# Patient Record
Sex: MALE | Race: WHITE | NOT HISPANIC OR LATINO | Employment: OTHER | URBAN - METROPOLITAN AREA
[De-identification: names, ages, dates, MRNs, and addresses within clinical notes are randomized per-mention and may not be internally consistent; named-entity substitution may affect disease eponyms.]

---

## 2023-06-05 ENCOUNTER — APPOINTMENT (OUTPATIENT)
Dept: CT IMAGING | Facility: HOSPITAL | Age: 88
End: 2023-06-05
Payer: MEDICARE

## 2023-06-05 ENCOUNTER — APPOINTMENT (OUTPATIENT)
Dept: GENERAL RADIOLOGY | Facility: HOSPITAL | Age: 88
End: 2023-06-05
Payer: MEDICARE

## 2023-06-05 ENCOUNTER — HOSPITAL ENCOUNTER (OUTPATIENT)
Facility: HOSPITAL | Age: 88
Setting detail: OBSERVATION
LOS: 1 days | Discharge: NURSING, REHAB OR LONG-TERM CARE | End: 2023-06-14
Attending: EMERGENCY MEDICINE | Admitting: INTERNAL MEDICINE
Payer: MEDICARE

## 2023-06-05 ENCOUNTER — APPOINTMENT (OUTPATIENT)
Dept: NEPHROLOGY | Facility: HOSPITAL | Age: 88
End: 2023-06-05
Payer: MEDICARE

## 2023-06-05 DIAGNOSIS — R13.12 OROPHARYNGEAL DYSPHAGIA: ICD-10-CM

## 2023-06-05 DIAGNOSIS — S09.90XA CLOSED HEAD INJURY, INITIAL ENCOUNTER: ICD-10-CM

## 2023-06-05 DIAGNOSIS — S12.690A COMPRESSION FRACTURE OF C7 VERTEBRA, INITIAL ENCOUNTER: Primary | ICD-10-CM

## 2023-06-05 DIAGNOSIS — N18.6 ESRD ON HEMODIALYSIS: ICD-10-CM

## 2023-06-05 DIAGNOSIS — Z99.2 ESRD ON HEMODIALYSIS: ICD-10-CM

## 2023-06-05 PROBLEM — I50.30 (HFPEF) HEART FAILURE WITH PRESERVED EJECTION FRACTION: Status: ACTIVE | Noted: 2022-09-24
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
## 2023-06-05 PROBLEM — S22.000A COMPRESSION FRACTURE OF THORACIC VERTEBRA: Status: ACTIVE | Noted: 2023-05-09

## 2023-06-05 PROBLEM — Z91.81 AT HIGH RISK FOR FALLS: Status: ACTIVE | Noted: 2023-04-06

## 2023-06-05 PROBLEM — S32.402A CLOSED FRACTURE OF LEFT ACETABULUM: Status: ACTIVE | Noted: 2023-03-21

## 2023-06-05 PROBLEM — F32.1 MAJOR DEPRESSIVE DISORDER, SINGLE EPISODE, MODERATE: Status: ACTIVE | Noted: 2023-04-26

## 2023-06-05 PROBLEM — N18.9 ANEMIA OF RENAL DISEASE: Status: ACTIVE | Noted: 2017-04-12

## 2023-06-05 PROBLEM — N40.0 BENIGN PROSTATIC HYPERPLASIA: Status: ACTIVE | Noted: 2017-01-11

## 2023-06-05 PROBLEM — E53.8 DEFICIENCY OF OTHER SPECIFIED B GROUP VITAMINS: Status: ACTIVE | Noted: 2023-04-26

## 2023-06-05 PROBLEM — G47.33 OSA (OBSTRUCTIVE SLEEP APNEA): Status: ACTIVE | Noted: 2022-11-23

## 2023-06-05 PROBLEM — F32.A DEPRESSION: Status: ACTIVE | Noted: 2022-11-28

## 2023-06-05 PROBLEM — K76.89 HEPATIC CYST: Status: ACTIVE | Noted: 2023-03-22

## 2023-06-05 PROBLEM — Q79.0 MORGAGNI HERNIA: Status: ACTIVE | Noted: 2023-03-21

## 2023-06-05 PROBLEM — Z79.01 LONG TERM (CURRENT) USE OF ANTICOAGULANTS: Status: ACTIVE | Noted: 2023-03-21

## 2023-06-05 PROBLEM — I10 ESSENTIAL HYPERTENSION: Status: ACTIVE | Noted: 2021-06-09

## 2023-06-05 PROBLEM — I65.01 OCCLUSION OF RIGHT VERTEBRAL ARTERY: Status: ACTIVE | Noted: 2023-03-21

## 2023-06-05 PROBLEM — E11.9 TYPE 2 DIABETES MELLITUS: Status: ACTIVE | Noted: 2023-03-21

## 2023-06-05 PROBLEM — G93.40 ENCEPHALOPATHY: Status: ACTIVE | Noted: 2023-05-09

## 2023-06-05 PROBLEM — D63.1 ANEMIA OF RENAL DISEASE: Status: ACTIVE | Noted: 2017-04-12

## 2023-06-05 LAB
ALBUMIN SERPL-MCNC: 3.1 G/DL (ref 3.5–5.2)
ALBUMIN/GLOB SERPL: 0.9 G/DL
ALP SERPL-CCNC: 200 U/L (ref 39–117)
ALT SERPL W P-5'-P-CCNC: 19 U/L (ref 1–41)
ANION GAP SERPL CALCULATED.3IONS-SCNC: 13 MMOL/L (ref 5–15)
ANION GAP SERPL CALCULATED.3IONS-SCNC: 13 MMOL/L (ref 5–15)
AST SERPL-CCNC: 25 U/L (ref 1–40)
BACTERIA UR QL AUTO: NORMAL /HPF
BASOPHILS # BLD AUTO: 0.04 10*3/MM3 (ref 0–0.2)
BASOPHILS NFR BLD AUTO: 0.5 % (ref 0–1.5)
BILIRUB SERPL-MCNC: 0.3 MG/DL (ref 0–1.2)
BILIRUB UR QL STRIP: NEGATIVE
BUN SERPL-MCNC: 37 MG/DL (ref 8–23)
BUN SERPL-MCNC: 38 MG/DL (ref 8–23)
BUN/CREAT SERPL: 7.8 (ref 7–25)
BUN/CREAT SERPL: 8.4 (ref 7–25)
CALCIUM SPEC-SCNC: 8.8 MG/DL (ref 8.6–10.5)
CALCIUM SPEC-SCNC: 9.1 MG/DL (ref 8.6–10.5)
CHLORIDE SERPL-SCNC: 93 MMOL/L (ref 98–107)
CHLORIDE SERPL-SCNC: 94 MMOL/L (ref 98–107)
CLARITY UR: CLEAR
CO2 SERPL-SCNC: 28 MMOL/L (ref 22–29)
CO2 SERPL-SCNC: 29 MMOL/L (ref 22–29)
COLOR UR: YELLOW
CREAT SERPL-MCNC: 4.5 MG/DL (ref 0.76–1.27)
CREAT SERPL-MCNC: 4.74 MG/DL (ref 0.76–1.27)
DEPRECATED RDW RBC AUTO: 54.7 FL (ref 37–54)
DEPRECATED RDW RBC AUTO: 55.1 FL (ref 37–54)
EGFRCR SERPLBLD CKD-EPI 2021: 11.2 ML/MIN/1.73
EGFRCR SERPLBLD CKD-EPI 2021: 11.9 ML/MIN/1.73
EOSINOPHIL # BLD AUTO: 0.16 10*3/MM3 (ref 0–0.4)
EOSINOPHIL NFR BLD AUTO: 1.8 % (ref 0.3–6.2)
ERYTHROCYTE [DISTWIDTH] IN BLOOD BY AUTOMATED COUNT: 16 % (ref 12.3–15.4)
ERYTHROCYTE [DISTWIDTH] IN BLOOD BY AUTOMATED COUNT: 16 % (ref 12.3–15.4)
FLUAV RNA RESP QL NAA+PROBE: NOT DETECTED
FLUBV RNA RESP QL NAA+PROBE: NOT DETECTED
GEN 5 2HR TROPONIN T REFLEX: 182 NG/L
GLOBULIN UR ELPH-MCNC: 3.3 GM/DL
GLUCOSE BLDC GLUCOMTR-MCNC: 115 MG/DL (ref 70–130)
GLUCOSE BLDC GLUCOMTR-MCNC: 71 MG/DL (ref 70–130)
GLUCOSE SERPL-MCNC: 107 MG/DL (ref 65–99)
GLUCOSE SERPL-MCNC: 115 MG/DL (ref 65–99)
GLUCOSE UR STRIP-MCNC: NEGATIVE MG/DL
HCT VFR BLD AUTO: 24.7 % (ref 37.5–51)
HCT VFR BLD AUTO: 25.7 % (ref 37.5–51)
HGB BLD-MCNC: 7.7 G/DL (ref 13–17.7)
HGB BLD-MCNC: 8.1 G/DL (ref 13–17.7)
HGB UR QL STRIP.AUTO: NEGATIVE
HYALINE CASTS UR QL AUTO: NORMAL /LPF
IMM GRANULOCYTES # BLD AUTO: 0.11 10*3/MM3 (ref 0–0.05)
IMM GRANULOCYTES NFR BLD AUTO: 1.3 % (ref 0–0.5)
IRON 24H UR-MRATE: 54 MCG/DL (ref 59–158)
IRON SATN MFR SERPL: 25 % (ref 20–50)
KETONES UR QL STRIP: NEGATIVE
LEUKOCYTE ESTERASE UR QL STRIP.AUTO: NEGATIVE
LYMPHOCYTES # BLD AUTO: 1.06 10*3/MM3 (ref 0.7–3.1)
LYMPHOCYTES NFR BLD AUTO: 12.1 % (ref 19.6–45.3)
MAGNESIUM SERPL-MCNC: 1.5 MG/DL (ref 1.6–2.4)
MCH RBC QN AUTO: 30 PG (ref 26.6–33)
MCH RBC QN AUTO: 30.6 PG (ref 26.6–33)
MCHC RBC AUTO-ENTMCNC: 31.2 G/DL (ref 31.5–35.7)
MCHC RBC AUTO-ENTMCNC: 31.5 G/DL (ref 31.5–35.7)
MCV RBC AUTO: 96.1 FL (ref 79–97)
MCV RBC AUTO: 97 FL (ref 79–97)
MONOCYTES # BLD AUTO: 0.72 10*3/MM3 (ref 0.1–0.9)
MONOCYTES NFR BLD AUTO: 8.2 % (ref 5–12)
NEUTROPHILS NFR BLD AUTO: 6.7 10*3/MM3 (ref 1.7–7)
NEUTROPHILS NFR BLD AUTO: 76.1 % (ref 42.7–76)
NITRITE UR QL STRIP: NEGATIVE
NRBC BLD AUTO-RTO: 0.3 /100 WBC (ref 0–0.2)
PH UR STRIP.AUTO: >=9 [PH] (ref 5–8)
PLATELET # BLD AUTO: 300 10*3/MM3 (ref 140–450)
PLATELET # BLD AUTO: 329 10*3/MM3 (ref 140–450)
PMV BLD AUTO: 8.3 FL (ref 6–12)
PMV BLD AUTO: 8.5 FL (ref 6–12)
POTASSIUM SERPL-SCNC: 4.5 MMOL/L (ref 3.5–5.2)
POTASSIUM SERPL-SCNC: 4.6 MMOL/L (ref 3.5–5.2)
PROT SERPL-MCNC: 6.4 G/DL (ref 6–8.5)
PROT UR QL STRIP: ABNORMAL
RBC # BLD AUTO: 2.57 10*6/MM3 (ref 4.14–5.8)
RBC # BLD AUTO: 2.65 10*6/MM3 (ref 4.14–5.8)
RBC # UR STRIP: NORMAL /HPF
REF LAB TEST METHOD: NORMAL
SARS-COV-2 RNA RESP QL NAA+PROBE: NOT DETECTED
SODIUM SERPL-SCNC: 135 MMOL/L (ref 136–145)
SODIUM SERPL-SCNC: 135 MMOL/L (ref 136–145)
SP GR UR STRIP: 1.01 (ref 1–1.03)
SQUAMOUS #/AREA URNS HPF: NORMAL /HPF
TIBC SERPL-MCNC: 216 MCG/DL (ref 298–536)
TRANSFERRIN SERPL-MCNC: 145 MG/DL (ref 200–360)
TROPONIN T DELTA: -3 NG/L
TROPONIN T SERPL HS-MCNC: 185 NG/L
UROBILINOGEN UR QL STRIP: ABNORMAL
WBC # UR STRIP: NORMAL /HPF
WBC NRBC COR # BLD: 7.22 10*3/MM3 (ref 3.4–10.8)
WBC NRBC COR # BLD: 8.79 10*3/MM3 (ref 3.4–10.8)

## 2023-06-05 PROCEDURE — G0378 HOSPITAL OBSERVATION PER HR: HCPCS

## 2023-06-05 PROCEDURE — 74230 X-RAY XM SWLNG FUNCJ C+: CPT

## 2023-06-05 PROCEDURE — 71045 X-RAY EXAM CHEST 1 VIEW: CPT

## 2023-06-05 PROCEDURE — 83735 ASSAY OF MAGNESIUM: CPT | Performed by: INTERNAL MEDICINE

## 2023-06-05 PROCEDURE — 97116 GAIT TRAINING THERAPY: CPT

## 2023-06-05 PROCEDURE — 93005 ELECTROCARDIOGRAM TRACING: CPT | Performed by: EMERGENCY MEDICINE

## 2023-06-05 PROCEDURE — 83540 ASSAY OF IRON: CPT | Performed by: INTERNAL MEDICINE

## 2023-06-05 PROCEDURE — 97166 OT EVAL MOD COMPLEX 45 MIN: CPT

## 2023-06-05 PROCEDURE — 84466 ASSAY OF TRANSFERRIN: CPT | Performed by: INTERNAL MEDICINE

## 2023-06-05 PROCEDURE — 99285 EMERGENCY DEPT VISIT HI MDM: CPT

## 2023-06-05 PROCEDURE — 96366 THER/PROPH/DIAG IV INF ADDON: CPT

## 2023-06-05 PROCEDURE — 85027 COMPLETE CBC AUTOMATED: CPT | Performed by: INTERNAL MEDICINE

## 2023-06-05 PROCEDURE — 82948 REAGENT STRIP/BLOOD GLUCOSE: CPT

## 2023-06-05 PROCEDURE — 87636 SARSCOV2 & INF A&B AMP PRB: CPT | Performed by: EMERGENCY MEDICINE

## 2023-06-05 PROCEDURE — 81001 URINALYSIS AUTO W/SCOPE: CPT | Performed by: EMERGENCY MEDICINE

## 2023-06-05 PROCEDURE — 70450 CT HEAD/BRAIN W/O DYE: CPT

## 2023-06-05 PROCEDURE — 97162 PT EVAL MOD COMPLEX 30 MIN: CPT

## 2023-06-05 PROCEDURE — 36415 COLL VENOUS BLD VENIPUNCTURE: CPT

## 2023-06-05 PROCEDURE — 96365 THER/PROPH/DIAG IV INF INIT: CPT

## 2023-06-05 PROCEDURE — 92610 EVALUATE SWALLOWING FUNCTION: CPT

## 2023-06-05 PROCEDURE — 99222 1ST HOSP IP/OBS MODERATE 55: CPT | Performed by: NURSE PRACTITIONER

## 2023-06-05 PROCEDURE — 25010000002 MAGNESIUM SULFATE IN D5W 1G/100ML (PREMIX) 1-5 GM/100ML-% SOLUTION: Performed by: INTERNAL MEDICINE

## 2023-06-05 PROCEDURE — 92611 MOTION FLUOROSCOPY/SWALLOW: CPT

## 2023-06-05 PROCEDURE — 99203 OFFICE O/P NEW LOW 30 MIN: CPT

## 2023-06-05 PROCEDURE — 82746 ASSAY OF FOLIC ACID SERUM: CPT | Performed by: INTERNAL MEDICINE

## 2023-06-05 PROCEDURE — 72125 CT NECK SPINE W/O DYE: CPT

## 2023-06-05 PROCEDURE — 82607 VITAMIN B-12: CPT | Performed by: INTERNAL MEDICINE

## 2023-06-05 PROCEDURE — G0257 UNSCHED DIALYSIS ESRD PT HOS: HCPCS

## 2023-06-05 PROCEDURE — 85025 COMPLETE CBC W/AUTO DIFF WBC: CPT | Performed by: EMERGENCY MEDICINE

## 2023-06-05 PROCEDURE — 80053 COMPREHEN METABOLIC PANEL: CPT | Performed by: EMERGENCY MEDICINE

## 2023-06-05 PROCEDURE — 84484 ASSAY OF TROPONIN QUANT: CPT | Performed by: EMERGENCY MEDICINE

## 2023-06-05 PROCEDURE — 63710000001 ONDANSETRON ODT 4 MG TABLET DISPERSIBLE: Performed by: EMERGENCY MEDICINE

## 2023-06-05 RX ORDER — NICOTINE POLACRILEX 4 MG
15 LOZENGE BUCCAL
Status: DISCONTINUED | OUTPATIENT
Start: 2023-06-05 | End: 2023-06-10

## 2023-06-05 RX ORDER — PEN NEEDLE, DIABETIC 32GX 5/32"
NEEDLE, DISPOSABLE MISCELLANEOUS
Status: ON HOLD | COMMUNITY
Start: 2023-02-23 | End: 2023-06-16

## 2023-06-05 RX ORDER — HYDROCODONE BITARTRATE AND ACETAMINOPHEN 5; 325 MG/1; MG/1
1 TABLET ORAL EVERY 4 HOURS PRN
Status: DISPENSED | OUTPATIENT
Start: 2023-06-05 | End: 2023-06-12

## 2023-06-05 RX ORDER — CHOLECALCIFEROL (VITAMIN D3) 125 MCG
5 CAPSULE ORAL NIGHTLY
COMMUNITY

## 2023-06-05 RX ORDER — HYDROCODONE BITARTRATE AND ACETAMINOPHEN 5; 325 MG/1; MG/1
TABLET ORAL EVERY 6 HOURS PRN
Status: ON HOLD | COMMUNITY
Start: 2023-04-26 | End: 2023-06-14 | Stop reason: SDUPTHER

## 2023-06-05 RX ORDER — OXYCODONE HYDROCHLORIDE AND ACETAMINOPHEN 5; 325 MG/1; MG/1
1 TABLET ORAL ONCE
Status: COMPLETED | OUTPATIENT
Start: 2023-06-05 | End: 2023-06-05

## 2023-06-05 RX ORDER — OXYCODONE HYDROCHLORIDE 5 MG/1
TABLET ORAL
COMMUNITY
Start: 2023-05-31 | End: 2023-06-14 | Stop reason: HOSPADM

## 2023-06-05 RX ORDER — ACETAMINOPHEN 325 MG/1
650 TABLET ORAL EVERY 4 HOURS PRN
Status: DISCONTINUED | OUTPATIENT
Start: 2023-06-05 | End: 2023-06-14 | Stop reason: HOSPADM

## 2023-06-05 RX ORDER — ACETAMINOPHEN 500 MG
500 TABLET ORAL EVERY 6 HOURS PRN
COMMUNITY

## 2023-06-05 RX ORDER — ACETAMINOPHEN 160 MG/5ML
650 SOLUTION ORAL EVERY 4 HOURS PRN
Status: DISCONTINUED | OUTPATIENT
Start: 2023-06-05 | End: 2023-06-14 | Stop reason: HOSPADM

## 2023-06-05 RX ORDER — METHYLPREDNISOLONE 4 MG/1
TABLET ORAL
COMMUNITY
Start: 2023-04-20 | End: 2023-06-14 | Stop reason: HOSPADM

## 2023-06-05 RX ORDER — CHOLECALCIFEROL (VITAMIN D3) 125 MCG
5 CAPSULE ORAL NIGHTLY
Status: DISCONTINUED | OUTPATIENT
Start: 2023-06-05 | End: 2023-06-14 | Stop reason: HOSPADM

## 2023-06-05 RX ORDER — LIDOCAINE 40 MG/G
1 CREAM TOPICAL EVERY 12 HOURS
COMMUNITY
Start: 2023-05-31 | End: 2023-06-14 | Stop reason: HOSPADM

## 2023-06-05 RX ORDER — SODIUM CHLORIDE 9 MG/ML
40 INJECTION, SOLUTION INTRAVENOUS AS NEEDED
Status: DISCONTINUED | OUTPATIENT
Start: 2023-06-05 | End: 2023-06-14 | Stop reason: HOSPADM

## 2023-06-05 RX ORDER — NALOXONE HYDROCHLORIDE 4 MG/.1ML
4 SPRAY NASAL
COMMUNITY
Start: 2023-04-25 | End: 2024-04-24

## 2023-06-05 RX ORDER — CARVEDILOL 3.12 MG/1
TABLET ORAL
COMMUNITY
Start: 2023-04-25 | End: 2023-06-14 | Stop reason: HOSPADM

## 2023-06-05 RX ORDER — MAGNESIUM SULFATE 1 G/100ML
1 INJECTION INTRAVENOUS
Status: DISPENSED | OUTPATIENT
Start: 2023-06-05 | End: 2023-06-05

## 2023-06-05 RX ORDER — BISACODYL 10 MG
10 SUPPOSITORY, RECTAL RECTAL DAILY PRN
Status: DISCONTINUED | OUTPATIENT
Start: 2023-06-05 | End: 2023-06-14 | Stop reason: HOSPADM

## 2023-06-05 RX ORDER — PANTOPRAZOLE SODIUM 40 MG/1
40 TABLET, DELAYED RELEASE ORAL
Status: DISCONTINUED | OUTPATIENT
Start: 2023-06-05 | End: 2023-06-14 | Stop reason: HOSPADM

## 2023-06-05 RX ORDER — INSULIN LISPRO 100 [IU]/ML
2-7 INJECTION, SOLUTION INTRAVENOUS; SUBCUTANEOUS
Status: DISCONTINUED | OUTPATIENT
Start: 2023-06-05 | End: 2023-06-10

## 2023-06-05 RX ORDER — FINASTERIDE 5 MG/1
5 TABLET, FILM COATED ORAL DAILY
COMMUNITY
Start: 2023-06-02

## 2023-06-05 RX ORDER — FLUTICASONE PROPIONATE 50 MCG
2 SPRAY, SUSPENSION (ML) NASAL DAILY
COMMUNITY

## 2023-06-05 RX ORDER — ATORVASTATIN CALCIUM 20 MG/1
20 TABLET, FILM COATED ORAL NIGHTLY
COMMUNITY

## 2023-06-05 RX ORDER — AMOXICILLIN 250 MG
2 CAPSULE ORAL 2 TIMES DAILY
Status: DISCONTINUED | OUTPATIENT
Start: 2023-06-05 | End: 2023-06-14 | Stop reason: HOSPADM

## 2023-06-05 RX ORDER — FINASTERIDE 5 MG/1
5 TABLET, FILM COATED ORAL DAILY
Status: DISCONTINUED | OUTPATIENT
Start: 2023-06-05 | End: 2023-06-14 | Stop reason: HOSPADM

## 2023-06-05 RX ORDER — POLYETHYLENE GLYCOL 3350 17 G/17G
17 POWDER, FOR SOLUTION ORAL DAILY PRN
Status: DISCONTINUED | OUTPATIENT
Start: 2023-06-05 | End: 2023-06-14 | Stop reason: HOSPADM

## 2023-06-05 RX ORDER — SODIUM CHLORIDE 0.9 % (FLUSH) 0.9 %
10 SYRINGE (ML) INJECTION AS NEEDED
Status: DISCONTINUED | OUTPATIENT
Start: 2023-06-05 | End: 2023-06-14 | Stop reason: HOSPADM

## 2023-06-05 RX ORDER — ACETAMINOPHEN 650 MG/1
650 SUPPOSITORY RECTAL EVERY 4 HOURS PRN
Status: DISCONTINUED | OUTPATIENT
Start: 2023-06-05 | End: 2023-06-14 | Stop reason: HOSPADM

## 2023-06-05 RX ORDER — SODIUM CHLORIDE 0.9 % (FLUSH) 0.9 %
10 SYRINGE (ML) INJECTION EVERY 12 HOURS SCHEDULED
Status: DISCONTINUED | OUTPATIENT
Start: 2023-06-05 | End: 2023-06-14 | Stop reason: HOSPADM

## 2023-06-05 RX ORDER — BISACODYL 5 MG/1
5 TABLET, DELAYED RELEASE ORAL DAILY PRN
Status: DISCONTINUED | OUTPATIENT
Start: 2023-06-05 | End: 2023-06-14 | Stop reason: HOSPADM

## 2023-06-05 RX ORDER — PANTOPRAZOLE SODIUM 40 MG/1
40 TABLET, DELAYED RELEASE ORAL DAILY
COMMUNITY
Start: 2023-04-04

## 2023-06-05 RX ORDER — LOSARTAN POTASSIUM 100 MG/1
TABLET ORAL
COMMUNITY
Start: 2023-02-21 | End: 2023-06-14 | Stop reason: HOSPADM

## 2023-06-05 RX ORDER — TERIPARATIDE 250 UG/ML
INJECTION, SOLUTION SUBCUTANEOUS
COMMUNITY
Start: 2023-04-11

## 2023-06-05 RX ORDER — ATORVASTATIN CALCIUM 20 MG/1
20 TABLET, FILM COATED ORAL DAILY
Status: DISCONTINUED | OUTPATIENT
Start: 2023-06-05 | End: 2023-06-14 | Stop reason: HOSPADM

## 2023-06-05 RX ORDER — TRAZODONE HYDROCHLORIDE 50 MG/1
25-50 TABLET ORAL NIGHTLY PRN
COMMUNITY
Start: 2023-03-16

## 2023-06-05 RX ORDER — IBUPROFEN 600 MG/1
1 TABLET ORAL
Status: DISCONTINUED | OUTPATIENT
Start: 2023-06-05 | End: 2023-06-10

## 2023-06-05 RX ORDER — DEXTROSE MONOHYDRATE 25 G/50ML
25 INJECTION, SOLUTION INTRAVENOUS
Status: DISCONTINUED | OUTPATIENT
Start: 2023-06-05 | End: 2023-06-10

## 2023-06-05 RX ORDER — ONDANSETRON 4 MG/1
4 TABLET, ORALLY DISINTEGRATING ORAL ONCE
Status: COMPLETED | OUTPATIENT
Start: 2023-06-05 | End: 2023-06-05

## 2023-06-05 RX ORDER — FLUTICASONE PROPIONATE 50 MCG
1 SPRAY, SUSPENSION (ML) NASAL DAILY
Status: DISCONTINUED | OUTPATIENT
Start: 2023-06-05 | End: 2023-06-14 | Stop reason: HOSPADM

## 2023-06-05 RX ADMIN — METOPROLOL TARTRATE 6.25 MG: 25 TABLET, FILM COATED ORAL at 20:30

## 2023-06-05 RX ADMIN — MAGNESIUM SULFATE HEPTAHYDRATE 1 G: 1 INJECTION, SOLUTION INTRAVENOUS at 10:49

## 2023-06-05 RX ADMIN — FLUTICASONE PROPIONATE 1 SPRAY: 50 SPRAY, METERED NASAL at 09:56

## 2023-06-05 RX ADMIN — Medication 5 MG: at 20:33

## 2023-06-05 RX ADMIN — MAGNESIUM SULFATE HEPTAHYDRATE 1 G: 1 INJECTION, SOLUTION INTRAVENOUS at 10:30

## 2023-06-05 RX ADMIN — BARIUM SULFATE 50 ML: 400 SUSPENSION ORAL at 14:25

## 2023-06-05 RX ADMIN — ONDANSETRON 4 MG: 4 TABLET, ORALLY DISINTEGRATING ORAL at 02:58

## 2023-06-05 RX ADMIN — Medication 10 ML: at 09:56

## 2023-06-05 RX ADMIN — HYDROCODONE BITARTRATE AND ACETAMINOPHEN 1 TABLET: 5; 325 TABLET ORAL at 20:33

## 2023-06-05 RX ADMIN — MAGNESIUM SULFATE HEPTAHYDRATE 1 G: 1 INJECTION, SOLUTION INTRAVENOUS at 09:56

## 2023-06-05 RX ADMIN — Medication 10 ML: at 20:35

## 2023-06-05 RX ADMIN — BARIUM SULFATE 20 ML: 400 PASTE ORAL at 14:26

## 2023-06-05 RX ADMIN — OXYCODONE HYDROCHLORIDE AND ACETAMINOPHEN 1 TABLET: 5; 325 TABLET ORAL at 02:58

## 2023-06-05 RX ADMIN — BARIUM SULFATE 100 ML: 0.81 POWDER, FOR SUSPENSION ORAL at 14:26

## 2023-06-05 RX ADMIN — BARIUM SULFATE 10 ML: 400 SUSPENSION ORAL at 14:25

## 2023-06-05 NOTE — PROGRESS NOTES
Malnutrition Severity Assessment    Patient Name:  Enrike Tomas  YOB: 1935  MRN: [id number]  Admit Date:  6/5/2023    Patient meets criteria for : Severe Malnutrition    Comments:  Based on patient recent wt loss and poor PO intake, patient meets criteria for severe malnutrition r/t acute illness/injury.  Nutrition Focused Physical Exam completed to determine fat and muscle wasting.  MD please attest and include in pt diagnosis as you deem appropriate.      Malnutrition Severity Assessment  Malnutrition Type: Acute Disease or Injury - Related Malnutrition  Malnutrition Type (last 8 hours)       Malnutrition Severity Assessment       Row Name 06/05/23 1036       Malnutrition Severity Assessment    Malnutrition Type Acute Disease or Injury - Related Malnutrition      Row Name 06/05/23 1036       Insufficient Energy Intake     Insufficient Energy Intake Findings Severe    Insufficient Energy Intake  < or equal to 50% of est. energy requirement for > or equal to 5d)      Row Name 06/05/23 1036       Unintentional Weight Loss     Unintentional Weight Loss Findings Severe    Unintentional Weight Loss  Weight loss greater than 5% in one month      Row Name 06/05/23 1036       Muscle Loss    Loss of Muscle Mass Findings Severe    [place] Severe - deep hollowing/scooping, lack of muscle to touch, facial bones well defined    Clavicle Bone Region Severe - protruding prominent bone    Acromion Bone Region Severe - squared shoulders, bones, and acromion process protrusion prominent    Scapular Bone Region Severe - prominent bones, depressions easily visible between ribs, scapula, spine, shoulders    Dorsal Hand Region Severe - prominent depression    Patellar Region Severe - prominent bone, square looking, very little muscle definition    Anterior Thigh Region Severe - line/depression along thigh, obviously thin    Posterior Calf Region Severe - thin with very little definition/firmness      Row Name  06/05/23 1036       Fat Loss    Subcutaneous Fat Loss Findings Severe    Orbital Region  Severe - pronounced hollowness/depression, dark circles, loose saggy skin    Upper Arm Region Severe - mostly skin, very little space between folds, fingers touch    Thoracic & Lumbar Region Severe - ribs visible with prominent depressions, iliac crest very prominent      Row Name 06/05/23 1036       Criteria Met (Must meet criteria for severity in at least 2 of these categories: M Wasting, Fat Loss, Fluid, Secondary Signs, Wt. Status, Intake)    Patient meets criteria for  Severe Malnutrition                    Electronically signed by:  Shagufta Dorman RD  06/05/23 10:37 EDT

## 2023-06-05 NOTE — PLAN OF CARE
Goal Outcome Evaluation:  Plan of Care Reviewed With: patient, spouse, daughter        Progress: no change  Outcome Evaluation: PT initial eval completed. Pt presents with generalized weakness, impaired balance, and limited ROM causing functional mobility below baseline. Spinal precautions reviewed, cervical collar in place. Pt ambulated 80' with min Ax2 and FWW. Pt reports multiple falls over last 6 months and will benefit from further IPPT for addressing deficits. PT rec d/c to IPR for best functional outcomes.

## 2023-06-05 NOTE — PLAN OF CARE
Goal Outcome Evaluation:  Plan of Care Reviewed With: patient, spouse, daughter        Progress: no change (Initial Eval)  Outcome Evaluation: Pt presenting w/ impaired balance, mobility and transfers limiting independence w/ ADL based tasks. Pt educated on spinal precautions and log rolling for ingressing/egressing bed. pt requiring significant assist w/ UB and LB dressing. Will issue AE and initiate training next session. IP OT warranted to address deficits. Recommend inpatient rehab at d/c for best functional outcome.

## 2023-06-05 NOTE — CONSULTS
Patient Care Team:  Blair Dhaliwal MD as PCP - General (Internal Medicine)    Chief complaint: Recurrent fall   Thoracic and cervical fracture     History of Present Illness:  Mr. Tomas is an 88-year-old male with a past medical history significant for ESRD on hemodialysis every Monday, Wednesday and Friday, A-fib which she is on Eliquis therapy for, GERD, BPH, hypertension, hyperlipidemia, orthostatic hypotension, T2DM, depression.  Patient initially presented to [facility] emergency department on 6/05/2023 with complaint of fall and neck pain. Found to have C7 fracture. In neck collar at present. Nephrology has been consulted for dialysis needs. Patient has been on HD for 5 yrs. He normally gets HD at [facility] nephrology. He gets HD on [facility] [place].     Review of Systems   Constitutional: Negative.    HENT: Negative.     Respiratory: Negative.     Cardiovascular: Negative.    Gastrointestinal: Negative.    Genitourinary: Negative.    Musculoskeletal: Negative.    Neurological: Negative.    Hematological: Negative.    Psychiatric/Behavioral: Negative.        Past Medical History:   Diagnosis Date    Atrial fibrillation     BPH (benign prostatic hyperplasia)     Diabetes mellitus     Diverticulosis     ESRD on hemodialysis     GERD (gastroesophageal reflux disease)     Heart failure     Hyperlipidemia     Hypertension     NERY (obstructive sleep apnea)     Recurrent falls    ,   Past Surgical History:   Procedure Laterality Date    CATARACT EXTRACTION      DIALYSIS FISTULA CREATION      INGUINAL HERNIA REPAIR      PROSTATE BIOPSY      TONSILLECTOMY AND ADENOIDECTOMY     ,   Family History   Problem Relation Age of Onset    Stomach cancer Mother     Heart disease Mother     Arthritis Mother     Heart disease Father     Stroke Father     Kidney failure Father     Cancer Father    ,   Social History     Socioeconomic History    Marital status:    Tobacco Use    Smoking status: Never     Smokeless tobacco: Never   Vaping Use    Vaping Use: Never used   Substance and Sexual Activity    Alcohol use: Not Currently    Drug use: Never     E-cigarette/Vaping    E-cigarette/Vaping Use Never User      E-cigarette/Vaping Substances     E-cigarette/Vaping Devices         ,   Medications Prior to Admission   Medication Sig Dispense Refill Last Dose    acetaminophen (TYLENOL) 500 MG tablet Take 1 tablet by mouth Every 6 (Six) Hours As Needed for Mild Pain.   Past Month    apixaban (ELIQUIS) 2.5 MG tablet tablet Take 1 tablet by mouth 2 (Two) Times a Day.   6/4/2023    atorvastatin (LIPITOR) 20 MG tablet Take 1 tablet by mouth Daily.   6/4/2023    BD Pen Needle Makenna U/F 32G X 4 MM misc        carvedilol (COREG) 3.125 MG tablet        finasteride (PROSCAR) 5 MG tablet    6/4/2023    fluticasone (FLONASE) 50 MCG/ACT nasal spray 1 spray into the nostril(s) as directed by provider.   Past Week    HYDROcodone-acetaminophen (NORCO) 5-325 MG per tablet Take  by mouth Every 6 (Six) Hours As Needed.   Past Month    lidocaine (LMX) 4 % cream Apply 1 application topically to the appropriate area as directed Every 12 (Twelve) Hours.   6/4/2023    losartan (COZAAR) 100 MG tablet        melatonin 5 MG tablet tablet Take 1 tablet by mouth.   Past Week    methylPREDNISolone (MEDROL) 4 MG dose pack follow package directions       metoprolol tartrate (LOPRESSOR) 25 MG tablet Take 6.25 mg by mouth.   6/4/2023    oxyCODONE (ROXICODONE) 5 MG immediate release tablet    Past Month    pantoprazole (PROTONIX) 40 MG EC tablet Take 1 tablet by mouth.   6/4/2023    sertraline (ZOLOFT) 50 MG tablet Take 1 tablet by mouth Daily.   6/4/2023    Teriparatide, Recombinant, (FORTEO) 620 MCG/2.48ML injection INJECT 20MCG UNDER THE SKIN ONCE DAILY AS DIRECTED. DISCARD 28 DAYS AFTER FIRST INJECTION.   6/4/2023    traZODone (DESYREL) 50 MG tablet Take 25-50 mg by mouth At Night As Needed.   Past Month    CYANOCOBALAMIN IJ 1 vial.   More than a month     naloxone (NARCAN) 4 MG/0.1ML nasal spray 1 spray into the nostril(s) as directed by provider.      , and Scheduled Meds:  atorvastatin, 20 mg, Oral, Daily  finasteride, 5 mg, Oral, Daily  fluticasone, 1 spray, Nasal, Daily  insulin lispro, 2-7 Units, Subcutaneous, 4x Daily AC & at Bedtime  melatonin, 5 mg, Oral, Nightly  metoprolol tartrate, 6.25 mg, Oral, Q12H  pantoprazole, 40 mg, Oral, Q AM  senna-docusate sodium, 2 tablet, Oral, BID  sertraline, 50 mg, Oral, Daily  sodium chloride, 10 mL, Intravenous, Q12H       Objective     Vital Signs  Temp:  [97.8 °F (36.6 °C)-98.3 °F (36.8 °C)] 97.8 °F (36.6 °C)  Heart Rate:  [57-81] 57  Resp:  [18-20] 18  BP: (119-141)/(62-88) 141/74    No intake/output data recorded.  No intake/output data recorded.    Physical Exam  Constitutional:       General: He is not in acute distress.     Appearance: Normal appearance. He is not ill-appearing.   HENT:      Head: Normocephalic and atraumatic.      Right Ear: Tympanic membrane normal.      Left Ear: Tympanic membrane normal.      Nose: Nose normal.      Mouth/Throat:      Mouth: Mucous membranes are moist.   Eyes:      Extraocular Movements: Extraocular movements intact.      Pupils: Pupils are equal, round, and reactive to light.   Cardiovascular:      Rate and Rhythm: Normal rate and regular rhythm.      Pulses: Normal pulses.      Heart sounds: Normal heart sounds.   Pulmonary:      Effort: Pulmonary effort is normal.      Breath sounds: Normal breath sounds.   Abdominal:      General: Abdomen is flat.   Musculoskeletal:         General: Normal range of motion.      Right lower leg: No edema.      Left lower leg: No edema.   Skin:     General: Skin is warm.   Neurological:      General: No focal deficit present.      Mental Status: He is alert and oriented to person, place, and time. Mental status is at baseline.   Psychiatric:         Mood and Affect: Mood normal.       Results Review:    I reviewed the patient's new clinical  results.    WBC WBC   Date Value Ref Range Status   06/05/2023 7.22 3.40 - 10.80 10*3/mm3 Final   06/05/2023 8.79 3.40 - 10.80 10*3/mm3 Final      HGB Hemoglobin   Date Value Ref Range Status   06/05/2023 8.1 (L) 13.0 - 17.7 g/dL Final   06/05/2023 7.7 (L) 13.0 - 17.7 g/dL Final      HCT Hematocrit   Date Value Ref Range Status   06/05/2023 25.7 (L) 37.5 - 51.0 % Final   06/05/2023 24.7 (L) 37.5 - 51.0 % Final      Platlets No results found for: LABPLAT   MCV MCV   Date Value Ref Range Status   06/05/2023 97.0 79.0 - 97.0 fL Final   06/05/2023 96.1 79.0 - 97.0 fL Final          Sodium Sodium   Date Value Ref Range Status   06/05/2023 135 (L) 136 - 145 mmol/L Final   06/05/2023 135 (L) 136 - 145 mmol/L Final      Potassium Potassium   Date Value Ref Range Status   06/05/2023 4.5 3.5 - 5.2 mmol/L Final   06/05/2023 4.6 3.5 - 5.2 mmol/L Final      Chloride Chloride   Date Value Ref Range Status   06/05/2023 94 (L) 98 - 107 mmol/L Final   06/05/2023 93 (L) 98 - 107 mmol/L Final      CO2 CO2   Date Value Ref Range Status   06/05/2023 28.0 22.0 - 29.0 mmol/L Final   06/05/2023 29.0 22.0 - 29.0 mmol/L Final      BUN BUN   Date Value Ref Range Status   06/05/2023 37 (H) 8 - 23 mg/dL Final   06/05/2023 38 (H) 8 - 23 mg/dL Final      Creatinine Creatinine   Date Value Ref Range Status   06/05/2023 4.74 (H) 0.76 - 1.27 mg/dL Final   06/05/2023 4.50 (H) 0.76 - 1.27 mg/dL Final      Calcium Calcium   Date Value Ref Range Status   06/05/2023 8.8 8.6 - 10.5 mg/dL Final   06/05/2023 9.1 8.6 - 10.5 mg/dL Final      PO4 No results found for: CAPO4   Albumin Albumin   Date Value Ref Range Status   06/05/2023 3.1 (L) 3.5 - 5.2 g/dL Final      Magnesium Magnesium   Date Value Ref Range Status   06/05/2023 1.5 (L) 1.6 - 2.4 mg/dL Final      Uric Acid No results found for: URICACID         Assessment & Plan       Compression fracture of C7 vertebra, initial encounter    (HFpEF) heart failure with preserved ejection fraction    Benign  prostatic hyperplasia    Closed fracture of left acetabulum    Encephalopathy    ESRD (end stage renal disease)    Essential hypertension    GERD (gastroesophageal reflux disease)    Mixed hyperlipidemia    NERY (obstructive sleep apnea)    Type 2 diabetes mellitus    Fall      Assessment & Plan    ESRD: MWF [facility].  nephrology. [address]     Anemia: JENNYFER on HD days    Recurrent fall: hx of thoracic vertebrae fracture now with C7 fracture.     BMD: Check phos/PTH ( Obtain records from HD unit)    Recs  Seen on HD tolerating well. No active complaints. UF 2.5 liter as tolerated. Good access pressure        I discussed the patients findings and my recommendations with patient    Pancho Carpenter MD  06/05/23  17:15 EDT

## 2023-06-05 NOTE — H&P
[facility] Medicine Services  HISTORY AND PHYSICAL    Patient Name: Enrike Tomas  : 1935  MRN: [id number]  Primary Care Physician: Blair Dhaliwal MD  Date of admission: 2023    Subjective   Subjective     Chief Complaint:  Fall, neck pain    HPI:  Enrike Tomas is a 88 y.o. male with PMH significant for ESRD on HD M, W, F, A-fib on Eliquis, GERD, BPH, HTN, HLD, orthostatic hypotension, DM2, depression, who presents to the ED with complaint of fall and neck pain.  He recently had admission to  2023 - 2023 secondary to fall and T7 fracture.  He ultimately was discharged home instead of acute rehab.  His wife who is at bedside helps provide HPI.  She notes that since arriving home he has had increased confusion.  He has been compliant with dialysis and medications.  She also notes some increased edema.  Tonight, she states that he got up and was confused about what time it was.  She states that he made it out to the garage and down the steps where he then apparently lost his balance and fell backwards hitting his head on the fender of the car.  He normally uses a walker when walking at home.  She states that given his intermittent confusion and ongoing weakness and poor balance with recurrent falls, she is unable to provide good care for him at home.  Upon arrival to the ED, labs note elevated troponin as well as elevated creatinine.  He is also anemic.  CXR is negative for acute findings.  CT cervical spine notes acute superior endplate compression fracture of C7 with 30% height loss.  CT head notes right parietal scalp soft tissue swelling without acute intracranial abnormality.  He will be admitted to hospital medicine for further evaluation.      Review of Systems   Constitutional:  Positive for activity change. Negative for appetite change, chills, diaphoresis, fatigue, fever and unexpected weight change.   HENT: Negative.  Negative for congestion and trouble  swallowing.    Eyes: Negative.  Negative for visual disturbance.   Respiratory: Negative.  Negative for cough, choking, chest tightness, shortness of breath and wheezing.    Cardiovascular:  Positive for leg swelling. Negative for chest pain and palpitations.   Gastrointestinal: Negative.  Negative for abdominal distention, abdominal pain, constipation, diarrhea, nausea and vomiting.   Endocrine: Negative.  Negative for polydipsia and polyphagia.   Genitourinary:  Positive for decreased urine volume. Negative for difficulty urinating, dysuria and hematuria.        On hemodialysis   Musculoskeletal:  Positive for arthralgias, gait problem and neck pain. Negative for back pain and joint swelling.   Skin:  Negative for color change, pallor and wound.   Allergic/Immunologic: Negative.  Negative for immunocompromised state.   Neurological:  Positive for dizziness, speech difficulty, weakness and light-headedness. Negative for tremors, facial asymmetry and headaches.   Hematological: Negative.  Does not bruise/bleed easily.   Psychiatric/Behavioral:  Positive for confusion. The patient is not nervous/anxious.           Personal History     Past Medical History:   Diagnosis Date    Atrial fibrillation     BPH (benign prostatic hyperplasia)     Diabetes mellitus     Diverticulosis     ESRD on hemodialysis     GERD (gastroesophageal reflux disease)     Heart failure     Hyperlipidemia     Hypertension     NERY (obstructive sleep apnea)     Recurrent falls          Past Surgical History:   Procedure Laterality Date    CATARACT EXTRACTION      DIALYSIS FISTULA CREATION      INGUINAL HERNIA REPAIR      PROSTATE BIOPSY      TONSILLECTOMY AND ADENOIDECTOMY         Family History:  family history includes Arthritis in his mother; Cancer in his father; Heart disease in his father and mother; Kidney failure in his father; Stomach cancer in his mother; Stroke in his father.     Social History:  reports that he has never smoked. He has  never used smokeless tobacco. He reports that he does not currently use alcohol. He reports that he does not use drugs.  Social History     Social History Narrative    Not on file       Medications:  Cyanocobalamin, HYDROcodone-acetaminophen, Insulin Pen Needle, Teriparatide (Recombinant), acetaminophen, apixaban, atorvastatin, carvedilol, finasteride, fluticasone, lidocaine, losartan, melatonin, methylPREDNISolone, metoprolol tartrate, naloxone, oxyCODONE, pantoprazole, sertraline, and traZODone    No Known Allergies    Objective   Objective     Vital Signs:   Temp:  [98 °F (36.7 °C)-98.1 °F (36.7 °C)] 98.1 °F (36.7 °C)  Heart Rate:  [64-81] 65  Resp:  [18-20] 18  BP: (119-139)/(62-88) 125/66  Flow (L/min):  [2] 2    Physical Exam   Constitutional: Sleeping, easily awakens  Eyes: PERRLA, sclerae anicteric, no conjunctival injection  HENT: NCAT, mucous membranes moist  Neck: Supple, no thyromegaly, no lymphadenopathy, trachea midline, hard collar in place  Respiratory: Clear to auscultation bilaterally, nonlabored respirations   Cardiovascular: RRR, + murmur, no rubs, or gallops, palpable pedal pulses bilaterally  Gastrointestinal: Positive bowel sounds, soft, nontender, nondistended  Musculoskeletal: Trace bilateral ankle edema, no clubbing or cyanosis to extremities  Psychiatric: Appropriate affect, cooperative  Neurologic: Oriented to self, situation disoriented to time, place, strength symmetric in all extremities, Cranial Nerves grossly intact to confrontation, speech clear  Skin: No rashes      Result Review:  I have personally reviewed the results from the time of this admission to 6/5/2023 06:13 EDT and agree with these findings:  [x]  Laboratory list / accordion  []  Microbiology  [x]  Radiology  []  EKG/Telemetry   []  Cardiology/Vascular   []  Pathology  [x]  Old records  []  Other:  Most notable findings include:     LAB RESULTS:      Lab 06/05/23  0324 05/30/23  0533 05/29/23  0713   WBC 8.79 6.82 6.35    HEMOGLOBIN 7.7* 8.2* 7.8*   HEMATOCRIT 24.7* 25.1* 23.4*   PLATELETS 300 342 360   NEUTROS ABS 6.70 4.46 3.91   IMMATURE GRANS (ABS) 0.11* 0.11* 0.11*   LYMPHS ABS 1.06 1.22 1.26   MONOS ABS 0.72 0.66 0.64   EOS ABS 0.16 0.29 0.36   MCV 96.1 94 93         Lab 06/05/23  0514 06/05/23  0324   SODIUM 135* 135*   POTASSIUM 4.5 4.6   CHLORIDE 94* 93*   CO2 28.0 29.0   ANION GAP 13.0 13.0   BUN 37* 38*   CREATININE 4.74* 4.50*   EGFR 11.2* 11.9*   GLUCOSE 107* 115*   CALCIUM 8.8 9.1   MAGNESIUM  --  1.5*         Lab 06/05/23  0324   TOTAL PROTEIN 6.4   ALBUMIN 3.1*   GLOBULIN 3.3   ALT (SGPT) 19   AST (SGOT) 25   BILIRUBIN 0.3   ALK PHOS 200*         Lab 06/05/23  0514 06/05/23  0324   HSTROP T 182* 185*             Lab 06/05/23  0324   IRON 54*   IRON SATURATION 25   TIBC 216*   TRANSFERRIN 145*         Brief Urine Lab Results  (Last result in the past 365 days)        Color   Clarity   Blood   Leuk Est   Nitrite   Protein   CREAT   Urine HCG        05/09/23 1317 Yellow   Clear     Negative                     Microbiology Results (last 10 days)       Procedure Component Value - Date/Time    COVID PRE-OP / PRE-PROCEDURE SCREENING ORDER (NO ISOLATION) - Swab, Nasopharynx [[id number]]  (Normal) Collected: 06/05/23 0324    Lab Status: Final result Specimen: Swab from Nasopharynx Updated: 06/05/23 0402    Narrative:      The following orders were created for panel order COVID PRE-OP / PRE-PROCEDURE SCREENING ORDER (NO ISOLATION) - Swab, Nasopharynx.  Procedure                               Abnormality         Status                     ---------                               -----------         ------                     COVID-19 and FLU A/B PCR...[[id number]]  Normal              Final result                 Please view results for these tests on the individual orders.    COVID-19 and FLU A/B PCR - Swab, Nasopharynx [[id number]]  (Normal) Collected: 06/05/23 0324    Lab Status: Final result Specimen: Swab from Nasopharynx  Updated: 06/05/23 0402     COVID19 Not Detected     Influenza A PCR Not Detected     Influenza B PCR Not Detected    Narrative:      Fact sheet for providers: https://www.fda.gov/media/553329/download    Fact sheet for patients: https://www.fda.gov/media/571282/download    Test performed by PCR.            CT Head Without Contrast    Result Date: 6/5/2023  EXAMINATION: CT HEAD WITHOUT CONTRAST DATE: 6/5/2023 1:41 AM INDICATION: Trauma, Pain COMPARISON: None available at the time of this dictation. TECHNIQUE: Noncontrast imaging obtained from the vertex to the skull base.  CT dose lowering techniques were used, to include: automated exposure control, adjustment for patient size, and or use of iterative reconstruction.? FINDINGS: Soft Tissues: Mild right parietal scalp soft tissue swelling. Skull: No underlying skull fracture or radiopaque foreign body. Sinuses: Paranasal sinuses are clear. Mastoids: Mastoid air cells are clear. Globes and Orbits: Globes and orbits are intact. Brain: No acute hemorrhage.  No midline shift, masses, or mass effect.  No evidence of acute infarct by noncontrast CT. Ventricles and Cisterns: Ventricular size and configuration is within normal limits. Basal cisterns are patent. No abnormal extra-axial fluid collection. Senescent Changes: Mild to moderate volume loss and mild chronic microvascular ischemic changes. Arteriosclerosis.     Impression: 1. Mild right parietal scalp soft tissue swelling. 2. No acute intracranial abnormality. 3. Mild to moderate volume loss and mild chronic microvascular ischemic changes. Arteriosclerosis.  Electronically signed by:  Richard Malin M.D.  6/5/2023 12:01 AM Mountain Time    CT Cervical Spine Without Contrast    Result Date: 6/5/2023  EXAMINATION: CT CERVICAL SPINE WO CONTRAST DATE: 6/5/2023 1:41 AM  INDICATION: Trauma, fall  COMPARISON: None available. TECHNIQUE: Thin section axial noncontrast images were obtained through the cervical spine.   Sagittal and coronal reformatted images were created.  Images were reviewed in bone and soft tissue windows. CT dose lowering techniques were used, to include: automated exposure control, adjustment for patient size, and or use of iterative reconstruction. FINDINGS: Vertebral column: Straightening of the normal cervical lordosis. There is an acute superior endplate compression deformity of C7 with approximately 30% anterior height loss. No bony retropulsion. Remaining vertebral body heights are maintained. No additional acute fractures in the cervical spine. Multilevel intervertebral disc space narrowing with facet and uncovertebral joint degenerative changes. Resulting mild spinal canal narrowing at C3-C4 and C4-C5. Multilevel neuroforaminal stenosis, greatest on the right at C3-4 and C4-5. Soft tissues: Lung apices are clear. Carotid atherosclerosis. Multiple punctate calcifications noted in the left parotid gland, nonspecific.     Impression: 1.  Acute superior endplate compression fracture at C7 with approximately 30% height loss. No bony retropulsion. 2.  No other acute fractures. Normal facet joint alignment. 3.  Multilevel cervical spondylosis greatest at C3-4 to C5-6. I communicated these results by phone to  Dr. Flora Cantu at 12:18 AM Mountain time on 6/5/2023. The results were communicated back and were said to be understood. Electronically signed by:  Abdon Steele M.D.  6/5/2023 12:18 AM Mountain Time    XR Chest 1 View    Result Date: 6/5/2023  Examination: XR CHEST 1 VW Date and Time: 6/5/2023 4:01 AM Clinical Information: 88 years, Male, . Mental status change. Comparison: None FINDINGS: Heart size/mediastinum/mira: The heart size is normal. There is moderate calcific plaque at the aortic arch. Lung fields: The lungs are hypoventilated. Lung bases/Retrocardiac/Pleura:  No effusion, retrocardiac density, pneumothorax or pleural parenchymal abnormality is demonstrated. Trachea/Paravertebral soft  tissues: The trachea and paravertebral soft tissues appear normal. Upper abdomen: No free air is demonstrated. Osseous structures: The bones are normally mineralized.     Impression: No consolidation, atelectasis or effusion is demonstrated. Thank you for the referral of this patient. This exam was interpreted by an American Board of Radiology certified radiologist with subspecialty fellowship training. If there are any questions regarding this exam please feel free to contact a radiologist directly at [phone]. Slot 70 Electronically signed by:  Geovanny Musa M.D.  6/5/2023 2:54 AM Mountain Time         Assessment & Plan   Assessment & Plan       Compression fracture of C7 vertebra, initial encounter    (HFpEF) heart failure with preserved ejection fraction    Benign prostatic hyperplasia    Closed fracture of left acetabulum    Encephalopathy    ESRD (end stage renal disease)    Essential hypertension    GERD (gastroesophageal reflux disease)    Mixed hyperlipidemia    NERY (obstructive sleep apnea)    Type 2 diabetes mellitus    Fall    88 y.o. male with PMH significant for ESRD on HD M, W, F, A-fib on Eliquis, GERD, BPH, HTN, HLD, orthostatic hypotension, DM2, depression, who presents to the ED with complaint of fall and neck pain who was found to have concern for acute compression fracture of C7.    Compression fracture of C7  Recurrent falls  - Neurosurgery consult in the a.m. (contacted by ED)  - Hard collar in place  - P.o. as needed pain control  - Recent admission to  x3 weeks secondary to T7 fracture  - PT/OT consult in the a.m.  - Case management consult in the a.m.  - Fall precautions    ESRD  - On HD M, W, F  - Nephrology consult    Afib  -holding eliquis due to falls, anemia    Anemia  - Iron studies  - Stool occult  - Type and screen  - H&H previously 11.7/33.8 on 3/29/2023  - Hold Eliquis for now    Diabetes mellitus 2  - FSBG ACHS  - SS insulin  - Hemoglobin A1c in the  a.m.    Hypertension  Hyperlipidemia  History of orthostatic hypotension  - Continue metoprolol with hold parameters  - Atorvastatin  - Orthostatic hypotension likely playing a role in frequent falls    BPH  - Continue Proscar    GERD  - Continue PPI    Depression  - Continue Zoloft      DVT prophylaxis:  on Eliquis, holding for now secondary to head injury and frequent falls    CODE STATUS:  discussed with wife at bedside   Medical Intervention Limits: NO intubation (DNI)  Code Status (Patient has no pulse and is not breathing): No CPR (Do Not Attempt to Resuscitate)  Medical Interventions (Patient has pulse or is breathing): Limited Support      Expected Discharge  Expected Discharge Date: 6/7/2023; Expected Discharge Time:       Signature: Electronically signed by MERCEDEZ Cui, 06/05/23, 6:13 AM EDT.  Total time spent: 63 minutes   Time spent includes time reviewing chart, face-to-face time, counseling patient/family/caregiver, ordering medications/tests/procedures, communicating with other health care professionals, documenting clinical information in the electronic health record, and coordination of care.        Attending   Admission Attestation       I have performed an independent face-to-face diagnostic evaluation including performing an independent physical examination as documented here.  The documented plan of care above was reviewed and developed with the advanced practice clinician (APC).      Brief Summary Statement:   Enrike Tomas is a 88 y.o. male w/ afib, esrd (m/w/f HD), dm, frequent falls who presented after a fall this evening. Got disoriented, walked without his walker and fall causing neck pain. In ED ct imaging show c7 wedge compression fracture. ED spoke w/ neurosugery who recommended hard collar and admission w/ neurosurgery consultation.    Remainder of detailed HPI is as noted by APC and has been reviewed and/or edited by me for completeness.    Attending Physical Exam:  Temp:   [98 °F (36.7 °C)-98.1 °F (36.7 °C)] 98.1 °F (36.7 °C)  Heart Rate:  [64-81] 65  Resp:  [18-20] 18  BP: (119-139)/(62-88) 125/66  Flow (L/min):  [2] 2  Constitutional:Alert, oriented x 3, nontoxic appearing, elderly, hard neck collar in place  Psych:Normal/appropriate affect  HEENT:NCAT, oropharynx clear  Neck: neck supple, full range of motion  Neuro: Face symmetric, speech clear, equal , moves all extremities  Cardiac: RRR; No pretibial pitting edema  Resp: CTAB, normal effort  GI: abd soft, nontender  Skin: No extremity rash  Musculoskeletal/extremities: no cyanosis of extremities; no significant ankle edema          Brief Assessment/Plan :  See detailed assessment and plan developed with APC which I have reviewed and/or edited for completeness.            Yanick Reed MD  06/05/23

## 2023-06-05 NOTE — ED PROVIDER NOTES
Subjective   History of Present Illness  88-year-old male brought by EMS for evaluation of head injury after fall.  Patient was recently discharged from the hospital at .  He was admitted there for 3 weeks.  He has been home for about 4 days.  He woke up this morning around midnight and was confused and thought it was morning.  He made some coffee and walked out into the garage and fell.  He hit the back of his head.  He takes Eliquis for atrial fibrillation chronically.  He complains of right-sided neck pain anteriorly.  He denies any other injuries.  His wife does not feel that she can take care of him at home.  She has tried to get him into a rehab facility, but has been unsuccessful.  The patient has difficulty with left-sided shoulder mobility.  He does have a walker at home, but has difficulty using it due to his left shoulder problems.  This is his third fall in the recent past.        No past medical history on file.  Atrial fibrillation, end-stage renal disease on hemodialysis Monday Wednesday Friday at [facility]    No Known Allergies    No past surgical history on file.  Right upper extremity dialysis access    No family history on file.    Social History     Socioeconomic History    Marital status:            Objective   Physical Exam  Vitals and nursing note reviewed.   Constitutional:       General: He is not in acute distress.     Appearance: He is not diaphoretic.   HENT:      Head: Normocephalic.      Comments: Occipital cephalohematoma with mild tenderness to palpation, no palpable skull fracture.     Mouth/Throat:      Comments: Moist mucosa without pallor  Eyes:      General: No scleral icterus.     Extraocular Movements: Extraocular movements intact.      Comments: No photophobia   Neck:      Comments: No midline cervical spine tenderness or step-off.  Cardiovascular:      Rate and Rhythm: Normal rate and regular rhythm.      Heart sounds: No murmur heard.    No friction rub. No gallop.       Comments: S1, S2.  Pulmonary:      Effort: Pulmonary effort is normal. No respiratory distress.      Breath sounds: No stridor. No wheezing, rhonchi or rales.      Comments: No respiratory distress, good air entry.  Abdominal:      Palpations: Abdomen is soft.      Tenderness: There is no abdominal tenderness. There is no guarding or rebound.   Musculoskeletal:         General: No deformity.      Comments: 1+ peripheral edema, bilateral, symmetric, without signs of cellulitis.  Palpable thrill right medial arm   Skin:     General: Skin is warm and dry.   Neurological:      Mental Status: He is alert.      Comments: Normal speech, no dysarthria.  No facial droop.  Moves all extremities.  Sensation grossly intact to light touch.       Procedures - I applied the [place] collar/reapplied it as the RN had applied it and it settled and was too loose.           ED Course                                   TRISTEN reviewed by Flora Cantu MD       Medical Decision Making  Differential diagnosis includes subdural hematoma, deconditioning, cervical spine fracture.    Problems Addressed:  Closed head injury, initial encounter: complicated acute illness or injury  Compression fracture of C7 vertebra, initial encounter: complicated acute illness or injury  ESRD on hemodialysis: complicated acute illness or injury    Amount and/or Complexity of Data Reviewed  Labs: ordered. Decision-making details documented in ED Course.  Radiology: ordered. Decision-making details documented in ED Course.  ECG/medicine tests: ordered and independent interpretation performed. Decision-making details documented in ED Course.     Details: EKG at 0315 shows atrial fibrillation at a rate of 66 bpm, nonspecific ST-T wave changes, no prior EKG available for comparison.  Discussion of management or test interpretation with external provider(s): Case discussed with SHEILA Jolley, taking call for neurosurgery Dr. Caballero this evening, at 0230,  recommends hard [place] collar and follow-up.  Patient's wife does not feel comfortable taking the patient home, as he is already following, and now has a hard collar on and cannot look down to see where he is walking.  Hospitalist Dr. Reed contacted regarding admission at approximately 0500 via epic chat and accepts the patient.    Risk  Prescription drug management.  Decision regarding hospitalization.      Recent Results (from the past 24 hour(s))   ECG 12 Lead Altered Mental Status    Collection Time: 06/05/23  3:15 AM   Result Value Ref Range    QT Interval 438 ms    QTC Interval 459 ms   Comprehensive Metabolic Panel    Collection Time: 06/05/23  3:24 AM    Specimen: Blood   Result Value Ref Range    Glucose 115 (H) 65 - 99 mg/dL    BUN 38 (H) 8 - 23 mg/dL    Creatinine 4.50 (H) 0.76 - 1.27 mg/dL    Sodium 135 (L) 136 - 145 mmol/L    Potassium 4.6 3.5 - 5.2 mmol/L    Chloride 93 (L) 98 - 107 mmol/L    CO2 29.0 22.0 - 29.0 mmol/L    Calcium 9.1 8.6 - 10.5 mg/dL    Total Protein 6.4 6.0 - 8.5 g/dL    Albumin 3.1 (L) 3.5 - 5.2 g/dL    ALT (SGPT) 19 1 - 41 U/L    AST (SGOT) 25 1 - 40 U/L    Alkaline Phosphatase 200 (H) 39 - 117 U/L    Total Bilirubin 0.3 0.0 - 1.2 mg/dL    Globulin 3.3 gm/dL    A/G Ratio 0.9 g/dL    BUN/Creatinine Ratio 8.4 7.0 - 25.0    Anion Gap 13.0 5.0 - 15.0 mmol/L    eGFR 11.9 (L) >60.0 mL/min/1.73   CBC Auto Differential    Collection Time: 06/05/23  3:24 AM    Specimen: Blood   Result Value Ref Range    WBC 8.79 3.40 - 10.80 10*3/mm3    RBC 2.57 (L) 4.14 - 5.80 10*6/mm3    Hemoglobin 7.7 (L) 13.0 - 17.7 g/dL    Hematocrit 24.7 (L) 37.5 - 51.0 %    MCV 96.1 79.0 - 97.0 fL    MCH 30.0 26.6 - 33.0 pg    MCHC 31.2 (L) 31.5 - 35.7 g/dL    RDW 16.0 (H) 12.3 - 15.4 %    RDW-SD 54.7 (H) 37.0 - 54.0 fl    MPV 8.3 6.0 - 12.0 fL    Platelets 300 140 - 450 10*3/mm3    Neutrophil % 76.1 (H) 42.7 - 76.0 %    Lymphocyte % 12.1 (L) 19.6 - 45.3 %    Monocyte % 8.2 5.0 - 12.0 %    Eosinophil % 1.8 0.3 - 6.2 %     Basophil % 0.5 0.0 - 1.5 %    Immature Grans % 1.3 (H) 0.0 - 0.5 %    Neutrophils, Absolute 6.70 1.70 - 7.00 10*3/mm3    Lymphocytes, Absolute 1.06 0.70 - 3.10 10*3/mm3    Monocytes, Absolute 0.72 0.10 - 0.90 10*3/mm3    Eosinophils, Absolute 0.16 0.00 - 0.40 10*3/mm3    Basophils, Absolute 0.04 0.00 - 0.20 10*3/mm3    Immature Grans, Absolute 0.11 (H) 0.00 - 0.05 10*3/mm3    nRBC 0.3 (H) 0.0 - 0.2 /100 WBC   COVID-19 and FLU A/B PCR - Swab, Nasopharynx    Collection Time: 06/05/23  3:24 AM    Specimen: Nasopharynx; Swab   Result Value Ref Range    COVID19 Not Detected Not Detected - Ref. Range    Influenza A PCR Not Detected Not Detected    Influenza B PCR Not Detected Not Detected   High Sensitivity Troponin T    Collection Time: 06/05/23  3:24 AM    Specimen: Blood   Result Value Ref Range    HS Troponin T 185 (C) <15 ng/L     Note: In addition to lab results from this visit, the labs listed above may include labs taken at another facility or during a different encounter within the last 24 hours. Please correlate lab times with ED admission and discharge times for further clarification of the services performed during this visit.    XR Chest 1 View   Final Result   No consolidation, atelectasis or effusion is demonstrated.      Thank you for the referral of this patient. This exam was interpreted by an American Board of Radiology certified radiologist with subspecialty fellowship training. If there are any questions regarding this exam please feel free to contact a radiologist    directly at [phone].      Slot 70      Electronically signed by:  Geovanny Musa M.D.     6/5/2023 2:54 AM Mountain Time      CT Head Without Contrast   Final Result         1. Mild right parietal scalp soft tissue swelling.      2. No acute intracranial abnormality.      3. Mild to moderate volume loss and mild chronic microvascular ischemic changes. Arteriosclerosis.                Electronically signed by:  Richard Malin  "M.D.     6/5/2023 12:01 AM Mountain Time      CT Cervical Spine Without Contrast   Final Result      1.  Acute superior endplate compression fracture at C7 with approximately 30% height loss. No bony retropulsion.    2.  No other acute fractures. Normal facet joint alignment.   3.  Multilevel cervical spondylosis greatest at C3-4 to C5-6.         I communicated these results by phone to  Dr. Flora Cantu at 12:18 AM Mountain time on 6/5/2023. The results were communicated back and were said to be understood.      Electronically signed by:  Abdon Steele M.D.     6/5/2023 12:18 AM Mountain Time        Vitals:    06/05/23 0119 06/05/23 0130 06/05/23 0215 06/05/23 0230   BP: 122/88 139/79  128/65   BP Location: Right arm      Patient Position: Lying      Pulse: 81 67 69 66   Resp: 20      Temp: 98 °F (36.7 °C)      TempSrc: Oral      SpO2: 92% 93% 96% 92%   Weight: 58 kg (127 lb 13.9 oz)      Height: 167.6 cm (66\")        Medications   sodium chloride 0.9 % flush 10 mL (has no administration in time range)   ondansetron ODT (ZOFRAN-ODT) disintegrating tablet 4 mg (4 mg Oral Given 6/5/23 0258)   oxyCODONE-acetaminophen (PERCOCET) 5-325 MG per tablet 1 tablet (1 tablet Oral Given 6/5/23 0258)     ECG/EMG Results (last 24 hours)       Procedure Component Value Units Date/Time    ECG 12 Lead Altered Mental Status [[id number]] Collected: 06/05/23 0315     Updated: 06/05/23 0315     QT Interval 438 ms      QTC Interval 459 ms     Narrative:      Test Reason : Altered Mental Status  Blood Pressure :   */*   mmHG  Vent. Rate :  66 BPM     Atrial Rate :  56 BPM     P-R Int :   * ms          QRS Dur : 120 ms      QT Int : 438 ms       P-R-T Axes :   * -21 171 degrees     QTc Int : 459 ms    Wide QRS rhythm  Nonspecific intraventricular conduction delay  Nonspecific ST and T wave abnormality  Abnormal ECG  No previous ECGs available    Referred By: EDMD           Confirmed By:           ECG 12 Lead Altered Mental Status "   Preliminary Result   Test Reason : Altered Mental Status   Blood Pressure :   */*   mmHG   Vent. Rate :  66 BPM     Atrial Rate :  56 BPM      P-R Int :   * ms          QRS Dur : 120 ms       QT Int : 438 ms       P-R-T Axes :   * -21 171 degrees      QTc Int : 459 ms      Wide QRS rhythm   Nonspecific intraventricular conduction delay   Nonspecific ST and T wave abnormality   Abnormal ECG   No previous ECGs available      Referred By: EDMD           Confirmed By:               Final diagnoses:   Compression fracture of C7 vertebra, initial encounter   Closed head injury, initial encounter   ESRD on hemodialysis       ED Disposition  ED Disposition       ED Disposition   Decision to Admit    Condition   --    Comment   --               Blair Dhaliwal MD  [address] [id number] [phone]    Schedule an appointment as soon as possible for a visit in 1 week      Supa Caballero MD  [address] [id number] [phone]    Schedule an appointment as soon as possible for a visit in 1 week  for C7 cervical spine anterior wedge compression fracture         Medication List      No changes were made to your prescriptions during this visit.            Flora Cantu MD  06/05/23 3192

## 2023-06-05 NOTE — PROGRESS NOTES
Clinical Nutrition     Nutrition Support Assessment  Reason for Visit: Identified at risk by screening criteria, MST score 2+, Difficulty chewing/swallowing      Patient Name: Enrike Tomas  YOB: 1935  MRN: [id number]  Date of Encounter: 06/05/23 10:21 EDT  Admission date: 6/5/2023    Comments:    - Patient meets criteria for severe malnutrition due to acute illness.  Recent hospital admission with poor intake and weight loss.  Noted with significant muscle and fast wasting on exam   -Monitor SLP eval results.    - Nepro supplement BID once diet started (vanilla)  - Monitor intake during admission.     Nutrition Assessment   Admission Diagnosis:  Compression fracture of C7 vertebra, initial encounter [S12.690A]    Problem List:    Compression fracture of C7 vertebra, initial encounter    (HFpEF) heart failure with preserved ejection fraction    Benign prostatic hyperplasia    Closed fracture of left acetabulum    Encephalopathy    ESRD (end stage renal disease)    Essential hypertension    GERD (gastroesophageal reflux disease)    Mixed hyperlipidemia    NERY (obstructive sleep apnea)    Type 2 diabetes mellitus    Fall    PMH:   He  has a past medical history of Atrial fibrillation, BPH (benign prostatic hyperplasia), Diabetes mellitus, Diverticulosis, ESRD on hemodialysis, GERD (gastroesophageal reflux disease), Heart failure, Hyperlipidemia, Hypertension, NERY (obstructive sleep apnea), and Recurrent falls.    PSH:  He  has a past surgical history that includes Tonsillectomy and adenoidectomy; Cataract extraction; Dialysis fistula creation; Inguinal hernia repair; and Prostate biopsy.    Applicable Nutrition Concerns:   Skin:  Oral:  NPO awaiting SLP eval   GI:    Applicable Interval History:   (6/5) SLP eval - bed side eval, NPO, needs further testing.     Reported/Observed/Food/Nutrition Related History:   6/5  Alert and oriented. Very pleasant to provide information regarding  "weight status, intake and recent hospital admission (outside hospital). Wife at bedside and is able to provide some information as well.  Reported issues with chewing and swallowing (at outside hospital). SLP completed bedside eval with recommendation for NPO status and further testing.     Patient reports appetite started to diminish about a couple of months ago.  Became worse during previous admission as he did not like most of the food provided.  He feels his intake declined due to poor appetite as well as not liking the food provided.  He believes he lost about 15lb in the past month.  A bed weight obtained during visit = 115lb.      Drank Nepro supplement before and liked it.  Willing to drink once diet started.     History of ESRD, HD x 5 years per patient. Weight stable during that time.     Labs    Labs Reviewed: Yes     Results from last 7 days   Lab Units 06/05/23 0514 06/05/23  0324   GLUCOSE mg/dL 107* 115*   BUN mg/dL 37* 38*   CREATININE mg/dL 4.74* 4.50*   SODIUM mmol/L 135* 135*   CHLORIDE mmol/L 94* 93*   POTASSIUM mmol/L 4.5 4.6   MAGNESIUM mg/dL  --  1.5*   ALT (SGPT) U/L  --  19       Results from last 7 days   Lab Units 06/05/23  0324   ALBUMIN g/dL 3.1*           Lab Results   Lab Value Date/Time    HGBA1C 4.8 03/01/2023 1707    HGBA1C 5.2 09/25/2022 0347             Medications    Medications Reviewed: Yes  Pertinent: melatonin, protonix, zoloft   Infusion: none   PRN: Miralax, Dulcolax       Intake/Ouptut 24 hrs (0701 - 0700)   I&O's Reviewed: Yes       Anthropometrics     Flowsheet Rows      Flowsheet Row First Filed Value   Admission Height 167.6 cm (66\") Documented at 06/05/2023 0119   Admission Weight 58 kg (127 lb 13.9 oz) Documented at 06/05/2023 0119          Height: Height: 167.6 cm (66\")  Last Filed Weight: Weight: 51.6 kg (113 lb 11.2 oz) (06/05/23 0546)  Method: Weight Method: Bed scale  BMI: BMI (Calculated): 18.4  BMI classification: Underweight:<18.5kg/m2  IBW:  148lb    UBW: " 65kg (143lb)   Weight change: 15lb x 1 month per patient, based on bed weight obtained patient with 28lb weight loss. (19.6%)     Nutrition Focused Physical Exam     Date: 6/5    Patient meets criteria for malnutrition diagnosis, see MSA note.    Current Nutrition Prescription     PO: NPO Diet NPO Type: Strict NPO  Oral Nutrition Supplement:   Intake: Insufficient data  NPO awaiting neuro eval as well as SLP further testing.       Nutrition Diagnosis   Date: 6/5 Updated:   Problem Malnutrition acuate/severe    Etiology Recent hospital admission (disliked hospital food); decreased appetite    Signs/Symptoms Weight loss; muscle and fat wasting on NPFE   Status:     Date:  6/5 Updated:  Problem Predicted suboptimal energy intake   Etiology NPO awaiting SLP Eval; history of poor intake    Signs/Symptoms    Status:     Goal:   General: Nutrition support treatment  PO: Establish PO  EN/PN: N/A    Nutrition Intervention      Follow treatment progress, Care plan reviewed, Await begin PO    - Patient meets criteria for severe malnutrition due to acute illness.  Recent hospital admission with poor intake and weight loss.  Noted with significant muscle and fast wasting on exam   -Monitor SLP eval results.    - Nepro supplement BID once diet started (vanilla)  - Monitor intake during admission.     Monitoring/Evaluation:   Per protocol, Pertinent labs, Symptoms, Swallow function      Shagufta Dorman RD  Time Spent: 45min

## 2023-06-05 NOTE — PROGRESS NOTES
[facility]  PROGRESS NOTE    Patient Name: Enrike Tomas  : 1935  MRN: [id number]    Date of Admission: 2023  Primary Care Physician: Blair Dhaliwal MD    Subjective   Subjective     CC:  See H&P    Patient seen and examined. Wife present     HPI:  See H&P    ROS:  See H&P    Objective   Objective     Vital Signs:   Temp:  [98 °F (36.7 °C)-98.1 °F (36.7 °C)] 98.1 °F (36.7 °C)  Heart Rate:  [64-81] 65  Resp:  [18-20] 18  BP: (119-139)/(62-88) 125/66  Flow (L/min):  [2] 2     Physical Exam:  See H&P    Results Reviewed:  LAB RESULTS:      Lab 23  0324 23  0533   WBC 8.79 6.82   HEMOGLOBIN 7.7* 8.2*   HEMATOCRIT 24.7* 25.1*   PLATELETS 300 342   NEUTROS ABS 6.70 4.46   IMMATURE GRANS (ABS) 0.11* 0.11*   LYMPHS ABS 1.06 1.22   MONOS ABS 0.72 0.66   EOS ABS 0.16 0.29   MCV 96.1 94         Lab 23  0514 23  0324   SODIUM 135* 135*   POTASSIUM 4.5 4.6   CHLORIDE 94* 93*   CO2 28.0 29.0   ANION GAP 13.0 13.0   BUN 37* 38*   CREATININE 4.74* 4.50*   EGFR 11.2* 11.9*   GLUCOSE 107* 115*   CALCIUM 8.8 9.1   MAGNESIUM  --  1.5*         Lab 23  0324   TOTAL PROTEIN 6.4   ALBUMIN 3.1*   GLOBULIN 3.3   ALT (SGPT) 19   AST (SGOT) 25   BILIRUBIN 0.3   ALK PHOS 200*         Lab 23  0514 23  032   HSTROP T 182* 185*             Lab 23  032   IRON 54*   IRON SATURATION 25   TIBC 216*   TRANSFERRIN 145*         Brief Urine Lab Results  (Last result in the past 365 days)        Color   Clarity   Blood   Leuk Est   Nitrite   Protein   CREAT   Urine HCG        23 1317 Yellow   Clear     Negative                       Microbiology Results Abnormal       Procedure Component Value - Date/Time    COVID PRE-OP / PRE-PROCEDURE SCREENING ORDER (NO ISOLATION) - Swab, Nasopharynx [[id number]]  (Normal) Collected: 23 0324    Lab Status: Final result Specimen: Swab from Nasopharynx Updated: 23    Narrative:      The  following orders were created for panel order COVID PRE-OP / PRE-PROCEDURE SCREENING ORDER (NO ISOLATION) - Swab, Nasopharynx.  Procedure                               Abnormality         Status                     ---------                               -----------         ------                     COVID-19 and FLU A/B PCR...[[id number]]  Normal              Final result                 Please view results for these tests on the individual orders.    COVID-19 and FLU A/B PCR - Swab, Nasopharynx [[id number]]  (Normal) Collected: 06/05/23 0324    Lab Status: Final result Specimen: Swab from Nasopharynx Updated: 06/05/23 0402     COVID19 Not Detected     Influenza A PCR Not Detected     Influenza B PCR Not Detected    Narrative:      Fact sheet for providers: https://www.fda.gov/media/574398/download    Fact sheet for patients: https://www.fda.gov/media/524635/download    Test performed by PCR.            CT Head Without Contrast    Result Date: 6/5/2023  EXAMINATION: CT HEAD WITHOUT CONTRAST DATE: 6/5/2023 1:41 AM INDICATION: Trauma, Pain COMPARISON: None available at the time of this dictation. TECHNIQUE: Noncontrast imaging obtained from the vertex to the skull base.  CT dose lowering techniques were used, to include: automated exposure control, adjustment for patient size, and or use of iterative reconstruction.? FINDINGS: Soft Tissues: Mild right parietal scalp soft tissue swelling. Skull: No underlying skull fracture or radiopaque foreign body. Sinuses: Paranasal sinuses are clear. Mastoids: Mastoid air cells are clear. Globes and Orbits: Globes and orbits are intact. Brain: No acute hemorrhage.  No midline shift, masses, or mass effect.  No evidence of acute infarct by noncontrast CT. Ventricles and Cisterns: Ventricular size and configuration is within normal limits. Basal cisterns are patent. No abnormal extra-axial fluid collection. Senescent Changes: Mild to moderate volume loss and mild chronic  microvascular ischemic changes. Arteriosclerosis.     Impression: 1. Mild right parietal scalp soft tissue swelling. 2. No acute intracranial abnormality. 3. Mild to moderate volume loss and mild chronic microvascular ischemic changes. Arteriosclerosis.  Electronically signed by:  Richard Malin M.D.  6/5/2023 12:01 AM Mountain Time    CT Cervical Spine Without Contrast    Result Date: 6/5/2023  EXAMINATION: CT CERVICAL SPINE WO CONTRAST DATE: 6/5/2023 1:41 AM  INDICATION: Trauma, fall  COMPARISON: None available. TECHNIQUE: Thin section axial noncontrast images were obtained through the cervical spine.  Sagittal and coronal reformatted images were created.  Images were reviewed in bone and soft tissue windows. CT dose lowering techniques were used, to include: automated exposure control, adjustment for patient size, and or use of iterative reconstruction. FINDINGS: Vertebral column: Straightening of the normal cervical lordosis. There is an acute superior endplate compression deformity of C7 with approximately 30% anterior height loss. No bony retropulsion. Remaining vertebral body heights are maintained. No additional acute fractures in the cervical spine. Multilevel intervertebral disc space narrowing with facet and uncovertebral joint degenerative changes. Resulting mild spinal canal narrowing at C3-C4 and C4-C5. Multilevel neuroforaminal stenosis, greatest on the right at C3-4 and C4-5. Soft tissues: Lung apices are clear. Carotid atherosclerosis. Multiple punctate calcifications noted in the left parotid gland, nonspecific.     Impression: 1.  Acute superior endplate compression fracture at C7 with approximately 30% height loss. No bony retropulsion. 2.  No other acute fractures. Normal facet joint alignment. 3.  Multilevel cervical spondylosis greatest at C3-4 to C5-6. I communicated these results by phone to  Dr. Flora Cantu at 12:18 AM Mountain time on 6/5/2023. The results were communicated back and were  said to be understood. Electronically signed by:  Abdon Steele M.D.  6/5/2023 12:18 AM Mountain Time    XR Chest 1 View    Result Date: 6/5/2023  Examination: XR CHEST 1 VW Date and Time: 6/5/2023 4:01 AM Clinical Information: 88 years, Male, . Mental status change. Comparison: None FINDINGS: Heart size/mediastinum/mira: The heart size is normal. There is moderate calcific plaque at the aortic arch. Lung fields: The lungs are hypoventilated. Lung bases/Retrocardiac/Pleura:  No effusion, retrocardiac density, pneumothorax or pleural parenchymal abnormality is demonstrated. Trachea/Paravertebral soft tissues: The trachea and paravertebral soft tissues appear normal. Upper abdomen: No free air is demonstrated. Osseous structures: The bones are normally mineralized.     Impression: No consolidation, atelectasis or effusion is demonstrated. Thank you for the referral of this patient. This exam was interpreted by an American Board of Radiology certified radiologist with subspecialty fellowship training. If there are any questions regarding this exam please feel free to contact a radiologist directly at [phone]. Slot 70 Electronically signed by:  Geovanny Musa M.D.  6/5/2023 2:54 AM Mountain Time         Current medications:  Scheduled Meds:atorvastatin, 20 mg, Oral, Daily  finasteride, 5 mg, Oral, Daily  fluticasone, 1 spray, Nasal, Daily  insulin lispro, 2-7 Units, Subcutaneous, 4x Daily AC & at Bedtime  magnesium sulfate, 1 g, Intravenous, Q1H  melatonin, 5 mg, Oral, Nightly  metoprolol tartrate, 6.25 mg, Oral, Q12H  pantoprazole, 40 mg, Oral, Q AM  senna-docusate sodium, 2 tablet, Oral, BID  sertraline, 50 mg, Oral, Daily  sodium chloride, 10 mL, Intravenous, Q12H      Continuous Infusions:   PRN Meds:.  acetaminophen **OR** acetaminophen **OR** acetaminophen    senna-docusate sodium **AND** polyethylene glycol **AND** bisacodyl **AND** bisacodyl    dextrose    dextrose    glucagon (human recombinant)     HYDROcodone-acetaminophen    Magnesium Low Dose Replacement - Follow Nurse / BPA Driven Protocol    [COMPLETED] Insert Peripheral IV **AND** sodium chloride    sodium chloride    sodium chloride    Assessment & Plan   Assessment & Plan     Active Hospital Problems    Diagnosis  POA    **Compression fracture of C7 vertebra, initial encounter [S12.690A]  Yes    Fall [W19.XXXA]  Yes    Encephalopathy [G93.40]  Yes    Type 2 diabetes mellitus [E11.9]  Yes    Closed fracture of left acetabulum [S32.402A]  Yes    NERY (obstructive sleep apnea) [G47.33]  Yes    (HFpEF) heart failure with preserved ejection fraction [I50.30]  Yes    Essential hypertension [I10]  Yes    ESRD (end stage renal disease) [N18.6]  Yes    Benign prostatic hyperplasia [N40.0]  Yes    GERD (gastroesophageal reflux disease) [K21.9]  Yes    Mixed hyperlipidemia [E78.2]  Yes      Resolved Hospital Problems   No resolved problems to display.        Brief Hospital Course to date:  Enrike Tomas is 88 y.o. male with PMH significant for ESRD on HD M, W, F, A-fib on Eliquis, GERD, BPH, HTN, HLD, orthostatic hypotension, DM2, depression, who presents to the ED with complaint of fall and neck pain who was found to have concern for acute compression fracture of C7.     Compression fracture of C7  Recurrent falls  - Neurosurgery consulted, appreciate assistance   - Hard collar in place  - P.o. as needed pain control  - Recent admission to  x3 weeks secondary to T7 fracture  - PT/OT consult in the a.m.  - Case management consult in the a.m.  - Fall precautions  CT head notes right parietal scalp soft tissue swelling without acute intracranial abnormality     ESRD  - On HD M, W, F  - Nephrology consult     Afib  -holding eliquis due to falls, anemia     Anemia  - Iron studies  - Stool occult  - Type and screen  - H&H previously 11.7/33.8 on 3/29/2023  - Hold Eliquis for now     Diabetes mellitus 2  - FSBG ACHS  -  insulin  - Hemoglobin A1c in the a.m.      Hypertension  Hyperlipidemia  History of orthostatic hypotension  - Continue metoprolol with hold parameters  - Atorvastatin  - Orthostatic hypotension likely playing a role in frequent falls     BPH  - Continue Proscar     GERD  - Continue PPI     Depression  - Continue Zoloft        DVT prophylaxis:  on Eliquis, holding for now secondary to head injury and frequent falls    Expected Discharge Location and Transportation:patient would benefit greatly from rehab and wife reports cannot take him home  Expected Discharge   Expected Discharge Date: 6/7/2023; Expected Discharge Time:      DVT prophylaxis:  Medical and mechanical DVT prophylaxis orders are present.     AM-PAC 6 Clicks Score (PT): 13 (06/05/23 0635)    CODE STATUS:   Code Status and Medical Interventions:   Ordered at: 06/05/23 0611     Medical Intervention Limits:    NO intubation (DNI)     Code Status (Patient has no pulse and is not breathing):    No CPR (Do Not Attempt to Resuscitate)     Medical Interventions (Patient has pulse or is breathing):    Limited Support       Bing Llanes MD  06/05/23

## 2023-06-05 NOTE — PLAN OF CARE
Goal Outcome Evaluation:  Plan of Care Reviewed With: patient, spouse        Progress: no change          SLP evaluation completed. Will address swallowing concerns w/ MBS when appropriate. Please see note for further details and recommendations.

## 2023-06-05 NOTE — PLAN OF CARE
Goal Outcome Evaluation:Pt tolerated tx well. Goal reached. UF:2500mls removed. Called report to AGUSTÍN Dumont  Problem: Device-Related Complication Risk (Hemodialysis)  Goal: Safe, Effective Therapy Delivery  Outcome: Ongoing, Progressing     Problem: Hemodynamic Instability (Hemodialysis)  Goal: Effective Tissue Perfusion  Outcome: Ongoing, Progressing     Problem: Infection (Hemodialysis)  Goal: Absence of Infection Signs and Symptoms  Outcome: Ongoing, Progressing

## 2023-06-05 NOTE — CONSULTS
NEUROSURGERY CONSULT NOTE    Chief Complaint: Fall/C7 compression fracture    Subjective: Mr. Tomas is an 88-year-old male with a past medical history significant for ESRD on hemodialysis every Monday, Wednesday and Friday, A-fib which she is on Eliquis therapy for, GERD, BPH, hypertension, hyperlipidemia, orthostatic hypotension, T2DM, depression.  Patient initially presented to [facility] emergency department on 6/05/2023 with complaint of fall and neck pain.  Of note the patient was recently admitted admitted at [facility] from 5/9/2023 to 5/31/2023, secondary to a fall and a T7 fracture.  He was ultimately discharged home with a set of acute rehab.  He is accompanied by his wife who helps provide some of the history.  Per the patient and wife they state that the patient had got up late last night and was confused about what time it was, patient normally ambulates with a walker at baseline.  When he awoke, he according to the wife apparently made his way out to the garage where he lost his balance and fell backwards on steps hitting his head on the side of his car.  Per the wife, the patient has some intermittent confusion and ongoing weakness with poor balance and recurrent falls, this is his fourth fall in around 2 months, she is unable provide good care for him at home.  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  Denies any TIA or strokelike symptoms, denies any numbness/tingling, denies any right upper extremity weakness, denies any acute lower extremity weakness other than his baseline.  Patient is resting comfortably in bed with a c-collar do not, IV fluids and IV magnesium running at the bedside, patient is in no acute distress, and currently complains of no pain whatsoever.  Patient did complain of  "some pain with movement in his neck when he was \"getting up on the gurney.\"    Objective    Vital Signs: Blood pressure 141/74, pulse 65, temperature 98.3 °F (36.8 °C), temperature source Oral, resp. rate 18, height 167.6 cm (66\"), weight 51.6 kg (113 lb 11.2 oz), SpO2 91 %.    Physical Exam  Awake, alert and oriented x 3  Opens eyes spont  Pupils 3 mm rx bilat  Extraocular muscles intact bilaterally  Face symmetric bilaterally  Tongue midline  4/5 in all 4 ext, he has good  strength, able to lift both legs to command, with mild resistance, good dorsiflexion good plantarflexion strength  Of note, the patient has to have assistance from his right arm in order to lift his left arm due to pain and limited range of motion in the shoulder joint, patient does have good  strength on the left.  Minimal tenderness to palpation noted on the C-spine, slight limited range of motion, however no pain is elicited on movement of the patient's neck  States he ambulates with a walker at baseline    Intake/Output: No intake or output data in the 24 hours ending 06/05/23 1222    Current Medications:   Current Facility-Administered Medications:     acetaminophen (TYLENOL) tablet 650 mg, 650 mg, Oral, Q4H PRN **OR** acetaminophen (TYLENOL) 160 MG/5ML solution 650 mg, 650 mg, Oral, Q4H PRN **OR** acetaminophen (TYLENOL) suppository 650 mg, 650 mg, Rectal, Q4H PRN, Mónica Perry APRN    atorvastatin (LIPITOR) tablet 20 mg, 20 mg, Oral, Daily, Mónica Perry APRN    sennosides-docusate (PERICOLACE) 8.6-50 MG per tablet 2 tablet, 2 tablet, Oral, BID **AND** polyethylene glycol (MIRALAX) packet 17 g, 17 g, Oral, Daily PRN **AND** bisacodyl (DULCOLAX) EC tablet 5 mg, 5 mg, Oral, Daily PRN **AND** bisacodyl (DULCOLAX) suppository 10 mg, 10 mg, Rectal, Daily PRN, Mónica Perry APRN    dextrose (D50W) (25 g/50 mL) IV injection 25 g, 25 g, Intravenous, Q15 Min PRN, Mónica Perry APRN    dextrose (GLUTOSE) oral gel 15 g, 15 " g, Oral, Q15 Min PRN, Mónica Perry APRN    finasteride (PROSCAR) tablet 5 mg, 5 mg, Oral, Daily, Mónica Perry APRN    fluticasone (FLONASE) 50 MCG/ACT nasal spray 1 spray, 1 spray, Nasal, Daily, Mónica Perry APRN, 1 spray at 06/05/23 0956    glucagon (GLUCAGEN) injection 1 mg, 1 mg, Intramuscular, Q15 Min PRN, Móncia Perry APRN    HYDROcodone-acetaminophen (NORCO) 5-325 MG per tablet 1 tablet, 1 tablet, Oral, Q4H PRN, Mónica Perry APRN    Insulin Lispro (humaLOG) injection 2-7 Units, 2-7 Units, Subcutaneous, 4x Daily AC & at Bedtime, Mónica Perry APRN    Magnesium Low Dose Replacement - Follow Nurse / BPA Driven Protocol, , Does not apply, PRN, Yanick Reed MD    melatonin tablet 5 mg, 5 mg, Oral, Nightly, Mónica Perry APRN    metoprolol tartrate (LOPRESSOR) tablet 6.25 mg, 6.25 mg, Oral, Q12H, Mónica Perry APRN    pantoprazole (PROTONIX) EC tablet 40 mg, 40 mg, Oral, Q AM, Mónica Perry APRN    sertraline (ZOLOFT) tablet 50 mg, 50 mg, Oral, Daily, Mónica Perry APRN    [COMPLETED] Insert Peripheral IV, , , Once **AND** sodium chloride 0.9 % flush 10 mL, 10 mL, Intravenous, PRN, Flora Cantu MD    sodium chloride 0.9 % flush 10 mL, 10 mL, Intravenous, Q12H, Mónica Perry APRN, 10 mL at 06/05/23 0956    sodium chloride 0.9 % flush 10 mL, 10 mL, Intravenous, PRN, Mónica Perry APRN    sodium chloride 0.9 % infusion 40 mL, 40 mL, Intravenous, PRN, Mónica Perry APRN     Laboratory Results:       Lab 06/05/23  0324 05/30/23  0533   WBC 8.79 6.82   HEMOGLOBIN 7.7* 8.2*   HEMATOCRIT 24.7* 25.1*   PLATELETS 300 342   NEUTROS ABS 6.70 4.46   IMMATURE GRANS (ABS) 0.11* 0.11*   LYMPHS ABS 1.06 1.22   MONOS ABS 0.72 0.66   EOS ABS 0.16 0.29   MCV 96.1 94         Lab 06/05/23  0514 06/05/23  0324   SODIUM 135* 135*   POTASSIUM 4.5 4.6   CHLORIDE 94* 93*   CO2 28.0 29.0   ANION GAP 13.0 13.0   BUN 37* 38*   CREATININE 4.74* 4.50*   EGFR 11.2* 11.9*    GLUCOSE 107* 115*   CALCIUM 8.8 9.1   MAGNESIUM  --  1.5*         Lab 06/05/23 0324   TOTAL PROTEIN 6.4   ALBUMIN 3.1*   GLOBULIN 3.3   ALT (SGPT) 19   AST (SGOT) 25   BILIRUBIN 0.3   ALK PHOS 200*         Lab 06/05/23  0514 06/05/23 0324   HSTROP T 182* 185*             Lab 06/05/23 0324   IRON 54*   IRON SATURATION 25   TIBC 216*   TRANSFERRIN 145*         Brief Urine Lab Results  (Last result in the past 365 days)        Color   Clarity   Blood   Leuk Est   Nitrite   Protein   CREAT   Urine HCG        05/09/23 1317 Yellow   Clear     Negative                     Microbiology Results (last 10 days)       Procedure Component Value - Date/Time    COVID PRE-OP / PRE-PROCEDURE SCREENING ORDER (NO ISOLATION) - Swab, Nasopharynx [[id number]]  (Normal) Collected: 06/05/23 0324    Lab Status: Final result Specimen: Swab from Nasopharynx Updated: 06/05/23 0402    Narrative:      The following orders were created for panel order COVID PRE-OP / PRE-PROCEDURE SCREENING ORDER (NO ISOLATION) - Swab, Nasopharynx.  Procedure                               Abnormality         Status                     ---------                               -----------         ------                     COVID-19 and FLU A/B PCR...[[id number]]  Normal              Final result                 Please view results for these tests on the individual orders.    COVID-19 and FLU A/B PCR - Swab, Nasopharynx [[id number]]  (Normal) Collected: 06/05/23 0324    Lab Status: Final result Specimen: Swab from Nasopharynx Updated: 06/05/23 0402     COVID19 Not Detected     Influenza A PCR Not Detected     Influenza B PCR Not Detected    Narrative:      Fact sheet for providers: https://www.fda.gov/media/918030/download    Fact sheet for patients: https://www.fda.gov/media/408217/download    Test performed by PCR.             Diagnostic Imaging: I reviewed and independently interpreted the new imaging.  I reviewed the patient's CT cervical spine without contrast  was performed on 6/5/2023 along with his corresponding radiological report, there is acute.  Endplate compression fracture of C7 with approximately 30% height loss, with no bony retropulsion, normal facet joint alignment, multilevel cervical spondylosis throughout the cervical spine most notable at C3-C4 to C5-C6.  I reviewed the patient's CT of the head without contrast along with his corresponding radiological report, there is a mild right parietal scalp soft tissue swelling, that coincides well with his history of fall, no acute intracranial abnormality, mild to moderate volume loss and mild chronic microvascular hemic changes.    Assessment/Plan:  Compression fracture of C7 vertebra/recurrent falls  - Stable acute traumatic compression fracture at C7 with no bony retropulsion, patient is neurologically intact, no pain with range of motion, minimal pain to palpation  - Continue good p.o. pain control regimen  - Recent admission to  x3 weeks secondary to fall and acute T7 traumatic compression fracture, would recommend follow-up with  for this  - Would recommend semirigid cervical collar for 8 to 10 weeks when up and ambulating  - PT OT evaluation as tolerated  - Fall precautions  - Would consider possible inpatient rehab facility upon discharge  - Can follow-up as an outpatient in the neurosurgical practice upon discharge  - Patient can have a diet  -CT head showed mild right parietal scalp soft tissue swelling, no other acute intracranial abnormality    2.  ESRD  - On hemodialysis Monday, Wednesday, Friday  - Nephrology consult pending    3.  A-fib  -On current anticoagulation therapy with Eliquis    Continue hospitalist medical management.      Any copied data from previous notes included in the (1) History of Present Illness, (2) Physical Examination and (3) Medical Decision Making and/or Assessment and Plan has been reviewed and is accurate as of 06/05/23      Murali Solis PA-C  06/05/23  12:22  EDT

## 2023-06-05 NOTE — THERAPY EVALUATION
Patient Name: Enrike Tomas  : 1935    MRN: [id number]                              Today's Date: 2023       Admit Date: 2023    Visit Dx:     ICD-10-CM ICD-9-CM   1. Compression fracture of C7 vertebra, initial encounter  S12.690A 805.07   2. Closed head injury, initial encounter  S09.90XA 959.01   3. ESRD on hemodialysis  N18.6 585.6    Z99.2 V45.11   4. Dysphagia, unspecified type  R13.10 787.20     Patient Active Problem List   Diagnosis    Compression fracture of C7 vertebra, initial encounter    (HFpEF) heart failure with preserved ejection fraction    Anemia of renal disease    At high risk for falls    Benign prostatic hyperplasia    Closed fracture of left acetabulum    Compression fracture of thoracic vertebra    Deficiency of other specified B group vitamins    Depression    Encephalopathy    ESRD (end stage renal disease)    Essential hypertension    GERD (gastroesophageal reflux disease)    Hepatic cyst    Long term (current) use of anticoagulants    Major depressive disorder, single episode, moderate    Mixed hyperlipidemia    Morgagni hernia    Occlusion of right vertebral artery    NERY (obstructive sleep apnea)    Type 2 diabetes mellitus    Fall     Past Medical History:   Diagnosis Date    Atrial fibrillation     BPH (benign prostatic hyperplasia)     Diabetes mellitus     Diverticulosis     ESRD on hemodialysis     GERD (gastroesophageal reflux disease)     Heart failure     Hyperlipidemia     Hypertension     NERY (obstructive sleep apnea)     Recurrent falls      Past Surgical History:   Procedure Laterality Date    CATARACT EXTRACTION      DIALYSIS FISTULA CREATION      INGUINAL HERNIA REPAIR      PROSTATE BIOPSY      TONSILLECTOMY AND ADENOIDECTOMY        General Information       Row Name 23 1358          Physical Therapy Time and Intention    Document Type evaluation  -AB     Mode of Treatment physical therapy  -AB       Row Name 23 1350          General Information     Patient Profile Reviewed yes  -AB     Prior Level of Function min assist:;mod assist:;all household mobility;gait;transfer;ADL's;bed mobility;using stairs  Pt reports decline in functional mobility since hospital stay on 5/9 post fall and T7 fx. Spouse and daughter assisting with all ADL's and mobility. Climbing steps to 2nd level bathroom with assist. Pt using RW. Spouse reports 5 falls in last 6 months.  -AB     Existing Precautions/Restrictions fall;cervical collar;spinal;brace worn when out of bed   Hard cervical collar in place for OOB activity. T7 and C7 fx.  -AB     Barriers to Rehab medically complex;previous functional deficit  -AB       Row Name 06/05/23 1356          Living Environment    People in Home spouse  -AB       Row Name 06/05/23 1358          Home Main Entrance    Number of Stairs, Main Entrance other (see comments)  Ramp to enter.  -AB       Row Name 06/05/23 1358          Stairs Within Home, Primary    Stairs, Within Home, Primary three level home with basement. Half bath on first floor with shower on second floor.  -AB     Number of Stairs, Within Home, Primary other (see comments)  15  -AB     Stair Railings, Within Home, Primary railing on right side (ascending)  -AB       Row Name 06/05/23 135          Cognition    Orientation Status (Cognition) oriented x 3;verbal cues/prompts needed for orientation;other (see comments)  Multiple attempts for year.  -AB       Row Name 06/05/23 1354          Safety Issues, Functional Mobility    Safety Issues Affecting Function (Mobility) awareness of need for assistance;insight into deficits/self-awareness;judgment;safety precaution awareness;safety precautions follow-through/compliance;sequencing abilities  -AB     Impairments Affecting Function (Mobility) balance;endurance/activity tolerance;pain;range of motion (ROM);strength  -AB     Comment, Safety Issues/Impairments (Mobility) Pt alert and following all commands.  -AB               User Key  (r)  = Recorded By, (t) = Taken By, (c) = Cosigned By      Initials Name Provider Type    AB Caro Youngblood, PT Physical Therapist                   Mobility       Row Name 06/05/23 1407          Bed Mobility    Comment, (Bed Mobility) Pt received sitting UIC from OT.  -AB       Row Name 06/05/23 1407          Transfers    Comment, (Transfers) Cues for hand placement, sequencing, and walker management. Cervical collar in place. Spinal precautions reviewed.  -AB       Row Name 06/05/23 1407          Bed-Chair Transfer    Bed-Chair [place] (Transfers) unable to assess  -AB       Row Name 06/05/23 1407          Sit-Stand Transfer    Sit-Stand [place] (Transfers) contact guard;1 person to manage equipment;verbal cues  -AB     Assistive Device (Sit-Stand Transfers) walker, front-wheeled  -AB     Comment, (Sit-Stand Transfer) Cues for reaching back prior to sitting.  -AB       Row Name 06/05/23 1407          Gait/Stairs (Locomotion)    [place] Level (Gait) minimum assist (75% patient effort);verbal cues;2 person assist  -AB     Assistive Device (Gait) walker, front-wheeled  -AB     Distance in Feet (Gait) 80'  -AB     Deviations/Abnormal Patterns (Gait) bilateral deviations;base of support, narrow;gait speed decreased;stride length decreased;amberly decreased  -AB     Bilateral Gait Deviations heel strike decreased  -AB     [place] Level (Stairs) unable to assess  -AB     Comment, (Gait/Stairs) Pt demo's step through gait pattern with narrow [place] and decreased stride length. Intermittent episodes of min A required for improved stability. Cues for maintaining spinal precautions during turns. No overt LOB. Gait mechanics improved with cues. LLE with noted ER during gait. Further ambulation limited by fatigue.  -AB               User Key  (r) = Recorded By, (t) = Taken By, (c) = Cosigned By      Initials Name Provider Type    AB Caro Youngblood, PT Physical Therapist                   Obj/Interventions        Row Name 06/05/23 1410          Range of Motion Comprehensive    General Range of Motion bilateral lower extremity ROM WFL  -AB       Row Name 06/05/23 1410          Strength Comprehensive (MMT)    General Manual Muscle Testing (MMT) Assessment lower extremity strength deficits identified  -AB     Comment, General Manual Muscle Testing (MMT) Assessment BLE strength grossly 4-/5  -AB       Row Name 06/05/23 1410          Balance    Balance Assessment sitting static balance;sitting dynamic balance;standing static balance;standing dynamic balance  -AB     Static Sitting Balance contact guard  -AB     Dynamic Sitting Balance contact guard  -AB     Position, Sitting Balance unsupported;sitting in chair  -AB     Static Standing Balance contact guard;1-person assist;verbal cues  -AB     Dynamic Standing Balance minimal assist;2-person assist;verbal cues  -AB     Position/Device Used, Standing Balance supported;walker, front-wheeled  -AB     Balance Interventions sitting;standing;sit to stand;supported;static;dynamic  -AB     Comment, Balance No overt LOB. General unsteadiness.  -AB       Row Name 06/05/23 1410          Sensory Assessment (Somatosensory)    Sensory Assessment (Somatosensory) LE sensation intact  -AB               User Key  (r) = Recorded By, (t) = Taken By, (c) = Cosigned By      Initials Name Provider Type    AB Caro Youngblood, PT Physical Therapist                   Goals/Plan       Row Name 06/05/23 1418          Bed Mobility Goal 1 (PT)    Activity/Assistive Device (Bed Mobility Goal 1, PT) supine to sit;sit to supine  -AB     [place] Level/Cues Needed (Bed Mobility Goal 1, PT) independent  -AB     Time Frame (Bed Mobility Goal 1, PT) long term goal (LTG);10 days  -AB       Row Name 06/05/23 1418          Transfer Goal 1 (PT)    Activity/Assistive Device (Transfer Goal 1, PT) sit-to-stand/stand-to-sit;bed-to-chair/chair-to-bed;walker, rolling  -AB     [place] Level/Cues Needed (Transfer Goal  1, PT) modified independence  -AB     Time Frame (Transfer Goal 1, PT) long term goal (LTG);10 days  -AB       Row Name 06/05/23 1418          Gait Training Goal 1 (PT)    Activity/Assistive Device (Gait Training Goal 1, PT) gait (walking locomotion);assistive device use;walker, rolling  -AB     [place] Level (Gait Training Goal 1, PT) standby assist  -AB     Distance (Gait Training Goal 1, PT) 150  -AB     Time Frame (Gait Training Goal 1, PT) long term goal (LTG);10 days  -AB       Row Name 06/05/23 1418          Patient Education Goal (PT)    Activity (Patient Education Goal, PT) Spinal precautions.  -AB     [place]/Cues/Accuracy (Memory Goal 2, PT) verbalizes understanding;independent  -AB     Time Frame (Patient Education Goal, PT) long term goal (LTG);10 days  -AB       Row Name 06/05/23 1418          Therapy Assessment/Plan (PT)    Planned Therapy Interventions (PT) balance training;bed mobility training;gait training;home exercise program;postural re-education;patient/family education;strengthening;ROM (range of motion);stretching;transfer training  -AB               User Key  (r) = Recorded By, (t) = Taken By, (c) = Cosigned By      Initials Name Provider Type    AB Caro Youngblood, PT Physical Therapist                   Clinical Impression       Row Name 06/05/23 1411          Pain    Pain Location - Side/Orientation Left  -AB     Pain Location - shoulder  -AB     Pre/Posttreatment Pain Comment Pt reports this is baseline.  -AB     Pain Intervention(s) Repositioned;Rest  -AB       Row Name 06/05/23 1411          Pain Scale: FACES Pre/Post-Treatment    Pain: FACES Scale, Pretreatment 2-->hurts little bit  -AB     Posttreatment Pain Rating 2-->hurts little bit  -AB       Row Name 06/05/23 1411          Plan of Care Review    Plan of Care Reviewed With patient;spouse;daughter  -AB     Progress no change  -AB     Outcome Evaluation PT initial eval completed. Pt presents with generalized weakness,  impaired balance, and limited ROM causing functional mobility below baseline. Spinal precautions reviewed, cervical collar in place. Pt ambulated 80' with min Ax2 and FWW. Pt reports multiple falls over last 6 months and will benefit from further IPPT for addressing deficits. PT rec d/c to IPR for best functional outcomes.  -AB       Row Name 06/05/23 1411          Therapy Assessment/Plan (PT)    Rehab Potential (PT) good, to achieve stated therapy goals  -AB     Criteria for Skilled Interventions Met (PT) yes;meets criteria;skilled treatment is necessary  -AB     Therapy Frequency (PT) daily  -AB       Row Name 06/05/23 1411          Vital Signs    Pre Systolic BP Rehab 119  -AB     Pre Treatment Diastolic   -AB     Post Systolic BP Rehab 124  -AB     Post Treatment Diastolic BP 75  -AB     O2 Delivery Pre Treatment room air  -AB     O2 Delivery Intra Treatment room air  -AB     O2 Delivery Post Treatment room air  -AB     Pre Patient Position Sitting  -AB     Intra Patient Position Standing  -AB     Post Patient Position Sitting  -AB       Row Name 06/05/23 1411          Positioning and Restraints    Pre-Treatment Position sitting in chair/recliner  -AB     Post Treatment Position chair  -AB     In Chair notified nsg;reclined;sitting;call light within reach;encouraged to call for assist;exit alarm on;with family/caregiver;legs elevated;waffle cushion;with brace  Cervical collar.  -AB               User Key  (r) = Recorded By, (t) = Taken By, (c) = Cosigned By      Initials Name Provider Type    AB Caro Youngblood, PT Physical Therapist                   Outcome Measures       Row Name 06/05/23 1419 06/05/23 1000       How much help from another person do you currently need...    Turning from your back to your side while in flat bed without using bedrails? 3  -AB 3  -TG    Moving from lying on back to sitting on the side of a flat bed without bedrails? 2  -AB 3  -TG    Moving to and from a bed to a chair  (including a wheelchair)? 3  -AB 3  -TG    Standing up from a chair using your arms (e.g., wheelchair, bedside chair)? 3  -AB 2  -TG    Climbing 3-5 steps with a railing? 2  -AB 2  -TG    To walk in hospital room? 3  -AB 3  -TG    AM-PAC 6 Clicks Score (PT) 16  -AB 16  -TG    Highest level of mobility 5 --> Static standing  -AB 5 --> Static standing  -TG      Row Name 06/05/23 0800 06/05/23 0635       How much help from another person do you currently need...    Turning from your back to your side while in flat bed without using bedrails? 2  -TG 2  -AD    Moving from lying on back to sitting on the side of a flat bed without bedrails? -- 2  -AD    Moving to and from a bed to a chair (including a wheelchair)? -- 2  -AD    Standing up from a chair using your arms (e.g., wheelchair, bedside chair)? -- 2  -AD    Climbing 3-5 steps with a railing? -- 2  -AD    To walk in hospital room? -- 3  -AD    AM-PAC 6 Clicks Score (PT) -- 13  -AD    Highest level of mobility -- 4 --> Transferred to chair/commode  -AD      Row Name 06/05/23 1419          Functional Assessment    Outcome Measure Options AM-PAC 6 Clicks Basic Mobility (PT)  -AB               User Key  (r) = Recorded By, (t) = Taken By, (c) = Cosigned By      Initials Name Provider Type    TG Alan Basilio, RN Registered Nurse    Cece Phillips, RN Registered Nurse    Caro Draper, PT Physical Therapist                                 Physical Therapy Education       Title: PT OT SLP Therapies (In Progress)       Topic: Physical Therapy (In Progress)       Point: Mobility training (In Progress)       Learning Progress Summary             Patient Acceptance, E,D, NR by AB at 6/5/2023 1419                         Point: Home exercise program (Not Started)       Learner Progress:  Not documented in this visit.              Point: Body mechanics (In Progress)       Learning Progress Summary             Patient Acceptance, E,D, NR by AB at 6/5/2023 7976                          Point: Precautions (In Progress)       Learning Progress Summary             Patient Acceptance, E,D, NR by AB at 6/5/2023 1419                                         User Key       Initials Effective Dates Name Provider Type Discipline    AB 09/22/22 -  Caro Youngblood, PT Physical Therapist PT                  PT Recommendation and Plan  Planned Therapy Interventions (PT): balance training, bed mobility training, gait training, home exercise program, postural re-education, patient/family education, strengthening, ROM (range of motion), stretching, transfer training  Plan of Care Reviewed With: patient, spouse, daughter  Progress: no change  Outcome Evaluation: PT initial eval completed. Pt presents with generalized weakness, impaired balance, and limited ROM causing functional mobility below baseline. Spinal precautions reviewed, cervical collar in place. Pt ambulated 80' with min Ax2 and FWW. Pt reports multiple falls over last 6 months and will benefit from further IPPT for addressing deficits. PT rec d/c to IPR for best functional outcomes.     Time Calculation:    PT Charges       Row Name 06/05/23 1420             Time Calculation    Start Time 1310  -AB      PT Received On 06/05/23  -AB      PT Goal Re-Cert Due Date 06/15/23  -AB         Timed Charges    60244 - Gait Training Minutes  10  -AB         Untimed Charges    PT Eval/Re-eval Minutes 46  -AB         Total Minutes    Timed Charges Total Minutes 10  -AB      Untimed Charges Total Minutes 46  -AB       Total Minutes 56  -AB                User Key  (r) = Recorded By, (t) = Taken By, (c) = Cosigned By      Initials Name Provider Type    AB Caro Youngblood, PT Physical Therapist                  Therapy Charges for Today       Code Description Service Date Service Provider Modifiers Qty    65990179330 HC GAIT TRAINING EA 15 MIN 6/5/2023 Caro Youngblood, PT GP 1    66105420886 HC PT EVAL MOD COMPLEXITY 4 6/5/2023 Caro Youngblood, PT GP 1             PT G-Codes  Outcome Measure Options: AM-PAC 6 Clicks Basic Mobility (PT)  AM-PAC 6 Clicks Score (PT): 16  PT Discharge Summary  Anticipated Discharge Disposition (PT): inpatient rehabilitation facility    Caro Youngblood PT  6/5/2023

## 2023-06-05 NOTE — THERAPY EVALUATION
Acute Care - Speech Language Pathology   Swallow Initial Evaluation [facility]  Clinical Swallow Evaluation     Patient Name: Enrike Tomas  : 1935  MRN: [id number]  Today's Date: 2023               Admit Date: 2023    Visit Dx:     ICD-10-CM ICD-9-CM   1. Compression fracture of C7 vertebra, initial encounter  S12.690A 805.07   2. Closed head injury, initial encounter  S09.90XA 959.01   3. ESRD on hemodialysis  N18.6 585.6    Z99.2 V45.11   4. Dysphagia, unspecified type  R13.10 787.20     Patient Active Problem List   Diagnosis    Compression fracture of C7 vertebra, initial encounter    (HFpEF) heart failure with preserved ejection fraction    Anemia of renal disease    At high risk for falls    Benign prostatic hyperplasia    Closed fracture of left acetabulum    Compression fracture of thoracic vertebra    Deficiency of other specified B group vitamins    Depression    Encephalopathy    ESRD (end stage renal disease)    Essential hypertension    GERD (gastroesophageal reflux disease)    Hepatic cyst    Long term (current) use of anticoagulants    Major depressive disorder, single episode, moderate    Mixed hyperlipidemia    Morgagni hernia    Occlusion of right vertebral artery    NERY (obstructive sleep apnea)    Type 2 diabetes mellitus    Fall     Past Medical History:   Diagnosis Date    Atrial fibrillation     BPH (benign prostatic hyperplasia)     Diabetes mellitus     Diverticulosis     ESRD on hemodialysis     GERD (gastroesophageal reflux disease)     Heart failure     Hyperlipidemia     Hypertension     NERY (obstructive sleep apnea)     Recurrent falls      Past Surgical History:   Procedure Laterality Date    CATARACT EXTRACTION      DIALYSIS FISTULA CREATION      INGUINAL HERNIA REPAIR      PROSTATE BIOPSY      TONSILLECTOMY AND ADENOIDECTOMY         SLP Recommendation and Plan  SLP Swallowing Diagnosis: suspected pharyngeal dysphagia (23 0915)  SLP Diet Recommendation: NPO,  other (see comments) (until MBS) (06/05/23 0915)     SLP Rec. for Method of Medication Administration: meds via alternate route (06/05/23 0915)        Recommended Diagnostics: VFSS (MBS), other (see comments) (pending neurosurgery & nephrology consults/plan) (06/05/23 0915)  Swallow Criteria for Skilled Therapeutic Interventions Met: demonstrates skilled criteria (06/05/23 0915)  Anticipated Discharge Disposition (SLP): anticipate therapy at next level of care, skilled nursing facility (06/05/23 0915)  Rehab Potential/Prognosis, Swallowing: good, to achieve stated therapy goals (06/05/23 0915)                                              Plan of Care Reviewed With: patient, spouse  Progress: no change      SWALLOW EVALUATION (last 72 hours)       SLP Adult Swallow Evaluation       Row Name 06/05/23 0915                   Rehab Evaluation    Document Type evaluation  -AC        Subjective Information no complaints  -AC        Patient Observations cooperative  Drowsy--alert to stimuli  -AC        Patient/Family/Caregiver Comments/Observations Spouse present.  -AC        Patient Effort good  -AC           General Information    Patient Profile Reviewed yes  -AC        Pertinent History Of Current Problem Fall, compression fx of C7. Encephalopathic. Recent adm  5/9-5/31 after fall & T7 fx. Pt was assessed by SLP team @  multiple times and diagnosed w/ pharyngeal dysphagia/aspiration w/ unknown etiology. Initially, honey-thick liquids were recommended. Per repeat MBS on 5/23, pt aspirated thin liquids (ineffective cough response) and SLP recommended regular diet, upgrade to nectar-thick liquids. Pt d/c'd home after the hospital stay, reported he returned to thin liquids, & home health SLP services were not arranged. 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  -AC         Current Method of Nutrition NPO  -AC        Precautions/Limitations, Vision WFL with corrective lenses;for purposes of eval  -AC        Precautions/Limitations, Hearing WFL;for purposes of eval  -AC        Prior Level of Function-Swallowing --  recent hx pharyngeal dysphagia/aspiration  -AC        Plans/Goals Discussed with patient;spouse/S.O.;agreed upon  -AC        Barriers to Rehab medically complex;previous functional deficit;cognitive status  -AC        Patient's Goals for Discharge return to PO diet  -AC        Family Goals for Discharge patient able to return to PO diet  -AC           Pain    Additional Documentation Pain Scale: FACES Pre/Post-Treatment (Group)  -AC           Pain Scale: FACES Pre/Post-Treatment    Pain: FACES Scale, Pretreatment 0-->no hurt  -AC        Posttreatment Pain Rating 0-->no hurt  -AC           Oral Motor Structure and Function    Dentition Assessment natural, present and adequate  -AC        Secretion Management WNL/WFL  -AC        Mucosal Quality dry  -AC        Volitional Cough weak;reduced respiratory support  -AC           Oral Musculature and Cranial Nerve Assessment    Oral Motor General Assessment generalized oral motor weakness  -AC           General Eating/Swallowing Observations    Respiratory Support Currently in Use nasal cannula  -AC        O2 Liters 2L  -AC        Eating/Swallowing Skills fed by SLP  -AC        Positioning During Eating upright 90 degree;upright in bed  -AC        Utensils Used spoon;straw  -AC        Consistencies Trialed nectar/syrup-thick liquids;pureed  ~10cc nectar H2O & 5cc puree  -AC           Clinical Swallow Eval    Oral Prep Phase WFL  -AC        Oral Transit WFL  -AC        Oral Residue WFL  -AC        Pharyngeal Phase suspected pharyngeal impairment  -AC           Pharyngeal Phase Concerns    Pharyngeal Phase Concerns cough;throat clear  -AC        Cough all consistencies  -AC        Throat Clear all consistencies  -AC        Pharyngeal  Phase Concerns, Comment In c-collar. Baseline throat clear/cough. Increased w/ introduction of PO trials. High risk for aspiration.  -           SLP Evaluation Clinical Impression    SLP Swallowing Diagnosis suspected pharyngeal dysphagia  -        Functional Impact risk of aspiration/pneumonia;risk of malnutrition;risk of dehydration  -        Rehab Potential/Prognosis, Swallowing good, to achieve stated therapy goals  -        Swallow Criteria for Skilled Therapeutic Interventions Met demonstrates skilled criteria  -AC           Recommendations    SLP Diet Recommendation NPO;other (see comments)  until MBS  -        Recommended Diagnostics VFSS ([facility] [place]);other (see comments)  pending neurosurgery & nephrology consults/plan  -        Oral Care Recommendations Oral Care BID/PRN  -        SLP Rec. for Method of Medication Administration meds via alternate route  -        Anticipated Discharge Disposition (SLP) anticipate therapy at next level of care;skilled nursing facility  -                  User Key  (r) = Recorded By, (t) = Taken By, (c) = Cosigned By      Initials Name Effective Dates    Mireya Youssef MS CCC-SLP 02/03/23 -                     EDUCATION  The patient has been educated in the following areas:   Dysphagia (Swallowing Impairment) Oral Care/Hydration NPO rationale.              Time Calculation:    Time Calculation- SLP       Row Name 06/05/23 1056             Time Calculation- SLP    SLP Start Time 0915  -      SLP Received On 06/05/23  -         Untimed Charges    21327-VO Eval Oral Pharyng Swallow Minutes 50  -AC         Total Minutes    Untimed Charges Total Minutes 50  -AC       Total Minutes 50  -AC                User Key  (r) = Recorded By, (t) = Taken By, (c) = Cosigned By      Initials Name Provider Type    Mireya Youssef MS CCC-SLP Speech and Language Pathologist                    Therapy Charges for Today       Code Description Service Date Service Provider  Modifiers Qty    [id number]  ST EVAL ORAL PHARYNG SWALLOW 3 6/5/2023 Mireya Santos, MS CCC-SLP GN 1                 Mireya Santos, MS DUMONT-SLP  6/5/2023

## 2023-06-05 NOTE — THERAPY EVALUATION
Patient Name: Enrike Tomas  : 1935    MRN: [id number]                              Today's Date: 2023       Admit Date: 2023    Visit Dx:     ICD-10-CM ICD-9-CM   1. Compression fracture of C7 vertebra, initial encounter  S12.690A 805.07   2. Closed head injury, initial encounter  S09.90XA 959.01   3. ESRD on hemodialysis  N18.6 585.6    Z99.2 V45.11   4. Dysphagia, unspecified type  R13.10 787.20     Patient Active Problem List   Diagnosis    Compression fracture of C7 vertebra, initial encounter    (HFpEF) heart failure with preserved ejection fraction    Anemia of renal disease    At high risk for falls    Benign prostatic hyperplasia    Closed fracture of left acetabulum    Compression fracture of thoracic vertebra    Deficiency of other specified B group vitamins    Depression    Encephalopathy    ESRD (end stage renal disease)    Essential hypertension    GERD (gastroesophageal reflux disease)    Hepatic cyst    Long term (current) use of anticoagulants    Major depressive disorder, single episode, moderate    Mixed hyperlipidemia    Morgagni hernia    Occlusion of right vertebral artery    NERY (obstructive sleep apnea)    Type 2 diabetes mellitus    Fall     Past Medical History:   Diagnosis Date    Atrial fibrillation     BPH (benign prostatic hyperplasia)     Diabetes mellitus     Diverticulosis     ESRD on hemodialysis     GERD (gastroesophageal reflux disease)     Heart failure     Hyperlipidemia     Hypertension     NERY (obstructive sleep apnea)     Recurrent falls      Past Surgical History:   Procedure Laterality Date    CATARACT EXTRACTION      DIALYSIS FISTULA CREATION      INGUINAL HERNIA REPAIR      PROSTATE BIOPSY      TONSILLECTOMY AND ADENOIDECTOMY        General Information       Row Name 23 1425          OT Time and Intention    Document Type evaluation  -MR     Mode of Treatment occupational therapy  -MR       Row Name 23 1425          General Information    Patient  Profile Reviewed yes  -MR     Prior Level of Function min assist:;all household mobility;gait;transfer;bed mobility;ADL's  -MR     Existing Precautions/Restrictions fall;cervical collar;spinal;brace worn when out of bed  Hard cervical collar in place for OOB activity. T7 and C7 fx.  -MR     Barriers to Rehab medically complex;previous functional deficit  -MR       Row Name 06/05/23 1425          Living Environment    People in Home spouse  -MR       Row Name 06/05/23 1425          Home Main Entrance    Number of Stairs, Main Entrance other (see comments)  Ramp to enter.  -MR       Row Name 06/05/23 1425          Stairs Within Home, Primary    Stairs, Within Home, Primary Three level home w/ basement. Half bath on first floor w/ shower on second floor.  -MR     Number of Stairs, Within Home, Primary other (see comments)  15  -MR     Stair Railings, Within Home, Primary railing on right side (ascending)  -MR       Row Name 06/05/23 1425          Cognition    Orientation Status (Cognition) oriented x 3;verbal cues/prompts needed for orientation;other (see comments)  Multiple attempts for year.  -MR       Row Name 06/05/23 1425          Safety Issues, Functional Mobility    Safety Issues Affecting Function (Mobility) awareness of need for assistance;insight into deficits/self-awareness;judgment;safety precaution awareness;safety precautions follow-through/compliance;sequencing abilities  -MR     Impairments Affecting Function (Mobility) balance;endurance/activity tolerance;pain;range of motion (ROM);strength  -MR               User Key  (r) = Recorded By, (t) = Taken By, (c) = Cosigned By      Initials Name Provider Type    MR Dhara Ford OT Occupational Therapist                     Mobility/ADL's       Row Name 06/05/23 1426          Bed Mobility    Bed Mobility rolling left;sidelying-sit  -MR     Rolling Left [place] (Bed Mobility) minimum assist (75% patient effort);verbal cues;nonverbal cues (demo/gesture)   -MR     Sidelying-Sit [place] (Bed Mobility) minimum assist (75% patient effort);verbal cues;nonverbal cues (demo/gesture);2 person assist  -MR     Bed Mobility, Safety Issues decreased use of arms for pushing/pulling;decreased use of legs for bridging/pushing  -MR     Assistive Device (Bed Mobility) bed rails;head of bed elevated  -MR     Comment, (Bed Mobility) Pt educated on log rolling technique for bed mobility and spinal precautions.  -MR       Row Name 06/05/23 1426          Transfers    Transfers bed-chair transfer  -MR       Row Name 06/05/23 1426          Bed-Chair Transfer    Bed-Chair [place] (Transfers) contact guard;verbal cues;nonverbal cues (demo/gesture)  -MR     Assistive Device (Bed-Chair Transfers) walker, front-wheeled  -MR       Row Name 06/05/23 1426          Functional Mobility    Functional Mobility- Ind. Level contact guard assist;verbal cues required;nonverbal cues required (demo/gesture)  -MR     Functional Mobility- Device walker, front-wheeled  -MR     Functional Mobility-Distance (Feet) --  HH distances w/in pt's room  -MR       Row Name 06/05/23 1426          Activities of Daily Living    BADL Assessment/Intervention upper body dressing;lower body dressing  -MR       Row Name 06/05/23 1426          Upper Body Dressing Assessment/Training    [place] Level (Upper Body Dressing) don;other (see comments);moderate assist (50% patient effort)  hospital gown  -MR     Position (Upper Body Dressing) edge of bed sitting  -MR       Row Name 06/05/23 1426          Lower Body Dressing Assessment/Training    [place] Level (Lower Body Dressing) don;socks;dependent (less than 25% patient effort)  -MR     Position (Lower Body Dressing) supine  -MR               User Key  (r) = Recorded By, (t) = Taken By, (c) = Cosigned By      Initials Name Provider Type    MR Dhara Ford OT Occupational Therapist                   Obj/Interventions       Row Name 06/05/23 7305           Sensory Assessment (Somatosensory)    Sensory Assessment (Somatosensory) UE sensation intact  -MR       Row Name 06/05/23 1431          Vision Assessment/Intervention    Visual Impairment/Limitations WFL;corrective lenses full-time  -MR       Row Name 06/05/23 1431          Range of Motion Comprehensive    Comment, General Range of Motion LUE ROM limited d/t significant arthritis. Pt reporting he is supposed to be getting a shoulder replacement but it has since been postponed by multiple falls. RUE WFL  -MR       Row Name 06/05/23 1431          Strength Comprehensive (MMT)    Comment, General Manual Muscle Testing (MMT) Assessment Unable to formally assess LUE strength d/t pain. RUE grossly 3+/5  -MR       Row Name 06/05/23 1431          Balance    Balance Assessment sitting static balance;sitting dynamic balance;standing static balance;standing dynamic balance  -MR     Static Sitting Balance contact guard  -MR     Dynamic Sitting Balance contact guard  -MR     Position, Sitting Balance unsupported;sitting in chair;sitting edge of bed  -MR     Static Standing Balance contact guard;verbal cues;non-verbal cues (demo/gesture)  -MR     Dynamic Standing Balance contact guard;verbal cues;non-verbal cues (demo/gesture)  -MR     Position/Device Used, Standing Balance supported;walker, front-wheeled  -MR     Balance Interventions sitting;standing;sit to stand;supported;static;dynamic;occupation based/functional task  -MR     Comment, Balance Pt demonstrating slight unsteadiness w/ transfer and in standing. Pt denied dizziness and BP stable throughout session.  -MR               User Key  (r) = Recorded By, (t) = Taken By, (c) = Cosigned By      Initials Name Provider Type     Dhara Ford, BETSY Occupational Therapist                   Goals/Plan       Row Name 06/05/23 1439          Bed Mobility Goal 1 (OT)    Activity/Assistive Device (Bed Mobility Goal 1, OT) rolling to left;rolling to right;sidelying to sit/sit to  sidelying  -MR     [place] Level/Cues Needed (Bed Mobility Goal 1, OT) contact guard required;tactile cues required;verbal cues required  -MR     Time Frame (Bed Mobility Goal 1, OT) long term goal (LTG);by discharge  -MR     Progress/Outcomes (Bed Mobility Goal 1, OT) new goal  -MR       Row Name 06/05/23 1439          Transfer Goal 1 (OT)    Activity/Assistive Device (Transfer Goal 1, OT) sit-to-stand/stand-to-sit;toilet  -MR     [place] Level/Cues Needed (Transfer Goal 1, OT) contact guard required;tactile cues required;verbal cues required  -MR     Time Frame (Transfer Goal 1, OT) long term goal (LTG);by discharge  -MR     Progress/Outcome (Transfer Goal 1, OT) new goal  -MR       Row Name 06/05/23 1439          Dressing Goal 1 (OT)    Activity/Device (Dressing Goal 1, OT) lower body dressing;elastic laces;long-handled shoe horn;reacher;other (see comments)  will initiate training next session.  -MR     [place]/Cues Needed (Dressing Goal 1, OT) tactile cues required;verbal cues required;moderate assist (50-74% patient effort)  -MR     Time Frame (Dressing Goal 1, OT) long term goal (LTG);by discharge  -MR     Progress/Outcome (Dressing Goal 1, OT) new goal  -MR       Row Name 06/05/23 1439          Grooming Goal 1 (OT)    Activity/Device (Grooming Goal 1, OT) grooming skills, all;other (see comments)  standing at sink side for increased functional challenge  -MR     [place] (Grooming Goal 1, OT) contact guard required;tactile cues required;verbal cues required  -MR     Time Frame (Grooming Goal 1, OT) long term goal (LTG);by discharge  -MR     Progress/Outcome (Grooming Goal 1, OT) new goal  -MR       Row Name 06/05/23 1439          Therapy Assessment/Plan (OT)    Planned Therapy Interventions (OT) activity tolerance training;adaptive equipment training;BADL retraining;functional balance retraining;IADL retraining;occupation/activity based interventions;patient/caregiver  education/training;transfer/mobility retraining;strengthening exercise;ROM/therapeutic exercise  -MR               User Key  (r) = Recorded By, (t) = Taken By, (c) = Cosigned By      Initials Name Provider Type    MR Ford Dhara, OT Occupational Therapist                   Clinical Impression       Row Name 06/05/23 1434          Pain Assessment    Pretreatment Pain Rating 0/10 - no pain  -MR     Posttreatment Pain Rating 0/10 - no pain  -MR     Pain Intervention(s) Ambulation/increased activity;Repositioned  -MR       Row Name 06/05/23 1434          Plan of Care Review    Plan of Care Reviewed With patient;spouse;daughter  -MR     Progress no change  Initial Eval  -MR     Outcome Evaluation Pt presenting w/ impaired balance, mobility and transfers limiting independence w/ ADL based tasks. Pt educated on spinal precautions and log rolling for ingressing/egressing bed. pt requiring significant assist w/ UB and LB dressing. Will issue AE and initiate training next session. IP OT warranted to address deficits. Recommend inpatient rehab at d/c for best functional outcome.  -MR       Row Name 06/05/23 1434          Therapy Assessment/Plan (OT)    Patient/Family Therapy Goal Statement (OT) Return to PLOF  -MR     Rehab Potential (OT) good, to achieve stated therapy goals  -MR     Criteria for Skilled Therapeutic Interventions Met (OT) yes;meets criteria;skilled treatment is necessary  -MR     Therapy Frequency (OT) daily  -MR       Row Name 06/05/23 1434          Therapy Plan Review/Discharge Plan (OT)    Anticipated Discharge Disposition (OT) inpatient rehabilitation facility  -MR       Row Name 06/05/23 1434          Vital Signs    Pre Systolic BP Rehab 120  -MR     Pre Treatment Diastolic BP 64  -MR     Post Systolic BP Rehab 124  -MR     Post Treatment Diastolic BP 75  -MR     Pretreatment Heart Rate (beats/min) 60  -MR     Posttreatment Heart Rate (beats/min) 70  -MR     O2 Delivery Pre Treatment room air  -MR      O2 Delivery Intra Treatment room air  -MR     O2 Delivery Post Treatment room air  -MR     Pre Patient Position Supine  -MR     Intra Patient Position Standing  -MR     Post Patient Position Sitting  -MR       Row Name 06/05/23 1434          Positioning and Restraints    Pre-Treatment Position in bed  -MR     Post Treatment Position chair  -MR     In Chair exit alarm on;with PT  cervical collar donned  -MR               User Key  (r) = Recorded By, (t) = Taken By, (c) = Cosigned By      Initials Name Provider Type     LoganDhara, OT Occupational Therapist                   Outcome Measures       Row Name 06/05/23 1442          How much help from another is currently needed...    Putting on and taking off regular lower body clothing? 1  -MR     Bathing (including washing, rinsing, and drying) 2  -MR     Toileting (which includes using toilet bed pan or urinal) 2  -MR     Putting on and taking off regular upper body clothing 2  -MR     Taking care of personal grooming (such as brushing teeth) 2  -MR     Eating meals 3  -MR     AM-PAC 6 Clicks Score (OT) 12  -MR       Row Name 06/05/23 1419 06/05/23 1000       How much help from another person do you currently need...    Turning from your back to your side while in flat bed without using bedrails? 3  -AB 3  -TG    Moving from lying on back to sitting on the side of a flat bed without bedrails? 2  -AB 3  -TG    Moving to and from a bed to a chair (including a wheelchair)? 3  -AB 3  -TG    Standing up from a chair using your arms (e.g., wheelchair, bedside chair)? 3  -AB 2  -TG    Climbing 3-5 steps with a railing? 2  -AB 2  -TG    To walk in hospital room? 3  -AB 3  -TG    AM-PAC 6 Clicks Score (PT) 16  -AB 16  -TG    Highest level of mobility 5 --> Static standing  -AB 5 --> Static standing  -TG      Row Name 06/05/23 0800 06/05/23 0635       How much help from another person do you currently need...    Turning from your back to your side while in flat bed  without using bedrails? 2  -TG 2  -AD    Moving from lying on back to sitting on the side of a flat bed without bedrails? -- 2  -AD    Moving to and from a bed to a chair (including a wheelchair)? -- 2  -AD    Standing up from a chair using your arms (e.g., wheelchair, bedside chair)? -- 2  -AD    Climbing 3-5 steps with a railing? -- 2  -AD    To walk in hospital room? -- 3  -AD    AM-PAC 6 Clicks Score (PT) -- 13  -AD    Highest level of mobility -- 4 --> Transferred to chair/commode  -AD      Row Name 06/05/23 1442 06/05/23 1419       Functional Assessment    Outcome Measure Options AM-PAC 6 Clicks Daily Activity (OT)  -MR AM-PAC 6 Clicks Basic Mobility (PT)  -AB              User Key  (r) = Recorded By, (t) = Taken By, (c) = Cosigned By      Initials Name Provider Type    TG Alan Basilio, RN Registered Nurse    eCce Phillips, RN Registered Nurse    Dhara Lopez, OT Occupational Therapist    Caro Draper, PT Physical Therapist                    Occupational Therapy Education       Title: PT OT SLP Therapies (In Progress)       Topic: Occupational Therapy (In Progress)       Point: ADL training (Done)       Description:   Instruct learner(s) on proper safety adaptation and remediation techniques during self care or transfers.   Instruct in proper use of assistive devices.                  Learning Progress Summary             Patient Acceptance, E, VU by MR at 6/5/2023 6178                         Point: Home exercise program (Not Started)       Description:   Instruct learner(s) on appropriate technique for monitoring, assisting and/or progressing therapeutic exercises/activities.                  Learner Progress:  Not documented in this visit.              Point: Precautions (Done)       Description:   Instruct learner(s) on prescribed precautions during self-care and functional transfers.                  Learning Progress Summary             Patient Acceptance, E, VU by MR at 6/5/2023 0444                          Point: Body mechanics (Done)       Description:   Instruct learner(s) on proper positioning and spine alignment during self-care, functional mobility activities and/or exercises.                  Learning Progress Summary             Patient Acceptance, E, VU by MR at 6/5/2023 1443                                         User Key       Initials Effective Dates Name Provider Type Discipline    MR 09/22/22 -  Dhara Ford, OT Occupational Therapist OT                  OT Recommendation and Plan  Planned Therapy Interventions (OT): activity tolerance training, adaptive equipment training, BADL retraining, functional balance retraining, IADL retraining, occupation/activity based interventions, patient/caregiver education/training, transfer/mobility retraining, strengthening exercise, ROM/therapeutic exercise  Therapy Frequency (OT): daily  Plan of Care Review  Plan of Care Reviewed With: patient, spouse, daughter  Progress: no change (Initial Eval)  Outcome Evaluation: Pt presenting w/ impaired balance, mobility and transfers limiting independence w/ ADL based tasks. Pt educated on spinal precautions and log rolling for ingressing/egressing bed. pt requiring significant assist w/ UB and LB dressing. Will issue AE and initiate training next session. IP OT warranted to address deficits. Recommend inpatient rehab at d/c for best functional outcome.     Time Calculation:    Time Calculation- OT       Row Name 06/05/23 1444 06/05/23 1420          Time Calculation- OT    OT Start Time 1308  -MR --     OT Received On 06/05/23  -MR --     OT Goal Re-Cert Due Date 06/15/23  -MR --        Timed Charges    28645 - Gait Training Minutes  -- 10  -AB        Untimed Charges    OT Eval/Re-eval Minutes 46  -MR --        Total Minutes    Timed Charges Total Minutes -- 10  -AB     Untimed Charges Total Minutes 46  -MR --      Total Minutes 46  -MR 10  -AB               User Key  (r) = Recorded By, (t) = Taken By,  (c) = Cosigned By      Initials Name Provider Type    MR Dhara Ford, OT Occupational Therapist    AB Caro Youngblood, PT Physical Therapist                  Therapy Charges for Today       Code Description Service Date Service Provider Modifiers Qty    [id number] HC OT EVAL MOD COMPLEXITY 4 6/5/2023 Dhara Ford OT GO 1                 Dhara Ford OT  6/5/2023

## 2023-06-05 NOTE — MBS/VFSS/FEES
Acute Care - Speech Language Pathology   Swallow Initial Evaluation  [facility]  Modified Barium Swallow Study (MBS)     Patient Name: Enrike Tomas  : 1935  MRN: [id number]  Today's Date: 2023               Admit Date: 2023    Visit Dx:     ICD-10-CM ICD-9-CM   1. Compression fracture of C7 vertebra, initial encounter  S12.690A 805.07   2. Closed head injury, initial encounter  S09.90XA 959.01   3. ESRD on hemodialysis  N18.6 585.6    Z99.2 V45.11   4. Oropharyngeal dysphagia  R13.12 787.22     Patient Active Problem List   Diagnosis    Compression fracture of C7 vertebra, initial encounter    (HFpEF) heart failure with preserved ejection fraction    Anemia of renal disease    At high risk for falls    Benign prostatic hyperplasia    Closed fracture of left acetabulum    Compression fracture of thoracic vertebra    Deficiency of other specified B group vitamins    Depression    Encephalopathy    ESRD (end stage renal disease)    Essential hypertension    GERD (gastroesophageal reflux disease)    Hepatic cyst    Long term (current) use of anticoagulants    Major depressive disorder, single episode, moderate    Mixed hyperlipidemia    Morgagni hernia    Occlusion of right vertebral artery    NERY (obstructive sleep apnea)    Type 2 diabetes mellitus    Fall     Past Medical History:   Diagnosis Date    Atrial fibrillation     BPH (benign prostatic hyperplasia)     Diabetes mellitus     Diverticulosis     ESRD on hemodialysis     GERD (gastroesophageal reflux disease)     Heart failure     Hyperlipidemia     Hypertension     NERY (obstructive sleep apnea)     Recurrent falls      Past Surgical History:   Procedure Laterality Date    CATARACT EXTRACTION      DIALYSIS FISTULA CREATION      INGUINAL HERNIA REPAIR      PROSTATE BIOPSY      TONSILLECTOMY AND ADENOIDECTOMY         SLP Recommendation and Plan  SLP Swallowing Diagnosis: suspect acute-on-chronic, moderate, pharyngeal dysphagia, mild-moderate, oral  dysphagia (06/05/23 1410)  SLP Diet Recommendation: mechanical ground textures, no mixed consistencies, honey thick liquids (06/05/23 1410)  Recommended Precautions and Strategies: upright posture during/after eating, general aspiration precautions (encourage dry/2nd swallows intermittently) (06/05/23 1410)  SLP Rec. for Method of Medication Administration: meds whole, meds crushed, with puree, as tolerated (06/05/23 1410)     Monitor for Signs of Aspiration: yes, notify SLP if any concerns, cough, throat clearing (06/05/23 1410)    Swallow Criteria for Skilled Therapeutic Interventions Met: demonstrates skilled criteria (06/05/23 1410)  Anticipated Discharge Disposition (SLP): anticipate therapy at next level of care, skilled nursing facility (06/05/23 1410)  Rehab Potential/Prognosis, Swallowing: good, to achieve stated therapy goals (06/05/23 1410)  Therapy Frequency (Swallow): 5 days per week (06/05/23 1410)  Predicted Duration Therapy Intervention (Days): until discharge (06/05/23 1410)                                        Plan of Care Reviewed With: patient, spouse  Progress: no change      SWALLOW EVALUATION (last 72 hours)       SLP Adult Swallow Evaluation       Row Name 06/05/23 1410 06/05/23 0915                Rehab Evaluation    Document Type evaluation  -AC evaluation  -AC       Subjective Information no complaints  -AC no complaints  -AC       Patient Observations cooperative  drowsy--alert to stimuli  -AC cooperative  Drowsy--alert to stimuli  -AC       Patient/Family/Caregiver Comments/Observations No family present.  -AC Spouse present.  -AC       Patient Effort good  -AC good  -AC          General Information    Patient Profile Reviewed yes  -AC yes  -AC       Pertinent History Of Current Problem See previous eval. Neurosurgery cleared pt for PO diet.  -AC Fall, compression fx of C7. Encephalopathic. Recent adm  5/9-5/31 after fall & T7 fx. Pt was assessed by SLP team @  multiple times and  diagnosed w/ pharyngeal dysphagia/aspiration w/ unknown etiology. Initially, honey-thick liquids were recommended. Per repeat MBS on 5/23, pt aspirated thin liquids (ineffective cough response) and SLP recommended regular diet, upgrade to nectar-thick liquids. Pt d/c'd home after the hospital stay, reported he returned to thin liquids, & home health SLP services were not arranged. 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  -AC       Current Method of Nutrition NPO  -AC NPO  -AC       Precautions/Limitations, Vision -- WFL with corrective lenses;for purposes of eval  -AC       Precautions/Limitations, Hearing -- WFL;for purposes of eval  -AC       Prior Level of Function-Swallowing -- --  recent hx pharyngeal dysphagia/aspiration  -AC       Plans/Goals Discussed with patient;agreed upon  -AC patient;spouse/S.O.;agreed upon  -AC       Barriers to Rehab medically complex;previous functional deficit;cognitive status  -AC medically complex;previous functional deficit;cognitive status  -AC       Patient's Goals for Discharge return to PO diet  -AC return to PO diet  -AC       Family Goals for Discharge -- patient able to return to PO diet  -AC          Pain    Additional Documentation -- Pain Scale: FACES Pre/Post-Treatment (Group)  -AC          Pain Scale: FACES Pre/Post-Treatment    Pain: FACES Scale, Pretreatment 0-->no hurt  -AC 0-->no hurt  -AC       Posttreatment Pain Rating 0-->no hurt  -AC 0-->no hurt  -AC          Oral Motor Structure and Function    Dentition Assessment -- natural, present and adequate  -AC       Secretion Management -- WNL/WFL  -AC       Mucosal Quality -- dry  -AC       Volitional Cough -- weak;reduced respiratory support  -AC          Oral Musculature and Cranial Nerve Assessment    Oral Motor General Assessment -- generalized oral motor  weakness  -          General Eating/Swallowing Observations    Respiratory Support Currently in Use room air  -AC nasal cannula  -AC       O2 Liters -- 2L  -AC       Eating/Swallowing Skills fed by SLP  -AC fed by SLP  -AC       Positioning During Eating upright 90 degree;upright in chair;other (see comments)  wearing c-collar  - upright 90 degree;upright in bed  -       Utensils Used -- spoon;straw  -AC       Consistencies Trialed -- nectar/syrup-thick liquids;pureed  ~10cc nectar H2O & 5cc puree  -          Clinical Swallow Eval    Oral Prep Phase -- WFL  -AC       Oral Transit -- WFL  -       Oral Residue -- L  -       Pharyngeal Phase -- suspected pharyngeal impairment  -          Pharyngeal Phase Concerns    Pharyngeal Phase Concerns -- cough;throat clear  -       Cough -- all consistencies  -       Throat Clear -- all consistencies  -AC       Pharyngeal Phase Concerns, Comment -- In c-collar. Baseline throat clear/cough. Increased w/ introduction of PO trials. High risk for aspiration.  -          MBS/VFSS    Utensils Used spoon;cup;straw  -AC --       Consistencies Trialed thin liquids;nectar/syrup-thick liquids;honey-thick liquids;pudding thick;regular textures  - --          MBS/VFSS Interpretation    Oral Prep Phase [facility]  - --       Oral Transit Phase impaired  - --       Oral Residue L  - --          Oral Transit Phase    Impaired Oral Transit Phase increased A-P transit time;premature spillage of liquids into pharynx;delayed initiation of bolus transit  -AC --       Delayed Initiation of bolus transit all consistencies tested;secondary to impaired cognitive status  -AC --       Increased A-P Transit Time all consistencies tested;secondary to impaired cognitive status  -AC --       Premature Spillage of Liquids into Pharynx thin liquids;nectar-thick liquids;secondary to reduced lingual control;secondary to impaired cognitive status  -AC --          Initiation of Pharyngeal  Swallow    Initiation of Pharyngeal Swallow bolus in pyriform sinuses  -AC --       Pharyngeal Phase impaired pharyngeal phase of swallowing  -AC --       Anatomical abnormalities noted anterior cervical c-spine prominence;other (see comments)  lordotic curvature appeared prominent anteriorly--may have been affected by positioning in c-collar  -AC --       Anatomical abnormalities functional impact functional impact on swallowing;other (see comments)  reduced pharyngeal clearance/stripping  -AC --       Penetration Before the Swallow thin liquids;secondary to reduced back of tongue control;secondary to delayed swallow initiation or mistiming  -AC --       Aspiration During the Swallow thin liquids;nectar-thick liquids;secondary to delayed swallow initiation or mistiming;secondary to reduced laryngeal elevation;secondary to reduced vestibular closure;secondary to reduced laryngeal (VF) closure;other (see comments)  luz  -AC --       Response to Aspiration Yes  -AC --       Responded to aspiration with non-effective;cough;throat clear;other (see comments)  very weak cough/throat clear  -AC --       Rosenbek's Scale thin:;nectar:;7--->level 7  -AC --       Pharyngeal Residue all consistencies tested;diffuse within pharynx;secondary to reduced posterior pharyngeal wall stripping;secondary to reduced hyolaryngeal excursion;secondary to reduced laryngeal elevation;other (see comments)  mild-moderate amount w/ all, except solid, which was moderate-severe  -AC --       Response to Residue other (see comments)  partial clearance w/ cued 2nd swallow & liquid wash  -AC --       Attempted Compensatory Maneuvers effortful (hard swallow);other (see comments)  Did not attmempt more complex compensations 2' cognitive impairments  -AC --       Response to Attempted Compensatory Maneuvers did not reduce residue  -AC --       Pharyngeal Phase, Comment Luz aspiration of thin and nectar-thick liquids. No penetration/aspiration  appreciated w/ honey-thick liquid, pudding, or solid. At end of study, pt exhibited excessive coughing--though no new penetration/aspiration appreciated. ? Delayed response to aspiration of thin/nectar-thick liquids @ beginning of study as material deepened in trachea.  -AC --          SLP Evaluation Clinical Impression    SLP Swallowing Diagnosis suspect acute-on-chronic;moderate;pharyngeal dysphagia;mild-moderate;oral dysphagia  -AC suspected pharyngeal dysphagia  -AC       Functional Impact risk of aspiration/pneumonia  -AC risk of aspiration/pneumonia;risk of malnutrition;risk of dehydration  -AC       Rehab Potential/Prognosis, Swallowing good, to achieve stated therapy goals  -AC good, to achieve stated therapy goals  -AC       Swallow Criteria for Skilled Therapeutic Interventions Met demonstrates skilled criteria  -AC demonstrates skilled criteria  -AC          Recommendations    Therapy Frequency (Swallow) 5 days per week  -AC --       Predicted Duration Therapy Intervention (Days) until discharge  -AC --       SLP Diet Recommendation mechanical ground textures;no mixed consistencies;honey thick liquids  -AC NPO;other (see comments)  until MBS  -       Recommended Diagnostics -- VFSS ([facility] [place]);other (see comments)  pending neurosurgery & nephrology consults/plan  -       Recommended Precautions and Strategies upright posture during/after eating;general aspiration precautions  encourage dry/2nd swallows intermittently  -AC --       Oral Care Recommendations Oral Care BID/PRN  -AC Oral Care BID/PRN  -AC       SLP Rec. for Method of Medication Administration meds whole;meds crushed;with puree;as tolerated  -AC meds via alternate route  -       Monitor for Signs of Aspiration yes;notify SLP if any concerns;cough;throat clearing  -AC --       Anticipated Discharge Disposition (SLP) anticipate therapy at next level of care;skilled nursing facility  - anticipate therapy at next level of care;skilled nursing  facility  -                 User Key  (r) = Recorded By, (t) = Taken By, (c) = Cosigned By      Initials Name Effective Dates    AC Mireya Santos, MS [facility]-SLP 02/03/23 -                     EDUCATION  The patient has been educated in the following areas:   Dysphagia (Swallowing Impairment) Modified Diet Instruction.        SLP GOALS       Row Name 06/05/23 1410             (LTG) Patient will demonstrate functional swallow for    Diet Texture (Demonstrate functional swallow) regular textures  -AC      Liquid viscosity (Demonstrate functional swallow) nectar/ mildly thick liquids  -AC      Fitzpatrick (Demonstrate functional swallow) with minimal cues (75-90% accuracy)  -AC      Time Frame (Demonstrate functional swallow) by discharge  -AC         (STG) Patient will tolerate trials of    Consistencies Trialed (Tolerate trials) mechanical ground textures;honey/ moderately thick liquids  -AC      Desired Outcome (Tolerate trials) without signs/symptoms of aspiration;with adequate oral prep/transit/clearance;with use of compensatory strategies (see comments)  2nd swallow, general precautions  -AC      Fitzpatrick (Tolerate trials) with minimal cues (75-90% accuracy)  -AC      Time Frame (Tolerate trials) by discharge  -AC         (STG) Patient will tolerate therapeutic trials of    Consistencies Trialed (Tolerate therapeutic trials) nectar/ mildly thick liquids  -AC      Desired Outcome (Tolerate therapeutic trials) without signs/symptoms of aspiration  check for cough/throat clear--may very weak  -AC      Fitzpatrick (Tolerate therapeutic trials) with 1:1 assist/ supervision  -AC      Time Frame (Tolerate therapeutic trials) by discharge  -AC         (STG) Lingual Strengthening Goal 1 (SLP)    Activity (Lingual Strengthening Goal 1, SLP) increase tongue back strength  -AC      Increase Tongue Back Strength lingual resistance exercises  -AC      Fitzpatrick/Accuracy (Lingual Strengthening Goal 1, SLP) with moderate  cues (50-74% accuracy)  -AC      Time Frame (Lingual Strengthening Goal 1, SLP) short term goal (STG)  -AC         (STG) Pharyngeal Strengthening Exercise Goal 1 (SLP)    Activity (Pharyngeal Strengthening Goal 1, SLP) increase timing;increase superior movement of the hyolaryngeal complex;increase squeeze/positive pressure generation;increase closure at entrance to airway/closure of airway at glottis  -AC      Increase Timing prepping - 3 second prep or suck swallow or 3-step swallow  -AC      Increase Superior Movement of the Hyolaryngeal Complex effortful pitch glide (falsetto + pharyngeal squeeze)  -AC      Increase Closure at Entrance to Airway/Closure of Airway at Glottis supraglottic swallow  -AC      Increase Squeeze/Positive Pressure Generation hard effortful swallow  -AC      Clifton/Accuracy (Pharyngeal Strengthening Goal 1, SLP) with moderate cues (50-74% accuracy)  -AC      Time Frame (Pharyngeal Strengthening Goal 1, SLP) short term goal (STG)  -AC                User Key  (r) = Recorded By, (t) = Taken By, (c) = Cosigned By      Initials Name Provider Type    Mireya Youssef MS CCC-SLP Speech and Language Pathologist                       Time Calculation:    Time Calculation- SLP       Row Name 06/05/23 1556 06/05/23 1555 06/05/23 1056       Time Calculation- SLP    SLP Start Time -- 1410  -AC 0915  -AC    SLP Received On -- 06/05/23  -AC 06/05/23  -AC       Untimed Charges    83106-QZ Eval Oral Pharyng Swallow Minutes -- -- 50  -AC    84592-AO Motion Fluoro Eval Swallow Minutes 70  -AC -- --       Total Minutes    Untimed Charges Total Minutes 70  -AC -- 50  -AC     Total Minutes 70  -AC -- 50  -AC              User Key  (r) = Recorded By, (t) = Taken By, (c) = Cosigned By      Initials Name Provider Type    Mireya Youssef MS CCC-SLP Speech and Language Pathologist                    Therapy Charges for Today       Code Description Service Date Service Provider Modifiers Qty     90458348034 HC ST EVAL ORAL PHARYNG SWALLOW 3 6/5/2023 Mireya Santos, MS CCC-SLP GN 1    [id number] HC ST MOTION FLUORO EVAL SWALLOW 5 6/5/2023 Mireya Santos, MS CCC-SLP GN 1                 Mireya Santos MS CCC-SLP  6/5/2023

## 2023-06-05 NOTE — Clinical Note
Level of Care: Telemetry [5]   Diagnosis: Compression fracture of C7 vertebra, initial encounter [[id number]]

## 2023-06-06 PROBLEM — E43 SEVERE MALNUTRITION: Status: ACTIVE | Noted: 2023-06-06

## 2023-06-06 LAB
FOLATE SERPL-MCNC: 7.8 NG/ML (ref 4.78–24.2)
GLUCOSE BLDC GLUCOMTR-MCNC: 126 MG/DL (ref 70–130)
GLUCOSE BLDC GLUCOMTR-MCNC: 132 MG/DL (ref 70–130)
MAGNESIUM SERPL-MCNC: 2.3 MG/DL (ref 1.6–2.4)
QT INTERVAL: 438 MS
QTC INTERVAL: 459 MS
VIT B12 BLD-MCNC: 1484 PG/ML (ref 211–946)

## 2023-06-06 PROCEDURE — 82948 REAGENT STRIP/BLOOD GLUCOSE: CPT

## 2023-06-06 PROCEDURE — 97530 THERAPEUTIC ACTIVITIES: CPT

## 2023-06-06 PROCEDURE — 83735 ASSAY OF MAGNESIUM: CPT | Performed by: INTERNAL MEDICINE

## 2023-06-06 PROCEDURE — G0378 HOSPITAL OBSERVATION PER HR: HCPCS

## 2023-06-06 PROCEDURE — 83036 HEMOGLOBIN GLYCOSYLATED A1C: CPT | Performed by: FAMILY MEDICINE

## 2023-06-06 PROCEDURE — 97535 SELF CARE MNGMENT TRAINING: CPT

## 2023-06-06 PROCEDURE — 97116 GAIT TRAINING THERAPY: CPT

## 2023-06-06 PROCEDURE — 99233 SBSQ HOSP IP/OBS HIGH 50: CPT | Performed by: FAMILY MEDICINE

## 2023-06-06 RX ORDER — FOLIC ACID 1 MG/1
1 TABLET ORAL DAILY
Status: DISCONTINUED | OUTPATIENT
Start: 2023-06-06 | End: 2023-06-14 | Stop reason: HOSPADM

## 2023-06-06 RX ADMIN — PANTOPRAZOLE SODIUM 40 MG: 40 TABLET, DELAYED RELEASE ORAL at 05:56

## 2023-06-06 RX ADMIN — Medication 10 ML: at 08:48

## 2023-06-06 RX ADMIN — FINASTERIDE 5 MG: 5 TABLET, FILM COATED ORAL at 08:52

## 2023-06-06 RX ADMIN — METOPROLOL TARTRATE 6.25 MG: 25 TABLET, FILM COATED ORAL at 08:49

## 2023-06-06 RX ADMIN — SENNOSIDES AND DOCUSATE SODIUM 2 TABLET: 50; 8.6 TABLET ORAL at 08:49

## 2023-06-06 RX ADMIN — ATORVASTATIN CALCIUM 20 MG: 20 TABLET, FILM COATED ORAL at 08:49

## 2023-06-06 RX ADMIN — Medication 10 ML: at 21:18

## 2023-06-06 RX ADMIN — Medication 5 MG: at 21:18

## 2023-06-06 RX ADMIN — SENNOSIDES AND DOCUSATE SODIUM 2 TABLET: 50; 8.6 TABLET ORAL at 21:18

## 2023-06-06 RX ADMIN — SERTRALINE 50 MG: 50 TABLET, FILM COATED ORAL at 08:49

## 2023-06-06 NOTE — PLAN OF CARE
Goal Outcome Evaluation:  Plan of Care Reviewed With: patient, spouse, daughter        Progress: improving  Outcome Evaluation: Pt with good effort and ambulated 80' with min Ax1 and FWW. Cues for maintaining spinal precautions, cervical collar in place. Pt continues to be limited by decreased strength, impaired safety awareness, and balance deficits causingf functional mobility below baseline. Pt will benefit from further IPPT for addressing deficits. PT rec d/c to IPR.

## 2023-06-06 NOTE — PLAN OF CARE
Goal Outcome Evaluation:  Plan of Care Reviewed With: patient        Progress: improving  Outcome Evaluation: Pt continues to present below baseline with decreased balance, strength, coordination, and overall independence with ADLS. Pt completes transfers with CGA and ambulates with CGA. Nasima for all dynamic standing takes due to decreased balance. Nasima for all grooming. Recommend d/c to IPR when medically appropriate.

## 2023-06-06 NOTE — THERAPY TREATMENT NOTE
Patient Name: Enrike Tomas  : 1935    MRN: [id number]                              Today's Date: 2023       Admit Date: 2023    Visit Dx:     ICD-10-CM ICD-9-CM   1. Compression fracture of C7 vertebra, initial encounter  S12.690A 805.07   2. Closed head injury, initial encounter  S09.90XA 959.01   3. ESRD on hemodialysis  N18.6 585.6    Z99.2 V45.11   4. Oropharyngeal dysphagia  R13.12 787.22     Patient Active Problem List   Diagnosis    Compression fracture of C7 vertebra, initial encounter    (HFpEF) heart failure with preserved ejection fraction    Anemia of renal disease    At high risk for falls    Benign prostatic hyperplasia    Closed fracture of left acetabulum    Compression fracture of thoracic vertebra    Deficiency of other specified B group vitamins    Depression    Encephalopathy    ESRD (end stage renal disease)    Essential hypertension    GERD (gastroesophageal reflux disease)    Hepatic cyst    Long term (current) use of anticoagulants    Major depressive disorder, single episode, moderate    Mixed hyperlipidemia    Morgagni hernia    Occlusion of right vertebral artery    NERY (obstructive sleep apnea)    Type 2 diabetes mellitus    Fall     Past Medical History:   Diagnosis Date    Atrial fibrillation     BPH (benign prostatic hyperplasia)     Diabetes mellitus     Diverticulosis     ESRD on hemodialysis     GERD (gastroesophageal reflux disease)     Heart failure     Hyperlipidemia     Hypertension     NERY (obstructive sleep apnea)     Recurrent falls      Past Surgical History:   Procedure Laterality Date    CATARACT EXTRACTION      DIALYSIS FISTULA CREATION      INGUINAL HERNIA REPAIR      PROSTATE BIOPSY      TONSILLECTOMY AND ADENOIDECTOMY        General Information       Row Name 23 1146          Physical Therapy Time and Intention    Document Type therapy note (daily note)  -AB     Mode of Treatment physical therapy  -AB       Row Name 23 1146          General  Information    Patient Profile Reviewed yes  -AB     Existing Precautions/Restrictions fall;cervical collar;spinal;brace worn when out of bed  Hard cervical collar in place for OOB activity. T7 and C7 fx.  -AB     Barriers to Rehab medically complex;previous functional deficit  -AB       Row Name 06/06/23 1146          Cognition    Orientation Status (Cognition) oriented x 3  -AB       Row Name 06/06/23 1146          Safety Issues, Functional Mobility    Safety Issues Affecting Function (Mobility) awareness of need for assistance;insight into deficits/self-awareness;positioning of assistive device;safety precaution awareness;safety precautions follow-through/compliance;sequencing abilities;judgment  -AB     Impairments Affecting Function (Mobility) balance;endurance/activity tolerance;pain;range of motion (ROM);strength  -AB               User Key  (r) = Recorded By, (t) = Taken By, (c) = Cosigned By      Initials Name Provider Type    AB Caro Youngblood, PT Physical Therapist                   Mobility       Row Name 06/06/23 1146          Bed Mobility    Bed Mobility rolling left;rolling right;sidelying-sit;sit-sidelying  -AB     Rolling Left Drew (Bed Mobility) minimum assist (75% patient effort);verbal cues  -AB     Rolling Right Drew (Bed Mobility) minimum assist (75% patient effort);verbal cues  -AB     Sidelying-Sit Drew (Bed Mobility) minimum assist (75% patient effort);verbal cues;nonverbal cues (demo/gesture);1 person assist  -AB     Sit-Sidelying Drew (Bed Mobility) minimum assist (75% patient effort);1 person assist;verbal cues;nonverbal cues (demo/gesture)  -AB     Assistive Device (Bed Mobility) bed rails;head of bed elevated  -AB     Comment, (Bed Mobility) Pt practiced multiple reps of sup<>sit using log rolling technique. Maintained precautions with minimal cues.  -AB       Row Name 06/06/23 1146          Transfers    Comment, (Transfers) Cues for hand placement and  sequencing. Cervical collar in place.  -AB       Row Name 06/06/23 1146          Bed-Chair Transfer    Bed-Chair Amite (Transfers) contact guard;verbal cues;1 person assist  -AB     Assistive Device (Bed-Chair Transfers) walker, front-wheeled  -AB       Row Name 06/06/23 1146          Sit-Stand Transfer    Sit-Stand Amite (Transfers) contact guard;verbal cues;1 person assist  -AB     Assistive Device (Sit-Stand Transfers) walker, front-wheeled  -AB     Comment, (Sit-Stand Transfer) Cues for eccentric control when sitting.  -AB       Row Name 06/06/23 1146          Gait/Stairs (Locomotion)    Amite Level (Gait) minimum assist (75% patient effort);verbal cues;1 person assist  -AB     Assistive Device (Gait) walker, front-wheeled  -AB     Distance in Feet (Gait) 80'  -AB     Deviations/Abnormal Patterns (Gait) bilateral deviations;base of support, narrow;gait speed decreased;stride length decreased;amberly decreased  -AB     Bilateral Gait Deviations heel strike decreased  -AB     Comment, (Gait/Stairs) Pt demo's step through gait pattern with decreased stride length and forward flexed posture. Cues for forward gaze and increased stride length. Min assist intermittently for walker management. Cues for maintaining spinal precautions during turns. Gait mechanics improved with cues. No overt LOB. Further gait limited by fatigue.  -AB               User Key  (r) = Recorded By, (t) = Taken By, (c) = Cosigned By      Initials Name Provider Type    AB Caro Youngblood, PT Physical Therapist                   Obj/Interventions       Row Name 06/06/23 1150          Motor Skills    Therapeutic Exercise knee;ankle  -AB       Row Name 06/06/23 1150          Knee (Therapeutic Exercise)    Knee (Therapeutic Exercise) strengthening exercise  -AB     Knee Strengthening (Therapeutic Exercise) bilateral;LAQ (long arc quad);SLR (straight leg raise);10 repetitions  -AB       Row Name 06/06/23 1150          Ankle  (Therapeutic Exercise)    Ankle (Therapeutic Exercise) AROM (active range of motion)  -AB     Ankle AROM (Therapeutic Exercise) bilateral;dorsiflexion;plantarflexion;20 repititions  -AB       Row Name 06/06/23 1150          Balance    Balance Assessment sitting static balance;sitting dynamic balance;standing static balance;standing dynamic balance  -AB     Static Sitting Balance supervision  -AB     Dynamic Sitting Balance contact guard  -AB     Position, Sitting Balance unsupported;sitting edge of bed  -AB     Static Standing Balance contact guard;1-person assist  -AB     Dynamic Standing Balance minimal assist;1-person assist;verbal cues  -AB     Position/Device Used, Standing Balance supported;walker, front-wheeled  -AB     Balance Interventions sitting;standing;sit to stand;supported;static;dynamic  -AB               User Key  (r) = Recorded By, (t) = Taken By, (c) = Cosigned By      Initials Name Provider Type    AB Caro Youngblood, PT Physical Therapist                   Goals/Plan    No documentation.                  Clinical Impression       Row Name 06/06/23 1150          Pain    Pretreatment Pain Rating 2/10  -AB     Posttreatment Pain Rating 2/10  -AB     Pain Location - Side/Orientation Left  -AB     Pain Location - shoulder  -AB     Pre/Posttreatment Pain Comment Pain with movement.  -AB     Pain Intervention(s) Ambulation/increased activity;Repositioned  -AB     Additional Documentation Pain Scale: Numbers Pre/Post-Treatment (Group)  -AB       Row Name 06/06/23 1150          Plan of Care Review    Progress improving  -AB     Outcome Evaluation Pt with good effort and ambulated 80' with min Ax1 and FWW. Cues for maintaining spinal precautions, cervical collar in place. Pt continues to be limited by decreased strength, impaired safety awareness, and balance deficits causingf functional mobility below baseline. Pt will benefit from further IPPT for addressing deficits. PT rec d/c to IPR.  -AB       Row  Name 06/06/23 1150          Vital Signs    Pre Systolic BP Rehab 117  -AB     Pre Treatment Diastolic BP 72  -AB     Post Systolic BP Rehab 100  -AB     Post Treatment Diastolic BP 71  -AB     Pretreatment Heart Rate (beats/min) 71  -AB     Posttreatment Heart Rate (beats/min) 70  -AB     Pre SpO2 (%) 93  -AB     O2 Delivery Pre Treatment room air  -AB     O2 Delivery Intra Treatment room air  -AB     Post SpO2 (%) 91  -AB     O2 Delivery Post Treatment room air  -AB     Pre Patient Position Sitting  -AB     Intra Patient Position Standing  -AB     Post Patient Position Sitting  -AB       Row Name 06/06/23 1150          Positioning and Restraints    Pre-Treatment Position sitting in chair/recliner  -AB     Post Treatment Position chair  -AB     In Chair notified nsg;reclined;sitting;call light within reach;encouraged to call for assist;exit alarm on;legs elevated;waffle cushion;with brace  -AB               User Key  (r) = Recorded By, (t) = Taken By, (c) = Cosigned By      Initials Name Provider Type    AB Caro Youngblood, PT Physical Therapist                   Outcome Measures       Row Name 06/06/23 1154 06/06/23 0800       How much help from another person do you currently need...    Turning from your back to your side while in flat bed without using bedrails? 3  -AB 3  -SN    Moving from lying on back to sitting on the side of a flat bed without bedrails? 3  -AB 2  -SN    Moving to and from a bed to a chair (including a wheelchair)? 3  -AB 3  -SN    Standing up from a chair using your arms (e.g., wheelchair, bedside chair)? 3  -AB 3  -SN    Climbing 3-5 steps with a railing? 2  -AB 2  -SN    To walk in hospital room? 3  -AB 3  -SN    AM-PAC 6 Clicks Score (PT) 17  -AB 16  -SN    Highest level of mobility 5 --> Static standing  -AB 5 --> Static standing  -SN      Row Name 06/06/23 1154          Functional Assessment    Outcome Measure Options AM-PAC 6 Clicks Basic Mobility (PT)  -AB               User Key  (r)  = Recorded By, (t) = Taken By, (c) = Cosigned By      Initials Name Provider Type    AB Caro Youngblood, PT Physical Therapist    Rosemarie Rock RN Registered Nurse                                 Physical Therapy Education       Title: PT OT SLP Therapies (In Progress)       Topic: Physical Therapy (Done)       Point: Mobility training (Done)       Learning Progress Summary             Patient Acceptance, E,D, NR,VU by AB at 6/6/2023 1155    Acceptance, E,D, NR by AB at 6/5/2023 1419                         Point: Home exercise program (Done)       Learning Progress Summary             Patient Acceptance, E,D, NR,VU by AB at 6/6/2023 1155                         Point: Body mechanics (Done)       Learning Progress Summary             Patient Acceptance, E,D, NR,VU by AB at 6/6/2023 1155    Acceptance, E,D, NR by AB at 6/5/2023 1419                         Point: Precautions (Done)       Learning Progress Summary             Patient Acceptance, E,D, NR,VU by AB at 6/6/2023 1155    Acceptance, E,D, NR by AB at 6/5/2023 1419                                         User Key       Initials Effective Dates Name Provider Type Discipline    AB 09/22/22 -  Caro Youngblood, PT Physical Therapist PT                  PT Recommendation and Plan  Planned Therapy Interventions (PT): balance training, bed mobility training, gait training, home exercise program, postural re-education, patient/family education, strengthening, ROM (range of motion), stretching, transfer training  Plan of Care Reviewed With: patient, spouse, daughter  Progress: improving  Outcome Evaluation: Pt with good effort and ambulated 80' with min Ax1 and FWW. Cues for maintaining spinal precautions, cervical collar in place. Pt continues to be limited by decreased strength, impaired safety awareness, and balance deficits causingf functional mobility below baseline. Pt will benefit from further IPPT for addressing deficits. PT rec d/c to IPR.     Time  Calculation:    PT Charges       Row Name 06/06/23 1155             Time Calculation    Start Time 1117  -AB      PT Received On 06/06/23  -AB      PT Goal Re-Cert Due Date 06/15/23  -AB         Timed Charges    18934 - Gait Training Minutes  15  -AB      11481 - PT Therapeutic Activity Minutes 12  -AB         Total Minutes    Timed Charges Total Minutes 27  -AB       Total Minutes 27  -AB                User Key  (r) = Recorded By, (t) = Taken By, (c) = Cosigned By      Initials Name Provider Type    AB Caro Youngblood, PT Physical Therapist                  Therapy Charges for Today       Code Description Service Date Service Provider Modifiers Qty    26272922430 HC GAIT TRAINING EA 15 MIN 6/5/2023 Caro Youngblood, PT GP 1    56358348560 HC PT EVAL MOD COMPLEXITY 4 6/5/2023 Caro Youngblood, PT GP 1    63531453363 HC GAIT TRAINING EA 15 MIN 6/6/2023 Caro Youngblood, PT GP 1    75990090504 HC PT THERAPEUTIC ACT EA 15 MIN 6/6/2023 Caro Youngblood, PT GP 1            PT G-Codes  Outcome Measure Options: AM-PAC 6 Clicks Basic Mobility (PT)  AM-PAC 6 Clicks Score (PT): 17  AM-PAC 6 Clicks Score (OT): 12  PT Discharge Summary  Anticipated Discharge Disposition (PT): inpatient rehabilitation facility    Caro Youngblood PT  6/6/2023

## 2023-06-06 NOTE — THERAPY TREATMENT NOTE
Patient Name: Enrike Tomas  : 1935    MRN: [id number]                              Today's Date: 2023       Admit Date: 2023    Visit Dx:     ICD-10-CM ICD-9-CM   1. Compression fracture of C7 vertebra, initial encounter  S12.690A 805.07   2. Closed head injury, initial encounter  S09.90XA 959.01   3. ESRD on hemodialysis  N18.6 585.6    Z99.2 V45.11   4. Oropharyngeal dysphagia  R13.12 787.22     Patient Active Problem List   Diagnosis    Compression fracture of C7 vertebra, initial encounter    (HFpEF) heart failure with preserved ejection fraction    Anemia of renal disease    At high risk for falls    Benign prostatic hyperplasia    Closed fracture of left acetabulum    Compression fracture of thoracic vertebra    Deficiency of other specified B group vitamins    Depression    Encephalopathy    ESRD (end stage renal disease)    Essential hypertension    GERD (gastroesophageal reflux disease)    Hepatic cyst    Long term (current) use of anticoagulants    Major depressive disorder, single episode, moderate    Mixed hyperlipidemia    Morgagni hernia    Occlusion of right vertebral artery    NERY (obstructive sleep apnea)    Type 2 diabetes mellitus    Fall     Past Medical History:   Diagnosis Date    Atrial fibrillation     BPH (benign prostatic hyperplasia)     Diabetes mellitus     Diverticulosis     ESRD on hemodialysis     GERD (gastroesophageal reflux disease)     Heart failure     Hyperlipidemia     Hypertension     NERY (obstructive sleep apnea)     Recurrent falls      Past Surgical History:   Procedure Laterality Date    CATARACT EXTRACTION      DIALYSIS FISTULA CREATION      INGUINAL HERNIA REPAIR      PROSTATE BIOPSY      TONSILLECTOMY AND ADENOIDECTOMY        General Information       Row Name 23 1147          OT Time and Intention    Document Type therapy note (daily note)  -     Mode of Treatment occupational therapy  -       Row Name 23 1147          General Information     Patient Profile Reviewed yes  -     Existing Precautions/Restrictions fall;cervical collar;spinal;brace worn when out of bed   C collar on when out of bed. T7 and C7 fractures  -     Barriers to Rehab medically complex;previous functional deficit;cognitive status  -       Row Name 06/06/23 1147          Cognition    Orientation Status (Cognition) oriented x 4  -       Row Name 06/06/23 1147          Safety Issues, Functional Mobility    Safety Issues Affecting Function (Mobility) safety precautions follow-through/compliance;safety precaution awareness;insight into deficits/self-awareness;awareness of need for assistance;judgment;problem-solving  -     Impairments Affecting Function (Mobility) balance;endurance/activity tolerance;pain;range of motion (ROM);strength  -               User Key  (r) = Recorded By, (t) = Taken By, (c) = Cosigned By      Initials Name Provider Type     Fouzia Soriano OT Occupational Therapist                     Mobility/ADL's       Row Name 06/06/23 1148          Bed Mobility    Comment, (Bed Mobility) Pt educated on log roll technique as he reports increased difficulty and pain with all bed mobility.  -       Row Name 06/06/23 1148          Transfers    Transfers sit-stand transfer  -     Comment, (Transfers) OT reviewed all spinal precautions. Pt with 1/3 recall.  -       Row Name 06/06/23 1148          Sit-Stand Transfer    Sit-Stand [place] (Transfers) contact guard;verbal cues  -     Assistive Device (Sit-Stand Transfers) walker, front-wheeled  -       Row Name 06/06/23 1148          Functional Mobility    Functional Mobility- Ind. Level contact guard assist;verbal cues required  -     Functional Mobility- Device walker, front-wheeled  -     Functional Mobility-Distance (Feet) 30  -     Functional Mobility- Safety Issues sequencing ability decreased;step length decreased;weight-shifting ability decreased;balance decreased during turns;loses  balance backward  -       Row Name 06/06/23 1148          Activities of Daily Living    BADL Assessment/Intervention grooming  -       Row Name 06/06/23 1148          Grooming Assessment/Training    [place] Level (Grooming) hair care, combing/brushing;oral care regimen;minimum assist (75% patient effort);wash face, hands;set up  -     Position (Grooming) supported standing  -     Comment, (Grooming) Charu to open tooth paste and tooth brush.  -               User Key  (r) = Recorded By, (t) = Taken By, (c) = Cosigned By      Initials Name Provider Type     Fouzia Soriano OT Occupational Therapist                   Obj/Interventions       Row Name 06/06/23 1157          Sensory Assessment (Somatosensory)    Sensory Assessment (Somatosensory) sensation intact  -       Row Name 06/06/23 1157          Balance    Balance Assessment sitting static balance;sitting dynamic balance;standing static balance;standing dynamic balance  -     Static Sitting Balance supervision  -     Dynamic Sitting Balance contact guard  -     Position, Sitting Balance unsupported;sitting in chair  -     Static Standing Balance contact guard  -     Dynamic Standing Balance minimal assist  -     Position/Device Used, Standing Balance supported;walker, rolling  -     Balance Interventions sitting;standing;occupation based/functional task  -               User Key  (r) = Recorded By, (t) = Taken By, (c) = Cosigned By      Initials Name Provider Type     Fouzia Soriano OT Occupational Therapist                   Goals/Plan    No documentation.                  Clinical Impression       [facility] Name 06/06/23 1158          Pain Assessment    Additional Documentation Pain Scale: FACES Pre/Post-Treatment (Group)  -       Row Name 06/06/23 1158          Pain Scale: FACES Pre/Post-Treatment    Pain: FACES Scale, Pretreatment 0-->no hurt  -     Posttreatment Pain Rating 4-->hurts little more  -     Pain  Location - Side/Orientation Bilateral  -     Pain Location lower  -     Pain Location - back  -       Row Name 06/06/23 1158          Plan of Care Review    Plan of Care Reviewed With patient  -     Progress improving  -     Outcome Evaluation Pt continues to present below baseline with decreased balance, strength, coordination, and overall independence with ADLS. Pt completes transfers with CGA and ambulates with CGA. Nasima for all dynamic standing takes due to decreased balance. Nasima for all grooming. Recommend d/c to IPR when medically appropriate.  -       Row Name 06/06/23 1158          Therapy Assessment/Plan (OT)    Patient/Family Therapy Goal Statement (OT) Pt would like to improve and return home.  -     Rehab Potential (OT) good, to achieve stated therapy goals  -     Criteria for Skilled Therapeutic Interventions Met (OT) yes;meets criteria;skilled treatment is necessary  -     Therapy Frequency (OT) daily  -       Row Name 06/06/23 1158          Therapy Plan Review/Discharge Plan (OT)    Anticipated Discharge Disposition (OT) inpatient rehabilitation facility  -       Row Name 06/06/23 1158          Vital Signs    Pre Systolic BP Rehab 107  RN cleared for tx; VSS  -HM     Pre Treatment Diastolic BP 62  -HM     Post Systolic BP Rehab 117  -HM     Post Treatment Diastolic BP 72  -HM     O2 Delivery Pre Treatment room air  -HM     O2 Delivery Intra Treatment room air  -HM     O2 Delivery Post Treatment room air  -HM     Pre Patient Position Sitting  -     Intra Patient Position Standing  -     Post Patient Position Sitting  -       Row Name 06/06/23 1158          Positioning and Restraints    Pre-Treatment Position sitting in chair/recliner  -HM     Post Treatment Position chair  -HM     In Chair notified nsg;reclined;encouraged to call for assist;call light within reach;exit alarm on;waffle cushion  -               User Key  (r) = Recorded By, (t) = Taken By, (c) = Cosigned By       Initials Name Provider Type     Fouzia Soriano OT Occupational Therapist                   Outcome Measures       Row Name 06/06/23 1201          How much help from another is currently needed...    Putting on and taking off regular lower body clothing? 1  -HM     Bathing (including washing, rinsing, and drying) 2  -HM     Toileting (which includes using toilet bed pan or urinal) 2  -HM     Putting on and taking off regular upper body clothing 2  -HM     Taking care of personal grooming (such as brushing teeth) 3  -HM     Eating meals 3  -HM     AM-PAC 6 Clicks Score (OT) 13  -HM       Row Name 06/06/23 1154 06/06/23 0800       How much help from another person do you currently need...    Turning from your back to your side while in flat bed without using bedrails? 3  -AB 3  -SN    Moving from lying on back to sitting on the side of a flat bed without bedrails? 3  -AB 2  -SN    Moving to and from a bed to a chair (including a wheelchair)? 3  -AB 3  -SN    Standing up from a chair using your arms (e.g., wheelchair, bedside chair)? 3  -AB 3  -SN    Climbing 3-5 steps with a railing? 2  -AB 2  -SN    To walk in hospital room? 3  -AB 3  -SN    AM-PAC 6 Clicks Score (PT) 17  -AB 16  -SN    Highest level of mobility 5 --> Static standing  -AB 5 --> Static standing  -SN      Row Name 06/06/23 1201 06/06/23 1154       Functional Assessment    Outcome Measure Options AM-PAC 6 Clicks Daily Activity (OT)  - AM-PAC 6 Clicks Basic Mobility (PT)  -AB              User Key  (r) = Recorded By, (t) = Taken By, (c) = Cosigned By      Initials Name Provider Type     Fouzia Soriano, OT Occupational Therapist    AB Caro Youngblood, PT Physical Therapist    Rosemarie Rock, RN Registered Nurse                    Occupational Therapy Education       Title: PT OT SLP Therapies (In Progress)       Topic: Occupational Therapy (In Progress)       Point: ADL training (Done)       Description:   Instruct learner(s) on  proper safety adaptation and remediation techniques during self care or transfers.   Instruct in proper use of assistive devices.                  Learning Progress Summary             Patient Acceptance, E,TB,D, VU,NR by  at 6/6/2023 1202    Acceptance, E, VU by MR at 6/5/2023 1443                         Point: Home exercise program (Not Started)       Description:   Instruct learner(s) on appropriate technique for monitoring, assisting and/or progressing therapeutic exercises/activities.                  Learner Progress:  Not documented in this visit.              Point: Precautions (Done)       Description:   Instruct learner(s) on prescribed precautions during self-care and functional transfers.                  Learning Progress Summary             Patient Acceptance, E,TB,D, VU,NR by  at 6/6/2023 1202    Acceptance, E, VU by MR at 6/5/2023 1443                         Point: Body mechanics (Done)       Description:   Instruct learner(s) on proper positioning and spine alignment during self-care, functional mobility activities and/or exercises.                  Learning Progress Summary             Patient Acceptance, E,TB,D, VU,NR by  at 6/6/2023 1202    Acceptance, E, VU by MR at 6/5/2023 1443                                         User Key       Initials Effective Dates Name Provider Type Discipline     10/25/22 -  Fouzia Soriano, OT Occupational Therapist OT    MR 09/22/22 -  Dhara Ford OT Occupational Therapist OT                  OT Recommendation and Plan  Therapy Frequency (OT): daily  Plan of Care Review  Plan of Care Reviewed With: patient  Progress: improving  Outcome Evaluation: Pt continues to present below baseline with decreased balance, strength, coordination, and overall independence with ADLS. Pt completes transfers with CGA and ambulates with CGA. Nasima for all dynamic standing takes due to decreased balance. Nasima for all grooming. Recommend d/c to IPR when medically  appropriate.     Time Calculation:    Time Calculation- OT       Row Name 06/06/23 1155 06/06/23 1056          Time Calculation- OT    OT Start Time -- 1056  -HM     OT Received On -- 06/06/23  -        Timed Charges    71852 - Gait Training Minutes  15  -AB --     52827 - OT Self Care/Mgmt Minutes -- 24  -HM        Total Minutes    Timed Charges Total Minutes 15  -AB 24  -HM      Total Minutes 15  -AB 24  -HM               User Key  (r) = Recorded By, (t) = Taken By, (c) = Cosigned By      Initials Name Provider Type     Fouzia Soriano, OT Occupational Therapist    Caro Draper, PT Physical Therapist                           Fouzia Soriano OT  6/6/2023

## 2023-06-06 NOTE — CASE MANAGEMENT/SOCIAL WORK
Discharge Planning Assessment  [facility]     Patient Name: Enrike Tomas  MRN: [id number]  Today's Date: 6/6/2023    Admit Date: 6/5/2023    Plan: TBD   Discharge Needs Assessment       Row Name 06/06/23 1619       Living Environment    People in Home spouse    Current Living Arrangements home    Potentially Unsafe Housing Conditions none    Primary Care Provided by self;spouse/significant other    Provides Primary Care For no one    Family Caregiver if Needed spouse    Quality of Family Relationships supportive    Able to Return to Prior Arrangements yes       Resource/Environmental Concerns    Transportation Concerns none       Transition Planning    Patient/Family Anticipates Transition to home with family    Transportation Anticipated family or friend will provide       Discharge Needs Assessment    Readmission Within the Last 30 Days no previous admission in last 30 days    Equipment Currently Used at Home walker, rolling;wheelchair;shower chair    Anticipated Changes Related to Illness none                   Discharge Plan       Row Name 06/06/23 7283       Plan    Plan Comments Spoke with Mr. Tomas and spouse at the bedside. Mr. Tomas lives with his Spouse in [facility]. He was independent with ADL's until a few months ago, but is needing more assistance. He has a hospital bed, walker, transport chair, and shower chair. He does not use oxygen at home, but is current with [facility]. His Spouse Cleo is his POA. He has dialysis every monday, wednesday and friday at 10:15 through [facility] on [address] [address] [facility]. Spouse can transport to all appointments. PCP is Blair Dhaliwal and he has [employer] Medicare. CM will continue to follow for any discharge needs.    Final Discharge Disposition Code 30 - still a patient      Row Name 06/06/23 9209       Plan    Plan TBD    Patient/Family in Agreement with Plan yes    Provided Post Acute Provider List? Yes    Post Acute Provider List Inpatient  Rehab    Plan Comments Spoke with Mr. Tomas and spouse at the bedside. Mr. Tomas lives with his Spouse in [facility]. He was Independent with ADL's until a few months ago, but is needing more assistance. He has a hospital bed, walker, transport chair and shower chair.    Final Discharge Disposition Code 30 - still a patient      Row Name 06/06/23 1628       Plan    Plan TBD    Patient/Family in Agreement with Plan yes                  Continued Care and Services - Admitted Since 6/5/2023       Home Medical Care       Service Provider Request Status Selected Services Address Phone Fax Patient Preferred    RONA-ERVIN SKELTON,[facility] Accepted N/A [address], [address] [id number] [phone] --                  Expected Discharge Date and Time       Expected Discharge Date Expected Discharge Time    Jun 9, 2023            Demographic Summary       Row Name 06/06/23 1618       General Information    Admission Type observation    Arrived From home    Referral Source admission list    Reason for Consult discharge planning    Preferred Language English       Contact Information    Permission Granted to Share Info With                    Functional Status       Row Name 06/06/23 1618       Functional Status, IADL    Medications assistive equipment and person    Meal Preparation assistive equipment and person    Housekeeping assistive equipment and person    Laundry assistive equipment and person    Shopping assistive equipment and person       Mental Status    General Appearance WDL WDL       Mental Status Summary    Recent Changes in Mental Status/Cognitive Functioning no changes                   Psychosocial    No documentation.                  Abuse/Neglect    No documentation.                  Legal    No documentation.                  Substance Abuse    No documentation.                  Patient Forms    No documentation.                     Rekha Navarrete,  RN     Current Weight: 51.6 kg (113 lb 11.2 oz)   Please refer to physical therapy notes for mobility status. If patient is a bed patient please refer to above bed assessment.

## 2023-06-06 NOTE — PROGRESS NOTES
[facility] Medicine Services  PROGRESS NOTE    Patient Name: Enrike Tomas  : 1935  MRN: [id number]    Date of Admission: 2023  Primary Care Physician: Blair Dhaliwal MD    Subjective   Subjective     CC:  F/U C spine Fx     HPI:  Patient seen and examined. Up in chair, he doesn't have pain if he doesn't move. Discussed plan for rehab.    ROS:  Gen- No fevers, chills  CV- No chest pain, palpitations  Resp- No cough, dyspnea  GI- No N/V/D, abd pain    Objective   Objective     Vital Signs:   Temp:  [97.5 °F (36.4 °C)-98.2 °F (36.8 °C)] 97.5 °F (36.4 °C)  Heart Rate:  [56-71] 71  Resp:  [18] 18  BP: (114-180)/() 114/72  Flow (L/min):  [2] 2     Physical Exam:  Constitutional: No acute distress, awake, alert  HENT: NCAT, mucous membranes moist, in C collar   Respiratory: Clear to auscultation bilaterally, respiratory effort normal   Cardiovascular: RRR, no murmurs, rubs, or gallops  Gastrointestinal: Positive bowel sounds, soft, nontender, nondistended  Musculoskeletal: No bilateral ankle edema  Psychiatric: Appropriate affect, cooperative  Neurologic: Oriented x 3, speech clear  Skin: No rashes     Results Reviewed:  LAB RESULTS:      Lab 23  1541 23  0324   WBC 7.22 8.79   HEMOGLOBIN 8.1* 7.7*   HEMATOCRIT 25.7* 24.7*   PLATELETS 329 300   NEUTROS ABS  --  6.70   IMMATURE GRANS (ABS)  --  0.11*   LYMPHS ABS  --  1.06   MONOS ABS  --  0.72   EOS ABS  --  0.16   MCV 97.0 96.1         Lab 23  0011 23  0514 23  0324   SODIUM  --  135* 135*   POTASSIUM  --  4.5 4.6   CHLORIDE  --  94* 93*   CO2  --  28.0 29.0   ANION GAP  --  13.0 13.0   BUN  --  37* 38*   CREATININE  --  4.74* 4.50*   EGFR  --  11.2* 11.9*   GLUCOSE  --  107* 115*   CALCIUM  --  8.8 9.1   MAGNESIUM 2.3  --  1.5*         Lab 23  0324   TOTAL PROTEIN 6.4   ALBUMIN 3.1*   GLOBULIN 3.3   ALT (SGPT) 19   AST (SGOT) 25   BILIRUBIN 0.3   ALK PHOS 200*         Lab 23  0594  06/05/23 0324   HSTROP T 182* 185*             Lab 06/05/23  1541 06/05/23 0324   IRON  --  54*   IRON SATURATION (TSAT)  --  25   TIBC  --  216*   TRANSFERRIN  --  145*   FOLATE 7.80  --    VITAMIN B 12 1,484*  --          Brief Urine Lab Results  (Last result in the past 365 days)        Color   Clarity   Blood   Leuk Est   Nitrite   Protein   CREAT   Urine HCG        06/05/23 2018 Yellow   Clear   Negative   Negative   Negative   >=300 mg/dL (3+)                   Microbiology Results Abnormal       Procedure Component Value - Date/Time    COVID PRE-OP / PRE-PROCEDURE SCREENING ORDER (NO ISOLATION) - Swab, Nasopharynx [[id number]]  (Normal) Collected: 06/05/23 0324    Lab Status: Final result Specimen: Swab from Nasopharynx Updated: 06/05/23 0402    Narrative:      The following orders were created for panel order COVID PRE-OP / PRE-PROCEDURE SCREENING ORDER (NO ISOLATION) - Swab, Nasopharynx.  Procedure                               Abnormality         Status                     ---------                               -----------         ------                     COVID-19 and FLU A/B PCR...[[id number]]  Normal              Final result                 Please view results for these tests on the individual orders.    COVID-19 and FLU A/B PCR - Swab, Nasopharynx [[id number]]  (Normal) Collected: 06/05/23 0324    Lab Status: Final result Specimen: Swab from Nasopharynx Updated: 06/05/23 0402     COVID19 Not Detected     Influenza A PCR Not Detected     Influenza B PCR Not Detected    Narrative:      Fact sheet for providers: https://www.fda.gov/media/207447/download    Fact sheet for patients: https://www.fda.gov/media/073179/download    Test performed by PCR.            CT Head Without Contrast    Result Date: 6/5/2023  EXAMINATION: CT HEAD WITHOUT CONTRAST DATE: 6/5/2023 1:41 AM INDICATION: Trauma, Pain COMPARISON: None available at the time of this dictation. TECHNIQUE: Noncontrast imaging obtained from the  vertex to the skull base.  CT dose lowering techniques were used, to include: automated exposure control, adjustment for patient size, and or use of iterative reconstruction.? FINDINGS: Soft Tissues: Mild right parietal scalp soft tissue swelling. Skull: No underlying skull fracture or radiopaque foreign body. Sinuses: Paranasal sinuses are clear. Mastoids: Mastoid air cells are clear. Globes and Orbits: Globes and orbits are intact. Brain: No acute hemorrhage.  No midline shift, masses, or mass effect.  No evidence of acute infarct by noncontrast CT. Ventricles and Cisterns: Ventricular size and configuration is within normal limits. Basal cisterns are patent. No abnormal extra-axial fluid collection. Senescent Changes: Mild to moderate volume loss and mild chronic microvascular ischemic changes. Arteriosclerosis.     Impression: 1. Mild right parietal scalp soft tissue swelling. 2. No acute intracranial abnormality. 3. Mild to moderate volume loss and mild chronic microvascular ischemic changes. Arteriosclerosis.  Electronically signed by:  Richard Malin M.D.  6/5/2023 12:01 AM Mountain Time    CT Cervical Spine Without Contrast    Result Date: 6/5/2023  EXAMINATION: CT CERVICAL SPINE WO CONTRAST DATE: 6/5/2023 1:41 AM  INDICATION: Trauma, fall  COMPARISON: None available. TECHNIQUE: Thin section axial noncontrast images were obtained through the cervical spine.  Sagittal and coronal reformatted images were created.  Images were reviewed in bone and soft tissue windows. CT dose lowering techniques were used, to include: automated exposure control, adjustment for patient size, and or use of iterative reconstruction. FINDINGS: Vertebral column: Straightening of the normal cervical lordosis. There is an acute superior endplate compression deformity of C7 with approximately 30% anterior height loss. No bony retropulsion. Remaining vertebral body heights are maintained. No additional acute fractures in the cervical  spine. Multilevel intervertebral disc space narrowing with facet and uncovertebral joint degenerative changes. Resulting mild spinal canal narrowing at C3-C4 and C4-C5. Multilevel neuroforaminal stenosis, greatest on the right at C3-4 and C4-5. Soft tissues: Lung apices are clear. Carotid atherosclerosis. Multiple punctate calcifications noted in the left parotid gland, nonspecific.     Impression: 1.  Acute superior endplate compression fracture at C7 with approximately 30% height loss. No bony retropulsion. 2.  No other acute fractures. Normal facet joint alignment. 3.  Multilevel cervical spondylosis greatest at C3-4 to C5-6. I communicated these results by phone to  Dr. Flora Cantu at 12:18 AM Mountain time on 6/5/2023. The results were communicated back and were said to be understood. Electronically signed by:  Abdon Steele M.D.  6/5/2023 12:18 AM Mountain Time    FL Video Swallow With Speech Single Contrast    Result Date: 6/5/2023  FL VIDEO SWALLOW W SPEECH SINGLE-CONTRAST Date of Exam: 6/5/2023 1:59 PM EDT Indication: dysphagia. Comparison: None available. Technique:   The speech pathologist administered food and/or liquid mixed with barium to the patient with cine/video imaging.  Imaging assistance was provided to the speech pathologist and an image was saved. Fluoroscopic Time: 1 minute 42 seconds Number of Images: 11 fluoroscopic series Findings: Fluoroscopy provided for modified barium swallow for speech therapy evaluation.     Impression: Impression: Fluoroscopy provided for modified barium swallow for speech therapy evaluation. Please see speech therapy report for full findings and recommendations. Electronically Signed: Minh Galdamez  6/5/2023 3:19 PM EDT  Workstation ID: [id number]    XR Chest 1 View    Result Date: 6/5/2023  Examination: XR CHEST 1 VW Date and Time: 6/5/2023 4:01 AM Clinical Information: 88 years, Male, . Mental status change. Comparison: None FINDINGS: Heart  size/mediastinum/mira: The heart size is normal. There is moderate calcific plaque at the aortic arch. Lung fields: The lungs are hypoventilated. Lung bases/Retrocardiac/Pleura:  No effusion, retrocardiac density, pneumothorax or pleural parenchymal abnormality is demonstrated. Trachea/Paravertebral soft tissues: The trachea and paravertebral soft tissues appear normal. Upper abdomen: No free air is demonstrated. Osseous structures: The bones are normally mineralized.     Impression: No consolidation, atelectasis or effusion is demonstrated. Thank you for the referral of this patient. This exam was interpreted by an American Board of Radiology certified radiologist with subspecialty fellowship training. If there are any questions regarding this exam please feel free to contact a radiologist directly at [phone]. Slot 70 Electronically signed by:  Geovanny Musa M.D.  6/5/2023 2:54 AM Mountain Time         Current medications:  Scheduled Meds:atorvastatin, 20 mg, Oral, Daily  finasteride, 5 mg, Oral, Daily  fluticasone, 1 spray, Nasal, Daily  insulin lispro, 2-7 Units, Subcutaneous, 4x Daily AC & at Bedtime  melatonin, 5 mg, Oral, Nightly  metoprolol tartrate, 6.25 mg, Oral, Q12H  pantoprazole, 40 mg, Oral, Q AM  senna-docusate sodium, 2 tablet, Oral, BID  sertraline, 50 mg, Oral, Daily  sodium chloride, 10 mL, Intravenous, Q12H      Continuous Infusions:   PRN Meds:.  acetaminophen **OR** acetaminophen **OR** acetaminophen    senna-docusate sodium **AND** polyethylene glycol **AND** bisacodyl **AND** bisacodyl    dextrose    dextrose    glucagon (human recombinant)    HYDROcodone-acetaminophen    Magnesium Low Dose Replacement - Follow Nurse / BPA Driven Protocol    [COMPLETED] Insert Peripheral IV **AND** sodium chloride    sodium chloride    sodium chloride    Assessment & Plan   Assessment & Plan     Active Hospital Problems    Diagnosis  POA    **Compression fracture of C7 vertebra, initial encounter  [S12.690A]  Yes    Fall [W19.XXXA]  Yes    Encephalopathy [G93.40]  Yes    Type 2 diabetes mellitus [E11.9]  Yes    Closed fracture of left acetabulum [S32.402A]  Yes    NERY (obstructive sleep apnea) [G47.33]  Yes    (HFpEF) heart failure with preserved ejection fraction [I50.30]  Yes    Essential hypertension [I10]  Yes    ESRD (end stage renal disease) [N18.6]  Yes    Benign prostatic hyperplasia [N40.0]  Yes    GERD (gastroesophageal reflux disease) [K21.9]  Yes    Mixed hyperlipidemia [E78.2]  Yes      Resolved Hospital Problems   No resolved problems to display.        Brief Hospital Course to date:  Enrike Tomas is 88 y.o. male with PMH significant for ESRD on HD M, W, F, A-fib on Eliquis, GERD, BPH, HTN, HLD, orthostatic hypotension, DM2, depression, who presents to the ED with complaint of fall and neck pain who was found to have concern for acute compression fracture of C7.    This patient's problems and plans were partially entered by my partner and updated as appropriate by me 06/06/23.   All problems are new to me today     Compression fracture of C7  Recurrent falls  Recent T7 Fx  -Neurosurgery has evaluated. Plan for semirigid cervical collar for 8 to 10 weeks when up and ambulating   -PT/OT recommend rehab, CM following, plan is [facility]  -Pain control PRN  -Follow-up with NSGY outpatient      ESRD  - On HD M, W, F  - Nephrology following      Afib  -holding eliquis due to falls, anemia     Anemia  -Iron studies consistent with AoCD  -Folate low normal, will supplement   -Hemoccult pending  -Hold Eliquis for now     Diabetes mellitus 2  - FSBG ACHS  - SS insulin  - Hemoglobin A1c in the a.m.     Hypertension  Hyperlipidemia  History of orthostatic hypotension  - Continue metoprolol with hold parameters  - Atorvastatin  - Orthostatic hypotension likely playing a role in frequent falls     BPH  - Continue Proscar     GERD  - Continue PPI     Depression  - Continue Zoloft    Moderate Pharyngeal Dysphagia,  Mild-Mod oral dysphagia   -SLP has evaluated with MBS, see diet recs      DVT prophylaxis:  on Eliquis, holding for now secondary to head injury and frequent falls    Expected Discharge Location and Transportation:[facility]  Expected Discharge   Expected Discharge Date: 6/9/2023; Expected Discharge Time:      DVT prophylaxis:  Medical and mechanical DVT prophylaxis orders are present.     AM-PAC 6 Clicks Score (PT): 17 (06/06/23 1154)    CODE STATUS:   Code Status and Medical Interventions:   Ordered at: 06/05/23 0611     Medical Intervention Limits:    NO intubation (DNI)     Code Status (Patient has no pulse and is not breathing):    No CPR (Do Not Attempt to Resuscitate)     Medical Interventions (Patient has pulse or is breathing):    Limited Support       Caitlin Resendez, DO  06/06/23

## 2023-06-06 NOTE — PROGRESS NOTES
"   LOS: 1 day    Patient Care Team:  Blair Dhaliwal MD as PCP - General (Internal Medicine)    Chief Complaint:  Recurrent fall     Subjective     Interval History:     No acute events overnight. No new complaints       Review of Systems:   No CP or SOA , fever, chills, rigors, rash, N/V, Constipation.       Objective     Vital Sign Min/Max for last 24 hours  Temp  Min: 97.8 °F (36.6 °C)  Max: 98.2 °F (36.8 °C)   BP  Min: 114/72  Max: 180/76   Pulse  Min: 56  Max: 68   Resp  Min: 18  Max: 18   SpO2  Min: 88 %  Max: 100 %   Flow (L/min)  Min: 2  Max: 2   Weight  Min: 51.6 kg (113 lb 11.2 oz)  Max: 51.6 kg (113 lb 11.2 oz)     Flowsheet Rows      Flowsheet Row First Filed Value   Admission Height 167.6 cm (66\") Documented at 06/05/2023 0119   Admission Weight 58 kg (127 lb 13.9 oz) Documented at 06/05/2023 0119            I/O this shift:  In: 120 [P.O.:120]  Out: -   I/O last 3 completed shifts:  In: -   Out: 2500 [Other:2500]    Physical Exam:    Gen: Alert, NAD   HENT: NC, AT, EOMI   NECK: Supple, no JVD, Trachea midline   LUNGS: CTA bilaterally, non labored respirtation   CVS: S1/S2 audible, RRR, no murmur   Abd: Soft, NT, ND, BS+   Ext: No pedal edema, no cyanosis   CNS: Alert, No focal deficit noted grossly  Psy: Cooperative  Skin: Warm, dry and intact      WBC WBC   Date Value Ref Range Status   06/05/2023 7.22 3.40 - 10.80 10*3/mm3 Final   06/05/2023 8.79 3.40 - 10.80 10*3/mm3 Final      HGB Hemoglobin   Date Value Ref Range Status   06/05/2023 8.1 (L) 13.0 - 17.7 g/dL Final   06/05/2023 7.7 (L) 13.0 - 17.7 g/dL Final      HCT Hematocrit   Date Value Ref Range Status   06/05/2023 25.7 (L) 37.5 - 51.0 % Final   06/05/2023 24.7 (L) 37.5 - 51.0 % Final      Platlets No results found for: LABPLAT   MCV MCV   Date Value Ref Range Status   06/05/2023 97.0 79.0 - 97.0 fL Final   06/05/2023 96.1 79.0 - 97.0 fL Final          Sodium Sodium   Date Value Ref Range Status   06/05/2023 135 (L) 136 - 145 mmol/L Final "   06/05/2023 135 (L) 136 - 145 mmol/L Final      Potassium Potassium   Date Value Ref Range Status   06/05/2023 4.5 3.5 - 5.2 mmol/L Final   06/05/2023 4.6 3.5 - 5.2 mmol/L Final      Chloride Chloride   Date Value Ref Range Status   06/05/2023 94 (L) 98 - 107 mmol/L Final   06/05/2023 93 (L) 98 - 107 mmol/L Final      CO2 CO2   Date Value Ref Range Status   06/05/2023 28.0 22.0 - 29.0 mmol/L Final   06/05/2023 29.0 22.0 - 29.0 mmol/L Final      BUN BUN   Date Value Ref Range Status   06/05/2023 37 (H) 8 - 23 mg/dL Final   06/05/2023 38 (H) 8 - 23 mg/dL Final      Creatinine Creatinine   Date Value Ref Range Status   06/05/2023 4.74 (H) 0.76 - 1.27 mg/dL Final   06/05/2023 4.50 (H) 0.76 - 1.27 mg/dL Final      Calcium Calcium   Date Value Ref Range Status   06/05/2023 8.8 8.6 - 10.5 mg/dL Final   06/05/2023 9.1 8.6 - 10.5 mg/dL Final      PO4 No results found for: CAPO4   Albumin Albumin   Date Value Ref Range Status   06/05/2023 3.1 (L) 3.5 - 5.2 g/dL Final      Magnesium Magnesium   Date Value Ref Range Status   06/06/2023 2.3 1.6 - 2.4 mg/dL Final     Comment:     Result checked   06/05/2023 1.5 (L) 1.6 - 2.4 mg/dL Final      Uric Acid No results found for: URICACID        Results Review:     I reviewed the patient's new clinical results.    atorvastatin, 20 mg, Oral, Daily  finasteride, 5 mg, Oral, Daily  fluticasone, 1 spray, Nasal, Daily  insulin lispro, 2-7 Units, Subcutaneous, 4x Daily AC & at Bedtime  melatonin, 5 mg, Oral, Nightly  metoprolol tartrate, 6.25 mg, Oral, Q12H  pantoprazole, 40 mg, Oral, Q AM  senna-docusate sodium, 2 tablet, Oral, BID  sertraline, 50 mg, Oral, Daily  sodium chloride, 10 mL, Intravenous, Q12H           Medication Review: yes    Assessment & Plan       Compression fracture of C7 vertebra, initial encounter    (HFpEF) heart failure with preserved ejection fraction    Benign prostatic hyperplasia    Closed fracture of left acetabulum    Encephalopathy    ESRD (end stage renal  disease)    Essential hypertension    GERD (gastroesophageal reflux disease)    Mixed hyperlipidemia    NERY (obstructive sleep apnea)    Type 2 diabetes mellitus    Fall      1- ESRD -MWF   2-HTN   3- Anemia of chronic disease   4- Hyponatremia     Plan:  - HD tomorrow, UF as tolerated.   - Renal diet   - JENNYFER with HD   - Electrolytes will be corrected with HD         Cristy Coyne MD  06/06/23  10:18 EDT

## 2023-06-07 ENCOUNTER — APPOINTMENT (OUTPATIENT)
Dept: NEPHROLOGY | Facility: HOSPITAL | Age: 88
End: 2023-06-07
Payer: MEDICARE

## 2023-06-07 LAB
ANION GAP SERPL CALCULATED.3IONS-SCNC: 12 MMOL/L (ref 5–15)
BUN SERPL-MCNC: 30 MG/DL (ref 8–23)
BUN/CREAT SERPL: 7.9 (ref 7–25)
CALCIUM SPEC-SCNC: 9.9 MG/DL (ref 8.6–10.5)
CHLORIDE SERPL-SCNC: 94 MMOL/L (ref 98–107)
CO2 SERPL-SCNC: 26 MMOL/L (ref 22–29)
CREAT SERPL-MCNC: 3.79 MG/DL (ref 0.76–1.27)
DEPRECATED RDW RBC AUTO: 56.7 FL (ref 37–54)
EGFRCR SERPLBLD CKD-EPI 2021: 14.6 ML/MIN/1.73
ERYTHROCYTE [DISTWIDTH] IN BLOOD BY AUTOMATED COUNT: 17 % (ref 12.3–15.4)
GLUCOSE BLDC GLUCOMTR-MCNC: 113 MG/DL (ref 70–130)
GLUCOSE BLDC GLUCOMTR-MCNC: 121 MG/DL (ref 70–130)
GLUCOSE BLDC GLUCOMTR-MCNC: 99 MG/DL (ref 70–130)
GLUCOSE SERPL-MCNC: 99 MG/DL (ref 65–99)
HBA1C MFR BLD: 5.2 % (ref 4.8–5.6)
HCT VFR BLD AUTO: 29.2 % (ref 37.5–51)
HEMOCCULT STL QL: NEGATIVE
HGB BLD-MCNC: 9 G/DL (ref 13–17.7)
MCH RBC QN AUTO: 30.4 PG (ref 26.6–33)
MCHC RBC AUTO-ENTMCNC: 30.8 G/DL (ref 31.5–35.7)
MCV RBC AUTO: 98.6 FL (ref 79–97)
PLATELET # BLD AUTO: 377 10*3/MM3 (ref 140–450)
PMV BLD AUTO: 8.3 FL (ref 6–12)
POTASSIUM SERPL-SCNC: 4.9 MMOL/L (ref 3.5–5.2)
RBC # BLD AUTO: 2.96 10*6/MM3 (ref 4.14–5.8)
SODIUM SERPL-SCNC: 132 MMOL/L (ref 136–145)
WBC NRBC COR # BLD: 8.6 10*3/MM3 (ref 3.4–10.8)

## 2023-06-07 PROCEDURE — G0378 HOSPITAL OBSERVATION PER HR: HCPCS

## 2023-06-07 PROCEDURE — G0257 UNSCHED DIALYSIS ESRD PT HOS: HCPCS

## 2023-06-07 PROCEDURE — 80048 BASIC METABOLIC PNL TOTAL CA: CPT | Performed by: FAMILY MEDICINE

## 2023-06-07 PROCEDURE — 99232 SBSQ HOSP IP/OBS MODERATE 35: CPT | Performed by: NURSE PRACTITIONER

## 2023-06-07 PROCEDURE — 82948 REAGENT STRIP/BLOOD GLUCOSE: CPT

## 2023-06-07 PROCEDURE — 97116 GAIT TRAINING THERAPY: CPT

## 2023-06-07 PROCEDURE — 97110 THERAPEUTIC EXERCISES: CPT

## 2023-06-07 PROCEDURE — 92526 ORAL FUNCTION THERAPY: CPT

## 2023-06-07 PROCEDURE — 82272 OCCULT BLD FECES 1-3 TESTS: CPT | Performed by: NURSE PRACTITIONER

## 2023-06-07 PROCEDURE — 85027 COMPLETE CBC AUTOMATED: CPT | Performed by: FAMILY MEDICINE

## 2023-06-07 RX ADMIN — FLUTICASONE PROPIONATE 1 SPRAY: 50 SPRAY, METERED NASAL at 13:07

## 2023-06-07 RX ADMIN — ATORVASTATIN CALCIUM 20 MG: 20 TABLET, FILM COATED ORAL at 13:07

## 2023-06-07 RX ADMIN — PANTOPRAZOLE SODIUM 40 MG: 40 TABLET, DELAYED RELEASE ORAL at 04:49

## 2023-06-07 RX ADMIN — SENNOSIDES AND DOCUSATE SODIUM 2 TABLET: 50; 8.6 TABLET ORAL at 20:44

## 2023-06-07 RX ADMIN — FINASTERIDE 5 MG: 5 TABLET, FILM COATED ORAL at 13:07

## 2023-06-07 RX ADMIN — FOLIC ACID 1 MG: 1 TABLET ORAL at 13:07

## 2023-06-07 RX ADMIN — SERTRALINE 50 MG: 50 TABLET, FILM COATED ORAL at 13:07

## 2023-06-07 RX ADMIN — Medication 10 ML: at 13:07

## 2023-06-07 RX ADMIN — METOPROLOL TARTRATE 6.25 MG: 25 TABLET, FILM COATED ORAL at 13:07

## 2023-06-07 RX ADMIN — Medication 5 MG: at 20:44

## 2023-06-07 NOTE — THERAPY TREATMENT NOTE
Patient Name: Enrike Tomas  : 1935    MRN: [id number]                              Today's Date: 2023       Admit Date: 2023    Visit Dx:     ICD-10-CM ICD-9-CM   1. Compression fracture of C7 vertebra, initial encounter  S12.690A 805.07   2. Closed head injury, initial encounter  S09.90XA 959.01   3. ESRD on hemodialysis  N18.6 585.6    Z99.2 V45.11   4. Oropharyngeal dysphagia  R13.12 787.22     Patient Active Problem List   Diagnosis    Compression fracture of C7 vertebra, initial encounter    (HFpEF) heart failure with preserved ejection fraction    Anemia of renal disease    At high risk for falls    Benign prostatic hyperplasia    Closed fracture of left acetabulum    Compression fracture of thoracic vertebra    Deficiency of other specified B group vitamins    Depression    Encephalopathy    ESRD (end stage renal disease)    Essential hypertension    GERD (gastroesophageal reflux disease)    Hepatic cyst    Long term (current) use of anticoagulants    Major depressive disorder, single episode, moderate    Mixed hyperlipidemia    Morgagni hernia    Occlusion of right vertebral artery    NERY (obstructive sleep apnea)    Type 2 diabetes mellitus    Fall    Severe Malnutrition (HCC)     Past Medical History:   Diagnosis Date    Atrial fibrillation     BPH (benign prostatic hyperplasia)     Diabetes mellitus     Diverticulosis     ESRD on hemodialysis     GERD (gastroesophageal reflux disease)     Heart failure     Hyperlipidemia     Hypertension     NERY (obstructive sleep apnea)     Recurrent falls      Past Surgical History:   Procedure Laterality Date    CATARACT EXTRACTION      DIALYSIS FISTULA CREATION      INGUINAL HERNIA REPAIR      PROSTATE BIOPSY      TONSILLECTOMY AND ADENOIDECTOMY        General Information       Row Name 23 1541          Physical Therapy Time and Intention    Document Type therapy note (daily note)  -SS     Mode of Treatment physical therapy  -SS       Row Name  06/07/23 1541          General Information    Patient Profile Reviewed yes  -     Existing Precautions/Restrictions fall;cervical collar;spinal;brace worn when out of bed;other (see comments)  C7/T7 comp fx  -     Barriers to Rehab medically complex;previous functional deficit;cognitive status  -       Row Name 06/07/23 1541          Cognition    Orientation Status (Cognition) oriented x 4  -       Row Name 06/07/23 1541          Safety Issues, Functional Mobility    Safety Issues Affecting Function (Mobility) awareness of need for assistance;insight into deficits/self-awareness;judgment;positioning of assistive device;problem-solving;safety precaution awareness;safety precautions follow-through/compliance;sequencing abilities  -     Impairments Affecting Function (Mobility) balance;endurance/activity tolerance;pain;range of motion (ROM);strength;postural/trunk control  -               User Key  (r) = Recorded By, (t) = Taken By, (c) = Cosigned By      Initials Name Provider Type     Bing Castellanos PT Physical Therapist                   Mobility       Row Name 06/07/23 1543          Bed Mobility    Comment, (Bed Mobility) up in chair  -       Row Name 06/07/23 1543          Sit-Stand Transfer    Sit-Stand [place] (Transfers) contact guard;verbal cues  -     Assistive Device (Sit-Stand Transfers) walker, front-wheeled  -     Comment, (Sit-Stand Transfer) VC for hand placement, appropriate alignment, lowering with eccentric control  -       Row Name 06/07/23 1543          Gait/Stairs (Locomotion)    [place] Level (Gait) minimum assist (75% patient effort);verbal cues  -     Assistive Device (Gait) walker, front-wheeled  -     Distance in Feet (Gait) 100  -     Deviations/Abnormal Patterns (Gait) bilateral deviations;base of support, narrow;gait speed decreased;stride length decreased;amberly decreased  -     Bilateral Gait Deviations heel strike decreased  -     Comment,  (Gait/Stairs) Pt. ambulated with a step through gait pattern at a decreased speed. VC for upright posture, decreased shoulder elevation, heel toe gait pattern. Activity limited by fatigue.  -               User Key  (r) = Recorded By, (t) = Taken By, (c) = Cosigned By      Initials Name Provider Type     Bing Castellanos PT Physical Therapist                   Obj/Interventions       Row Name 06/07/23 1545          Motor Skills    Motor Skills posture  -     Therapeutic Exercise shoulder;hip;knee;ankle  -[facility] Name 06/07/23 1545          Shoulder (Therapeutic Exercise)    Shoulder (Therapeutic Exercise) AROM (active range of motion)  -     Shoulder AROM (Therapeutic Exercise) bilateral;aBduction;scapular elevation;scapular retraction;10 repetitions  shoulder rolls  -[facility] Name 06/07/23 1545          Hip (Therapeutic Exercise)    Hip (Therapeutic Exercise) strengthening exercise;isometric exercises  -     Hip Isometrics (Therapeutic Exercise) bilateral;gluteal sets;10 repetitions  -     Hip Strengthening (Therapeutic Exercise) bilateral;aBduction;aDduction;heel slides;marching while seated;10 repetitions  -[facility] Name 06/07/23 1545          Knee (Therapeutic Exercise)    Knee (Therapeutic Exercise) isometric exercises;strengthening exercise  -     Knee Isometrics (Therapeutic Exercise) bilateral;quad sets;10 repetitions  -     Knee Strengthening (Therapeutic Exercise) bilateral;SLR (straight leg raise);LAQ (long arc quad);10 repetitions  -[facility] Name 06/07/23 1545          Ankle (Therapeutic Exercise)    Ankle (Therapeutic Exercise) AROM (active range of motion)  -     Ankle AROM (Therapeutic Exercise) bilateral;dorsiflexion;plantarflexion;10 repetitions  -[facility] Name 06/07/23 1545          Balance    Balance Assessment sitting static balance;sitting dynamic balance;sit to stand dynamic balance;standing dynamic balance;standing static balance  -     Static Sitting  Balance supervision  -     Dynamic Sitting Balance contact guard  -     Position, Sitting Balance unsupported;sitting in chair  -     Sit to Stand Dynamic Balance contact guard  -SS     Static Standing Balance contact guard  -SS     Dynamic Standing Balance minimal assist  -     Position/Device Used, Standing Balance supported;walker, rolling  -     Balance Interventions sitting;standing;sit to stand;supported;static;dynamic  -       Row Name 06/07/23 1545          Posture    Posture postural deviations  -       Row Name 06/07/23 1545          Postural Deviations    Comment, Postural Deviations scolosis in lumbar region w/R hip elevation noted during ambulation  -               User Key  (r) = Recorded By, (t) = Taken By, (c) = Cosigned By      Initials Name Provider Type     Bing Castellanos, PT Physical Therapist                   Goals/Plan    No documentation.                  Clinical Impression       Row Name 06/07/23 2220          Pain    Pretreatment Pain Rating 0/10 - no pain  -     Posttreatment Pain Rating 0/10 - no pain  -     Pre/Posttreatment Pain Comment no pain at rest but LUE painful w/mobility  -     Pain Intervention(s) Repositioned;Ambulation/increased activity;Elevated  -     Additional Documentation Pain Scale: Numbers Pre/Post-Treatment (Group)  -       Row Name 06/07/23 3298          Plan of Care Review    Plan of Care Reviewed With patient  -     Progress improving  -     Outcome Evaluation Pt. presents below baseline function w/generalized weakness, balance deficits, L shoulder pain and decreased functional endurance affecting his ability to safely participate in functional mobility. He performed transfers and ambulated 100' w/front wheeled walker, min assist. Activity limited by fatigue, dyspnea on exertion. He tolerated ther-ex well. Will continue IPPT to progress as tolerated. Recommend IPR upon discharge due to acute history of falls as well as previously  stated deficits.  -       Row Name 06/07/23 1550          Therapy Assessment/Plan (PT)    Rehab Potential (PT) good, to achieve stated therapy goals  -     Criteria for Skilled Interventions Met (PT) yes;meets criteria;skilled treatment is necessary  -     Therapy Frequency (PT) daily  -       Row Name 06/07/23 1550          Vital Signs    Pre Systolic BP Rehab 99  asymptomatic  -SS     Pre Treatment Diastolic BP 66  -SS     Intra Systolic BP Rehab 96  asymptomatic  -SS     Intra Treatment Diastolic BP 63  -SS     Post Systolic BP Rehab 107  -SS     Post Treatment Diastolic BP 74  -SS     Pretreatment Heart Rate (beats/min) 81  -SS     Posttreatment Heart Rate (beats/min) 81  -SS     Pre SpO2 (%) 98  -SS     O2 Delivery Pre Treatment room air  -SS     Post SpO2 (%) 97  -SS     O2 Delivery Post Treatment room air  -SS     Pre Patient Position Sitting  -SS     Intra Patient Position Standing  -SS     Post Patient Position Sitting  -SS       Row Name 06/07/23 1550          Positioning and Restraints    Pre-Treatment Position sitting in chair/recliner  -SS     Post Treatment Position chair  -SS     In Chair notified nsg;reclined;call light within reach;encouraged to call for assist;exit alarm on;LUE elevated;legs elevated;waffle cushion;with brace  -               User Key  (r) = Recorded By, (t) = Taken By, (c) = Cosigned By      Initials Name Provider Type     Bing Castellanos, PT Physical Therapist                   Outcome Measures       Row Name 06/07/23 1554 06/07/23 1200       How much help from another person do you currently need...    Turning from your back to your side while in flat bed without using bedrails? 3  -SS 2  -SN    Moving from lying on back to sitting on the side of a flat bed without bedrails? 3  -SS 2  -SN    Moving to and from a bed to a chair (including a wheelchair)? 3  -SS 2  -SN    Standing up from a chair using your arms (e.g., wheelchair, bedside chair)? 3  -SS 2  -SN     Climbing 3-5 steps with a railing? 2  -SS 2  -SN    To walk in hospital room? 3  -SS 3  -SN    AM-PAC 6 Clicks Score (PT) 17  -SS 13  -SN    Highest level of mobility 5 --> Static standing  -SS 4 --> Transferred to chair/commode  -SN      Row Name 06/07/23 1554          Functional Assessment    Outcome Measure Options AM-PAC 6 Clicks Basic Mobility (PT)  -SS               User Key  (r) = Recorded By, (t) = Taken By, (c) = Cosigned By      Initials Name Provider Type    Bing Chan, PT Physical Therapist    Rosemarie Rock, RN Registered Nurse                                 Physical Therapy Education       Title: PT OT SLP Therapies (In Progress)       Topic: Physical Therapy (Done)       Point: Mobility training (Done)       Learning Progress Summary             Patient Eager, E, VU,DU,NR by  at 6/7/2023 1554    Comment: Reviewed safety/technique w/transfers, ambulation, HEP, c spine precautions, PT POC    Acceptance, E,D, NR,VU by AB at 6/6/2023 1155    Acceptance, E,D, NR by AB at 6/5/2023 1419                         Point: Home exercise program (Done)       Learning Progress Summary             Patient Eager, E, VU,DU,NR by SS at 6/7/2023 1554    Comment: Reviewed safety/technique w/transfers, ambulation, HEP, c spine precautions, PT POC    Acceptance, E,D, NR,VU by AB at 6/6/2023 1155                         Point: Body mechanics (Done)       Learning Progress Summary             Patient Eager, E, VU,DU,NR by SS at 6/7/2023 1554    Comment: Reviewed safety/technique w/transfers, ambulation, HEP, c spine precautions, PT POC    Acceptance, E,D, NR,VU by AB at 6/6/2023 1155    Acceptance, E,D, NR by AB at 6/5/2023 1419                         Point: Precautions (Done)       Learning Progress Summary             Patient Eager, E, VU,DU,NR by  at 6/7/2023 1554    Comment: Reviewed safety/technique w/transfers, ambulation, HEP, c spine precautions, PT POC    Acceptance, E,D, NR,VU by AB at 6/6/2023 1155     Acceptance, E,D, NR by AB at 6/5/2023 1419                                         User Key       Initials Effective Dates Name Provider Type Discipline     06/01/21 -  Bing Castellanos, PT Physical Therapist PT    AB 09/22/22 -  Caro Youngblood, PT Physical Therapist PT                  PT Recommendation and Plan     Plan of Care Reviewed With: patient  Progress: improving  Outcome Evaluation: Pt. presents below baseline function w/generalized weakness, balance deficits, L shoulder pain and decreased functional endurance affecting his ability to safely participate in functional mobility. He performed transfers and ambulated 100' w/front wheeled walker, min assist. Activity limited by fatigue, dyspnea on exertion. He tolerated ther-ex well. Will continue IPPT to progress as tolerated. Recommend IPR upon discharge due to acute history of falls as well as previously stated deficits.     Time Calculation:    PT Charges       Row Name 06/07/23 1555             Time Calculation    Start Time 1515  -SS      Stop Time 1538  -SS      Time Calculation (min) 23 min  -SS      PT Received On 06/07/23  -SS         Time Calculation- PT    Total Timed Code Minutes- PT 23 minute(s)  -SS         Timed Charges    89187 - PT Therapeutic Exercise Minutes 13  -SS      03024 - Gait Training Minutes  10  -SS         Total Minutes    Timed Charges Total Minutes 23  -SS       Total Minutes 23  -SS                User Key  (r) = Recorded By, (t) = Taken By, (c) = Cosigned By      Initials Name Provider Type     Bing Castellanos, PT Physical Therapist                  Therapy Charges for Today       Code Description Service Date Service Provider Modifiers Qty    94483500956 HC PT THER PROC EA 15 MIN 6/7/2023 Bing Castellanos, PT GP 1    10000327108 HC GAIT TRAINING EA 15 MIN 6/7/2023 Bing Castellanos, PT GP 1            PT G-Codes  Outcome Measure Options: AM-PAC 6 Clicks Basic Mobility (PT)  AM-PAC 6 Clicks Score (PT): 17  AM-PAC 6 Clicks  Score (OT): 13  PT Discharge Summary  Anticipated Discharge Disposition (PT): inpatient rehabilitation facility    Bing Castellanos, PT  6/7/2023

## 2023-06-07 NOTE — CASE MANAGEMENT/SOCIAL WORK
Continued Stay Note  [facility]     Patient Name: Enrike Tomas  MRN: [id number]  Today's Date: 6/7/2023    Admit Date: 6/5/2023    Plan: IPR   Discharge Plan       Row Name 06/07/23 1628       Plan    Plan IPR    Patient/Family in Agreement with Plan yes    Post Acute Provider List Inpatient Rehab    Plan Comments Spoke with Ms. Tomas on the telephone. PT is recommending IPR. Asked her which facilities she was interested in and the [facility] was first, but they do not have any beds. Her second choice is [facility]. Called and gave referral to Ruchi on 6/7. [facility] third choice is [facility]. Referral given to Pauly on 6/7. Prior auth will be needed for the [employer]. CM will continue to follow up.                   Discharge Codes    No documentation.                 Expected Discharge Date and Time       Expected Discharge Date Expected Discharge Time    Jun 9, 2023               Rekha Navarrete RN

## 2023-06-07 NOTE — PLAN OF CARE
Goal Outcome Evaluation: Scheduled HD completed. Pt tolerated well. Goal reached . UF:2500mls removed. Called report to AGUSTÍN [place].         Problem: Device-Related Complication Risk (Hemodialysis)  Goal: Safe, Effective Therapy Delivery  Outcome: Ongoing, Progressing  Intervention: Optimize Device Care and Function  Recent Flowsheet Documentation  Taken 6/7/2023 0806 by Thais Holley, RN  Medication Review/Management: medications reviewed     Problem: Hemodynamic Instability (Hemodialysis)  Goal: Effective Tissue Perfusion  Outcome: Ongoing, Progressing     Problem: Infection (Hemodialysis)  Goal: Absence of Infection Signs and Symptoms  Outcome: Ongoing, Progressing

## 2023-06-07 NOTE — PLAN OF CARE
Goal Outcome Evaluation:  Plan of Care Reviewed With: patient        Progress: improving  Outcome Evaluation: Pt. presents below baseline function w/generalized weakness, balance deficits, L shoulder pain and decreased functional endurance affecting his ability to safely participate in functional mobility. He performed transfers and ambulated 100' w/front wheeled walker, min assist. Activity limited by fatigue, dyspnea on exertion. He tolerated ther-ex well. Will continue IPPT to progress as tolerated. Recommend IPR upon discharge due to acute history of falls as well as previously stated deficits.

## 2023-06-07 NOTE — PLAN OF CARE
Goal Outcome Evaluation:  Plan of Care Reviewed With: patient            SLP treatment completed. Will continue to address dysphagia. Please see note for further details and recommendations.

## 2023-06-07 NOTE — PROGRESS NOTES
[facility] Medicine Services  PROGRESS NOTE    Patient Name: Enrike Tomas  : 1935  MRN: [id number]    Date of Admission: 2023  Primary Care Physician: Blair Dhaliwal MD    Subjective   Subjective     CC:  F/U C spine Fx     HPI:  Patient seen resting up in bed awake and alert.  Seen on hemodialysis.  No acute distress.  Wearing a hard c-collar.  Denies neck pain at rest.  States he has moderate neck and right shoulder pain with movement rated 7-9/10 scale.  Still feels weak.  Debilitated.  Eager to get to rehab soon.  Awaiting placement.      ROS:  Gen- No fevers, chills  CV- No chest pain, palpitations  Resp- No cough, dyspnea  GI- No N/V/D, abd pain    Objective   Objective     Vital Signs:   Temp:  [96 °F (35.6 °C)-98.6 °F (37 °C)] 96 °F (35.6 °C)  Heart Rate:  [57-82] 82  Resp:  [16-18] 16  BP: ()/(54-69) 120/65  Flow (L/min):  [1-2] 1     Physical Exam:  Constitutional: No acute distress, seen on hemodialysis.  Awake and alert.  Chronically ill-appearing.  HENT: NCAT, mucous membranes moist, in C collar   Respiratory: Clear to auscultation bilaterally, respiratory effort normal on room air with sats 96%.  Cardiovascular: RRR, no murmurs, rubs, or gallops  Gastrointestinal: Positive bowel sounds, soft, nontender, nondistended  Musculoskeletal: No bilateral ankle edema.  VARELA spontaneously.  Psychiatric: Appropriate affect, cooperative  Neurologic: Oriented x 3, speech clear and appropriate.  Nonfocal.  Follows commands.  Skin: No rashes.  Right upper extremity AV fistula with hemodialysis underway.    Results Reviewed:  LAB RESULTS:      Lab 23  0732 23  1541 23  0324   WBC 8.60 7.22 8.79   HEMOGLOBIN 9.0* 8.1* 7.7*   HEMATOCRIT 29.2* 25.7* 24.7*   PLATELETS 377 329 300   NEUTROS ABS  --   --  6.70   IMMATURE GRANS (ABS)  --   --  0.11*   LYMPHS ABS  --   --  1.06   MONOS ABS  --   --  0.72   EOS ABS  --   --  0.16   MCV 98.6* 97.0 96.1          Lab 06/07/23  0732 06/06/23  1322 06/06/23  0011 06/05/23  0514 06/05/23  0324   SODIUM 132*  --   --  135* 135*   POTASSIUM 4.9  --   --  4.5 4.6   CHLORIDE 94*  --   --  94* 93*   CO2 26.0  --   --  28.0 29.0   ANION GAP 12.0  --   --  13.0 13.0   BUN 30*  --   --  37* 38*   CREATININE 3.79*  --   --  4.74* 4.50*   EGFR 14.6*  --   --  11.2* 11.9*   GLUCOSE 99  --   --  107* 115*   CALCIUM 9.9  --   --  8.8 9.1   MAGNESIUM  --   --  2.3  --  1.5*   HEMOGLOBIN A1C  --  5.20  --   --   --          Lab 06/05/23  0324   TOTAL PROTEIN 6.4   ALBUMIN 3.1*   GLOBULIN 3.3   ALT (SGPT) 19   AST (SGOT) 25   BILIRUBIN 0.3   ALK PHOS 200*         Lab 06/05/23  0514 06/05/23  0324   HSTROP T 182* 185*             Lab 06/05/23  1541 06/05/23  0324   IRON  --  54*   IRON SATURATION (TSAT)  --  25   TIBC  --  216*   TRANSFERRIN  --  145*   FOLATE 7.80  --    VITAMIN B 12 1,484*  --          Brief Urine Lab Results  (Last result in the past 365 days)        Color   Clarity   Blood   Leuk Est   Nitrite   Protein   CREAT   Urine HCG        06/05/23 2018 Yellow   Clear   Negative   Negative   Negative   >=300 mg/dL (3+)                   Microbiology Results Abnormal       Procedure Component Value - Date/Time    COVID PRE-OP / PRE-PROCEDURE SCREENING ORDER (NO ISOLATION) - Swab, Nasopharynx [[id number]]  (Normal) Collected: 06/05/23 0324    Lab Status: Final result Specimen: Swab from Nasopharynx Updated: 06/05/23 0402    Narrative:      The following orders were created for panel order COVID PRE-OP / PRE-PROCEDURE SCREENING ORDER (NO ISOLATION) - Swab, Nasopharynx.  Procedure                               Abnormality         Status                     ---------                               -----------         ------                     COVID-19 and FLU A/B PCR...[[id number]]  Normal              Final result                 Please view results for these tests on the individual orders.    COVID-19 and FLU A/B PCR - Swab, Nasopharynx  [[id number]]  (Normal) Collected: 06/05/23 0324    Lab Status: Final result Specimen: Swab from Nasopharynx Updated: 06/05/23 0402     COVID19 Not Detected     Influenza A PCR Not Detected     Influenza B PCR Not Detected    Narrative:      Fact sheet for providers: https://www.fda.gov/media/478211/download    Fact sheet for patients: https://www.fda.gov/media/315391/download    Test performed by PCR.            FL Video Swallow With Speech Single Contrast    Result Date: 6/5/2023  FL VIDEO SWALLOW W SPEECH SINGLE-CONTRAST Date of Exam: 6/5/2023 1:59 PM EDT Indication: dysphagia. Comparison: None available. Technique:   The speech pathologist administered food and/or liquid mixed with barium to the patient with cine/video imaging.  Imaging assistance was provided to the speech pathologist and an image was saved. Fluoroscopic Time: 1 minute 42 seconds Number of Images: 11 fluoroscopic series Findings: Fluoroscopy provided for modified barium swallow for speech therapy evaluation.     Impression: Impression: Fluoroscopy provided for modified barium swallow for speech therapy evaluation. Please see speech therapy report for full findings and recommendations. Electronically Signed: Minh Galdamez  6/5/2023 3:19 PM EDT  Workstation ID: [id number]         Current medications:  Scheduled Meds:atorvastatin, 20 mg, Oral, Daily  finasteride, 5 mg, Oral, Daily  fluticasone, 1 spray, Nasal, Daily  folic acid, 1 mg, Oral, Daily  insulin lispro, 2-7 Units, Subcutaneous, 4x Daily AC & at Bedtime  melatonin, 5 mg, Oral, Nightly  metoprolol tartrate, 6.25 mg, Oral, Q12H  pantoprazole, 40 mg, Oral, Q AM  senna-docusate sodium, 2 tablet, Oral, BID  sertraline, 50 mg, Oral, Daily  sodium chloride, 10 mL, Intravenous, Q12H      Continuous Infusions:   PRN Meds:.  acetaminophen **OR** acetaminophen **OR** acetaminophen    senna-docusate sodium **AND** polyethylene glycol **AND** bisacodyl **AND** bisacodyl    dextrose    dextrose     glucagon (human recombinant)    HYDROcodone-acetaminophen    Magnesium Low Dose Replacement - Follow Nurse / BPA Driven Protocol    [COMPLETED] Insert Peripheral IV **AND** sodium chloride    sodium chloride    sodium chloride    Assessment & Plan   Assessment & Plan     Active Hospital Problems    Diagnosis  POA    **Compression fracture of C7 vertebra, initial encounter [S12.690A]  Yes    Severe Malnutrition (HCC) [E43]  Yes    Fall [W19.XXXA]  Yes    Encephalopathy [G93.40]  Yes    Type 2 diabetes mellitus [E11.9]  Yes    Closed fracture of left acetabulum [S32.402A]  Yes    NERY (obstructive sleep apnea) [G47.33]  Yes    (HFpEF) heart failure with preserved ejection fraction [I50.30]  Yes    Essential hypertension [I10]  Yes    ESRD (end stage renal disease) [N18.6]  Yes    Benign prostatic hyperplasia [N40.0]  Yes    GERD (gastroesophageal reflux disease) [K21.9]  Yes    Mixed hyperlipidemia [E78.2]  Yes      Resolved Hospital Problems   No resolved problems to display.        Brief Hospital Course to date:  Enrike Tomas is 88 y.o. male with PMH significant for ESRD on HD M, W, F, A-fib on Eliquis, GERD, BPH, HTN, HLD, orthostatic hypotension, DM2, depression, who presents to the ED with complaint of fall and neck pain who was found to have concern for acute compression fracture of C7.    This patient's problems and plans were partially entered by my partner and updated as appropriate by me 06/07/23.     Assessment/plan:  Patient is new to me today    Compression fracture of C7  Recurrent falls  Recent T7 Fx  -Neurosurgery has evaluated. Plan for semirigid cervical collar for 8 to 10 weeks when up and ambulating   -PT/OT recommend rehab, CM following, plan is [facility].  CM following for disposition.  -Pain control PRN  -Follow-up with NSGY outpatient      ESRD  - On HD M, W, F  - Nephrology following   --Patient currently has dialysis at [facility] on Monday/Wednesday/Fridays at 10:15 AM.     Afib  -holding eliquis due  to falls, anemia     Anemia  -Iron studies consistent with AoCD  -Folate low normal, will supplement   -Hemoccult negative  -Hold Eliquis for now as above.  --Monitor labs.  H/H today stable at 9.0/29.2.     Diabetes mellitus 2 (A1c 5.2)  - FSBG ACHS  - SS insulin  --Well-controlled.  No change.     Hypertension  Hyperlipidemia  History of orthostatic hypotension  - Continue metoprolol with hold parameters  - Atorvastatin  - Orthostatic hypotension likely playing a role in frequent falls     BPH  - Continue Proscar     GERD  - Continue PPI     Depression  - Continue Zoloft    Moderate Pharyngeal Dysphagia, Mild-Mod oral dysphagia   -SLP has evaluated with MBS, see diet recs      DVT prophylaxis:  on Eliquis, holding for now secondary to head injury and frequent falls    [facility]  CM following.    Expected Discharge   Expected Discharge Date: 6/9/2023; Expected Discharge Time:      DVT prophylaxis:  Medical and mechanical DVT prophylaxis orders are present.     AM-PAC 6 Clicks Score (PT): 17 (06/06/23 1154)    CODE STATUS:   Code Status and Medical Interventions:   Ordered at: 06/05/23 0611     Medical Intervention Limits:    NO intubation (DNI)     Code Status (Patient has no pulse and is not breathing):    No CPR (Do Not Attempt to Resuscitate)     Medical Interventions (Patient has pulse or is breathing):    Limited Support       Kaycee Byrne, APRN  06/07/23

## 2023-06-07 NOTE — PROGRESS NOTES
"   LOS: 1 day    Patient Care Team:  Blair Dhaliwal MD as PCP - General (Internal Medicine)    Chief Complaint:  Recurrent fall     Subjective     Interval History:     No acute events overnight. No new complaints       Review of Systems:   No CP or SOA , fever, chills, rigors, rash, N/V, Constipation.       Objective     Vital Sign Min/Max for last 24 hours  Temp  Min: 97.5 °F (36.4 °C)  Max: 98.6 °F (37 °C)   BP  Min: 91/54  Max: 129/64   Pulse  Min: 57  Max: 80   Resp  Min: 16  Max: 18   SpO2  Min: 96 %  Max: 99 %   Flow (L/min)  Min: 1  Max: 2   Weight  Min: 59.4 kg (131 lb)  Max: 59.4 kg (131 lb)     Flowsheet Rows      Flowsheet Row First Filed Value   Admission Height 167.6 cm (66\") Documented at 06/05/2023 0119   Admission Weight 58 kg (127 lb 13.9 oz) Documented at 06/05/2023 0119            No intake/output data recorded.  I/O last 3 completed shifts:  In: 490 [P.O.:490]  Out: 2500 [Other:2500]    Physical Exam:    Gen: Alert, NAD   HENT: NC, AT, EOMI   NECK: Supple, no JVD, Trachea midline   LUNGS: CTA bilaterally, non labored respirtation   CVS: S1/S2 audible, RRR, no murmur   Abd: Soft, NT, ND, BS+   Ext: No pedal edema, no cyanosis   CNS: Alert, No focal deficit noted grossly  Psy: Cooperative  Skin: Warm, dry and intact      WBC WBC   Date Value Ref Range Status   06/05/2023 7.22 3.40 - 10.80 10*3/mm3 Final   06/05/2023 8.79 3.40 - 10.80 10*3/mm3 Final      HGB Hemoglobin   Date Value Ref Range Status   06/05/2023 8.1 (L) 13.0 - 17.7 g/dL Final   06/05/2023 7.7 (L) 13.0 - 17.7 g/dL Final      HCT Hematocrit   Date Value Ref Range Status   06/05/2023 25.7 (L) 37.5 - 51.0 % Final   06/05/2023 24.7 (L) 37.5 - 51.0 % Final      Platlets No results found for: LABPLAT   MCV MCV   Date Value Ref Range Status   06/05/2023 97.0 79.0 - 97.0 fL Final   06/05/2023 96.1 79.0 - 97.0 fL Final          Sodium Sodium   Date Value Ref Range Status   06/05/2023 135 (L) 136 - 145 mmol/L Final   06/05/2023 135 (L) " 136 - 145 mmol/L Final      Potassium Potassium   Date Value Ref Range Status   06/05/2023 4.5 3.5 - 5.2 mmol/L Final   06/05/2023 4.6 3.5 - 5.2 mmol/L Final      Chloride Chloride   Date Value Ref Range Status   06/05/2023 94 (L) 98 - 107 mmol/L Final   06/05/2023 93 (L) 98 - 107 mmol/L Final      CO2 CO2   Date Value Ref Range Status   06/05/2023 28.0 22.0 - 29.0 mmol/L Final   06/05/2023 29.0 22.0 - 29.0 mmol/L Final      BUN BUN   Date Value Ref Range Status   06/05/2023 37 (H) 8 - 23 mg/dL Final   06/05/2023 38 (H) 8 - 23 mg/dL Final      Creatinine Creatinine   Date Value Ref Range Status   06/05/2023 4.74 (H) 0.76 - 1.27 mg/dL Final   06/05/2023 4.50 (H) 0.76 - 1.27 mg/dL Final      Calcium Calcium   Date Value Ref Range Status   06/05/2023 8.8 8.6 - 10.5 mg/dL Final   06/05/2023 9.1 8.6 - 10.5 mg/dL Final      PO4 No results found for: CAPO4   Albumin Albumin   Date Value Ref Range Status   06/05/2023 3.1 (L) 3.5 - 5.2 g/dL Final      Magnesium Magnesium   Date Value Ref Range Status   06/06/2023 2.3 1.6 - 2.4 mg/dL Final     Comment:     Result checked   06/05/2023 1.5 (L) 1.6 - 2.4 mg/dL Final      Uric Acid No results found for: URICACID        Results Review:     I reviewed the patient's new clinical results.    atorvastatin, 20 mg, Oral, Daily  finasteride, 5 mg, Oral, Daily  fluticasone, 1 spray, Nasal, Daily  folic acid, 1 mg, Oral, Daily  insulin lispro, 2-7 Units, Subcutaneous, 4x Daily AC & at Bedtime  melatonin, 5 mg, Oral, Nightly  metoprolol tartrate, 6.25 mg, Oral, Q12H  pantoprazole, 40 mg, Oral, Q AM  senna-docusate sodium, 2 tablet, Oral, BID  sertraline, 50 mg, Oral, Daily  sodium chloride, 10 mL, Intravenous, Q12H           Medication Review: yes    Assessment & Plan       Compression fracture of C7 vertebra, initial encounter    (HFpEF) heart failure with preserved ejection fraction    Benign prostatic hyperplasia    Closed fracture of left acetabulum    Encephalopathy    ESRD (end stage  renal disease)    Essential hypertension    GERD (gastroesophageal reflux disease)    Mixed hyperlipidemia    NERY (obstructive sleep apnea)    Type 2 diabetes mellitus    Fall    Severe Malnutrition (HCC)      1- ESRD -MWF   2-HTN   3- Anemia of chronic disease   4- Hyponatremia     Plan:  - Seen on dialysis, UF as tolerated.   - Renal diet   - JENNYFER with HD   - Electrolytes will be corrected with HD         Cristy Coyne MD  06/07/23  07:48 EDT

## 2023-06-07 NOTE — THERAPY TREATMENT NOTE
Acute Care - Speech Language Pathology   Swallow Treatment Note  [place]     Patient Name: Enrike Tomas  : 1935  MRN: [id number]  Today's Date: 2023               Admit Date: 2023    Visit Dx:     ICD-10-CM ICD-9-CM   1. Compression fracture of C7 vertebra, initial encounter  S12.690A 805.07   2. Closed head injury, initial encounter  S09.90XA 959.01   3. ESRD on hemodialysis  N18.6 585.6    Z99.2 V45.11   4. Oropharyngeal dysphagia  R13.12 787.22     Patient Active Problem List   Diagnosis    Compression fracture of C7 vertebra, initial encounter    (HFpEF) heart failure with preserved ejection fraction    Anemia of renal disease    At high risk for falls    Benign prostatic hyperplasia    Closed fracture of left acetabulum    Compression fracture of thoracic vertebra    Deficiency of other specified B group vitamins    Depression    Encephalopathy    ESRD (end stage renal disease)    Essential hypertension    GERD (gastroesophageal reflux disease)    Hepatic cyst    Long term (current) use of anticoagulants    Major depressive disorder, single episode, moderate    Mixed hyperlipidemia    Morgagni hernia    Occlusion of right vertebral artery    NERY (obstructive sleep apnea)    Type 2 diabetes mellitus    Fall    Severe Malnutrition (HCC)     Past Medical History:   Diagnosis Date    Atrial fibrillation     BPH (benign prostatic hyperplasia)     Diabetes mellitus     Diverticulosis     ESRD on hemodialysis     GERD (gastroesophageal reflux disease)     Heart failure     Hyperlipidemia     Hypertension     NERY (obstructive sleep apnea)     Recurrent falls      Past Surgical History:   Procedure Laterality Date    CATARACT EXTRACTION      DIALYSIS FISTULA CREATION      INGUINAL HERNIA REPAIR      PROSTATE BIOPSY      TONSILLECTOMY AND ADENOIDECTOMY         SLP Recommendation and Plan     SLP Diet Recommendation: mechanical ground textures, no mixed consistencies, honey thick liquids (23  1455)  Recommended Precautions and Strategies: upright posture during/after eating, general aspiration precautions (encourage dry/2nd swallows intermittently) (06/07/23 1455)  SLP Rec. for Method of Medication Administration: meds whole, meds crushed, with puree, as tolerated (06/07/23 1455)     Monitor for Signs of Aspiration: yes, notify SLP if any concerns, cough, throat clearing (06/07/23 1455)        Anticipated Discharge Disposition (SLP): anticipate therapy at next level of care, skilled nursing facility (06/07/23 1455)     Therapy Frequency (Swallow): 5 days per week (06/07/23 1455)  Predicted Duration Therapy Intervention (Days): until discharge (06/07/23 1455)        Daily Summary of Progress (SLP): progress toward functional goals as expected (06/07/23 1455)               Treatment Assessment (SLP): continued, suspected, pharyngeal dysphagia (06/07/23 1455)  Treatment Assessment Comments (SLP): Reviewed & completed dysphagia exercises w/ pt. Left handout & encouraged independent completion. Would benefit from cont'd SLP services @ next level of care. (06/07/23 1455)  Plan for Continued Treatment (SLP): continue treatment per plan of care (06/07/23 1455)         Plan of Care Reviewed With: patient      SWALLOW EVALUATION (last 72 hours)       SLP Adult Swallow Evaluation       Row Name 06/07/23 1455 06/05/23 1410 06/05/23 0915             Rehab Evaluation    Document Type therapy note (daily note)  -MP evaluation  -AC evaluation  -AC      Subjective Information no complaints  -MP no complaints  -AC no complaints  -AC      Patient Observations alert;cooperative  -MP cooperative  drowsy--alert to stimuli  -AC cooperative  Drowsy--alert to stimuli  -AC      Patient/Family/Caregiver Comments/Observations No family present  -MP No family present.  -AC Spouse present.  -AC      Patient Effort good  -MP good  -AC good  -AC         General Information    Patient Profile Reviewed -- yes  -AC yes  -AC      Pertinent  History Of Current Problem -- See previous eval. Neurosurgery cleared pt for PO diet.  -AC Fall, compression fx of C7. Encephalopathic. Recent adm  5/9-5/31 after fall & T7 fx. Pt was assessed by SLP team @  multiple times and diagnosed w/ pharyngeal dysphagia/aspiration w/ unknown etiology. Initially, honey-thick liquids were recommended. Per repeat MBS on 5/23, pt aspirated thin liquids (ineffective cough response) and SLP recommended regular diet, upgrade to nectar-thick liquids. Pt d/c'd home after the hospital stay, reported he returned to thin liquids, & home health SLP services were not arranged. 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  -AC      Current Method of Nutrition -- NPO  -AC NPO  -AC      Precautions/Limitations, Vision -- -- WFL with corrective lenses;for purposes of eval  -AC      Precautions/Limitations, Hearing -- -- WFL;for purposes of eval  -AC      Prior Level of Function-Swallowing -- -- --  recent hx pharyngeal dysphagia/aspiration  -AC      Plans/Goals Discussed with -- patient;agreed upon  -AC patient;spouse/S.O.;agreed upon  -AC      Barriers to Rehab -- medically complex;previous functional deficit;cognitive status  -AC medically complex;previous functional deficit;cognitive status  -AC      Patient's Goals for Discharge -- return to PO diet  -AC return to PO diet  -AC      Family Goals for Discharge -- -- patient able to return to PO diet  -AC         Pain    Additional Documentation Pain Scale: FACES Pre/Post-Treatment (Group)  -MP -- Pain Scale: FACES Pre/Post-Treatment (Group)  -AC         Pain Scale: FACES Pre/Post-Treatment    Pain: FACES Scale, Pretreatment 0-->no hurt  -MP 0-->no hurt  -AC 0-->no hurt  -AC      Posttreatment Pain Rating 0-->no hurt  -MP 0-->no hurt  -AC 0-->no hurt  -AC         Oral Motor Structure and  Function    Dentition Assessment -- -- natural, present and adequate  -      Secretion Management -- -- WNL/WFL  -AC      Mucosal Quality -- -- dry  -AC      Volitional Cough -- -- weak;reduced respiratory support  -         Oral Musculature and Cranial Nerve Assessment    Oral Motor General Assessment -- -- generalized oral motor weakness  -         General Eating/Swallowing Observations    Respiratory Support Currently in Use -- room air  - nasal cannula  -      O2 Liters -- -- 2L  -AC      Eating/Swallowing Skills -- fed by SLP  -AC fed by SLP  -      Positioning During Eating -- upright 90 degree;upright in chair;other (see comments)  wearing c-collar  - upright 90 degree;upright in bed  -      Utensils Used -- -- spoon;straw  -      Consistencies Trialed -- -- nectar/syrup-thick liquids;pureed  ~10cc nectar H2O & 5cc puree  -         Clinical Swallow Eval    Oral Prep Phase -- -- WFL  -      Oral Transit -- -- WFL  -      Oral Residue -- -- [facility]  -      Pharyngeal Phase -- -- suspected pharyngeal impairment  -         Pharyngeal Phase Concerns    Pharyngeal Phase Concerns -- -- cough;throat clear  -      Cough -- -- all consistencies  -      Throat Clear -- -- all consistencies  -      Pharyngeal Phase Concerns, Comment -- -- In c-collar. Baseline throat clear/cough. Increased w/ introduction of PO trials. High risk for aspiration.  -         MBS/VFSS    Utensils Used -- spoon;cup;straw  -AC --      Consistencies Trialed -- thin liquids;nectar/syrup-thick liquids;honey-thick liquids;pudding thick;regular textures  - --         MBS/VFSS Interpretation    Oral Prep Phase -- WFL  -AC --      Oral Transit Phase -- impaired  - --      Oral Residue -- WFL  -AC --         Oral Transit Phase    Impaired Oral Transit Phase -- increased A-P transit time;premature spillage of liquids into pharynx;delayed initiation of bolus transit  - --      Delayed Initiation of bolus transit --  all consistencies tested;secondary to impaired cognitive status  -AC --      Increased A-P Transit Time -- all consistencies tested;secondary to impaired cognitive status  -AC --      Premature Spillage of Liquids into Pharynx -- thin liquids;nectar-thick liquids;secondary to reduced lingual control;secondary to impaired cognitive status  -AC --         Initiation of Pharyngeal Swallow    Initiation of Pharyngeal Swallow -- bolus in pyriform sinuses  -AC --      Pharyngeal Phase -- impaired pharyngeal phase of swallowing  -AC --      Anatomical abnormalities noted -- anterior cervical c-spine prominence;other (see comments)  lordotic curvature appeared prominent anteriorly--may have been affected by positioning in c-collar  -AC --      Anatomical abnormalities functional impact -- functional impact on swallowing;other (see comments)  reduced pharyngeal clearance/stripping  -AC --      Penetration Before the Swallow -- thin liquids;secondary to reduced back of tongue control;secondary to delayed swallow initiation or mistiming  -AC --      Aspiration During the Swallow -- thin liquids;nectar-thick liquids;secondary to delayed swallow initiation or mistiming;secondary to reduced laryngeal elevation;secondary to reduced vestibular closure;secondary to reduced laryngeal (VF) closure;other (see comments)  luz  -AC --      Response to Aspiration -- Yes  -AC --      Responded to aspiration with -- non-effective;cough;throat clear;other (see comments)  very weak cough/throat clear  -AC --      Rosenbek's Scale -- thin:;nectar:;7--->level 7  -AC --      Pharyngeal Residue -- all consistencies tested;diffuse within pharynx;secondary to reduced posterior pharyngeal wall stripping;secondary to reduced hyolaryngeal excursion;secondary to reduced laryngeal elevation;other (see comments)  mild-moderate amount w/ all, except solid, which was moderate-severe  -AC --      Response to Residue -- other (see comments)  partial  clearance w/ cued 2nd swallow & liquid wash  -AC --      Attempted Compensatory Maneuvers -- effortful (hard swallow);other (see comments)  Did not attmempt more complex compensations 2' cognitive impairments  -AC --      Response to Attempted Compensatory Maneuvers -- did not reduce residue  -AC --      Pharyngeal Phase, Comment -- Luis Enrique aspiration of thin and nectar-thick liquids. No penetration/aspiration appreciated w/ honey-thick liquid, pudding, or solid. At end of study, pt exhibited excessive coughing--though no new penetration/aspiration appreciated. ? Delayed response to aspiration of thin/nectar-thick liquids @ beginning of study as material deepened in trachea.  -AC --         SLP Evaluation Clinical Impression    SLP Swallowing Diagnosis -- suspect acute-on-chronic;moderate;pharyngeal dysphagia;mild-moderate;oral dysphagia  -AC suspected pharyngeal dysphagia  -AC      Functional Impact -- risk of aspiration/pneumonia  -AC risk of aspiration/pneumonia;risk of malnutrition;risk of dehydration  -AC      Rehab Potential/Prognosis, Swallowing -- good, to achieve stated therapy goals  -AC good, to achieve stated therapy goals  -AC      Swallow Criteria for Skilled Therapeutic Interventions Met -- demonstrates skilled criteria  -AC demonstrates skilled criteria  -AC         SLP Treatment Clinical Impressions    Treatment Assessment (SLP) continued;suspected;pharyngeal dysphagia  -MP -- --      Treatment Assessment Comments (SLP) Reviewed & completed dysphagia exercises w/ pt. Left handout & encouraged independent completion. Would benefit from cont'd SLP services @ next level of care.  -MP -- --      Daily Summary of Progress (SLP) progress toward functional goals as expected  -MP -- --      Plan for Continued Treatment (SLP) continue treatment per plan of care  -MP -- --      Care Plan Review care plan/treatment goals reviewed;patient/other agree to care plan  -MP -- --         Recommendations    Therapy  Frequency (Swallow) 5 days per week  -MP 5 days per week  -AC --      Predicted Duration Therapy Intervention (Days) until discharge  -MP until discharge  -AC --      SLP Diet Recommendation mechanical ground textures;no mixed consistencies;honey thick liquids  -MP mechanical ground textures;no mixed consistencies;honey thick liquids  -AC NPO;other (see comments)  until MBS  -AC      Recommended Diagnostics -- -- [facility] ([facility]other (see comments)  pending neurosurgery & nephrology consults/plan  -      Recommended Precautions and Strategies upright posture during/after eating;general aspiration precautions  encourage dry/2nd swallows intermittently  -MP upright posture during/after eating;general aspiration precautions  encourage dry/2nd swallows intermittently  -AC --      Oral Care Recommendations Oral Care BID/PRN  -MP Oral Care BID/PRN  -AC Oral Care BID/PRN  -AC      SLP Rec. for Method of Medication Administration meds whole;meds crushed;with puree;as tolerated  -MP meds whole;meds crushed;with puree;as tolerated  -AC meds via alternate route  -AC      Monitor for Signs of Aspiration yes;notify SLP if any concerns;cough;throat clearing  -MP yes;notify SLP if any concerns;cough;throat clearing  -AC --      Anticipated Discharge Disposition (SLP) anticipate therapy at next level of care;skilled nursing facility  -MP anticipate therapy at next level of care;skilled nursing facility  -AC anticipate therapy at next level of care;skilled nursing facility  -AC                User Key  (r) = Recorded By, (t) = Taken By, (c) = Cosigned By      Initials Name Effective Dates    AC Mireya Santos MS CCC-SLP 02/03/23 -     Jayme Escalante MS CCC-SLP 12/28/21 -                     EDUCATION  The patient has been educated in the following areas:   Dysphagia (Swallowing Impairment) Modified Diet Instruction.        SLP GOALS       Row Name 06/07/23 1455 06/05/23 4762          (LTG) Patient will demonstrate  functional swallow for    Diet Texture (Demonstrate functional swallow) regular textures  -MP regular textures  -AC     Liquid viscosity (Demonstrate functional swallow) nectar/ mildly thick liquids  -MP nectar/ mildly thick liquids  -AC     [place] (Demonstrate functional swallow) with minimal cues (75-90% accuracy)  -MP with minimal cues (75-90% accuracy)  -AC     Time Frame (Demonstrate functional swallow) by discharge  -MP by discharge  -AC     Progress/Outcomes (Demonstrate functional swallow) continuing progress toward goal  -MP --        (STG) Patient will tolerate trials of    Consistencies Trialed (Tolerate trials) mechanical ground textures;honey/ moderately thick liquids  -MP mechanical ground textures;honey/ moderately thick liquids  -AC     Desired Outcome (Tolerate trials) without signs/symptoms of aspiration;with adequate oral prep/transit/clearance;with use of compensatory strategies (see comments)  2nd swallow, general precautions  -MP without signs/symptoms of aspiration;with adequate oral prep/transit/clearance;with use of compensatory strategies (see comments)  2nd swallow, general precautions  -AC     [place] (Tolerate trials) with minimal cues (75-90% accuracy)  -MP with minimal cues (75-90% accuracy)  -AC     Time Frame (Tolerate trials) by discharge  -MP by discharge  -AC     Progress/Outcomes (Tolerate trials) continuing progress toward goal  -MP --        (STG) Patient will tolerate therapeutic trials of    Consistencies Trialed (Tolerate therapeutic trials) nectar/ mildly thick liquids  -MP nectar/ mildly thick liquids  -AC     Desired Outcome (Tolerate therapeutic trials) without signs/symptoms of aspiration  check for cough/throat clear--may very weak  -MP without signs/symptoms of aspiration  check for cough/throat clear--may very weak  -AC     [place] (Tolerate therapeutic trials) with 1:1 assist/ supervision  -MP with 1:1 assist/ supervision  -AC     Time Frame  (Tolerate therapeutic trials) by discharge  -MP by discharge  -AC     Progress/Outcomes (Tolerate therapeutic trials) continuing progress toward goal  -MP --        (STG) Lingual Strengthening Goal 1 (SLP)    Activity (Lingual Strengthening Goal 1, SLP) -- increase tongue back strength  -AC     Increase Tongue Back Strength lingual resistance exercises  -MP lingual resistance exercises  -AC     [place]/Accuracy (Lingual Strengthening Goal 1, SLP) with moderate cues (50-74% accuracy)  -MP with moderate cues (50-74% accuracy)  -AC     Time Frame (Lingual Strengthening Goal 1, SLP) short term goal (STG)  -MP short term goal (STG)  -AC     Progress/Outcomes (Lingual Strengthening Goal 1, SLP) continuing progress toward goal  -MP --        (STG) Pharyngeal Strengthening Exercise Goal 1 (SLP)    Activity (Pharyngeal Strengthening Goal 1, SLP) -- increase timing;increase superior movement of the hyolaryngeal complex;increase squeeze/positive pressure generation;increase closure at entrance to airway/closure of airway at glottis  -AC     Increase Timing prepping - 3 second prep or suck swallow or 3-step swallow  -MP prepping - 3 second prep or suck swallow or 3-step swallow  -AC     Increase Superior Movement of the Hyolaryngeal Complex effortful pitch glide (falsetto + pharyngeal squeeze)  -MP effortful pitch glide (falsetto + pharyngeal squeeze)  -AC     Increase Closure at Entrance to Airway/Closure of Airway at Glottis supraglottic swallow  -MP supraglottic swallow  -AC     Increase Squeeze/Positive Pressure Generation hard effortful swallow  -MP hard effortful swallow  -AC     [place]/Accuracy (Pharyngeal Strengthening Goal 1, SLP) with moderate cues (50-74% accuracy)  -MP with moderate cues (50-74% accuracy)  -AC     Time Frame (Pharyngeal Strengthening Goal 1, SLP) short term goal (STG)  -MP short term goal (STG)  -AC     Progress/Outcomes (Pharyngeal Strengthening Goal 1, SLP) continuing progress toward  goal  -MP --     Comment (Pharyngeal Strengthening Goal 1, SLP) Reviewed & completed w/ pt. Some difficulty initiating dry swallows for exercises  -MP --               User Key  (r) = Recorded By, (t) = Taken By, (c) = Cosigned By      Initials Name Provider Type    Mireya Youssef MS CCC-SLP Speech and Language Pathologist    Jayme Escalante MS CCC-SLP Speech and Language Pathologist                       Time Calculation:    Time Calculation- SLP       Row Name 06/07/23 1538             Time Calculation- SLP    SLP Start Time 1455  -MP      SLP Received On 06/07/23  -MP         Untimed Charges    41595-QI Treatment Swallow Minutes 40  -MP         Total Minutes    Untimed Charges Total Minutes 40  -MP       Total Minutes 40  -MP                User Key  (r) = Recorded By, (t) = Taken By, (c) = Cosigned By      Initials Name Provider Type    Jayme Escalante MS CCC-SLP Speech and Language Pathologist                    Therapy Charges for Today       Code Description Service Date Service Provider Modifiers Qty    [id number]  ST TREATMENT SWALLOW 3 6/7/2023 Jayme Guerin MS CCC-SLP GN 1                 Jayme Jimenez MS CCC-MINH  6/7/2023

## 2023-06-08 LAB
ANION GAP SERPL CALCULATED.3IONS-SCNC: 15 MMOL/L (ref 5–15)
BUN SERPL-MCNC: 20 MG/DL (ref 8–23)
BUN/CREAT SERPL: 6.6 (ref 7–25)
CALCIUM SPEC-SCNC: 9.5 MG/DL (ref 8.6–10.5)
CHLORIDE SERPL-SCNC: 98 MMOL/L (ref 98–107)
CO2 SERPL-SCNC: 20 MMOL/L (ref 22–29)
CREAT SERPL-MCNC: 3.04 MG/DL (ref 0.76–1.27)
DEPRECATED RDW RBC AUTO: 60.1 FL (ref 37–54)
EGFRCR SERPLBLD CKD-EPI 2021: 19.1 ML/MIN/1.73
ERYTHROCYTE [DISTWIDTH] IN BLOOD BY AUTOMATED COUNT: 17.5 % (ref 12.3–15.4)
GLUCOSE BLDC GLUCOMTR-MCNC: 107 MG/DL (ref 70–130)
GLUCOSE BLDC GLUCOMTR-MCNC: 126 MG/DL (ref 70–130)
GLUCOSE BLDC GLUCOMTR-MCNC: 128 MG/DL (ref 70–130)
GLUCOSE BLDC GLUCOMTR-MCNC: 97 MG/DL (ref 70–130)
GLUCOSE SERPL-MCNC: 122 MG/DL (ref 65–99)
HCT VFR BLD AUTO: 31.5 % (ref 37.5–51)
HGB BLD-MCNC: 9.6 G/DL (ref 13–17.7)
MCH RBC QN AUTO: 30.9 PG (ref 26.6–33)
MCHC RBC AUTO-ENTMCNC: 30.5 G/DL (ref 31.5–35.7)
MCV RBC AUTO: 101.3 FL (ref 79–97)
PLATELET # BLD AUTO: 330 10*3/MM3 (ref 140–450)
PMV BLD AUTO: 8.2 FL (ref 6–12)
POTASSIUM SERPL-SCNC: 4.8 MMOL/L (ref 3.5–5.2)
RBC # BLD AUTO: 3.11 10*6/MM3 (ref 4.14–5.8)
SODIUM SERPL-SCNC: 133 MMOL/L (ref 136–145)
WBC NRBC COR # BLD: 7.88 10*3/MM3 (ref 3.4–10.8)

## 2023-06-08 PROCEDURE — 97110 THERAPEUTIC EXERCISES: CPT

## 2023-06-08 PROCEDURE — 97116 GAIT TRAINING THERAPY: CPT

## 2023-06-08 PROCEDURE — 85027 COMPLETE CBC AUTOMATED: CPT | Performed by: NURSE PRACTITIONER

## 2023-06-08 PROCEDURE — G0378 HOSPITAL OBSERVATION PER HR: HCPCS

## 2023-06-08 PROCEDURE — 82948 REAGENT STRIP/BLOOD GLUCOSE: CPT

## 2023-06-08 PROCEDURE — 92526 ORAL FUNCTION THERAPY: CPT

## 2023-06-08 PROCEDURE — 80048 BASIC METABOLIC PNL TOTAL CA: CPT | Performed by: NURSE PRACTITIONER

## 2023-06-08 PROCEDURE — 97535 SELF CARE MNGMENT TRAINING: CPT

## 2023-06-08 PROCEDURE — 99232 SBSQ HOSP IP/OBS MODERATE 35: CPT | Performed by: NURSE PRACTITIONER

## 2023-06-08 RX ADMIN — METOPROLOL TARTRATE 6.25 MG: 25 TABLET, FILM COATED ORAL at 08:01

## 2023-06-08 RX ADMIN — PANTOPRAZOLE SODIUM 40 MG: 40 TABLET, DELAYED RELEASE ORAL at 05:13

## 2023-06-08 RX ADMIN — METOPROLOL TARTRATE 6.25 MG: 25 TABLET, FILM COATED ORAL at 20:34

## 2023-06-08 RX ADMIN — HYDROCODONE BITARTRATE AND ACETAMINOPHEN 1 TABLET: 5; 325 TABLET ORAL at 06:09

## 2023-06-08 RX ADMIN — FOLIC ACID 1 MG: 1 TABLET ORAL at 08:01

## 2023-06-08 RX ADMIN — Medication 10 ML: at 08:01

## 2023-06-08 RX ADMIN — Medication 5 MG: at 20:35

## 2023-06-08 RX ADMIN — FLUTICASONE PROPIONATE 1 SPRAY: 50 SPRAY, METERED NASAL at 08:01

## 2023-06-08 RX ADMIN — ATORVASTATIN CALCIUM 20 MG: 20 TABLET, FILM COATED ORAL at 08:01

## 2023-06-08 RX ADMIN — SERTRALINE 50 MG: 50 TABLET, FILM COATED ORAL at 08:01

## 2023-06-08 RX ADMIN — FINASTERIDE 5 MG: 5 TABLET, FILM COATED ORAL at 08:03

## 2023-06-08 RX ADMIN — Medication 10 ML: at 20:37

## 2023-06-08 RX ADMIN — SENNOSIDES AND DOCUSATE SODIUM 2 TABLET: 50; 8.6 TABLET ORAL at 20:37

## 2023-06-08 RX ADMIN — SENNOSIDES AND DOCUSATE SODIUM 2 TABLET: 50; 8.6 TABLET ORAL at 08:01

## 2023-06-08 NOTE — PROGRESS NOTES
"   LOS: 1 day    Patient Care Team:  Blair Dhaliwal MD as PCP - General (Internal Medicine)    Chief Complaint:  Recurrent fall     Subjective     Interval History:     No acute events overnight. No new complaints       Review of Systems:   No CP or SOA , fever, chills, rigors, rash, N/V, Constipation.       Objective     Vital Sign Min/Max for last 24 hours  Temp  Min: 96 °F (35.6 °C)  Max: 98.2 °F (36.8 °C)   BP  Min: 99/66  Max: 131/74   Pulse  Min: 71  Max: 83   Resp  Min: 16  Max: 17   SpO2  Min: 93 %  Max: 98 %   No data recorded   Weight  Min: 58.9 kg (129 lb 13.6 oz)  Max: 58.9 kg (129 lb 13.6 oz)     Flowsheet Rows      Flowsheet Row First Filed Value   Admission Height 167.6 cm (66\") Documented at 06/05/2023 0119   Admission Weight 58 kg (127 lb 13.9 oz) Documented at 06/05/2023 0119            No intake/output data recorded.  I/O last 3 completed shifts:  In: 670 [P.O.:670]  Out: 2500 [Other:2500]    Physical Exam:    Gen: Alert, NAD   HENT: NC, AT, EOMI   NECK: Supple, no JVD, Trachea midline   LUNGS: CTA bilaterally, non labored respirtation   CVS: S1/S2 audible, RRR, no murmur   Abd: Soft, NT, ND, BS+   Ext: No pedal edema, no cyanosis   CNS: Alert, No focal deficit noted grossly  Psy: Cooperative  Skin: Warm, dry and intact      WBC WBC   Date Value Ref Range Status   06/07/2023 8.60 3.40 - 10.80 10*3/mm3 Final   06/05/2023 7.22 3.40 - 10.80 10*3/mm3 Final      HGB Hemoglobin   Date Value Ref Range Status   06/07/2023 9.0 (L) 13.0 - 17.7 g/dL Final   06/05/2023 8.1 (L) 13.0 - 17.7 g/dL Final      HCT Hematocrit   Date Value Ref Range Status   06/07/2023 29.2 (L) 37.5 - 51.0 % Final   06/05/2023 25.7 (L) 37.5 - 51.0 % Final      Platlets No results found for: LABPLAT   MCV MCV   Date Value Ref Range Status   06/07/2023 98.6 (H) 79.0 - 97.0 fL Final   06/05/2023 97.0 79.0 - 97.0 fL Final          Sodium Sodium   Date Value Ref Range Status   06/07/2023 132 (L) 136 - 145 mmol/L Final      Potassium " Potassium   Date Value Ref Range Status   06/07/2023 4.9 3.5 - 5.2 mmol/L Final      Chloride Chloride   Date Value Ref Range Status   06/07/2023 94 (L) 98 - 107 mmol/L Final      CO2 CO2   Date Value Ref Range Status   06/07/2023 26.0 22.0 - 29.0 mmol/L Final      BUN BUN   Date Value Ref Range Status   06/07/2023 30 (H) 8 - 23 mg/dL Final      Creatinine Creatinine   Date Value Ref Range Status   06/07/2023 3.79 (H) 0.76 - 1.27 mg/dL Final      Calcium Calcium   Date Value Ref Range Status   06/07/2023 9.9 8.6 - 10.5 mg/dL Final      PO4 No results found for: CAPO4   Albumin No results found for: ALBUMIN     Magnesium Magnesium   Date Value Ref Range Status   06/06/2023 2.3 1.6 - 2.4 mg/dL Final     Comment:     Result checked      Uric Acid No results found for: URICACID        Results Review:     I reviewed the patient's new clinical results.    atorvastatin, 20 mg, Oral, Daily  finasteride, 5 mg, Oral, Daily  fluticasone, 1 spray, Nasal, Daily  folic acid, 1 mg, Oral, Daily  insulin lispro, 2-7 Units, Subcutaneous, 4x Daily AC & at Bedtime  melatonin, 5 mg, Oral, Nightly  metoprolol tartrate, 6.25 mg, Oral, Q12H  pantoprazole, 40 mg, Oral, Q AM  senna-docusate sodium, 2 tablet, Oral, BID  sertraline, 50 mg, Oral, Daily  sodium chloride, 10 mL, Intravenous, Q12H           Medication Review: yes    Assessment & Plan       Compression fracture of C7 vertebra, initial encounter    (HFpEF) heart failure with preserved ejection fraction    Benign prostatic hyperplasia    Closed fracture of left acetabulum    Encephalopathy    ESRD (end stage renal disease)    Essential hypertension    GERD (gastroesophageal reflux disease)    Mixed hyperlipidemia    NERY (obstructive sleep apnea)    Type 2 diabetes mellitus    Fall    Severe Malnutrition (HCC)      1- ESRD -MWF   2-HTN   3- Anemia of chronic disease   4- Hyponatremia     Plan:  - HD per schedule, UF as tolerated.   - Renal diet   - JENNYFER with HD   - Electrolytes will be  corrected with HD         Cristy Coyne MD  06/08/23  08:32 EDT

## 2023-06-08 NOTE — PLAN OF CARE
Goal Outcome Evaluation:  Plan of Care Reviewed With: patient        Progress: improving  Outcome Evaluation: Pt continues to improve with all OT goals, however continues to be limited by impaired balance, weakness, and activity tolerance that is below his baseline performance. Pt able to perform LB dressing without AE and able to maintain spinal precautions throughout. Cont POC.

## 2023-06-08 NOTE — THERAPY TREATMENT NOTE
Patient Name: Enrike Tomas  : 1935    MRN: [id number]                              Today's Date: 2023       Admit Date: 2023    Visit Dx:     ICD-10-CM ICD-9-CM   1. Compression fracture of C7 vertebra, initial encounter  S12.690A 805.07   2. Closed head injury, initial encounter  S09.90XA 959.01   3. ESRD on hemodialysis  N18.6 585.6    Z99.2 V45.11   4. Oropharyngeal dysphagia  R13.12 787.22     Patient Active Problem List   Diagnosis    Compression fracture of C7 vertebra, initial encounter    (HFpEF) heart failure with preserved ejection fraction    Anemia of renal disease    At high risk for falls    Benign prostatic hyperplasia    Closed fracture of left acetabulum    Compression fracture of thoracic vertebra    Deficiency of other specified B group vitamins    Depression    Encephalopathy    ESRD (end stage renal disease)    Essential hypertension    GERD (gastroesophageal reflux disease)    Hepatic cyst    Long term (current) use of anticoagulants    Major depressive disorder, single episode, moderate    Mixed hyperlipidemia    Morgagni hernia    Occlusion of right vertebral artery    NERY (obstructive sleep apnea)    Type 2 diabetes mellitus    Fall    Severe Malnutrition (HCC)     Past Medical History:   Diagnosis Date    Atrial fibrillation     BPH (benign prostatic hyperplasia)     Diabetes mellitus     Diverticulosis     ESRD on hemodialysis     GERD (gastroesophageal reflux disease)     Heart failure     Hyperlipidemia     Hypertension     NERY (obstructive sleep apnea)     Recurrent falls      Past Surgical History:   Procedure Laterality Date    CATARACT EXTRACTION      DIALYSIS FISTULA CREATION      INGUINAL HERNIA REPAIR      PROSTATE BIOPSY      TONSILLECTOMY AND ADENOIDECTOMY        General Information       Row Name 23 1524          OT Time and Intention    Document Type therapy note (daily note)  -AN     Mode of Treatment occupational therapy  -AN       Row Name 23 1524           General Information    Patient Profile Reviewed yes  -AN     Existing Precautions/Restrictions fall;cervical collar;spinal;brace worn when out of bed;other (see comments)  C7/T7 comp fx  -AN     Barriers to Rehab medically complex;previous functional deficit  -AN       Row Name 06/08/23 1524          Cognition    Orientation Status (Cognition) oriented x 4  -AN       Row Name 06/08/23 1524          Safety Issues, Functional Mobility    Safety Issues Affecting Function (Mobility) safety precaution awareness;safety precautions follow-through/compliance;awareness of need for assistance  -AN     Impairments Affecting Function (Mobility) balance;endurance/activity tolerance;pain;range of motion (ROM);strength;postural/trunk control  -AN               User Key  (r) = Recorded By, (t) = Taken By, (c) = Cosigned By      Initials Name Provider Type    Juliana Villaseñor OT Occupational Therapist                     Mobility/ADL's       Row Name 06/08/23 1528          Bed Mobility    Bed Mobility supine-sit  -AN     Supine-Sit [place] (Bed Mobility) supervision  -AN     Bed Mobility, Safety Issues decreased use of arms for pushing/pulling;decreased use of legs for bridging/pushing  -AN     Comment, (Bed Mobility) pt able to demo appropriate log rolling technique with sup<>sit  -AN       Row Name 06/08/23 1528          Transfers    Transfers sit-stand transfer;stand-sit transfer;toilet transfer  -AN     Comment, (Transfers) pt demo'd safe transfer technique using RW throughout with all transfers  -AN       Row Name 06/08/23 1528          Sit-Stand Transfer    Sit-Stand [place] (Transfers) contact guard;verbal cues  -AN     Assistive Device (Sit-Stand Transfers) walker, front-wheeled  -AN       Row Name 06/08/23 1528          Stand-Sit Transfer    Stand-Sit [place] (Transfers) verbal cues;contact guard  -AN     Assistive Device (Stand-Sit Transfers) walker, front-wheeled  -AN       Row Name 06/08/23  1528          Toilet Transfer    Type (Toilet Transfer) sit-stand;stand-sit  -AN     [place] Level (Toilet Transfer) contact guard;verbal cues  -AN     Assistive Device (Toilet Transfer) walker, front-wheeled  -AN       Row Name 06/08/23 1528          Functional Mobility    Functional Mobility- Ind. Level contact guard assist;verbal cues required  -AN     Functional Mobility- Device walker, front-wheeled  -AN     Functional Mobility-Distance (Feet) --  household distance  -AN     Functional Mobility- Safety Issues sequencing ability decreased;step length decreased  -AN     Functional Mobility- Comment pt ambulated >household distance using the RW with CGA for balance and safety, no LOB throughout  -AN       Row Name 06/08/23 1528          Activities of Daily Living    BADL Assessment/Intervention grooming;lower body dressing  -AN       Row Name 06/08/23 1528          Lower Body Dressing Assessment/Training    [place] Level (Lower Body Dressing) don;socks;supervision;verbal cues  -AN     Position (Lower Body Dressing) edge of bed sitting  -AN     Comment, (Lower Body Dressing) pt able to complete figure four compensatory technique in order to maintain precautions  -AN       Row Name 06/08/23 1528          Grooming Assessment/Training    [place] Level (Grooming) oral care regimen;wash face, hands;standby assist  -AN     Position (Grooming) sink side  -AN               User Key  (r) = Recorded By, (t) = Taken By, (c) = Cosigned By      Initials Name Provider Type    Juliana Villaseñor OT Occupational Therapist                   Obj/Interventions       Row Name 06/08/23 1538          Balance    Balance Assessment sitting static balance;sitting dynamic balance;sit to stand dynamic balance;standing static balance;standing dynamic balance  -AN     Static Sitting Balance supervision  -AN     Dynamic Sitting Balance supervision  -AN     Position, Sitting Balance sitting edge of bed  -AN     Sit to Stand  Dynamic Balance contact guard  -AN     Static Standing Balance contact guard  -AN     Dynamic Standing Balance contact guard  -AN     Position/Device Used, Standing Balance walker, rolling  -AN     Balance Interventions standing;sit to stand;supported;dynamic;minimal challenge;occupation based/functional task  -AN               User Key  (r) = Recorded By, (t) = Taken By, (c) = Cosigned By      Initials Name Provider Type    Juliana Villaseñor, OT Occupational Therapist                   Goals/Plan    No documentation.                  Clinical Impression       Row Name 06/08/23 1540          Pain Assessment    Pretreatment Pain Rating 0/10 - no pain  -AN     Posttreatment Pain Rating 0/10 - no pain  -AN     Pre/Posttreatment Pain Comment asymptomatic  -AN     Pain Intervention(s) Ambulation/increased activity;Repositioned  -AN       Row Name 06/08/23 5670          Plan of Care Review    Plan of Care Reviewed With patient  -AN     Progress improving  -AN     Outcome Evaluation Pt continues to improve with all OT goals, however continues to be limited by impaired balance, weakness, and activity tolerance that is below his baseline performance. Pt able to perform LB dressing without AE and able to maintain spinal precautions throughout. Cont POC.  -AN       Row Name 06/08/23 8890          Therapy Plan Review/Discharge Plan (OT)    Anticipated Discharge Disposition (OT) inpatient rehabilitation facility  -AN       Row Name 06/08/23 5717          Vital Signs    Pre Systolic BP Rehab --  VSS  -AN     O2 Delivery Pre Treatment room air  -AN     O2 Delivery Intra Treatment room air  -AN     O2 Delivery Post Treatment room air  -AN     Pre Patient Position Supine  -AN     Intra Patient Position Standing  -AN     Post Patient Position Supine  -AN       Row Name 06/08/23 6349          Positioning and Restraints    Pre-Treatment Position in bed  -AN     Post Treatment Position bed  -AN     In Bed notified nsg;supine;exit  alarm on;encouraged to call for assist;call light within reach;side rails up x2;patient within staff view  -AN               User Key  (r) = Recorded By, (t) = Taken By, (c) = Cosigned By      Initials Name Provider Type    Juliana Villaseñor OT Occupational Therapist                   Outcome Measures       Row Name 06/08/23 1543          How much help from another is currently needed...    Putting on and taking off regular lower body clothing? 3  -AN     Bathing (including washing, rinsing, and drying) 3  -AN     Toileting (which includes using toilet bed pan or urinal) 2  -AN     Putting on and taking off regular upper body clothing 3  -AN     Taking care of personal grooming (such as brushing teeth) 3  -AN     Eating meals 3  -AN     AM-PAC 6 Clicks Score (OT) 17  -AN       Row Name 06/08/23 1355 06/08/23 0800       How much help from another person do you currently need...    Turning from your back to your side while in flat bed without using bedrails? 3  -MB 4  -EO    Moving from lying on back to sitting on the side of a flat bed without bedrails? 3  -MB 3  -EO    Moving to and from a bed to a chair (including a wheelchair)? 3  -MB 3  -EO    Standing up from a chair using your arms (e.g., wheelchair, bedside chair)? 3  -MB 3  -EO    Climbing 3-5 steps with a railing? 3  -MB 2  -EO    To walk in hospital room? 3  -MB 3  -EO    AM-PAC 6 Clicks Score (PT) 18  -MB 18  -EO    Highest level of mobility 6 --> Walked 10 steps or more  -MB 6 --> Walked 10 steps or more  -EO      Row Name 06/08/23 1543          Functional Assessment    Outcome Measure Options AM-PAC 6 Clicks Daily Activity (OT)  -AN               User Key  (r) = Recorded By, (t) = Taken By, (c) = Cosigned By      Initials Name Provider Type    Tamiko Hernandez, PT Physical Therapist    Maria Alejandra Oseguera, RN Registered Nurse    Juliana Villaseñor OT Occupational Therapist                    Occupational Therapy Education       Title: PT OT  SLP Therapies (In Progress)       Topic: Occupational Therapy (In Progress)       Point: ADL training (Done)       Description:   Instruct learner(s) on proper safety adaptation and remediation techniques during self care or transfers.   Instruct in proper use of assistive devices.                  Learning Progress Summary             Patient Acceptance, E, VU by AN at 6/8/2023 1543    Acceptance, E,TB,D, VU,NR by  at 6/6/2023 1202    Acceptance, E, VU by MR at 6/5/2023 1443                         Point: Home exercise program (Not Started)       Description:   Instruct learner(s) on appropriate technique for monitoring, assisting and/or progressing therapeutic exercises/activities.                  Learner Progress:  Not documented in this visit.              Point: Precautions (Done)       Description:   Instruct learner(s) on prescribed precautions during self-care and functional transfers.                  Learning Progress Summary             Patient Acceptance, E, VU by AN at 6/8/2023 1543    Acceptance, E,TB,D, VU,NR by  at 6/6/2023 1202    Acceptance, E, VU by MR at 6/5/2023 1443                         Point: Body mechanics (Done)       Description:   Instruct learner(s) on proper positioning and spine alignment during self-care, functional mobility activities and/or exercises.                  Learning Progress Summary             Patient Acceptance, E, VU by AN at 6/8/2023 1543    Acceptance, E,TB,D, VU,NR by  at 6/6/2023 1202    Acceptance, E, VU by MR at 6/5/2023 1443                                         User Key       Initials Effective Dates Name Provider Type Discipline     10/25/22 -  Fouzia Soriano OT Occupational Therapist OT    AN 09/21/21 -  Juliana Mckinney OT Occupational Therapist OT    MR 09/22/22 -  Dhara Ford OT Occupational Therapist OT                  OT Recommendation and Plan     Plan of Care Review  Plan of Care Reviewed With: patient  Progress:  improving  Outcome Evaluation: Pt continues to improve with all OT goals, however continues to be limited by impaired balance, weakness, and activity tolerance that is below his baseline performance. Pt able to perform LB dressing without AE and able to maintain spinal precautions throughout. Cont POC.     Time Calculation:    Time Calculation- OT       Row Name 06/08/23 1544 06/08/23 1356          Time Calculation- OT    OT Start Time 1130  -AN --     OT Received On 06/08/23  -AN --        Timed Charges    25283 - Gait Training Minutes  -- 16  -MB     75596 - OT Self Care/Mgmt Minutes 16  -AN --        Total Minutes    Timed Charges Total Minutes 16  -AN 16  -MB      Total Minutes 16  -AN 16  -MB               User Key  (r) = Recorded By, (t) = Taken By, (c) = Cosigned By      Initials Name Provider Type    Tamiko Hernandez, PT Physical Therapist    Juliana Villaseñor OT Occupational Therapist                  Therapy Charges for Today       Code Description Service Date Service Provider Modifiers Qty    [id number] HC OT SELF CARE/MGMT/TRAIN EA 15 MIN 6/8/2023 Juliana Mckinney OT GO 1                 Juliana Mckinney OT  6/8/2023

## 2023-06-08 NOTE — THERAPY TREATMENT NOTE
Acute Care - Speech Language Pathology   Swallow Treatment Note  [place]     Patient Name: Enrike Tomas  : 1935  MRN: [id number]  Today's Date: 2023               Admit Date: 2023    Visit Dx:     ICD-10-CM ICD-9-CM   1. Compression fracture of C7 vertebra, initial encounter  S12.690A 805.07   2. Closed head injury, initial encounter  S09.90XA 959.01   3. ESRD on hemodialysis  N18.6 585.6    Z99.2 V45.11   4. Oropharyngeal dysphagia  R13.12 787.22     Patient Active Problem List   Diagnosis    Compression fracture of C7 vertebra, initial encounter    (HFpEF) heart failure with preserved ejection fraction    Anemia of renal disease    At high risk for falls    Benign prostatic hyperplasia    Closed fracture of left acetabulum    Compression fracture of thoracic vertebra    Deficiency of other specified B group vitamins    Depression    Encephalopathy    ESRD (end stage renal disease)    Essential hypertension    GERD (gastroesophageal reflux disease)    Hepatic cyst    Long term (current) use of anticoagulants    Major depressive disorder, single episode, moderate    Mixed hyperlipidemia    Morgagni hernia    Occlusion of right vertebral artery    NERY (obstructive sleep apnea)    Type 2 diabetes mellitus    Fall    Severe Malnutrition (HCC)     Past Medical History:   Diagnosis Date    Atrial fibrillation     BPH (benign prostatic hyperplasia)     Diabetes mellitus     Diverticulosis     ESRD on hemodialysis     GERD (gastroesophageal reflux disease)     Heart failure     Hyperlipidemia     Hypertension     NERY (obstructive sleep apnea)     Recurrent falls      Past Surgical History:   Procedure Laterality Date    CATARACT EXTRACTION      DIALYSIS FISTULA CREATION      INGUINAL HERNIA REPAIR      PROSTATE BIOPSY      TONSILLECTOMY AND ADENOIDECTOMY         SLP Recommendation and Plan                                            Daily Summary of Progress (SLP): progress toward functional goals as  expected (06/08/23 1345)               Treatment Assessment (SLP): continued, suspected, pharyngeal dysphagia (06/08/23 1345)  Treatment Assessment Comments (SLP): [place] state limited success. (06/08/23 1345)  Plan for Continued Treatment (SLP): continue treatment per plan of care (06/08/23 1345)         Plan of Care Reviewed With: patient      SWALLOW EVALUATION (last 72 hours)       SLP Adult Swallow Evaluation       Row Name 06/08/23 1345 06/07/23 8315                Rehab Evaluation    Document Type therapy note (daily note)  -SM therapy note (daily note)  -MP       Subjective Information no complaints  -SM no complaints  -MP       Patient Observations lethargic;cooperative  -SM alert;cooperative  -MP       Patient/Family/Caregiver Comments/Observations -- No family present  -MP       Patient Effort good  -SM good  -MP       Comment Pt sleepy, stated did not sleep well last night.  -SM --          Pain    Additional Documentation -- Pain Scale: FACES Pre/Post-Treatment (Group)  -MP          Pain Scale: Numbers Pre/Post-Treatment    Pretreatment Pain Rating 0/10 - no pain  -SM --       Posttreatment Pain Rating 0/10 - no pain  -SM --          Pain Scale: FACES Pre/Post-Treatment    Pain: FACES Scale, Pretreatment -- 0-->no hurt  -MP       Posttreatment Pain Rating -- 0-->no hurt  -MP          SLP Treatment Clinical Impressions    Treatment Assessment (SLP) continued;suspected;pharyngeal dysphagia  -SM continued;suspected;pharyngeal dysphagia  -MP       Treatment Assessment Comments (SLP) [place]y state limited success.  -SM Reviewed & completed dysphagia exercises w/ pt. Left handout & encouraged independent completion. Would benefit from cont'd SLP services @ next level of care.  -MP       Daily Summary of Progress (SLP) progress toward functional goals as expected  -SM progress toward functional goals as expected  -MP       Plan for Continued Treatment (SLP) continue treatment per plan of care  -SM continue  treatment per plan of care  -MP       Care Plan Review care plan/treatment goals reviewed;patient/other agree to care plan  -SM care plan/treatment goals reviewed;patient/other agree to care plan  -MP          Recommendations    Therapy Frequency (Swallow) -- 5 days per week  -MP       Predicted Duration Therapy Intervention (Days) -- until discharge  -MP       SLP Diet Recommendation -- mechanical ground textures;no mixed consistencies;honey thick liquids  -MP       Recommended Precautions and Strategies -- upright posture during/after eating;general aspiration precautions  encourage dry/2nd swallows intermittently  -MP       Oral Care Recommendations -- Oral Care BID/PRN  -MP       SLP Rec. for Method of Medication Administration -- meds whole;meds crushed;with puree;as tolerated  -MP       Monitor for Signs of Aspiration -- yes;notify SLP if any concerns;cough;throat clearing  -MP       Anticipated Discharge Disposition (SLP) -- anticipate therapy at next level of care;skilled nursing facility  -MP                 User Key  (r) = Recorded By, (t) = Taken By, (c) = Cosigned By      Initials Name Effective Dates    Sigrid Quesada MS CCC-SLP 02/03/23 -     Jayme Escalante MS CCC-SLP 12/28/21 -                     EDUCATION  The patient has been educated in the following areas:   Dysphagia (Swallowing Impairment).        SLP GOALS       Row Name 06/08/23 1345 06/07/23 1455          (LTG) Patient will demonstrate functional swallow for    Diet Texture (Demonstrate functional swallow) regular textures  -SM regular textures  -MP     Liquid viscosity (Demonstrate functional swallow) nectar/ mildly thick liquids  -SM nectar/ mildly thick liquids  -MP     [place] (Demonstrate functional swallow) with minimal cues (75-90% accuracy)  -SM with minimal cues (75-90% accuracy)  -MP     Time Frame (Demonstrate functional swallow) by discharge  -SM by discharge  -MP     Progress/Outcomes (Demonstrate  functional swallow) continuing progress toward goal  -SM continuing progress toward goal  -MP        (STG) Patient will tolerate trials of    Consistencies Trialed (Tolerate trials) mechanical ground textures;honey/ moderately thick liquids  -SM mechanical ground textures;honey/ moderately thick liquids  -MP     Desired Outcome (Tolerate trials) without signs/symptoms of aspiration;with adequate oral prep/transit/clearance;with use of compensatory strategies (see comments)  2nd swallow, general precautions  -SM without signs/symptoms of aspiration;with adequate oral prep/transit/clearance;with use of compensatory strategies (see comments)  2nd swallow, general precautions  -MP     [place] (Tolerate trials) with minimal cues (75-90% accuracy)  -SM with minimal cues (75-90% accuracy)  -MP     Time Frame (Tolerate trials) by discharge  -SM by discharge  -MP     Progress/Outcomes (Tolerate trials) good progress toward goal  -SM continuing progress toward goal  -MP        (STG) Patient will tolerate therapeutic trials of    Consistencies Trialed (Tolerate therapeutic trials) nectar/ mildly thick liquids  -SM nectar/ mildly thick liquids  -MP     Desired Outcome (Tolerate therapeutic trials) without signs/symptoms of aspiration  check for cough/throat clear--may very weak  -SM without signs/symptoms of aspiration  check for cough/throat clear--may very weak  -MP     [place] (Tolerate therapeutic trials) with 1:1 assist/ supervision  -SM with 1:1 assist/ supervision  -MP     Time Frame (Tolerate therapeutic trials) by discharge  -SM by discharge  -MP     Progress/Outcomes (Tolerate therapeutic trials) good progress toward goal  -SM continuing progress toward goal  -MP     Comment (Tolerate therapeutic trials) No s/s aspiration with ice chip or tsp NT x1 or straw NT x1. Pt quite sleepy today. Not ready to use results as means for readiness repeat swallow study. Will f/u for continued carryover.  -SM --         (STG) Lingual Strengthening Goal 1 (SLP)    Increase Tongue Back Strength lingual resistance exercises  -SM lingual resistance exercises  -MP     [place]/Accuracy (Lingual Strengthening Goal 1, SLP) with moderate cues (50-74% accuracy)  -SM with moderate cues (50-74% accuracy)  -MP     Time Frame (Lingual Strengthening Goal 1, SLP) short term goal (STG)  -SM short term goal (STG)  -MP     Progress/Outcomes (Lingual Strengthening Goal 1, SLP) goal ongoing  -SM continuing progress toward goal  -MP        (STG) Pharyngeal Strengthening Exercise Goal 1 (SLP)    Increase Timing prepping - 3 second prep or suck swallow or 3-step swallow  -SM prepping - 3 second prep or suck swallow or 3-step swallow  -MP     Increase Superior Movement of the Hyolaryngeal Complex effortful pitch glide (falsetto + pharyngeal squeeze)  -SM effortful pitch glide (falsetto + pharyngeal squeeze)  -MP     Increase Closure at Entrance to Airway/Closure of Airway at Glottis supraglottic swallow  -SM supraglottic swallow  -MP     Increase Squeeze/Positive Pressure Generation hard effortful swallow  -SM hard effortful swallow  -MP     [place]/Accuracy (Pharyngeal Strengthening Goal 1, SLP) with moderate cues (50-74% accuracy)  -SM with moderate cues (50-74% accuracy)  -MP     Time Frame (Pharyngeal Strengthening Goal 1, SLP) short term goal (STG)  -SM short term goal (STG)  -MP     Progress/Outcomes (Pharyngeal Strengthening Goal 1, SLP) goal ongoing  -SM continuing progress toward goal  -MP     Comment (Pharyngeal Strengthening Goal 1, SLP) Copy of exercises remain on bedside table. Too sleepy to attempt this session.  -SM Reviewed & completed w/ pt. Some difficulty initiating dry swallows for exercises  -MP               User Key  (r) = Recorded By, (t) = Taken By, (c) = Cosigned By      Initials Name Provider Type    Sigrid Quesada, MS CCC-SLP Speech and Language Pathologist    Jayme Escalante, MS CCC-SLP Speech and  Language Pathologist                       Time Calculation:    Time Calculation- SLP       Row Name 06/08/23 1440             Time Calculation- SLP    SLP Start Time 1330  -SM      SLP Received On 06/08/23  -SM         Untimed Charges    49706-XV Treatment Swallow Minutes 24  -SM         Total Minutes    Untimed Charges Total Minutes 24  -SM       Total Minutes 24  -SM                User Key  (r) = Recorded By, (t) = Taken By, (c) = Cosigned By      Initials Name Provider Type    Sigrid Quesada MS CCC-SLP Speech and Language Pathologist                    Therapy Charges for Today       Code Description Service Date Service Provider Modifiers Qty    [id number]  ST TREATMENT SWALLOW 2 6/8/2023 Sigrid Higgins MS CCC-SLP GN 1                 Sigrid Higgins MS CCC-SLP  6/8/2023

## 2023-06-08 NOTE — CASE MANAGEMENT/SOCIAL WORK
Continued Stay Note  [facility]     Patient Name: Enrike Tomas  MRN: [id number]  Today's Date: 6/8/2023    Admit Date: 6/5/2023    Plan: [facility]   Discharge Plan       Row Name 06/08/23 1326       Plan    Plan [facility]    Patient/Family in Agreement with Plan yes    Plan Comments Patient's plan is a skilled bed at [facility] PENDING insurance approval.  Ruchi will initiate precert with patient's insurance today, 6/8.  Updated patient's wife by phone.  CM will continue to follow.                   Discharge Codes    No documentation.                 Expected Discharge Date and Time       Expected Discharge Date Expected Discharge Time    Jun 9, 2023               Lois Naidu RN

## 2023-06-08 NOTE — PLAN OF CARE
Goal Outcome Evaluation:  Plan of Care Reviewed With: patient        Progress: improving  Outcome Evaluation: Patient demonstrates improving independence w/ mobility, but continues to be limited by generalized weakness, decreased ROM, impaired balance, unsteady gait, and remains below his baseline. He progressed forward ambulation to 115ft w/ RW, min A, and 1 overt LOB requiring assist to recover. PT continues to recommend IP rehab at D/C for best outcome.

## 2023-06-08 NOTE — THERAPY TREATMENT NOTE
Patient Name: Enrike Tomas  : 1935    MRN: [id number]                              Today's Date: 2023       Admit Date: 2023    Visit Dx:     ICD-10-CM ICD-9-CM   1. Compression fracture of C7 vertebra, initial encounter  S12.690A 805.07   2. Closed head injury, initial encounter  S09.90XA 959.01   3. ESRD on hemodialysis  N18.6 585.6    Z99.2 V45.11   4. Oropharyngeal dysphagia  R13.12 787.22     Patient Active Problem List   Diagnosis    Compression fracture of C7 vertebra, initial encounter    (HFpEF) heart failure with preserved ejection fraction    Anemia of renal disease    At high risk for falls    Benign prostatic hyperplasia    Closed fracture of left acetabulum    Compression fracture of thoracic vertebra    Deficiency of other specified B group vitamins    Depression    Encephalopathy    ESRD (end stage renal disease)    Essential hypertension    GERD (gastroesophageal reflux disease)    Hepatic cyst    Long term (current) use of anticoagulants    Major depressive disorder, single episode, moderate    Mixed hyperlipidemia    Morgagni hernia    Occlusion of right vertebral artery    NERY (obstructive sleep apnea)    Type 2 diabetes mellitus    Fall    Severe Malnutrition (HCC)     Past Medical History:   Diagnosis Date    Atrial fibrillation     BPH (benign prostatic hyperplasia)     Diabetes mellitus     Diverticulosis     ESRD on hemodialysis     GERD (gastroesophageal reflux disease)     Heart failure     Hyperlipidemia     Hypertension     NERY (obstructive sleep apnea)     Recurrent falls      Past Surgical History:   Procedure Laterality Date    CATARACT EXTRACTION      DIALYSIS FISTULA CREATION      INGUINAL HERNIA REPAIR      PROSTATE BIOPSY      TONSILLECTOMY AND ADENOIDECTOMY        General Information       Row Name 23 0838          Physical Therapy Time and Intention    Document Type therapy note (daily note)  -MB     Mode of Treatment physical therapy  -MB       Row Name  06/08/23 0838          General Information    Patient Profile Reviewed yes  -MB     Existing Precautions/Restrictions fall;cervical collar;spinal;brace worn when out of bed;other (see comments)  C7/T7 comp fx  -MB     Barriers to Rehab medically complex;previous functional deficit  -MB       Row Name 06/08/23 0838          Cognition    Orientation Status (Cognition) oriented x 4  -MB       Row Name 06/08/23 0838          Safety Issues, Functional Mobility    Safety Issues Affecting Function (Mobility) insight into deficits/self-awareness;judgment;positioning of assistive device;problem-solving;safety precaution awareness;safety precautions follow-through/compliance;sequencing abilities  -MB     Impairments Affecting Function (Mobility) balance;endurance/activity tolerance;pain;range of motion (ROM);strength;postural/trunk control  -MB               User Key  (r) = Recorded By, (t) = Taken By, (c) = Cosigned By      Initials Name Provider Type    MB Tamiko Aceves, PT Physical Therapist                   Mobility       Row Name 06/08/23 1333          Bed Mobility    Rolling Right Harrison (Bed Mobility) minimum assist (75% patient effort);verbal cues  -MB     Sidelying-Sit Harrison (Bed Mobility) minimum assist (75% patient effort);verbal cues  -MB     Sit-Sidelying Harrison (Bed Mobility) not tested  -MB     Assistive Device (Bed Mobility) bed rails;head of bed elevated  -MB     Comment, (Bed Mobility) Pt. verbalized understanding of spinal precautions; required assist to roll and transfer sidelying to sit d/t limited use LUE.  -MB       Row Name 06/08/23 1333          Transfers    Comment, (Transfers) VCs for safe hand placement and avoiding twisting in standing.  -MB       Row Name 06/08/23 1333          Sit-Stand Transfer    Sit-Stand Harrison (Transfers) contact guard;verbal cues  -MB     Assistive Device (Sit-Stand Transfers) walker, front-wheeled  -MB       Row Name 06/08/23 1333           Gait/Stairs (Locomotion)    Prentiss Level (Gait) minimum assist (75% patient effort);verbal cues  -MB     Assistive Device (Gait) walker, front-wheeled  -MB     Distance in Feet (Gait) 115  -MB     Deviations/Abnormal Patterns (Gait) bilateral deviations;base of support, narrow;gait speed decreased;stride length decreased;amberly decreased  -MB     Bilateral Gait Deviations forward flexed posture;heel strike decreased;weight shift ability decreased  -MB     Comment, (Gait/Stairs) Patient ambulated w/ step through gait pattern w/ slow pace. He required VCs for upright posture, increased B heelstrike, and safe use of RW to keep closer to HARRISON. 1 LOB requiring assist to recover.  -MB               User Key  (r) = Recorded By, (t) = Taken By, (c) = Cosigned By      Initials Name Provider Type    Tamiko Hernandez, PT Physical Therapist                   Obj/Interventions       Row Name 06/08/23 1345          Shoulder (Therapeutic Exercise)    Shoulder (Therapeutic Exercise) AROM (active range of motion)  -MB     Shoulder AROM (Therapeutic Exercise) right;flexion;scapular retraction;10 repetitions  -MB     Shoulder AAROM (Therapeutic Exercise) left;flexion;scapular retraction;10 repetitions  -MB       Row Name 06/08/23 1345          Hip (Therapeutic Exercise)    Hip Isometrics (Therapeutic Exercise) bilateral;gluteal sets;10 repetitions  -MB     Hip Strengthening (Therapeutic Exercise) bilateral;marching while seated;aBduction;heel slides;10 repetitions  -MB       Row Name 06/08/23 1345          Knee (Therapeutic Exercise)    Knee Isometrics (Therapeutic Exercise) bilateral;quad sets;10 repetitions  -MB     Knee Strengthening (Therapeutic Exercise) bilateral;LAQ (long arc quad);10 repetitions  -MB       Row Name 06/08/23 1345          Ankle (Therapeutic Exercise)    Ankle AROM (Therapeutic Exercise) bilateral;dorsiflexion;plantarflexion;10 repetitions  -MB       Row Name 06/08/23 1345          Balance    Static  Standing Balance contact guard  -MB     Dynamic Standing Balance minimal assist  -MB     Position/Device Used, Standing Balance supported;walker, rolling  -MB     Balance Interventions standing;sit to stand;weight shifting activity;dynamic reaching  -MB               User Key  (r) = Recorded By, (t) = Taken By, (c) = Cosigned By      Initials Name Provider Type    MB Tamiko Aceves, PT Physical Therapist                   Goals/Plan    No documentation.                  Clinical Impression       Row Name 06/08/23 1348          Pain    Pretreatment Pain Rating 0/10 - no pain  -MB     Posttreatment Pain Rating 0/10 - no pain  -MB       Row Name 06/08/23 1348          Plan of Care Review    Plan of Care Reviewed With patient  -MB     Progress improving  -MB     Outcome Evaluation Patient demonstrates improving independence w/ mobility, but continues to be limited by generalized weakness, decreased ROM, impaired balance, unsteady gait, and remains below his baseline. He progressed forward ambulation to 115ft w/ RW, min A, and 1 overt LOB requiring assist to recover. PT continues to recommend IP rehab at D/C for best outcome.  -MB       Row Name 06/08/23 1348          Vital Signs    Pre Systolic BP Rehab 118  -MB     Pre Treatment Diastolic BP 64  -MB     Pre SpO2 (%) 97  -MB     O2 Delivery Pre Treatment room air  -MB     O2 Delivery Intra Treatment room air  -MB     Post SpO2 (%) 98  -MB     O2 Delivery Post Treatment room air  -MB     Pre Patient Position Supine  -MB     Intra Patient Position Standing  -MB     Post Patient Position Sitting  -MB       Row Name 06/08/23 1345          Positioning and Restraints    Pre-Treatment Position in bed  -MB     Post Treatment Position chair  -MB     In Chair notified nsg;reclined;call light within reach;encouraged to call for assist;exit alarm on;waffle cushion;legs elevated;heels elevated;with brace  -MB               User Key  (r) = Recorded By, (t) = Taken By, (c) =  Cosigned By      Initials Name Provider Type    Tamiko Hernandez, PT Physical Therapist                   Outcome Measures       Row Name 06/08/23 1355 06/08/23 0800       How much help from another person do you currently need...    Turning from your back to your side while in flat bed without using bedrails? 3  -MB 4  -EO    Moving from lying on back to sitting on the side of a flat bed without bedrails? 3  -MB 3  -EO    Moving to and from a bed to a chair (including a wheelchair)? 3  -MB 3  -EO    Standing up from a chair using your arms (e.g., wheelchair, bedside chair)? 3  -MB 3  -EO    Climbing 3-5 steps with a railing? 3  -MB 2  -EO    To walk in hospital room? 3  -MB 3  -EO    AM-PAC 6 Clicks Score (PT) 18  -MB 18  -EO    Highest level of mobility 6 --> Walked 10 steps or more  -MB 6 --> Walked 10 steps or more  -EO              User Key  (r) = Recorded By, (t) = Taken By, (c) = Cosigned By      Initials Name Provider Type    Tamiko Hernandez, PT Physical Therapist    Maria Alejandra Oseguera RN Registered Nurse                                 Physical Therapy Education       Title: PT OT SLP Therapies (In Progress)       Topic: Physical Therapy (Done)       Point: Mobility training (Done)       Learning Progress Summary             Patient Eager, E, VU,DU,NR by  at 6/7/2023 1554    Comment: Reviewed safety/technique w/transfers, ambulation, HEP, c spine precautions, PT POC    Acceptance, E,D, NR,VU by AB at 6/6/2023 1155    Acceptance, E,D, NR by AB at 6/5/2023 1419                         Point: Home exercise program (Done)       Learning Progress Summary             Patient Eager, E, VU,DU,NR by  at 6/7/2023 1554    Comment: Reviewed safety/technique w/transfers, ambulation, HEP, c spine precautions, PT POC    Acceptance, E,D, NR,VU by AB at 6/6/2023 1155                         Point: Body mechanics (Done)       Learning Progress Summary             Patient Eager, E, VU,DU,NR by SS at 6/7/2023  1554    Comment: Reviewed safety/technique w/transfers, ambulation, HEP, c spine precautions, PT POC    Acceptance, E,D, NR,VU by AB at 6/6/2023 1155    Acceptance, E,D, NR by AB at 6/5/2023 1419                         Point: Precautions (Done)       Learning Progress Summary             Patient Eager, E, VU,DU,NR by  at 6/7/2023 1554    Comment: Reviewed safety/technique w/transfers, ambulation, HEP, c spine precautions, PT POC    Acceptance, E,D, NR,VU by AB at 6/6/2023 1155    Acceptance, E,D, NR by AB at 6/5/2023 1419                                         User Key       Initials Effective Dates Name Provider Type Discipline     06/01/21 -  Bing Castellanos, PT Physical Therapist PT    AB 09/22/22 -  Caro Youngblood, PT Physical Therapist PT                  PT Recommendation and Plan     Plan of Care Reviewed With: patient  Progress: improving  Outcome Evaluation: Patient demonstrates improving independence w/ mobility, but continues to be limited by generalized weakness, decreased ROM, impaired balance, unsteady gait, and remains below his baseline. He progressed forward ambulation to 115ft w/ RW, min A, and 1 overt LOB requiring assist to recover. PT continues to recommend IP rehab at D/C for best outcome.     Time Calculation:    PT Charges       Row Name 06/08/23 1356             Time Calculation    Start Time 0838  -MB      PT Received On 06/08/23  -MB      PT Goal Re-Cert Due Date 06/15/23  -MB         Time Calculation- PT    Total Timed Code Minutes- PT 46 minute(s)  -MB         Timed Charges    35056 - PT Therapeutic Exercise Minutes 30  -MB      69748 - Gait Training Minutes  16  -MB         Total Minutes    Timed Charges Total Minutes 46  -MB       Total Minutes 46  -MB                User Key  (r) = Recorded By, (t) = Taken By, (c) = Cosigned By      Initials Name Provider Type    Tamiko Hernandez, PT Physical Therapist                  Therapy Charges for Today       Code Description  Service Date Service Provider Modifiers Qty    10408457678 HC PT THER PROC EA 15 MIN 6/8/2023 Tamiko Aceves, PT GP 2    28271924682 HC GAIT TRAINING EA 15 MIN 6/8/2023 Tamiko Aceves, PT GP 1            PT G-Codes  Outcome Measure Options: AM-PAC 6 Clicks Basic Mobility (PT)  AM-PAC 6 Clicks Score (PT): 18  AM-PAC 6 Clicks Score (OT): 13  PT Discharge Summary  Anticipated Discharge Disposition (PT): inpatient rehabilitation facility    Tamiko Aceves, PT  6/8/2023

## 2023-06-08 NOTE — PROGRESS NOTES
[facility] Medicine Services  PROGRESS NOTE    Patient Name: Enrike Tomas  : 1935  MRN: [id number]    Date of Admission: 2023  Primary Care Physician: Blair Dhaliwal MD    Subjective   Subjective     CC:  F/U C spine Fx     HPI:  Seen resting up in chair awake and alert.  No acute distress.  Wearing his hard c-collar.  Tolerating his dysphagia diet.  Denies nausea or vomiting.  Rates right shoulder and neck pain 0 at rest and up to 8/10 scale with activity.  Otherwise no new issues.  Awaiting rehab.    ROS:  Gen- No fevers, chills  CV- No chest pain, palpitations  Resp- No cough, dyspnea  GI- No N/V/D, abd pain    Objective   Objective     Vital Signs:   Temp:  [97.7 °F (36.5 °C)-98.2 °F (36.8 °C)] 97.7 °F (36.5 °C)  Heart Rate:  [71-83] 71  Resp:  [16-18] 18  BP: ()/(55-74) 104/63     Physical Exam:  Constitutional: No acute distress, seen resting up in the chair.  Awake and alert.  Chronically ill-appearing.  HENT: NCAT, mucous membranes moist, in C collar   Respiratory: Clear to auscultation bilaterally, respiratory effort normal on room air with sats 97%.  Cardiovascular: RRR, no murmurs, rubs, or gallops  Gastrointestinal: Positive bowel sounds, soft, nontender, nondistended  Musculoskeletal: No bilateral ankle edema.  VARELA spontaneously.  Psychiatric: Appropriate affect, cooperative  Neurologic: Oriented x 3, speech clear and appropriate.  Nonfocal.  Follows commands.  Skin: No rashes. AV fistula with positive thrill and bruit.    Results Reviewed:  LAB RESULTS:      Lab 23  1018 23  0732 23  1541 23  0324   WBC 7.88 8.60 7.22 8.79   HEMOGLOBIN 9.6* 9.0* 8.1* 7.7*   HEMATOCRIT 31.5* 29.2* 25.7* 24.7*   PLATELETS 330 377 329 300   NEUTROS ABS  --   --   --  6.70   IMMATURE GRANS (ABS)  --   --   --  0.11*   LYMPHS ABS  --   --   --  1.06   MONOS ABS  --   --   --  0.72   EOS ABS  --   --   --  0.16   .3* 98.6* 97.0 96.1         Lab  06/08/23  1018 06/07/23  0732 06/06/23  1322 06/06/23  0011 06/05/23  0514 06/05/23 0324   SODIUM 133* 132*  --   --  135* 135*   POTASSIUM 4.8 4.9  --   --  4.5 4.6   CHLORIDE 98 94*  --   --  94* 93*   CO2 20.0* 26.0  --   --  28.0 29.0   ANION GAP 15.0 12.0  --   --  13.0 13.0   BUN 20 30*  --   --  37* 38*   CREATININE 3.04* 3.79*  --   --  4.74* 4.50*   EGFR 19.1* 14.6*  --   --  11.2* 11.9*   GLUCOSE 122* 99  --   --  107* 115*   CALCIUM 9.5 9.9  --   --  8.8 9.1   MAGNESIUM  --   --   --  2.3  --  1.5*   HEMOGLOBIN A1C  --   --  5.20  --   --   --          Lab 06/05/23  0324   TOTAL PROTEIN 6.4   ALBUMIN 3.1*   GLOBULIN 3.3   ALT (SGPT) 19   AST (SGOT) 25   BILIRUBIN 0.3   ALK PHOS 200*         Lab 06/05/23  0514 06/05/23  0324   HSTROP T 182* 185*             Lab 06/05/23  1541 06/05/23 0324   IRON  --  54*   IRON SATURATION (TSAT)  --  25   TIBC  --  216*   TRANSFERRIN  --  145*   FOLATE 7.80  --    VITAMIN B 12 1,484*  --          Brief Urine Lab Results  (Last result in the past 365 days)        Color   Clarity   Blood   Leuk Est   Nitrite   Protein   CREAT   Urine HCG        06/05/23 2018 Yellow   Clear   Negative   Negative   Negative   >=300 mg/dL (3+)                   Microbiology Results Abnormal       Procedure Component Value - Date/Time    COVID PRE-OP / PRE-PROCEDURE SCREENING ORDER (NO ISOLATION) - Swab, Nasopharynx [[id number]]  (Normal) Collected: 06/05/23 0324    Lab Status: Final result Specimen: Swab from Nasopharynx Updated: 06/05/23 0402    Narrative:      The following orders were created for panel order COVID PRE-OP / PRE-PROCEDURE SCREENING ORDER (NO ISOLATION) - Swab, Nasopharynx.  Procedure                               Abnormality         Status                     ---------                               -----------         ------                     COVID-19 and FLU A/B PCR...[[id number]]  Normal              Final result                 Please view results for these tests on the  individual orders.    COVID-19 and FLU A/B PCR - Swab, Nasopharynx [[id number]]  (Normal) Collected: 06/05/23 0324    Lab Status: Final result Specimen: Swab from Nasopharynx Updated: 06/05/23 0402     COVID19 Not Detected     Influenza A PCR Not Detected     Influenza B PCR Not Detected    Narrative:      Fact sheet for providers: https://www.fda.gov/media/916190/download    Fact sheet for patients: https://www.fda.gov/media/258477/download    Test performed by PCR.            No radiology results from the last 24 hrs        Current medications:  Scheduled Meds:atorvastatin, 20 mg, Oral, Daily  finasteride, 5 mg, Oral, Daily  fluticasone, 1 spray, Nasal, Daily  folic acid, 1 mg, Oral, Daily  insulin lispro, 2-7 Units, Subcutaneous, 4x Daily AC & at Bedtime  melatonin, 5 mg, Oral, Nightly  metoprolol tartrate, 6.25 mg, Oral, Q12H  pantoprazole, 40 mg, Oral, Q AM  senna-docusate sodium, 2 tablet, Oral, BID  sertraline, 50 mg, Oral, Daily  sodium chloride, 10 mL, Intravenous, Q12H      Continuous Infusions:   PRN Meds:.  acetaminophen **OR** acetaminophen **OR** acetaminophen    senna-docusate sodium **AND** polyethylene glycol **AND** bisacodyl **AND** bisacodyl    dextrose    dextrose    glucagon (human recombinant)    HYDROcodone-acetaminophen    Magnesium Low Dose Replacement - Follow Nurse / BPA Driven Protocol    [COMPLETED] Insert Peripheral IV **AND** sodium chloride    sodium chloride    sodium chloride    Assessment & Plan   Assessment & Plan     Active Hospital Problems    Diagnosis  POA    **Compression fracture of C7 vertebra, initial encounter [S12.690A]  Yes    Severe Malnutrition (HCC) [E43]  Yes    Fall [W19.XXXA]  Yes    Encephalopathy [G93.40]  Yes    Type 2 diabetes mellitus [E11.9]  Yes    Closed fracture of left acetabulum [S32.402A]  Yes    NERY (obstructive sleep apnea) [G47.33]  Yes    (HFpEF) heart failure with preserved ejection fraction [I50.30]  Yes    Essential hypertension [I10]  Yes     ESRD (end stage renal disease) [N18.6]  Yes    Benign prostatic hyperplasia [N40.0]  Yes    GERD (gastroesophageal reflux disease) [K21.9]  Yes    Mixed hyperlipidemia [E78.2]  Yes      Resolved Hospital Problems   No resolved problems to display.        Brief Hospital Course to date:  Enrike Tomas is 88 y.o. male with PMH significant for ESRD on HD M, W, F, A-fib on Eliquis, GERD, BPH, HTN, HLD, orthostatic hypotension, DM2, depression, who presents to the ED with complaint of fall and neck pain who was found to have concern for acute compression fracture of C7.    This patient's problems and plans were partially entered by my partner and updated as appropriate by me 06/08/23.     Assessment/plan:    Compression fracture of C7  Recurrent falls  Recent T7 Fx  -Neurosurgery has evaluated. Plan for semirigid cervical collar for 8 to 10 weeks when up and ambulating   -PT/OT recommend rehab, CM following, plan is [facility].  CM following for disposition.  -Pain control PRN  -Follow-up with NSGY outpatient   --Pain fairly well controlled.     ESRD  - On HD M, W, F  - Nephrology following   --Patient currently has dialysis at [facility] on Monday/Wednesday/Fridays at 10:15 AM.  Awaiting disposition.  CM following.     Afib  -holding eliquis due to falls, anemia     Anemia  -Iron studies consistent with AoCD  -Folate low normal, will supplement   -Hemoccult negative  -Hold Eliquis for now as above.  --Monitor labs.  H/H today stable at 9.6/31.5     Diabetes mellitus 2 (A1c 5.2)  - FSBG ACHS  - SS insulin  --Well-controlled.  No change.     Hypertension  Hyperlipidemia  History of orthostatic hypotension  - Continue metoprolol with hold parameters  - Atorvastatin  - Orthostatic hypotension likely playing a role in frequent falls  --BP low end normal but stable and asymptomatic today.  Continue to monitor.     BPH  - Continue Proscar     GERD  - Continue PPI     Depression  - Continue Zoloft    Moderate Pharyngeal Dysphagia, Mild-Mod  oral dysphagia   -SLP has evaluated with MBS, see diet recs   --Tolerating diet     DVT prophylaxis:  on Eliquis, holding for now secondary to head injury and frequent falls    Expected Discharge Location and Transportation: [facility]/rehab pending placement.  CM following.    Expected Discharge   Expected Discharge Date: 6/9/2023; Expected Discharge Time:      DVT prophylaxis:  Medical and mechanical DVT prophylaxis orders are present.     AM-PAC 6 Clicks Score (PT): 18 (06/08/23 0800)    CODE STATUS:   Code Status and Medical Interventions:   Ordered at: 06/05/23 0611     Medical Intervention Limits:    NO intubation (DNI)     Code Status (Patient has no pulse and is not breathing):    No CPR (Do Not Attempt to Resuscitate)     Medical Interventions (Patient has pulse or is breathing):    Limited Support       Kaycee Byrne, APRN  06/08/23

## 2023-06-09 ENCOUNTER — APPOINTMENT (OUTPATIENT)
Dept: NEPHROLOGY | Facility: HOSPITAL | Age: 88
End: 2023-06-09
Payer: MEDICARE

## 2023-06-09 LAB
GLUCOSE BLDC GLUCOMTR-MCNC: 102 MG/DL (ref 70–130)
GLUCOSE BLDC GLUCOMTR-MCNC: 110 MG/DL (ref 70–130)
GLUCOSE BLDC GLUCOMTR-MCNC: 134 MG/DL (ref 70–130)

## 2023-06-09 PROCEDURE — G0257 UNSCHED DIALYSIS ESRD PT HOS: HCPCS

## 2023-06-09 PROCEDURE — G0378 HOSPITAL OBSERVATION PER HR: HCPCS

## 2023-06-09 PROCEDURE — 82948 REAGENT STRIP/BLOOD GLUCOSE: CPT

## 2023-06-09 PROCEDURE — 92526 ORAL FUNCTION THERAPY: CPT

## 2023-06-09 PROCEDURE — 99232 SBSQ HOSP IP/OBS MODERATE 35: CPT | Performed by: INTERNAL MEDICINE

## 2023-06-09 RX ADMIN — METOPROLOL TARTRATE 6.25 MG: 25 TABLET, FILM COATED ORAL at 13:28

## 2023-06-09 RX ADMIN — PANTOPRAZOLE SODIUM 40 MG: 40 TABLET, DELAYED RELEASE ORAL at 05:13

## 2023-06-09 RX ADMIN — Medication 5 MG: at 21:45

## 2023-06-09 RX ADMIN — SERTRALINE 50 MG: 50 TABLET, FILM COATED ORAL at 13:29

## 2023-06-09 RX ADMIN — HYDROCODONE BITARTRATE AND ACETAMINOPHEN 1 TABLET: 5; 325 TABLET ORAL at 14:02

## 2023-06-09 RX ADMIN — FOLIC ACID 1 MG: 1 TABLET ORAL at 13:29

## 2023-06-09 RX ADMIN — Medication 10 ML: at 13:35

## 2023-06-09 RX ADMIN — ATORVASTATIN CALCIUM 20 MG: 20 TABLET, FILM COATED ORAL at 13:29

## 2023-06-09 RX ADMIN — FINASTERIDE 5 MG: 5 TABLET, FILM COATED ORAL at 13:35

## 2023-06-09 RX ADMIN — SENNOSIDES AND DOCUSATE SODIUM 2 TABLET: 50; 8.6 TABLET ORAL at 21:48

## 2023-06-09 RX ADMIN — METOPROLOL TARTRATE 6.25 MG: 25 TABLET, FILM COATED ORAL at 21:45

## 2023-06-09 RX ADMIN — SENNOSIDES AND DOCUSATE SODIUM 2 TABLET: 50; 8.6 TABLET ORAL at 13:30

## 2023-06-09 RX ADMIN — FLUTICASONE PROPIONATE 1 SPRAY: 50 SPRAY, METERED NASAL at 13:30

## 2023-06-09 RX ADMIN — Medication 10 ML: at 21:46

## 2023-06-09 NOTE — PROGRESS NOTES
"   LOS: 1 day    Patient Care Team:  Blair Dhaliwal MD as PCP - General (Internal Medicine)    Chief Complaint:  Recurrent fall     Subjective     Interval History:     No acute events overnight. No new complaints       Review of Systems:   No CP or SOA , fever, chills, rigors, rash, N/V, Constipation.       Objective     Vital Sign Min/Max for last 24 hours  Temp  Min: 97.7 °F (36.5 °C)  Max: 98.1 °F (36.7 °C)   BP  Min: 104/63  Max: 123/71   Pulse  Min: 70  Max: 80   Resp  Min: 16  Max: 19   SpO2  Min: 92 %  Max: 99 %   No data recorded   No data recorded     Flowsheet Rows      Flowsheet Row First Filed Value   Admission Height 167.6 cm (66\") Documented at 06/05/2023 0119   Admission Weight 58 kg (127 lb 13.9 oz) Documented at 06/05/2023 0119            No intake/output data recorded.  I/O last 3 completed shifts:  In: 450 [P.O.:450]  Out: -     Physical Exam:    Gen: Alert, NAD   HENT: NC, AT, EOMI   NECK: Supple, no JVD, Trachea midline   LUNGS: CTA bilaterally, non labored respirtation   CVS: S1/S2 audible, RRR, no murmur   Abd: Soft, NT, ND, BS+   Ext: No pedal edema, no cyanosis   CNS: Alert, No focal deficit noted grossly  Psy: Cooperative  Skin: Warm, dry and intact      WBC WBC   Date Value Ref Range Status   06/08/2023 7.88 3.40 - 10.80 10*3/mm3 Final   06/07/2023 8.60 3.40 - 10.80 10*3/mm3 Final      HGB Hemoglobin   Date Value Ref Range Status   06/08/2023 9.6 (L) 13.0 - 17.7 g/dL Final   06/07/2023 9.0 (L) 13.0 - 17.7 g/dL Final      HCT Hematocrit   Date Value Ref Range Status   06/08/2023 31.5 (L) 37.5 - 51.0 % Final   06/07/2023 29.2 (L) 37.5 - 51.0 % Final      Platlets No results found for: LABPLAT   MCV MCV   Date Value Ref Range Status   06/08/2023 101.3 (H) 79.0 - 97.0 fL Final   06/07/2023 98.6 (H) 79.0 - 97.0 fL Final          Sodium Sodium   Date Value Ref Range Status   06/08/2023 133 (L) 136 - 145 mmol/L Final   06/07/2023 132 (L) 136 - 145 mmol/L Final      Potassium Potassium "   Date Value Ref Range Status   06/08/2023 4.8 3.5 - 5.2 mmol/L Final     Comment:     Slight hemolysis detected by analyzer. Results may be affected.   06/07/2023 4.9 3.5 - 5.2 mmol/L Final      Chloride Chloride   Date Value Ref Range Status   06/08/2023 98 98 - 107 mmol/L Final   06/07/2023 94 (L) 98 - 107 mmol/L Final      CO2 CO2   Date Value Ref Range Status   06/08/2023 20.0 (L) 22.0 - 29.0 mmol/L Final   06/07/2023 26.0 22.0 - 29.0 mmol/L Final      BUN BUN   Date Value Ref Range Status   06/08/2023 20 8 - 23 mg/dL Final   06/07/2023 30 (H) 8 - 23 mg/dL Final      Creatinine Creatinine   Date Value Ref Range Status   06/08/2023 3.04 (H) 0.76 - 1.27 mg/dL Final   06/07/2023 3.79 (H) 0.76 - 1.27 mg/dL Final      Calcium Calcium   Date Value Ref Range Status   06/08/2023 9.5 8.6 - 10.5 mg/dL Final   06/07/2023 9.9 8.6 - 10.5 mg/dL Final      PO4 No results found for: CAPO4   Albumin No results found for: ALBUMIN     Magnesium No results found for: MG     Uric Acid No results found for: URICACID        Results Review:     I reviewed the patient's new clinical results.    atorvastatin, 20 mg, Oral, Daily  finasteride, 5 mg, Oral, Daily  fluticasone, 1 spray, Nasal, Daily  folic acid, 1 mg, Oral, Daily  insulin lispro, 2-7 Units, Subcutaneous, 4x Daily AC & at Bedtime  melatonin, 5 mg, Oral, Nightly  metoprolol tartrate, 6.25 mg, Oral, Q12H  pantoprazole, 40 mg, Oral, Q AM  senna-docusate sodium, 2 tablet, Oral, BID  sertraline, 50 mg, Oral, Daily  sodium chloride, 10 mL, Intravenous, Q12H           Medication Review: yes    Assessment & Plan       Compression fracture of C7 vertebra, initial encounter    (HFpEF) heart failure with preserved ejection fraction    Benign prostatic hyperplasia    Closed fracture of left acetabulum    Encephalopathy    ESRD (end stage renal disease)    Essential hypertension    GERD (gastroesophageal reflux disease)    Mixed hyperlipidemia    NERY (obstructive sleep apnea)    Type 2  diabetes mellitus    Fall    Severe Malnutrition (HCC)      1- ESRD -MWF   2-HTN   3- Anemia of chronic disease   4- Hyponatremia     Plan:  - Seen on dialysis, UF as tolerated.   - Renal diet   - JENNYFER with HD   - Electrolytes will be corrected with HD         Cristy Coyne MD  06/09/23  08:17 EDT

## 2023-06-09 NOTE — PLAN OF CARE
Problem: Device-Related Complication Risk (Hemodialysis)  Goal: Safe, Effective Therapy Delivery  Outcome: Ongoing, Progressing     Problem: Hemodynamic Instability (Hemodialysis)  Goal: Effective Tissue Perfusion  Outcome: Ongoing, Progressing     Problem: Infection (Hemodialysis)  Goal: Absence of Infection Signs and Symptoms  Outcome: Ongoing, Progressing   Goal Outcome Evaluation:     HD today, goal UF 2.5 liters

## 2023-06-09 NOTE — THERAPY TREATMENT NOTE
Acute Care - Speech Language Pathology   Swallow Treatment Note  [facility]     Patient Name: Enrike Tomas  : 1935  MRN: [id number]  Today's Date: 2023               Admit Date: 2023    Visit Dx:     ICD-10-CM ICD-9-CM   1. Compression fracture of C7 vertebra, initial encounter  S12.690A 805.07   2. Closed head injury, initial encounter  S09.90XA 959.01   3. ESRD on hemodialysis  N18.6 585.6    Z99.2 V45.11   4. Oropharyngeal dysphagia  R13.12 787.22     Patient Active Problem List   Diagnosis   • Compression fracture of C7 vertebra, initial encounter   • (HFpEF) heart failure with preserved ejection fraction   • Anemia of renal disease   • At high risk for falls   • Benign prostatic hyperplasia   • Closed fracture of left acetabulum   • Compression fracture of thoracic vertebra   • Deficiency of other specified B group vitamins   • Depression   • Encephalopathy   • ESRD (end stage renal disease)   • Essential hypertension   • GERD (gastroesophageal reflux disease)   • Hepatic cyst   • Long term (current) use of anticoagulants   • Major depressive disorder, single episode, moderate   • Mixed hyperlipidemia   • Morgagni hernia   • Occlusion of right vertebral artery   • NERY (obstructive sleep apnea)   • Type 2 diabetes mellitus   • Fall   • Severe Malnutrition (HCC)     Past Medical History:   Diagnosis Date   • Atrial fibrillation    • BPH (benign prostatic hyperplasia)    • Diabetes mellitus    • Diverticulosis    • ESRD on hemodialysis    • GERD (gastroesophageal reflux disease)    • Heart failure    • Hyperlipidemia    • Hypertension    • NERY (obstructive sleep apnea)    • Recurrent falls      Past Surgical History:   Procedure Laterality Date   • CATARACT EXTRACTION     • DIALYSIS FISTULA CREATION     • INGUINAL HERNIA REPAIR     • PROSTATE BIOPSY     • TONSILLECTOMY AND ADENOIDECTOMY         SLP Recommendation and Plan                          Anticipated Discharge Disposition (SLP): anticipate  therapy at next level of care, [facility] nursing facility (06/09/23 1545)                 Daily Summary of Progress (SLP): progress toward functional goals as expected (06/09/23 1545)               Treatment Assessment (SLP): continued, clinical signs of, aspiration, suspected, oral dysphagia, pharyngeal dysphagia (06/09/23 1545)     Plan for Continued Treatment (SLP): continue treatment per plan of care (06/09/23 1545)         Plan of Care Reviewed With: patient, spouse  Progress: no change      SWALLOW EVALUATION (last 72 hours)     SLP Adult Swallow Evaluation     Row Name 06/09/23 1545 06/08/23 1345 06/07/23 1455             Rehab Evaluation    Document Type therapy note (daily note)  -AC therapy note (daily note)  -SM therapy note (daily note)  -MP      Subjective Information no complaints  -AC no complaints  -SM no complaints  -MP      Patient Observations alert;cooperative  -AC lethargic;cooperative  -SM alert;cooperative  -MP      Patient/Family/Caregiver Comments/Observations Spouse present.  -AC -- No family present  -MP      Patient Effort good  -AC good  -SM good  -MP      Comment -- Pt sleepy, stated did not sleep well last night.  -SM --         Pain    Additional Documentation -- -- Pain Scale: FACES Pre/Post-Treatment (Group)  -MP         Pain Scale: Numbers Pre/Post-Treatment    Pretreatment Pain Rating -- 0/10 - no pain  -SM --      Posttreatment Pain Rating -- 0/10 - no pain  -SM --         Pain Scale: FACES Pre/Post-Treatment    Pain: FACES Scale, Pretreatment 0-->no hurt  -AC -- 0-->no hurt  -MP      Posttreatment Pain Rating 0-->no hurt  -AC -- 0-->no hurt  -MP         Swallowing Quality of Life Assessment    Education and counseling provided Signs of aspiration;Silent aspiration;Risks of aspiration;Safest diet options  -AC -- --         SLP Treatment Clinical Impressions    Treatment Assessment (SLP) continued;clinical signs of;aspiration;suspected;oral dysphagia;pharyngeal dysphagia  -AC  continued;suspected;pharyngeal dysphagia  -SM continued;suspected;pharyngeal dysphagia  -      Treatment Assessment Comments (SLP) -- [place] state limited success.  -SM Reviewed & completed dysphagia exercises w/ pt. Left handout & encouraged independent completion. Would benefit from cont'd SLP services @ next level of care.  -      Daily Summary of Progress (SLP) progress toward functional goals as expected  -AC progress toward functional goals as expected  -SM progress toward functional goals as expected  -MP      Barriers to Overall Progress (SLP) Cognitive status  - -- --      Plan for Continued Treatment (SLP) continue treatment per plan of care  - continue treatment per plan of care  - continue treatment per plan of care  -      Care Plan Review evaluation/treatment results reviewed;care plan/treatment goals reviewed;risks/benefits reviewed;current/potential barriers reviewed;patient/other agree to care plan  - care plan/treatment goals reviewed;patient/other agree to care plan  - care plan/treatment goals reviewed;patient/other agree to care plan  -      Care Plan Review, Other Participant(s) spouse  - -- --         Recommendations    Therapy Frequency (Swallow) -- -- 5 days per week  -      Predicted Duration Therapy Intervention (Days) -- -- until discharge  -      SLP Diet Recommendation -- -- mechanical ground textures;no mixed consistencies;honey thick liquids  -      Recommended Precautions and Strategies -- -- upright posture during/after eating;general aspiration precautions  encourage dry/2nd swallows intermittently  -      Oral Care Recommendations -- -- Oral Care BID/PRN  -      SLP Rec. for Method of Medication Administration -- -- meds whole;meds crushed;with puree;as tolerated  -      Monitor for Signs of Aspiration -- -- yes;notify SLP if any concerns;cough;throat clearing  -      Anticipated Discharge Disposition (SLP) anticipate therapy at next level of  care;skilled nursing facility  -AC -- anticipate therapy at next level of care;skilled nursing facility  -MP            User Key  (r) = Recorded By, (t) = Taken By, (c) = Cosigned By    Initials Name Effective Dates    Sigrid Quesada, MS CCC-SLP 02/03/23 -     AC Mireya Santos, MS CCC-SLP 02/03/23 -     MP Jayme Guerin, MS CCC-SLP 12/28/21 -                 EDUCATION  The patient has been educated in the following areas:   Dysphagia (Swallowing Impairment) Modified Diet Instruction.        SLP GOALS     Row Name 06/09/23 1545 06/08/23 1345 06/07/23 1455       (LTG) Patient will demonstrate functional swallow for    Diet Texture (Demonstrate functional swallow) regular textures  -AC regular textures  -SM regular textures  -MP    Liquid viscosity (Demonstrate functional swallow) nectar/ mildly thick liquids  -AC nectar/ mildly thick liquids  -SM nectar/ mildly thick liquids  -MP    [place] (Demonstrate functional swallow) with minimal cues (75-90% accuracy)  -AC with minimal cues (75-90% accuracy)  -SM with minimal cues (75-90% accuracy)  -MP    Time Frame (Demonstrate functional swallow) by discharge  -AC by discharge  -SM by discharge  -MP    Progress/Outcomes (Demonstrate functional swallow) continuing progress toward goal  -AC continuing progress toward goal  -SM continuing progress toward goal  -MP       (STG) Patient will tolerate trials of    Consistencies Trialed (Tolerate trials) mechanical ground textures;honey/ moderately thick liquids  -AC mechanical ground textures;honey/ moderately thick liquids  -SM mechanical ground textures;honey/ moderately thick liquids  -MP    Desired Outcome (Tolerate trials) without signs/symptoms of aspiration;with adequate oral prep/transit/clearance;with use of compensatory strategies (see comments)  2nd swallow, general precautions  -AC without signs/symptoms of aspiration;with adequate oral prep/transit/clearance;with use of compensatory strategies  (see comments)  2nd swallow, general precautions  -SM without signs/symptoms of aspiration;with adequate oral prep/transit/clearance;with use of compensatory strategies (see comments)  2nd swallow, general precautions  -MP    [place] (Tolerate trials) with minimal cues (75-90% accuracy)  -AC with minimal cues (75-90% accuracy)  -SM with minimal cues (75-90% accuracy)  -MP    Time Frame (Tolerate trials) by discharge  -AC by discharge  -SM by discharge  -MP    Progress/Outcomes (Tolerate trials) goal ongoing  -AC good progress toward goal  -SM continuing progress toward goal  -MP       (STG) Patient will tolerate therapeutic trials of    Consistencies Trialed (Tolerate therapeutic trials) thin liquids;nectar/ mildly thick liquids  -AC nectar/ mildly thick liquids  -SM nectar/ mildly thick liquids  -MP    Desired Outcome (Tolerate therapeutic trials) without signs/symptoms of aspiration  check for cough/throat clear--may be very weak  -AC without signs/symptoms of aspiration  check for cough/throat clear--may very weak  -SM without signs/symptoms of aspiration  check for cough/throat clear--may very weak  -MP    [place] (Tolerate therapeutic trials) with 1:1 assist/ supervision  -AC with 1:1 assist/ supervision  -SM with 1:1 assist/ supervision  -MP    Time Frame (Tolerate therapeutic trials) by discharge  -AC by discharge  -SM by discharge  -MP    Progress/Outcomes (Tolerate therapeutic trials) continuing progress toward goal  -AC good progress toward goal  -SM continuing progress toward goal  -MP    Comment (Tolerate therapeutic trials) Excessive & prolonged cough/throat clear w/ thin via tsp.  -AC No s/s aspiration with ice chip or tsp NT x1 or straw NT x1. Pt quite sleepy today. Not ready to use results as means for readiness repeat swallow study. Will f/u for continued carryover.  -SM --       (STG) Lingual Strengthening Goal 1 (SLP)    Increase Tongue Back Strength lingual resistance exercises  -AC  lingual resistance exercises  -SM lingual resistance exercises  -MP    [place]/Accuracy (Lingual Strengthening Goal 1, SLP) with moderate cues (50-74% accuracy)  -AC with moderate cues (50-74% accuracy)  -SM with moderate cues (50-74% accuracy)  -MP    Time Frame (Lingual Strengthening Goal 1, SLP) short term goal (STG)  -AC short term goal (STG)  -SM short term goal (STG)  -MP    Progress/Outcomes (Lingual Strengthening Goal 1, SLP) goal ongoing  -AC goal ongoing  -SM continuing progress toward goal  -MP    Comment (Lingual Strengthening Goal 1, SLP) Provided handout/explanation/rationale for independent practice.  -AC -- --       (STG) Pharyngeal Strengthening Exercise Goal 1 (SLP)    Increase Timing prepping - 3 second prep or suck swallow or 3-step swallow  -AC prepping - 3 second prep or suck swallow or 3-step swallow  -SM prepping - 3 second prep or suck swallow or 3-step swallow  -MP    Increase Superior Movement of the Hyolaryngeal Complex effortful pitch glide (falsetto + pharyngeal squeeze)  -AC effortful pitch glide (falsetto + pharyngeal squeeze)  -SM effortful pitch glide (falsetto + pharyngeal squeeze)  -MP    Increase Closure at Entrance to Airway/Closure of Airway at Glottis supraglottic swallow  -AC supraglottic swallow  -SM supraglottic swallow  -MP    Increase Squeeze/Positive Pressure Generation hard effortful swallow  -AC hard effortful swallow  -SM hard effortful swallow  -MP    [place]/Accuracy (Pharyngeal Strengthening Goal 1, SLP) with moderate cues (50-74% accuracy)  -AC with moderate cues (50-74% accuracy)  -SM with moderate cues (50-74% accuracy)  -MP    Time Frame (Pharyngeal Strengthening Goal 1, SLP) short term goal (STG)  -AC short term goal (STG)  -SM short term goal (STG)  -MP    Progress/Outcomes (Pharyngeal Strengthening Goal 1, SLP) continuing progress toward goal  -AC goal ongoing  -SM continuing progress toward goal  -MP    Comment (Pharyngeal Strengthening Goal 1,  SLP) Pt able to demonstrate effortful pitch glide x3 w/ min cues + SGS x2 w/ max cues. Provided handout & encouraged independent practice.  -AC Copy of exercises remain on bedside table. Too sleepy to attempt this session.  -SM Reviewed & completed w/ pt. Some difficulty initiating dry swallows for exercises  -MP          User Key  (r) = Recorded By, (t) = Taken By, (c) = Cosigned By    Initials Name Provider Type    Sigrid Quesada, MS CCC-SLP Speech and Language Pathologist    Mireya Youssef MS CCC-SLP Speech and Language Pathologist    Jayme Escalante MS CCC-SLP Speech and Language Pathologist                   Time Calculation:    Time Calculation- SLP     Row Name 06/09/23 1650             Time Calculation- SLP    SLP Start Time 1545  -AC      SLP Received On 06/09/23  -         Untimed Charges    [id number] Treatment Swallow Minutes 32  -AC         Total Minutes    Untimed Charges Total Minutes 32  -AC       Total Minutes 32  -AC            User Key  (r) = Recorded By, (t) = Taken By, (c) = Cosigned By    Initials Name Provider Type     Mireya Santos MS CCC-SLP Speech and Language Pathologist                Therapy Charges for Today     Code Description Service Date Service Provider Modifiers Qty    [id number] HC ST TREATMENT SWALLOW 2 6/9/2023 Mireya Santos MS CCC-SLP GN 1               Mireya Santos MS CCC-SLP  6/9/2023

## 2023-06-09 NOTE — PROGRESS NOTES
[facility] Medicine Services  PROGRESS NOTE    Patient Name: Enrike Tomas  : 1935  MRN: [id number]    Date of Admission: 2023  Primary Care Physician: Blair Dhaliwal MD    Subjective   Subjective     CC:  F/U C spine Fx     HPI:  Doing okay in HD today.     ROS:  Gen- No fevers, chills  CV- No chest pain, palpitations  Resp- No cough, dyspnea  GI- No N/V/D, abd pain    Objective   Objective     Vital Signs:   Temp:  [97.7 °F (36.5 °C)-98.1 °F (36.7 °C)] 98.1 °F (36.7 °C)  Heart Rate:  [70-80] 70  Resp:  [16-19] 16  BP: (104-123)/(63-76) 121/64     Physical Exam:  Constitutional: No acute distress, seen resting up in the chair.  Awake and alert.  Chronically ill-appearing.  HENT: NCAT, mucous membranes moist, in C collar   Respiratory: Clear to auscultation bilaterally, respiratory effort normal on room air with sats 97%.  Cardiovascular: RRR, no murmurs, rubs, or gallops  Gastrointestinal: Positive bowel sounds, soft, nontender, nondistended  Musculoskeletal: No bilateral ankle edema.  VARELA spontaneously.  Psychiatric: Appropriate affect, cooperative  Neurologic: Oriented x 3, speech clear and appropriate.  Nonfocal.  Follows commands.  Skin: No rashes. AV fistula with positive thrill and bruit.    Results Reviewed:  LAB RESULTS:      Lab 23  1018 23  0732 23  1541 23  0324   WBC 7.88 8.60 7.22 8.79   HEMOGLOBIN 9.6* 9.0* 8.1* 7.7*   HEMATOCRIT 31.5* 29.2* 25.7* 24.7*   PLATELETS 330 377 329 300   NEUTROS ABS  --   --   --  6.70   IMMATURE GRANS (ABS)  --   --   --  0.11*   LYMPHS ABS  --   --   --  1.06   MONOS ABS  --   --   --  0.72   EOS ABS  --   --   --  0.16   .3* 98.6* 97.0 96.1         Lab 23  1018 23  0732 23  1322 23  0011 23  0514 23  0324   SODIUM 133* 132*  --   --  135* 135*   POTASSIUM 4.8 4.9  --   --  4.5 4.6   CHLORIDE 98 94*  --   --  94* 93*   CO2 20.0* 26.0  --   --  28.0 29.0   ANION  GAP 15.0 12.0  --   --  13.0 13.0   BUN 20 30*  --   --  37* 38*   CREATININE 3.04* 3.79*  --   --  4.74* 4.50*   EGFR 19.1* 14.6*  --   --  11.2* 11.9*   GLUCOSE 122* 99  --   --  107* 115*   CALCIUM 9.5 9.9  --   --  8.8 9.1   MAGNESIUM  --   --   --  2.3  --  1.5*   HEMOGLOBIN A1C  --   --  5.20  --   --   --          Lab 06/05/23  0324   TOTAL PROTEIN 6.4   ALBUMIN 3.1*   GLOBULIN 3.3   ALT (SGPT) 19   AST (SGOT) 25   BILIRUBIN 0.3   ALK PHOS 200*         Lab 06/05/23  0514 06/05/23 0324   HSTROP T 182* 185*             Lab 06/05/23  1541 06/05/23 0324   IRON  --  54*   IRON SATURATION (TSAT)  --  25   TIBC  --  216*   TRANSFERRIN  --  145*   FOLATE 7.80  --    VITAMIN B 12 1,484*  --          Brief Urine Lab Results  (Last result in the past 365 days)        Color   Clarity   Blood   Leuk Est   Nitrite   Protein   CREAT   Urine HCG        06/05/23 2018 Yellow   Clear   Negative   Negative   Negative   >=300 mg/dL (3+)                   Microbiology Results Abnormal       Procedure Component Value - Date/Time    COVID PRE-OP / PRE-PROCEDURE SCREENING ORDER (NO ISOLATION) - Swab, Nasopharynx [[id number]]  (Normal) Collected: 06/05/23 0324    Lab Status: Final result Specimen: Swab from Nasopharynx Updated: 06/05/23 0402    Narrative:      The following orders were created for panel order COVID PRE-OP / PRE-PROCEDURE SCREENING ORDER (NO ISOLATION) - Swab, Nasopharynx.  Procedure                               Abnormality         Status                     ---------                               -----------         ------                     COVID-19 and FLU A/B PCR...[[id number]]  Normal              Final result                 Please view results for these tests on the individual orders.    COVID-19 and FLU A/B PCR - Swab, Nasopharynx [[id number]]  (Normal) Collected: 06/05/23 0324    Lab Status: Final result Specimen: Swab from Nasopharynx Updated: 06/05/23 0402     COVID19 Not Detected     Influenza A PCR Not  Detected     Influenza B PCR Not Detected    Narrative:      Fact sheet for providers: https://www.fda.gov/media/099457/download    Fact sheet for patients: https://www.fda.gov/media/280476/download    Test performed by PCR.            No radiology results from the last 24 hrs        Current medications:  Scheduled Meds:atorvastatin, 20 mg, Oral, Daily  finasteride, 5 mg, Oral, Daily  fluticasone, 1 spray, Nasal, Daily  folic acid, 1 mg, Oral, Daily  insulin lispro, 2-7 Units, Subcutaneous, 4x Daily AC & at Bedtime  melatonin, 5 mg, Oral, Nightly  metoprolol tartrate, 6.25 mg, Oral, Q12H  pantoprazole, 40 mg, Oral, Q AM  senna-docusate sodium, 2 tablet, Oral, BID  sertraline, 50 mg, Oral, Daily  sodium chloride, 10 mL, Intravenous, Q12H      Continuous Infusions:   PRN Meds:.  acetaminophen **OR** acetaminophen **OR** acetaminophen    senna-docusate sodium **AND** polyethylene glycol **AND** bisacodyl **AND** bisacodyl    dextrose    dextrose    glucagon (human recombinant)    HYDROcodone-acetaminophen    Magnesium Low Dose Replacement - Follow Nurse / BPA Driven Protocol    [COMPLETED] Insert Peripheral IV **AND** sodium chloride    sodium chloride    sodium chloride    Assessment & Plan   Assessment & Plan     Active Hospital Problems    Diagnosis  POA    **Compression fracture of C7 vertebra, initial encounter [S12.690A]  Yes    Severe Malnutrition (HCC) [E43]  Yes    Fall [W19.XXXA]  Yes    Encephalopathy [G93.40]  Yes    Type 2 diabetes mellitus [E11.9]  Yes    Closed fracture of left acetabulum [S32.402A]  Yes    NERY (obstructive sleep apnea) [G47.33]  Yes    (HFpEF) heart failure with preserved ejection fraction [I50.30]  Yes    Essential hypertension [I10]  Yes    ESRD (end stage renal disease) [N18.6]  Yes    Benign prostatic hyperplasia [N40.0]  Yes    GERD (gastroesophageal reflux disease) [K21.9]  Yes    Mixed hyperlipidemia [E78.2]  Yes      Resolved Hospital Problems   No resolved problems to display.         Brief Hospital Course to date:  Enrike Tomas is 88 y.o. male with PMH significant for ESRD on HD M, W, F, A-fib on Eliquis, GERD, BPH, HTN, HLD, orthostatic hypotension, DM2, depression, who presents to the ED with complaint of fall and neck pain who was found to have concern for acute compression fracture of C7.    This patient's problems and plans were partially entered by my partner and updated as appropriate by me 06/09/23.     Assessment/plan:    Compression fracture of C7  Recurrent falls  Recent T7 Fx  -Neurosurgery has evaluated. Plan for semirigid cervical collar for 8 to 10 weeks when up and ambulating   -PT/OT recommend rehab, CM following, plan is LCP.  CM following for disposition.  -Pain control PRN  -Follow-up with NSGY outpatient   --Pain fairly well controlled.     ESRD  - On HD M, W, F  - Nephrology following   --Patient currently has dialysis at [facility] on Monday/Wednesday/Fridays at 10:15 AM.  Awaiting disposition.  CM following.     Afib  -holding eliquis due to falls, anemia     Anemia  -Iron studies consistent with AoCD  -Folate low normal, will supplement   -Hemoccult negative  -Hold Eliquis for now as above.  --Monitor labs.  H/H stable around 9.6/31.5     Diabetes mellitus 2 (A1c 5.2)  - FSBG ACHS  - SS insulin  --Well-controlled.  No change.     Hypertension  Hyperlipidemia  History of orthostatic hypotension  - Continue metoprolol with hold parameters  - Atorvastatin  - Orthostatic hypotension likely playing a role in frequent falls  --BP low end normal but stable and asymptomatic today.  Continue to monitor.     BPH  - Continue Proscar     GERD  - Continue PPI     Depression  - Continue Zoloft    Moderate Pharyngeal Dysphagia, Mild-Mod oral dysphagia   -SLP has evaluated with MBS, see diet recs   --Tolerating diet     DVT prophylaxis:  on Eliquis, holding for now secondary to head injury and frequent falls    Expected Discharge Location and Transportation: [facility]  Place/rehab pending insurance precert    Expected Discharge   Expected Discharge Date: 6/10/2023; Expected Discharge Time:      DVT prophylaxis:  Medical and mechanical DVT prophylaxis orders are present.     AM-PAC 6 Clicks Score (PT): 18 (06/08/23 4055)    CODE STATUS:   Code Status and Medical Interventions:   Ordered at: 06/05/23 0611     Medical Intervention Limits:    NO intubation (DNI)     Code Status (Patient has no pulse and is not breathing):    No CPR (Do Not Attempt to Resuscitate)     Medical Interventions (Patient has pulse or is breathing):    Limited Support       Nancy Cormier MD  06/09/23

## 2023-06-09 NOTE — PLAN OF CARE
Goal Outcome Evaluation:  Plan of Care Reviewed With: patient, spouse        Progress: no change     SLP treatment completed. Will continue to address dysphagia in tx. Please see note for further details and recommendations.

## 2023-06-09 NOTE — CASE MANAGEMENT/SOCIAL WORK
Continued Stay Note  [facility]     Patient Name: Enrike Tomas  MRN: [id number]  Today's Date: 6/9/2023    Admit Date: 6/5/2023    Plan: [facility]   Discharge Plan       Row Name 06/09/23 1450       Plan    Plan [facility]    Patient/Family in Agreement with Plan yes    Plan Comments Updated  about discharge plan on the telephone. Awaiting insurance approval for [facility]. Spoke to Acosta Hopper at facility, and she is anticipating authorization by Monday. CM will follow up.    Final Discharge Disposition Code 03 - skilled nursing facility (SNF)                   Discharge Codes    No documentation.                 Expected Discharge Date and Time       Expected Discharge Date Expected Discharge Time    Jun 11, 2023               Rekha Navarrete RN

## 2023-06-10 LAB
ANION GAP SERPL CALCULATED.3IONS-SCNC: 11 MMOL/L (ref 5–15)
BASOPHILS # BLD AUTO: 0.05 10*3/MM3 (ref 0–0.2)
BASOPHILS NFR BLD AUTO: 0.7 % (ref 0–1.5)
BUN SERPL-MCNC: 17 MG/DL (ref 8–23)
BUN/CREAT SERPL: 6.6 (ref 7–25)
CALCIUM SPEC-SCNC: 9.1 MG/DL (ref 8.6–10.5)
CHLORIDE SERPL-SCNC: 99 MMOL/L (ref 98–107)
CO2 SERPL-SCNC: 23 MMOL/L (ref 22–29)
CREAT SERPL-MCNC: 2.56 MG/DL (ref 0.76–1.27)
DEPRECATED RDW RBC AUTO: 61.7 FL (ref 37–54)
EGFRCR SERPLBLD CKD-EPI 2021: 23.4 ML/MIN/1.73
EOSINOPHIL # BLD AUTO: 0.31 10*3/MM3 (ref 0–0.4)
EOSINOPHIL NFR BLD AUTO: 4.2 % (ref 0.3–6.2)
ERYTHROCYTE [DISTWIDTH] IN BLOOD BY AUTOMATED COUNT: 18.2 % (ref 12.3–15.4)
GLUCOSE BLDC GLUCOMTR-MCNC: 109 MG/DL (ref 70–130)
GLUCOSE BLDC GLUCOMTR-MCNC: 88 MG/DL (ref 70–130)
GLUCOSE SERPL-MCNC: 89 MG/DL (ref 65–99)
HCT VFR BLD AUTO: 29.7 % (ref 37.5–51)
HGB BLD-MCNC: 9.3 G/DL (ref 13–17.7)
IMM GRANULOCYTES # BLD AUTO: 0.05 10*3/MM3 (ref 0–0.05)
IMM GRANULOCYTES NFR BLD AUTO: 0.7 % (ref 0–0.5)
LYMPHOCYTES # BLD AUTO: 1.06 10*3/MM3 (ref 0.7–3.1)
LYMPHOCYTES NFR BLD AUTO: 14.5 % (ref 19.6–45.3)
MCH RBC QN AUTO: 31.2 PG (ref 26.6–33)
MCHC RBC AUTO-ENTMCNC: 31.3 G/DL (ref 31.5–35.7)
MCV RBC AUTO: 99.7 FL (ref 79–97)
MONOCYTES # BLD AUTO: 0.75 10*3/MM3 (ref 0.1–0.9)
MONOCYTES NFR BLD AUTO: 10.3 % (ref 5–12)
NEUTROPHILS NFR BLD AUTO: 5.08 10*3/MM3 (ref 1.7–7)
NEUTROPHILS NFR BLD AUTO: 69.6 % (ref 42.7–76)
NRBC BLD AUTO-RTO: 0 /100 WBC (ref 0–0.2)
PLATELET # BLD AUTO: 293 10*3/MM3 (ref 140–450)
PMV BLD AUTO: 8.1 FL (ref 6–12)
POTASSIUM SERPL-SCNC: 4.6 MMOL/L (ref 3.5–5.2)
RBC # BLD AUTO: 2.98 10*6/MM3 (ref 4.14–5.8)
SODIUM SERPL-SCNC: 133 MMOL/L (ref 136–145)
WBC NRBC COR # BLD: 7.3 10*3/MM3 (ref 3.4–10.8)

## 2023-06-10 PROCEDURE — 97530 THERAPEUTIC ACTIVITIES: CPT

## 2023-06-10 PROCEDURE — 82948 REAGENT STRIP/BLOOD GLUCOSE: CPT

## 2023-06-10 PROCEDURE — 99232 SBSQ HOSP IP/OBS MODERATE 35: CPT | Performed by: FAMILY MEDICINE

## 2023-06-10 PROCEDURE — 80048 BASIC METABOLIC PNL TOTAL CA: CPT | Performed by: INTERNAL MEDICINE

## 2023-06-10 PROCEDURE — 97110 THERAPEUTIC EXERCISES: CPT

## 2023-06-10 PROCEDURE — G0378 HOSPITAL OBSERVATION PER HR: HCPCS

## 2023-06-10 PROCEDURE — 85025 COMPLETE CBC W/AUTO DIFF WBC: CPT | Performed by: INTERNAL MEDICINE

## 2023-06-10 RX ORDER — ZOLPIDEM TARTRATE 5 MG/1
5 TABLET ORAL NIGHTLY PRN
Status: DISCONTINUED | OUTPATIENT
Start: 2023-06-10 | End: 2023-06-14 | Stop reason: HOSPADM

## 2023-06-10 RX ADMIN — FINASTERIDE 5 MG: 5 TABLET, FILM COATED ORAL at 10:34

## 2023-06-10 RX ADMIN — FOLIC ACID 1 MG: 1 TABLET ORAL at 10:35

## 2023-06-10 RX ADMIN — METOPROLOL TARTRATE 6.25 MG: 25 TABLET, FILM COATED ORAL at 20:55

## 2023-06-10 RX ADMIN — FLUTICASONE PROPIONATE 1 SPRAY: 50 SPRAY, METERED NASAL at 10:35

## 2023-06-10 RX ADMIN — METOPROLOL TARTRATE 6.25 MG: 25 TABLET, FILM COATED ORAL at 10:35

## 2023-06-10 RX ADMIN — ATORVASTATIN CALCIUM 20 MG: 20 TABLET, FILM COATED ORAL at 10:35

## 2023-06-10 RX ADMIN — Medication 10 ML: at 10:35

## 2023-06-10 RX ADMIN — HYDROCODONE BITARTRATE AND ACETAMINOPHEN 1 TABLET: 5; 325 TABLET ORAL at 15:56

## 2023-06-10 RX ADMIN — Medication 5 MG: at 20:55

## 2023-06-10 RX ADMIN — ZOLPIDEM TARTRATE 5 MG: 5 TABLET ORAL at 20:55

## 2023-06-10 RX ADMIN — Medication 10 ML: at 20:58

## 2023-06-10 RX ADMIN — SERTRALINE 50 MG: 50 TABLET, FILM COATED ORAL at 10:34

## 2023-06-10 RX ADMIN — PANTOPRAZOLE SODIUM 40 MG: 40 TABLET, DELAYED RELEASE ORAL at 05:10

## 2023-06-10 NOTE — PROGRESS NOTES
"   LOS: 1 day    Patient Care Team:  Blair Dhaliwal MD as PCP - General (Internal Medicine)    Chief Complaint:  Recurrent fall     Subjective     Interval History:     No acute events overnight. No new complaints       Review of Systems:   No CP or SOA , fever, chills, rigors, rash, N/V, Constipation.       Objective     Vital Sign Min/Max for last 24 hours  Temp  Min: 97.5 °F (36.4 °C)  Max: 98.3 °F (36.8 °C)   BP  Min: 97/61  Max: 123/64   Pulse  Min: 66  Max: 81   Resp  Min: 16  Max: 18   SpO2  Min: 84 %  Max: 99 %   Flow (L/min)  Min: 2  Max: 2   No data recorded     Flowsheet Rows      Flowsheet Row First Filed Value   Admission Height 167.6 cm (66\") Documented at 06/05/2023 0119   Admission Weight 58 kg (127 lb 13.9 oz) Documented at 06/05/2023 0119            No intake/output data recorded.  I/O last 3 completed shifts:  In: 450 [P.O.:450]  Out: 2610 [Other:2610]    Physical Exam:    Gen: Alert, NAD   HENT: NC, AT, EOMI   NECK: Supple, no JVD, Trachea midline   LUNGS: CTA bilaterally, non labored respirtation   CVS: S1/S2 audible, RRR, no murmur   Abd: Soft, NT, ND, BS+   Ext: No pedal edema, no cyanosis   CNS: Alert, No focal deficit noted grossly  Psy: Cooperative  Skin: Warm, dry and intact      WBC WBC   Date Value Ref Range Status   06/10/2023 7.30 3.40 - 10.80 10*3/mm3 Final   06/08/2023 7.88 3.40 - 10.80 10*3/mm3 Final      HGB Hemoglobin   Date Value Ref Range Status   06/10/2023 9.3 (L) 13.0 - 17.7 g/dL Final   06/08/2023 9.6 (L) 13.0 - 17.7 g/dL Final      HCT Hematocrit   Date Value Ref Range Status   06/10/2023 29.7 (L) 37.5 - 51.0 % Final   06/08/2023 31.5 (L) 37.5 - 51.0 % Final      Platlets No results found for: LABPLAT   MCV MCV   Date Value Ref Range Status   06/10/2023 99.7 (H) 79.0 - 97.0 fL Final   06/08/2023 101.3 (H) 79.0 - 97.0 fL Final          Sodium Sodium   Date Value Ref Range Status   06/10/2023 133 (L) 136 - 145 mmol/L Final   06/08/2023 133 (L) 136 - 145 mmol/L Final    "   Potassium Potassium   Date Value Ref Range Status   06/10/2023 4.6 3.5 - 5.2 mmol/L Final   06/08/2023 4.8 3.5 - 5.2 mmol/L Final     Comment:     Slight hemolysis detected by analyzer. Results may be affected.      Chloride Chloride   Date Value Ref Range Status   06/10/2023 99 98 - 107 mmol/L Final   06/08/2023 98 98 - 107 mmol/L Final      CO2 CO2   Date Value Ref Range Status   06/10/2023 23.0 22.0 - 29.0 mmol/L Final   06/08/2023 20.0 (L) 22.0 - 29.0 mmol/L Final      BUN BUN   Date Value Ref Range Status   06/10/2023 17 8 - 23 mg/dL Final   06/08/2023 20 8 - 23 mg/dL Final      Creatinine Creatinine   Date Value Ref Range Status   06/10/2023 2.56 (H) 0.76 - 1.27 mg/dL Final   06/08/2023 3.04 (H) 0.76 - 1.27 mg/dL Final      Calcium Calcium   Date Value Ref Range Status   06/10/2023 9.1 8.6 - 10.5 mg/dL Final   06/08/2023 9.5 8.6 - 10.5 mg/dL Final      PO4 No results found for: CAPO4   Albumin No results found for: ALBUMIN     Magnesium No results found for: MG     Uric Acid No results found for: URICACID        Results Review:     I reviewed the patient's new clinical results.    atorvastatin, 20 mg, Oral, Daily  finasteride, 5 mg, Oral, Daily  fluticasone, 1 spray, Nasal, Daily  folic acid, 1 mg, Oral, Daily  insulin lispro, 2-7 Units, Subcutaneous, 4x Daily AC & at Bedtime  melatonin, 5 mg, Oral, Nightly  metoprolol tartrate, 6.25 mg, Oral, Q12H  pantoprazole, 40 mg, Oral, Q AM  senna-docusate sodium, 2 tablet, Oral, BID  sertraline, 50 mg, Oral, Daily  sodium chloride, 10 mL, Intravenous, Q12H           Medication Review: yes    Assessment & Plan       Compression fracture of C7 vertebra, initial encounter    (HFpEF) heart failure with preserved ejection fraction    Benign prostatic hyperplasia    Closed fracture of left acetabulum    Encephalopathy    ESRD (end stage renal disease)    Essential hypertension    GERD (gastroesophageal reflux disease)    Mixed hyperlipidemia    NERY (obstructive sleep  apnea)    Type 2 diabetes mellitus    Fall    Severe Malnutrition (HCC)      1- ESRD -MWF   2-HTN   3- Anemia of chronic disease   4- Hyponatremia     Plan:  - Next HD on Monday , UF as tolerated.   - Renal diet   - JENNYFER with HD   - Electrolytes will be corrected with HD         Cristy Coyne MD  06/10/23  08:59 EDT

## 2023-06-10 NOTE — PLAN OF CARE
Goal Outcome Evaluation:  Plan of Care Reviewed With: patient        Progress: improving  Outcome Evaluation: Pt required less assist for ambulation this date and demonstrated good safety awareness with walker management. Pt req'd education re: spinal precautions and importance of cervical collar. Pt continues to demonstrate weakness and balance below baseline and will contnue to benefit from PT to address these ongoing deficits and return to PLOF.

## 2023-06-10 NOTE — PROGRESS NOTES
[facility] Medicine Services  PROGRESS NOTE    Patient Name: Enrike Tomas  : 1935  MRN: [id number]    Date of Admission: 2023  Primary Care Physician: Blair Dhaliwal MD    Subjective   Subjective     CC:  F/U C spine Fx     HPI:  No new concerns today.  Wearing c-collar.  Tolerating p.o.  Awaiting rehab, anticipate insurance/bed for Monday.  Patient tearful and states he has been dealing with depression.    ROS:  Gen- No fevers, chills  CV- No chest pain, palpitations  Resp- No cough, dyspnea  GI- No N/V/D, abd pain    Objective   Objective     Vital Signs:   Temp:  [97.5 °F (36.4 °C)-98.3 °F (36.8 °C)] 98.3 °F (36.8 °C)  Heart Rate:  [66-81] 67  Resp:  [16-18] 16  BP: ()/(55-76) 97/61  Flow (L/min):  [2] 2     Physical Exam:  Constitutional: No acute distress, awake, alert  HENT: NCAT, mucous membranes moist; wearing c-collar  Respiratory: Clear to auscultation bilaterally, respiratory effort normal   Cardiovascular: RRR  Gastrointestinal: Positive bowel sounds, soft, nontender, nondistended  Musculoskeletal: No bilateral ankle edema; wearing c-collar  Psychiatric: Appropriate affect, cooperative  Neurologic: Oriented x 3, strength symmetric in all extremities, Cranial Nerves grossly intact to confrontation, speech clear  Skin: No rashes      Results Reviewed:  LAB RESULTS:      Lab 06/10/23  0437 23  1018 23  0732 23  1541 23  0324   WBC 7.30 7.88 8.60 7.22 8.79   HEMOGLOBIN 9.3* 9.6* 9.0* 8.1* 7.7*   HEMATOCRIT 29.7* 31.5* 29.2* 25.7* 24.7*   PLATELETS 293 330 377 329 300   NEUTROS ABS 5.08  --   --   --  6.70   IMMATURE GRANS (ABS) 0.05  --   --   --  0.11*   LYMPHS ABS 1.06  --   --   --  1.06   MONOS ABS 0.75  --   --   --  0.72   EOS ABS 0.31  --   --   --  0.16   MCV 99.7* 101.3* 98.6* 97.0 96.1         Lab 06/10/23  0437 23  1018 23  0732 23  1322 23  0011 23  0514 23  0324   SODIUM 133* 133* 132*   --   --  135* 135*   POTASSIUM 4.6 4.8 4.9  --   --  4.5 4.6   CHLORIDE 99 98 94*  --   --  94* 93*   CO2 23.0 20.0* 26.0  --   --  28.0 29.0   ANION GAP 11.0 15.0 12.0  --   --  13.0 13.0   BUN 17 20 30*  --   --  37* 38*   CREATININE 2.56* 3.04* 3.79*  --   --  4.74* 4.50*   EGFR 23.4* 19.1* 14.6*  --   --  11.2* 11.9*   GLUCOSE 89 122* 99  --   --  107* 115*   CALCIUM 9.1 9.5 9.9  --   --  8.8 9.1   MAGNESIUM  --   --   --   --  2.3  --  1.5*   HEMOGLOBIN A1C  --   --   --  5.20  --   --   --          Lab 06/05/23  0324   TOTAL PROTEIN 6.4   ALBUMIN 3.1*   GLOBULIN 3.3   ALT (SGPT) 19   AST (SGOT) 25   BILIRUBIN 0.3   ALK PHOS 200*         Lab 06/05/23  0514 06/05/23  0324   HSTROP T 182* 185*             Lab 06/05/23  1541 06/05/23  0324   IRON  --  54*   IRON SATURATION (TSAT)  --  25   TIBC  --  216*   TRANSFERRIN  --  145*   FOLATE 7.80  --    VITAMIN B 12 1,484*  --          Brief Urine Lab Results  (Last result in the past 365 days)        Color   Clarity   Blood   Leuk Est   Nitrite   Protein   CREAT   Urine HCG        06/05/23 2018 Yellow   Clear   Negative   Negative   Negative   >=300 mg/dL (3+)                   Microbiology Results Abnormal       Procedure Component Value - Date/Time    COVID PRE-OP / PRE-PROCEDURE SCREENING ORDER (NO ISOLATION) - Swab, Nasopharynx [[id number]]  (Normal) Collected: 06/05/23 0324    Lab Status: Final result Specimen: Swab from Nasopharynx Updated: 06/05/23 0402    Narrative:      The following orders were created for panel order COVID PRE-OP / PRE-PROCEDURE SCREENING ORDER (NO ISOLATION) - Swab, Nasopharynx.  Procedure                               Abnormality         Status                     ---------                               -----------         ------                     COVID-19 and FLU A/B PCR...[[id number]]  Normal              Final result                 Please view results for these tests on the individual orders.    COVID-19 and FLU A/B PCR - Swab,  Nasopharynx [[id number]]  (Normal) Collected: 06/05/23 0324    Lab Status: Final result Specimen: Swab from Nasopharynx Updated: 06/05/23 0402     COVID19 Not Detected     Influenza A PCR Not Detected     Influenza B PCR Not Detected    Narrative:      Fact sheet for providers: https://www.fda.gov/media/135589/download    Fact sheet for patients: https://www.fda.gov/media/694696/download    Test performed by PCR.            No radiology results from the last 24 hrs        Current medications:  Scheduled Meds:atorvastatin, 20 mg, Oral, Daily  finasteride, 5 mg, Oral, Daily  fluticasone, 1 spray, Nasal, Daily  folic acid, 1 mg, Oral, Daily  insulin lispro, 2-7 Units, Subcutaneous, 4x Daily AC & at Bedtime  melatonin, 5 mg, Oral, Nightly  metoprolol tartrate, 6.25 mg, Oral, Q12H  pantoprazole, 40 mg, Oral, Q AM  senna-docusate sodium, 2 tablet, Oral, BID  sertraline, 50 mg, Oral, Daily  sodium chloride, 10 mL, Intravenous, Q12H      Continuous Infusions:   PRN Meds:.  acetaminophen **OR** acetaminophen **OR** acetaminophen    senna-docusate sodium **AND** polyethylene glycol **AND** bisacodyl **AND** bisacodyl    dextrose    dextrose    glucagon (human recombinant)    HYDROcodone-acetaminophen    Magnesium Low Dose Replacement - Follow Nurse / BPA Driven Protocol    [COMPLETED] Insert Peripheral IV **AND** sodium chloride    sodium chloride    sodium chloride    Assessment & Plan   Assessment & Plan     Active Hospital Problems    Diagnosis  POA    **Compression fracture of C7 vertebra, initial encounter [S12.690A]  Yes    Severe Malnutrition (HCC) [E43]  Yes    Fall [W19.XXXA]  Yes    Encephalopathy [G93.40]  Yes    Type 2 diabetes mellitus [E11.9]  Yes    Closed fracture of left acetabulum [S32.402A]  Yes    NERY (obstructive sleep apnea) [G47.33]  Yes    (HFpEF) heart failure with preserved ejection fraction [I50.30]  Yes    Essential hypertension [I10]  Yes    ESRD (end stage renal disease) [N18.6]  Yes    Benign  prostatic hyperplasia [N40.0]  Yes    GERD (gastroesophageal reflux disease) [K21.9]  Yes    Mixed hyperlipidemia [E78.2]  Yes      Resolved Hospital Problems   No resolved problems to display.        Brief Hospital Course to date:  Enrike Tomas is 88 y.o. male with PMH significant for ESRD on HD M, W, F, A-fib on Eliquis, GERD, BPH, HTN, HLD, orthostatic hypotension, DM2, depression, who presents to the ED with complaint of fall and neck pain who was found to have concern for acute compression fracture of C7.    This patient's problems and plans were partially entered by my partner and updated as appropriate by me 06/10/23.     Assessment/plan:    Compression fracture of C7  Recurrent falls  Recent T7 Fx  -Neurosurgery has evaluated. Plan for semirigid cervical collar for 8 to 10 weeks when up and ambulating   -PT/OT recommend rehab, CM following, plan is LCP.  CM following for disposition.  -Pain control PRN  -Follow-up with NSGY outpatient   --Pain fairly well controlled.     ESRD  - On HD M, W, F  - Nephrology following   --Patient currently has dialysis at [facility] on Monday/Wednesday/Fridays at 10:15 AM.  Awaiting disposition.  CM following.     Afib  -holding eliquis due to falls, anemia     Anemia  -Iron studies consistent with AoCD  -Folate low normal, will supplement   -Hemoccult negative  -Hold Eliquis for now as above.  --Monitor labs.  H/H stable around 9.6/31.5     Diabetes mellitus 2 (A1c 5.2)  - FSBG ACHS  - SS insulin  --Well-controlled.  No change.     Hypertension  Hyperlipidemia  History of orthostatic hypotension  - Continue metoprolol with hold parameters  - Atorvastatin  - Orthostatic hypotension likely playing a role in frequent falls  --BP low end normal but stable and asymptomatic today.  Continue to monitor.     BPH  - Continue Proscar     GERD  - Continue PPI     Depression  - Continue Zoloft    Moderate Pharyngeal Dysphagia, Mild-Mod oral dysphagia   -SLP has evaluated with MBS, see diet  recs   --Tolerating diet     DVT prophylaxis:  on Eliquis, holding for now secondary to head injury and frequent falls    Expected Discharge Location and Transportation: [facility]/rehab pending insurance precert    Expected Discharge   Expected Discharge Date: 6/12/2023; Expected Discharge Time:      DVT prophylaxis:  Medical and mechanical DVT prophylaxis orders are present.     AM-PAC 6 Clicks Score (PT): 18 (06/10/23 0800)    CODE STATUS:   Code Status and Medical Interventions:   Ordered at: 06/05/23 0611     Medical Intervention Limits:    NO intubation (DNI)     Code Status (Patient has no pulse and is not breathing):    No CPR (Do Not Attempt to Resuscitate)     Medical Interventions (Patient has pulse or is breathing):    Limited Support       Zoe Hurtado MD  06/10/23

## 2023-06-10 NOTE — THERAPY TREATMENT NOTE
Patient Name: Enrike Tomas  : 1935    MRN: [id number]                              Today's Date: 6/10/2023       Admit Date: 2023    Visit Dx:     ICD-10-CM ICD-9-CM   1. Compression fracture of C7 vertebra, initial encounter  S12.690A 805.07   2. Closed head injury, initial encounter  S09.90XA 959.01   3. ESRD on hemodialysis  N18.6 585.6    Z99.2 V45.11   4. Oropharyngeal dysphagia  R13.12 787.22     Patient Active Problem List   Diagnosis    Compression fracture of C7 vertebra, initial encounter    (HFpEF) heart failure with preserved ejection fraction    Anemia of renal disease    At high risk for falls    Benign prostatic hyperplasia    Closed fracture of left acetabulum    Compression fracture of thoracic vertebra    Deficiency of other specified B group vitamins    Depression    Encephalopathy    ESRD (end stage renal disease)    Essential hypertension    GERD (gastroesophageal reflux disease)    Hepatic cyst    Long term (current) use of anticoagulants    Major depressive disorder, single episode, moderate    Mixed hyperlipidemia    Morgagni hernia    Occlusion of right vertebral artery    NERY (obstructive sleep apnea)    Type 2 diabetes mellitus    Fall    Severe Malnutrition (HCC)     Past Medical History:   Diagnosis Date    Atrial fibrillation     BPH (benign prostatic hyperplasia)     Diabetes mellitus     Diverticulosis     ESRD on hemodialysis     GERD (gastroesophageal reflux disease)     Heart failure     Hyperlipidemia     Hypertension     NERY (obstructive sleep apnea)     Recurrent falls      Past Surgical History:   Procedure Laterality Date    CATARACT EXTRACTION      DIALYSIS FISTULA CREATION      INGUINAL HERNIA REPAIR      PROSTATE BIOPSY      TONSILLECTOMY AND ADENOIDECTOMY        General Information       Row Name 06/10/23 1022          Physical Therapy Time and Intention    Document Type therapy note (daily note)  -KR     Mode of Treatment individual therapy;physical therapy   -KR       Row Name 06/10/23 1022          General Information    Patient Profile Reviewed yes  -KR     Existing Precautions/Restrictions fall;cervical collar;spinal;brace worn when out of bed;other (see comments)  C7/T7 compression fx  -KR     Barriers to Rehab medically complex;previous functional deficit  -KR       Row Name 06/10/23 1022          Cognition    Orientation Status (Cognition) oriented x 4  -KR       Row Name 06/10/23 1022          Safety Issues, Functional Mobility    Safety Issues Affecting Function (Mobility) safety precaution awareness;safety precautions follow-through/compliance  -KR     Impairments Affecting Function (Mobility) balance;endurance/activity tolerance;pain;range of motion (ROM);strength;postural/trunk control  -KR               User Key  (r) = Recorded By, (t) = Taken By, (c) = Cosigned By      Initials Name Provider Type    KR Nazia Murray, PT Physical Therapist                   Mobility       Row Name 06/10/23 1320          Bed Mobility    Bed Mobility rolling left;sidelying-sit  -KR     Rolling Left [place] (Bed Mobility) supervision;verbal cues;nonverbal cues (demo/gesture)  -KR     Sidelying-Sit [place] (Bed Mobility) minimum assist (75% patient effort);verbal cues  -KR     Assistive Device (Bed Mobility) bed rails;head of bed elevated  -KR     Comment, (Bed Mobility) education re: log rolling technique for spinal precautions. BP in supine 101/61. BP upon sitting /67.  -KR       Row Name 06/10/23 1320          Sit-Stand Transfer    Sit-Stand [place] (Transfers) supervision  -KR     Assistive Device (Sit-Stand Transfers) walker, front-wheeled  -KR     Comment, (Sit-Stand Transfer) 1x from bed, 1x from toilet, 2x from chair. Pt demo'd good safety awareness with all STS transfers.  -KR       Row Name 06/10/23 1320          Gait/Stairs (Locomotion)    [place] Level (Gait) contact guard  -KR     Assistive Device (Gait) walker, front-wheeled  -KR      Distance in Feet (Gait) 20 + 20 + 140  -KR     Deviations/Abnormal Patterns (Gait) bilateral deviations;base of support, narrow;gait speed decreased;stride length decreased;amberly decreased  -KR     Bilateral Gait Deviations forward flexed posture;heel strike decreased;weight shift ability decreased  -KR     Comment, (Gait/Stairs) Pt demonstrated good safety awareness and positioning of walker this date. Pt continues to ambulate at slow, steady pace, no LOB.  -KR               User Key  (r) = Recorded By, (t) = Taken By, (c) = Cosigned By      Initials Name Provider Type    KR Nazia Murray PT Physical Therapist                   Obj/Interventions       Row Name 06/10/23 1322          Motor Skills    Therapeutic Exercise hip;knee  -KR       Row Name 06/10/23 1322          Hip (Therapeutic Exercise)    Hip (Therapeutic Exercise) strengthening exercise  -KR     Hip Strengthening (Therapeutic Exercise) 10 repetitions;bilateral;aDduction;aBduction;marching while standing  -KR       Row Name 06/10/23 1322          Knee (Therapeutic Exercise)    Knee (Therapeutic Exercise) strengthening exercise;isometric exercises  -KR     Knee Isometrics (Therapeutic Exercise) 10 repetitions;bilateral;quad sets  -KR     Knee Strengthening (Therapeutic Exercise) 10 repetitions;bilateral;SLR (straight leg raise)  -KR       Row Name 06/10/23 1322          Balance    Balance Assessment sitting static balance;sitting dynamic balance;standing static balance;standing dynamic balance  -KR     Static Sitting Balance independent  -KR     Dynamic Sitting Balance supervision  -KR     Position, Sitting Balance unsupported;sitting edge of bed;sitting in chair;other (see comments)  toilet  -KR     Static Standing Balance supervision  -KR     Dynamic Standing Balance contact guard  -KR     Position/Device Used, Standing Balance supported;walker, rolling  -KR     Comment, Balance pt performed pericare on toilet with spv, washed hands at sink in  unsupported standing with spv, no LOB.  -KR               User Key  (r) = Recorded By, (t) = Taken By, (c) = Cosigned By      Initials Name Provider Type    Nazia Jones PT Physical Therapist                   Goals/Plan    No documentation.                  Clinical Impression       Row Name 06/10/23 1324          Pain    Pretreatment Pain Rating 0/10 - no pain  -KR     Posttreatment Pain Rating 0/10 - no pain  -KR     Pre/Posttreatment Pain Comment Pt reports no pain at rest, however pain in L shoulder with all movement.  -KR     Pain Intervention(s) Repositioned  -KR       Row Name 06/10/23 1324          Plan of Care Review    Plan of Care Reviewed With patient  -KR     Progress improving  -KR     Outcome Evaluation Pt required less assist for ambulation this date and demonstrated good safety awareness with walker management. Pt req'd education re: spinal precautions and importance of cervical collar. Pt continues to demonstrate weakness and balance below baseline and will contnue to benefit from PT to address these ongoing deficits and return to PLOF.  -KR       Row Name 06/10/23 1324          Vital Signs    Pre Systolic BP Rehab 101  -KR     Pre Treatment Diastolic BP 61  -KR     Intra Systolic BP Rehab 111  -KR     Intra Treatment Diastolic BP 67  -KR     Post Systolic BP Rehab 108  -KR     Post Treatment Diastolic BP 65  -KR     O2 Delivery Pre Treatment room air  -KR     O2 Delivery Intra Treatment room air  -KR     Post SpO2 (%) 98  -KR     O2 Delivery Post Treatment room air  -KR     Pre Patient Position Supine  -KR     Intra Patient Position Standing  -KR     Post Patient Position Sitting  -KR       Row Name 06/10/23 1324          Positioning and Restraints    Pre-Treatment Position in bed  -KR     Post Treatment Position chair  -KR     In Chair notified nsg;reclined;call light within reach;encouraged to call for assist;exit alarm on;waffle cushion;legs elevated  -KR               User Key  (r) =  Recorded By, (t) = Taken By, (c) = Cosigned By      Initials Name Provider Type    Nazia Jones, KIM Physical Therapist                   Outcome Measures       Row Name 06/10/23 1325 06/10/23 0800       How much help from another person do you currently need...    Turning from your back to your side while in flat bed without using bedrails? 3  -KR 3  -SN    Moving from lying on back to sitting on the side of a flat bed without bedrails? 3  -KR 3  -SN    Moving to and from a bed to a chair (including a wheelchair)? 3  -KR 3  -SN    Standing up from a chair using your arms (e.g., wheelchair, bedside chair)? 4  -KR 3  -SN    Climbing 3-5 steps with a railing? 3  -KR 3  -SN    To walk in hospital room? 3  -KR 3  -SN    AM-PAC 6 Clicks Score (PT) 19  -KR 18  -SN    Highest level of mobility 6 --> Walked 10 steps or more  -KR 6 --> Walked 10 steps or more  -SN              User Key  (r) = Recorded By, (t) = Taken By, (c) = Cosigned By      Initials Name Provider Type    Nehal Black, RN Registered Nurse    Nazia Jones, KIM Physical Therapist                                 Physical Therapy Education       Title: PT OT SLP Therapies (In Progress)       Topic: Physical Therapy (Done)       Point: Mobility training (Done)       Learning Progress Summary             Patient Acceptance, E, VU by KR at 6/10/2023 1326    Comment: reviewed spinal precautions    Eager, E, VU,DU,NR by  at 6/7/2023 1554    Comment: Reviewed safety/technique w/transfers, ambulation, HEP, c spine precautions, PT POC    Acceptance, E,D, NR,VU by AB at 6/6/2023 1155    Acceptance, E,D, NR by AB at 6/5/2023 1419                         Point: Home exercise program (Done)       Learning Progress Summary             Patient Acceptance, E, VU by KR at 6/10/2023 1326    Comment: reviewed spinal precautions    Eager, E, VU,DU,NR by SS at 6/7/2023 1554    Comment: Reviewed safety/technique w/transfers, ambulation, HEP, c spine precautions,  PT POC    Acceptance, E,D, NR,VU by AB at 6/6/2023 1155                         Point: Body mechanics (Done)       Learning Progress Summary             Patient Acceptance, E, VU by KR at 6/10/2023 1326    Comment: reviewed spinal precautions    Eager, E, VU,DU,NR by SS at 6/7/2023 1554    Comment: Reviewed safety/technique w/transfers, ambulation, HEP, c spine precautions, PT POC    Acceptance, E,D, NR,VU by AB at 6/6/2023 1155    Acceptance, E,D, NR by AB at 6/5/2023 1419                         Point: Precautions (Done)       Learning Progress Summary             Patient Acceptance, E, VU by KR at 6/10/2023 1326    Comment: reviewed spinal precautions    Eager, E, VU,DU,NR by SS at 6/7/2023 1554    Comment: Reviewed safety/technique w/transfers, ambulation, HEP, c spine precautions, PT POC    Acceptance, E,D, NR,VU by AB at 6/6/2023 1155    Acceptance, E,D, NR by AB at 6/5/2023 1419                                         User Key       Initials Effective Dates Name Provider Type Discipline     06/01/21 -  Bing Castellanos, PT Physical Therapist PT    AB 09/22/22 -  Caro Youngblood, PT Physical Therapist PT     12/30/22 -  Nazia Murray, PT Physical Therapist PT                  PT Recommendation and Plan     Plan of Care Reviewed With: patient  Progress: improving  Outcome Evaluation: Pt required less assist for ambulation this date and demonstrated good safety awareness with walker management. Pt req'd education re: spinal precautions and importance of cervical collar. Pt continues to demonstrate weakness and balance below baseline and will contnue to benefit from PT to address these ongoing deficits and return to PLOF.     Time Calculation:    PT Charges       Row Name 06/10/23 1326             Time Calculation    Start Time 1022  -KR      PT Received On 06/10/23  -KR      PT Goal Re-Cert Due Date 06/15/23  -KR         Timed Charges    33406 - PT Therapeutic Exercise Minutes 12  -KR      20809 - PT  Therapeutic Activity Minutes 41  -KR         Total Minutes    Timed Charges Total Minutes 53  -KR       Total Minutes 53  -KR                User Key  (r) = Recorded By, (t) = Taken By, (c) = Cosigned By      Initials Name Provider Type    Nazia Jones, PT Physical Therapist                  Therapy Charges for Today       Code Description Service Date Service Provider Modifiers Qty    [id number]  PT THER PROC EA 15 MIN 6/10/2023 Nazia Murray, PT GP 1    [id number]  PT THERAPEUTIC ACT EA 15 MIN 6/10/2023 Nazia Murray, PT GP 3            PT G-Codes  Outcome Measure Options: AM-PAC 6 Clicks Daily Activity (OT)  AM-PAC 6 Clicks Score (PT): 19  AM-PAC 6 Clicks Score (OT): 17       Nzaia Murray PT  6/10/2023

## 2023-06-11 PROCEDURE — G0378 HOSPITAL OBSERVATION PER HR: HCPCS

## 2023-06-11 PROCEDURE — 97116 GAIT TRAINING THERAPY: CPT

## 2023-06-11 PROCEDURE — 99232 SBSQ HOSP IP/OBS MODERATE 35: CPT | Performed by: FAMILY MEDICINE

## 2023-06-11 PROCEDURE — 92610 EVALUATE SWALLOWING FUNCTION: CPT

## 2023-06-11 PROCEDURE — 97530 THERAPEUTIC ACTIVITIES: CPT

## 2023-06-11 RX ADMIN — PANTOPRAZOLE SODIUM 40 MG: 40 TABLET, DELAYED RELEASE ORAL at 10:41

## 2023-06-11 RX ADMIN — SERTRALINE 50 MG: 50 TABLET, FILM COATED ORAL at 10:40

## 2023-06-11 RX ADMIN — ZOLPIDEM TARTRATE 5 MG: 5 TABLET ORAL at 19:41

## 2023-06-11 RX ADMIN — APIXABAN 2.5 MG: 2.5 TABLET, FILM COATED ORAL at 16:00

## 2023-06-11 RX ADMIN — FOLIC ACID 1 MG: 1 TABLET ORAL at 10:40

## 2023-06-11 RX ADMIN — FLUTICASONE PROPIONATE 1 SPRAY: 50 SPRAY, METERED NASAL at 10:40

## 2023-06-11 RX ADMIN — Medication 10 ML: at 19:41

## 2023-06-11 RX ADMIN — Medication 10 ML: at 10:41

## 2023-06-11 RX ADMIN — FINASTERIDE 5 MG: 5 TABLET, FILM COATED ORAL at 10:40

## 2023-06-11 RX ADMIN — ATORVASTATIN CALCIUM 20 MG: 20 TABLET, FILM COATED ORAL at 10:40

## 2023-06-11 RX ADMIN — ACETAMINOPHEN 650 MG: 325 TABLET ORAL at 19:40

## 2023-06-11 RX ADMIN — Medication 5 MG: at 19:41

## 2023-06-11 RX ADMIN — APIXABAN 2.5 MG: 2.5 TABLET, FILM COATED ORAL at 19:40

## 2023-06-11 NOTE — THERAPY TREATMENT NOTE
Patient Name: Enrike Tomas  : 1935    MRN: [id number]                              Today's Date: 2023       Admit Date: 2023    Visit Dx:     ICD-10-CM ICD-9-CM   1. Compression fracture of C7 vertebra, initial encounter  S12.690A 805.07   2. Closed head injury, initial encounter  S09.90XA 959.01   3. ESRD on hemodialysis  N18.6 585.6    Z99.2 V45.11   4. Oropharyngeal dysphagia  R13.12 787.22     Patient Active Problem List   Diagnosis    Compression fracture of C7 vertebra, initial encounter    (HFpEF) heart failure with preserved ejection fraction    Anemia of renal disease    At high risk for falls    Benign prostatic hyperplasia    Closed fracture of left acetabulum    Compression fracture of thoracic vertebra    Deficiency of other specified B group vitamins    Depression    Encephalopathy    ESRD (end stage renal disease)    Essential hypertension    GERD (gastroesophageal reflux disease)    Hepatic cyst    Long term (current) use of anticoagulants    Major depressive disorder, single episode, moderate    Mixed hyperlipidemia    Morgagni hernia    Occlusion of right vertebral artery    NERY (obstructive sleep apnea)    Type 2 diabetes mellitus    Fall    Severe Malnutrition (HCC)     Past Medical History:   Diagnosis Date    Atrial fibrillation     BPH (benign prostatic hyperplasia)     Diabetes mellitus     Diverticulosis     ESRD on hemodialysis     GERD (gastroesophageal reflux disease)     Heart failure     Hyperlipidemia     Hypertension     NERY (obstructive sleep apnea)     Recurrent falls      Past Surgical History:   Procedure Laterality Date    CATARACT EXTRACTION      DIALYSIS FISTULA CREATION      INGUINAL HERNIA REPAIR      PROSTATE BIOPSY      TONSILLECTOMY AND ADENOIDECTOMY        General Information       Row Name 23 0920          Physical Therapy Time and Intention    Document Type therapy note (daily note)  -KR     Mode of Treatment individual therapy;physical therapy   -KR       Row Name 06/11/23 0920          General Information    Patient Profile Reviewed yes  -KR     Existing Precautions/Restrictions fall;cervical collar;spinal;brace worn when out of bed;other (see comments)  C7/T7 compression fx  -KR     Barriers to Rehab medically complex;previous functional deficit  -KR       Row Name 06/11/23 0920          Cognition    Orientation Status (Cognition) oriented x 4  -KR       Row Name 06/11/23 0920          Safety Issues, Functional Mobility    Safety Issues Affecting Function (Mobility) safety precaution awareness;safety precautions follow-through/compliance  -KR     Impairments Affecting Function (Mobility) balance;endurance/activity tolerance;pain;range of motion (ROM);strength;postural/trunk control  -KR               User Key  (r) = Recorded By, (t) = Taken By, (c) = Cosigned By      Initials Name Provider Type    KR Nazia Murray, PT Physical Therapist                   Mobility       Row Name 06/11/23 1004          Bed Mobility    Bed Mobility rolling left;sidelying-sit  -KR     Rolling Left [place] (Bed Mobility) supervision;verbal cues;nonverbal cues (demo/gesture)  -KR     Sidelying-Sit [place] (Bed Mobility) contact guard;nonverbal cues (demo/gesture)  -KR     Assistive Device (Bed Mobility) bed rails;head of bed elevated  -KR     Comment, (Bed Mobility) cues for safety awareness d/t pt attempting to scoot too far forward on EOB.  -KR       Row Name 06/11/23 1004          Sit-Stand Transfer    Sit-Stand [place] (Transfers) supervision  -KR     Assistive Device (Sit-Stand Transfers) walker, front-wheeled  -KR     Comment, (Sit-Stand Transfer) 1x from bed, 1x from chair.  -KR       Row Name 06/11/23 1004          Gait/Stairs (Locomotion)    [place] Level (Gait) contact guard  -KR     Assistive Device (Gait) walker, front-wheeled  -KR     Distance in Feet (Gait) 10 + 10 + 120  -KR     Deviations/Abnormal Patterns (Gait) bilateral deviations;base  "of support, narrow;stride length decreased  -KR     Bilateral Gait Deviations forward flexed posture;heel strike decreased;weight shift ability decreased  -KR     Comment, (Gait/Stairs) pt with one LOB upon initial ambulation into bathroom, able to recover without addt'l assist. Pt attempting to ambulate at quick pace this date and demonstrated decreased stability, thus cues for slowed pace and increased focus on balance. Farther distance continues to be limited by LLE fatigue. LLE with decreased toe clearance and compensatory increased hip flexion during swing phase as ambulation distance progresses d/t fatigue.  -KR               User Key  (r) = Recorded By, (t) = Taken By, (c) = Cosigned By      Initials Name Provider Type    Nazia Jones PT Physical Therapist                   Obj/Interventions       Row Name 23 1006          Balance    Balance Assessment sitting static balance;sitting dynamic balance;standing static balance;standing dynamic balance  -KR     Static Sitting Balance independent  -KR     Dynamic Sitting Balance supervision  -KR     Position, Sitting Balance unsupported;sitting in chair;sitting edge of bed  -KR     Static Standing Balance supervision  -KR     Dynamic Standing Balance contact guard  -KR     Position/Device Used, Standing Balance supported;walker, rolling  -KR     Balance Interventions other (see comments)  In unsupported standin\" wide HARRISON E.C., 30\" narrow HARRISON E.O., 30\" narrow HARRISON E.C. Increased multidirectional sway with Charu req'd with narrow HARRISON E.C.  -KR     Comment, Balance pt stood at sink unsupported while brushing teeth and hair with spv.  -KR               User Key  (r) = Recorded By, (t) = Taken By, (c) = Cosigned By      Initials Name Provider Type    Nazia Jones PT Physical Therapist                   Goals/Plan    No documentation.                  Clinical Impression       Row Name 23 1007          Pain    Pretreatment Pain Rating 0/10 - " no pain  -KR     Posttreatment Pain Rating 0/10 - no pain  -KR     Pre/Posttreatment Pain Comment Pt reports no pain at rest, however pain in L shoulder with movement. Nsg aware.  -KR     Pain Intervention(s) Repositioned  -KR       Row Name 06/11/23 1007          Plan of Care Review    Plan of Care Reviewed With patient  -KR     Progress improving  -KR     Outcome Evaluation Pt required less assist with sidelying to sit this date and demonstrated appropriate spinal precautions with mobility. Pt continues to require education re: importance of upright and OOB mobility and positioning in rehab process. Pt will continue to benefit from PT to address ongoing deficits in strength, balance, and endurance, and return to PLOF.  -KR       Row Name 06/11/23 1007          Vital Signs    Pre Systolic BP Rehab --  VSS, RN approved tx.  -KR     O2 Delivery Pre Treatment room air  -KR     O2 Delivery Intra Treatment room air  -KR     O2 Delivery Post Treatment room air  -KR     Pre Patient Position Supine  -KR     Intra Patient Position Standing  -KR     Post Patient Position Sitting  -KR       Row Name 06/11/23 1007          Positioning and Restraints    Pre-Treatment Position in bed  -KR     Post Treatment Position chair  -KR     In Chair notified nsg;reclined;encouraged to call for assist;call light within reach;exit alarm on;with family/caregiver;waffle cushion;legs elevated  -KR               User Key  (r) = Recorded By, (t) = Taken By, (c) = Cosigned By      Initials Name Provider Type    Nazia Jones, PT Physical Therapist                   Outcome Measures       Row Name 06/11/23 1009          How much help from another person do you currently need...    Turning from your back to your side while in flat bed without using bedrails? 3  -KR     Moving from lying on back to sitting on the side of a flat bed without bedrails? 3  -KR     Moving to and from a bed to a chair (including a wheelchair)? 3  -KR     Standing up  from a chair using your arms (e.g., wheelchair, bedside chair)? 4  -KR     Climbing 3-5 steps with a railing? 3  -KR     To walk in hospital room? 3  -KR     AM-PAC 6 Clicks Score (PT) 19  -KR     Highest level of mobility 6 --> Walked 10 steps or more  -KR               User Key  (r) = Recorded By, (t) = Taken By, (c) = Cosigned By      Initials Name Provider Type    Nazia Jones, PT Physical Therapist                                 Physical Therapy Education       Title: PT OT SLP Therapies (In Progress)       Topic: Physical Therapy (In Progress)       Point: Mobility training (In Progress)       Learning Progress Summary             Patient Acceptance, E, NR by KR at 6/11/2023 1011    Acceptance, E, VU by KR at 6/10/2023 1326    Comment: reviewed spinal precautions    Eager, E, VU,DU,NR by SS at 6/7/2023 1554    Comment: Reviewed safety/technique w/transfers, ambulation, HEP, c spine precautions, PT POC    Acceptance, E,D, NR,VU by AB at 6/6/2023 1155    Acceptance, E,D, NR by AB at 6/5/2023 1419   Family Acceptance, E, NR by KR at 6/11/2023 1011                         Point: Home exercise program (In Progress)       Learning Progress Summary             Patient Acceptance, E, NR by KR at 6/11/2023 1011    Acceptance, E, VU by KR at 6/10/2023 1326    Comment: reviewed spinal precautions    Eager, E, VU,DU,NR by SS at 6/7/2023 1554    Comment: Reviewed safety/technique w/transfers, ambulation, HEP, c spine precautions, PT POC    Acceptance, E,D, NR,VU by AB at 6/6/2023 1155   Family Acceptance, E, NR by KR at 6/11/2023 1011                         Point: Body mechanics (In Progress)       Learning Progress Summary             Patient Acceptance, E, NR by KR at 6/11/2023 1011    Acceptance, E, VU by KR at 6/10/2023 1326    Comment: reviewed spinal precautions    Eager, E, VU,DU,NR by SS at 6/7/2023 1554    Comment: Reviewed safety/technique w/transfers, ambulation, HEP, c spine precautions, PT POC     Acceptance, E,D, NR,VU by AB at 6/6/2023 1155    Acceptance, E,D, NR by AB at 6/5/2023 1419   Family Acceptance, E, NR by KR at 6/11/2023 1011                         Point: Precautions (In Progress)       Learning Progress Summary             Patient Acceptance, E, NR by KR at 6/11/2023 1011    Acceptance, E, VU by KR at 6/10/2023 1326    Comment: reviewed spinal precautions    Eager, E, VU,DU,NR by SS at 6/7/2023 1554    Comment: Reviewed safety/technique w/transfers, ambulation, HEP, c spine precautions, PT POC    Acceptance, E,D, NR,VU by AB at 6/6/2023 1155    Acceptance, E,D, NR by AB at 6/5/2023 1419   Family Acceptance, E, NR by KR at 6/11/2023 1011                                         User Key       Initials Effective Dates Name Provider Type Discipline     06/01/21 -  Bing Castellanos, PT Physical Therapist PT    AB 09/22/22 -  Caro Youngblood, PT Physical Therapist PT    KR 12/30/22 -  Nazia Murray, PT Physical Therapist PT                  PT Recommendation and Plan     Plan of Care Reviewed With: patient  Progress: improving  Outcome Evaluation: Pt required less assist with sidelying to sit this date and demonstrated appropriate spinal precautions with mobility. Pt continues to require education re: importance of upright and OOB mobility and positioning in rehab process. Pt will continue to benefit from PT to address ongoing deficits in strength, balance, and endurance, and return to PLOF.     Time Calculation:    PT Charges       Row Name 06/11/23 1012             Time Calculation    Start Time 0920  -KR      PT Received On 06/11/23  -KR      PT Goal Re-Cert Due Date 06/15/23  -KR         Timed Charges    48213 - Gait Training Minutes  10  -KR      76308 - PT Therapeutic Activity Minutes 28  -KR         Total Minutes    Timed Charges Total Minutes 38  -KR       Total Minutes 38  -KR                User Key  (r) = Recorded By, (t) = Taken By, (c) = Cosigned By      Initials Name Provider Type     Nazia Jones, PT Physical Therapist                  Therapy Charges for Today       Code Description Service Date Service Provider Modifiers Qty    76389433651 HC PT THER PROC EA 15 MIN 6/10/2023 Nazia Murray, PT GP 1    84123301605 HC PT THERAPEUTIC ACT EA 15 MIN 6/10/2023 Nazia Murray, PT GP 3    59829779595 HC GAIT TRAINING EA 15 MIN 6/11/2023 Nazia Murray, PT GP 1    00682187236 HC PT THERAPEUTIC ACT EA 15 MIN 6/11/2023 Nazia Murray, PT GP 2            PT G-Codes  Outcome Measure Options: AM-PAC 6 Clicks Daily Activity (OT)  AM-PAC 6 Clicks Score (PT): 19  AM-PAC 6 Clicks Score (OT): 17       Nazia Murray, PT  6/11/2023

## 2023-06-11 NOTE — PLAN OF CARE
Goal Outcome Evaluation:    SLP re-evaluation completed. Will continue to address dysphagia via MBS. Please see note for further details and recommendations.

## 2023-06-11 NOTE — THERAPY RE-EVALUATION
Acute Care - Speech Language Pathology   Swallow Re-Evaluation   [facility]    Clinical Swallow Evaluation       Patient Name: Enrike Tomas  : 1935  MRN: [id number]  Today's Date: 2023               Admit Date: 2023    Visit Dx:     ICD-10-CM ICD-9-CM   1. Compression fracture of C7 vertebra, initial encounter  S12.690A 805.07   2. Closed head injury, initial encounter  S09.90XA 959.01   3. ESRD on hemodialysis  N18.6 585.6    Z99.2 V45.11   4. Oropharyngeal dysphagia  R13.12 787.22     Patient Active Problem List   Diagnosis    Compression fracture of C7 vertebra, initial encounter    (HFpEF) heart failure with preserved ejection fraction    Anemia of renal disease    At high risk for falls    Benign prostatic hyperplasia    Closed fracture of left acetabulum    Compression fracture of thoracic vertebra    Deficiency of other specified B group vitamins    Depression    Encephalopathy    ESRD (end stage renal disease)    Essential hypertension    GERD (gastroesophageal reflux disease)    Hepatic cyst    Long term (current) use of anticoagulants    Major depressive disorder, single episode, moderate    Mixed hyperlipidemia    Morgagni hernia    Occlusion of right vertebral artery    NERY (obstructive sleep apnea)    Fall    Severe Malnutrition (HCC)     Past Medical History:   Diagnosis Date    Atrial fibrillation     BPH (benign prostatic hyperplasia)     Diabetes mellitus     Diverticulosis     ESRD on hemodialysis     GERD (gastroesophageal reflux disease)     Heart failure     Hyperlipidemia     Hypertension     NERY (obstructive sleep apnea)     Recurrent falls      Past Surgical History:   Procedure Laterality Date    CATARACT EXTRACTION      DIALYSIS FISTULA CREATION      INGUINAL HERNIA REPAIR      PROSTATE BIOPSY      TONSILLECTOMY AND ADENOIDECTOMY         SLP Recommendation and Plan  SLP Swallowing Diagnosis: suspect acute-on-chronic, pharyngeal dysphagia, functional oral phase (23  1600)  SLP Diet Recommendation: mechanical ground textures, no mixed consistencies, honey thick liquids (06/11/23 1600)  Recommended Precautions and Strategies: upright posture during/after eating, general aspiration precautions, other (see comments) (encourage 2nd dry swallow intermittently) (06/11/23 1600)  SLP Rec. for Method of Medication Administration: meds whole, meds crushed, with puree, as tolerated (06/11/23 1600)     Monitor for Signs of Aspiration: yes, notify SLP if any concerns, cough, throat clearing (06/11/23 1600)  Recommended Diagnostics: reassess via [facility] ([facility] [place], [place]) (06/11/23 1600)  Swallow Criteria for Skilled Therapeutic Interventions Met: demonstrates skilled criteria (06/11/23 1600)  Anticipated Discharge Disposition (SLP): anticipate therapy at next level of care, skilled nursing facility (06/11/23 1600)  Rehab Potential/Prognosis, Swallowing: good, to achieve stated therapy goals (06/11/23 1600)  Therapy Frequency (Swallow): evaluation only (06/11/23 1600)  Predicted Duration Therapy Intervention (Days): until discharge (06/11/23 1600)                                               SWALLOW EVALUATION (last 72 hours)       SLP Adult Swallow Evaluation       Row Name 06/11/23 1600 06/09/23 5468                Rehab Evaluation    Document Type re-evaluation  -CH therapy note (daily note)  -AC       Subjective Information no complaints  -CH no complaints  -AC       Patient Observations alert;cooperative;agree to therapy  -CH alert;cooperative  -AC       Patient/Family/Caregiver Comments/Observations friend present  -CH Spouse present.  -AC       Patient Effort adequate  -CH good  -AC          General Information    Patient Profile Reviewed yes  -CH --       Pertinent History Of Current Problem see prior eval. Patient non-compliant with Honey thick liquids per RN and OT. Re-evaluation completed to assess for possible upgrade  -CH --       Current Method of Nutrition mechanical ground textures;no mixed  consistencies;honey-thick liquids  -CH --       Precautions/Limitations, Vision WFL with corrective lenses;for purposes of eval  -CH --       Precautions/Limitations, Hearing WFL;for purposes of eval  -CH --          Pain    Additional Documentation Pain Scale: FACES Pre/Post-Treatment (Group)  -CH --          Pain Scale: FACES Pre/Post-Treatment    Pain: FACES Scale, Pretreatment 0-->no hurt  -CH 0-->no hurt  -AC       Posttreatment Pain Rating 0-->no hurt  -CH 0-->no hurt  -AC          Oral Motor Structure and Function    Dentition Assessment natural, present and adequate  -CH --       Secretion Management WNL/WFL  -CH --       Mucosal Quality dry  -CH --       Volitional Cough weak;reduced respiratory support  -CH --          Oral Musculature and Cranial Nerve Assessment    Oral Motor General Assessment generalized oral motor weakness  -CH --          General Eating/Swallowing Observations    Respiratory Support Currently in Use room air  -CH --       Eating/Swallowing Skills self-fed  -CH --       Positioning During Eating upright 90 degree;upright in chair;other (see comments)  wearing C collar  -CH --       Utensils Used spoon;cup;straw  -CH --       Consistencies Trialed thin liquids;nectar/syrup-thick liquids  -CH --       Pre SpO2 (%) 96  -CH --       Post SpO2 (%) 97  -CH --          Respiratory    Respiratory Status WFL;room air  -CH --          Clinical Swallow Eval    Oral Prep Phase WFL  -CH --       Oral Transit WFL  -CH --       Oral Residue WFL  -CH --       Pharyngeal Phase suspected pharyngeal impairment  -CH --       Clinical Swallow Evaluation Summary Multiple swallows with trials of thin liquids by cup and straw. No s/s of aspiration with pureed, regular or nectar thick trials. Recommend continuation of current mechanical ground, no mixed consistencies, honey thick liquids. MBS to further assess for possible upgrade.  -CH --          Pharyngeal Phase Concerns    Pharyngeal Phase Concerns  multiple swallows  - --       Cough thin  - --          Swallowing Quality of Life Assessment    Education and counseling provided -- Signs of aspiration;Silent aspiration;Risks of aspiration;Safest diet options  -          SLP Evaluation Clinical Impression    SLP Swallowing Diagnosis suspect acute-on-chronic;pharyngeal dysphagia;functional oral phase  - --       Functional Impact risk of aspiration/pneumonia  - --       Rehab Potential/Prognosis, Swallowing good, to achieve stated therapy goals  - --       Swallow Criteria for Skilled Therapeutic Interventions Met demonstrates skilled criteria  - --          SLP Treatment Clinical Impressions    Treatment Assessment (SLP) -- continued;clinical signs of;aspiration;suspected;oral dysphagia;pharyngeal dysphagia  -       Daily Summary of Progress (SLP) -- progress toward functional goals as expected  -       Barriers to Overall Progress (SLP) -- Cognitive status  -       Plan for Continued Treatment (SLP) -- continue treatment per plan of care  -       Care Plan Review -- evaluation/treatment results reviewed;care plan/treatment goals reviewed;risks/benefits reviewed;current/potential barriers reviewed;patient/other agree to care plan  -       Care Plan Review, Other Participant(s) -- spouse  -          Recommendations    Therapy Frequency (Swallow) evaluation only  - --       Predicted Duration Therapy Intervention (Days) until discharge  - --       SLP Diet Recommendation mechanical ground textures;no mixed consistencies;honey thick liquids  - --       Recommended Diagnostics reassess via VFSS (MBS)  - --       Recommended Precautions and Strategies upright posture during/after eating;general aspiration precautions;other (see comments)  encourage 2nd dry swallow intermittently  - --       Oral Care Recommendations Oral Care BID/PRN  - --       SLP Rec. for Method of Medication Administration meds whole;meds crushed;with puree;as  tolerated  -CH --       Monitor for Signs of Aspiration yes;notify SLP if any concerns;cough;throat clearing  - --       Anticipated Discharge Disposition (SLP) anticipate therapy at next level of care;skilled nursing facility  - anticipate therapy at next level of care;skilled nursing facility  -                 User Key  (r) = Recorded By, (t) = Taken By, (c) = Cosigned By      Initials Name Effective Dates    AC Mireya Santos MS [facility]-SLP 02/03/23 -      Juhi Landeros MS CCC-SLP 06/16/21 -                     EDUCATION  The patient has been educated in the following areas:   Home Exercise Program (HEP) Dysphagia (Swallowing Impairment) Oral Care/Hydration Modified Diet Instruction.        SLP GOALS       Row Name 06/09/23 1545             (LTG) Patient will demonstrate functional swallow for    Diet Texture (Demonstrate functional swallow) regular textures  -AC      Liquid viscosity (Demonstrate functional swallow) nectar/ mildly thick liquids  -AC      [place] (Demonstrate functional swallow) with minimal cues (75-90% accuracy)  -AC      Time Frame (Demonstrate functional swallow) by discharge  -AC      Progress/Outcomes (Demonstrate functional swallow) continuing progress toward goal  -AC         (STG) Patient will tolerate trials of    Consistencies Trialed (Tolerate trials) mechanical ground textures;honey/ moderately thick liquids  -AC      Desired Outcome (Tolerate trials) without signs/symptoms of aspiration;with adequate oral prep/transit/clearance;with use of compensatory strategies (see comments)  2nd swallow, general precautions  -AC      [place] (Tolerate trials) with minimal cues (75-90% accuracy)  -AC      Time Frame (Tolerate trials) by discharge  -AC      Progress/Outcomes (Tolerate trials) goal ongoing  -AC         (STG) Patient will tolerate therapeutic trials of    Consistencies Trialed (Tolerate therapeutic trials) thin liquids;nectar/ mildly thick liquids  -AC       Desired Outcome (Tolerate therapeutic trials) without signs/symptoms of aspiration  check for cough/throat clear--may be very weak  -AC      [place] (Tolerate therapeutic trials) with 1:1 assist/ supervision  -AC      Time Frame (Tolerate therapeutic trials) by discharge  -AC      Progress/Outcomes (Tolerate therapeutic trials) continuing progress toward goal  -AC      Comment (Tolerate therapeutic trials) Excessive & prolonged cough/throat clear w/ thin via tsp.  -AC         (STG) Lingual Strengthening Goal 1 (SLP)    Increase Tongue Back Strength lingual resistance exercises  -AC      [place]/Accuracy (Lingual Strengthening Goal 1, SLP) with moderate cues (50-74% accuracy)  -AC      Time Frame (Lingual Strengthening Goal 1, SLP) short term goal (STG)  -AC      Progress/Outcomes (Lingual Strengthening Goal 1, SLP) goal ongoing  -AC      Comment (Lingual Strengthening Goal 1, SLP) Provided handout/explanation/rationale for independent practice.  -AC         (STG) Pharyngeal Strengthening Exercise Goal 1 (SLP)    Increase Timing prepping - 3 second prep or suck swallow or 3-step swallow  -AC      Increase Superior Movement of the Hyolaryngeal Complex effortful pitch glide (falsetto + pharyngeal squeeze)  -AC      Increase Closure at Entrance to Airway/Closure of Airway at Glottis supraglottic swallow  -AC      Increase Squeeze/Positive Pressure Generation hard effortful swallow  -AC      [place]/Accuracy (Pharyngeal Strengthening Goal 1, SLP) with moderate cues (50-74% accuracy)  -AC      Time Frame (Pharyngeal Strengthening Goal 1, SLP) short term goal (STG)  -AC      Progress/Outcomes (Pharyngeal Strengthening Goal 1, SLP) continuing progress toward goal  -AC      Comment (Pharyngeal Strengthening Goal 1, SLP) Pt able to demonstrate effortful pitch glide x3 w/ min cues + SGS x2 w/ max cues. Provided handout & encouraged independent practice.  -AC                User Key  (r) = Recorded By, (t) =  Taken By, (c) = Cosigned By      Initials Name Provider Type    Mireya Youssef MS CCC-SLP Speech and Language Pathologist                       Time Calculation:    Time Calculation- SLP       Row Name 06/11/23 1626             Time Calculation- SLP    SLP Start Time 1600  -CH      SLP Received On 06/11/23  -CH         Untimed Charges    SLP Eval/Re-eval  ST Eval Oral Pharyng Swallow - 00714  -CH      90712-IR Eval Oral Pharyng Swallow Minutes 45  -CH         Total Minutes    Untimed Charges Total Minutes 45  -CH       Total Minutes 45  -CH                User Key  (r) = Recorded By, (t) = Taken By, (c) = Cosigned By      Initials Name Provider Type     Juhi Landeros MS CCC-SLP Speech and Language Pathologist                    Therapy Charges for Today       Code Description Service Date Service Provider Modifiers Qty    [id number]  ST EVAL ORAL PHARYNG SWALLOW 3 6/11/2023 Juhi Landeros MS CCC-SLP GN 1                 Juhi Landeros MS CCC-MINH  6/11/2023

## 2023-06-11 NOTE — PROGRESS NOTES
"   LOS: 1 day    Patient Care Team:  Blair Dhaliwal MD as PCP - General (Internal Medicine)    Chief Complaint:  Recurrent fall     Subjective     Interval History:     No acute events overnight. No new complaints       Review of Systems:   No CP or SOA , fever, chills, rigors, rash, N/V, Constipation.       Objective     Vital Sign Min/Max for last 24 hours  Temp  Min: 97.7 °F (36.5 °C)  Max: 98.1 °F (36.7 °C)   BP  Min: 101/61  Max: 144/83   Pulse  Min: 76  Max: 82   Resp  Min: 16  Max: 16   SpO2  Min: 95 %  Max: 95 %   No data recorded   No data recorded     Flowsheet Rows      Flowsheet Row First Filed Value   Admission Height 167.6 cm (66\") Documented at 06/05/2023 0119   Admission Weight 58 kg (127 lb 13.9 oz) Documented at 06/05/2023 0119            No intake/output data recorded.  No intake/output data recorded.    Physical Exam:    Gen: Alert, NAD   HENT: NC, AT, EOMI   NECK: Supple, no JVD, Trachea midline   LUNGS: CTA bilaterally, non labored respirtation   CVS: S1/S2 audible, RRR, no murmur   Abd: Soft, NT, ND, BS+   Ext: No pedal edema, no cyanosis   CNS: Alert, No focal deficit noted grossly  Psy: Cooperative  Skin: Warm, dry and intact      WBC WBC   Date Value Ref Range Status   06/10/2023 7.30 3.40 - 10.80 10*3/mm3 Final   06/08/2023 7.88 3.40 - 10.80 10*3/mm3 Final      HGB Hemoglobin   Date Value Ref Range Status   06/10/2023 9.3 (L) 13.0 - 17.7 g/dL Final   06/08/2023 9.6 (L) 13.0 - 17.7 g/dL Final      HCT Hematocrit   Date Value Ref Range Status   06/10/2023 29.7 (L) 37.5 - 51.0 % Final   06/08/2023 31.5 (L) 37.5 - 51.0 % Final      Platlets No results found for: LABPLAT   MCV MCV   Date Value Ref Range Status   06/10/2023 99.7 (H) 79.0 - 97.0 fL Final   06/08/2023 101.3 (H) 79.0 - 97.0 fL Final          Sodium Sodium   Date Value Ref Range Status   06/10/2023 133 (L) 136 - 145 mmol/L Final   06/08/2023 133 (L) 136 - 145 mmol/L Final      Potassium Potassium   Date Value Ref Range Status "   06/10/2023 4.6 3.5 - 5.2 mmol/L Final   06/08/2023 4.8 3.5 - 5.2 mmol/L Final     Comment:     Slight hemolysis detected by analyzer. Results may be affected.      Chloride Chloride   Date Value Ref Range Status   06/10/2023 99 98 - 107 mmol/L Final   06/08/2023 98 98 - 107 mmol/L Final      CO2 CO2   Date Value Ref Range Status   06/10/2023 23.0 22.0 - 29.0 mmol/L Final   06/08/2023 20.0 (L) 22.0 - 29.0 mmol/L Final      BUN BUN   Date Value Ref Range Status   06/10/2023 17 8 - 23 mg/dL Final   06/08/2023 20 8 - 23 mg/dL Final      Creatinine Creatinine   Date Value Ref Range Status   06/10/2023 2.56 (H) 0.76 - 1.27 mg/dL Final   06/08/2023 3.04 (H) 0.76 - 1.27 mg/dL Final      Calcium Calcium   Date Value Ref Range Status   06/10/2023 9.1 8.6 - 10.5 mg/dL Final   06/08/2023 9.5 8.6 - 10.5 mg/dL Final      PO4 No results found for: CAPO4   Albumin No results found for: ALBUMIN     Magnesium No results found for: MG     Uric Acid No results found for: URICACID        Results Review:     I reviewed the patient's new clinical results.    atorvastatin, 20 mg, Oral, Daily  finasteride, 5 mg, Oral, Daily  fluticasone, 1 spray, Nasal, Daily  folic acid, 1 mg, Oral, Daily  melatonin, 5 mg, Oral, Nightly  pantoprazole, 40 mg, Oral, Q AM  senna-docusate sodium, 2 tablet, Oral, BID  sertraline, 50 mg, Oral, Daily  sodium chloride, 10 mL, Intravenous, Q12H           Medication Review: yes    Assessment & Plan       Compression fracture of C7 vertebra, initial encounter    (HFpEF) heart failure with preserved ejection fraction    Benign prostatic hyperplasia    Closed fracture of left acetabulum    Encephalopathy    ESRD (end stage renal disease)    Essential hypertension    GERD (gastroesophageal reflux disease)    Mixed hyperlipidemia    NERY (obstructive sleep apnea)    Type 2 diabetes mellitus    Fall    Severe Malnutrition (HCC)      1- ESRD -MWF   2-HTN   3- Anemia of chronic disease   4- Hyponatremia     Plan:  - HD  tomorrow , UF as tolerated.   - Renal diet   - JENNYFER with HD   - Electrolytes will be corrected with HD         Cristy Coyne MD  06/11/23  09:29 EDT

## 2023-06-11 NOTE — PLAN OF CARE
Goal Outcome Evaluation:  Plan of Care Reviewed With: patient        Progress: improving  Outcome Evaluation: Pt required less assist with sidelying to sit this date and demonstrated appropriate spinal precautions with mobility. Pt continues to require education re: importance of upright and OOB mobility and positioning in rehab process. Pt will continue to benefit from PT to address ongoing deficits in strength, balance, and endurance, and return to PLOF.

## 2023-06-11 NOTE — PROGRESS NOTES
[facility] Medicine Services  PROGRESS NOTE    Patient Name: Enrike Tomas  : 1935  MRN: [id number]    Date of Admission: 2023  Primary Care Physician: Blair Dhaliwal MD    Subjective   Subjective     CC:  F/U C spine Fx     HPI:  6/10 - No new concerns today.  Wearing c-collar.  Tolerating p.o.  Awaiting rehab, anticipate insurance/bed for Monday.  Patient tearful and states he has been dealing with depression.     -patient's wife is at bedside and his mood is much improved today.  He slept well with the Ambien last night.  We discussed metoprolol he has been on for a few months for A-fib and the risk of hypotension.  We will discontinue it and monitor heart rate.  If he has tachycardia with A-fib will need alternative rate controlling medication.  He was able to work with therapy today.  Planning on discharge to rehab early next week.  Of note his wife brought him regular coffee that was not thickened and he was drinking it at the bedside.  Patient reported to nursing that he was never diagnosed as diabetic.    ROS:  Gen- No fevers, chills  CV- No chest pain, palpitations  Resp- No cough, dyspnea  GI- No N/V/D, abd pain    Objective   Objective     Vital Signs:   Temp:  [97.7 °F (36.5 °C)-98.1 °F (36.7 °C)] 98 °F (36.7 °C)  Heart Rate:  [80-82] 80  Resp:  [16] 16  BP: (105-144)/(58-83) 112/66     Physical Exam:  Constitutional: No acute distress, awake, alert  HENT: NCAT, mucous membranes moist; wearing c-collar  Respiratory: Clear to auscultation bilaterally, respiratory effort normal   Cardiovascular: RRR  Gastrointestinal: Positive bowel sounds, soft, nontender, nondistended  Musculoskeletal: No bilateral ankle edema; wearing c-collar  Psychiatric: Appropriate affect, cooperative  Neurologic: Oriented x 3, strength symmetric in all extremities, Cranial Nerves grossly intact to confrontation, speech clear  Skin: No rashes      Results Reviewed:  LAB RESULTS:      Lab  06/10/23  0437 06/08/23  1018 06/07/23  0732 06/05/23  1541 06/05/23  0324   WBC 7.30 7.88 8.60 7.22 8.79   HEMOGLOBIN 9.3* 9.6* 9.0* 8.1* 7.7*   HEMATOCRIT 29.7* 31.5* 29.2* 25.7* 24.7*   PLATELETS 293 330 377 329 300   NEUTROS ABS 5.08  --   --   --  6.70   IMMATURE GRANS (ABS) 0.05  --   --   --  0.11*   LYMPHS ABS 1.06  --   --   --  1.06   MONOS ABS 0.75  --   --   --  0.72   EOS ABS 0.31  --   --   --  0.16   MCV 99.7* 101.3* 98.6* 97.0 96.1         Lab 06/10/23  0437 06/08/23  1018 06/07/23  0732 06/06/23  1322 06/06/23  0011 06/05/23  0514 06/05/23  0324   SODIUM 133* 133* 132*  --   --  135* 135*   POTASSIUM 4.6 4.8 4.9  --   --  4.5 4.6   CHLORIDE 99 98 94*  --   --  94* 93*   CO2 23.0 20.0* 26.0  --   --  28.0 29.0   ANION GAP 11.0 15.0 12.0  --   --  13.0 13.0   BUN 17 20 30*  --   --  37* 38*   CREATININE 2.56* 3.04* 3.79*  --   --  4.74* 4.50*   EGFR 23.4* 19.1* 14.6*  --   --  11.2* 11.9*   GLUCOSE 89 122* 99  --   --  107* 115*   CALCIUM 9.1 9.5 9.9  --   --  8.8 9.1   MAGNESIUM  --   --   --   --  2.3  --  1.5*   HEMOGLOBIN A1C  --   --   --  5.20  --   --   --          Lab 06/05/23  0324   TOTAL PROTEIN 6.4   ALBUMIN 3.1*   GLOBULIN 3.3   ALT (SGPT) 19   AST (SGOT) 25   BILIRUBIN 0.3   ALK PHOS 200*         Lab 06/05/23  0514 06/05/23 0324   HSTROP T 182* 185*             Lab 06/05/23  1541 06/05/23 0324   IRON  --  54*   IRON SATURATION (TSAT)  --  25   TIBC  --  216*   TRANSFERRIN  --  145*   FOLATE 7.80  --    VITAMIN B 12 1,484*  --          Brief Urine Lab Results  (Last result in the past 365 days)        Color   Clarity   Blood   Leuk Est   Nitrite   Protein   CREAT   Urine HCG        06/05/23 2018 Yellow   Clear   Negative   Negative   Negative   >=300 mg/dL (3+)                   Microbiology Results Abnormal       Procedure Component Value - Date/Time    COVID PRE-OP / PRE-PROCEDURE SCREENING ORDER (NO ISOLATION) - Swab, Nasopharynx [[id number]]  (Normal) Collected: 06/05/23 0324    Lab  Status: Final result Specimen: Swab from Nasopharynx Updated: 06/05/23 0402    Narrative:      The following orders were created for panel order COVID PRE-OP / PRE-PROCEDURE SCREENING ORDER (NO ISOLATION) - Swab, Nasopharynx.  Procedure                               Abnormality         Status                     ---------                               -----------         ------                     COVID-19 and FLU A/B PCR...[[id number]]  Normal              Final result                 Please view results for these tests on the individual orders.    COVID-19 and FLU A/B PCR - Swab, Nasopharynx [[id number]]  (Normal) Collected: 06/05/23 0324    Lab Status: Final result Specimen: Swab from Nasopharynx Updated: 06/05/23 0402     COVID19 Not Detected     Influenza A PCR Not Detected     Influenza B PCR Not Detected    Narrative:      Fact sheet for providers: https://www.fda.gov/media/728234/download    Fact sheet for patients: https://www.fda.gov/media/952339/download    Test performed by PCR.            No radiology results from the last 24 hrs        Current medications:  Scheduled Meds:apixaban, 2.5 mg, Oral, BID  atorvastatin, 20 mg, Oral, Daily  finasteride, 5 mg, Oral, Daily  fluticasone, 1 spray, Nasal, Daily  folic acid, 1 mg, Oral, Daily  melatonin, 5 mg, Oral, Nightly  pantoprazole, 40 mg, Oral, Q AM  senna-docusate sodium, 2 tablet, Oral, BID  sertraline, 50 mg, Oral, Daily  sodium chloride, 10 mL, Intravenous, Q12H      Continuous Infusions:   PRN Meds:.  acetaminophen **OR** acetaminophen **OR** acetaminophen    senna-docusate sodium **AND** polyethylene glycol **AND** bisacodyl **AND** bisacodyl    HYDROcodone-acetaminophen    Magnesium Low Dose Replacement - Follow Nurse / BPA Driven Protocol    [COMPLETED] Insert Peripheral IV **AND** sodium chloride    sodium chloride    sodium chloride    zolpidem    Assessment & Plan   Assessment & Plan     Active Hospital Problems    Diagnosis  POA    **Compression  fracture of C7 vertebra, initial encounter [S12.690A]  Yes    Severe Malnutrition (HCC) [E43]  Yes    Fall [W19.XXXA]  Yes    Encephalopathy [G93.40]  Yes    Closed fracture of left acetabulum [S32.402A]  Yes    NERY (obstructive sleep apnea) [G47.33]  Yes    (HFpEF) heart failure with preserved ejection fraction [I50.30]  Yes    Essential hypertension [I10]  Yes    ESRD (end stage renal disease) [N18.6]  Yes    Benign prostatic hyperplasia [N40.0]  Yes    GERD (gastroesophageal reflux disease) [K21.9]  Yes    Mixed hyperlipidemia [E78.2]  Yes      Resolved Hospital Problems   No resolved problems to display.        Brief Hospital Course to date:  Enrike Tomas is 88 y.o. male with PMH significant for ESRD on HD M, W, F, A-fib on Eliquis, GERD, BPH, HTN, HLD, orthostatic hypotension, DM2, depression, who presents to the ED with complaint of fall and neck pain who was found to have concern for acute compression fracture of C7.    This patient's problems and plans were partially entered by my partner and updated as appropriate by me 06/11/23.     Assessment/plan:    Compression fracture of C7  Recurrent falls  Recent T7 Fx  -Neurosurgery has evaluated. Plan for semirigid cervical collar for 8 to 10 weeks when up and ambulating   -PT/OT recommend rehab, CM following, plan is LCP.  CM following for disposition/rehab referrals.  -Pain control PRN  -Follow-up with NSGY outpatient   --Pain fairly well controlled.     ESRD  - On HD M, W, F  - Nephrology following   --Patient currently has dialysis at [facility] on Monday/Wednesday/Fridays at 10:15 AM.  CM following.     Afib  -Initially eliquis due to falls, anemia  -Resume Eliquis 6/11/2023  -Discontinued metoprolol secondary to hypotension  -If needs another rate controlling drug would consider cardiology consult     Anemia  -Iron studies consistent with AoCD  -Folate low normal, will supplement   -Hemoccult negative  -Initially held Eliquis, resumed on 6/11/2023.  --Monitor  labs.  H/H stable around 9.6/31.5     Diabetes?  -Patient denies history of diabetes  -Removed from problem list  -A1c's reviewed 4.8 and 5.2     Hypertension  Hyperlipidemia  History of orthostatic hypotension  - discontinued metoprolol 6/11/2023  - Continue atorvastatin  - Orthostatic hypotension likely playing a role in frequent falls  --Continue to monitor.     BPH  - Continue Proscar     GERD  - Continue PPI     Depression  Insomnia  - Continue Zoloft  -Tolerated Ambien well    Moderate Pharyngeal Dysphagia, Mild-Mod oral dysphagia   -SLP has evaluated with [facility], see diet recs   --Tolerating diet  -Reassess 6/11/2023     DVT prophylaxis: Resumed Eliquis 6/11/2023    Expected Discharge Location and Transportation: [facility]/rehab pending insurance precert    Expected Discharge   Expected Discharge Date: 6/12/2023; Expected Discharge Time:      DVT prophylaxis:  Medical and mechanical DVT prophylaxis orders are present.     AM-PAC 6 Clicks Score (PT): 19 (06/11/23 1009)    CODE STATUS:   Code Status and Medical Interventions:   Ordered at: 06/05/23 0611     Medical Intervention Limits:    NO intubation (DNI)     Code Status (Patient has no pulse and is not breathing):    No CPR (Do Not Attempt to Resuscitate)     Medical Interventions (Patient has pulse or is breathing):    Limited Support       Zoe Hurtado MD  06/11/23

## 2023-06-12 ENCOUNTER — APPOINTMENT (OUTPATIENT)
Dept: NEPHROLOGY | Facility: HOSPITAL | Age: 88
End: 2023-06-12
Payer: MEDICARE

## 2023-06-12 ENCOUNTER — APPOINTMENT (OUTPATIENT)
Dept: GENERAL RADIOLOGY | Facility: HOSPITAL | Age: 88
End: 2023-06-12
Payer: MEDICARE

## 2023-06-12 PROCEDURE — G0378 HOSPITAL OBSERVATION PER HR: HCPCS

## 2023-06-12 PROCEDURE — G0257 UNSCHED DIALYSIS ESRD PT HOS: HCPCS

## 2023-06-12 PROCEDURE — 99232 SBSQ HOSP IP/OBS MODERATE 35: CPT | Performed by: INTERNAL MEDICINE

## 2023-06-12 PROCEDURE — 74230 X-RAY XM SWLNG FUNCJ C+: CPT

## 2023-06-12 PROCEDURE — 92611 MOTION FLUOROSCOPY/SWALLOW: CPT

## 2023-06-12 RX ADMIN — ZOLPIDEM TARTRATE 5 MG: 5 TABLET ORAL at 20:14

## 2023-06-12 RX ADMIN — FLUTICASONE PROPIONATE 1 SPRAY: 50 SPRAY, METERED NASAL at 15:34

## 2023-06-12 RX ADMIN — Medication 5 MG: at 20:14

## 2023-06-12 RX ADMIN — SERTRALINE 50 MG: 50 TABLET, FILM COATED ORAL at 15:34

## 2023-06-12 RX ADMIN — Medication 10 ML: at 15:35

## 2023-06-12 RX ADMIN — Medication 10 ML: at 20:14

## 2023-06-12 RX ADMIN — FINASTERIDE 5 MG: 5 TABLET, FILM COATED ORAL at 15:34

## 2023-06-12 RX ADMIN — BARIUM SULFATE 20 ML: 400 PASTE ORAL at 14:02

## 2023-06-12 RX ADMIN — PANTOPRAZOLE SODIUM 40 MG: 40 TABLET, DELAYED RELEASE ORAL at 05:32

## 2023-06-12 RX ADMIN — APIXABAN 2.5 MG: 2.5 TABLET, FILM COATED ORAL at 20:14

## 2023-06-12 RX ADMIN — ATORVASTATIN CALCIUM 20 MG: 20 TABLET, FILM COATED ORAL at 15:34

## 2023-06-12 RX ADMIN — FOLIC ACID 1 MG: 1 TABLET ORAL at 15:34

## 2023-06-12 RX ADMIN — BARIUM SULFATE 100 ML: 0.81 POWDER, FOR SUSPENSION ORAL at 14:02

## 2023-06-12 RX ADMIN — ACETAMINOPHEN 650 MG: 325 TABLET ORAL at 18:10

## 2023-06-12 RX ADMIN — APIXABAN 2.5 MG: 2.5 TABLET, FILM COATED ORAL at 15:34

## 2023-06-12 RX ADMIN — BARIUM SULFATE 50 ML: 400 SUSPENSION ORAL at 14:02

## 2023-06-12 NOTE — CASE MANAGEMENT/SOCIAL WORK
Continued Stay Note  [facility]     Patient Name: Enrike Tomas  MRN: [id number]  Today's Date: 6/12/2023    Admit Date: 6/5/2023    Plan: [facility]   Discharge Plan       Row Name 06/12/23 1532       Plan    Plan [facility]    Patient/Family in Agreement with Plan yes    Plan Comments Spoke with Ms. Tomas's wife and daughter by telephone. Discussed that we are still waiting for insurance approval for [facility]. Family agreeable with discharge plan. CM will continue to follow.    Final Discharge Disposition Code 03 - skilled nursing facility (SNF)                   Discharge Codes    No documentation.                 Expected Discharge Date and Time       Expected Discharge Date Expected Discharge Time    Jun 14, 2023               Rekha Navarrete RN

## 2023-06-12 NOTE — PROGRESS NOTES
"   LOS: 1 day    Patient Care Team:  Blair Dhaliwal MD as PCP - General (Internal Medicine)    Chief Complaint:  Recurrent fall     Subjective     Interval History:     No acute events overnight. No new complaints       Review of Systems:   No CP or SOA , fever, chills, rigors, rash, N/V, Constipation.       Objective     Vital Sign Min/Max for last 24 hours  Temp  Min: 96.2 °F (35.7 °C)  Max: 98 °F (36.7 °C)   BP  Min: 97/73  Max: 143/76   Pulse  Min: 82  Max: 87   Resp  Min: 16  Max: 16   SpO2  Min: 100 %  Max: 100 %   No data recorded   No data recorded     Flowsheet Rows      Flowsheet Row First Filed Value   Admission Height 167.6 cm (66\") Documented at 06/05/2023 0119   Admission Weight 58 kg (127 lb 13.9 oz) Documented at 06/05/2023 0119            No intake/output data recorded.  No intake/output data recorded.    Physical Exam:    Gen: Alert, NAD   HENT: NC, AT, EOMI   NECK: Supple, no JVD, Trachea midline   LUNGS: CTA bilaterally, non labored respirtation   CVS: S1/S2 audible, RRR, no murmur   Abd: Soft, NT, ND, BS+   Ext: No pedal edema, no cyanosis   CNS: Alert, No focal deficit noted grossly  Psy: Cooperative  Skin: Warm, dry and intact      WBC WBC   Date Value Ref Range Status   06/10/2023 7.30 3.40 - 10.80 10*3/mm3 Final      HGB Hemoglobin   Date Value Ref Range Status   06/10/2023 9.3 (L) 13.0 - 17.7 g/dL Final      HCT Hematocrit   Date Value Ref Range Status   06/10/2023 29.7 (L) 37.5 - 51.0 % Final      Platlets No results found for: LABPLAT   MCV MCV   Date Value Ref Range Status   06/10/2023 99.7 (H) 79.0 - 97.0 fL Final          Sodium Sodium   Date Value Ref Range Status   06/10/2023 133 (L) 136 - 145 mmol/L Final      Potassium Potassium   Date Value Ref Range Status   06/10/2023 4.6 3.5 - 5.2 mmol/L Final      Chloride Chloride   Date Value Ref Range Status   06/10/2023 99 98 - 107 mmol/L Final      CO2 CO2   Date Value Ref Range Status   06/10/2023 23.0 22.0 - 29.0 mmol/L Final    "   BUN BUN   Date Value Ref Range Status   06/10/2023 17 8 - 23 mg/dL Final      Creatinine Creatinine   Date Value Ref Range Status   06/10/2023 2.56 (H) 0.76 - 1.27 mg/dL Final      Calcium Calcium   Date Value Ref Range Status   06/10/2023 9.1 8.6 - 10.5 mg/dL Final      PO4 No results found for: CAPO4   Albumin No results found for: ALBUMIN     Magnesium No results found for: MG     Uric Acid No results found for: URICACID        Results Review:     I reviewed the patient's new clinical results.    apixaban, 2.5 mg, Oral, BID  atorvastatin, 20 mg, Oral, Daily  finasteride, 5 mg, Oral, Daily  fluticasone, 1 spray, Nasal, Daily  folic acid, 1 mg, Oral, Daily  melatonin, 5 mg, Oral, Nightly  pantoprazole, 40 mg, Oral, Q AM  senna-docusate sodium, 2 tablet, Oral, BID  sertraline, 50 mg, Oral, Daily  sodium chloride, 10 mL, Intravenous, Q12H           Medication Review: yes    Assessment & Plan       Compression fracture of C7 vertebra, initial encounter    (HFpEF) heart failure with preserved ejection fraction    Benign prostatic hyperplasia    Closed fracture of left acetabulum    Encephalopathy    ESRD (end stage renal disease)    Essential hypertension    GERD (gastroesophageal reflux disease)    Mixed hyperlipidemia    NERY (obstructive sleep apnea)    Fall    Severe Malnutrition (HCC)      1- ESRD -MWF   2-HTN   3- Anemia of chronic disease   4- Hyponatremia     Plan:  - Patient seen on dialysis, UF as tolerated.   - Renal diet   - JENNYFER with HD   - Electrolytes will be corrected with HD         Cristy Coyne MD  06/12/23  09:21 EDT

## 2023-06-12 NOTE — PLAN OF CARE
Goal Outcome Evaluation:   SLP evaluation completed. Will continue to address dysphagia. Please see note for further details and recommendations.

## 2023-06-12 NOTE — DISCHARGE PLACEMENT REQUEST
"Enrike Ayala (88 y.o. Male)     LEONARDO Navarrete BSN, RN  [phone]  Clinical notes    ATTN: [id number]        Date of Birth   1935    Social Security Number       Address   [address] [place], [place] [address]    Home Phone   [phone]    MRN   [id number]       Mormonism   Jewish    Marital Status                               Admission Date   6/5/23    Admission Type   Emergency    Admitting Provider   Lilibeth Johnson II, DO    Attending Provider   Lilibeth Johnson II, DO    Department, Room/Bed   [facility] 3F, S325/1       Discharge Date       Discharge Disposition       Discharge Destination                                 Attending Provider: Lilibeth Johnson II, DO    Allergies: No Known Allergies    Isolation: None   Infection: None   Code Status: No CPR    Ht: 167.6 cm (66\")   Wt: 58.9 kg (129 lb 13.6 oz)    Admission Cmt: None   Principal Problem: Compression fracture of C7 vertebra, initial encounter [S12.690A]                   Active Insurance as of 6/5/2023       Primary Coverage       Payor Plan Insurance Group Employer/Plan Group    [facility] MEDICARE REPLACEMENT [facility] MEDICARE REPLACEMENT [id number]       Payor Plan Address Payor Plan Phone Number Payor Plan Fax Number Effective Dates    [address] [address]   1/1/2023 - None Entered    [facility] [id number]         Subscriber Name Subscriber Birth Date Member ID       ENRIKE AYALA 1935 [id number]                     Emergency Contacts        (Rel.) Home Phone Work Phone Mobile Phone    RICHARD AYALA (Spouse) [phone] -- [phone]    LUCYLUAN (Daughter) [phone] -- [phone]                 Physician Progress Notes (most recent note)        Cristy Coyne MD at 06/12/23 0921             LOS: 1 day    Patient Care Team:  Blair Dhaliwal MD as PCP - General (Internal Medicine)    Chief Complaint:  Recurrent fall     Subjective     Interval History: " "    No acute events overnight. No new complaints       Review of Systems:   No CP or SOA , fever, chills, rigors, rash, N/V, Constipation.       Objective     Vital Sign Min/Max for last 24 hours  Temp  Min: 96.2 °F (35.7 °C)  Max: 98 °F (36.7 °C)   BP  Min: 97/73  Max: 143/76   Pulse  Min: 82  Max: 87   Resp  Min: 16  Max: 16   SpO2  Min: 100 %  Max: 100 %   No data recorded   No data recorded     Flowsheet Rows      Flowsheet Row First Filed Value   Admission Height 167.6 cm (66\") Documented at 06/05/2023 0119   Admission Weight 58 kg (127 lb 13.9 oz) Documented at 06/05/2023 0119            No intake/output data recorded.  No intake/output data recorded.    Physical Exam:    Gen: Alert, NAD   HENT: NC, AT, EOMI   NECK: Supple, no JVD, Trachea midline   LUNGS: CTA bilaterally, non labored respirtation   CVS: S1/S2 audible, RRR, no murmur   Abd: Soft, NT, ND, BS+   Ext: No pedal edema, no cyanosis   CNS: Alert, No focal deficit noted grossly  Psy: Cooperative  Skin: Warm, dry and intact      WBC WBC   Date Value Ref Range Status   06/10/2023 7.30 3.40 - 10.80 10*3/mm3 Final      HGB Hemoglobin   Date Value Ref Range Status   06/10/2023 9.3 (L) 13.0 - 17.7 g/dL Final      HCT Hematocrit   Date Value Ref Range Status   06/10/2023 29.7 (L) 37.5 - 51.0 % Final      Platlets No results found for: LABPLAT   MCV MCV   Date Value Ref Range Status   06/10/2023 99.7 (H) 79.0 - 97.0 fL Final          Sodium Sodium   Date Value Ref Range Status   06/10/2023 133 (L) 136 - 145 mmol/L Final      Potassium Potassium   Date Value Ref Range Status   06/10/2023 4.6 3.5 - 5.2 mmol/L Final      Chloride Chloride   Date Value Ref Range Status   06/10/2023 99 98 - 107 mmol/L Final      CO2 CO2   Date Value Ref Range Status   06/10/2023 23.0 22.0 - 29.0 mmol/L Final      BUN BUN   Date Value Ref Range Status   06/10/2023 17 8 - 23 mg/dL Final      Creatinine Creatinine   Date Value Ref Range Status   06/10/2023 2.56 (H) 0.76 - 1.27 mg/dL " Final      Calcium Calcium   Date Value Ref Range Status   06/10/2023 9.1 8.6 - 10.5 mg/dL Final      PO4 No results found for: CAPO4   Albumin No results found for: ALBUMIN     Magnesium No results found for: MG     Uric Acid No results found for: URICACID        Results Review:     I reviewed the patient's new clinical results.    apixaban, 2.5 mg, Oral, BID  atorvastatin, 20 mg, Oral, Daily  finasteride, 5 mg, Oral, Daily  fluticasone, 1 spray, Nasal, Daily  folic acid, 1 mg, Oral, Daily  melatonin, 5 mg, Oral, Nightly  pantoprazole, 40 mg, Oral, Q AM  senna-docusate sodium, 2 tablet, Oral, BID  sertraline, 50 mg, Oral, Daily  sodium chloride, 10 mL, Intravenous, Q12H           Medication Review: yes    Assessment & Plan       Compression fracture of C7 vertebra, initial encounter    (HFpEF) heart failure with preserved ejection fraction    Benign prostatic hyperplasia    Closed fracture of left acetabulum    Encephalopathy    ESRD (end stage renal disease)    Essential hypertension    GERD (gastroesophageal reflux disease)    Mixed hyperlipidemia    NERY (obstructive sleep apnea)    Fall    Severe Malnutrition (HCC)      1- ESRD -MWF   2-HTN   3- Anemia of chronic disease   4- Hyponatremia     Plan:  - Patient seen on dialysis, UF as tolerated.   - Renal diet   - JENNYFER with HD   - Electrolytes will be corrected with HD         Cristy Coyne MD  23  09:21 EDT        Electronically signed by Cristy Coyne MD at 23 0921          Physical Therapy Notes (most recent note)        Nazia Murray, PT at 23 0920  Version 1 of 1         Patient Name: Enrike Tomas  : 1935    MRN: [id number]                              Today's Date: 2023       Admit Date: 2023    Visit Dx:     ICD-10-CM ICD-9-CM   1. Compression fracture of C7 vertebra, initial encounter  S12.690A 805.07   2. Closed head injury, initial encounter  S09.90XA 959.01   3. ESRD on hemodialysis  N18.6 585.6    Z99.2  V45.11   4. Oropharyngeal dysphagia  R13.12 787.22     Patient Active Problem List   Diagnosis    Compression fracture of C7 vertebra, initial encounter    (HFpEF) heart failure with preserved ejection fraction    Anemia of renal disease    At high risk for falls    Benign prostatic hyperplasia    Closed fracture of left acetabulum    Compression fracture of thoracic vertebra    Deficiency of other specified B group vitamins    Depression    Encephalopathy    ESRD (end stage renal disease)    Essential hypertension    GERD (gastroesophageal reflux disease)    Hepatic cyst    Long term (current) use of anticoagulants    Major depressive disorder, single episode, moderate    Mixed hyperlipidemia    Morgagni hernia    Occlusion of right vertebral artery    NERY (obstructive sleep apnea)    Type 2 diabetes mellitus    Fall    Severe Malnutrition (HCC)     Past Medical History:   Diagnosis Date    Atrial fibrillation     BPH (benign prostatic hyperplasia)     Diabetes mellitus     Diverticulosis     ESRD on hemodialysis     GERD (gastroesophageal reflux disease)     Heart failure     Hyperlipidemia     Hypertension     NERY (obstructive sleep apnea)     Recurrent falls      Past Surgical History:   Procedure Laterality Date    CATARACT EXTRACTION      DIALYSIS FISTULA CREATION      INGUINAL HERNIA REPAIR      PROSTATE BIOPSY      TONSILLECTOMY AND ADENOIDECTOMY        General Information       Row Name 06/11/23 0920          Physical Therapy Time and Intention    Document Type therapy note (daily note)  -KR     Mode of Treatment individual therapy;physical therapy  -KR       Row Name 06/11/23 0920          General Information    Patient Profile Reviewed yes  -KR     Existing Precautions/Restrictions fall;cervical collar;spinal;brace worn when out of bed;other (see comments)  C7/T7 compression fx  -KR     Barriers to Rehab medically complex;previous functional deficit  -KR       Row Name 06/11/23 2319          Cognition     Orientation Status (Cognition) oriented x 4  -KR       Row Name 06/11/23 0920          Safety Issues, Functional Mobility    Safety Issues Affecting Function (Mobility) safety precaution awareness;safety precautions follow-through/compliance  -KR     Impairments Affecting Function (Mobility) balance;endurance/activity tolerance;pain;range of motion (ROM);strength;postural/trunk control  -KR               User Key  (r) = Recorded By, (t) = Taken By, (c) = Cosigned By      Initials Name Provider Type    KR Nazia Murray, PT Physical Therapist                   Mobility       Row Name 06/11/23 1004          Bed Mobility    Bed Mobility rolling left;sidelying-sit  -KR     Rolling Left [place] (Bed Mobility) supervision;verbal cues;nonverbal cues (demo/gesture)  -KR     Sidelying-Sit [place] (Bed Mobility) contact guard;nonverbal cues (demo/gesture)  -KR     Assistive Device (Bed Mobility) bed rails;head of bed elevated  -KR     Comment, (Bed Mobility) cues for safety awareness d/t pt attempting to scoot too far forward on EOB.  -KR       Row Name 06/11/23 1004          Sit-Stand Transfer    Sit-Stand [place] (Transfers) supervision  -KR     Assistive Device (Sit-Stand Transfers) walker, front-wheeled  -KR     Comment, (Sit-Stand Transfer) 1x from bed, 1x from chair.  -KR       Row Name 06/11/23 1004          Gait/Stairs (Locomotion)    [place] Level (Gait) contact guard  -KR     Assistive Device (Gait) walker, front-wheeled  -KR     Distance in Feet (Gait) 10 + 10 + 120  -KR     Deviations/Abnormal Patterns (Gait) bilateral deviations;base of support, narrow;stride length decreased  -KR     Bilateral Gait Deviations forward flexed posture;heel strike decreased;weight shift ability decreased  -KR     Comment, (Gait/Stairs) pt with one LOB upon initial ambulation into bathroom, able to recover without addt'l assist. Pt attempting to ambulate at quick pace this date and demonstrated decreased  "stability, thus cues for slowed pace and increased focus on balance. Farther distance continues to be limited by LLE fatigue. LLE with decreased toe clearance and compensatory increased hip flexion during swing phase as ambulation distance progresses d/t fatigue.  -KR               User Key  (r) = Recorded By, (t) = Taken By, (c) = Cosigned By      Initials Name Provider Type    Nazia Jones PT Physical Therapist                   Obj/Interventions       Row Name 23 1006          Balance    Balance Assessment sitting static balance;sitting dynamic balance;standing static balance;standing dynamic balance  -KR     Static Sitting Balance independent  -KR     Dynamic Sitting Balance supervision  -KR     Position, Sitting Balance unsupported;sitting in chair;sitting edge of bed  -KR     Static Standing Balance supervision  -KR     Dynamic Standing Balance contact guard  -KR     Position/Device Used, Standing Balance supported;walker, rolling  -KR     Balance Interventions other (see comments)  In unsupported standin\" wide HARRISON E.C., 30\" narrow HARRISON E.O., 30\" narrow HARRISON E.C. Increased multidirectional sway with Charu req'd with narrow HARRISON E.C.  -KR     Comment, Balance pt stood at sink unsupported while brushing teeth and hair with spv.  -KR               User Key  (r) = Recorded By, (t) = Taken By, (c) = Cosigned By      Initials Name Provider Type    Nazia Jones PT Physical Therapist                   Goals/Plan    No documentation.                  Clinical Impression       Row Name 23 1007          Pain    Pretreatment Pain Rating 0/10 - no pain  -KR     Posttreatment Pain Rating 0/10 - no pain  -KR     Pre/Posttreatment Pain Comment Pt reports no pain at rest, however pain in L shoulder with movement. Nsg aware.  -KR     Pain Intervention(s) Repositioned  -KR       Row Name 23 1007          Plan of Care Review    Plan of Care Reviewed With patient  -MYRIAM     Progress improving  -KR  "    Outcome Evaluation Pt required less assist with sidelying to sit this date and demonstrated appropriate spinal precautions with mobility. Pt continues to require education re: importance of upright and OOB mobility and positioning in rehab process. Pt will continue to benefit from PT to address ongoing deficits in strength, balance, and endurance, and return to PLOF.  -KR       Row Name 06/11/23 1007          Vital Signs    Pre Systolic BP Rehab --  VSS, RN approved tx.  -KR     O2 Delivery Pre Treatment room air  -KR     O2 Delivery Intra Treatment room air  -KR     O2 Delivery Post Treatment room air  -KR     Pre Patient Position Supine  -KR     Intra Patient Position Standing  -KR     Post Patient Position Sitting  -KR       Row Name 06/11/23 1007          Positioning and Restraints    Pre-Treatment Position in bed  -KR     Post Treatment Position chair  -KR     In Chair notified nsg;reclined;encouraged to call for assist;call light within reach;exit alarm on;with family/caregiver;waffle cushion;legs elevated  -KR               User Key  (r) = Recorded By, (t) = Taken By, (c) = Cosigned By      Initials Name Provider Type    Nazia Jones, PT Physical Therapist                   Outcome Measures       Row Name 06/11/23 1009          How much help from another person do you currently need...    Turning from your back to your side while in flat bed without using bedrails? 3  -KR     Moving from lying on back to sitting on the side of a flat bed without bedrails? 3  -KR     Moving to and from a bed to a chair (including a wheelchair)? 3  -KR     Standing up from a chair using your arms (e.g., wheelchair, bedside chair)? 4  -KR     Climbing 3-5 steps with a railing? 3  -KR     To walk in hospital room? 3  -KR     AM-PAC 6 Clicks Score (PT) 19  -KR     Highest level of mobility 6 --> Walked 10 steps or more  -KR               User Key  (r) = Recorded By, (t) = Taken By, (c) = Cosigned By      Initials Name  Provider Type    Nazia Jones PT Physical Therapist                                 Physical Therapy Education       Title: PT OT SLP Therapies (In Progress)       Topic: Physical Therapy (In Progress)       Point: Mobility training (In Progress)       Learning Progress Summary             Patient Acceptance, E, NR by KR at 6/11/2023 1011    Acceptance, E, VU by KR at 6/10/2023 1326    Comment: reviewed spinal precautions    Eager, E, VU,DU,NR by SS at 6/7/2023 1554    Comment: Reviewed safety/technique w/transfers, ambulation, HEP, c spine precautions, PT POC    Acceptance, E,D, NR,VU by AB at 6/6/2023 1155    Acceptance, E,D, NR by AB at 6/5/2023 1419   Family Acceptance, E, NR by KR at 6/11/2023 1011                         Point: Home exercise program (In Progress)       Learning Progress Summary             Patient Acceptance, E, NR by KR at 6/11/2023 1011    Acceptance, E, VU by KR at 6/10/2023 1326    Comment: reviewed spinal precautions    Eager, E, VU,DU,NR by SS at 6/7/2023 1554    Comment: Reviewed safety/technique w/transfers, ambulation, HEP, c spine precautions, PT POC    Acceptance, E,D, NR,VU by AB at 6/6/2023 1155   Family Acceptance, E, NR by KR at 6/11/2023 1011                         Point: Body mechanics (In Progress)       Learning Progress Summary             Patient Acceptance, E, NR by KR at 6/11/2023 1011    Acceptance, E, VU by KR at 6/10/2023 1326    Comment: reviewed spinal precautions    Eager, E, VU,DU,NR by SS at 6/7/2023 1554    Comment: Reviewed safety/technique w/transfers, ambulation, HEP, c spine precautions, PT POC    Acceptance, E,D, NR,VU by AB at 6/6/2023 1155    Acceptance, E,D, NR by AB at 6/5/2023 1419   Family Acceptance, E, NR by KR at 6/11/2023 1011                         Point: Precautions (In Progress)       Learning Progress Summary             Patient Acceptance, E, NR by KR at 6/11/2023 1011    Acceptance, E, VU by KR at 6/10/2023 1326    Comment: reviewed  spinal precautions    Eager, E, VU,DU,NR by SS at 6/7/2023 1554    Comment: Reviewed safety/technique w/transfers, ambulation, HEP, c spine precautions, PT POC    Acceptance, E,D, NR,VU by AB at 6/6/2023 1155    Acceptance, E,D, NR by AB at 6/5/2023 1419   Family Acceptance, E, NR by KR at 6/11/2023 1011                                         User Key       Initials Effective Dates Name Provider Type Discipline     06/01/21 -  Bing Castellanos, PT Physical Therapist PT    AB 09/22/22 -  Caro Youngblood, PT Physical Therapist PT    KR 12/30/22 -  Nazia Murray PT Physical Therapist PT                  PT Recommendation and Plan     Plan of Care Reviewed With: patient  Progress: improving  Outcome Evaluation: Pt required less assist with sidelying to sit this date and demonstrated appropriate spinal precautions with mobility. Pt continues to require education re: importance of upright and OOB mobility and positioning in rehab process. Pt will continue to benefit from PT to address ongoing deficits in strength, balance, and endurance, and return to PLOF.     Time Calculation:    PT Charges       Row Name 06/11/23 1012             Time Calculation    Start Time 0920  -KR      PT Received On 06/11/23  -KR      PT Goal Re-Cert Due Date 06/15/23  -KR         Timed Charges    28504 - Gait Training Minutes  10  -KR      55100 - PT Therapeutic Activity Minutes 28  -KR         Total Minutes    Timed Charges Total Minutes 38  -KR       Total Minutes 38  -KR                User Key  (r) = Recorded By, (t) = Taken By, (c) = Cosigned By      Initials Name Provider Type    KR Nazia Murray, PT Physical Therapist                  Therapy Charges for Today       Code Description Service Date Service Provider Modifiers Qty    05265619917 HC PT THER PROC EA 15 MIN 6/10/2023 Nazia Murray, PT GP 1    16764414305 HC PT THERAPEUTIC ACT EA 15 MIN 6/10/2023 Nazia Murray, PT GP 3    02257740872 HC GAIT TRAINING EA 15 MIN 6/11/2023  Nazia Murray, PT GP 1    01992296023 HC PT THERAPEUTIC ACT EA 15 MIN 6/11/2023 Nazia Murray, PT GP 2            PT G-Codes  Outcome Measure Options: AM-PAC 6 Clicks Daily Activity (OT)  AM-PAC 6 Clicks Score (PT): 19  AM-PAC 6 Clicks Score (OT): 17       Nazia Murray, KIM  6/11/2023      Electronically signed by Nazia Murray, PT at 06/11/23 1013

## 2023-06-12 NOTE — PROGRESS NOTES
[facility] Medicine Services  PROGRESS NOTE    Patient Name: Enrike Tomas  : 1935  MRN: [id number]    Date of Admission: 2023  Primary Care Physician: Blair Dhaliwal MD    Subjective   Subjective     CC: f/u neck fx    HPI: Seen in HD. Fairly comfortable. Wants to get out as soon as possible so he can work on his strength.    ROS:  Gen- No fevers, chills  CV- No chest pain, palpitations  Resp- No cough, dyspnea  GI- No N/V/D, abd pain     Objective   Objective     Vital Signs:   Temp:  [96.2 °F (35.7 °C)-98 °F (36.7 °C)] 96.2 °F (35.7 °C)  Heart Rate:  [75-87] 75  Resp:  [16] 16  BP: ()/(60-96) 132/64     Physical Exam:  Constitutional: No acute distress, awake, alert  HENT: NCAT, mucous membranes moist  Respiratory: Clear to auscultation bilaterally, respiratory effort normal   Cardiovascular: RRR, no murmurs, rubs, or gallops  Gastrointestinal: Positive bowel sounds, soft, nontender, nondistended  Musculoskeletal: No bilateral ankle edema  Psychiatric: Appropriate affect, cooperative  Neurologic: Oriented x 3, strength symmetric in all extremities, Cranial Nerves grossly intact to confrontation, speech clear  Skin: No rashes    Results Reviewed:  LAB RESULTS:      Lab 06/10/23  0437 06/08/23  1018 23  0732 23  1541   WBC 7.30 7.88 8.60 7.22   HEMOGLOBIN 9.3* 9.6* 9.0* 8.1*   HEMATOCRIT 29.7* 31.5* 29.2* 25.7*   PLATELETS 293 330 377 329   NEUTROS ABS 5.08  --   --   --    IMMATURE GRANS (ABS) 0.05  --   --   --    LYMPHS ABS 1.06  --   --   --    MONOS ABS 0.75  --   --   --    EOS ABS 0.31  --   --   --    MCV 99.7* 101.3* 98.6* 97.0         Lab 06/10/23  0437 06/08/23  1018 23  0732 23  1322 23  0011   SODIUM 133* 133* 132*  --   --    POTASSIUM 4.6 4.8 4.9  --   --    CHLORIDE 99 98 94*  --   --    CO2 23.0 20.0* 26.0  --   --    ANION GAP 11.0 15.0 12.0  --   --    BUN 17 20 30*  --   --    CREATININE 2.56* 3.04* 3.79*  --   --     EGFR 23.4* 19.1* 14.6*  --   --    GLUCOSE 89 122* 99  --   --    CALCIUM 9.1 9.5 9.9  --   --    MAGNESIUM  --   --   --   --  2.3   HEMOGLOBIN A1C  --   --   --  5.20  --                      Lab 06/05/23  1541   FOLATE 7.80   VITAMIN B 12 1,484*         Brief Urine Lab Results  (Last result in the past 365 days)      Color   Clarity   Blood   Leuk Est   Nitrite   Protein   CREAT   Urine HCG        06/05/23 2018 Yellow   Clear   Negative   Negative   Negative   >=300 mg/dL (3+)                 Microbiology Results Abnormal     Procedure Component Value - Date/Time    COVID PRE-OP / PRE-PROCEDURE SCREENING ORDER (NO ISOLATION) - Swab, Nasopharynx [[id number]]  (Normal) Collected: 06/05/23 0324    Lab Status: Final result Specimen: Swab from Nasopharynx Updated: 06/05/23 0402    Narrative:      The following orders were created for panel order COVID PRE-OP / PRE-PROCEDURE SCREENING ORDER (NO ISOLATION) - Swab, Nasopharynx.  Procedure                               Abnormality         Status                     ---------                               -----------         ------                     COVID-19 and FLU A/B PCR...[[id number]]  Normal              Final result                 Please view results for these tests on the individual orders.    COVID-19 and FLU A/B PCR - Swab, Nasopharynx [[id number]]  (Normal) Collected: 06/05/23 0324    Lab Status: Final result Specimen: Swab from Nasopharynx Updated: 06/05/23 0402     COVID19 Not Detected     Influenza A PCR Not Detected     Influenza B PCR Not Detected    Narrative:      Fact sheet for providers: https://www.fda.gov/media/071234/download    Fact sheet for patients: https://www.fda.gov/media/032360/download    Test performed by PCR.          No radiology results from the last 24 hrs        Current medications:  Scheduled Meds:apixaban, 2.5 mg, Oral, BID  atorvastatin, 20 mg, Oral, Daily  finasteride, 5 mg, Oral, Daily  fluticasone, 1 spray, Nasal, Daily  folic  acid, 1 mg, Oral, Daily  melatonin, 5 mg, Oral, Nightly  pantoprazole, 40 mg, Oral, Q AM  senna-docusate sodium, 2 tablet, Oral, BID  sertraline, 50 mg, Oral, Daily  sodium chloride, 10 mL, Intravenous, Q12H      Continuous Infusions:   PRN Meds:.•  acetaminophen **OR** acetaminophen **OR** acetaminophen  •  senna-docusate sodium **AND** polyethylene glycol **AND** bisacodyl **AND** bisacodyl  •  Magnesium Low Dose Replacement - Follow Nurse / BPA Driven Protocol  •  [COMPLETED] Insert Peripheral IV **AND** sodium chloride  •  sodium chloride  •  sodium chloride  •  zolpidem    Assessment & Plan   Assessment & Plan     Active Hospital Problems    Diagnosis  POA   • **Compression fracture of C7 vertebra, initial encounter [S12.690A]  Yes   • Severe Malnutrition (HCC) [E43]  Yes   • Fall [W19.XXXA]  Yes   • Encephalopathy [G93.40]  Yes   • Closed fracture of left acetabulum [S32.402A]  Yes   • NERY (obstructive sleep apnea) [G47.33]  Yes   • (HFpEF) heart failure with preserved ejection fraction [I50.30]  Yes   • Essential hypertension [I10]  Yes   • ESRD (end stage renal disease) [N18.6]  Yes   • Benign prostatic hyperplasia [N40.0]  Yes   • GERD (gastroesophageal reflux disease) [K21.9]  Yes   • Mixed hyperlipidemia [E78.2]  Yes      Resolved Hospital Problems   No resolved problems to display.        Brief Hospital Course to date:  Enrike Tomas is 88 y.o. male with PMH significant for ESRD on HD M, W, F, A-fib on Eliquis, GERD, BPH, HTN, HLD, orthostatic hypotension, DM2, depression, who presents to the ED with complaint of fall and neck pain who was found to have concern for acute compression fracture of C7.     Compression fracture of C7  Recurrent falls  Recent T7 Fx  -Neurosurgery has evaluated. Plan for semirigid cervical collar for 8 to 10 weeks when up and ambulating   -PT/OT recommend rehab, CM following, plan is LCP.  CM following for disposition/rehab referrals.  -Pain control PRN  -Follow-up with NSGY  outpatient   -Pain fairly well controlled.     ESRD  - On HD M, W, F  - Nephrology following   --Patient currently has dialysis at [facility] on Monday/Wednesday/Fridays at 10:15 AM.  CM following.     Afib  -Initially eliquis due to falls, anemia  -Resume Eliquis 6/11/2023  -Discontinued metoprolol secondary to hypotension  -If needs another rate controlling drug would consider cardiology consult     Anemia  -Iron studies consistent with AoCD  -Folate low normal, will supplement   -Hemoccult negative  -Initially held Eliquis, resumed on 6/11/2023.  --Monitor labs.  H/H stable around 9.6/31.5     Diabetes?  -Patient denies history of diabetes  -Removed from problem list  -A1c's reviewed 4.8 and 5.2     Hypertension  Hyperlipidemia  History of orthostatic hypotension  - discontinued metoprolol 6/11/2023  - Continue atorvastatin  - Orthostatic hypotension likely playing a role in frequent falls  --Continue to monitor.     BPH  - Continue Proscar     GERD  - Continue PPI     Depression  Insomnia  - Continue Zoloft  -Tolerated Ambien well     Moderate Pharyngeal Dysphagia, Mild-Mod oral dysphagia   -SLP has evaluated with [facility], see diet recs   --Tolerating diet  -Reassess 6/11/2023     DVT prophylaxis: Resumed Eliquis 6/11/2023     Expected Discharge Location and Transportation: [facility]/rehab pending insurance precert    Expected Discharge Date: 6/14/2023; Expected Discharge Time:      DVT prophylaxis:  Medical and mechanical DVT prophylaxis orders are present.     AM-PAC 6 Clicks Score (PT): 19 (06/11/23 1009)    CODE STATUS:   Code Status and Medical Interventions:   Ordered at: 06/05/23 0611     Medical Intervention Limits:    NO intubation (DNI)     Code Status (Patient has no pulse and is not breathing):    No CPR (Do Not Attempt to Resuscitate)     Medical Interventions (Patient has pulse or is breathing):    Limited Support       Lilibeth Johnson II, DO  06/12/23

## 2023-06-12 NOTE — MBS/VFSS/FEES
Acute Care - Speech Language Pathology   Swallow Initial Evaluation  [place]  Modified Barium Swallow Study (MBS)       Patient Name: Enrike Tomas  : 1935  MRN: [id number]  Today's Date: 2023               Admit Date: 2023    Visit Dx:     ICD-10-CM ICD-9-CM   1. Compression fracture of C7 vertebra, initial encounter  S12.690A 805.07   2. Closed head injury, initial encounter  S09.90XA 959.01   3. ESRD on hemodialysis  N18.6 585.6    Z99.2 V45.11   4. Oropharyngeal dysphagia  R13.12 787.22     Patient Active Problem List   Diagnosis    Compression fracture of C7 vertebra, initial encounter    (HFpEF) heart failure with preserved ejection fraction    Anemia of renal disease    At high risk for falls    Benign prostatic hyperplasia    Closed fracture of left acetabulum    Compression fracture of thoracic vertebra    Deficiency of other specified B group vitamins    Depression    Encephalopathy    ESRD (end stage renal disease)    Essential hypertension    GERD (gastroesophageal reflux disease)    Hepatic cyst    Long term (current) use of anticoagulants    Major depressive disorder, single episode, moderate    Mixed hyperlipidemia    Morgagni hernia    Occlusion of right vertebral artery    NERY (obstructive sleep apnea)    Fall    Severe Malnutrition (HCC)     Past Medical History:   Diagnosis Date    Atrial fibrillation     BPH (benign prostatic hyperplasia)     Diabetes mellitus     Diverticulosis     ESRD on hemodialysis     GERD (gastroesophageal reflux disease)     Heart failure     Hyperlipidemia     Hypertension     NERY (obstructive sleep apnea)     Recurrent falls      Past Surgical History:   Procedure Laterality Date    CATARACT EXTRACTION      DIALYSIS FISTULA CREATION      INGUINAL HERNIA REPAIR      PROSTATE BIOPSY      TONSILLECTOMY AND ADENOIDECTOMY         SLP Recommendation and Plan  SLP Swallowing Diagnosis: mild-moderate, oral dysphagia, mod-severe, pharyngeal dysphagia (23  1340)  SLP Diet Recommendation: puree, no mixed consistencies, honey thick liquids (06/12/23 1340)  Recommended Precautions and Strategies: general aspiration precautions, small bites of food and sips of liquid, no straw, other (see comments) (Intermittent extra swallow, small/single sips) (06/12/23 1340)  SLP Rec. for Method of Medication Administration: meds whole, meds crushed, with puree, as tolerated (06/12/23 1340)     Monitor for Signs of Aspiration: yes, notify SLP if any concerns, cough (06/12/23 1340)     Swallow Criteria for Skilled Therapeutic Interventions Met: demonstrates skilled criteria (06/12/23 1340)  Anticipated Discharge Disposition (SLP): anticipate therapy at next level of care, skilled nursing facility (06/12/23 1340)  Rehab Potential/Prognosis, Swallowing: good, to achieve stated therapy goals (06/12/23 1340)  Therapy Frequency (Swallow): 5 days per week (06/12/23 1340)  Predicted Duration Therapy Intervention (Days): until discharge (06/12/23 1340)                                               SWALLOW EVALUATION (last 72 hours)       SLP Adult Swallow Evaluation       Row Name 06/12/23 1340 06/11/23 1600 06/09/23 1545             Rehab Evaluation    Document Type evaluation  - re-evaluation  - therapy note (daily note)  -AC      Subjective Information no complaints  - no complaints  - no complaints  -      Patient Observations alert;cooperative  - alert;cooperative;agree to therapy  - alert;cooperative  -      Patient/Family/Caregiver Comments/Observations No family present  - friend present  - Spouse present.  -AC      Patient Effort good  - adequate  - good  -      Symptoms Noted During/After Treatment none  - -- --         General Information    Patient Profile Reviewed yes  -MH yes  -CH --      Pertinent History Of Current Problem See initial eval, [place] [facility]  [place]MH see prior eval. Patient non-compliant with Honey thick liquids per RN and OT.  Re-evaluation completed to assess for possible upgrade  - --      Current Method of Nutrition mechanical ground textures;no mixed consistencies;honey-thick liquids  - mechanical ground textures;no mixed consistencies;honey-thick liquids  - --      Precautions/Limitations, Vision WFL with corrective lenses;for purposes of eval  - WFL with corrective lenses;for purposes of eval  - --      Precautions/Limitations, Hearing WFL;for purposes of eval  - WFL;for purposes of eval  - --      Prior Level of Function-Communication unknown  - -- --      Prior Level of Function-Swallowing --  recent hx pharyngeal dysphagia/aspiration  - -- --      Plans/Goals Discussed with patient;agreed upon  - -- --      Barriers to Rehab medically complex;previous functional deficit;cognitive status  - -- --      Patient's Goals for Discharge patient did not state  - -- --         Pain    Additional Documentation Pain Scale: FACES Pre/Post-Treatment (Group)  - Pain Scale: FACES Pre/Post-Treatment (Group)  - --         Pain Scale: FACES Pre/Post-Treatment    Pain: FACES Scale, Pretreatment 0-->no hurt  - 0-->no hurt  - 0-->no hurt  -      Posttreatment Pain Rating 0-->no hurt  - 0-->no hurt  -CH 0-->no hurt  -AC         Oral Motor Structure and Function    Dentition Assessment -- natural, present and adequate  - --      Secretion Management -- WNL/WFL  - --      Mucosal Quality -- dry  - --      Volitional Cough -- weak;reduced respiratory support  - --         Oral Musculature and Cranial Nerve Assessment    Oral Motor General Assessment -- generalized oral motor weakness  - --         General Eating/Swallowing Observations    Respiratory Support Currently in Use -- room air  - --      Eating/Swallowing Skills -- self-fed  - --      Positioning During Eating -- upright 90 degree;upright in chair;other (see comments)  wearing C collar  - --      Utensils Used -- spoon;cup;straw  - --       Consistencies Trialed -- thin liquids;nectar/syrup-thick liquids  - --      Pre SpO2 (%) -- 96  -CH --      Post SpO2 (%) -- 97  -CH --         Respiratory    Respiratory Status -- WFL;room air  - --         Clinical Swallow Eval    Oral Prep Phase -- WFL  - --      Oral Transit -- WFL  - --      Oral Residue -- WFL  - --      Pharyngeal Phase -- suspected pharyngeal impairment  - --      Clinical Swallow Evaluation Summary -- Multiple swallows with trials of thin liquids by cup and straw. No s/s of aspiration with pureed, regular or nectar thick trials. Recommend continuation of current mechanical ground, no mixed consistencies, honey thick liquids. MBS to further assess for possible upgrade.  - --         Pharyngeal Phase Concerns    Pharyngeal Phase Concerns -- multiple swallows  - --      Cough -- thin  - --         MBS/VFSS    Utensils Used spoon;cup;straw  - -- --      Consistencies Trialed thin liquids;honey-thick liquids;pudding thick  - -- --         MBS/VFSS Interpretation    Oral Prep Phase L  - -- --      Oral Transit Phase impaired  - -- --      Oral Residue [facility]  - -- --      VFSS Summary Moderate to severe pharyngeal dysphagia. Prespill w/ thins to the pyriforms. Solid trials deferred per pt preference/reports of discomfort. Deep penetration w/ thins before the swallow w/ subsequent silent aspiration during & after the swallow. Pt w/ inconsistent cough response to larger amounts of aspiration. Penetration w/ HTL via straw only before the swallow w/ subsequent silent aspiration during the swallow. Cued cough weak and ineffective in clearing. No penetration/aspiration w/ thins via tsp/cup, or pudding. Mild to moderate residue diffuse throughout pharynx w/ all consistencies assessed. Recommend pureed diet w/ HTL, no mixed consistencies, no straw, small/single sips, intermittent extra swallow. SLP will f/u for tx to address areas of weakness.  - -- --      Oral Phase,  Comment Solids not assessed  - -- --         Oral Transit Phase    Impaired Oral Transit Phase premature spillage of liquids into pharynx;delayed initiation of bolus transit  - -- --      Delayed Initiation of bolus transit pudding/puree;discoordination of lingual movement;secondary to reduced lingual control  - -- --      Premature Spillage of Liquids into Pharynx thin liquids;discoordination of lingual movement;secondary to reduced lingual control  - -- --         Initiation of Pharyngeal Swallow    Initiation of Pharyngeal Swallow bolus in pyriform sinuses  - -- --      Pharyngeal Phase impaired pharyngeal phase of swallowing  - -- --      Anatomical abnormalities noted anterior cervical c-spine prominence;other (see comments)  lordodic curvature; prominent anteriorly  - -- --      Anatomical abnormalities functional impact functional impact on swallowing  - -- --      Penetration Before the Swallow thin liquids;honey-thick liquids;secondary to reduced back of tongue control;secondary to delayed swallow initiation or mistiming;other (see comments)  HTL via straw only  - -- --      Aspiration During the Swallow thin liquids;honey-thick liquids;secondary to reduced laryngeal (VF) closure;secondary to reduced vestibular closure;secondary to reduced laryngeal elevation;other (see comments)  HTL via straw only  - -- --      Aspiration After the Swallow thin liquids  - -- --      Depth of Penetration deep  - -- --      Response to Penetration No  - -- --      No spontaneous response to penetration and non-effective laryngeal clearance with cue (see comments);other (see comments)  cued cough  - -- --      Response to Aspiration Yes  - -- --      Responded to aspiration with inconsistent;cough;non-effective  - -- --      Rosenbek's Scale thin:;honey:;8--->level 8;pudding/puree:;1--->level 1  HTL level 8 via straw only  - -- --      Pharyngeal Residue all consistencies tested;diffuse  within pharynx;secondary to reduced hyolaryngeal excursion;secondary to reduced laryngeal elevation;secondary to reduced posterior pharyngeal wall stripping  - -- --      Response to Residue other (see comments)  partially cleared w/ cued second swallow  - -- --      Attempted Compensatory Maneuvers bolus size;bolus presentation style;additional subsequent swallow  - -- --      Response to Attempted Compensatory Maneuvers did not reduce residue  - -- --      Successful Compensatory Maneuver Competency patient able to;demonstrate compensations;with cues  - -- --         Swallowing Quality of Life Assessment    Education and counseling provided -- -- Signs of aspiration;Silent aspiration;Risks of aspiration;Safest diet options  -         SLP Evaluation Clinical Impression    SLP Swallowing Diagnosis mild-moderate;oral dysphagia;mod-severe;pharyngeal dysphagia  - suspect acute-on-chronic;pharyngeal dysphagia;functional oral phase  -CH --      Functional Impact risk of aspiration/pneumonia;risk of malnutrition;risk of dehydration  - risk of aspiration/pneumonia  - --      Rehab Potential/Prognosis, Swallowing good, to achieve stated therapy goals  - good, to achieve stated therapy goals  - --      Swallow Criteria for Skilled Therapeutic Interventions Met demonstrates skilled criteria  - demonstrates skilled criteria  - --         SLP Treatment Clinical Impressions    Treatment Assessment (SLP) -- -- continued;clinical signs of;aspiration;suspected;oral dysphagia;pharyngeal dysphagia  -      Daily Summary of Progress (SLP) -- -- progress toward functional goals as expected  -      Barriers to Overall Progress (SLP) -- -- Cognitive status  -      Plan for Continued Treatment (SLP) -- -- continue treatment per plan of care  -      Care Plan Review -- -- evaluation/treatment results reviewed;care plan/treatment goals reviewed;risks/benefits reviewed;current/potential barriers  reviewed;patient/other agree to care plan  -      Care Plan Review, Other Participant(s) -- -- spouse  -         Recommendations    Therapy Frequency (Swallow) 5 days per week  - evaluation only  - --      Predicted Duration Therapy Intervention (Days) until discharge  - until discharge  - --      SLP Diet Recommendation puree;no mixed consistencies;honey thick liquids  - mechanical ground textures;no mixed consistencies;honey thick liquids  - --      Recommended Diagnostics -- reassess via VFSS ([facility] [place])  - --      Recommended Precautions and Strategies general aspiration precautions;small bites of food and sips of liquid;no straw;other (see comments)  Intermittent extra swallow, small/single sips  - upright posture during/after eating;general aspiration precautions;other (see comments)  encourage 2nd dry swallow intermittently  - --      Oral Care Recommendations Oral Care BID/PRN  - Oral Care BID/PRN  - --      SLP Rec. for Method of Medication Administration meds whole;meds crushed;with puree;as tolerated  - meds whole;meds crushed;with puree;as tolerated  - --      Monitor for Signs of Aspiration yes;notify SLP if any concerns;cough  - yes;notify SLP if any concerns;cough;throat clearing  - --      Anticipated Discharge Disposition (SLP) anticipate therapy at next level of care;skilled nursing facility  - anticipate therapy at next level of care;skilled nursing facility  - anticipate therapy at next level of care;skilled nursing facility  -                User Key  (r) = Recorded By, (t) = Taken By, (c) = Cosigned By      Initials Name Effective Dates     Mireya Santos, MS CCC-SLP 02/03/23 -      Juhi Landeros MS CCC-SLP 06/16/21 -      Rubi Cormier, MS CCC-SLP 05/12/23 -                     EDUCATION  The patient has been educated in the following areas:   Dysphagia (Swallowing Impairment) Modified Diet Instruction.        SLP GOALS       Row Name 06/12/23  1340 06/09/23 1545          (LTG) Patient will demonstrate functional swallow for    Diet Texture (Demonstrate functional swallow) regular textures  - regular textures  -AC     Liquid viscosity (Demonstrate functional swallow) nectar/ mildly thick liquids  - nectar/ mildly thick liquids  -AC     [place] (Demonstrate functional swallow) with minimal cues (75-90% accuracy)  -MH with minimal cues (75-90% accuracy)  -AC     Time Frame (Demonstrate functional swallow) by discharge  - by discharge  -AC     Progress/Outcomes (Demonstrate functional swallow) goal ongoing  - continuing progress toward goal  -AC        (STG) Patient will tolerate trials of    Consistencies Trialed (Tolerate trials) pureed textures;honey/ moderately thick liquids  - mechanical ground textures;honey/ moderately thick liquids  -AC     Desired Outcome (Tolerate trials) without signs/symptoms of aspiration;with use of compensatory strategies (see comments)  small/single sips, intermittent extra swallow  - without signs/symptoms of aspiration;with adequate oral prep/transit/clearance;with use of compensatory strategies (see comments)  2nd swallow, general precautions  -     [place] (Tolerate trials) with minimal cues (75-90% accuracy)  - with minimal cues (75-90% accuracy)  -AC     Time Frame (Tolerate trials) by discharge  - by discharge  -     Progress/Outcomes (Tolerate trials) goal ongoing  - goal ongoing  -        (STG) Patient will tolerate therapeutic trials of    Consistencies Trialed (Tolerate therapeutic trials) thin liquids;nectar/ mildly thick liquids  - thin liquids;nectar/ mildly thick liquids  -AC     Desired Outcome (Tolerate therapeutic trials) without signs/symptoms of aspiration  check for cough/throat clear--may be very weak  - without signs/symptoms of aspiration  check for cough/throat clear--may be very weak  -AC     [place] (Tolerate therapeutic trials) with 1:1 assist/ supervision   - with 1:1 assist/ supervision  -AC     Time Frame (Tolerate therapeutic trials) by discharge  - by discharge  -AC     Progress/Outcomes (Tolerate therapeutic trials) goal ongoing  - continuing progress toward goal  -AC     Comment (Tolerate therapeutic trials) -- Excessive & prolonged cough/throat clear w/ thin via tsp.  -AC        (STG) Lingual Strengthening Goal 1 (SLP)    Increase Tongue Back Strength lingual resistance exercises  - lingual resistance exercises  -AC     [place]/Accuracy (Lingual Strengthening Goal 1, SLP) with moderate cues (50-74% accuracy)  - with moderate cues (50-74% accuracy)  -AC     Time Frame (Lingual Strengthening Goal 1, SLP) short term goal (STG)  - short term goal (STG)  -AC     Progress/Outcomes (Lingual Strengthening Goal 1, SLP) goal ongoing  - goal ongoing  -AC     Comment (Lingual Strengthening Goal 1, SLP) -- Provided handout/explanation/rationale for independent practice.  -AC        (STG) Pharyngeal Strengthening Exercise Goal 1 (SLP)    Increase Timing prepping - 3 second prep or suck swallow or 3-step swallow  - prepping - 3 second prep or suck swallow or 3-step swallow  -AC     Increase Superior Movement of the Hyolaryngeal Complex effortful pitch glide (falsetto + pharyngeal squeeze)  - effortful pitch glide (falsetto + pharyngeal squeeze)  -AC     Increase Closure at Entrance to Airway/Closure of Airway at Glottis supraglottic swallow  - supraglottic swallow  -AC     Increase Squeeze/Positive Pressure Generation hard effortful swallow  - hard effortful swallow  -AC     [place]/Accuracy (Pharyngeal Strengthening Goal 1, SLP) with moderate cues (50-74% accuracy)  - with moderate cues (50-74% accuracy)  -AC     Time Frame (Pharyngeal Strengthening Goal 1, SLP) short term goal (STG)  - short term goal (STG)  -AC     Progress/Outcomes (Pharyngeal Strengthening Goal 1, SLP) goal ongoing  - continuing progress toward goal  -AC     Comment  (Pharyngeal Strengthening Goal 1, SLP) -- Pt able to demonstrate effortful pitch glide x3 w/ min cues + SGS x2 w/ max cues. Provided handout & encouraged independent practice.  -        (STG) Swallow Compensatory Strategies Goal 1 (SLP)    Activity (Swallow Compensatory Strategies/Techniques Goal 1, SLP) small bites;small cup sips;aspiration precautions;other (see comments)  no straw, small/single sips, intermittent second swallow  - --     [place]/Accuracy (Swallow Compensatory Strategies/Techniques Goal 1, SLP) independently (over 90% accuracy)  - --     Time Frame (Swallow Compensatory Strategies/Techniques Goal 1, SLP) short term goal (STG)  - --     Progress/Outcomes (Swallow Compensatory Strategies/Techniques Goal 1, SLP) goal ongoing  - --               User Key  (r) = Recorded By, (t) = Taken By, (c) = Cosigned By      Initials Name Provider Type     Mireya Santos, MS CCC-SLP Speech and Language Pathologist     Rubi Cormier MS CCC-SLP Speech and Language Pathologist                       Time Calculation:    Time Calculation- SLP       Row Name 06/12/23 1455             Time Calculation- [facility]    SLP Start Time 1340  -      SLP Received On 06/12/23  -         Untimed Charges    73806-HQ Motion Fluoro Eval Swallow Minutes 75  -         Total Minutes    Untimed Charges Total Minutes 75  -       Total Minutes 75  -                User Key  (r) = Recorded By, (t) = Taken By, (c) = Cosigned By      Initials Name Provider Type     Rubi Cormier, MS CCC-SLP Speech and Language Pathologist                    Therapy Charges for Today       Code Description Service Date Service Provider Modifiers Qty    [id number]  ST MOTION FLUORO EVAL SWALLOW 5 6/12/2023 Rubi Cormier MS CCC-MINH GN 1                 MS LIZETH Mendez  6/12/2023

## 2023-06-12 NOTE — DISCHARGE INSTR - DIET
MBS/VFSS Reason for Referral  Patient was referred for a MBS to assess the efficiency of his/her swallow function, rule out aspiration and make recommendations regarding safe dietary consistencies, effective compensatory strategies, and safe eating environment.             Recommendations/Treatment  SLP Swallowing Diagnosis: mild-moderate, oral dysphagia, mod-severe, pharyngeal dysphagia (06/12/23 1340)  Functional Impact: risk of aspiration/pneumonia, risk of malnutrition, risk of dehydration (06/12/23 1340)  Rehab Potential/Prognosis, Swallowing: good, to achieve stated therapy goals (06/12/23 1340)  Swallow Criteria for Skilled Therapeutic Interventions Met: demonstrates skilled criteria (06/12/23 1340)  Therapy Frequency (Swallow): 5 days per week (06/12/23 1340)  Predicted Duration Therapy Intervention (Days): until discharge (06/12/23 1340)  SLP Diet Recommendation: puree, no mixed consistencies, honey thick liquids (06/12/23 1340)  Recommended Precautions and Strategies: general aspiration precautions, small bites of food and sips of liquid, no straw, other (see comments) (Intermittent extra swallow, small/single sips) (06/12/23 1340)  SLP Rec. for Method of Medication Administration: meds whole, meds crushed, with puree, as tolerated (06/12/23 1340)  Monitor for Signs of Aspiration: yes, notify SLP if any concerns, cough (06/12/23 1340)  Anticipated Discharge Disposition (SLP): anticipate therapy at next level of care, skilled nursing facility (06/12/23 1340)    Instrumental Set-up  Utensils Used: spoon, cup, straw (06/12/23 1340)  Consistencies Trialed: thin liquids, honey-thick liquids, pudding thick (06/12/23 1340)    Oral Preparation/ Oral Phase  Oral Prep Phase: WFL (06/12/23 1340)  Oral Transit Phase: impaired (06/12/23 1340)  Oral Residue: WFL (06/12/23 1340)  Oral Phase, Comment: Solids not assessed (06/12/23 1340)     Impaired Oral Transit Phase: premature spillage of liquids into pharynx, delayed  initiation of bolus transit (06/12/23 1340)  Delayed Initiation of bolus transit: pudding/puree, discoordination of lingual movement, secondary to reduced lingual control (06/12/23 1340)  Premature Spillage of Liquids into Pharynx: thin liquids, discoordination of lingual movement, secondary to reduced lingual control (06/12/23 1340)       Pharyngeal Phase  Initiation of Pharyngeal Swallow: bolus in pyriform sinuses (06/12/23 1340)  Pharyngeal Phase: impaired pharyngeal phase of swallowing (06/12/23 1340)  Penetration Before the Swallow: thin liquids, honey-thick liquids, secondary to reduced back of tongue control, secondary to delayed swallow initiation or mistiming, other (see comments) (HTL via straw only) (06/12/23 1340)  Aspiration During the Swallow: thin liquids, honey-thick liquids, secondary to reduced laryngeal (VF) closure, secondary to reduced vestibular closure, secondary to reduced laryngeal elevation, other (see comments) (HTL via straw only) (06/12/23 1340)  Aspiration After the Swallow: thin liquids (06/12/23 1340)  Pharyngeal Residue: all consistencies tested, diffuse within pharynx, secondary to reduced hyolaryngeal excursion, secondary to reduced laryngeal elevation, secondary to reduced posterior pharyngeal wall stripping (06/12/23 1340)  Response to Residue: other (see comments) (partially cleared w/ cued second swallow) (06/12/23 1340)  Attempted Compensatory Maneuvers: bolus size, bolus presentation style, additional subsequent swallow (06/12/23 1340)  Response to Attempted Compensatory Maneuvers: did not reduce residue (06/12/23 1340)  VFSS Summary: Moderate to severe pharyngeal dysphagia. Prespill w/ thins to the pyriforms. Solid trials deferred per pt preference/reports of discomfort. Deep penetration w/ thins before the swallow w/ subsequent silent aspiration during & after the swallow. Pt w/ inconsistent cough response to larger amounts of aspiration. Penetration w/ HTL via straw only  before the swallow w/ subsequent silent aspiration during the swallow. Cued cough weak and ineffective in clearing. No penetration/aspiration w/ thins via tsp/cup, or pudding. Mild to moderate residue diffuse throughout pharynx w/ all consistencies assessed. Recommend pureed diet w/ NTL, no mixed consistencies, no straw, small/single sips, intermittent extra swallow. SLP will f/u for tx to address areas of weakness. (06/12/23 1340)    Cervical Esophageal Phase

## 2023-06-13 PROCEDURE — G0378 HOSPITAL OBSERVATION PER HR: HCPCS

## 2023-06-13 PROCEDURE — 97110 THERAPEUTIC EXERCISES: CPT

## 2023-06-13 PROCEDURE — 97530 THERAPEUTIC ACTIVITIES: CPT

## 2023-06-13 PROCEDURE — 99232 SBSQ HOSP IP/OBS MODERATE 35: CPT | Performed by: INTERNAL MEDICINE

## 2023-06-13 RX ADMIN — PANTOPRAZOLE SODIUM 40 MG: 40 TABLET, DELAYED RELEASE ORAL at 08:16

## 2023-06-13 RX ADMIN — Medication 5 MG: at 20:08

## 2023-06-13 RX ADMIN — ACETAMINOPHEN 650 MG: 325 TABLET ORAL at 20:08

## 2023-06-13 RX ADMIN — Medication 10 ML: at 20:09

## 2023-06-13 RX ADMIN — FINASTERIDE 5 MG: 5 TABLET, FILM COATED ORAL at 08:08

## 2023-06-13 RX ADMIN — APIXABAN 2.5 MG: 2.5 TABLET, FILM COATED ORAL at 20:09

## 2023-06-13 RX ADMIN — Medication 10 ML: at 08:09

## 2023-06-13 RX ADMIN — APIXABAN 2.5 MG: 2.5 TABLET, FILM COATED ORAL at 08:08

## 2023-06-13 RX ADMIN — ATORVASTATIN CALCIUM 20 MG: 20 TABLET, FILM COATED ORAL at 08:08

## 2023-06-13 RX ADMIN — FOLIC ACID 1 MG: 1 TABLET ORAL at 08:08

## 2023-06-13 RX ADMIN — ZOLPIDEM TARTRATE 5 MG: 5 TABLET ORAL at 20:08

## 2023-06-13 RX ADMIN — FLUTICASONE PROPIONATE 1 SPRAY: 50 SPRAY, METERED NASAL at 08:09

## 2023-06-13 RX ADMIN — SERTRALINE 50 MG: 50 TABLET, FILM COATED ORAL at 08:08

## 2023-06-13 NOTE — PLAN OF CARE
Goal Outcome Evaluation:      Alert and oriented x4. Intermittently forgetful. Vital signs stable. Eager to d/c to rehab tomorrow. Called dialysis to schedule his treatment in the AM before d/c.

## 2023-06-13 NOTE — PROGRESS NOTES
[facility] Medicine Services  PROGRESS NOTE    Patient Name: Enrike Tomas  : 1935  MRN: [id number]    Date of Admission: 2023  Primary Care Physician: Blair Dhaliwal MD    Subjective   Subjective     CC: f/u back pain    HPI: Up in bed eating. Pain controlled. Wants to take a bath    ROS:  Gen- No fevers, chills  CV- No chest pain, palpitations  Resp- No cough, dyspnea  GI- No N/V/D, abd pain     Objective   Objective     Vital Signs:   Temp:  [97.5 °F (36.4 °C)-98.1 °F (36.7 °C)] 98 °F (36.7 °C)  Resp:  [16] 16  BP: ()/(57-88) 151/76     Physical Exam:  Constitutional: No acute distress, awake, alert  HENT: NCAT, mucous membranes moist  Respiratory: Clear to auscultation bilaterally, respiratory effort normal   Cardiovascular: RRR, no murmurs, rubs, or gallops  Gastrointestinal: Positive bowel sounds, soft, nontender, nondistended  Musculoskeletal: No bilateral ankle edema  Psychiatric: Appropriate affect, cooperative  Neurologic: Oriented x 3, strength symmetric in all extremities, Cranial Nerves grossly intact to confrontation, speech clear  Skin: No rashes     Results Reviewed:  LAB RESULTS:      Lab 06/10/23  0437 06/08/23  1018 23  0732   WBC 7.30 7.88 8.60   HEMOGLOBIN 9.3* 9.6* 9.0*   HEMATOCRIT 29.7* 31.5* 29.2*   PLATELETS 293 330 377   NEUTROS ABS 5.08  --   --    IMMATURE GRANS (ABS) 0.05  --   --    LYMPHS ABS 1.06  --   --    MONOS ABS 0.75  --   --    EOS ABS 0.31  --   --    MCV 99.7* 101.3* 98.6*         Lab 06/10/23  0437 06/08/23  1018 23  0732 23  1322   SODIUM 133* 133* 132*  --    POTASSIUM 4.6 4.8 4.9  --    CHLORIDE 99 98 94*  --    CO2 23.0 20.0* 26.0  --    ANION GAP 11.0 15.0 12.0  --    BUN 17 20 30*  --    CREATININE 2.56* 3.04* 3.79*  --    EGFR 23.4* 19.1* 14.6*  --    GLUCOSE 89 122* 99  --    CALCIUM 9.1 9.5 9.9  --    HEMOGLOBIN A1C  --   --   --  5.20                         Brief Urine Lab Results  (Last result in  the past 365 days)        Color   Clarity   Blood   Leuk Est   Nitrite   Protein   CREAT   Urine HCG        06/05/23 2018 Yellow   Clear   Negative   Negative   Negative   >=300 mg/dL (3+)                   Microbiology Results Abnormal       Procedure Component Value - Date/Time    COVID PRE-OP / PRE-PROCEDURE SCREENING ORDER (NO ISOLATION) - Swab, Nasopharynx [[id number]]  (Normal) Collected: 06/05/23 0324    Lab Status: Final result Specimen: Swab from Nasopharynx Updated: 06/05/23 0402    Narrative:      The following orders were created for panel order COVID PRE-OP / PRE-PROCEDURE SCREENING ORDER (NO ISOLATION) - Swab, Nasopharynx.  Procedure                               Abnormality         Status                     ---------                               -----------         ------                     COVID-19 and FLU A/B PCR...[[id number]]  Normal              Final result                 Please view results for these tests on the individual orders.    COVID-19 and FLU A/B PCR - Swab, Nasopharynx [[id number]]  (Normal) Collected: 06/05/23 0324    Lab Status: Final result Specimen: Swab from Nasopharynx Updated: 06/05/23 0402     COVID19 Not Detected     Influenza A PCR Not Detected     Influenza B PCR Not Detected    Narrative:      Fact sheet for providers: https://www.fda.gov/media/787438/download    Fact sheet for patients: https://www.fda.gov/media/983000/download    Test performed by PCR.            No radiology results from the last 24 hrs        Current medications:  Scheduled Meds:apixaban, 2.5 mg, Oral, BID  atorvastatin, 20 mg, Oral, Daily  finasteride, 5 mg, Oral, Daily  fluticasone, 1 spray, Nasal, Daily  folic acid, 1 mg, Oral, Daily  melatonin, 5 mg, Oral, Nightly  pantoprazole, 40 mg, Oral, Q AM  senna-docusate sodium, 2 tablet, Oral, BID  sertraline, 50 mg, Oral, Daily  sodium chloride, 10 mL, Intravenous, Q12H      Continuous Infusions:   PRN Meds:.  acetaminophen **OR** acetaminophen **OR**  acetaminophen    senna-docusate sodium **AND** polyethylene glycol **AND** bisacodyl **AND** bisacodyl    Magnesium Low Dose Replacement - Follow Nurse / BPA Driven Protocol    [COMPLETED] Insert Peripheral IV **AND** sodium chloride    sodium chloride    sodium chloride    zolpidem    Assessment & Plan   Assessment & Plan     Active Hospital Problems    Diagnosis  POA    **Compression fracture of C7 vertebra, initial encounter [S12.690A]  Yes    Severe Malnutrition (HCC) [E43]  Yes    Fall [W19.XXXA]  Yes    Encephalopathy [G93.40]  Yes    Closed fracture of left acetabulum [S32.402A]  Yes    NERY (obstructive sleep apnea) [G47.33]  Yes    (HFpEF) heart failure with preserved ejection fraction [I50.30]  Yes    Essential hypertension [I10]  Yes    ESRD (end stage renal disease) [N18.6]  Yes    Benign prostatic hyperplasia [N40.0]  Yes    GERD (gastroesophageal reflux disease) [K21.9]  Yes    Mixed hyperlipidemia [E78.2]  Yes      Resolved Hospital Problems   No resolved problems to display.        Brief Hospital Course to date:  Enrike Tomas is 88 y.o. male with PMH significant for ESRD on HD M, W, F, A-fib on Eliquis, GERD, BPH, HTN, HLD, orthostatic hypotension, DM2, depression, who presents to the ED with complaint of fall and neck pain who was found to have concern for acute compression fracture of C7.     Compression fracture of C7  Recurrent falls  Recent T7 Fx  -Neurosurgery has evaluated. Plan for semirigid cervical collar for 8 to 10 weeks when up and ambulating   -PT/OT recommend rehab, CM following, plan is LCP.  CM following for disposition/rehab referrals.  -Pain control PRN  -Follow-up with NSGY outpatient   -Pain fairly well controlled.  -PT/OT recs SNF - they are requesting peer to peer which is pending.     ESRD  - On HD M, W, F  - Nephrology following   --Patient currently has dialysis at [facility] on Monday/Wednesday/Fridays at 10:15 AM.  CM following.     Afib  -Initially eliquis due to falls,  anemia  -Resume Eliquis 6/11/2023  -Discontinued metoprolol secondary to hypotension  -If needs another rate controlling drug would consider cardiology consult     Anemia  -Iron studies consistent with AoCD  -Folate low normal, will supplement   -Hemoccult negative  -Initially held Eliquis, resumed on 6/11/2023.  --Monitor labs.  H/H stable around 9.6/31.5     Diabetes?  -Patient denies history of diabetes  -Removed from problem list  -A1c's reviewed 4.8 and 5.2     Hypertension  Hyperlipidemia  History of orthostatic hypotension  - discontinued metoprolol 6/11/2023  - Continue atorvastatin  - Orthostatic hypotension likely playing a role in frequent falls  --Continue to monitor.     BPH  - Continue Proscar     GERD  - Continue PPI     Depression  Insomnia  - Continue Zoloft  -Tolerated Ambien well     Moderate Pharyngeal Dysphagia, Mild-Mod oral dysphagia   -SLP has evaluated with MBS, see diet recs   --Tolerating diet  -Reassess 6/11/2023    Expected Discharge Location and Transportation:   Expected Discharge   Expected Discharge Date: 6/14/2023; Expected Discharge Time:      DVT prophylaxis:  Medical and mechanical DVT prophylaxis orders are present.     AM-PAC 6 Clicks Score (PT): 18 (06/13/23 0820)    CODE STATUS:   Code Status and Medical Interventions:   Ordered at: 06/05/23 0611     Medical Intervention Limits:    NO intubation (DNI)     Code Status (Patient has no pulse and is not breathing):    No CPR (Do Not Attempt to Resuscitate)     Medical Interventions (Patient has pulse or is breathing):    Limited Support       Lilibeth Johnson II, DO  06/13/23

## 2023-06-13 NOTE — PLAN OF CARE
Goal Outcome Evaluation:  Plan of Care Reviewed With: patient        Progress: no change  Outcome Evaluation: Pt continues to demo overall weakness and decreased endurance. Pt ambulating less distance this date, per pt, he has increased weakness d/t depression. HEP provided to pt to perform 3x/day for increased strengthening. Pt will continue to benefit from PT to address ongoing strength, balance, and endurance and return to PLOF.

## 2023-06-13 NOTE — THERAPY TREATMENT NOTE
Patient Name: Enrike Tomas  : 1935    MRN: [id number]                              Today's Date: 2023       Admit Date: 2023    Visit Dx:     ICD-10-CM ICD-9-CM   1. Compression fracture of C7 vertebra, initial encounter  S12.690A 805.07   2. Closed head injury, initial encounter  S09.90XA 959.01   3. ESRD on hemodialysis  N18.6 585.6    Z99.2 V45.11   4. Oropharyngeal dysphagia  R13.12 787.22     Patient Active Problem List   Diagnosis    Compression fracture of C7 vertebra, initial encounter    (HFpEF) heart failure with preserved ejection fraction    Anemia of renal disease    At high risk for falls    Benign prostatic hyperplasia    Closed fracture of left acetabulum    Compression fracture of thoracic vertebra    Deficiency of other specified B group vitamins    Depression    Encephalopathy    ESRD (end stage renal disease)    Essential hypertension    GERD (gastroesophageal reflux disease)    Hepatic cyst    Long term (current) use of anticoagulants    Major depressive disorder, single episode, moderate    Mixed hyperlipidemia    Morgagni hernia    Occlusion of right vertebral artery    NERY (obstructive sleep apnea)    Fall    Severe Malnutrition (HCC)     Past Medical History:   Diagnosis Date    Atrial fibrillation     BPH (benign prostatic hyperplasia)     Diabetes mellitus     Diverticulosis     ESRD on hemodialysis     GERD (gastroesophageal reflux disease)     Heart failure     Hyperlipidemia     Hypertension     NERY (obstructive sleep apnea)     Recurrent falls      Past Surgical History:   Procedure Laterality Date    CATARACT EXTRACTION      DIALYSIS FISTULA CREATION      INGUINAL HERNIA REPAIR      PROSTATE BIOPSY      TONSILLECTOMY AND ADENOIDECTOMY        General Information       Row Name 23 1101          Physical Therapy Time and Intention    Document Type therapy note (daily note)  -KR     Mode of Treatment individual therapy;physical therapy  -KR       Row Name 23  1101          General Information    Patient Profile Reviewed yes  -KR     Existing Precautions/Restrictions fall;cervical collar;spinal;brace worn when out of bed;other (see comments)  C7/T7 compression fx  -KR     Barriers to Rehab medically complex;previous functional deficit  -KR       Row Name 06/13/23 1101          Cognition    Orientation Status (Cognition) oriented x 4  -KR       Row Name 06/13/23 1101          Safety Issues, Functional Mobility    Safety Issues Affecting Function (Mobility) safety precaution awareness;safety precautions follow-through/compliance;insight into deficits/self-awareness;judgment  -KR     Impairments Affecting Function (Mobility) balance;endurance/activity tolerance;pain;range of motion (ROM);strength;postural/trunk control  -KR               User Key  (r) = Recorded By, (t) = Taken By, (c) = Cosigned By      Initials Name Provider Type    KR Nazia Murray PT Physical Therapist                   Mobility       Row Name 06/13/23 1131          Sit-Stand Transfer    Sit-Stand [place] (Transfers) supervision  -KR     Assistive Device (Sit-Stand Transfers) walker, front-wheeled  -KR     Comment, (Sit-Stand Transfer) 2x from chair  -KR       Row Name 06/13/23 1131          Gait/Stairs (Locomotion)    [place] Level (Gait) contact guard  -KR     Assistive Device (Gait) walker, front-wheeled  -KR     Distance in Feet (Gait) 40 + 40  -KR     Deviations/Abnormal Patterns (Gait) bilateral deviations;base of support, narrow;stride length decreased;left sided deviations  -KR     Bilateral Gait Deviations forward flexed posture;heel strike decreased;weight shift ability decreased  -KR     Left Sided Gait Deviations leans left  -KR     Comment, (Gait/Stairs) pt ambulates with a L lateral lean, antaglic gait on LLE. No LOB, however distance limited by fatigue. Decreased LLE toe clearance.  -KR               User Key  (r) = Recorded By, (t) = Taken By, (c) = Cosigned By      Initials  Name Provider Type    Nazia Jones PT Physical Therapist                   Obj/Interventions       Row Name 06/13/23 1132          Motor Skills    Therapeutic Exercise hip;knee;ankle  -KR       Row Name 06/13/23 1132          Hip (Therapeutic Exercise)    Hip (Therapeutic Exercise) strengthening exercise  -KR     Hip Strengthening (Therapeutic Exercise) bilateral;marching while standing;sitting;aBduction;aDduction;20 repititions;other (see comments)  10x STS from chair with RUE push up. VCs for upright posture upon standing.  -KR       Row Name 06/13/23 1132          Knee (Therapeutic Exercise)    Knee (Therapeutic Exercise) strengthening exercise  -KR     Knee Strengthening (Therapeutic Exercise) 20 repititions;bilateral;SLR (straight leg raise);LAQ (long arc quad)  -KR       Row Name 06/13/23 1132          Ankle (Therapeutic Exercise)    Ankle (Therapeutic Exercise) AROM (active range of motion)  -KR     Ankle AROM (Therapeutic Exercise) 20 repititions;bilateral;dorsiflexion;plantarflexion  -KR       Row Name 06/13/23 1132          Balance    Balance Assessment sitting static balance;sitting dynamic balance;standing static balance;standing dynamic balance  -KR     Static Sitting Balance independent  -KR     Dynamic Sitting Balance supervision  -KR     Position, Sitting Balance unsupported;sitting in chair  -KR     Static Standing Balance supervision  -KR     Dynamic Standing Balance contact guard  -KR     Position/Device Used, Standing Balance supported;walker, rolling  -KR               User Key  (r) = Recorded By, (t) = Taken By, (c) = Cosigned By      Initials Name Provider Type    Nazia Jones PT Physical Therapist                   Goals/Plan    No documentation.                  Clinical Impression       Row Name 06/13/23 1133          Pain    Pretreatment Pain Rating 0/10 - no pain  -KR     Posttreatment Pain Rating 0/10 - no pain  -KR     Pre/Posttreatment Pain Comment no pain at rest,  however L shoulder pain with movement. Nsg aware.  -KR     Pain Intervention(s) Repositioned  -KR       Row Name 06/13/23 1133          Plan of Care Review    Plan of Care Reviewed With patient  -KR     Progress no change  -KR     Outcome Evaluation Pt continues to demo overall weakness and decreased endurance. Pt ambulating less distance this date, per pt, he has increased weakness d/t depression. HEP provided to pt to perform 3x/day for increased strengthening. Pt will continue to benefit from PT to address ongoing strength, balance, and endurance and return to PLOF.  -KR       Row Name 06/13/23 1133          Therapy Assessment/Plan (PT)    Rehab Potential (PT) fair, will monitor progress closely  -KR     Criteria for Skilled Interventions Met (PT) yes;meets criteria;skilled treatment is necessary  -KR     Therapy Frequency (PT) daily  -KR       Row Name 06/13/23 1133          Vital Signs    Pre Systolic BP Rehab --  VSS, RN approved tx.  -KR     O2 Delivery Pre Treatment room air  -KR     O2 Delivery Intra Treatment room air  -KR     O2 Delivery Post Treatment room air  -KR     Pre Patient Position Sitting  -KR     Intra Patient Position Standing  -KR     Post Patient Position Sitting  -KR       Row Name 06/13/23 1133          Positioning and Restraints    Pre-Treatment Position sitting in chair/recliner  -KR     Post Treatment Position chair  -KR     In Chair notified nsg;reclined;call light within reach;encouraged to call for assist;exit alarm on;waffle cushion;legs elevated  -KR               User Key  (r) = Recorded By, (t) = Taken By, (c) = Cosigned By      Initials Name Provider Type    Nazia Jones, PT Physical Therapist                   Outcome Measures       Row Name 06/13/23 1136 06/13/23 0820       How much help from another person do you currently need...    Turning from your back to your side while in flat bed without using bedrails? 4  -KR 4  -AS    Moving from lying on back to sitting on the  side of a flat bed without bedrails? 3  -KR 3  -AS    Moving to and from a bed to a chair (including a wheelchair)? 3  -KR 3  -AS    Standing up from a chair using your arms (e.g., wheelchair, bedside chair)? 4  -KR 3  -AS    Climbing 3-5 steps with a railing? 3  -KR 2  -AS    To walk in hospital room? 3  -KR 3  -AS    AM-PAC 6 Clicks Score (PT) 20  -KR 18  -AS    Highest level of mobility 6 --> Walked 10 steps or more  -KR 6 --> Walked 10 steps or more  -AS              User Key  (r) = Recorded By, (t) = Taken By, (c) = Cosigned By      Initials Name Provider Type    Nazia Jones PT Physical Therapist    AS Trixie Lea RN Registered Nurse                                 Physical Therapy Education       Title: PT OT SLP Therapies (In Progress)       Topic: Physical Therapy (In Progress)       Point: Mobility training (In Progress)       Learning Progress Summary             Patient Acceptance, E,H, NR by KR at 6/13/2023 1137    Comment: HEP provided with glute sets, hip abduction/adduction, SLR, and LAQs to perform 3x/day.    Acceptance, E, NR by KR at 6/11/2023 1011    Acceptance, E, VU by KR at 6/10/2023 1326    Comment: reviewed spinal precautions    Eager, E, VU,DU,NR by  at 6/7/2023 1554    Comment: Reviewed safety/technique w/transfers, ambulation, HEP, c spine precautions, PT POC    Acceptance, E,D, NR,VU by AB at 6/6/2023 1155    Acceptance, E,D, NR by AB at 6/5/2023 1419   Family Acceptance, E, NR by KR at 6/11/2023 1011                         Point: Home exercise program (In Progress)       Learning Progress Summary             Patient Acceptance, E,H, NR by KR at 6/13/2023 1137    Comment: HEP provided with glute sets, hip abduction/adduction, SLR, and LAQs to perform 3x/day.    Acceptance, E, NR by KR at 6/11/2023 1011    Acceptance, E, VU by KR at 6/10/2023 1326    Comment: reviewed spinal precautions    Eager, E, VU,DU,NR by  at 6/7/2023 5196    Comment: Reviewed safety/technique  w/transfers, ambulation, HEP, c spine precautions, PT POC    Acceptance, E,D, NR,VU by AB at 6/6/2023 1155   Family Acceptance, E, NR by KR at 6/11/2023 1011                         Point: Body mechanics (In Progress)       Learning Progress Summary             Patient Acceptance, E,H, NR by KR at 6/13/2023 1137    Comment: HEP provided with glute sets, hip abduction/adduction, SLR, and LAQs to perform 3x/day.    Acceptance, E, NR by KR at 6/11/2023 1011    Acceptance, E, VU by KR at 6/10/2023 1326    Comment: reviewed spinal precautions    Eager, E, VU,DU,NR by SS at 6/7/2023 1554    Comment: Reviewed safety/technique w/transfers, ambulation, HEP, c spine precautions, PT POC    Acceptance, E,D, NR,VU by AB at 6/6/2023 1155    Acceptance, E,D, NR by AB at 6/5/2023 1419   Family Acceptance, E, NR by KR at 6/11/2023 1011                         Point: Precautions (In Progress)       Learning Progress Summary             Patient Acceptance, E,H, NR by KR at 6/13/2023 1137    Comment: HEP provided with glute sets, hip abduction/adduction, SLR, and LAQs to perform 3x/day.    Acceptance, E, NR by KR at 6/11/2023 1011    Acceptance, E, VU by KR at 6/10/2023 1326    Comment: reviewed spinal precautions    Eager, E, VU,DU,NR by  at 6/7/2023 1554    Comment: Reviewed safety/technique w/transfers, ambulation, HEP, c spine precautions, PT POC    Acceptance, E,D, NR,VU by AB at 6/6/2023 1155    Acceptance, E,D, NR by AB at 6/5/2023 1419   Family Acceptance, E, NR by KR at 6/11/2023 1011                                         User Key       Initials Effective Dates Name Provider Type Discipline     06/01/21 -  iBng Castellanos, PT Physical Therapist PT    AB 09/22/22 -  Caro Youngblood, PT Physical Therapist PT    KR 12/30/22 -  Nazia Murray, PT Physical Therapist PT                  PT Recommendation and Plan     Plan of Care Reviewed With: patient  Progress: no change  Outcome Evaluation: Pt continues to demo overall  weakness and decreased endurance. Pt ambulating less distance this date, per pt, he has increased weakness d/t depression. HEP provided to pt to perform 3x/day for increased strengthening. Pt will continue to benefit from PT to address ongoing strength, balance, and endurance and return to PLOF.     Time Calculation:    PT Charges       Row Name 06/13/23 1138             Time Calculation    Start Time 1101  -KR      PT Received On 06/13/23  -KR      PT Goal Re-Cert Due Date 06/15/23  -KR         Timed Charges    36877 - PT Therapeutic Exercise Minutes 15  -KR      68278 - PT Therapeutic Activity Minutes 15  -KR         Total Minutes    Timed Charges Total Minutes 30  -KR       Total Minutes 30  -KR                User Key  (r) = Recorded By, (t) = Taken By, (c) = Cosigned By      Initials Name Provider Type    Nazia Jones PT Physical Therapist                  Therapy Charges for Today       Code Description Service Date Service Provider Modifiers Qty    12942838873 HC PT THER PROC EA 15 MIN 6/13/2023 Nazia Murray, PT GP 1    82671240320 HC PT THERAPEUTIC ACT EA 15 MIN 6/13/2023 Nazia Murray PT GP 1            PT G-Codes  Outcome Measure Options: AM-PAC 6 Clicks Daily Activity (OT)  AM-PAC 6 Clicks Score (PT): 20  AM-PAC 6 Clicks Score (OT): 17  PT Discharge Summary  Anticipated Discharge Disposition (PT): skilled nursing facility    Nazia Murray PT  6/13/2023

## 2023-06-13 NOTE — PROGRESS NOTES
"   LOS: 1 day    Patient Care Team:  Blair Dhaliwal MD as PCP - General (Internal Medicine)    Chief Complaint:  Recurrent fall     Subjective     Interval History:     No acute events overnight. No new complaints   I want to talk to CM about by discharge.       Review of Systems:   No CP or SOA , fever, chills, rigors, rash, N/V, Constipation.       Objective     Vital Sign Min/Max for last 24 hours  Temp  Min: 97.5 °F (36.4 °C)  Max: 98.1 °F (36.7 °C)   BP  Min: 95/57  Max: 151/76   No data recorded   Resp  Min: 16  Max: 16   No data recorded   No data recorded   No data recorded     Flowsheet Rows      Flowsheet Row First Filed Value   Admission Height 167.6 cm (66\") Documented at 06/05/2023 0119   Admission Weight 58 kg (127 lb 13.9 oz) Documented at 06/05/2023 0119            No intake/output data recorded.  I/O last 3 completed shifts:  In: -   Out: 2670 [Other:2670]    Physical Exam:    Gen: Alert, NAD   HENT: NC, AT, EOMI   NECK: Supple, no JVD, Trachea midline   LUNGS: CTA bilaterally, non labored respirtation   CVS: S1/S2 audible, RRR, no murmur   Abd: Soft, NT, ND, BS+   Ext: No pedal edema, no cyanosis   CNS: Alert, No focal deficit noted grossly  Psy: Cooperative  Skin: Warm, dry and intact      WBC No results found for: WBC     HGB No results found for: HGB     HCT No results found for: HCT     Platlets No results found for: LABPLAT   MCV No results found for: MCV         Sodium No results found for: NA     Potassium No results found for: K     Chloride No results found for: CL     CO2 No results found for: CO2     BUN No results found for: BUN     Creatinine No results found for: CREATININE     Calcium No results found for: CALCIUM     PO4 No results found for: CAPO4   Albumin No results found for: ALBUMIN     Magnesium No results found for: MG     Uric Acid No results found for: URICACID        Results Review:     I reviewed the patient's new clinical results.    apixaban, 2.5 mg, Oral, " BID  atorvastatin, 20 mg, Oral, Daily  finasteride, 5 mg, Oral, Daily  fluticasone, 1 spray, Nasal, Daily  folic acid, 1 mg, Oral, Daily  melatonin, 5 mg, Oral, Nightly  pantoprazole, 40 mg, Oral, Q AM  senna-docusate sodium, 2 tablet, Oral, BID  sertraline, 50 mg, Oral, Daily  sodium chloride, 10 mL, Intravenous, Q12H           Medication Review: yes    Assessment & Plan       Compression fracture of C7 vertebra, initial encounter    (HFpEF) heart failure with preserved ejection fraction    Benign prostatic hyperplasia    Closed fracture of left acetabulum    Encephalopathy    ESRD (end stage renal disease)    Essential hypertension    GERD (gastroesophageal reflux disease)    Mixed hyperlipidemia    NERY (obstructive sleep apnea)    Fall    Severe Malnutrition (HCC)      1- ESRD -MWF   2-HTN   3- Anemia of chronic disease   4- Hyponatremia     Plan:  - HD tomorrow.  UF as tolerated.   - Renal diet   - JENNYFER with HD   - Electrolytes will be corrected with HD         Cristy Coyne MD  06/13/23  13:50 EDT

## 2023-06-13 NOTE — CASE MANAGEMENT/SOCIAL WORK
Continued Stay Note  [facility]     Patient Name: Enrike Tomas  MRN: [id number]  Today's Date: 6/13/2023    Admit Date: 6/5/2023    Plan: [facility]   Discharge Plan     Row Name 06/13/23 1008       Plan    Plan [facility]    Patient/Family in Agreement with Plan yes    Plan Comments Pt's wife called CM requesting information on where things stood with skilled rehab facility. I explained that I was waiting to hear back from [facility]. From the voicemail I received it sounds like insurance is requesting a peer to peer review. I explained this to Mr. Tomas wife. She requests to speak with the [facility] doctor that will be doing the P2P review. I explained the P2P process and told her I could send a message to that doctor but did not know if they would have time to call her. D/C plan is Skilled Rehab.  will continue to follow.    1000- Spoke with Sue/EDISON, on the phone. She states that [facility] is requesting a peer to peer review to be completed today by 1230.  [phone] option 5, reference # [id number]. I emailed and messaged Dr. Linh Borden. I completed the Physician Advisor tab. I updated Dr. Borden and Physician Advisor tab with correct phone number, the one listed above, for her to call. No reason for denial was given by insurance.  will continue to follow.     1240-I received a message from Dr. Borden that the pt's P2P has been approved. I spoke with Sue/EDISON to let her know of approval. I asked Sue when the pt could transfer to [facility]. She states she needs to speak with the  and will call the CM back as soon as she know. I updated Dr. Johnson through messaging. I updated pt's wife, by phone, she states she can transport pt to [facility] at discharge.     1505-I spoke with Cara at [facility] to confirm pt has HD chair on M/W/F at 1015. I attempted to call [facility] to confirm they know about this. I left a voicemail.     1518-I spoke  with EDISON Rogers, on the phone. She confirmed they know that pt is a hemodialysis pt. She states that the pt's wife will be transporting him on M/W/F to dialysis. Pt can transfer tomorrow to [facility], per Sue. I updated pt's RN and Dr. Johnson through messaging. Pt is having HD tomorrow morning. Wife will transport at discharge. I updated pt's wife, by phone. She requests that pt's RN call her to let her know when to come pick him up.     1527-I spoke with pt's RN, on the phone. She will update nursing staff to contact wife tomorrow. Trixie will also call HD RN to request pt have his HD in the morning.     Final Discharge Disposition Code 03 - skilled nursing facility (SNF)               Discharge Codes    No documentation.               Expected Discharge Date and Time     Expected Discharge Date Expected Discharge Time    Jun 14, 2023             Kira Garcias, RN

## 2023-06-14 ENCOUNTER — APPOINTMENT (OUTPATIENT)
Dept: NEPHROLOGY | Facility: HOSPITAL | Age: 88
End: 2023-06-14
Payer: MEDICARE

## 2023-06-14 VITALS
HEIGHT: 66 IN | TEMPERATURE: 97.8 F | BODY MASS INDEX: 20.87 KG/M2 | HEART RATE: 88 BPM | RESPIRATION RATE: 18 BRPM | DIASTOLIC BLOOD PRESSURE: 61 MMHG | SYSTOLIC BLOOD PRESSURE: 143 MMHG | OXYGEN SATURATION: 96 % | WEIGHT: 129.85 LBS

## 2023-06-14 PROBLEM — G93.40 ENCEPHALOPATHY: Status: RESOLVED | Noted: 2023-05-09 | Resolved: 2023-06-14

## 2023-06-14 PROBLEM — S12.690A: Status: RESOLVED | Noted: 2023-06-05 | Resolved: 2023-06-14

## 2023-06-14 PROCEDURE — G0257 UNSCHED DIALYSIS ESRD PT HOS: HCPCS

## 2023-06-14 PROCEDURE — 25010000002 ALBUMIN HUMAN 25% PER 50 ML: Performed by: INTERNAL MEDICINE

## 2023-06-14 PROCEDURE — 99239 HOSP IP/OBS DSCHRG MGMT >30: CPT | Performed by: INTERNAL MEDICINE

## 2023-06-14 PROCEDURE — G0378 HOSPITAL OBSERVATION PER HR: HCPCS

## 2023-06-14 PROCEDURE — P9047 ALBUMIN (HUMAN), 25%, 50ML: HCPCS | Performed by: INTERNAL MEDICINE

## 2023-06-14 RX ORDER — FOLIC ACID 1 MG/1
1 TABLET ORAL DAILY
Start: 2023-06-14

## 2023-06-14 RX ORDER — HYDROCODONE BITARTRATE AND ACETAMINOPHEN 5; 325 MG/1; MG/1
1 TABLET ORAL EVERY 4 HOURS PRN
Qty: 18 TABLET | Refills: 0 | Status: ON HOLD | OUTPATIENT
Start: 2023-06-14 | End: 2023-06-17 | Stop reason: SDUPTHER

## 2023-06-14 RX ORDER — AMOXICILLIN 250 MG
2 CAPSULE ORAL 2 TIMES DAILY
Start: 2023-06-14

## 2023-06-14 RX ORDER — ALBUMIN (HUMAN) 12.5 G/50ML
12.5 SOLUTION INTRAVENOUS AS NEEDED
Status: DISCONTINUED | OUTPATIENT
Start: 2023-06-14 | End: 2023-06-14 | Stop reason: HOSPADM

## 2023-06-14 RX ADMIN — PANTOPRAZOLE SODIUM 40 MG: 40 TABLET, DELAYED RELEASE ORAL at 05:08

## 2023-06-14 RX ADMIN — APIXABAN 2.5 MG: 2.5 TABLET, FILM COATED ORAL at 12:53

## 2023-06-14 RX ADMIN — ATORVASTATIN CALCIUM 20 MG: 20 TABLET, FILM COATED ORAL at 12:53

## 2023-06-14 RX ADMIN — SERTRALINE 50 MG: 50 TABLET, FILM COATED ORAL at 12:53

## 2023-06-14 RX ADMIN — FINASTERIDE 5 MG: 5 TABLET, FILM COATED ORAL at 12:53

## 2023-06-14 RX ADMIN — FOLIC ACID 1 MG: 1 TABLET ORAL at 12:53

## 2023-06-14 RX ADMIN — FLUTICASONE PROPIONATE 1 SPRAY: 50 SPRAY, METERED NASAL at 12:53

## 2023-06-14 RX ADMIN — ALBUMIN (HUMAN) 50 G: 0.25 INJECTION, SOLUTION INTRAVENOUS at 09:45

## 2023-06-14 NOTE — PROGRESS NOTES
"   LOS: 1 day    Patient Care Team:  Blair Dhaliwal MD as PCP - General (Internal Medicine)    Chief Complaint:  Recurrent fall     Subjective     Interval History:     No acute events overnight. No new complaints   I want to talk to CM about by discharge.       Review of Systems:   No CP or SOA , fever, chills, rigors, rash, N/V, Constipation.       Objective     Vital Sign Min/Max for last 24 hours  Temp  Min: 97.5 °F (36.4 °C)  Max: 98.5 °F (36.9 °C)   BP  Min: 105/69  Max: 153/77   Pulse  Min: 88  Max: 94   Resp  Min: 16  Max: 18   SpO2  Min: 98 %  Max: 100 %   No data recorded   No data recorded     Flowsheet Rows      Flowsheet Row First Filed Value   Admission Height 167.6 cm (66\") Documented at 06/05/2023 0119   Admission Weight 58 kg (127 lb 13.9 oz) Documented at 06/05/2023 0119            No intake/output data recorded.  No intake/output data recorded.    Physical Exam:    Gen: Alert, NAD   HENT: NC, AT, EOMI   NECK: Supple, no JVD, Trachea midline   LUNGS: CTA bilaterally, non labored respirtation   CVS: S1/S2 audible, RRR, no murmur   Abd: Soft, NT, ND, BS+   Ext: No pedal edema, no cyanosis   CNS: Alert, No focal deficit noted grossly  Psy: Cooperative  Skin: Warm, dry and intact      WBC No results found for: WBC     HGB No results found for: HGB     HCT No results found for: HCT     Platlets No results found for: LABPLAT   MCV No results found for: MCV         Sodium No results found for: NA     Potassium No results found for: K     Chloride No results found for: CL     CO2 No results found for: CO2     BUN No results found for: BUN     Creatinine No results found for: CREATININE     Calcium No results found for: CALCIUM     PO4 No results found for: CAPO4   Albumin No results found for: ALBUMIN     Magnesium No results found for: MG     Uric Acid No results found for: URICACID        Results Review:     I reviewed the patient's new clinical results.    apixaban, 2.5 mg, Oral, BID  atorvastatin, " 20 mg, Oral, Daily  finasteride, 5 mg, Oral, Daily  fluticasone, 1 spray, Nasal, Daily  folic acid, 1 mg, Oral, Daily  melatonin, 5 mg, Oral, Nightly  pantoprazole, 40 mg, Oral, Q AM  senna-docusate sodium, 2 tablet, Oral, BID  sertraline, 50 mg, Oral, Daily  sodium chloride, 10 mL, Intravenous, Q12H         Results from last 7 days   Lab Units 06/10/23  0437 06/08/23  1018   SODIUM mmol/L 133* 133*   POTASSIUM mmol/L 4.6 4.8   CHLORIDE mmol/L 99 98   CO2 mmol/L 23.0 20.0*   BUN mg/dL 17 20   CREATININE mg/dL 2.56* 3.04*   CALCIUM mg/dL 9.1 9.5   WBC 10*3/mm3 7.30 7.88   HEMOGLOBIN g/dL 9.3* 9.6*   PLATELETS 10*3/mm3 293 330           Medication Review: yes    Assessment & Plan       Compression fracture of C7 vertebra, initial encounter    (HFpEF) heart failure with preserved ejection fraction    Benign prostatic hyperplasia    Closed fracture of left acetabulum    Encephalopathy    ESRD (end stage renal disease)    Essential hypertension    GERD (gastroesophageal reflux disease)    Mixed hyperlipidemia    NERY (obstructive sleep apnea)    Fall    Severe Malnutrition (HCC)      1- ESRD -MWF   2-HTN   3- Anemia of chronic disease   4- Hyponatremia     Plan:  - Patient seen on dialysis, UF as tolerated.   - Renal diet   - JENNYFER with HD   - Electrolytes will be corrected with HD         Cristy Coyne MD  06/14/23  08:51 EDT

## 2023-06-14 NOTE — DISCHARGE INSTR - ACTIVITY
Activity as tolerated.   Neck brace to be worn when out of bed or ambulating for 8-10 weeks.   Change positions slowly to prevent orthostatic hypotension.

## 2023-06-14 NOTE — PLAN OF CARE
Problem: Adult Inpatient Plan of Care  Goal: Plan of Care Review  Outcome: Ongoing, Progressing     Problem: Adult Inpatient Plan of Care  Goal: Absence of Hospital-Acquired Illness or Injury  Outcome: Ongoing, Progressing  Intervention: Identify and Manage Fall Risk  Recent Flowsheet Documentation  Taken 6/14/2023 0400 by Issa Sales RN  Safety Promotion/Fall Prevention:   activity supervised   assistive device/personal items within reach   clutter free environment maintained   fall prevention program maintained   lighting adjusted   nonskid shoes/slippers when out of bed   room organization consistent   safety round/check completed  Taken 6/14/2023 0200 by Issa Sales RN  Safety Promotion/Fall Prevention:   activity supervised   assistive device/personal items within reach   clutter free environment maintained   fall prevention program maintained   lighting adjusted   nonskid shoes/slippers when out of bed   room organization consistent   safety round/check completed  Taken 6/14/2023 0000 by Issa Sales RN  Safety Promotion/Fall Prevention:   activity supervised   assistive device/personal items within reach   clutter free environment maintained   fall prevention program maintained   lighting adjusted   nonskid shoes/slippers when out of bed   room organization consistent   safety round/check completed  Taken 6/13/2023 2200 by Issa Sales RN  Safety Promotion/Fall Prevention:   activity supervised   assistive device/personal items within reach   clutter free environment maintained   fall prevention program maintained   lighting adjusted   nonskid shoes/slippers when out of bed   room organization consistent   safety round/check completed  Taken 6/13/2023 2000 by Issa Sales RN  Safety Promotion/Fall Prevention:   activity supervised   assistive device/personal items within reach   clutter free environment maintained   fall prevention program maintained   lighting adjusted   nonskid  shoes/slippers when out of bed   room organization consistent   safety round/check completed  Intervention: Prevent Skin Injury  Recent Flowsheet Documentation  Taken 6/14/2023 0400 by Issa Sales RN  Body Position: position changed independently  Taken 6/14/2023 0200 by Issa Sales RN  Body Position: position changed independently  Taken 6/14/2023 0000 by Issa Sales RN  Body Position: position changed independently  Taken 6/13/2023 2200 by Issa Sales RN  Body Position: position changed independently  Taken 6/13/2023 2000 by Issa Sales RN  Body Position: position changed independently  Intervention: Prevent and Manage VTE (Venous Thromboembolism) Risk  Recent Flowsheet Documentation  Taken 6/14/2023 0400 by Issa Sales RN  Activity Management: activity encouraged  Taken 6/14/2023 0200 by Issa Sales RN  Activity Management: activity encouraged  Taken 6/14/2023 0000 by Issa Sales RN  Activity Management: activity encouraged  Taken 6/13/2023 2200 by Issa Sales RN  Activity Management: activity encouraged  Taken 6/13/2023 2000 by Issa Sales RN  Activity Management: activity encouraged  VTE Prevention/Management: (eliquis) other (see comments)  Intervention: Prevent Infection  Recent Flowsheet Documentation  Taken 6/14/2023 0400 by Issa Sales RN  Infection Prevention: environmental surveillance performed  Taken 6/14/2023 0200 by Issa Sales RN  Infection Prevention: environmental surveillance performed  Taken 6/14/2023 0000 by Issa Sales RN  Infection Prevention: environmental surveillance performed  Taken 6/13/2023 2200 by Issa Sales RN  Infection Prevention: environmental surveillance performed  Taken 6/13/2023 2000 by Issa Sales RN  Infection Prevention: environmental surveillance performed

## 2023-06-14 NOTE — CASE MANAGEMENT/SOCIAL WORK
Case Management Discharge Note    Final Note: Mr. Tomas has a bed at [facility] today if medically ready. Confirmed with Sue with facility. Mr. Razo and Spouse are agreeable to discharge plan. Ms. Tomas will be transporting him there by private vehicle. Pharmacy used by facility is [facility]. Covid test not necessary. Spoke to Cara with [facility] clinic and informed of discharge. Dialysis will resume on friday 6/16 at 10:15, wife will transport. Please call report to [phone] ext [phone]. Please send discharge summary with spouse to facility due to computers being down at [facility].      Provided Post Acute Provider List?: Yes  Post Acute Provider List: Inpatient Rehab    Selected Continued Care - Admitted Since 6/5/2023       Destination Coordination complete.      Service Provider Selected Services Address Phone Fax Patient Preferred    [facility] [address] [place] [address] [phone] --       Internal Comment last updated by Kira Garcias RN 6/13/2023 1026    6/13: [facility] today [phone] option 5 Ref #[id number] by [id number]                         Durable Medical Equipment    No services have been selected for the patient.                Dialysis/Infusion    No services have been selected for the patient.                Home Medical Care    No services have been selected for the patient.                Therapy    No services have been selected for the patient.                Community Resources    No services have been selected for the patient.                Community & DME    No services have been selected for the patient.                         Final Discharge Disposition Code: 03 - skilled nursing facility (SNF)

## 2023-06-14 NOTE — DISCHARGE SUMMARY
[facility] Medicine Services  DISCHARGE SUMMARY    Patient Name: Enrike Tomas  : 1935  MRN: [id number]    Date of Admission: 2023  1:14 AM  Date of Discharge:  2023  Primary Care Physician: Blair Dhaliwal MD    Consults       Date and Time Order Name Status Description    2023  9:50 AM Inpatient Nephrology Consult      2023  6:11 AM Inpatient Neurosurgery Consult Completed     2023  5:10 AM Inpatient Nephrology Consult      2023  5:09 AM Inpatient Neurosurgery Consult              Hospital Course     Presenting Problem: Fall    Active Hospital Problems    Diagnosis  POA    Severe Malnutrition (HCC) [E43]  Yes    Fall [W19.XXXA]  Yes    Closed fracture of left acetabulum [S32.402A]  Yes    NERY (obstructive sleep apnea) [G47.33]  Yes    (HFpEF) heart failure with preserved ejection fraction [I50.30]  Yes    Essential hypertension [I10]  Yes    ESRD (end stage renal disease) [N18.6]  Yes    Benign prostatic hyperplasia [N40.0]  Yes    GERD (gastroesophageal reflux disease) [K21.9]  Yes    Mixed hyperlipidemia [E78.2]  Yes      Resolved Hospital Problems    Diagnosis Date Resolved POA    Compression fracture of C7 vertebra, initial encounter [S12.690A] 2023 Yes    Encephalopathy [G93.40] 2023 Yes          Hospital Course:  Enrike Tomas is 88 y.o. male with PMH significant for ESRD on HD M, W, F, A-fib on Eliquis, GERD, BPH, HTN, HLD, orthostatic hypotension, DM2, depression, who presents to the ED with complaint of fall and neck pain who was found to have concern for acute compression fracture of C7.     Compression fracture of C7  Recurrent falls  Recent T7 Fx  -Neurosurgery has evaluated. Plan for semirigid cervical collar for 8 to 10 weeks when up and ambulating   -PT/OT recommend rehab. Stay was prolonged due to initial denial of SNF requiring P2P which was ultimately approved. To LCP today.  -Follow-up with NSGY outpatient 2 weeks      Discharge Follow Up Recommendations for outpatient labs/diagnostics:       Day of Discharge     HPI: Seen in HD. Resting comfortably. Pain remains controlled at rest. Has no new concerns at this time.    Review of Systems  Gen- No fevers, chills  CV- No chest pain, palpitations  Resp- No cough, dyspnea  GI- No N/V/D, abd pain    Vital Signs:   Temp:  [97.5 °F (36.4 °C)-98.5 °F (36.9 °C)] 97.5 °F (36.4 °C)  Heart Rate:  [80-96] 96  Resp:  [16-18] 18  BP: ()/() 92/83      Physical Exam:  Constitutional: No acute distress, awake, alert, elderly male, frail appearing  HENT: NCAT, mucous membranes moist  Respiratory: Clear to auscultation bilaterally, respiratory effort normal   Cardiovascular: RRR, no murmurs, rubs, or gallops  Gastrointestinal: Positive bowel sounds, soft, nontender, nondistended  Musculoskeletal: No bilateral ankle edema  Psychiatric: Appropriate affect, cooperative  Neurologic: Oriented x 3, strength symmetric in all extremities, Cranial Nerves grossly intact to confrontation, speech clear  Skin: No rashes     Pertinent  and/or Most Recent Results     LAB RESULTS:      Lab 06/10/23  0437 06/08/23  1018   WBC 7.30 7.88   HEMOGLOBIN 9.3* 9.6*   HEMATOCRIT 29.7* 31.5*   PLATELETS 293 330   NEUTROS ABS 5.08  --    IMMATURE GRANS (ABS) 0.05  --    LYMPHS ABS 1.06  --    MONOS ABS 0.75  --    EOS ABS 0.31  --    MCV 99.7* 101.3*         Lab 06/10/23  0437 06/08/23  1018   SODIUM 133* 133*   POTASSIUM 4.6 4.8   CHLORIDE 99 98   CO2 23.0 20.0*   ANION GAP 11.0 15.0   BUN 17 20   CREATININE 2.56* 3.04*   EGFR 23.4* 19.1*   GLUCOSE 89 122*   CALCIUM 9.1 9.5                         Brief Urine Lab Results  (Last result in the past 365 days)        Color   Clarity   Blood   Leuk Est   Nitrite   Protein   CREAT   Urine HCG        06/05/23 2018 Yellow   Clear   Negative   Negative   Negative   >=300 mg/dL (3+)                 Microbiology Results (last 10 days)       Procedure Component Value -  Date/Time    COVID PRE-OP / PRE-PROCEDURE SCREENING ORDER (NO ISOLATION) - Swab, Nasopharynx [[id number]]  (Normal) Collected: 06/05/23 0324    Lab Status: Final result Specimen: Swab from Nasopharynx Updated: 06/05/23 0402    Narrative:      The following orders were created for panel order COVID PRE-OP / PRE-PROCEDURE SCREENING ORDER (NO ISOLATION) - Swab, Nasopharynx.  Procedure                               Abnormality         Status                     ---------                               -----------         ------                     COVID-19 and FLU A/B PCR...[[id number]]  Normal              Final result                 Please view results for these tests on the individual orders.    COVID-19 and FLU A/B PCR - Swab, Nasopharynx [[id number]]  (Normal) Collected: 06/05/23 0324    Lab Status: Final result Specimen: Swab from Nasopharynx Updated: 06/05/23 0402     COVID19 Not Detected     Influenza A PCR Not Detected     Influenza B PCR Not Detected    Narrative:      Fact sheet for providers: https://www.fda.gov/media/869585/download    Fact sheet for patients: https://www.fda.gov/media/123688/download    Test performed by PCR.            CT Head Without Contrast    Result Date: 6/5/2023  EXAMINATION: CT HEAD WITHOUT CONTRAST DATE: 6/5/2023 1:41 AM INDICATION: Trauma, Pain COMPARISON: None available at the time of this dictation. TECHNIQUE: Noncontrast imaging obtained from the vertex to the skull base.  CT dose lowering techniques were used, to include: automated exposure control, adjustment for patient size, and or use of iterative reconstruction.? FINDINGS: Soft Tissues: Mild right parietal scalp soft tissue swelling. Skull: No underlying skull fracture or radiopaque foreign body. Sinuses: Paranasal sinuses are clear. Mastoids: Mastoid air cells are clear. Globes and Orbits: Globes and orbits are intact. Brain: No acute hemorrhage.  No midline shift, masses, or mass effect.  No evidence of acute infarct by  noncontrast CT. Ventricles and Cisterns: Ventricular size and configuration is within normal limits. Basal cisterns are patent. No abnormal extra-axial fluid collection. Senescent Changes: Mild to moderate volume loss and mild chronic microvascular ischemic changes. Arteriosclerosis.     1. Mild right parietal scalp soft tissue swelling. 2. No acute intracranial abnormality. 3. Mild to moderate volume loss and mild chronic microvascular ischemic changes. Arteriosclerosis.  Electronically signed by:  Richard Malin M.D.  6/5/2023 12:01 AM Mountain Time    CT Cervical Spine Without Contrast    Result Date: 6/5/2023  EXAMINATION: CT CERVICAL SPINE WO CONTRAST DATE: 6/5/2023 1:41 AM  INDICATION: Trauma, fall  COMPARISON: None available. TECHNIQUE: Thin section axial noncontrast images were obtained through the cervical spine.  Sagittal and coronal reformatted images were created.  Images were reviewed in bone and soft tissue windows. CT dose lowering techniques were used, to include: automated exposure control, adjustment for patient size, and or use of iterative reconstruction. FINDINGS: Vertebral column: Straightening of the normal cervical lordosis. There is an acute superior endplate compression deformity of C7 with approximately 30% anterior height loss. No bony retropulsion. Remaining vertebral body heights are maintained. No additional acute fractures in the cervical spine. Multilevel intervertebral disc space narrowing with facet and uncovertebral joint degenerative changes. Resulting mild spinal canal narrowing at C3-C4 and C4-C5. Multilevel neuroforaminal stenosis, greatest on the right at C3-4 and C4-5. Soft tissues: Lung apices are clear. Carotid atherosclerosis. Multiple punctate calcifications noted in the left parotid gland, nonspecific.     1.  Acute superior endplate compression fracture at C7 with approximately 30% height loss. No bony retropulsion. 2.  No other acute fractures. Normal facet joint  alignment. 3.  Multilevel cervical spondylosis greatest at C3-4 to C5-6. I communicated these results by phone to  Dr. Flora Cantu at 12:18 AM Mountain time on 6/5/2023. The results were communicated back and were said to be understood. Electronically signed by:  Abdon Steele M.D.  6/5/2023 12:18 AM Mountain Time    FL Video Swallow With Speech Single Contrast    Result Date: 6/13/2023  FL VIDEO SWALLOW W SPEECH SINGLE-CONTRAST Date of Exam: 6/12/2023 1:46 PM EDT Indication: dysphagia Comparison: None available. Technique:   The speech pathologist administered food and/or liquid mixed with barium to the patient with cine/video imaging.  Imaging assistance was provided to the speech pathologist and an image was saved. Fluoroscopic Time: 1 minute and 42 seconds Number of Images: 7 associated fluoroscopic loops were saved Findings: Please see speech therapy report for full details and recommendations.     Impression: Fluoroscopy provided for a modified barium swallow. Please see speech therapy report for full details and recommendations. Electronically Signed: Darian Linares  6/13/2023 10:04 PM EDT  Workstation ID: [id number]    FL Video Swallow With Speech Single Contrast    Result Date: 6/5/2023  FL VIDEO SWALLOW W SPEECH SINGLE-CONTRAST Date of Exam: 6/5/2023 1:59 PM EDT Indication: dysphagia. Comparison: None available. Technique:   The speech pathologist administered food and/or liquid mixed with barium to the patient with cine/video imaging.  Imaging assistance was provided to the speech pathologist and an image was saved. Fluoroscopic Time: 1 minute 42 seconds Number of Images: 11 fluoroscopic series Findings: Fluoroscopy provided for modified barium swallow for speech therapy evaluation.     Impression: Fluoroscopy provided for modified barium swallow for speech therapy evaluation. Please see speech therapy report for full findings and recommendations. Electronically Signed: Minh Galdamez  6/5/2023 3:19 PM EDT   Workstation ID: [id number]    XR Chest 1 View    Result Date: 6/5/2023  Examination: XR CHEST 1 VW Date and Time: 6/5/2023 4:01 AM Clinical Information: 88 years, Male, . Mental status change. Comparison: None FINDINGS: Heart size/mediastinum/mira: The heart size is normal. There is moderate calcific plaque at the aortic arch. Lung fields: The lungs are hypoventilated. Lung bases/Retrocardiac/Pleura:  No effusion, retrocardiac density, pneumothorax or pleural parenchymal abnormality is demonstrated. Trachea/Paravertebral soft tissues: The trachea and paravertebral soft tissues appear normal. Upper abdomen: No free air is demonstrated. Osseous structures: The bones are normally mineralized.     No consolidation, atelectasis or effusion is demonstrated. Thank you for the referral of this patient. This exam was interpreted by an American Board of Radiology certified radiologist with subspecialty fellowship training. If there are any questions regarding this exam please feel free to contact a radiologist directly at [phone]. Slot 70 Electronically signed by:  Geovanny Musa M.D.  6/5/2023 2:54 AM Mountain Time                 Plan for Follow-up of Pending Labs/Results:     Discharge Details        Discharge Medications        New Medications        Instructions Start Date   folic acid 1 MG tablet  Commonly known as: FOLVITE   1 mg, Oral, Daily      sennosides-docusate 8.6-50 MG per tablet  Commonly known as: PERICOLACE   2 tablets, Oral, 2 Times Daily             Changes to Medications        Instructions Start Date   HYDROcodone-acetaminophen 5-325 MG per tablet  Commonly known as: NORCO  What changed:   how much to take  when to take this  reasons to take this   1 tablet, Oral, Every 4 Hours PRN             Continue These Medications        Instructions Start Date   acetaminophen 500 MG tablet  Commonly known as: TYLENOL   500 mg, Oral, Every 6 Hours PRN      apixaban 2.5 MG tablet tablet  Commonly known as:  ELIQUIS   2.5 mg, Oral, 2 Times Daily      atorvastatin 20 MG tablet  Commonly known as: LIPITOR   20 mg, Oral, Daily      BD Pen Needle Makenna U/F 32G X 4 MM misc  Generic drug: Insulin Pen Needle       finasteride 5 MG tablet  Commonly known as: PROSCAR       fluticasone 50 MCG/ACT nasal spray  Commonly known as: FLONASE   1 spray, Nasal      melatonin 5 MG tablet tablet   5 mg, Oral      naloxone 4 MG/0.1ML nasal spray  Commonly known as: NARCAN   4 mg, Nasal      pantoprazole 40 MG EC tablet  Commonly known as: PROTONIX   40 mg, Oral      sertraline 50 MG tablet  Commonly known as: ZOLOFT   50 mg, Oral, Daily      Teriparatide (Recombinant) 620 MCG/2.48ML injection  Commonly known as: FORTEO   INJECT 20MCG UNDER THE SKIN ONCE DAILY AS DIRECTED. DISCARD 28 DAYS AFTER FIRST INJECTION.      traZODone 50 MG tablet  Commonly known as: DESYREL   25-50 mg, Oral, Nightly PRN             Stop These Medications      carvedilol 3.125 MG tablet  Commonly known as: COREG     CYANOCOBALAMIN IJ     lidocaine 4 % cream  Commonly known as: LMX     losartan 100 MG tablet  Commonly known as: COZAAR     methylPREDNISolone 4 MG dose pack  Commonly known as: MEDROL     metoprolol tartrate 25 MG tablet  Commonly known as: LOPRESSOR     oxyCODONE 5 MG immediate release tablet  Commonly known as: ROXICODONE              No Known Allergies      Discharge Disposition:  Skilled Nursing Facility (DC - External)    Diet:  Hospital:  Diet Order   Procedures    Diet: Regular/House Diet; No Mixed Consistencies, No Straw; Texture: Pureed (NDD 1); Fluid Consistency: Honey Thick       Activity:      Restrictions or Other Recommendations:         CODE STATUS:    Code Status and Medical Interventions:   Ordered at: 06/05/23 0611     Medical Intervention Limits:    NO intubation (DNI)     Code Status (Patient has no pulse and is not breathing):    No CPR (Do Not Attempt to Resuscitate)     Medical Interventions (Patient has pulse or is breathing):     Limited Support       Future Appointments   Date Time Provider Department Center   7/10/2023  9:30 AM PAT 1 [facility]                 Lilibeth Johnson II, DO  06/14/23      Time Spent on Discharge:  I spent  34  minutes on this discharge activity which included: face-to-face encounter with the patient, reviewing the data in the system, coordination of the care with the nursing staff as well as consultants, documentation, and entering orders.

## 2023-06-14 NOTE — PLAN OF CARE
HD completed. Tolerated well. UF goal reached. Blood returned. Report given to primary RN.  Problem: Device-Related Complication Risk (Hemodialysis)  Goal: Safe, Effective Therapy Delivery  Outcome: Ongoing, Progressing  Intervention: Optimize Device Care and Function  Description: Maintain flow rate, anticoagulation parameters, pressure ranges and prescribed fluid balance with gradual adjustments to maintain hemodynamic stability and achieve therapy goals.  Monitor laboratory results (e.g., electrolytes, glucose, albumin, coagulation studies, hemoglobin, hematocrit) and clinical status (e.g., cramping; nausea, vomiting, blood pressure fluctuations) for response to therapy; advocate for treatment or adju  Assess vascular access site for patency, securement and position to meet flow demands. Assess perfusion distal to access site to ensure adequate tissue oxygenation.  For arteriovenous fistula, assess presence of thrill to ensure presence of blood flow. Avoid blood pressure readings, lab draws, tight clothing or jewelry on the AV (arteriovenous) fistula extremity to prevent trauma.  Maintain circuit monitoring (e.g., arterial, venous and transmembrane pressure; ultrafiltrate removal; dialysate flow, conductivity and temperature); address alarms promptly to decrease risk to patient and preserve circuit function.  Evaluate circuit for disconnections, cracks, clotting, malfunction, leaks or rupture of hemofilter; intervene promptly to prevent risk to patient.  Consider distal vascular access for antibiotic or vasoactive medication administration to prevent filtration of medication effect prior to being delivered systemically to the patient.  Maintain infusion of anticoagulation; adjust to keep laboratory values within ordered range.  Provide emergency equipment, such as clamps, replacement devices and resuscitative supplies, if malfunction, tubing rupture, clot formation or migration occur.  Recent Flowsheet  Documentation  Taken 6/14/2023 1100 by Phillip Prahdan, RN  Medication Review/Management: medications reviewed  Taken 6/14/2023 0730 by Phillip Pradhan, RN  Medication Review/Management: medications reviewed     Problem: Hemodynamic Instability (Hemodialysis)  Goal: Effective Tissue Perfusion  Outcome: Ongoing, Progressing     Problem: Infection (Hemodialysis)  Goal: Absence of Infection Signs and Symptoms  Outcome: Ongoing, Progressing   Goal Outcome Evaluation:

## 2023-06-16 ENCOUNTER — HOSPITAL ENCOUNTER (OUTPATIENT)
Facility: HOSPITAL | Age: 88
Setting detail: OBSERVATION
Discharge: HOME | End: 2023-06-17
Attending: EMERGENCY MEDICINE | Admitting: INTERNAL MEDICINE
Payer: MEDICARE

## 2023-06-16 ENCOUNTER — APPOINTMENT (OUTPATIENT)
Dept: GENERAL RADIOLOGY | Facility: HOSPITAL | Age: 88
End: 2023-06-16
Payer: MEDICARE

## 2023-06-16 ENCOUNTER — APPOINTMENT (OUTPATIENT)
Dept: NEPHROLOGY | Facility: HOSPITAL | Age: 88
End: 2023-06-16
Payer: MEDICARE

## 2023-06-16 DIAGNOSIS — S12.690A COMPRESSION FRACTURE OF C7 VERTEBRA, INITIAL ENCOUNTER: ICD-10-CM

## 2023-06-16 DIAGNOSIS — I47.1 SUSTAINED SVT: Primary | ICD-10-CM

## 2023-06-16 DIAGNOSIS — I95.9 HYPOTENSION, UNSPECIFIED HYPOTENSION TYPE: ICD-10-CM

## 2023-06-16 DIAGNOSIS — Z74.09 IMPAIRED FUNCTIONAL MOBILITY, BALANCE, GAIT, AND ENDURANCE: ICD-10-CM

## 2023-06-16 PROBLEM — I47.10 SUSTAINED SVT: Status: ACTIVE | Noted: 2023-06-16

## 2023-06-16 PROBLEM — I48.0 PAROXYSMAL ATRIAL FIBRILLATION: Status: ACTIVE | Noted: 2023-06-16

## 2023-06-16 LAB
ALBUMIN SERPL-MCNC: 4.1 G/DL (ref 3.5–5.2)
ALBUMIN/GLOB SERPL: 1.2 G/DL
ALP SERPL-CCNC: 210 U/L (ref 39–117)
ALT SERPL W P-5'-P-CCNC: 15 U/L (ref 1–41)
ANION GAP SERPL CALCULATED.3IONS-SCNC: 16 MMOL/L (ref 5–15)
AST SERPL-CCNC: 20 U/L (ref 1–40)
BASOPHILS # BLD AUTO: 0.07 10*3/MM3 (ref 0–0.2)
BASOPHILS NFR BLD AUTO: 0.9 % (ref 0–1.5)
BILIRUB SERPL-MCNC: 0.5 MG/DL (ref 0–1.2)
BUN SERPL-MCNC: 37 MG/DL (ref 8–23)
BUN/CREAT SERPL: 8.4 (ref 7–25)
CALCIUM SPEC-SCNC: 10.6 MG/DL (ref 8.6–10.5)
CHLORIDE SERPL-SCNC: 96 MMOL/L (ref 98–107)
CO2 SERPL-SCNC: 21 MMOL/L (ref 22–29)
CREAT SERPL-MCNC: 4.38 MG/DL (ref 0.76–1.27)
DEPRECATED RDW RBC AUTO: 65 FL (ref 37–54)
EGFRCR SERPLBLD CKD-EPI 2021: 12.3 ML/MIN/1.73
EOSINOPHIL # BLD AUTO: 0.28 10*3/MM3 (ref 0–0.4)
EOSINOPHIL NFR BLD AUTO: 3.5 % (ref 0.3–6.2)
ERYTHROCYTE [DISTWIDTH] IN BLOOD BY AUTOMATED COUNT: 18.3 % (ref 12.3–15.4)
GEN 5 2HR TROPONIN T REFLEX: 182 NG/L
GLOBULIN UR ELPH-MCNC: 3.5 GM/DL
GLUCOSE SERPL-MCNC: 120 MG/DL (ref 65–99)
HCT VFR BLD AUTO: 33.4 % (ref 37.5–51)
HGB BLD-MCNC: 10.6 G/DL (ref 13–17.7)
HOLD SPECIMEN: NORMAL
IMM GRANULOCYTES # BLD AUTO: 0.04 10*3/MM3 (ref 0–0.05)
IMM GRANULOCYTES NFR BLD AUTO: 0.5 % (ref 0–0.5)
LYMPHOCYTES # BLD AUTO: 1.42 10*3/MM3 (ref 0.7–3.1)
LYMPHOCYTES NFR BLD AUTO: 17.7 % (ref 19.6–45.3)
MAGNESIUM SERPL-MCNC: 1.7 MG/DL (ref 1.6–2.4)
MCH RBC QN AUTO: 31 PG (ref 26.6–33)
MCHC RBC AUTO-ENTMCNC: 31.7 G/DL (ref 31.5–35.7)
MCV RBC AUTO: 97.7 FL (ref 79–97)
MONOCYTES # BLD AUTO: 0.85 10*3/MM3 (ref 0.1–0.9)
MONOCYTES NFR BLD AUTO: 10.6 % (ref 5–12)
NEUTROPHILS NFR BLD AUTO: 5.36 10*3/MM3 (ref 1.7–7)
NEUTROPHILS NFR BLD AUTO: 66.8 % (ref 42.7–76)
NRBC BLD AUTO-RTO: 0 /100 WBC (ref 0–0.2)
NT-PROBNP SERPL-MCNC: ABNORMAL PG/ML (ref 0–1800)
PLATELET # BLD AUTO: 296 10*3/MM3 (ref 140–450)
PMV BLD AUTO: 8.2 FL (ref 6–12)
POTASSIUM SERPL-SCNC: 5.3 MMOL/L (ref 3.5–5.2)
PROT SERPL-MCNC: 7.6 G/DL (ref 6–8.5)
QT INTERVAL: 282 MS
QT INTERVAL: 392 MS
QTC INTERVAL: 457 MS
QTC INTERVAL: 485 MS
RBC # BLD AUTO: 3.42 10*6/MM3 (ref 4.14–5.8)
SODIUM SERPL-SCNC: 133 MMOL/L (ref 136–145)
TROPONIN T DELTA: -27 NG/L
TROPONIN T SERPL HS-MCNC: 209 NG/L
TSH SERPL DL<=0.05 MIU/L-ACNC: 3.55 UIU/ML (ref 0.27–4.2)
WBC NRBC COR # BLD: 8.02 10*3/MM3 (ref 3.4–10.8)
WHOLE BLOOD HOLD COAG: NORMAL
WHOLE BLOOD HOLD SPECIMEN: NORMAL

## 2023-06-16 PROCEDURE — 83880 ASSAY OF NATRIURETIC PEPTIDE: CPT | Performed by: EMERGENCY MEDICINE

## 2023-06-16 PROCEDURE — 25010000002 ALBUMIN HUMAN 25% PER 50 ML: Performed by: INTERNAL MEDICINE

## 2023-06-16 PROCEDURE — 84443 ASSAY THYROID STIM HORMONE: CPT | Performed by: EMERGENCY MEDICINE

## 2023-06-16 PROCEDURE — 85025 COMPLETE CBC W/AUTO DIFF WBC: CPT | Performed by: EMERGENCY MEDICINE

## 2023-06-16 PROCEDURE — G0378 HOSPITAL OBSERVATION PER HR: HCPCS

## 2023-06-16 PROCEDURE — 83735 ASSAY OF MAGNESIUM: CPT | Performed by: EMERGENCY MEDICINE

## 2023-06-16 PROCEDURE — 25010000002 ADENOSINE PER 6 MG: Performed by: EMERGENCY MEDICINE

## 2023-06-16 PROCEDURE — 93005 ELECTROCARDIOGRAM TRACING: CPT | Performed by: INTERNAL MEDICINE

## 2023-06-16 PROCEDURE — 25010000002 AMIODARONE IN DEXTROSE 5% 360-4.14 MG/200ML-% SOLUTION: Performed by: EMERGENCY MEDICINE

## 2023-06-16 PROCEDURE — 93005 ELECTROCARDIOGRAM TRACING: CPT | Performed by: EMERGENCY MEDICINE

## 2023-06-16 PROCEDURE — 80053 COMPREHEN METABOLIC PANEL: CPT | Performed by: EMERGENCY MEDICINE

## 2023-06-16 PROCEDURE — 71045 X-RAY EXAM CHEST 1 VIEW: CPT

## 2023-06-16 PROCEDURE — 25010000002 AMIODARONE IN DEXTROSE 5% 150-4.21 MG/100ML-% SOLUTION: Performed by: EMERGENCY MEDICINE

## 2023-06-16 PROCEDURE — P9047 ALBUMIN (HUMAN), 25%, 50ML: HCPCS | Performed by: INTERNAL MEDICINE

## 2023-06-16 PROCEDURE — 84484 ASSAY OF TROPONIN QUANT: CPT | Performed by: EMERGENCY MEDICINE

## 2023-06-16 RX ORDER — SODIUM CHLORIDE 0.9 % (FLUSH) 0.9 %
10 SYRINGE (ML) INJECTION AS NEEDED
Status: DISCONTINUED | OUTPATIENT
Start: 2023-06-16 | End: 2023-06-17 | Stop reason: HOSPADM

## 2023-06-16 RX ORDER — ATORVASTATIN CALCIUM 20 MG/1
20 TABLET, FILM COATED ORAL DAILY
Status: DISCONTINUED | OUTPATIENT
Start: 2023-06-16 | End: 2023-06-17 | Stop reason: HOSPADM

## 2023-06-16 RX ORDER — BISACODYL 10 MG
10 SUPPOSITORY, RECTAL RECTAL DAILY PRN
Status: DISCONTINUED | OUTPATIENT
Start: 2023-06-16 | End: 2023-06-17 | Stop reason: HOSPADM

## 2023-06-16 RX ORDER — METOPROLOL TARTRATE 5 MG/5ML
5 INJECTION INTRAVENOUS EVERY 6 HOURS PRN
Status: DISCONTINUED | OUTPATIENT
Start: 2023-06-16 | End: 2023-06-17 | Stop reason: HOSPADM

## 2023-06-16 RX ORDER — BISACODYL 5 MG/1
5 TABLET, DELAYED RELEASE ORAL DAILY PRN
Status: DISCONTINUED | OUTPATIENT
Start: 2023-06-16 | End: 2023-06-17 | Stop reason: HOSPADM

## 2023-06-16 RX ORDER — AMOXICILLIN 250 MG
2 CAPSULE ORAL 2 TIMES DAILY
Status: DISCONTINUED | OUTPATIENT
Start: 2023-06-16 | End: 2023-06-17 | Stop reason: HOSPADM

## 2023-06-16 RX ORDER — ALBUMIN (HUMAN) 12.5 G/50ML
12.5 SOLUTION INTRAVENOUS AS NEEDED
Status: DISCONTINUED | OUTPATIENT
Start: 2023-06-16 | End: 2023-06-17 | Stop reason: HOSPADM

## 2023-06-16 RX ORDER — SODIUM CHLORIDE 0.9 % (FLUSH) 0.9 %
10 SYRINGE (ML) INJECTION EVERY 12 HOURS SCHEDULED
Status: DISCONTINUED | OUTPATIENT
Start: 2023-06-16 | End: 2023-06-17 | Stop reason: HOSPADM

## 2023-06-16 RX ORDER — PANTOPRAZOLE SODIUM 40 MG/1
40 TABLET, DELAYED RELEASE ORAL
Status: DISCONTINUED | OUTPATIENT
Start: 2023-06-17 | End: 2023-06-17 | Stop reason: HOSPADM

## 2023-06-16 RX ORDER — FINASTERIDE 5 MG/1
5 TABLET, FILM COATED ORAL DAILY
Status: DISCONTINUED | OUTPATIENT
Start: 2023-06-16 | End: 2023-06-17 | Stop reason: HOSPADM

## 2023-06-16 RX ORDER — POLYETHYLENE GLYCOL 3350 17 G/17G
17 POWDER, FOR SOLUTION ORAL DAILY PRN
Status: DISCONTINUED | OUTPATIENT
Start: 2023-06-16 | End: 2023-06-17 | Stop reason: HOSPADM

## 2023-06-16 RX ORDER — ADENOSINE 3 MG/ML
12 INJECTION, SOLUTION INTRAVENOUS ONCE
Status: COMPLETED | OUTPATIENT
Start: 2023-06-16 | End: 2023-06-16

## 2023-06-16 RX ORDER — METOPROLOL TARTRATE 5 MG/5ML
2.5 INJECTION INTRAVENOUS ONCE
Status: DISCONTINUED | OUTPATIENT
Start: 2023-06-16 | End: 2023-06-17 | Stop reason: HOSPADM

## 2023-06-16 RX ORDER — TRAZODONE HYDROCHLORIDE 50 MG/1
25 TABLET ORAL NIGHTLY PRN
Status: DISCONTINUED | OUTPATIENT
Start: 2023-06-16 | End: 2023-06-17 | Stop reason: HOSPADM

## 2023-06-16 RX ORDER — METOPROLOL TARTRATE 5 MG/5ML
2.5 INJECTION INTRAVENOUS ONCE
Status: COMPLETED | OUTPATIENT
Start: 2023-06-16 | End: 2023-06-16

## 2023-06-16 RX ORDER — SODIUM CHLORIDE 9 MG/ML
40 INJECTION, SOLUTION INTRAVENOUS AS NEEDED
Status: DISCONTINUED | OUTPATIENT
Start: 2023-06-16 | End: 2023-06-17 | Stop reason: HOSPADM

## 2023-06-16 RX ORDER — ADENOSINE 3 MG/ML
6 INJECTION, SOLUTION INTRAVENOUS ONCE
Status: COMPLETED | OUTPATIENT
Start: 2023-06-16 | End: 2023-06-16

## 2023-06-16 RX ORDER — ACETAMINOPHEN 325 MG/1
650 TABLET ORAL EVERY 4 HOURS PRN
Status: DISCONTINUED | OUTPATIENT
Start: 2023-06-16 | End: 2023-06-17 | Stop reason: HOSPADM

## 2023-06-16 RX ADMIN — AMIODARONE HYDROCHLORIDE 1 MG/MIN: 1.8 INJECTION, SOLUTION INTRAVENOUS at 19:42

## 2023-06-16 RX ADMIN — APIXABAN 2.5 MG: 2.5 TABLET, FILM COATED ORAL at 19:42

## 2023-06-16 RX ADMIN — ADENOSINE 12 MG: 3 INJECTION INTRAVENOUS at 12:59

## 2023-06-16 RX ADMIN — METOPROLOL TARTRATE 2.5 MG: 5 INJECTION INTRAVENOUS at 14:44

## 2023-06-16 RX ADMIN — ALBUMIN (HUMAN) 12.5 G: 0.25 INJECTION, SOLUTION INTRAVENOUS at 16:40

## 2023-06-16 RX ADMIN — ATORVASTATIN CALCIUM 20 MG: 20 TABLET, FILM COATED ORAL at 19:40

## 2023-06-16 RX ADMIN — AMIODARONE HYDROCHLORIDE 150 MG: 1.5 INJECTION, SOLUTION INTRAVENOUS at 13:36

## 2023-06-16 RX ADMIN — METOPROLOL TARTRATE 12.5 MG: 25 TABLET, FILM COATED ORAL at 19:40

## 2023-06-16 RX ADMIN — Medication 10 ML: at 14:39

## 2023-06-16 RX ADMIN — ADENOSINE 6 MG: 3 INJECTION INTRAVENOUS at 12:46

## 2023-06-16 RX ADMIN — SERTRALINE 50 MG: 50 TABLET, FILM COATED ORAL at 19:40

## 2023-06-16 RX ADMIN — AMIODARONE HYDROCHLORIDE 1 MG/MIN: 1.8 INJECTION, SOLUTION INTRAVENOUS at 14:39

## 2023-06-16 RX ADMIN — FINASTERIDE 5 MG: 5 TABLET, FILM COATED ORAL at 19:41

## 2023-06-16 NOTE — H&P
[facility] Medicine Services  HISTORY AND PHYSICAL    Patient Name: Enrike Tomas  : 1935  MRN: [id number]  Primary Care Physician: Blair Dhaliwal MD  Date of admission: 2023      Subjective   Subjective     Chief Complaint:  Hypotensive/tachycardic    HPI:  Enrike Tomas is a 88 y.o. male with history of ESRD on HD as well as atrial fibrillation (sees Dr Packer at ) and chronic low BPs.  He was recently admittd at [facility],  to , for fall and compression fracture of C7; he was discharged to [facility].  Metoprolol was DCd during that admission due to low blood pressures.       He presented for hemodialysis this morning, was found to be hypotensive and tachycardic so he was referred to ED.  EKG here reveals regular narrow-complex rhythm with rate 170:  SVT.  His systolic BP was reportedly 70s at dialysis clinic, is 90 in [facility] ED.  He denies dizziness, chest pain, palpitations. In ED he got a dose of adenosine 12mg which converted him to sinus rhythm very briefly.     He denies chest pain or dyspnea.  He has no edema.  He has chronic dizziness with sitting up (longstanding problem) but has remained generally active despite this.        Review of Systems   No headache  No dizziness while supine  No chest pain  No dyspnea  Has been doing  ok at [facility]     Personal History     Past Medical History:   Diagnosis Date   • Atrial fibrillation    • BPH (benign prostatic hyperplasia)    • Diabetes mellitus    • Diverticulosis    • ESRD on hemodialysis    • GERD (gastroesophageal reflux disease)    • Heart failure    • Hyperlipidemia    • Hypertension    • NERY (obstructive sleep apnea)    • Recurrent falls              Past Surgical History:   Procedure Laterality Date   • CATARACT EXTRACTION     • DIALYSIS FISTULA CREATION     • INGUINAL HERNIA REPAIR     • PROSTATE BIOPSY     • TONSILLECTOMY AND ADENOIDECTOMY         Family History: family history includes Arthritis in his  mother; Cancer in his father; Heart disease in his father and mother; Kidney failure in his father; Stomach cancer in his mother; Stroke in his father.     Social History:  reports that he has never smoked. He has never used smokeless tobacco. He reports that he does not currently use alcohol. He reports that he does not use drugs.  Social History     Social History Narrative   • Not on file       Medications:  Available home medication information reviewed.  (Not in a hospital admission)      No Known Allergies    Objective   Objective     Vital Signs:   Temp:  [98.4 °F (36.9 °C)] 98.4 °F (36.9 °C)  Heart Rate:  [170-180] 172  Resp:  [16] 16  BP: (95-97)/(60-77) 97/60       Physical Exam   Constitutional: Awake, alert thin man in C  collar lying supine, very chatty and pleasant, NAD despite    Eyes: PER  HENT: NCAT, mucous membranes moist  Neck: Supple,  trachea midline  Respiratory: Clear to auscultation bilaterally, nonlabored respirations   Cardiovascular: tachy RR   Gastrointestinal: Positive bowel sounds, soft, nontender, nondistended  Musculoskeletal: No bilateral ankle edema at all,  no clubbing or cyanosis to extremities  Psychiatric: Appropriate affect, cooperative  Neurologic: Oriented x 3, strength symmetric in all extremities, Cranial Nerves grossly intact to confrontation, speech clear  Skin: No rashes      Result Review:  I have personally reviewed the results from the time of this admission to 6/16/2023 13:19 EDT and agree with these findings:  [x]  Laboratory list / accordion  []  Microbiology  []  Radiology  [x]  EKG/Telemetry   []  Cardiology/Vascular   []  Pathology  [x]  Old records  []  Other:  Most notable findings include: creat 4.3.  tele SVT K 5.3          LAB RESULTS:      Lab 06/16/23  1210 06/10/23  0437   WBC 8.02 7.30   HEMOGLOBIN 10.6* 9.3*   HEMATOCRIT 33.4* 29.7*   PLATELETS 296 293   NEUTROS ABS 5.36 5.08   IMMATURE GRANS (ABS) 0.04 0.05   LYMPHS ABS 1.42 1.06   MONOS ABS  0.85 0.75   EOS ABS 0.28 0.31   MCV 97.7* 99.7*         Lab 06/16/23  1210 06/10/23  0437   SODIUM 133* 133*   POTASSIUM 5.3* 4.6   CHLORIDE 96* 99   CO2 21.0* 23.0   ANION GAP 16.0* 11.0   BUN 37* 17   CREATININE 4.38* 2.56*   EGFR 12.3* 23.4*   GLUCOSE 120* 89   CALCIUM 10.6* 9.1   MAGNESIUM 1.7  --    TSH 3.550  --          Lab 06/16/23  1210   TOTAL PROTEIN 7.6   ALBUMIN 4.1   GLOBULIN 3.5   ALT (SGPT) 15   AST (SGOT) 20   BILIRUBIN 0.5   ALK PHOS 210*         Lab 06/16/23  1210   PROBNP 33,663.0*   HSTROP T 209*                 UA          3/21/2023    01:07 5/9/2023    13:17 6/5/2023    20:18   Urinalysis   Squamous Epithelial Cells, UA 0-5     0-5     0-2    Specific Gravity, UA 1.017     1.020     1.015    Ketones, UA   Negative    Blood, UA Negative     Negative     Negative    Leukocytes, UA Negative     Negative     Negative    Nitrite, UA   Negative    RBC, UA 1     1     0-2    WBC, UA   0-2    Bacteria, UA Negative     Negative     None Seen       Details          This result is from an external source.               Microbiology Results (last 10 days)       ** No results found for the last 240 hours. **            No radiology results from the last 24 hrs        Assessment & Plan   Assessment & Plan     There are no active hospital problems to display for this patient.  88 yr old man with ESRD and history of Afib;  presented to HD clinic asymptomatic but tachycardic and hypotensive, was referred to [facility] ED    PSVT, sustained   History of Afib, on Eliquis   - metoprolol stopped recently - will give 2.5 mg IV x 1   - hs trop elevated; no chest pain, will trend   - With adenosine 12mg, he briefly converted but then returned to SVT  - Dr Blum contacted, recommends amiodarone.  Consider ECV   - echo at  in 4/23:  EF 47, stable from previous     Recent C7 fracture  - semirigid collar  x 8-10 weeks while up or ambulating   - NSGY saw him last admission, plan is FU in 2 weeks outpt   - PT consult     ESRD  on HD  - MWF HD.  Has not had Friday dialysis yet.    - consult NAL     Orthostasis:  consider midodrine + metoprolol when stabilized   Chronic anemia:  sable  BPH      DVT prophylaxis:  eliquis:  continue      CODE STATUS:  dnr   There are no questions and answers to display.       Expected Discharge  Expected discharge date/ time has not been documented.     Marry Adkins MD  06/16/23

## 2023-06-16 NOTE — CONSULTS
[facility] Electrophyiology        Date of Hospital Visit: 23      Place of Service: [facility]    Patient Name: Enrike Tomas  :1935    Referral Provider: ED Physician  Primary Care Provider: Blair Dhaliwal MD      Chief complaint/Reason for Consultation:  SVT         Problem List:  Active Hospital Problems    Diagnosis  POA    **Sustained SVT [I47.1]  Yes     Priority: High     8-day Holter monitor, 10/19/2022: 361 SVT events, longest 37 beats, the fastest 200 bpm..  No pauses no AV blocks.  PVC burden 4.64%.  296 VT events, longest event 8 beats.  No atrial fibrillation or flutter.  No patient recorded events.      Paroxysmal atrial fibrillation [I48.0]  Yes     Priority: High     CHADS-VASc 5      (HFpEF) heart failure with preserved ejection fraction [I50.30]  Yes     Priority: Medium     Echo, 2022: EF 40-50%.  The diastolic function is abnormal.  Echo, 4/3/2023: EF 47%.  Normal valvular morphology      Long term (current) use of anticoagulants [Z79.01]  Not Applicable     Priority: Low    Essential hypertension [I10]  Yes     Priority: Low    NERY (obstructive sleep apnea) [G47.33]  Yes     CPAP at night      ESRD (end stage renal disease) [N18.6]  Yes    GERD (gastroesophageal reflux disease) [K21.9]  Yes    Mixed hyperlipidemia [E78.2]  Yes           History of Present Illness:  Delightful 88-year-old male whose cardiac history is significant for paroxysmal atrial fibrillation on chronic anticoagulation and paroxysmal SVT noted on previous 8-day Holter monitor study.  He was previously treated by  cardiology.  He presented to the  emergency department in  in rapid atrial fibrillation.  He was converted to sinus rhythm medically, started on Eliquis and discharged to home.  He had an echocardiogram showing an ejection fraction of 40-45% with diastolic dysfunction.  A more recent echocardiogram shows an ejection fraction of 47%.  He has known sleep apnea  and has a CPAP.  He has had difficulty with compliance due to poor mobility of the upper extremities.  He has end-stage renal disease and is on hemodialysis.  He presented to the hemodialysis clinic earlier today for dialysis and was found to be tachycardic.  He was referred to the [facility] emergency department where he was found to be in SVT.  He was given 2 doses of IV adenosine with immediate return to SVT.  He is completely unaware of his rate and rhythm.  He was recently admitted to this facility after a fall exacerbated by severe malnutrition and orthostatic hypotension.  He had previously been on metoprolol which was discontinued at discharge.  He states compliance with his Eliquis and reports no missed doses.          Past Surgical History:   Procedure Laterality Date    CATARACT EXTRACTION      DIALYSIS FISTULA CREATION      INGUINAL HERNIA REPAIR      PROSTATE BIOPSY      TONSILLECTOMY AND ADENOIDECTOMY         No Known Allergies    Current Outpatient Medications   Medication Instructions    acetaminophen (TYLENOL) 500 mg, Oral, Every 6 Hours PRN    apixaban (ELIQUIS) 2.5 mg, Oral, 2 Times Daily    atorvastatin (LIPITOR) 20 mg, Oral, Daily    BD Pen Needle Makenna U/F 32G X 4 MM misc     finasteride (PROSCAR) 5 MG tablet     fluticasone (FLONASE) 50 MCG/ACT nasal spray 1 spray, Nasal    folic acid (FOLVITE) 1 mg, Oral, Daily    HYDROcodone-acetaminophen (NORCO) 5-325 MG per tablet 1 tablet, Oral, Every 4 Hours PRN    melatonin 5 mg, Oral    naloxone (NARCAN) 4 mg, Nasal    pantoprazole (PROTONIX) 40 mg, Oral    sennosides-docusate (PERICOLACE) 8.6-50 MG per tablet 2 tablets, Oral, 2 Times Daily    sertraline (ZOLOFT) 50 mg, Oral, Daily    Teriparatide, Recombinant, (FORTEO) 620 MCG/2.48ML injection INJECT 20MCG UNDER THE SKIN ONCE DAILY AS DIRECTED. DISCARD 28 DAYS AFTER FIRST INJECTION.    traZODone (DESYREL) 25-50 mg, Oral, Nightly PRN          Scheduled Meds:  apixaban, 2.5 mg, Oral, BID  atorvastatin, 20  mg, Oral, Daily  finasteride, 5 mg, Oral, Daily  metoprolol tartrate, 12.5 mg, Oral, Q12H  [START ON 6/17/2023] pantoprazole, 40 mg, Oral, Q AM  senna-docusate sodium, 2 tablet, Oral, BID  sertraline, 50 mg, Oral, Daily  sodium chloride, 10 mL, Intravenous, Q12H      Continuous Infusions:amiodarone, 1 mg/min, Last Rate: 1 mg/min (06/16/23 3589)            Social History     Socioeconomic History    Marital status:    Tobacco Use    Smoking status: Never    Smokeless tobacco: Never   Vaping Use    Vaping Use: Never used   Substance and Sexual Activity    Alcohol use: Not Currently     Alcohol/week: 1.0 standard drink     Types: 1 Glasses of wine per week     Comment: once monthly typically    Drug use: Never       Family History   Problem Relation Age of Onset    Stomach cancer Mother     Heart disease Mother     Arthritis Mother     Heart disease Father     Stroke Father     Kidney failure Father     Cancer Father        REVIEW OF SYSTEMS:   Review of Systems   Constitutional: Positive for malaise/fatigue.   HENT: Negative.     Eyes: Negative.    Cardiovascular:  Positive for dyspnea on exertion. Negative for chest pain, leg swelling, near-syncope, orthopnea and paroxysmal nocturnal dyspnea.   Respiratory: Negative.     Endocrine: Negative.    Hematologic/Lymphatic: Negative.    Skin: Negative.    Musculoskeletal: Negative.    Gastrointestinal: Negative.    Genitourinary: Negative.    Neurological: Negative.    Psychiatric/Behavioral: Negative.     Allergic/Immunologic: Negative.    All other systems reviewed and are negative.         Objective:  Vitals:    06/16/23 1610 06/16/23 1630 06/16/23 1635 06/16/23 1700   BP: 106/67 102/66 98/69 98/70   BP Location: Left arm      Patient Position: Lying      Pulse: 82 91 (!) 156 (!) 156   Resp: 18      Temp: 98.5 °F (36.9 °C)      TempSrc: Oral      SpO2: 98% 98% 97% 96%   Weight:       Height:         Body mass index is 17.5 kg/m².  Flowsheet Rows      Flowsheet Row  "First Filed Value   Admission Height 167.6 cm (66\") Documented at 06/16/2023 1140   Admission Weight 58.5 kg (129 lb) Documented at 06/16/2023 1140            Intake/Output Summary (Last 24 hours) at 6/16/2023 1716  Last data filed at 6/16/2023 1648  Gross per 24 hour   Intake 271.6 ml   Output --   Net 271.6 ml       Vitals and nursing note reviewed.   Constitutional:       Appearance: Underweight and not in distress. Frail.   Eyes:      Conjunctiva/sclera: Conjunctivae normal.      Pupils: Pupils are equal, round, and reactive to light.   Neck:      Vascular: JVD normal.   Pulmonary:      Effort: Pulmonary effort is normal.      Breath sounds: Normal breath sounds.   Chest:      Chest wall: Not tender to palpatation.   Cardiovascular:      Tachycardia present. Regular rhythm.      Murmurs: There is no murmur.      No gallop.  No click. No rub.   Pulses:     Intact distal pulses.   Edema:     Peripheral edema absent.   Abdominal:      General: Bowel sounds are normal.      Palpations: Abdomen is soft.   Musculoskeletal: Normal range of motion.         General: No deformity.      Cervical back: Normal range of motion. Skin:     General: Skin is warm and dry.   Neurological:      General: No focal deficit present.      Mental Status: Alert.             Lab Review:                CBC:      Lab 06/16/23  1210 06/10/23  0437   WBC 8.02 7.30   HEMOGLOBIN 10.6* 9.3*   HEMATOCRIT 33.4* 29.7*   PLATELETS 296 293   NEUTROS ABS 5.36 5.08   IMMATURE GRANS (ABS) 0.04 0.05   LYMPHS ABS 1.42 1.06   MONOS ABS 0.85 0.75   EOS ABS 0.28 0.31   MCV 97.7* 99.7*     CMP:        Lab 06/16/23  1210 06/10/23  0437   SODIUM 133* 133*   POTASSIUM 5.3* 4.6   CHLORIDE 96* 99   CO2 21.0* 23.0   ANION GAP 16.0* 11.0   BUN 37* 17   CREATININE 4.38* 2.56*   EGFR 12.3* 23.4*   GLUCOSE 120* 89   CALCIUM 10.6* 9.1   MAGNESIUM 1.7  --    TOTAL PROTEIN 7.6  --    ALBUMIN 4.1  --    GLOBULIN 3.5  --    ALT (SGPT) 15  --    AST (SGOT) 20  --    BILIRUBIN " 0.5  --    ALK PHOS 210*  --      Results from last 7 days   Lab Units 06/16/23  1210   MAGNESIUM mg/dL 1.7     Estimated Creatinine Clearance: 8.1 mL/min (A) (by C-G formula based on SCr of 4.38 mg/dL (H)).    CARDIAC LABS:      Lab 06/16/23  1638 06/16/23  1210   PROBNP  --  33,663.0*   HSTROP T 182* 209*           EKG/ Telemetry:                        Result Review:  I have personally reviewed the results from the time of admission and agree with these findings:  [x]   [x]  Radiology  [x]  EKG/Telemetry   []  Pathology  [x]  Old records  []  Other:        Assessment and Plan:     SVT  Agree with IV amiodarone  Agree with IV metoprolol  Continue amiodarone and monitor over the weekend.  As long as he remains stable and asymptomatic we will plan to cardiovert him Monday morning.  Paroxysmal atrial fibrillation  [id number] 5  Continue Eliquis 2.5 mg twice daily  Elevated proBNP  Currently euvolemic.  No physical exam findings to suggest volume overload  Elevated troponin  Down-trending  Absence of chest pain  Likely secondary to SVT and end-stage renal disease  Myocardial perfusion study in April 2022 showed no reversible ischemia  Continue to monitor          Electronically signed by SHEILA Rosario, 06/16/23, 5:28 PM EDT.

## 2023-06-16 NOTE — PLAN OF CARE
Problem: Fall Injury Risk  Goal: Absence of Fall and Fall-Related Injury  Intervention: Promote Injury-Free Environment  Recent Flowsheet Documentation  Taken 6/16/2023 1610 by Rosanne Stinson RN  Safety Promotion/Fall Prevention: safety round/check completed     Problem: Device-Related Complication Risk (Hemodialysis)  Goal: Safe, Effective Therapy Delivery  Outcome: Ongoing, Progressing     Problem: Hemodynamic Instability (Hemodialysis)  Goal: Effective Tissue Perfusion  Outcome: Ongoing, Progressing   Goal Outcome Evaluation:      HD today, UF goal 2 liters.  Currently in accelerated junctional rhythm, VSS.

## 2023-06-16 NOTE — PLAN OF CARE
Goal Outcome Evaluation:  Plan of Care Reviewed With: patient, spouse        Progress: no change          Pt has been in sustained SVT(160s), amiodarone gtt initiated @ 1mg/min. 2.5mg of IV Metoprolol administered, soon after pts HR was 80s-90s. EKG obtained and read accelerated junctional rhythm. Cardiology SHEILA Lyon and Hospitalist Dr Adkins updated. Patient complains of chronic left shoulder pain due to arthritis.  BP stable. Patient refused waffle mattress at this time. Patient and wife Cleo Educated.

## 2023-06-16 NOTE — CONSULTS
Patient Care Team:  Blair Dhaliwal MD as PCP - General (Internal Medicine)  Blair Packer MD as Consulting Physician (Cardiology)    Chief complaint: ESRD    History of Present IllnessMr. Tomas is an 88-year-old male with a past medical history significant for ESRD on hemodialysis every Monday, Wednesday and Friday, A-fib which she is on Eliquis therapy for, GERD, BPH, hypertension, hyperlipidemia, orthostatic hypotension, T2DM, depression patient is admitted this time for afib w RVR. Started on Amiodarone gtt. Patient was recently discharge 3 days ago following prolong stay after a fall. He normally gets HD on MWF at [facility]. Outpatient nephrology w     Review of Systems   Constitutional: Negative.    HENT: Negative.     Respiratory: Negative.     Cardiovascular:  Positive for palpitations.   Gastrointestinal: Negative.    Genitourinary: Negative.    Musculoskeletal: Negative.    Skin: Negative.    Neurological: Negative.    Hematological: Negative.    Psychiatric/Behavioral: Negative.        Past Medical History:   Diagnosis Date    Atrial fibrillation     BPH (benign prostatic hyperplasia)     Diabetes mellitus     Diverticulosis     ESRD on hemodialysis     GERD (gastroesophageal reflux disease)     Heart failure     Hyperlipidemia     Hypertension     NERY (obstructive sleep apnea)     Recurrent falls    ,   Past Surgical History:   Procedure Laterality Date    CATARACT EXTRACTION      DIALYSIS FISTULA CREATION      INGUINAL HERNIA REPAIR      PROSTATE BIOPSY      TONSILLECTOMY AND ADENOIDECTOMY     ,   Family History   Problem Relation Age of Onset    Stomach cancer Mother     Heart disease Mother     Arthritis Mother     Heart disease Father     Stroke Father     Kidney failure Father     Cancer Father    ,   Social History     Socioeconomic History    Marital status:    Tobacco Use    Smoking status: Never    Smokeless tobacco: Never   Vaping Use    Vaping Use: Never used   Substance  and Sexual Activity    Alcohol use: Not Currently     Alcohol/week: 1.0 standard drink     Types: 1 Glasses of wine per week     Comment: once monthly typically    Drug use: Never     E-cigarette/Vaping    E-cigarette/Vaping Use Never User      E-cigarette/Vaping Substances     E-cigarette/Vaping Devices         ,   Medications Prior to Admission   Medication Sig Dispense Refill Last Dose    acetaminophen (TYLENOL) 500 MG tablet Take 1 tablet by mouth Every 6 (Six) Hours As Needed for Mild Pain, Fever or Headache.   Past Week    apixaban (ELIQUIS) 2.5 MG tablet tablet Take 1 tablet by mouth 2 (Two) Times a Day.   6/15/2023    atorvastatin (LIPITOR) 20 MG tablet Take 1 tablet by mouth Every Night.   6/15/2023    finasteride (PROSCAR) 5 MG tablet Take 1 tablet by mouth Daily.   6/15/2023    fluticasone (FLONASE) 50 MCG/ACT nasal spray 2 sprays into the nostril(s) as directed by provider Daily.   6/15/2023    folic acid (FOLVITE) 1 MG tablet Take 1 tablet by mouth Daily.   6/15/2023    HYDROcodone-acetaminophen (NORCO) 5-325 MG per tablet Take 1 tablet by mouth Every 4 (Four) Hours As Needed for Moderate Pain. 18 tablet 0 6/15/2023    melatonin 5 MG tablet tablet Take 1 tablet by mouth Every Night.   6/15/2023    pantoprazole (PROTONIX) 40 MG EC tablet Take 1 tablet by mouth Daily.   6/15/2023    sennosides-docusate (PERICOLACE) 8.6-50 MG per tablet Take 2 tablets by mouth 2 (Two) Times a Day.   6/15/2023    sertraline (ZOLOFT) 50 MG tablet Take 1 tablet by mouth Daily.   6/15/2023    Teriparatide, Recombinant, (FORTEO) 620 MCG/2.48ML injection INJECT 20MCG UNDER THE SKIN ONCE DAILY AS DIRECTED. DISCARD 28 DAYS AFTER FIRST INJECTION.   6/15/2023    traZODone (DESYREL) 50 MG tablet Take 25-50 mg by mouth At Night As Needed.   6/15/2023    naloxone (NARCAN) 4 MG/0.1ML nasal spray 1 spray into the nostril(s) as directed by provider.   Unknown   , and Scheduled Meds:  apixaban, 2.5 mg, Oral, BID  atorvastatin, 20 mg, Oral,  Daily  finasteride, 5 mg, Oral, Daily  metoprolol tartrate, 12.5 mg, Oral, Q12H  [START ON 6/17/2023] pantoprazole, 40 mg, Oral, Q AM  senna-docusate sodium, 2 tablet, Oral, BID  sertraline, 50 mg, Oral, Daily  sodium chloride, 10 mL, Intravenous, Q12H       Objective     Vital Signs  Temp:  [98.1 °F (36.7 °C)-98.4 °F (36.9 °C)] 98.1 °F (36.7 °C)  Heart Rate:  [] 85  Resp:  [16-18] 18  BP: ()/(60-91) 108/67    No intake/output data recorded.  No intake/output data recorded.    Physical Exam  Constitutional:       General: He is not in acute distress.     Appearance: Normal appearance. He is not ill-appearing.   HENT:      Head: Normocephalic.      Nose: Nose normal.      Mouth/Throat:      Mouth: Mucous membranes are moist.   Eyes:      Extraocular Movements: Extraocular movements intact.      Pupils: Pupils are equal, round, and reactive to light.   Cardiovascular:      Rate and Rhythm: Normal rate and regular rhythm.   Pulmonary:      Effort: Pulmonary effort is normal.   Abdominal:      General: Abdomen is flat.   Musculoskeletal:         General: Normal range of motion.      Cervical back: Normal range of motion.      Left lower leg: No edema.   Skin:     General: Skin is warm.   Neurological:      General: No focal deficit present.      Mental Status: He is alert and oriented to person, place, and time. Mental status is at baseline.   Psychiatric:         Mood and Affect: Mood normal.       Results Review:    I reviewed the patient's new clinical results.    WBC WBC   Date Value Ref Range Status   06/16/2023 8.02 3.40 - 10.80 10*3/mm3 Final      HGB Hemoglobin   Date Value Ref Range Status   06/16/2023 10.6 (L) 13.0 - 17.7 g/dL Final      HCT Hematocrit   Date Value Ref Range Status   06/16/2023 33.4 (L) 37.5 - 51.0 % Final      Platlets No results found for: LABPLAT   MCV MCV   Date Value Ref Range Status   06/16/2023 97.7 (H) 79.0 - 97.0 fL Final          Sodium Sodium   Date Value Ref Range  Status   06/16/2023 133 (L) 136 - 145 mmol/L Final      Potassium Potassium   Date Value Ref Range Status   06/16/2023 5.3 (H) 3.5 - 5.2 mmol/L Final     Comment:     Slight hemolysis detected by analyzer. Results may be affected.      Chloride Chloride   Date Value Ref Range Status   06/16/2023 96 (L) 98 - 107 mmol/L Final      CO2 CO2   Date Value Ref Range Status   06/16/2023 21.0 (L) 22.0 - 29.0 mmol/L Final      BUN BUN   Date Value Ref Range Status   06/16/2023 37 (H) 8 - 23 mg/dL Final      Creatinine Creatinine   Date Value Ref Range Status   06/16/2023 4.38 (H) 0.76 - 1.27 mg/dL Final      Calcium Calcium   Date Value Ref Range Status   06/16/2023 10.6 (H) 8.6 - 10.5 mg/dL Final      PO4 No results found for: CAPO4   Albumin Albumin   Date Value Ref Range Status   06/16/2023 4.1 3.5 - 5.2 g/dL Final      Magnesium Magnesium   Date Value Ref Range Status   06/16/2023 1.7 1.6 - 2.4 mg/dL Final      Uric Acid No results found for: URICACID         Assessment & Plan       Sustained SVT    (HFpEF) heart failure with preserved ejection fraction    ESRD (end stage renal disease)    Essential hypertension    GERD (gastroesophageal reflux disease)    Long term (current) use of anticoagulants    Mixed hyperlipidemia    NERY (obstructive sleep apnea)    Paroxysmal atrial fibrillation      Assessment & Plan    [place] [facility] [address]      Anemia: JENNYFER on HD days     Recurrent fall: hx of thoracic vertebrae fracture now with C7 fracture.      BMD: Check phos/PTH ( Obtain records from HD unit)     Recs  Seen on HD tolerating well. No active complaints. UF 2 liter as tolerated. Good access pressure. Afib w RVR but rate significantly improved.    I discussed the patients findings and my recommendations with patient    Pancho Carpenter MD  06/16/23  16:05 EDT

## 2023-06-16 NOTE — CASE MANAGEMENT/SOCIAL WORK
Discharge Planning Assessment  [facility]     Patient Name: Enrike Tomas  MRN: [id number]  Today's Date: 6/16/2023    Admit Date: 6/16/2023    Plan: IDP   Discharge Needs Assessment       Row Name 06/16/23 1526       Living Environment    People in Home spouse    Name(s) of People in Home Cleo Tomas    Current Living Arrangements home    Potentially Unsafe Housing Conditions unable to assess    Primary Care Provided by self;spouse/significant other    Provides Primary Care For no one    Family Caregiver if Needed spouse    Family Caregiver Names Cleo Tomas    Quality of Family Relationships involved;helpful       Transition Planning    Patient/Family Anticipates Transition to home with family    Transportation Anticipated family or friend will provide       Discharge Needs Assessment    Readmission Within the Last 30 Days unable to assess    Equipment Currently Used at Home walker, standard;hospital bed;power chair,(recliner lift);commode    Equipment Needed After Discharge other (see comments)  tbd    Discharge Facility/Level of Care Needs other (see comments)  tbd                   Discharge Plan       Row Name 06/16/23 1537       Plan    Plan IDP    Plan Comments MSW met with pt. and his spouse Cleo Tomas at bedside. Pt. came from [facility] for rehab and would like to return there at d/c. Pt. lives with his spouse in [facility]. Pt.'s PCP is Blair Dhaliwal. Pt.'s pharmacy is [facility]. Pt.'s insurance is [employer]. Pt. reports that he was independent at baseline, but needs more assistance recently. Pt. reports he does dialysis MWF at [facility]. Pt. has a walker, hospital bed, a power chair, and going to get a stair lift soon. Pt. reports that he has no O2 or HH services at this time. Pt. has transportation when he is medically ready to d/c. Pt. doesn't have an advanced directive or ACP documentation on file. CM will continue to follow pt. throughout his stay.     Final Discharge Disposition Code 30 - still a patient                  Continued Care and Services - Admitted Since 6/16/2023    Coordination has not been started for this encounter.       Selected Continued Care - Prior Encounters Includes continued care and service providers with selected services from prior encounters from 3/18/2023 to 6/16/2023      Discharged on 6/14/2023 Admission date: 6/5/2023 - Discharge disposition: Skilled Nursing Facility (DC - External)      Destination       Service Provider Selected Services Address Phone Fax Patient Preferred    [facility] [address] [place], [place] [address]ON [facility] [phone] --       Internal Comment last updated by Kira Garcias RN 6/13/2023 1026    6/13: [facility] today [phone] option 5 Ref #[id number] by [id number]                                     Expected Discharge Date and Time       Expected Discharge Date Expected Discharge Time    Jun 17, 2023            Demographic Summary       Row Name 06/16/23 1524       General Information    Admission Type observation    Arrived From other (see comments)  sent from dialysis    Referral Source admission list;emergency department    Reason for Consult discharge planning    Preferred Language English                   Functional Status       Row Name 06/16/23 1526       Functional Status, IADL    Medications assistive equipment and person    Meal Preparation assistive equipment and person    Housekeeping assistive equipment and person    Laundry assistive equipment and person    Shopping assistive equipment and person       Mental Status Summary    Recent Changes in Mental Status/Cognitive Functioning unable to assess       Employment/    Employment Status retired                   Psychosocial    No documentation.                  Abuse/Neglect    No documentation.                  Legal    No documentation.                  Substance Abuse    No documentation.                   Patient Forms    No documentation.                     JOSEFINA Rodrigez

## 2023-06-16 NOTE — ED PROVIDER NOTES
Subjective   History of Present Illness  88-year-old male with a known history o of dialysis dependent renal failure who presents for evaluation of tachycardia.  The patient reports that he went to get dialysis today as he gets dialysis on Monday Wednesday Friday but then his blood pressure was low and his heart rate was high so they did not do dialysis and they sent him here.  He denies any symptoms associated with this.  He was identified to have systolics in the 70s at the dialysis clinic but has a systolic in the 90s here.  He exhibits normal mentation and answers questions appropriately.  He denies a history of abnormal heart rhythm.  No reported chest or abdominal pain.  No palpitations.  No recent fever or infectious symptoms.  No change in medications.  No other acute complaints.        Review of Systems   Constitutional: Negative for chills, fatigue and fever.   HENT: Negative for congestion, ear pain, postnasal drip, sinus pressure and sore throat.    Eyes: Negative for pain, redness and visual disturbance.   Respiratory: Negative for cough, chest tightness and shortness of breath.    Cardiovascular: Negative for chest pain, palpitations and leg swelling.   Gastrointestinal: Negative for abdominal pain, anal bleeding, blood in stool, diarrhea, nausea and vomiting.   Endocrine: Negative for polydipsia and polyuria.   Genitourinary: Negative for difficulty urinating, dysuria, frequency and urgency.   Musculoskeletal: Negative for arthralgias, back pain and neck pain.   Skin: Negative for pallor and rash.   Allergic/Immunologic: Negative for environmental allergies and immunocompromised state.   Neurological: Negative for dizziness, weakness and headaches.   Hematological: Negative for adenopathy.   Psychiatric/Behavioral: Negative for confusion, self-injury and suicidal ideas. The patient is not nervous/anxious.    All other systems reviewed and are negative.      Past Medical History:   Diagnosis Date   •  Atrial fibrillation    • BPH (benign prostatic hyperplasia)    • Diabetes mellitus    • Diverticulosis    • ESRD on hemodialysis    • GERD (gastroesophageal reflux disease)    • Heart failure    • Hyperlipidemia    • Hypertension    • NERY (obstructive sleep apnea)    • Recurrent falls        No Known Allergies    Past Surgical History:   Procedure Laterality Date   • CATARACT EXTRACTION     • DIALYSIS FISTULA CREATION     • INGUINAL HERNIA REPAIR     • PROSTATE BIOPSY     • TONSILLECTOMY AND ADENOIDECTOMY         Family History   Problem Relation Age of Onset   • Stomach cancer Mother    • Heart disease Mother    • Arthritis Mother    • Heart disease Father    • Stroke Father    • Kidney failure Father    • Cancer Father        Social History     Socioeconomic History   • Marital status:    Tobacco Use   • Smoking status: Never   • Smokeless tobacco: Never   Vaping Use   • Vaping Use: Never used   Substance and Sexual Activity   • Alcohol use: Not Currently     Alcohol/week: 1.0 standard drink     Types: 1 Glasses of wine per week     Comment: once monthly typically   • Drug use: Never           Objective   Physical Exam  Vitals and nursing note reviewed.   Constitutional:       General: He is not in acute distress.     Appearance: Normal appearance. He is well-developed. He is not toxic-appearing or diaphoretic.      Comments: Thin chronically ill appearing patient   HENT:      Head: Normocephalic and atraumatic.      Right Ear: External ear normal.      Left Ear: External ear normal.      Nose: Nose normal.   Eyes:      General: Lids are normal.      Pupils: Pupils are equal, round, and reactive to light.   Neck:      Trachea: No tracheal deviation.   Cardiovascular:      Rate and Rhythm: Regular rhythm. Tachycardia present.      Pulses: No decreased pulses.      Heart sounds: Normal heart sounds. No murmur heard.    No friction rub. No gallop.   Pulmonary:      Effort: Pulmonary effort is normal. No  respiratory distress.      Breath sounds: Normal breath sounds. No decreased breath sounds, wheezing, rhonchi or rales.   Abdominal:      General: Bowel sounds are normal.      Palpations: Abdomen is soft.      Tenderness: There is no abdominal tenderness. There is no guarding or rebound.   Musculoskeletal:         General: No deformity. Normal range of motion.      Cervical back: Normal range of motion and neck supple.   Lymphadenopathy:      Cervical: No cervical adenopathy.   Skin:     General: Skin is warm and dry.      Findings: No rash.   Neurological:      Mental Status: He is alert and oriented to person, place, and time.      Cranial Nerves: No cranial nerve deficit.      Sensory: No sensory deficit.   Psychiatric:         Speech: Speech normal.         Behavior: Behavior normal.         Thought Content: Thought content normal.         Judgment: Judgment normal.         Chemical Cardioversion    Date/Time: 6/16/2023 3:06 PM  Performed by: Dao Brennan MD  Authorized by: Marry Adkins MD     Consent:     Consent obtained:  Verbal    Consent given by:  Patient    Risks discussed:  Death, induced arrhythmia and pain    Alternatives discussed:  No treatment, electrical cardioversion and alternative treatment  Pre-procedure details:     Cardioversion basis:  Emergent    Rhythm:  Supraventricular tachycardia  Attempt one:     Cardioversion medication:  Adenosine 6mg      Medication outcome:  No change in rhythm  Attempt two:     Cardioversion medication:  Adenosine 12mg      Shock outcome:  Conversion to normal sinus rhythm   Post-procedure details:     Patient tolerance of procedure:  Tolerated well, no immediate complications  Comments:      Patient converted to sinus rhythm and within 10 to 15 seconds converted back to SVT.               ED Course                                           Medical Decision Making  Differential diagnosis includes SVT, A-fib with RVR, a flutter, tachycardia,  dehydration, acute infectious process, electrolyte abnormality, acute coronary syndrome.    The patient presents with tachycardia and appears to be in SVT.    Lab evaluation shows chronic renal insufficiency with a creatinine of 4.3.  The patient does receive dialysis on a Monday Wednesday Friday schedule.  The patient did not receive dialysis on the day, Friday, due to low blood pressure in combination with current tachycardia.    Lab evaluation shows elevated potassium to 5.3, increased calcium, increased BNP, and increased troponin.    The patient actually denies any chest pain.    No reported fever or infectious symptoms.  No abdominal pain or change in bowel or urinary function.    Possible atelectasis versus pneumonia reported in the right lung base on chest x-ray.  White blood cell count is normal.  The patient denies infectious symptoms.    An initial 6 mg adenosine was administered here in the ER without change in the rhythm.  12 mg of adenosine was administered the patient's rhythm did cardiovert but then he shortly went back into SVT.    Discussed this with the on-call cardiologist, Dr. Blum, who recommends amiodarone and admission.        I discussed the patient with the hospitalist, Dr. Adkins, who will consult the patient to determine status of admission.    Hypotension, unspecified hypotension type: acute illness or injury  Sustained SVT: acute illness or injury  Amount and/or Complexity of Data Reviewed  Independent Historian: spouse  External Data Reviewed: labs, radiology, ECG and notes.  Labs: ordered. Decision-making details documented in ED Course.  Radiology: ordered and independent interpretation performed. Decision-making details documented in ED Course.  ECG/medicine tests: ordered and independent interpretation performed. Decision-making details documented in ED Course.  Discussion of management or test interpretation with external provider(s): I discussed the patient with Dr. Blum, of  cardiology and Dr. Adkins of the hospitalist service    Risk  Prescription drug management.  Decision regarding hospitalization.          Final diagnoses:   Sustained SVT   Hypotension, unspecified hypotension type       ED Disposition  ED Disposition     ED Disposition   Decision to Admit    Condition   --    Comment   Level of Care: Telemetry [5]   Diagnosis: Sustained SVT [[id number]]               No follow-up provider specified.       Medication List      No changes were made to your prescriptions during this visit.          Dao Brennan MD  06/16/23 5354

## 2023-06-17 ENCOUNTER — READMISSION MANAGEMENT (OUTPATIENT)
Dept: CALL CENTER | Facility: HOSPITAL | Age: 88
End: 2023-06-17
Payer: MEDICARE

## 2023-06-17 VITALS
HEIGHT: 66 IN | RESPIRATION RATE: 18 BRPM | HEART RATE: 72 BPM | TEMPERATURE: 97.7 F | BODY MASS INDEX: 17.42 KG/M2 | OXYGEN SATURATION: 100 % | DIASTOLIC BLOOD PRESSURE: 50 MMHG | SYSTOLIC BLOOD PRESSURE: 92 MMHG | WEIGHT: 108.4 LBS

## 2023-06-17 PROBLEM — I50.32 CHRONIC HEART FAILURE WITH PRESERVED EJECTION FRACTION (HFPEF): Status: ACTIVE | Noted: 2023-06-17

## 2023-06-17 LAB
ANION GAP SERPL CALCULATED.3IONS-SCNC: 15 MMOL/L (ref 5–15)
BUN SERPL-MCNC: 19 MG/DL (ref 8–23)
BUN/CREAT SERPL: 7.1 (ref 7–25)
CALCIUM SPEC-SCNC: 9.4 MG/DL (ref 8.6–10.5)
CHLORIDE SERPL-SCNC: 97 MMOL/L (ref 98–107)
CO2 SERPL-SCNC: 21 MMOL/L (ref 22–29)
CREAT SERPL-MCNC: 2.68 MG/DL (ref 0.76–1.27)
DEPRECATED RDW RBC AUTO: 62.2 FL (ref 37–54)
EGFRCR SERPLBLD CKD-EPI 2021: 22.2 ML/MIN/1.73
ERYTHROCYTE [DISTWIDTH] IN BLOOD BY AUTOMATED COUNT: 18 % (ref 12.3–15.4)
GLUCOSE SERPL-MCNC: 75 MG/DL (ref 65–99)
HCT VFR BLD AUTO: 25 % (ref 37.5–51)
HGB BLD-MCNC: 8 G/DL (ref 13–17.7)
MAGNESIUM SERPL-MCNC: 1.6 MG/DL (ref 1.6–2.4)
MCH RBC QN AUTO: 30.8 PG (ref 26.6–33)
MCHC RBC AUTO-ENTMCNC: 32 G/DL (ref 31.5–35.7)
MCV RBC AUTO: 96.2 FL (ref 79–97)
PLATELET # BLD AUTO: 256 10*3/MM3 (ref 140–450)
PMV BLD AUTO: 8.8 FL (ref 6–12)
POTASSIUM SERPL-SCNC: 5.2 MMOL/L (ref 3.5–5.2)
RBC # BLD AUTO: 2.6 10*6/MM3 (ref 4.14–5.8)
SODIUM SERPL-SCNC: 133 MMOL/L (ref 136–145)
WBC NRBC COR # BLD: 5.62 10*3/MM3 (ref 3.4–10.8)

## 2023-06-17 PROCEDURE — 25010000002 ALBUMIN HUMAN 25% PER 50 ML

## 2023-06-17 PROCEDURE — 97162 PT EVAL MOD COMPLEX 30 MIN: CPT

## 2023-06-17 PROCEDURE — 85027 COMPLETE CBC AUTOMATED: CPT | Performed by: INTERNAL MEDICINE

## 2023-06-17 PROCEDURE — G0378 HOSPITAL OBSERVATION PER HR: HCPCS

## 2023-06-17 PROCEDURE — 80048 BASIC METABOLIC PNL TOTAL CA: CPT | Performed by: INTERNAL MEDICINE

## 2023-06-17 PROCEDURE — P9047 ALBUMIN (HUMAN), 25%, 50ML: HCPCS

## 2023-06-17 PROCEDURE — 83735 ASSAY OF MAGNESIUM: CPT | Performed by: INTERNAL MEDICINE

## 2023-06-17 RX ORDER — HYDROCODONE BITARTRATE AND ACETAMINOPHEN 5; 325 MG/1; MG/1
1 TABLET ORAL EVERY 4 HOURS PRN
Qty: 18 TABLET | Refills: 0 | Status: SHIPPED | OUTPATIENT
Start: 2023-06-17

## 2023-06-17 RX ORDER — HYDROCODONE BITARTRATE AND ACETAMINOPHEN 5; 325 MG/1; MG/1
1 TABLET ORAL EVERY 4 HOURS PRN
Qty: 12 TABLET | Refills: 0 | Status: CANCELLED | OUTPATIENT
Start: 2023-06-17

## 2023-06-17 RX ORDER — ALBUMIN (HUMAN) 12.5 G/50ML
25 SOLUTION INTRAVENOUS ONCE
Status: COMPLETED | OUTPATIENT
Start: 2023-06-17 | End: 2023-06-17

## 2023-06-17 RX ORDER — MIDODRINE HYDROCHLORIDE 5 MG/1
5 TABLET ORAL
Start: 2023-06-17

## 2023-06-17 RX ORDER — AMIODARONE HYDROCHLORIDE 200 MG/1
TABLET ORAL
Qty: 130 TABLET | Refills: 0 | Status: SHIPPED | OUTPATIENT
Start: 2023-06-17 | End: 2023-09-25

## 2023-06-17 RX ADMIN — SERTRALINE 50 MG: 50 TABLET, FILM COATED ORAL at 08:08

## 2023-06-17 RX ADMIN — TRAZODONE HYDROCHLORIDE 25 MG: 50 TABLET ORAL at 01:17

## 2023-06-17 RX ADMIN — FINASTERIDE 5 MG: 5 TABLET, FILM COATED ORAL at 08:08

## 2023-06-17 RX ADMIN — Medication 10 ML: at 08:11

## 2023-06-17 RX ADMIN — METOPROLOL TARTRATE 12.5 MG: 25 TABLET, FILM COATED ORAL at 08:08

## 2023-06-17 RX ADMIN — PANTOPRAZOLE SODIUM 40 MG: 40 TABLET, DELAYED RELEASE ORAL at 05:16

## 2023-06-17 RX ADMIN — SODIUM CHLORIDE 250 ML: 9 INJECTION, SOLUTION INTRAVENOUS at 11:16

## 2023-06-17 RX ADMIN — APIXABAN 2.5 MG: 2.5 TABLET, FILM COATED ORAL at 08:09

## 2023-06-17 RX ADMIN — ATORVASTATIN CALCIUM 20 MG: 20 TABLET, FILM COATED ORAL at 08:09

## 2023-06-17 RX ADMIN — ALBUMIN (HUMAN) 25 G: 0.25 INJECTION, SOLUTION INTRAVENOUS at 04:28

## 2023-06-17 NOTE — PROGRESS NOTES
" LOS: 0 days   Patient Care Team:  Blair Dhaliwal MD as PCP - General (Internal Medicine)  Blair Packer MD as Consulting Physician (Cardiology)    Chief Complaint: ESRD    Subjective     HD yesterday tolerated well.     Subjective    History taken from: patient    Objective     Vital Sign Min/Max for last 24 hours  Temp  Min: 97.7 °F (36.5 °C)  Max: 98.5 °F (36.9 °C)   BP  Min: 86/56  Max: 128/72   Pulse  Min: 65  Max: 165   Resp  Min: 18  Max: 19   SpO2  Min: 95 %  Max: 100 %   No data recorded   Weight  Min: 49.2 kg (108 lb 6.4 oz)  Max: 49.2 kg (108 lb 6.4 oz)     Flowsheet Rows      Flowsheet Row First Filed Value   Admission Height 167.6 cm (66\") Documented at 06/16/2023 1140   Admission Weight 58.5 kg (129 lb) Documented at 06/16/2023 1140            No intake/output data recorded.  I/O last 3 completed shifts:  In: 321.6 [I.V.:171.6; IV Piggyback:150]  Out: 2030 [Other:2030]    Objective:  General Appearance:  Comfortable.    Vital signs: (most recent): Blood pressure 108/69, pulse 74, temperature 97.7 °F (36.5 °C), temperature source Oral, resp. rate 18, height 167.6 cm (66\"), weight 49.2 kg (108 lb 6.4 oz), SpO2 100 %.  Vital signs are normal.    Output: Minimal urine output.    HEENT: Normal HEENT exam.    Lungs:  Normal effort and normal respiratory rate.  Breath sounds clear to auscultation.  He is not in respiratory distress.  No decreased breath sounds or wheezes.    Heart: Normal rate.  Regular rhythm.  S1 normal and S2 normal.  No murmur or gallop.   Abdomen: Abdomen is soft.  Bowel sounds are normal.     Extremities: Normal range of motion.    Pulses: Distal pulses are intact.    Neurological: Patient is alert and oriented to person, place and time.  Normal strength.    Pupils:  Pupils are equal, round, and reactive to light.              Results Review:     I reviewed the patient's new clinical results.    WBC WBC   Date Value Ref Range Status   06/17/2023 5.62 3.40 - 10.80 10*3/mm3 Final "   06/16/2023 8.02 3.40 - 10.80 10*3/mm3 Final      HGB Hemoglobin   Date Value Ref Range Status   06/17/2023 8.0 (L) 13.0 - 17.7 g/dL Final   06/16/2023 10.6 (L) 13.0 - 17.7 g/dL Final      HCT Hematocrit   Date Value Ref Range Status   06/17/2023 25.0 (L) 37.5 - 51.0 % Final   06/16/2023 33.4 (L) 37.5 - 51.0 % Final      Platlets No results found for: LABPLAT   MCV MCV   Date Value Ref Range Status   06/17/2023 96.2 79.0 - 97.0 fL Final   06/16/2023 97.7 (H) 79.0 - 97.0 fL Final          Sodium Sodium   Date Value Ref Range Status   06/17/2023 133 (L) 136 - 145 mmol/L Final   06/16/2023 133 (L) 136 - 145 mmol/L Final      Potassium Potassium   Date Value Ref Range Status   06/17/2023 5.2 3.5 - 5.2 mmol/L Final   06/16/2023 5.3 (H) 3.5 - 5.2 mmol/L Final     Comment:     Slight hemolysis detected by analyzer. Results may be affected.      Chloride Chloride   Date Value Ref Range Status   06/17/2023 97 (L) 98 - 107 mmol/L Final   06/16/2023 96 (L) 98 - 107 mmol/L Final      CO2 CO2   Date Value Ref Range Status   06/17/2023 21.0 (L) 22.0 - 29.0 mmol/L Final   06/16/2023 21.0 (L) 22.0 - 29.0 mmol/L Final      BUN BUN   Date Value Ref Range Status   06/17/2023 19 8 - 23 mg/dL Final   06/16/2023 37 (H) 8 - 23 mg/dL Final      Creatinine Creatinine   Date Value Ref Range Status   06/17/2023 2.68 (H) 0.76 - 1.27 mg/dL Final   06/16/2023 4.38 (H) 0.76 - 1.27 mg/dL Final      Calcium Calcium   Date Value Ref Range Status   06/17/2023 9.4 8.6 - 10.5 mg/dL Final   06/16/2023 10.6 (H) 8.6 - 10.5 mg/dL Final      PO4 No results found for: CAPO4   Albumin Albumin   Date Value Ref Range Status   06/16/2023 4.1 3.5 - 5.2 g/dL Final      Magnesium Magnesium   Date Value Ref Range Status   06/17/2023 1.6 1.6 - 2.4 mg/dL Final   06/16/2023 1.7 1.6 - 2.4 mg/dL Final      Uric Acid No results found for: URICACID     Medication Review: yes    Assessment & Plan       Sustained SVT    (HFpEF) heart failure with preserved ejection  fraction    ESRD (end stage renal disease)    Essential hypertension    GERD (gastroesophageal reflux disease)    Long term (current) use of anticoagulants    Mixed hyperlipidemia    NERY (obstructive sleep apnea)    Paroxysmal atrial fibrillation    Chronic heart failure with preserved ejection fraction (HFpEF)      Assessment & Plan    [place] [facility] [place] [address]      Anemia: JENNYFER on HD days     Recurrent fall: hx of thoracic vertebrae fracture now with C7 fracture.      BMD:  continue binders.     Recs  HD per [facility] saray.   Stable for discharge from renal standpoint.     I discussed the patients findings and my recommendations with patient    Pancho Carpenter MD  06/17/23  13:38 EDT

## 2023-06-17 NOTE — THERAPY DISCHARGE NOTE
Patient Name: Enrike Tomas  : 1935    MRN: [id number]                              Today's Date: 2023       Admit Date: 2023    Visit Dx:     ICD-10-CM ICD-9-CM   1. Sustained SVT  I47.1 427.89   2. Hypotension, unspecified hypotension type  I95.9 458.9   3. Compression fracture of C7 vertebra, initial encounter  S12.690A 805.07   4. Impaired functional mobility, balance, gait, and endurance  Z74.09 V49.89     Patient Active Problem List   Diagnosis    (HFpEF) heart failure with preserved ejection fraction    Anemia of renal disease    At high risk for falls    Benign prostatic hyperplasia    Closed fracture of left acetabulum    Compression fracture of thoracic vertebra    Deficiency of other specified B group vitamins    Depression    ESRD (end stage renal disease)    Essential hypertension    GERD (gastroesophageal reflux disease)    Hepatic cyst    Long term (current) use of anticoagulants    Major depressive disorder, single episode, moderate    Mixed hyperlipidemia    Morgagni hernia    Occlusion of right vertebral artery    NERY (obstructive sleep apnea)    Fall    Severe Malnutrition (HCC)    Sustained SVT    Paroxysmal atrial fibrillation    Chronic heart failure with preserved ejection fraction (HFpEF)     Past Medical History:   Diagnosis Date    Atrial fibrillation     BPH (benign prostatic hyperplasia)     Diabetes mellitus     Diverticulosis     ESRD on hemodialysis     GERD (gastroesophageal reflux disease)     Heart failure     Hyperlipidemia     Hypertension     NEYR (obstructive sleep apnea)     Recurrent falls      Past Surgical History:   Procedure Laterality Date    CATARACT EXTRACTION      DIALYSIS FISTULA CREATION      INGUINAL HERNIA REPAIR      PROSTATE BIOPSY      TONSILLECTOMY AND ADENOIDECTOMY        General Information       Row Name 23 1050          Physical Therapy Time and Intention    Document Type discharge evaluation/summary  -SD     Mode of Treatment  individual therapy;physical therapy  -SD       Row Name 06/17/23 1050          General Information    Patient Profile Reviewed yes  -SD     Prior Level of Function min assist:;gait;transfer;bed mobility  Pt at [facility] for rehab  -SD     Existing Precautions/Restrictions brace worn when out of bed;fall;orthostatic hypotension  recent C7 compression fx with c-collar, chronic L shoulder pain  -SD     Barriers to Rehab medically complex;previous functional deficit;physical barrier  -SD       Row Name 06/17/23 1050          Living Environment    People in Home spouse  -SD       Row Name 06/17/23 1050          Cognition    Orientation Status (Cognition) oriented x 3  -SD       Row Name 06/17/23 1050          Safety Issues, Functional Mobility    Safety Issues Affecting Function (Mobility) awareness of need for assistance;friction/shear risk;impulsivity;insight into deficits/self-awareness;judgment;sequencing abilities;safety precautions follow-through/compliance;safety precaution awareness  -SD     Impairments Affecting Function (Mobility) balance;endurance/activity tolerance;pain;strength;range of motion (ROM)  -SD     Comment, Safety Issues/Impairments (Mobility) Pt falling asleep mid-conversation, low BP, inability to use LUE due to shoulder pain  -SD               User Key  (r) = Recorded By, (t) = Taken By, (c) = Cosigned By      Initials Name Provider Type    SD Chandni Granados, PT Physical Therapist                   Mobility       Row Name 06/17/23 6563          Bed Mobility    Bed Mobility supine-sit  -SD     Supine-Sit [place] (Bed Mobility) moderate assist (50% patient effort)  -SD     Assistive Device (Bed Mobility) bed rails;head of bed elevated  -SD     Comment, (Bed Mobility) Pt required extra time and effort due to L shoulder pain and pt wanting to be as independent as possible.  -SD       Row Name 06/17/23 3512          Transfers    Comment, (Transfers) Orthostatics assessed and were low, but  stable.  -SD       [facility] Name 06/17/23 1336          Sit-Stand Transfer    Sit-Stand [place] (Transfers) minimum assist (75% patient effort);2 person assist;verbal cues  -SD       Row Name 06/17/23 1336          Gait/Stairs (Locomotion)    [place] Level (Gait) minimum assist (75% patient effort);2 person assist  -SD     Distance in Feet (Gait) 6ft  -SD     Deviations/Abnormal Patterns (Gait) bilateral deviations;base of support, wide;amberly decreased;festinating/shuffling;gait speed decreased  -SD     Bilateral Gait Deviations heel strike decreased  -SD     Comment, (Gait/Stairs) Pt amb a short distance from EOB to recliner with RUE HHA and assist through gait belt. Pt very unsteady.  -SD               User Key  (r) = Recorded By, (t) = Taken By, (c) = Cosigned By      Initials Name Provider Type    Chandni Reardon PT Physical Therapist                   Obj/Interventions       Row Name 06/17/23 1338          Range of Motion Comprehensive    General Range of Motion bilateral lower extremity ROM WFL  -SD       Row Name 06/17/23 1338          Strength Comprehensive (MMT)    General Manual Muscle Testing (MMT) Assessment lower extremity strength deficits identified  -SD       Row Name 06/17/23 1338          Balance    Balance Assessment sitting static balance;standing static balance  -SD     Static Sitting Balance standby assist  -SD     Position, Sitting Balance unsupported;sitting edge of bed  -SD     Static Standing Balance minimal assist;2-person assist  -SD               User Key  (r) = Recorded By, (t) = Taken By, (c) = Cosigned By      Initials Name Provider Type    Chandni Reardon PT Physical Therapist                   Goals/Plan    No documentation.                  Clinical Impression       Row Name 06/17/23 133          Pain    Additional Documentation Pain Scale: FACES Pre/Post-Treatment (Group)  -SD       Row Name 06/17/23 4703          Pain Scale: FACES Pre/Post-Treatment     Pain: FACES Scale, Pretreatment 4-->hurts little more  -SD     Posttreatment Pain Rating 4-->hurts little more  -SD     Pain Location - Side/Orientation Left  -SD     Pain Location - shoulder  -SD       Row Name 06/17/23 1335          Plan of Care Review    Plan of Care Reviewed With patient;spouse  -SD     Outcome Evaluation Pt presents with pain, weakness, decreased activity maurilio, and difficulty walking and is below baseline level of function. Pt amb 6ft with Charu x2. BP low but stable. Pt to d/c back to rehab today.  -SD       Row Name 06/17/23 133          Therapy Assessment/Plan (PT)    Patient/Family Therapy Goals Statement (PT) d/c to rehab  -SD     Criteria for Skilled Interventions Met (PT) yes;skilled treatment is necessary  -SD     Therapy Frequency (PT) evaluation only  -SD     Predicted Duration of Therapy Intervention (PT) 1x visit due to d/c  -SD       Row Name 06/17/23 1337          Vital Signs    Pre Systolic BP Rehab 85  -SD     Pre Treatment Diastolic BP 55  -SD     Intra Systolic BP Rehab 87  -SD     Intra Treatment Diastolic BP 66  -SD     Post Systolic BP Rehab 100  -SD     Post Treatment Diastolic BP 54  -SD     Pretreatment Heart Rate (beats/min) 73  -SD     Posttreatment Heart Rate (beats/min) 60  -SD     Pre SpO2 (%) 100  -SD     O2 Delivery Pre Treatment room air  -SD     Post SpO2 (%) 99  -SD     O2 Delivery Post Treatment room air  -SD     Pre Patient Position Supine  -SD     Post Patient Position Sitting  -SD       Row Name 06/17/23 133          Positioning and Restraints    Pre-Treatment Position in bed  -SD     Post Treatment Position chair  -SD     In Chair notified nsg;reclined;call light within reach;encouraged to call for assist;with family/caregiver;waffle cushion;with nsg;heels elevated  -SD               User Key  (r) = Recorded By, (t) = Taken By, (c) = Cosigned By      Initials Name Provider Type    Chandni Reardon, PT Physical Therapist                   Outcome  Measures       Row Name 06/17/23 1341 06/17/23 0800       How much help from another person do you currently need...    Turning from your back to your side while in flat bed without using bedrails? 3  -SD 3  -MG    Moving from lying on back to sitting on the side of a flat bed without bedrails? 3  -SD 3  -MG    Moving to and from a bed to a chair (including a wheelchair)? 3  -SD 3  -MG    Standing up from a chair using your arms (e.g., wheelchair, bedside chair)? 3  -SD 3  -MG    Climbing 3-5 steps with a railing? 3  -SD 3  -MG    To walk in hospital room? 2  -SD 2  -MG    AM-PAC 6 Clicks Score (PT) 17  -SD 17  -MG    Highest level of mobility 5 --> Static standing  -SD 5 --> Static standing  -MG      Row Name 06/17/23 1341          Functional Assessment    Outcome Measure Options AM-PAC 6 Clicks Basic Mobility (PT)  -SD               User Key  (r) = Recorded By, (t) = Taken By, (c) = Cosigned By      Initials Name Provider Type    Chandni Reardon, PT Physical Therapist    MG Kira Fraser RN Registered Nurse                  Physical Therapy Education       Title: PT OT SLP Therapies (Resolved)       Topic: Physical Therapy (Resolved)       Point: Mobility training (Resolved)       Learner Progress:  Not documented in this visit.              Point: Home exercise program (Resolved)       Learner Progress:  Not documented in this visit.              Point: Body mechanics (Resolved)       Learner Progress:  Not documented in this visit.              Point: Precautions (Resolved)       Learner Progress:  Not documented in this visit.                                  PT Recommendation and Plan     Plan of Care Reviewed With: patient, spouse  Outcome Evaluation: Pt presents with pain, weakness, decreased activity maurilio, and difficulty walking and is below baseline level of function. Pt amb 6ft with Charu x2. BP low but stable. Pt to d/c back to rehab today.     Time Calculation:    PT Charges       Row Name  06/17/23 1342             Time Calculation    Start Time 1050  -SD      PT Non-Billable Time (min) 46 min  -SD      PT Received On 06/17/23  -SD      PT Goal Re-Cert Due Date 06/27/23  -SD                User Key  (r) = Recorded By, (t) = Taken By, (c) = Cosigned By      Initials Name Provider Type    Chandni Reardon, PT Physical Therapist                  Therapy Charges for Today       Code Description Service Date Service Provider Modifiers Qty    [id number] HC PT EVAL MOD COMPLEXITY 4 6/17/2023 Chandni Granados, PT GP 1            PT G-Codes  Outcome Measure Options: AM-PAC 6 Clicks Basic Mobility (PT)  AM-PAC 6 Clicks Score (PT): 17    PT Discharge Summary  Anticipated Discharge Disposition (PT): skilled nursing facility    Chandni Granados, KIM  6/17/2023

## 2023-06-17 NOTE — PROGRESS NOTES
"      Cardiac Electrophysiology Inpatient Follow Up Note         [facility]    Progress Note    INITIAL REASON FOR CONSULT: SVT    CHIEF COMPLAINT:  Chief Complaint   Patient presents with    Irregular Heart Beat     SVT    Subjective   Mr. Enrike Tomas denies vomiting, abdominal pain, fussiness, cough, and difficulty breathing.        Objective   VITAL SIGNS  BP (!) 86/56   Pulse 69   Temp 97.7 °F (36.5 °C) (Oral)   Resp 18   Ht 167.6 cm (66\")   Wt 49.2 kg (108 lb 6.4 oz)   SpO2 98%   BMI 17.50 kg/m²   [unfilled]  Pulse Ox: SpO2  Av %  Min: 95 %  Max: 100 %  Supplemental O2:       Admit Weight  Weight: 58.5 kg (129 lb)  Last 3 Weights  [unfilled]  Body mass index is 17.5 kg/m².    INTAKE/OUTPUT  I/O last 3 completed shifts:  In: 321.6 [I.V.:171.6; IV Piggyback:150]  Out:  [Other:2030]    Intake/Output Summary (Last 24 hours) at 2023 1104  Last data filed at 2023 0428  Gross per 24 hour   Intake 321.6 ml   Output 2030 ml   Net -1708.4 ml       PHYSICAL EXAM  General appearance: awake, alert, oriented, moves all extremities  Lungs: no rhonchi, no wheezes, no rales  Heart: Regular rate and rhythm  Abdomen: positive bowel sounds, no bruits, no masses  Extremities: warm and dry, no cyanosis, no clubbing    LABS  Results from last 7 days   Lab Units 23  0513 23  1210   WBC 10*3/mm3 5.62 8.02   HEMOGLOBIN g/dL 8.0* 10.6*   HEMATOCRIT % 25.0* 33.4*   MCV fL 96.2 97.7*   PLATELETS 10*3/mm3 256 296         Results from last 7 days   Lab Units 23  0513 23  1210   POTASSIUM mmol/L 5.2 5.3*   CHLORIDE mmol/L 97* 96*   CO2 mmol/L 21.0* 21.0*   BUN mg/dL 19 37*   CREATININE mg/dL 2.68* 4.38*   GLUCOSE mg/dL 75 120*   CALCIUM mg/dL 9.4 10.6*   MAGNESIUM mg/dL 1.6 1.7             Results from last 7 days   Lab Units 23  0513 23  1210   MAGNESIUM mg/dL 1.6 1.7                 No results found for: CHOL, TRIG, HDL    CURRENT " MEDICATIONS  apixaban, 2.5 mg, Oral, BID  atorvastatin, 20 mg, Oral, Daily  finasteride, 5 mg, Oral, Daily  metoprolol tartrate, 2.5 mg, Intravenous, Once  metoprolol tartrate, 12.5 mg, Oral, Q12H  pantoprazole, 40 mg, Oral, Q AM  senna-docusate sodium, 2 tablet, Oral, BID  sertraline, 50 mg, Oral, Daily  sodium chloride, 250 mL, Intravenous, Once  sodium chloride, 10 mL, Intravenous, Q12H      CONTINUOUS INFUSIONS  amiodarone, 1 mg/min, Last Rate: Stopped (06/17/23 0200)            EKG: Noted.  ECHO:REVIEWED   STRESS: REVIEWED  CATH: REVIEWED  CXR:    TELEMETRY: Sinus rhythm today         Diagnosis Plan   1. Sustained SVT  Consistent with an atrial tachycardia.  Not AVNRT due to a positive P wave morphology in the inferior leads.    Subsequent follow-up EKG demonstrates an accelerated junctional rhythm with retrograde VA conduction.    Amiodarone has suppressed both of these.    I think amiodarone is a good medium to long-term option on this gentleman.    He can go home from my perspective.    Outpatient amiodarone loading 400 mg orally twice daily for 10 days then transition to 200 mg once a day.    He can follow-up with me in my Rhythmia clinic in 2 to 3 months from now.      2. Hypotension, unspecified hypotension type  Blood pressure variable.  Dialysis patient.      3. Compression fracture of C7 vertebra, initial encounter  Neck brace.        Body mass index is 17.5 kg/m².    I spent 35 minutes in consultation with this patient which included more than 65% of this time in direct face-to-face counseling, physical examination and discussion of my assessment and findings and this shared decision making with the patient.  The remainder of the time not spent face-to-face was performing one, some or all of the following actions: preparing to see the patient (e.g. reviewing tests, prior clinicians' notes, etc), ordering medications, tests or procedures, coordination of care, discussion of the plan with other  healthcare providers, documenting clinical information in epic as well as independently interpreting results and communication of these results to the patient family and/or caregiver(s).  Please note that this explicitly excludes time spent on other separate billable services such as performing procedures or test interpretation, when applicable.        Bernabe Blum DO, FACC, RS  Cardiac Electrophysiologist  [facility] / [facility]

## 2023-06-17 NOTE — DISCHARGE PLACEMENT REQUEST
"Enrike Tomas (88 y.o. Male)     Date of Birth   1935    Social Security Number       Address   [address] [address]    Home Phone   [phone]    MRN   [id number]       Mu-ism   B[employer]ism    Marital Status                               Admission Date   23    Admission Type   Emergency    Admitting Provider   Marry Adkins MD    Attending Provider   Marry Adkins MD    Department, Room/Bed   [address], [address]/1       Discharge Date       Discharge Disposition   Home or Self Care    Discharge Destination                               Attending Provider: Marry Adkins MD    Allergies: No Known Allergies    Isolation: None   Infection: None   Code Status: No CPR    Ht: 167.6 cm (66\")   Wt: 49.2 kg (108 lb 6.4 oz)    Admission Cmt: None   Principal Problem: Sustained SVT [I47.1]                 Active Insurance as of 2023     Primary Coverage     Payor Plan Insurance Group Employer/Plan Group    [facility] MEDICARE REPLACEMENT [facility] MEDICARE REPLACEMENT [id number]     Payor Plan Address Payor Plan Phone Number Payor Plan Fax Number Effective Dates    [address] [address]   2023 - None Entered    [facility] [id number]       Subscriber Name Subscriber Birth Date Member ID       ENRIKE TOMAS 1935 [id number]                 Emergency Contacts      (Rel.) Home Phone Work Phone Mobile Phone    RICHARD TOMAS (Spouse) [phone] -- [phone]    LUAN TOMAS (Daughter) [phone] -- [phone]               Discharge Summary      Marry Adkins MD at 23 1041              [facility] Medicine Services  DISCHARGE SUMMARY    Patient Name: Enrike Tomas  : 1935  MRN: [id number]    Date of Admission: 2023 12:24 PM  Date of Discharge:  2023  Primary Care Physician: Blair Dhaliwal MD    Consults     Date and Time Order Name Status Description    2023  1:59 PM Inpatient " Nephrology Consult      6/16/2023  1:59 PM Inpatient Cardiology Consult      6/5/2023  6:11 AM Inpatient Neurosurgery Consult Completed           Hospital Course     Presenting Problem:  Hypotension and tachycardia     Active Hospital Problems    Diagnosis  POA   • **Sustained SVT [I47.1]  Yes   • Chronic heart failure with preserved ejection fraction (HFpEF) [I50.32]  Yes   • Paroxysmal atrial fibrillation [I48.0]  Yes   • Long term (current) use of anticoagulants [Z79.01]  Not Applicable   • NERY (obstructive sleep apnea) [G47.33]  Yes   • (HFpEF) heart failure with preserved ejection fraction [I50.30]  Yes   • Essential hypertension [I10]  Yes   • ESRD (end stage renal disease) [N18.6]  Yes   • GERD (gastroesophageal reflux disease) [K21.9]  Yes   • Mixed hyperlipidemia [E78.2]  Yes      Resolved Hospital Problems   No resolved problems to display.          Hospital Course:  Enrike Tomas is a 88 y.o. male with history of ESRD on HD as well as atrial fibrillation (sees Dr Packer at ) and chronic low BPs.  He was recently admitted at [facility], 6/5 to 6/14, for fall and compression fracture of C7; he was discharged to [facility].  During that admission he had persistent low BPs and metoprolol was discontinued.     He presented for hemodialysis on 6/16 AM, was found to be hypotensive and tachycardic so he was referred to ED.  EKG here on arrival revealed regular narrow-complex rhythm with rate 170:  SVT.  A dose of adenosine 12mg converted him to sinus rhythm very briefly.      SVT He was seen by Dr Blum of EP, started an amiodarone gtt and converted back to sinus after several hours.  He feels back to baseline and is eager to return to rehab.  He had no chest pain or dyspnea with his SVT.  He will go on amiodarone loading dose.      BP is still on the low side and he still has orthostatic symptoms, but says this is chronic.   I will start midodrine.  As he has no edema, will give a saline bolus prior to DC .        Discharge Follow Up Recommendations for outpatient labs/diagnostics:   *Keep FU with Dr Dhaliwal in 5 days    *continue hemodialysis q M W F     *FU with Dr Packer in a month,     Day of Discharge     HPI:  Feels good.  Noted his usual dizziness upon sitting up this morning     Review of Systems  No chest pain   No dyspnea     Vital Signs:   Temp:  [97.7 °F (36.5 °C)-98.5 °F (36.9 °C)] 97.7 °F (36.5 °C)  Heart Rate:  [] 69  Resp:  [16-19] 18  BP: ()/(52-91) 86/56      Physical Exam:  Gen:  Thin man in C collar in bed, wife prsent   Neuro: alert and oriented, clear speech, follows commands, grossly nonfocal  HEENT:  NC/AT   Neck:  Semirigid collar in place   Heart RRR no murmur, rub, or gallop  Abd:  Soft, nontender, no rebound or guarding, pos BS  Extrem:  No c/c/e      Pertinent  and/or Most Recent Results     LAB RESULTS:      Lab 06/17/23  0513 06/16/23  1210   WBC 5.62 8.02   HEMOGLOBIN 8.0* 10.6*   HEMATOCRIT 25.0* 33.4*   PLATELETS 256 296   NEUTROS ABS  --  5.36   IMMATURE GRANS (ABS)  --  0.04   LYMPHS ABS  --  1.42   MONOS ABS  --  0.85   EOS ABS  --  0.28   MCV 96.2 97.7*         Lab 06/17/23  0513 06/16/23  1210   SODIUM 133* 133*   POTASSIUM 5.2 5.3*   CHLORIDE 97* 96*   CO2 21.0* 21.0*   ANION GAP 15.0 16.0*   BUN 19 37*   CREATININE 2.68* 4.38*   EGFR 22.2* 12.3*   GLUCOSE 75 120*   CALCIUM 9.4 10.6*   MAGNESIUM 1.6 1.7   TSH  --  3.550         Lab 06/16/23  1210   TOTAL PROTEIN 7.6   ALBUMIN 4.1   GLOBULIN 3.5   ALT (SGPT) 15   AST (SGOT) 20   BILIRUBIN 0.5   ALK PHOS 210*         Lab 06/16/23  1638 06/16/23  1210   PROBNP  --  33,663.0*   HSTROP T 182* 209*                 Brief Urine Lab Results  (Last result in the past 365 days)      Color   Clarity   Blood   Leuk Est   Nitrite   Protein   CREAT   Urine HCG        06/05/23 2018 Yellow   Clear   Negative   Negative   Negative   >=300 mg/dL (3+)               Microbiology Results (last 10 days)     ** No results found for the last 240  hours. **          FL Video Swallow With Speech Single Contrast    Result Date: 6/13/2023  FL VIDEO SWALLOW W SPEECH SINGLE-CONTRAST Date of Exam: 6/12/2023 1:46 PM EDT Indication: dysphagia Comparison: None available. Technique:   The speech pathologist administered food and/or liquid mixed with barium to the patient with cine/video imaging.  Imaging assistance was provided to the speech pathologist and an image was saved. Fluoroscopic Time: 1 minute and 42 seconds Number of Images: 7 associated fluoroscopic loops were saved Findings: Please see speech therapy report for full details and recommendations.     Impression: Fluoroscopy provided for a modified barium swallow. Please see speech therapy report for full details and recommendations. Electronically Signed: Darian Linares  6/13/2023 10:04 PM EDT  Workstation ID: [id number]    XR Chest 1 View    Result Date: 6/16/2023  XR CHEST 1 VW Date of Exam: 6/16/2023 1:11 PM EDT Indication: Dysrhythmia triage protocol Comparison: 6/5/2023 Findings: Cervical collar present. Heart size borderline. Upper lobe vessels are more prominent than the lower lung vessels. Strandy opacity in the medial right lung base. No suspicious infiltrate or edema. Costophrenic angles sharp. Severe arthritic change of the  glenohumeral joints     Impression: 1. Probable pulmonary venous hypertension with no evidence of edema 2. Atelectasis or pneumonia in the medial right lung base Electronically Signed: Bryant Dc  6/16/2023 1:27 PM EDT  Workstation ID: [id number]                      Discharge Details        Discharge Medications      New Medications      Instructions Start Date   amiodarone 200 MG tablet  Commonly known as: Pacerone   Take 2 tablets by mouth 2 (Two) Times a Day With Meals for 10 days, THEN 1 tablet Daily for 90 days.   Start Date: June 17, 2023     midodrine 5 MG tablet  Commonly known as: PROAMATINE   5 mg, Oral, 3 Times Daily Before Meals         Continue These Medications       Instructions Start Date   acetaminophen 500 MG tablet  Commonly known as: TYLENOL   500 mg, Oral, Every 6 Hours PRN      apixaban 2.5 MG tablet tablet  Commonly known as: ELIQUIS   2.5 mg, Oral, 2 Times Daily      atorvastatin 20 MG tablet  Commonly known as: LIPITOR   20 mg, Oral, Nightly      finasteride 5 MG tablet  Commonly known as: PROSCAR   Take 1 tablet by mouth Daily.      fluticasone 50 MCG/ACT nasal spray  Commonly known as: FLONASE   2 sprays, Nasal, Daily      folic acid 1 MG tablet  Commonly known as: FOLVITE   1 mg, Oral, Daily      HYDROcodone-acetaminophen 5-325 MG per tablet  Commonly known as: NORCO   1 tablet, Oral, Every 4 Hours PRN      melatonin 5 MG tablet tablet   5 mg, Oral, Nightly      naloxone 4 MG/0.1ML nasal spray  Commonly known as: NARCAN   4 mg, Nasal      pantoprazole 40 MG EC tablet  Commonly known as: PROTONIX   40 mg, Oral, Daily      sennosides-docusate 8.6-50 MG per tablet  Commonly known as: PERICOLACE   2 tablets, Oral, 2 Times Daily      sertraline 50 MG tablet  Commonly known as: ZOLOFT   50 mg, Oral, Daily      Teriparatide (Recombinant) 620 MCG/2.48ML injection  Commonly known as: FORTEO   INJECT 20MCG UNDER THE SKIN ONCE DAILY AS DIRECTED. DISCARD 28 DAYS AFTER FIRST INJECTION.      traZODone 50 MG tablet  Commonly known as: DESYREL   25-50 mg, Oral, Nightly PRN             No Known Allergies      Discharge Disposition:  Home or Self Care    Diet:  Hospital:  Diet Order   Procedures   • Diet: Cardiac Diets, Renal Diets; Healthy Heart (2-3 Na+); Low Potassium; Texture: Regular Texture (IDDSI 7); Fluid Consistency: Thin (IDDSI 0)       Activity:  C collar with being up/walking        CODE STATUS:    Code Status and Medical Interventions:   Ordered at: 06/16/23 1401     Medical Intervention Limits:    NO intubation (DNI)     Level Of Support Discussed With:    Patient     Code Status (Patient has no pulse and is not breathing):    No CPR (Do Not Attempt to Resuscitate)      Medical Interventions (Patient has pulse or is breathing):    Limited Support     Release to patient:    Routine Release       Future Appointments   Date Time Provider Department Center   6/29/2023 10:00 AM Deanna Grullon PA-C [facility]   7/11/2023  9:30 AM PAT 1 [facility]       Additional Instructions for the Follow-ups that You Need to Schedule     Discharge Follow-up with Specified Provider: Dr Birdie lamb; 1 Month   As directed      To: Dr Birdie lamb    Follow Up: 1 Month                     Marry Adkins MD  06/17/23      Time Spent on Discharge:  I spent  40  minutes on this discharge activity which included: face-to-face encounter with the patient, reviewing the data in the system, coordination of the care with the nursing staff as well as consultants, documentation, and entering orders.            Electronically signed by Marry Adkins MD at 06/17/23 3990

## 2023-06-17 NOTE — DISCHARGE SUMMARY
[facility] Medicine Services  DISCHARGE SUMMARY    Patient Name: Enrike Tomas  : 1935  MRN: [id number]    Date of Admission: 2023 12:24 PM  Date of Discharge:  2023  Primary Care Physician: Blair Dhaliwal MD    Consults     Date and Time Order Name Status Description    2023  1:59 PM Inpatient Nephrology Consult      2023  1:59 PM Inpatient Cardiology Consult      2023  6:11 AM Inpatient Neurosurgery Consult Completed           Hospital Course     Presenting Problem:  Hypotension and tachycardia     Active Hospital Problems    Diagnosis  POA   • **Sustained SVT [I47.1]  Yes   • Chronic heart failure with preserved ejection fraction (HFpEF) [I50.32]  Yes   • Paroxysmal atrial fibrillation [I48.0]  Yes   • Long term (current) use of anticoagulants [Z79.01]  Not Applicable   • NERY (obstructive sleep apnea) [G47.33]  Yes   • (HFpEF) heart failure with preserved ejection fraction [I50.30]  Yes   • Essential hypertension [I10]  Yes   • ESRD (end stage renal disease) [N18.6]  Yes   • GERD (gastroesophageal reflux disease) [K21.9]  Yes   • Mixed hyperlipidemia [E78.2]  Yes      Resolved Hospital Problems   No resolved problems to display.          Hospital Course:  Enrike Tomas is a 88 y.o. male with history of ESRD on HD as well as atrial fibrillation (sees Dr Packer at ) and chronic low BPs.  He was recently admitted at [facility],  to , for fall and compression fracture of C7; he was discharged to [facility].  During that admission he had persistent low BPs and metoprolol was discontinued.     He presented for hemodialysis on  AM, was found to be hypotensive and tachycardic so he was referred to ED.  EKG here on arrival revealed regular narrow-complex rhythm with rate 170:  SVT.  A dose of adenosine 12mg converted him to sinus rhythm very briefly.      SVT He was seen by Dr Blum of EP, started an amiodarone gtt and converted back to sinus after  several hours.  He feels back to baseline and is eager to return to rehab.  He had no chest pain or dyspnea with his SVT.  He will go on amiodarone loading dose.      BP is still on the low side and he still has orthostatic symptoms, but says this is chronic.   I will start midodrine.  As he has no edema, will give a saline bolus prior to DC .       Discharge Follow Up Recommendations for outpatient labs/diagnostics:   *Keep FU with Dr Dhaliwal in 5 days    *continue hemodialysis q M W F     *FU with Dr Packer in a month,     Day of Discharge     HPI:  Feels good.  Noted his usual dizziness upon sitting up this morning     Review of Systems  No chest pain   No dyspnea     Vital Signs:   Temp:  [97.7 °F (36.5 °C)-98.5 °F (36.9 °C)] 97.7 °F (36.5 °C)  Heart Rate:  [] 69  Resp:  [16-19] 18  BP: ()/(52-91) 86/56      Physical Exam:  Gen:  Thin man in C collar in bed, wife prsent   Neuro: alert and oriented, clear speech, follows commands, grossly nonfocal  HEENT:  NC/AT   Neck:  Semirigid collar in place   Heart RRR no murmur, rub, or gallop  Abd:  Soft, nontender, no rebound or guarding, pos BS  Extrem:  No c/c/e      Pertinent  and/or Most Recent Results     LAB RESULTS:      Lab 06/17/23  0513 06/16/23  1210   WBC 5.62 8.02   HEMOGLOBIN 8.0* 10.6*   HEMATOCRIT 25.0* 33.4*   PLATELETS 256 296   NEUTROS ABS  --  5.36   IMMATURE GRANS (ABS)  --  0.04   LYMPHS ABS  --  1.42   MONOS ABS  --  0.85   EOS ABS  --  0.28   MCV 96.2 97.7*         Lab 06/17/23  0513 06/16/23  1210   SODIUM 133* 133*   POTASSIUM 5.2 5.3*   CHLORIDE 97* 96*   CO2 21.0* 21.0*   ANION GAP 15.0 16.0*   BUN 19 37*   CREATININE 2.68* 4.38*   EGFR 22.2* 12.3*   GLUCOSE 75 120*   CALCIUM 9.4 10.6*   MAGNESIUM 1.6 1.7   TSH  --  3.550         Lab 06/16/23  1210   TOTAL PROTEIN 7.6   ALBUMIN 4.1   GLOBULIN 3.5   ALT (SGPT) 15   AST (SGOT) 20   BILIRUBIN 0.5   ALK PHOS 210*         Lab 06/16/23  1638 06/16/23  1210   PROBNP  --  33,663.0*   HSTROP T  182* 209*                 Brief Urine Lab Results  (Last result in the past 365 days)      Color   Clarity   Blood   Leuk Est   Nitrite   Protein   CREAT   Urine HCG        06/05/23 2018 Yellow   Clear   Negative   Negative   Negative   >=300 mg/dL (3+)               Microbiology Results (last 10 days)     ** No results found for the last 240 hours. **          FL Video Swallow With Speech Single Contrast    Result Date: 6/13/2023  FL VIDEO SWALLOW W SPEECH SINGLE-CONTRAST Date of Exam: 6/12/2023 1:46 PM EDT Indication: dysphagia Comparison: None available. Technique:   The speech pathologist administered food and/or liquid mixed with barium to the patient with cine/video imaging.  Imaging assistance was provided to the speech pathologist and an image was saved. Fluoroscopic Time: 1 minute and 42 seconds Number of Images: 7 associated fluoroscopic loops were saved Findings: Please see speech therapy report for full details and recommendations.     Impression: Fluoroscopy provided for a modified barium swallow. Please see speech therapy report for full details and recommendations. Electronically Signed: Darian Linares  6/13/2023 10:04 PM EDT  Workstation ID: [id number]    XR Chest 1 View    Result Date: 6/16/2023  XR CHEST 1 VW Date of Exam: 6/16/2023 1:11 PM EDT Indication: Dysrhythmia triage protocol Comparison: 6/5/2023 Findings: Cervical collar present. Heart size borderline. Upper lobe vessels are more prominent than the lower lung vessels. Strandy opacity in the medial right lung base. No suspicious infiltrate or edema. Costophrenic angles sharp. Severe arthritic change of the  glenohumeral joints     Impression: 1. Probable pulmonary venous hypertension with no evidence of edema 2. Atelectasis or pneumonia in the medial right lung base Electronically Signed: Bryant Dc  6/16/2023 1:27 PM EDT  Workstation ID: [id number]                      Discharge Details        Discharge Medications      New Medications       Instructions Start Date   amiodarone 200 MG tablet  Commonly known as: Pacerone   Take 2 tablets by mouth 2 (Two) Times a Day With Meals for 10 days, THEN 1 tablet Daily for 90 days.   Start Date: June 17, 2023     midodrine 5 MG tablet  Commonly known as: PROAMATINE   5 mg, Oral, 3 Times Daily Before Meals         Continue These Medications      Instructions Start Date   acetaminophen 500 MG tablet  Commonly known as: TYLENOL   500 mg, Oral, Every 6 Hours PRN      apixaban 2.5 MG tablet tablet  Commonly known as: ELIQUIS   2.5 mg, Oral, 2 Times Daily      atorvastatin 20 MG tablet  Commonly known as: LIPITOR   20 mg, Oral, Nightly      finasteride 5 MG tablet  Commonly known as: PROSCAR   Take 1 tablet by mouth Daily.      fluticasone 50 MCG/ACT nasal spray  Commonly known as: FLONASE   2 sprays, Nasal, Daily      folic acid 1 MG tablet  Commonly known as: FOLVITE   1 mg, Oral, Daily      HYDROcodone-acetaminophen 5-325 MG per tablet  Commonly known as: NORCO   1 tablet, Oral, Every 4 Hours PRN      melatonin 5 MG tablet tablet   5 mg, Oral, Nightly      naloxone 4 MG/0.1ML nasal spray  Commonly known as: NARCAN   4 mg, Nasal      pantoprazole 40 MG EC tablet  Commonly known as: PROTONIX   40 mg, Oral, Daily      sennosides-docusate 8.6-50 MG per tablet  Commonly known as: PERICOLACE   2 tablets, Oral, 2 Times Daily      sertraline 50 MG tablet  Commonly known as: ZOLOFT   50 mg, Oral, Daily      Teriparatide (Recombinant) 620 MCG/2.48ML injection  Commonly known as: FORTEO   INJECT 20MCG UNDER THE SKIN ONCE DAILY AS DIRECTED. DISCARD 28 DAYS AFTER FIRST INJECTION.      traZODone 50 MG tablet  Commonly known as: DESYREL   25-50 mg, Oral, Nightly PRN             No Known Allergies      Discharge Disposition:  Home or Self Care    Diet:  Hospital:  Diet Order   Procedures   • Diet: Cardiac Diets, Renal Diets; Healthy Heart (2-3 Na+); Low Potassium; Texture: Regular Texture (IDDSI 7); Fluid Consistency: Thin (IDDSI 0)        Activity:  C collar with being up/walking        CODE STATUS:    Code Status and Medical Interventions:   Ordered at: 06/16/23 1401     Medical Intervention Limits:    NO intubation (DNI)     Level Of Support Discussed With:    Patient     Code Status (Patient has no pulse and is not breathing):    No CPR (Do Not Attempt to Resuscitate)     Medical Interventions (Patient has pulse or is breathing):    Limited Support     Release to patient:    Routine Release       Future Appointments   Date Time Provider Department Center   6/29/2023 10:00 AM Deanna Grullon PA-C [facility]   7/11/2023  9:30 AM PAT 1 [facility] PAT [facility]       Additional Instructions for the Follow-ups that You Need to Schedule     Discharge Follow-up with Specified Provider: Dr Birdie GRAY cardiomichael; 1 Month   As directed      To: Dr Birdie lamb    Follow Up: 1 Month                     Marry Adkins MD  06/17/23      Time Spent on Discharge:  I spent  40  minutes on this discharge activity which included: face-to-face encounter with the patient, reviewing the data in the system, coordination of the care with the nursing staff as well as consultants, documentation, and entering orders.

## 2023-06-17 NOTE — OUTREACH NOTE
Prep Survey      Flowsheet Row Responses   [facility] facility patient discharged from? [place] [facility]   Is LACE score < 7 ? No   Eligibility Not Eligible   What are the reasons patient is not eligible? [facility]  [[facility]]   Does the patient have one of the following disease processes/diagnoses(primary or secondary)? Other   Prep survey completed? Yes            Taylor OWENS - Registered Nurse

## 2023-06-17 NOTE — CASE MANAGEMENT/SOCIAL WORK
Case Management Discharge Note      Final Note: Mr. Tomas will be discharged back to his skilled rehab bed at [facility]. He will be transported by his wife via private vehicle. Spoke with Mr. Tomas' wife by telephone and she is in agreement with this plan. No COVID test needed. Discharge summary has been faxed to [phone]. Nurse to call report to [phone].         Selected Continued Care - Admitted Since 6/16/2023     Destination Coordination complete.    Service Provider Selected Services Address Phone Fax Patient Preferred    [facility] [address] [place] [id number] [phone] --          Durable Medical Equipment    No services have been selected for the patient.              Dialysis/Infusion    No services have been selected for the patient.              Home Medical Care    No services have been selected for the patient.              Therapy    No services have been selected for the patient.              Community Resources    No services have been selected for the patient.              Community & DME    No services have been selected for the patient.                        Transportation Services  Private: Car    Final Discharge Disposition Code: 03 - skilled nursing facility (SNF)

## 2023-06-17 NOTE — PLAN OF CARE
Goal Outcome Evaluation:  Plan of Care Reviewed With: patient, spouse           Outcome Evaluation: Pt presents with pain, weakness, decreased activity maurilio, and difficulty walking and is below baseline level of function. Pt amb 6ft with Charu x2. BP low but stable. Pt to d/c back to rehab today.

## 2023-06-18 LAB
QT INTERVAL: 380 MS
QTC INTERVAL: 449 MS

## 2023-06-20 LAB
QT INTERVAL: 282 MS
QTC INTERVAL: 485 MS

## 2023-07-24 ENCOUNTER — ANESTHESIA (OUTPATIENT)
Dept: PERIOP | Facility: HOSPITAL | Age: 88
End: 2023-07-24

## 2023-07-24 ENCOUNTER — DOCUMENTATION (OUTPATIENT)
Dept: NEUROSURGERY | Facility: CLINIC | Age: 88
End: 2023-07-24
Payer: MEDICARE

## 2023-07-24 ENCOUNTER — ANESTHESIA EVENT (OUTPATIENT)
Dept: PERIOP | Facility: HOSPITAL | Age: 88
End: 2023-07-24

## 2023-07-24 LAB
QT INTERVAL: 392 MS
QTC INTERVAL: 457 MS

## 2023-07-24 RX ORDER — FAMOTIDINE 10 MG/ML
20 INJECTION, SOLUTION INTRAVENOUS ONCE
Status: CANCELLED | OUTPATIENT
Start: 2023-07-24 | End: 2023-07-24

## 2023-07-24 RX ORDER — SODIUM CHLORIDE 9 MG/ML
40 INJECTION, SOLUTION INTRAVENOUS AS NEEDED
Status: CANCELLED | OUTPATIENT
Start: 2023-07-24

## 2023-07-24 NOTE — ANESTHESIA PREPROCEDURE EVALUATION
Anesthesia Evaluation     Patient summary reviewed and Nursing notes reviewed   no history of anesthetic complications:   NPO Solid Status: > 8 hours  NPO Liquid Status: > 2 hours           Airway   Mallampati: II  TM distance: >3 FB  Neck ROM: full  Dental    (+) implants    Pulmonary - normal exam   (+) ,sleep apnea  (-) COPD, asthma, not a smoker  Cardiovascular - normal exam    ECG reviewed    (+) hypertension, dysrhythmias Atrial Fib, Paroxysmal Atrial Fib, hyperlipidemia,  carotid artery disease (R vertebral artery compression)    ROS comment: ECG Undetermined rhythm (regular)   NS intraventricular block  ECHO 2023 EF 47% ow unremarkable  SPECT 2022normal myocardial perfusion, but there is significant LV enlargement, No ischemic ST segment changes occurred with stress. EF: 60%       Neuro/Psych  (+) psychiatric history  (-) seizures, CVA    ROS Comment:    C7 compression fracture on 6/5/23 with approximately 30% height loss, no bony retropulsion noted.  He is currently in a cervical collar for stabilization. He had cervical MRI 7/22/23 which reveals a spondylosis with anterolisthesis at  C6-7,    GI/Hepatic/Renal/Endo    (+) GERD, liver disease (cyst), renal disease (esrd creat >3) ESRD and dialysis    Musculoskeletal     Abdominal    Substance History      OB/GYN          Other   arthritis,                     Anesthesia Plan    ASA 4     general with block     (Aim for MAP>65   Neck care (collar in line stabilisation)   ISB )    Anesthetic plan, risks, benefits, and alternatives have been provided, discussed and informed consent has been obtained with: patient.    Use of blood products discussed with  Consented to blood products.    Plan discussed with CRNA.      CODE STATUS:

## 2023-07-24 NOTE — PROGRESS NOTES
Reviewed MRI personally talked over the case with Dr. David got a shoulder surgeon    It is an elective shoulder surgery    Patient has subjective weakness per notes    He has no progressive weakness on his exam however    MRI personally reviewed shows stable compression fracture    I see no listhesis or high-grade cord impingement    Plan will be cervical collar for at least 6 to 8 weeks followed by cervical flexion-extension film    Can follow-up in 2 weeks with a lateral x-ray to check alignment

## 2023-08-10 ENCOUNTER — HOSPITAL ENCOUNTER (OUTPATIENT)
Dept: MRI IMAGING | Facility: HOSPITAL | Age: 88
Discharge: HOME | End: 2023-08-10
Payer: MEDICARE

## 2023-08-10 DIAGNOSIS — R29.898 LEFT LEG WEAKNESS: ICD-10-CM

## 2023-08-10 PROCEDURE — 72148 MRI LUMBAR SPINE W/O DYE: CPT

## 2023-08-14 ENCOUNTER — HOSPITAL ENCOUNTER (OUTPATIENT)
Dept: GENERAL RADIOLOGY | Facility: HOSPITAL | Age: 88
Discharge: HOME | End: 2023-08-14
Payer: MEDICARE

## 2023-08-14 ENCOUNTER — DOCUMENTATION (OUTPATIENT)
Dept: NEUROSURGERY | Facility: CLINIC | Age: 88
End: 2023-08-14
Payer: MEDICARE

## 2023-08-14 ENCOUNTER — OFFICE VISIT (OUTPATIENT)
Dept: NEUROSURGERY | Facility: CLINIC | Age: 88
End: 2023-08-14
Payer: MEDICARE

## 2023-08-14 VITALS
TEMPERATURE: 97.2 F | HEART RATE: 72 BPM | BODY MASS INDEX: 18.08 KG/M2 | WEIGHT: 112 LBS | DIASTOLIC BLOOD PRESSURE: 80 MMHG | SYSTOLIC BLOOD PRESSURE: 128 MMHG

## 2023-08-14 DIAGNOSIS — S12.690D COMPRESSION FRACTURE OF C7 VERTEBRA WITH ROUTINE HEALING, SUBSEQUENT ENCOUNTER: Primary | ICD-10-CM

## 2023-08-14 DIAGNOSIS — S12.690D COMPRESSION FRACTURE OF C7 VERTEBRA WITH ROUTINE HEALING, SUBSEQUENT ENCOUNTER: ICD-10-CM

## 2023-08-14 PROCEDURE — 72050 X-RAY EXAM NECK SPINE 4/5VWS: CPT

## 2023-08-14 NOTE — PROGRESS NOTES
"  Name: Enrike Tomas    : 1935     MRN: [id number]     Primary Care Provider: Blair Dhaliwal MD    Chief Complaint  Follow-up (No pain regarding visit) and Weakness - Generalized      History of Present Illness:  Enrike Tomas is a 88 y.o. 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  Patient states his neck feels good overall.  He is excited to get out of his brace soon.  He denies any pain radiating into his arms, weakness of his arms, neck pain.  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  He has severe pain with any ROM of his left arm due to this, he is scheduled for a total shoulder arthroplasty in the near future.  Patient is also here to discuss his lower extremity weakness.  Patient states it has been unchanged since his last visit.  His wife does state that she thinks it is worse.  Patient is now living at home and states physical therapy comes to his house 2 times a week to help him with his strength.  He has overall pretty bad deconditioning.  Denies any pain radiating into his legs, numbness, tingling.  Denies bowel or bladder issues, saddle anesthesia.  Patient states he is overall not interested in any surgical intervention if offered.  He states he is \"going to die soon anyway\".    PMHX  Allergies:  No Known Allergies  Medications    Current Outpatient Medications:     acetaminophen (TYLENOL) 500 MG tablet, Take 1 tablet by mouth Every 6 (Six) Hours As Needed for Mild Pain or Headache., Disp: , Rfl:     amiodarone (PACERONE) 200 MG tablet, Take 1 tablet by mouth Daily., Disp: , Rfl:     apixaban (ELIQUIS) 2.5 MG tablet tablet, Take 1 tablet by mouth 2 (Two) Times a Day., Disp: , Rfl:     atorvastatin " (LIPITOR) 20 MG tablet, Take 1 tablet by mouth Every Night., Disp: , Rfl:     finasteride (PROSCAR) 5 MG tablet, Take 1 tablet by mouth Daily., Disp: , Rfl:     fluticasone (FLONASE) 50 MCG/ACT nasal spray, 2 sprays into the nostril(s) as directed by provider Daily., Disp: , Rfl:     folic acid (FOLVITE) 1 MG tablet, Take 1 tablet by mouth Daily., Disp: , Rfl:     HYDROcodone-acetaminophen (NORCO) 5-325 MG per tablet, Take 1 tablet by mouth Every 4 (Four) Hours As Needed for Moderate Pain., Disp: 18 tablet, Rfl: 0    melatonin 5 MG tablet tablet, Take 1 tablet by mouth Every Night., Disp: , Rfl:     midodrine (PROAMATINE) 5 MG tablet, Take 1 tablet by mouth 3 (Three) Times a Day Before Meals., Disp: , Rfl:     naloxone (NARCAN) 4 MG/0.1ML nasal spray, 1 spray into the nostril(s) as directed by provider As Needed (sedation)., Disp: , Rfl:     pantoprazole (PROTONIX) 40 MG EC tablet, Take 1 tablet by mouth Daily., Disp: , Rfl:     sennosides-docusate (PERICOLACE) 8.6-50 MG per tablet, Take 2 tablets by mouth 2 (Two) Times a Day., Disp: , Rfl:     sertraline (ZOLOFT) 50 MG tablet, Take 1 tablet by mouth Daily., Disp: , Rfl:     Teriparatide, Recombinant, (FORTEO) 620 MCG/2.48ML injection, Inject 20 mcg under the skin into the appropriate area as directed Daily., Disp: , Rfl:     traZODone (DESYREL) 50 MG tablet, Take 0.5 tablets by mouth Every Night., Disp: , Rfl:   Past Medical History:  Past Medical History:   Diagnosis Date    Abnormal ECG     occasional extra heartbeat    Arthritis     Atrial fibrillation     BPH (benign prostatic hyperplasia)     Cervical compression fracture 06/05/2023    currently wearing a c collar    Diverticulosis     Diverticulum of esophagus     puree diet and speech therapy    ESRD on hemodialysis     GERD (gastroesophageal reflux disease)     Heart failure     Hyperlipidemia     Hypertension     Low back pain 2022    NERY (obstructive sleep apnea)     cpap    Recurrent falls      Past  Surgical History:  Past Surgical History:   Procedure Laterality Date    CATARACT EXTRACTION      COLONOSCOPY      DIALYSIS FISTULA CREATION      ENDOSCOPY      INGUINAL HERNIA REPAIR      PROSTATE BIOPSY      THROAT SURGERY      diverticulum in throat    TONSILLECTOMY AND ADENOIDECTOMY       Social Hx:  Social History     Tobacco Use    Smoking status: Never    Smokeless tobacco: Never   Vaping Use    Vaping Use: Never used   Substance Use Topics    Alcohol use: Not Currently     Alcohol/week: 1.0 standard drink     Types: 1 Glasses of wine per week     Comment: once monthly typically    Drug use: Never     Family Hx:  Family History   Problem Relation Age of Onset    Stomach cancer Mother     Heart disease Mother     Arthritis Mother     Heart disease Father     Stroke Father     Kidney failure Father     Cancer Father     Kidney disease Father      Review of Systems:        Review of Systems   Neurological:  Positive for weakness.        Vital Signs: /80   Pulse 72   Temp 97.2 øF (36.2 øC) (Infrared)   Wt 50.8 kg (112 lb) Comment: pt. reported  BMI 18.08 kg/mý      Physical Exam  Awake, alert and oriented x 3  Speech f/c  Opens eyes spontaneously  Pupils 3 mm rx bilaterally  Extraocular muscles intact bilaterally  Normal sensation to light touch in all 3 distributions of CN V bilaterally  Face symmetric bilaterally  Tongue midline  Generalized and symmetric weakness of bilateral lower extremities.  strength normal. Unable to assess LUE strength due to severe shoulder pain  Normal sensation to light touch in upper and lower extremities  Chan is negative bilaterally. Sustained clonus is negative bilaterally.   Gait not assessed. Balance normal        Social History    Tobacco Use      Smoking status: Never      Smokeless tobacco: Never       Tobacco Use: Low Risk     Smoking Tobacco Use: Never    Smokeless Tobacco Use: Never    Passive Exposure: Not on file           TAPAN Fall Risk Assessment was  completed, and patient is at HIGH risk for falls. Assessment completed on:6/29/2023      Diagnostic Studies: (All imaging is independently reviewed unless stated otherwise.)  MRI cervical spine from 7/22/2023 shows stable compression fracture There are multiple levels of degenerative disc disease and facet arthropathy throughout the cervical spine.  There is no listhesis or high-grade cord impingement.  Imaging was reviewed with Dr. Caballero.    MRI lumbar spine 8/10/2023 shows advanced multilevel lumbar spondylosis.      Procedures        Assessment/Plan    Diagnoses and all orders for this visit:    1. Compression fracture of C7 vertebra with routine healing, subsequent encounter (Primary)  -     XR spine cervical ap and lat w flex and ext; Future         This is an 88-year-old male who comes in for follow-up regarding C7 compression fracture and generalized weakness.  He is 8 weeks out from the initial fracture.  He has no radicular symptoms in his arms and denies neck pain at this time.  We will obtain cervical x-rays after his visit today and I will call the patient with the results.  If the x-rays are stable, patient can discontinue the cervical collar.  In regards to his generalized lower extremity weakness, patient has complex degeneration of his lumbar spine.  Due to his age and general health, I do not think the patient is a good surgical candidate.  He also does not have any radicular symptoms.  I offered to refer the patient to an adult deformity surgeon, however I do not think that there is an easy fix for his severe lumbar degeneration.  Patient is understanding of this and states that he does not want spine surgery anytime in the future.  I believe this patient needs aggressive physical therapy at home to gain mobility back.  I also had a lengthy discussion regarding nutrition and the need for high-protein diet so he can maintain his muscle mass.  Would recommend patient continue working with physical  therapy at home.  We will not schedule any formal follow-up at this time, patient can follow-up on an as-needed basis.  Patient encouraged to contact us if he has any changes in his condition or any concerns.    Any copied data from previous notes included in the (1) HPI, (2) PE, (3) MDM and/or Assessment and Plan has been reviewed and accurate as of 08/15/23.    Deanna Grullon PA-C  08/15/23    I spent 35 minutes caring for Enrike Tomas on 08/15/23.  This time includes activities preparing for the visit, reviewing test, reviewing history and performing an appropriate examination.  Discussing with the patient and reviewing and independently interpreting the diagnostic studies, communicating that information to the patient along with discussing care coordination and referrals if needed and documenting information in the medical records.

## 2023-08-15 NOTE — PROGRESS NOTES
Cervical x-ray AP and lateral with flexion-extension obtained on 8/14/2023.  This showed grossly unchanged appearance of mild superior endplate height loss of the C7 vertebral body.  No acute fracture or new vertebral body height loss.  I called the patient's wife to inform her of the results.  Patient is now 8 weeks out from the initial C7 compression fracture and x-rays are stable at this time.  He may discontinue the cervical collar at this time.

## 2023-09-10 ENCOUNTER — HOSPITAL ENCOUNTER (EMERGENCY)
Facility: HOSPITAL | Age: 88
Discharge: HOME | End: 2023-09-10
Attending: EMERGENCY MEDICINE
Payer: MEDICARE

## 2023-09-10 ENCOUNTER — APPOINTMENT (OUTPATIENT)
Dept: CT IMAGING | Facility: HOSPITAL | Age: 88
End: 2023-09-10
Payer: MEDICARE

## 2023-09-10 ENCOUNTER — APPOINTMENT (OUTPATIENT)
Dept: GENERAL RADIOLOGY | Facility: HOSPITAL | Age: 88
End: 2023-09-10
Payer: MEDICARE

## 2023-09-10 VITALS
BODY MASS INDEX: 18.8 KG/M2 | HEIGHT: 66 IN | HEART RATE: 63 BPM | RESPIRATION RATE: 18 BRPM | OXYGEN SATURATION: 96 % | SYSTOLIC BLOOD PRESSURE: 177 MMHG | TEMPERATURE: 97.7 F | DIASTOLIC BLOOD PRESSURE: 80 MMHG | WEIGHT: 117 LBS

## 2023-09-10 DIAGNOSIS — S22.050A COMPRESSION FRACTURE OF T5 VERTEBRA, INITIAL ENCOUNTER: Primary | ICD-10-CM

## 2023-09-10 DIAGNOSIS — S50.02XA CONTUSION OF LEFT ELBOW, INITIAL ENCOUNTER: ICD-10-CM

## 2023-09-10 DIAGNOSIS — S22.060A COMPRESSION FRACTURE OF T7 VERTEBRA, INITIAL ENCOUNTER: ICD-10-CM

## 2023-09-10 PROCEDURE — 73070 X-RAY EXAM OF ELBOW: CPT

## 2023-09-10 PROCEDURE — 99284 EMERGENCY DEPT VISIT MOD MDM: CPT

## 2023-09-10 PROCEDURE — 71250 CT THORAX DX C-: CPT

## 2023-09-10 RX ORDER — OXYCODONE AND ACETAMINOPHEN 10; 325 MG/1; MG/1
1 TABLET ORAL ONCE
Status: COMPLETED | OUTPATIENT
Start: 2023-09-10 | End: 2023-09-10

## 2023-09-10 RX ADMIN — OXYCODONE HYDROCHLORIDE AND ACETAMINOPHEN 1 TABLET: 10; 325 TABLET ORAL at 20:59

## 2023-09-10 NOTE — ED PROVIDER NOTES
[facility]    EMERGENCY DEPARTMENT ENCOUNTER      Pt Name: Enrike Tomas  MRN: [id number]  YOB: 1935  Date of evaluation: 9/10/2023  Provider: Ricky Sandoval MD    CHIEF COMPLAINT       Chief Complaint   Patient presents with    Fall    Back Pain         HISTORY OF PRESENT ILLNESS   Enrike Tomas is a 88 y.o. male who presents to the emergency department ***       Nursing notes were reviewed.    REVIEW OF SYSTEMS     ROS:  A chief complaint appropriate review of systems was completed and is negative except as noted in the HPI.      PAST MEDICAL HISTORY     Past Medical History:   Diagnosis Date    Abnormal ECG     occasional extra heartbeat    Arthritis     Atrial fibrillation     BPH (benign prostatic hyperplasia)     Cervical compression fracture 06/05/2023    currently wearing a c collar    Diverticulosis     Diverticulum of esophagus     puree diet and speech therapy    ESRD on hemodialysis     GERD (gastroesophageal reflux disease)     Heart failure     Hyperlipidemia     Hypertension     Low back pain 2022    NERY (obstructive sleep apnea)     cpap    Recurrent falls          SURGICAL HISTORY       Past Surgical History:   Procedure Laterality Date    CATARACT EXTRACTION      COLONOSCOPY      DIALYSIS FISTULA CREATION      ENDOSCOPY      INGUINAL HERNIA REPAIR      PROSTATE BIOPSY      THROAT SURGERY      diverticulum in throat    TONSILLECTOMY AND ADENOIDECTOMY           CURRENT MEDICATIONS     No current facility-administered medications for this encounter.    Current Outpatient Medications:     acetaminophen (TYLENOL) 500 MG tablet, Take 1 tablet by mouth Every 6 (Six) Hours As Needed for Mild Pain or Headache., Disp: , Rfl:     amiodarone (PACERONE) 200 MG tablet, Take 1 tablet by mouth Daily., Disp: , Rfl:     apixaban (ELIQUIS) 2.5 MG tablet tablet, Take 1 tablet by mouth 2 (Two) Times a Day., Disp: , Rfl:     atorvastatin (LIPITOR) 20 MG tablet, Take 1 tablet by mouth Every Night., Disp:  , Rfl:     finasteride (PROSCAR) 5 MG tablet, Take 1 tablet by mouth Daily., Disp: , Rfl:     fluticasone (FLONASE) 50 MCG/ACT nasal spray, 2 sprays into the nostril(s) as directed by provider Daily., Disp: , Rfl:     folic acid (FOLVITE) 1 MG tablet, Take 1 tablet by mouth Daily., Disp: , Rfl:     HYDROcodone-acetaminophen (NORCO) 5-325 MG per tablet, Take 1 tablet by mouth Every 4 (Four) Hours As Needed for Moderate Pain., Disp: 18 tablet, Rfl: 0    melatonin 5 MG tablet tablet, Take 1 tablet by mouth Every Night., Disp: , Rfl:     midodrine (PROAMATINE) 5 MG tablet, Take 1 tablet by mouth 3 (Three) Times a Day Before Meals., Disp: , Rfl:     naloxone (NARCAN) 4 MG/0.1ML nasal spray, 1 spray into the nostril(s) as directed by provider As Needed (sedation)., Disp: , Rfl:     pantoprazole (PROTONIX) 40 MG EC tablet, Take 1 tablet by mouth Daily., Disp: , Rfl:     sennosides-docusate (PERICOLACE) 8.6-50 MG per tablet, Take 2 tablets by mouth 2 (Two) Times a Day., Disp: , Rfl:     sertraline (ZOLOFT) 50 MG tablet, Take 1 tablet by mouth Daily., Disp: , Rfl:     Teriparatide, Recombinant, (FORTEO) 620 MCG/2.48ML injection, Inject 20 mcg under the skin into the appropriate area as directed Daily., Disp: , Rfl:     traZODone (DESYREL) 50 MG tablet, Take 0.5 tablets by mouth Every Night., Disp: , Rfl:     ALLERGIES     Patient has no known allergies.    FAMILY HISTORY       Family History   Problem Relation Age of Onset    Stomach cancer Mother     Heart disease Mother     Arthritis Mother     Heart disease Father     Stroke Father     Kidney failure Father     Cancer Father     Kidney disease Father           SOCIAL HISTORY       Social History     Socioeconomic History    Marital status:    Tobacco Use    Smoking status: Never    Smokeless tobacco: Never   Vaping Use    Vaping Use: Never used   Substance and Sexual Activity    Alcohol use: Not Currently     Alcohol/week: 1.0 standard drink     Types: 1 Glasses of  "wine per week     Comment: once monthly typically    Drug use: Never    Sexual activity: Never         PHYSICAL EXAM    (up to 7 for level 4, 8 or more for level 5)     Vitals:    09/10/23 1732   BP: 180/88   BP Location: Left arm   Patient Position: Sitting   Pulse: 76   Resp: 18   Temp: 97.7 °F (36.5 °C)   TempSrc: Oral   SpO2: 98%   Weight: 53.1 kg (117 lb)   Height: 167.6 cm (66\")       ***      DIAGNOSTIC RESULTS     EKG: All EKGs are interpreted by the Emergency Department Physician who either signs or Co-signs this chart in the absence of a cardiologist.    No orders to display         RADIOLOGY:   [x] Radiologist's Report Reviewed:  No orders to display       I ordered and independently reviewed the above noted radiographic studies.        LABS:    I have reviewed and interpreted all of the currently available lab results from this visit (if applicable):  Results for orders placed or performed in visit on 07/11/23   CBC (No Diff)    Specimen: Blood   Result Value Ref Range    WBC 5.14 3.40 - 10.80 10*3/mm3    RBC 3.42 (L) 4.14 - 5.80 10*6/mm3    Hemoglobin 11.1 (L) 13.0 - 17.7 g/dL    Hematocrit 37.8 37.5 - 51.0 %    .5 (H) 79.0 - 97.0 fL    MCH 32.5 26.6 - 33.0 pg    MCHC 29.4 (L) 31.5 - 35.7 g/dL    RDW 19.0 (H) 12.3 - 15.4 %    RDW-SD 77.3 (H) 37.0 - 54.0 fl    MPV 8.6 6.0 - 12.0 fL    Platelets 294 140 - 450 10*3/mm3   Basic Metabolic Panel    Specimen: Blood   Result Value Ref Range    Glucose 92 65 - 99 mg/dL    BUN 27 (H) 8 - 23 mg/dL    Creatinine 3.37 (H) 0.76 - 1.27 mg/dL    Sodium 136 136 - 145 mmol/L    Potassium 4.5 3.5 - 5.2 mmol/L    Chloride 93 (L) 98 - 107 mmol/L    CO2 27.0 22.0 - 29.0 mmol/L    Calcium 9.7 8.6 - 10.5 mg/dL    BUN/Creatinine Ratio 8.0 7.0 - 25.0    Anion Gap 16.0 (H) 5.0 - 15.0 mmol/L    eGFR 16.8 (L) >60.0 mL/min/1.73   Hemoglobin A1c    Specimen: Blood   Result Value Ref Range    Hemoglobin A1C 4.80 4.80 - 5.60 %   PTT    Specimen: Blood   Result Value Ref Range    " PTT 41.6 (H) 22.0 - 39.0 seconds   Protime-INR    Specimen: Blood   Result Value Ref Range    Protime 18.8 (H) 12.2 - 14.5 Seconds    INR 1.56 (H) 0.89 - 1.12        If labs were ordered, I independently reviewed the results and considered them in treating the patient.      EMERGENCY DEPARTMENT COURSE and DIFFERENTIAL DIAGNOSIS/MDM:   Vitals:  AS OF 17:47 EDT    BP - 180/88  HR - 76  TEMP - 97.7 °F (36.5 °C) (Oral)  O2 SATS - 98%        Discussion below represents my analysis of pertinent findings related to patient's condition, differential diagnosis, treatment plan and final disposition.      Differential diagnosis:  The differential diagnosis associated with the patient's presentation includes: ***      Independent interpretations (ECG/rhythm strip/X-ray/US/CT scan): ***      Additional sources:  Discussed/obtained information from independent historians:   [] Spouse:   [] Parent:   [] Friend:   [] EMS:   [] Other:  External (non-ED) record review:   [] Inpatient record:   [] Office record:   [] Outpatient record:   [] Prior Outpatient labs:   [] Prior Outpatient radiology:   [] Primary Care record:   [] Outside ED record:   [] Other:       Patient's care impacted by:   [] Diabetes   [] Hypertension   [] Coronary Artery Disease   [] Cancer   [] Other:     Care significantly affected by Social Determinants of Health (housing and economic circumstances, unemployment)    [] Yes     [] No   If yes, Patient's care significantly limited by  Social Determinants of Health including:    [] Inadequate housing    [] Low income    [] Alcoholism and drug addiction in family    [] Problems related to primary support group    [] Unemployment    [] Problems related to employment    [] Other Social Determinants of Health:       Consideration of admission/observation vs discharge: ***      I considered prescription management with: ***   [] Pain medication:   [] Antiviral:   [] Antibiotic:   [] Other:    Additional orders  considered but not ordered:  The following testing was considered but ultimately not selected after discussion with patient/family: ***    ED Course:           ***    I had a discussion with the patient/family regarding diagnosis, diagnostic results, treatment plan, and medications.  The patient/family indicated understanding of these instructions.  I spent adequate time at the bedside preceding discharge necessary to personally discuss the aftercare instructions, giving patient education, providing explanations of the results of our evaluations/findings, and my decision making to assure that the patient/family understand the plan of care.  Time was allotted to answer questions at that time and throughout the ED course.  Emphasis was placed on timely follow-up after discharge.  I also discussed the potential for the development of an acute emergent condition requiring further evaluation, admission, or even surgical intervention. I discussed that we found nothing during the visit today indicating the need for further workup, admission, or the presence of an unstable medical condition.  I encouraged the patient to return to the emergency department immediately for ANY concerns, worsening, new complaints, or if symptoms persist and unable to seek follow-up in a timely fashion.  The patient/family expressed understanding and agreement with this plan.  The patient will follow-up with their PCP in 1-2 days for reevaluation.           PROCEDURES:  Procedures    CRITICAL CARE TIME        FINAL IMPRESSION    No diagnosis found.      DISPOSITION/PLAN     ED Disposition       None              Comment: Please note this report has been produced using speech recognition software.      Rciky Sandoval MD  Attending Emergency Physician           Patient's care significantly limited by  Social Determinants of Health including:    [] Inadequate housing    [] Low income    [] Alcoholism and drug addiction in family    [] Problems related to primary support group    [] Unemployment    [] Problems related to employment    [] Other Social Determinants of Health:       I considered prescription management with:    [x] Pain medication: Pain well controlled by oxydocodone administered in the ED   [] Antiviral:   [] Antibiotic:   [] Other:    I had a discussion with the patient/family regarding diagnosis, diagnostic results, treatment plan, and medications.  The patient/family indicated understanding of these instructions.  I spent adequate time at the bedside preceding discharge necessary to personally discuss the aftercare instructions, giving patient education, providing explanations of the results of our evaluations/findings, and my decision making to assure that the patient/family understand the plan of care.  Time was allotted to answer questions at that time and throughout the ED course.  Emphasis was placed on timely follow-up after discharge.  I also discussed the potential for the development of an acute emergent condition requiring further evaluation, admission, or even surgical intervention. I discussed that we found nothing during the visit today indicating the need for further workup, admission, or the presence of an unstable medical condition.  I encouraged the patient to return to the emergency department immediately for ANY concerns, worsening, new complaints, or if symptoms persist and unable to seek follow-up in a timely fashion.  The patient/family expressed understanding and agreement with this plan.  The patient will follow-up with their PCP in 1-2 days for reevaluation.           PROCEDURES:  Procedures    CRITICAL CARE TIME        FINAL IMPRESSION      1. Compression fracture of T5 vertebra, initial encounter    2. Compression fracture of T7  vertebra, initial encounter    3. Contusion of left elbow, initial encounter          DISPOSITION/PLAN     ED Disposition       ED Disposition   Discharge    Condition   Stable    Comment   --                 Comment: Please note this report has been produced using speech recognition software.      Ricky Sandoval MD  Attending Emergency Physician             Ricky Sandoval MD  09/23/23 [id number]

## 2023-09-12 ENCOUNTER — PRE-ADMISSION TESTING (OUTPATIENT)
Dept: PREADMISSION TESTING | Facility: HOSPITAL | Age: 88
End: 2023-09-12
Payer: MEDICARE

## 2023-09-12 VITALS — BODY MASS INDEX: 21.99 KG/M2 | HEIGHT: 62 IN | WEIGHT: 119.49 LBS

## 2023-09-12 LAB
ANION GAP SERPL CALCULATED.3IONS-SCNC: 15 MMOL/L (ref 5–15)
BUN SERPL-MCNC: 47 MG/DL (ref 8–23)
BUN/CREAT SERPL: 12.4 (ref 7–25)
CALCIUM SPEC-SCNC: 10.1 MG/DL (ref 8.6–10.5)
CHLORIDE SERPL-SCNC: 91 MMOL/L (ref 98–107)
CO2 SERPL-SCNC: 30 MMOL/L (ref 22–29)
CREAT SERPL-MCNC: 3.8 MG/DL (ref 0.76–1.27)
DEPRECATED RDW RBC AUTO: 57.5 FL (ref 37–54)
EGFRCR SERPLBLD CKD-EPI 2021: 14.6 ML/MIN/1.73
ERYTHROCYTE [DISTWIDTH] IN BLOOD BY AUTOMATED COUNT: 15.5 % (ref 12.3–15.4)
GLUCOSE SERPL-MCNC: 116 MG/DL (ref 65–99)
HBA1C MFR BLD: 4.6 % (ref 4.8–5.6)
HCT VFR BLD AUTO: 35.8 % (ref 37.5–51)
HGB BLD-MCNC: 11.7 G/DL (ref 13–17.7)
INR PPP: 1.61 (ref 0.89–1.12)
MCH RBC QN AUTO: 32.5 PG (ref 26.6–33)
MCHC RBC AUTO-ENTMCNC: 32.7 G/DL (ref 31.5–35.7)
MCV RBC AUTO: 99.4 FL (ref 79–97)
PLATELET # BLD AUTO: 223 10*3/MM3 (ref 140–450)
PMV BLD AUTO: 8.6 FL (ref 6–12)
POTASSIUM SERPL-SCNC: 5 MMOL/L (ref 3.5–5.2)
PROTHROMBIN TIME: 19.3 SECONDS (ref 12.2–14.5)
RBC # BLD AUTO: 3.6 10*6/MM3 (ref 4.14–5.8)
SODIUM SERPL-SCNC: 136 MMOL/L (ref 136–145)
WBC NRBC COR # BLD: 7.63 10*3/MM3 (ref 3.4–10.8)

## 2023-09-12 PROCEDURE — 85610 PROTHROMBIN TIME: CPT

## 2023-09-12 PROCEDURE — 83036 HEMOGLOBIN GLYCOSYLATED A1C: CPT

## 2023-09-12 PROCEDURE — 85027 COMPLETE CBC AUTOMATED: CPT

## 2023-09-12 PROCEDURE — 36415 COLL VENOUS BLD VENIPUNCTURE: CPT

## 2023-09-12 PROCEDURE — 80048 BASIC METABOLIC PNL TOTAL CA: CPT

## 2023-09-12 RX ORDER — MIRTAZAPINE 7.5 MG/1
7.5 TABLET, FILM COATED ORAL NIGHTLY
COMMUNITY

## 2023-09-12 RX ORDER — HYDROCODONE BITARTRATE AND ACETAMINOPHEN 7.5; 325 MG/1; MG/1
1 TABLET ORAL NIGHTLY
COMMUNITY

## 2023-09-12 NOTE — PAT
An arrival time for procedure was not provided during PAT visit. If patient had any questions or concerns about their arrival time, they were instructed to contact their surgeon/physician.  Additionally, if the patient referred to an arrival time that was acquired from their my chart account, patient was encouraged to verify that time with their surgeon/physician. Arrival times are NOT provided in Pre Admission Testing Department.    Patient viewed general PAT education video as instructed in their preoperative information received from their surgeon.  Patient stated the general PAT education video was viewed in its entirety and survey completed.  Copies of PAT general education handouts (Incentive Spirometry, Meds to Beds Program, Patient Belongings, Pre-op skin preparation instructions, Blood Glucose testing, Visitor policy, Surgery FAQ, Code H) distributed to patient if not printed. Education related to the PAT pass and skin preparation for surgery (if applicable) completed in PAT as a reinforcement to PAT education video. Patient instructed to return PAT pass provided today as well as completed skin preparation sheet (if applicable) on the day of procedure.     Additionally if patient had not viewed video yet but intended to view it at home or in our waiting area, then referred them to the handout with QR code/link provided during PAT visit.  Instructed patient to complete survey after viewing the video in its entirety.  Encouraged patient/family to read PAT general education handouts thoroughly and notify PAT staff with any questions or concerns. Patient verbalized understanding of all information and priority content.    Patient denies any current skin issues.     Patient's surgeon called in a prescription for Benzol Peroxide 5% wash to [facility].  Patient instructed to  from [facility] pharmacy that was submitted electronically.  Verbal and written instructions given regarding proper use of the  Benzoyl Peroxide wash were provided to patient and/or ingrislily during PAT visit. Patient/family also instructed to complete Benzol Peroxide checklist and return it to Pre-op on the day of surgery.  Patient and/or family verbalized understanding.      Additionally, reinforced with patient to acquire this prescription from the [facility] retail pharmacy before leaving the hospital after PAT visit due to the potential unavailability at local pharmacies.    Patient instructed to drink 20 ounces of Gatorade and it needs to be completed 1 hour (for Main OR patients) or 2 hours (scheduled  section & [facility] patients) before given arrival time for procedure (NO RED Gatorade)    Patient verbalized understanding.    [employer] (by [employer]) pain pump patient informational handout given to patient.  Instructed patient to watch [employer] Patient Education Video regarding Peripheral Nerve Catheter that will be in place for upcoming surgery unless contraindicated. The video can be accessed using QR code noted on handout.  Patient agreed to watch video.  Stressed to patient to call [facility] Nursing Hotline  if patient has any questions or concerns after discharge.     Post-Surgery Information Instruction Sheet given to patient during Pre-Admission Testing Visit with verbal instructions to patient to return with PAT PASS on the day of surgery. Additionally, encouraged patient to review the information provided.    Verified patient previously completed cardiology visit for cardiac risk assessment in preparation for upcoming procedure, completion of 12-lead ECG within six months, and risk assessment letter reviewed. No further interventions required.   CARDIAC RISK ASSESSMENT FROM MERCEDEZ VARGHESE ON CHART FROM 23. PT TO HOLD ELIQUIS 48 HRS PRIOR TO PROCEDURE. PT VERBALIZED UNDERSTANDING.     EKG FROM 23 ON CHART. PT DENIES NEW CP/SOA.     CXR FROM 23 IN EPIC.     PT REPORTED NEW UTI. PT SCHEDULED FOR  APPOINTMENT WITH PCP AFTER PAT ON 9/12/23 FOR URINALYSIS. LM WITH JOHN THORNE,  FOR DR. TORRES, REGARDING UTI AND POSSIBLE ABX. PT/PT'S WIFE STATED THEY WOULD CONTACT DR. TORRES'S OFFICE IF PT STARTED ON ABX AND IF SYMPTOMS DON'T RESOLVE.

## 2023-09-25 ENCOUNTER — APPOINTMENT (OUTPATIENT)
Dept: GENERAL RADIOLOGY | Facility: HOSPITAL | Age: 88
End: 2023-09-25
Payer: MEDICARE

## 2023-09-25 ENCOUNTER — ANESTHESIA EVENT CONVERTED (OUTPATIENT)
Dept: ANESTHESIOLOGY | Facility: HOSPITAL | Age: 88
End: 2023-09-25
Payer: MEDICARE

## 2023-09-25 ENCOUNTER — HOSPITAL ENCOUNTER (OUTPATIENT)
Facility: HOSPITAL | Age: 88
Discharge: NURSING, REHAB OR LONG-TERM CARE | End: 2023-10-05
Attending: ORTHOPAEDIC SURGERY | Admitting: ORTHOPAEDIC SURGERY
Payer: MEDICARE

## 2023-09-25 DIAGNOSIS — Z96.612 S/P SHOULDER HEMIARTHROPLASTY, LEFT: Primary | ICD-10-CM

## 2023-09-25 LAB
INR PPP: 1.15 (ref 0.89–1.12)
POTASSIUM SERPL-SCNC: 3.4 MMOL/L (ref 3.5–5.2)
POTASSIUM SERPL-SCNC: 4.1 MMOL/L (ref 3.5–5.2)
PROTHROMBIN TIME: 14.9 SECONDS (ref 12.2–14.5)

## 2023-09-25 PROCEDURE — A9270 NON-COVERED ITEM OR SERVICE: HCPCS | Performed by: INTERNAL MEDICINE

## 2023-09-25 PROCEDURE — 84132 ASSAY OF SERUM POTASSIUM: CPT | Performed by: ANESTHESIOLOGY

## 2023-09-25 PROCEDURE — 25010000002 VANCOMYCIN PER 500 MG: Performed by: ORTHOPAEDIC SURGERY

## 2023-09-25 PROCEDURE — 85610 PROTHROMBIN TIME: CPT | Performed by: ORTHOPAEDIC SURGERY

## 2023-09-25 PROCEDURE — 25010000002 SUGAMMADEX 200 MG/2ML SOLUTION: Performed by: NURSE ANESTHETIST, CERTIFIED REGISTERED

## 2023-09-25 PROCEDURE — C1776 JOINT DEVICE (IMPLANTABLE): HCPCS | Performed by: ORTHOPAEDIC SURGERY

## 2023-09-25 PROCEDURE — 63710000001 POVIDONE-IODINE 10 % SOLUTION 30 ML BOTTLE: Performed by: ORTHOPAEDIC SURGERY

## 2023-09-25 PROCEDURE — 63710000001 ATORVASTATIN 20 MG TABLET: Performed by: INTERNAL MEDICINE

## 2023-09-25 PROCEDURE — 25010000002 BUPIVACAINE (PF) 0.25 % SOLUTION: Performed by: NURSE ANESTHETIST, CERTIFIED REGISTERED

## 2023-09-25 PROCEDURE — L3670 SO ACRO/CLAV CAN WEB PRE OTS: HCPCS | Performed by: ORTHOPAEDIC SURGERY

## 2023-09-25 PROCEDURE — 73030 X-RAY EXAM OF SHOULDER: CPT

## 2023-09-25 PROCEDURE — 25010000002 ROPIVACAINE PER 1 MG: Performed by: NURSE ANESTHETIST, CERTIFIED REGISTERED

## 2023-09-25 PROCEDURE — 25010000002 PROPOFOL 10 MG/ML EMULSION: Performed by: NURSE ANESTHETIST, CERTIFIED REGISTERED

## 2023-09-25 PROCEDURE — 25010000002 ONDANSETRON PER 1 MG: Performed by: NURSE ANESTHETIST, CERTIFIED REGISTERED

## 2023-09-25 PROCEDURE — 97535 SELF CARE MNGMENT TRAINING: CPT | Performed by: OCCUPATIONAL THERAPIST

## 2023-09-25 PROCEDURE — 63710000001 FINASTERIDE 5 MG TABLET: Performed by: INTERNAL MEDICINE

## 2023-09-25 PROCEDURE — 25010000002 PHENYLEPHRINE 10 MG/ML SOLUTION 1 ML VIAL: Performed by: NURSE ANESTHETIST, CERTIFIED REGISTERED

## 2023-09-25 PROCEDURE — 97530 THERAPEUTIC ACTIVITIES: CPT | Performed by: OCCUPATIONAL THERAPIST

## 2023-09-25 PROCEDURE — C1889 IMPLANT/INSERT DEVICE, NOC: HCPCS | Performed by: ORTHOPAEDIC SURGERY

## 2023-09-25 PROCEDURE — 25010000002 FENTANYL CITRATE (PF) 50 MCG/ML SOLUTION: Performed by: NURSE ANESTHETIST, CERTIFIED REGISTERED

## 2023-09-25 PROCEDURE — 25010000002 LABETALOL 5 MG/ML SOLUTION: Performed by: INTERNAL MEDICINE

## 2023-09-25 PROCEDURE — 97110 THERAPEUTIC EXERCISES: CPT | Performed by: OCCUPATIONAL THERAPIST

## 2023-09-25 PROCEDURE — 25010000002 DEXAMETHASONE PER 1 MG: Performed by: NURSE ANESTHETIST, CERTIFIED REGISTERED

## 2023-09-25 PROCEDURE — 97162 PT EVAL MOD COMPLEX 30 MIN: CPT

## 2023-09-25 PROCEDURE — 63710000001 SENNOSIDES-DOCUSATE 8.6-50 MG TABLET: Performed by: INTERNAL MEDICINE

## 2023-09-25 PROCEDURE — A9270 NON-COVERED ITEM OR SERVICE: HCPCS | Performed by: ORTHOPAEDIC SURGERY

## 2023-09-25 PROCEDURE — 63710000001 MIDODRINE 5 MG TABLET: Performed by: INTERNAL MEDICINE

## 2023-09-25 PROCEDURE — 63710000001 ACETAMINOPHEN 325 MG TABLET: Performed by: ORTHOPAEDIC SURGERY

## 2023-09-25 PROCEDURE — 63710000001 AMIODARONE 200 MG TABLET: Performed by: INTERNAL MEDICINE

## 2023-09-25 PROCEDURE — 97165 OT EVAL LOW COMPLEX 30 MIN: CPT | Performed by: OCCUPATIONAL THERAPIST

## 2023-09-25 PROCEDURE — 63710000001 PANTOPRAZOLE 40 MG TABLET DELAYED-RELEASE: Performed by: INTERNAL MEDICINE

## 2023-09-25 PROCEDURE — 63710000001 MIRTAZAPINE 15 MG TABLET: Performed by: INTERNAL MEDICINE

## 2023-09-25 PROCEDURE — 63710000001 SERTRALINE 50 MG TABLET: Performed by: INTERNAL MEDICINE

## 2023-09-25 PROCEDURE — 25010000002 CEFAZOLIN IN DEXTROSE 2-4 GM/100ML-% SOLUTION: Performed by: ORTHOPAEDIC SURGERY

## 2023-09-25 DEVICE — IMPLANTABLE DEVICE
Type: IMPLANTABLE DEVICE | Site: SHOULDER | Status: FUNCTIONAL
Brand: EQUINOXE

## 2023-09-25 DEVICE — SUT FW #2 W/TPR NDL 1/2 CIR 38IN 97CM 26.5MM BLU: Type: IMPLANTABLE DEVICE | Site: SHOULDER | Status: FUNCTIONAL

## 2023-09-25 DEVICE — IMPLANTABLE DEVICE: Type: IMPLANTABLE DEVICE | Site: SHOULDER | Status: FUNCTIONAL

## 2023-09-25 RX ORDER — PANTOPRAZOLE SODIUM 40 MG/1
40 TABLET, DELAYED RELEASE ORAL DAILY
Status: DISCONTINUED | OUTPATIENT
Start: 2023-09-25 | End: 2023-10-05 | Stop reason: HOSPADM

## 2023-09-25 RX ORDER — DEXAMETHASONE SODIUM PHOSPHATE 4 MG/ML
INJECTION, SOLUTION INTRA-ARTICULAR; INTRALESIONAL; INTRAMUSCULAR; INTRAVENOUS; SOFT TISSUE AS NEEDED
Status: DISCONTINUED | OUTPATIENT
Start: 2023-09-25 | End: 2023-09-25 | Stop reason: SURG

## 2023-09-25 RX ORDER — ROPIVACAINE HYDROCHLORIDE 2 MG/ML
INJECTION, SOLUTION EPIDURAL; INFILTRATION; PERINEURAL CONTINUOUS
Status: DISCONTINUED | OUTPATIENT
Start: 2023-09-25 | End: 2023-10-01

## 2023-09-25 RX ORDER — OXYCODONE HYDROCHLORIDE 5 MG/1
5 TABLET ORAL EVERY 4 HOURS PRN
Status: DISCONTINUED | OUTPATIENT
Start: 2023-09-25 | End: 2023-10-05 | Stop reason: HOSPADM

## 2023-09-25 RX ORDER — LIDOCAINE HYDROCHLORIDE 10 MG/ML
INJECTION, SOLUTION EPIDURAL; INFILTRATION; INTRACAUDAL; PERINEURAL AS NEEDED
Status: DISCONTINUED | OUTPATIENT
Start: 2023-09-25 | End: 2023-09-25 | Stop reason: SURG

## 2023-09-25 RX ORDER — MIDODRINE HYDROCHLORIDE 5 MG/1
5 TABLET ORAL
Status: DISCONTINUED | OUTPATIENT
Start: 2023-09-25 | End: 2023-09-27

## 2023-09-25 RX ORDER — AMOXICILLIN 250 MG
2 CAPSULE ORAL 2 TIMES DAILY
Status: DISCONTINUED | OUTPATIENT
Start: 2023-09-25 | End: 2023-10-05 | Stop reason: HOSPADM

## 2023-09-25 RX ORDER — MAGNESIUM HYDROXIDE 1200 MG/15ML
LIQUID ORAL AS NEEDED
Status: DISCONTINUED | OUTPATIENT
Start: 2023-09-25 | End: 2023-09-25 | Stop reason: HOSPADM

## 2023-09-25 RX ORDER — PROPOFOL 10 MG/ML
VIAL (ML) INTRAVENOUS AS NEEDED
Status: DISCONTINUED | OUTPATIENT
Start: 2023-09-25 | End: 2023-09-25 | Stop reason: SURG

## 2023-09-25 RX ORDER — FENTANYL CITRATE 50 UG/ML
50 INJECTION, SOLUTION INTRAMUSCULAR; INTRAVENOUS
Status: DISCONTINUED | OUTPATIENT
Start: 2023-09-25 | End: 2023-09-25 | Stop reason: HOSPADM

## 2023-09-25 RX ORDER — SODIUM CHLORIDE 9 MG/ML
9 INJECTION, SOLUTION INTRAVENOUS ONCE
Status: COMPLETED | OUTPATIENT
Start: 2023-09-25 | End: 2023-09-25

## 2023-09-25 RX ORDER — SODIUM CHLORIDE 450 MG/100ML
50 INJECTION, SOLUTION INTRAVENOUS CONTINUOUS
Status: DISCONTINUED | OUTPATIENT
Start: 2023-09-25 | End: 2023-09-28

## 2023-09-25 RX ORDER — MIRTAZAPINE 15 MG/1
7.5 TABLET, FILM COATED ORAL NIGHTLY
Status: DISCONTINUED | OUTPATIENT
Start: 2023-09-25 | End: 2023-10-05 | Stop reason: HOSPADM

## 2023-09-25 RX ORDER — SODIUM CHLORIDE 0.9 % (FLUSH) 0.9 %
10 SYRINGE (ML) INJECTION AS NEEDED
Status: DISCONTINUED | OUTPATIENT
Start: 2023-09-25 | End: 2023-09-25 | Stop reason: HOSPADM

## 2023-09-25 RX ORDER — HYDROMORPHONE HYDROCHLORIDE 1 MG/ML
0.5 INJECTION, SOLUTION INTRAMUSCULAR; INTRAVENOUS; SUBCUTANEOUS
Status: DISCONTINUED | OUTPATIENT
Start: 2023-09-25 | End: 2023-09-25 | Stop reason: HOSPADM

## 2023-09-25 RX ORDER — ACETAMINOPHEN 500 MG
1000 TABLET ORAL ONCE
Status: DISCONTINUED | OUTPATIENT
Start: 2023-09-25 | End: 2023-09-25 | Stop reason: HOSPADM

## 2023-09-25 RX ORDER — HYDROMORPHONE HYDROCHLORIDE 1 MG/ML
0.5 INJECTION, SOLUTION INTRAMUSCULAR; INTRAVENOUS; SUBCUTANEOUS
Status: DISCONTINUED | OUTPATIENT
Start: 2023-09-25 | End: 2023-09-25 | Stop reason: SDUPTHER

## 2023-09-25 RX ORDER — FENTANYL CITRATE 50 UG/ML
INJECTION, SOLUTION INTRAMUSCULAR; INTRAVENOUS
Status: COMPLETED | OUTPATIENT
Start: 2023-09-25 | End: 2023-09-25

## 2023-09-25 RX ORDER — CEFAZOLIN SODIUM 2 G/100ML
2000 INJECTION, SOLUTION INTRAVENOUS ONCE
Status: COMPLETED | OUTPATIENT
Start: 2023-09-25 | End: 2023-09-25

## 2023-09-25 RX ORDER — ACETAMINOPHEN 650 MG/1
650 SUPPOSITORY RECTAL EVERY 4 HOURS PRN
Status: DISCONTINUED | OUTPATIENT
Start: 2023-09-25 | End: 2023-10-05 | Stop reason: HOSPADM

## 2023-09-25 RX ORDER — TRANEXAMIC ACID 10 MG/ML
1000 INJECTION, SOLUTION INTRAVENOUS ONCE
Status: COMPLETED | OUTPATIENT
Start: 2023-09-25 | End: 2023-09-25

## 2023-09-25 RX ORDER — ONDANSETRON 2 MG/ML
INJECTION INTRAMUSCULAR; INTRAVENOUS AS NEEDED
Status: DISCONTINUED | OUTPATIENT
Start: 2023-09-25 | End: 2023-09-25 | Stop reason: SURG

## 2023-09-25 RX ORDER — AMIODARONE HYDROCHLORIDE 200 MG/1
200 TABLET ORAL DAILY
Status: DISCONTINUED | OUTPATIENT
Start: 2023-09-25 | End: 2023-10-05 | Stop reason: HOSPADM

## 2023-09-25 RX ORDER — FINASTERIDE 5 MG/1
5 TABLET, FILM COATED ORAL DAILY
Status: DISCONTINUED | OUTPATIENT
Start: 2023-09-25 | End: 2023-10-05 | Stop reason: HOSPADM

## 2023-09-25 RX ORDER — NALOXONE HCL 0.4 MG/ML
0.1 VIAL (ML) INJECTION
Status: DISCONTINUED | OUTPATIENT
Start: 2023-09-25 | End: 2023-10-05 | Stop reason: HOSPADM

## 2023-09-25 RX ORDER — ATORVASTATIN CALCIUM 20 MG/1
20 TABLET, FILM COATED ORAL NIGHTLY
Status: DISCONTINUED | OUTPATIENT
Start: 2023-09-25 | End: 2023-10-05 | Stop reason: HOSPADM

## 2023-09-25 RX ORDER — ACETAMINOPHEN 325 MG/1
650 TABLET ORAL EVERY 4 HOURS PRN
Status: DISCONTINUED | OUTPATIENT
Start: 2023-09-25 | End: 2023-10-05 | Stop reason: HOSPADM

## 2023-09-25 RX ORDER — LABETALOL HYDROCHLORIDE 5 MG/ML
10 INJECTION, SOLUTION INTRAVENOUS EVERY 4 HOURS PRN
Status: COMPLETED | OUTPATIENT
Start: 2023-09-25 | End: 2023-09-27

## 2023-09-25 RX ORDER — SODIUM CHLORIDE 0.9 % (FLUSH) 0.9 %
10 SYRINGE (ML) INJECTION EVERY 12 HOURS SCHEDULED
Status: DISCONTINUED | OUTPATIENT
Start: 2023-09-25 | End: 2023-09-25 | Stop reason: HOSPADM

## 2023-09-25 RX ORDER — FENTANYL CITRATE 50 UG/ML
50 INJECTION, SOLUTION INTRAMUSCULAR; INTRAVENOUS
Status: DISCONTINUED | OUTPATIENT
Start: 2023-09-25 | End: 2023-09-25 | Stop reason: SDUPTHER

## 2023-09-25 RX ORDER — BUPIVACAINE HYDROCHLORIDE 2.5 MG/ML
INJECTION, SOLUTION EPIDURAL; INFILTRATION; INTRACAUDAL
Status: COMPLETED | OUTPATIENT
Start: 2023-09-25 | End: 2023-09-25

## 2023-09-25 RX ORDER — FAMOTIDINE 10 MG/ML
20 INJECTION, SOLUTION INTRAVENOUS ONCE
Status: COMPLETED | OUTPATIENT
Start: 2023-09-25 | End: 2023-09-25

## 2023-09-25 RX ORDER — SODIUM CHLORIDE, SODIUM LACTATE, POTASSIUM CHLORIDE, CALCIUM CHLORIDE 600; 310; 30; 20 MG/100ML; MG/100ML; MG/100ML; MG/100ML
9 INJECTION, SOLUTION INTRAVENOUS CONTINUOUS
Status: DISCONTINUED | OUTPATIENT
Start: 2023-09-25 | End: 2023-09-25

## 2023-09-25 RX ORDER — ROCURONIUM BROMIDE 10 MG/ML
INJECTION, SOLUTION INTRAVENOUS AS NEEDED
Status: DISCONTINUED | OUTPATIENT
Start: 2023-09-25 | End: 2023-09-25 | Stop reason: SURG

## 2023-09-25 RX ORDER — HYDROMORPHONE HYDROCHLORIDE 1 MG/ML
0.5 INJECTION, SOLUTION INTRAMUSCULAR; INTRAVENOUS; SUBCUTANEOUS
Status: DISCONTINUED | OUTPATIENT
Start: 2023-09-25 | End: 2023-10-05 | Stop reason: HOSPADM

## 2023-09-25 RX ORDER — FAMOTIDINE 20 MG/1
20 TABLET, FILM COATED ORAL ONCE
Status: DISCONTINUED | OUTPATIENT
Start: 2023-09-25 | End: 2023-09-25

## 2023-09-25 RX ORDER — PHENYLEPHRINE HCL IN 0.9% NACL 1 MG/10 ML
SYRINGE (ML) INTRAVENOUS AS NEEDED
Status: DISCONTINUED | OUTPATIENT
Start: 2023-09-25 | End: 2023-09-25 | Stop reason: SURG

## 2023-09-25 RX ORDER — SODIUM CHLORIDE 9 MG/ML
INJECTION, SOLUTION INTRAVENOUS CONTINUOUS PRN
Status: DISCONTINUED | OUTPATIENT
Start: 2023-09-25 | End: 2023-09-25 | Stop reason: SURG

## 2023-09-25 RX ORDER — LIDOCAINE HYDROCHLORIDE 10 MG/ML
0.5 INJECTION, SOLUTION EPIDURAL; INFILTRATION; INTRACAUDAL; PERINEURAL ONCE AS NEEDED
Status: COMPLETED | OUTPATIENT
Start: 2023-09-25 | End: 2023-09-25

## 2023-09-25 RX ADMIN — FAMOTIDINE 20 MG: 10 INJECTION INTRAVENOUS at 11:05

## 2023-09-25 RX ADMIN — ROCURONIUM BROMIDE 50 MG: 10 SOLUTION INTRAVENOUS at 12:11

## 2023-09-25 RX ADMIN — BUPIVACAINE HYDROCHLORIDE 15 ML: 2.5 INJECTION, SOLUTION EPIDURAL; INFILTRATION; INTRACAUDAL; PERINEURAL at 11:33

## 2023-09-25 RX ADMIN — LIDOCAINE HYDROCHLORIDE 50 MG: 10 INJECTION, SOLUTION EPIDURAL; INFILTRATION; INTRACAUDAL; PERINEURAL at 12:11

## 2023-09-25 RX ADMIN — VANCOMYCIN HYDROCHLORIDE 750 MG: 750 INJECTION, SOLUTION INTRAVENOUS at 11:56

## 2023-09-25 RX ADMIN — ACETAMINOPHEN 650 MG: 325 TABLET ORAL at 21:54

## 2023-09-25 RX ADMIN — SUGAMMADEX 110 MG: 100 INJECTION, SOLUTION INTRAVENOUS at 13:16

## 2023-09-25 RX ADMIN — FINASTERIDE 5 MG: 5 TABLET, FILM COATED ORAL at 18:25

## 2023-09-25 RX ADMIN — SENNOSIDES AND DOCUSATE SODIUM 2 TABLET: 50; 8.6 TABLET ORAL at 21:51

## 2023-09-25 RX ADMIN — PROPOFOL 60 MG: 10 INJECTION, EMULSION INTRAVENOUS at 12:11

## 2023-09-25 RX ADMIN — Medication 100 MCG: at 12:32

## 2023-09-25 RX ADMIN — SODIUM CHLORIDE 50 ML/HR: 4.5 INJECTION, SOLUTION INTRAVENOUS at 18:26

## 2023-09-25 RX ADMIN — ATORVASTATIN CALCIUM 20 MG: 20 TABLET, FILM COATED ORAL at 21:50

## 2023-09-25 RX ADMIN — MIRTAZAPINE 7.5 MG: 15 TABLET, FILM COATED ORAL at 21:51

## 2023-09-25 RX ADMIN — CEFAZOLIN SODIUM 2000 MG: 2 INJECTION, SOLUTION INTRAVENOUS at 11:13

## 2023-09-25 RX ADMIN — Medication 1000 MG: at 13:32

## 2023-09-25 RX ADMIN — Medication 100 MCG: at 12:11

## 2023-09-25 RX ADMIN — SODIUM CHLORIDE 9 ML/HR: 9 INJECTION, SOLUTION INTRAVENOUS at 11:05

## 2023-09-25 RX ADMIN — PHENYLEPHRINE HYDROCHLORIDE 0.25 MCG/KG/MIN: 10 INJECTION INTRAVENOUS at 12:36

## 2023-09-25 RX ADMIN — PANTOPRAZOLE SODIUM 40 MG: 40 TABLET, DELAYED RELEASE ORAL at 18:25

## 2023-09-25 RX ADMIN — LIDOCAINE HYDROCHLORIDE 0.5 ML: 10 INJECTION, SOLUTION EPIDURAL; INFILTRATION; INTRACAUDAL; PERINEURAL at 11:05

## 2023-09-25 RX ADMIN — TRANEXAMIC ACID 1000 MG: 10 INJECTION, SOLUTION INTRAVENOUS at 13:04

## 2023-09-25 RX ADMIN — MIDODRINE HYDROCHLORIDE 5 MG: 5 TABLET ORAL at 18:25

## 2023-09-25 RX ADMIN — FENTANYL CITRATE 100 MCG: 50 INJECTION, SOLUTION INTRAMUSCULAR; INTRAVENOUS at 11:32

## 2023-09-25 RX ADMIN — AMIODARONE HYDROCHLORIDE 200 MG: 200 TABLET ORAL at 18:25

## 2023-09-25 RX ADMIN — Medication 10 MG: at 23:26

## 2023-09-25 RX ADMIN — ONDANSETRON 4 MG: 2 INJECTION INTRAMUSCULAR; INTRAVENOUS at 13:10

## 2023-09-25 RX ADMIN — Medication 100 MCG: at 12:27

## 2023-09-25 RX ADMIN — DEXAMETHASONE SODIUM PHOSPHATE 4 MG: 4 INJECTION, SOLUTION INTRAMUSCULAR; INTRAVENOUS at 12:16

## 2023-09-25 RX ADMIN — SODIUM CHLORIDE: 9 INJECTION, SOLUTION INTRAVENOUS at 12:05

## 2023-09-25 RX ADMIN — TRANEXAMIC ACID 1000 MG: 10 INJECTION, SOLUTION INTRAVENOUS at 12:16

## 2023-09-25 RX ADMIN — SERTRALINE 50 MG: 50 TABLET, FILM COATED ORAL at 18:25

## 2023-09-25 NOTE — THERAPY EVALUATION
Patient Name: Enrike Tomas  : 1935    MRN: [id number]                              Today's Date: 2023       Admit Date: 2023    Visit Dx: No diagnosis found.  Patient Active Problem List   Diagnosis    (HFpEF) heart failure with preserved ejection fraction    Anemia of renal disease    At high risk for falls    Benign prostatic hyperplasia    Closed fracture of left acetabulum    Compression fracture of thoracic vertebra    Deficiency of other specified B group vitamins    Depression    ESRD (end stage renal disease)    Essential hypertension    GERD (gastroesophageal reflux disease)    Hepatic cyst    Long term (current) use of anticoagulants    Major depressive disorder, single episode, moderate    Mixed hyperlipidemia    Morgagni hernia    Occlusion of right vertebral artery    NERY (obstructive sleep apnea)    Fall    Severe malnutrition    Sustained SVT    Paroxysmal atrial fibrillation    Chronic heart failure with preserved ejection fraction (HFpEF)    S/P shoulder hemiarthroplasty, left     Past Medical History:   Diagnosis Date    A-fib     Abnormal ECG     occasional extra heartbeat    Arthritis     Atrial fibrillation     BPH (benign prostatic hyperplasia)     Cervical compression fracture 2023    currently wearing a c collar    Diverticulosis     Diverticulum of esophagus     puree diet and speech therapy    Elevated cholesterol     ESRD on hemodialysis     GERD (gastroesophageal reflux disease)     Heart failure     Hemodialysis patient     M,W,F    Hyperlipidemia     Hypertension     Low back pain     NERY (obstructive sleep apnea)     NOT USING CPAP    Recurrent falls     UTI (urinary tract infection)      Past Surgical History:   Procedure Laterality Date    CATARACT EXTRACTION      COLONOSCOPY      DIALYSIS FISTULA CREATION      ENDOSCOPY      INGUINAL HERNIA REPAIR      PROSTATE BIOPSY      THROAT SURGERY      diverticulum in throat    TONSILLECTOMY AND ADENOIDECTOMY         General Information       Row Name 09/25/23 1802          Physical Therapy Time and Intention    Document Type evaluation  -     Mode of Treatment physical therapy  -       Row Name 09/25/23 1802          General Information    Patient Profile Reviewed yes  -     Prior Level of Function min assist:;all household mobility;community mobility;bed mobility;ADL's  limited by pain  -     Existing Precautions/Restrictions fall;non-weight bearing;left;other (see comments)  sling to LUE. Interscalene nerve cath  -     Barriers to Rehab previous functional deficit  -       Row Name 09/25/23 1802          Living Environment    People in Home child(vickie), adult;spouse  -       Row Name 09/25/23 1802          Home Main Entrance    Number of Stairs, Main Entrance other (see comments)  ramp  -       Row Name 09/25/23 1802          Stairs Within Home, Primary    Stairs, Within Home, Primary stair lift  -       Row Name 09/25/23 1802          Cognition    Orientation Status (Cognition) oriented x 4  -       Row Name 09/25/23 1802          Safety Issues, Functional Mobility    Safety Issues Affecting Function (Mobility) insight into deficits/self-awareness;awareness of need for assistance;safety precaution awareness;impulsivity;judgment;sequencing abilities;safety precautions follow-through/compliance  -     Impairments Affecting Function (Mobility) balance;coordination;endurance/activity tolerance;motor control;postural/trunk control;range of motion (ROM);sensation/sensory awareness;strength  -               User Key  (r) = Recorded By, (t) = Taken By, (c) = Cosigned By      Initials Name Provider Type     Fouzia Woody PT Physical Therapist                   Mobility       Row Name 09/25/23 1802          Bed Mobility    Bed Mobility supine-sit  -     Supine-Sit [place] (Bed Mobility) verbal cues;nonverbal cues (demo/gesture);2 person assist;maximum assist (25% patient effort)  -      Assistive Device (Bed Mobility) head of bed elevated;draw sheet;bed rails  -     Comment, (Bed Mobility) Assist for trunk management due to IV pain in R wrist and NWB on LUE  -       Row Name 09/25/23 1802          Transfers    Comment, (Transfers) Pt performed STS and SPT to chair with Mod A x2. Pt required multiple attempts to perform STS with VC for hand placement. Pt easily distracted with frequent VC to stay on task. Once in standing, posterior lean noted. Assist for stability while taking steps to chair with RUE support. Short, shuffling steps noted with encouragement for upright posture and sequencing. VC for reaching back to chair when sitting. Activity limited by balance deficits and fatigue.  -       Row Name 09/25/23 1802          Bed-Chair Transfer    Bed-Chair [place] (Transfers) moderate assist (50% patient effort);2 person assist;nonverbal cues (demo/gesture);verbal cues  -     Assistive Device (Bed-Chair Transfers) other (see comments)  R UE support  -[facility] Name 09/25/23 1802          Sit-Stand Transfer    Sit-Stand [place] (Transfers) moderate assist (50% patient effort);2 person assist;verbal cues;nonverbal cues (demo/gesture)  -[facility] Name 09/25/23 1802          Mobility    Extremity Weight-bearing Status left upper extremity  -     Left Upper Extremity (Weight-bearing Status) non weight-bearing (NWB)   -               User Key  (r) = Recorded By, (t) = Taken By, (c) = Cosigned By      Initials Name Provider Type     Fouzia Woody PT Physical Therapist                   Obj/Interventions       [facility] Name 09/25/23 1842          Range of Motion Comprehensive    General Range of Motion bilateral lower extremity ROM WFL  -[facility] Name 09/25/23 1842          Strength Comprehensive (MMT)    General Manual Muscle Testing (MMT) Assessment lower extremity strength deficits identified  -     Comment, General Manual Muscle Testing (MMT) Assessment Decreased command  following noted. Pt unable to sequence ankle DF with 3-/5 noted. BLE grossly 4/5  Simultaneous filing. User may be unaware of other data.  -       Row Name 09/25/23 1842          Balance    Balance Assessment sitting static balance;sitting dynamic balance;sit to stand dynamic balance;standing static balance;standing dynamic balance  -     Static Sitting Balance standby assist  -     Dynamic Sitting Balance contact guard  -     Position, Sitting Balance sitting edge of bed  -     Static Standing Balance moderate assist;2-person assist;verbal cues;non-verbal cues (demo/gesture)  -     Dynamic Standing Balance moderate assist;2-person assist;non-verbal cues (demo/gesture);verbal cues  -     Position/Device Used, Standing Balance supported  -     Balance Interventions sitting;standing;sit to stand;occupation based/functional task  -       Row Name 09/25/23 1842          Sensory Assessment (Somatosensory)    Sensory Assessment (Somatosensory) LE sensation intact  Simultaneous filing. User may be unaware of other data.  -               User Key  (r) = Recorded By, (t) = Taken By, (c) = Cosigned By      Initials Name Provider Type     Fouzia Woody, PT Physical Therapist                   Goals/Plan       Row Name 09/25/23 1848          Bed Mobility Goal 1 (PT)    Activity/Assistive Device (Bed Mobility Goal 1, PT) sit to supine/supine to sit  -HP     [place] Level/Cues Needed (Bed Mobility Goal 1, PT) contact guard required  -HP     Time Frame (Bed Mobility Goal 1, PT) long term goal (LTG);10 days  -HP     Progress/Outcomes (Bed Mobility Goal 1, PT) goal ongoing  -       Row Name 09/25/23 1848          Transfer Goal 1 (PT)    Activity/Assistive Device (Transfer Goal 1, PT) sit-to-stand/stand-to-sit  -HP     [place] Level/Cues Needed (Transfer Goal 1, PT) contact guard required  -HP     Time Frame (Transfer Goal 1, PT) long term goal (LTG);10 days  -HP     Progress/Outcome (Transfer Goal  1, PT) goal ongoing  -       Row Name 09/25/23 1848          Gait Training Goal 1 (PT)    Activity/Assistive Device (Gait Training Goal 1, PT) gait (walking locomotion)  -HP     [place] Level (Gait Training Goal 1, PT) contact guard required  -HP     Distance (Gait Training Goal 1, PT) 150  -HP     Time Frame (Gait Training Goal 1, PT) long term goal (LTG);10 days  -HP     Progress/Outcome (Gait Training Goal 1, PT) goal ongoing  -       Row Name 09/25/23 1848          Therapy Assessment/Plan (PT)    Planned Therapy Interventions (PT) balance training;bed mobility training;gait training;home exercise program;transfer training;patient/family education;stretching;strengthening;stair training  -               User Key  (r) = Recorded By, (t) = Taken By, (c) = Cosigned By      Initials Name Provider Type    HP Fouzia Woody, PT Physical Therapist                   Clinical Impression       Row Name 09/25/23 1802          Pain    Pretreatment Pain Rating 0/10 - no pain  -     Posttreatment Pain Rating 0/10 - no pain  -       Row Name 09/25/23 1802          Plan of Care Review    Plan of Care Reviewed With patient;family  -     Progress no change  -     Outcome Evaluation Pt performed STS and SPT to chair with RUE support and Mod A x2. Posterior lean noted with short shuffling steps. Assistance for stability required. Activity limited by balance deficits and fatigue. Decreased safety awareness noted. H/o recent falls. Recommend d/c to IRF to promote decrease in risk of falls.  -       Row Name 09/25/23 1802          Therapy Assessment/Plan (PT)    Rehab Potential (PT) good, to achieve stated therapy goals  -     Criteria for Skilled Interventions Met (PT) yes;meets criteria;skilled treatment is necessary  -     Therapy Frequency (PT) daily  -       Row Name 09/25/23 1802          Vital Signs    Pre Patient Position Supine  -HP     Intra Patient Position Standing  -HP     Post Patient Position  Sitting  -HP       Row Name 09/25/23 1802          Positioning and Restraints    Pre-Treatment Position in bed  -HP     Post Treatment Position chair  -HP     In Chair notified nsg;reclined;sitting;call light within reach;encouraged to call for assist;exit alarm on;with family/caregiver;legs elevated;on mechanical lift sling;waffle cushion  -               User Key  (r) = Recorded By, (t) = Taken By, (c) = Cosigned By      Initials Name Provider Type     Fouzia Woody PT Physical Therapist                   Outcome Measures       Row Name 09/25/23 1849          How much help from another person do you currently need...    Turning from your back to your side while in flat bed without using bedrails? 2  -HP     Moving from lying on back to sitting on the side of a flat bed without bedrails? 2  -HP     Moving to and from a bed to a chair (including a wheelchair)? 2  -HP     Standing up from a chair using your arms (e.g., wheelchair, bedside chair)? 2  -HP     Climbing 3-5 steps with a railing? 1  -HP     To walk in hospital room? 2  -HP     AM-PAC 6 Clicks Score (PT) 11  -HP     Highest level of mobility 4 --> Transferred to chair/commode  -HP       Row Name 09/25/23 1849          Functional Assessment    Outcome Measure Options AM-PAC 6 Clicks Basic Mobility (PT)  -HP               User Key  (r) = Recorded By, (t) = Taken By, (c) = Cosigned By      Initials Name Provider Type    Fouzia Arriaza PT Physical Therapist                                 Physical Therapy Education       Title: PT OT SLP Therapies (Done)       Topic: Physical Therapy (Done)       Point: Mobility training (Done)       Learning Progress Summary             Patient Acceptance, E,D, VU,NR by  at 9/25/2023 1849                         Point: Home exercise program (Done)       Learning Progress Summary             Patient Acceptance, E,D, VU,NR by  at 9/25/2023 1849                         Point: Body mechanics (Done)       Learning  Progress Summary             Patient Acceptance, E,D, VU,NR by  at 9/25/2023 1849                         Point: Precautions (Done)       Learning Progress Summary             Patient Acceptance, E,D, VU,NR by  at 9/25/2023 1849                                         User Key       Initials Effective Dates Name Provider Type [facility] 06/01/21 -  Fouzia Woody, KIM Physical Therapist PT                  PT Recommendation and Plan  Planned Therapy Interventions (PT): balance training, bed mobility training, gait training, home exercise program, transfer training, patient/family education, stretching, strengthening, stair training  Plan of Care Reviewed With: patient, family  Progress: no change  Outcome Evaluation: Pt performed STS and SPT to chair with RUE support and Mod A x2. Posterior lean noted with short shuffling steps. Assistance for stability required. Activity limited by balance deficits and fatigue. Decreased safety awareness noted. H/o recent falls. Recommend d/c to IRF to promote decrease in risk of falls.     Time Calculation:   PT Evaluation Complexity  History, PT Evaluation Complexity: 1-2 personal factors and/or comorbidities  Examination of Body Systems (PT Eval Complexity): total of 3 or more elements  Clinical Presentation (PT Evaluation Complexity): evolving  Clinical Decision Making (PT Evaluation Complexity): moderate complexity  Overall Complexity (PT Evaluation Complexity): moderate complexity     PT Charges       Row Name 09/25/23 1802             Time Calculation    Start Time 1802  -      PT Received On 09/25/23  -      PT Goal Re-Cert Due Date 10/05/23  -         Untimed Charges    PT Eval/Re-eval Minutes 49  -HP         Total Minutes    Untimed Charges Total Minutes 49  -HP       Total Minutes 49  -HP                User Key  (r) = Recorded By, (t) = Taken By, (c) = Cosigned By      Initials Name Provider Type     Fouzia Woody, KIM Physical Therapist                   Therapy Charges for Today       Code Description Service Date Service Provider Modifiers Qty    18375472289 HC PT EVAL MOD COMPLEXITY 4 9/25/2023 Fouzia Woody, PT GP 1            PT G-Codes  Outcome Measure Options: AM-PAC 6 Clicks Basic Mobility (PT)  AM-PAC 6 Clicks Score (PT): 11  PT Discharge Summary  Anticipated Discharge Disposition (PT): inpatient rehabilitation facility    Fouzia Woody, KIM  9/25/2023

## 2023-09-25 NOTE — THERAPY EVALUATION
Patient Name: Enrike Tomas  : 1935    MRN: [id number]                              Today's Date: 2023       Admit Date: 2023    Visit Dx: No diagnosis found.  Patient Active Problem List   Diagnosis    (HFpEF) heart failure with preserved ejection fraction    Anemia of renal disease    At high risk for falls    Benign prostatic hyperplasia    Closed fracture of left acetabulum    Compression fracture of thoracic vertebra    Deficiency of other specified B group vitamins    Depression    ESRD (end stage renal disease)    Essential hypertension    GERD (gastroesophageal reflux disease)    Hepatic cyst    Long term (current) use of anticoagulants    Major depressive disorder, single episode, moderate    Mixed hyperlipidemia    Morgagni hernia    Occlusion of right vertebral artery    NERY (obstructive sleep apnea)    Fall    Severe malnutrition    Sustained SVT    Paroxysmal atrial fibrillation    Chronic heart failure with preserved ejection fraction (HFpEF)    S/P shoulder hemiarthroplasty, left     Past Medical History:   Diagnosis Date    A-fib     Abnormal ECG     occasional extra heartbeat    Arthritis     Atrial fibrillation     BPH (benign prostatic hyperplasia)     Cervical compression fracture 2023    currently wearing a c collar    Diverticulosis     Diverticulum of esophagus     puree diet and speech therapy    Elevated cholesterol     ESRD on hemodialysis     GERD (gastroesophageal reflux disease)     Heart failure     Hemodialysis patient     M,W,F    Hyperlipidemia     Hypertension     Low back pain     NERY (obstructive sleep apnea)     NOT USING CPAP    Recurrent falls     UTI (urinary tract infection)      Past Surgical History:   Procedure Laterality Date    CATARACT EXTRACTION      COLONOSCOPY      DIALYSIS FISTULA CREATION      ENDOSCOPY      INGUINAL HERNIA REPAIR      PROSTATE BIOPSY      THROAT SURGERY      diverticulum in throat    TONSILLECTOMY AND ADENOIDECTOMY         General Information       Row Name 09/25/23 1832          OT Time and Intention    Document Type evaluation  -AR     Mode of Treatment individual therapy;occupational therapy  -AR       Row Name 09/25/23 1832          General Information    Patient Profile Reviewed yes  -AR     Prior Level of Function min assist:;all household mobility;gait;transfer;w/c or scooter;mod assist:;ADL's  uses RW at home  -AR     Existing Precautions/Restrictions fall;non-weight bearing;left;shoulder;other (see comments)  interscalene nerve catheter, Donjoy Ultra II sling with pillow  -AR     Barriers to Rehab previous functional deficit  -AR       Row Name 09/25/23 1832          Living Environment    People in Home child(vickie), adult;spouse  -AR       Row Name 09/25/23 1832          Home Main Entrance    Number of Stairs, Main Entrance other (see comments)  ramp access  -AR       Row Name 09/25/23 1832          Stairs Within Home, Primary    Stairs, Within Home, Primary has stair lift to access upstairs  -AR     Number of Stairs, Within Home, Primary other (see comments)  -AR       Row Name 09/25/23 1832          Cognition    Orientation Status (Cognition) oriented x 4  -AR       Row Name 09/25/23 1832          Safety Issues, Functional Mobility    Safety Issues Affecting Function (Mobility) awareness of need for assistance;judgment;problem-solving;safety precaution awareness;safety precautions follow-through/compliance;sequencing abilities  -AR     Impairments Affecting Function (Mobility) balance;coordination;endurance/activity tolerance;motor control;postural/trunk control;range of motion (ROM);sensation/sensory awareness;strength  -AR               User Key  (r) = Recorded By, (t) = Taken By, (c) = Cosigned By      Initials Name Provider Type    AR Debbie Espinosa, OT Occupational Therapist                     Mobility/ADL's       Row Name 09/25/23 1836          Bed Mobility    Bed Mobility scooting/bridging;supine-sit  -AR      Scooting/Bridging Desha (Bed Mobility) moderate assist (50% patient effort);verbal cues  -AR     Supine-Sit Desha (Bed Mobility) maximum assist (25% patient effort);verbal cues  -AR     Comment, (Bed Mobility) Educated pt and spouse on importance of maintaining NWB LUE AAT, reviewed safe sleeping position.  -AR       Row Name 09/25/23 1836          Transfers    Transfers sit-stand transfer;stand-sit transfer;bed-chair transfer  -AR       Row Name 09/25/23 1836          Bed-Chair Transfer    Bed-Chair Desha (Transfers) moderate assist (50% patient effort);2 person assist;verbal cues  -AR     Assistive Device (Bed-Chair Transfers) other (see comments)  CARISSA WILLIAMA  -AR     Comment, (Bed-Chair Transfer) Retrograde stance noted  -AR       Row Name 09/25/23 1836          Sit-Stand Transfer    Sit-Stand Desha (Transfers) moderate assist (50% patient effort);2 person assist;verbal cues  -AR     Assistive Device (Sit-Stand Transfers) other (see comments)  -AR       Row Name 09/25/23 1836          Stand-Sit Transfer    Stand-Sit Desha (Transfers) moderate assist (50% patient effort);2 person assist;verbal cues  -AR     Assistive Device (Stand-Sit Transfers) other (see comments)  -AR       Row Name 09/25/23 1836          Functional Mobility    Functional Mobility- Ind. Level moderate assist (50% patient effort);2 person assist required;verbal cues required  -AR     Functional Mobility- Device other (see comments)  CARISSA WEBER  -AR     Functional Mobility-Distance (Feet) 4  -AR     Functional Mobility- Safety Issues balance decreased during turns;loses balance backward  -AR     Functional Mobility- Comment Pt did not pass mobility screen, recommend PT and PT notified.  -AR     Patient was able to Ambulate yes  -AR     Reason Patient was unable to Ambulate Excessive Weakness  -AR       Row Name 09/25/23 1836          Activities of Daily Living    BADL Assessment/Intervention bathing;upper body  dressing  -AR       Row Name 09/25/23 1836          Mobility    Extremity Weight-bearing Status left upper extremity  -AR     Left Upper Extremity (Weight-bearing Status) non weight-bearing (NWB)  -AR       Row Name 09/25/23 1836          Bathing Assessment/Intervention    Comment, (Bathing) Educated pt on L shoulder precautions, axilla care to maintain and that pt may not shower until interscalene has been DC  -AR       Row Name 09/25/23 1836          Upper Body Dressing Assessment/Training    [place] Level (Upper Body Dressing) doff;don;dependent (less than 25% patient effort)  sling  -AR     Position (Upper Body Dressing) edge of bed sitting;supine  -AR     Comment, (Upper Body Dressing) Educated pt and spouse on L shoulder precautions, ADL retraining to maintain, sling management and care of interscalene nerve catheter during ADLs to avoid dislodgement. Spouse needed mod-max assist to unsecure sling straps.  -AR               User Key  (r) = Recorded By, (t) = Taken By, (c) = Cosigned By      Initials Name Provider Type    AR Debbie Espinosa, OT Occupational Therapist                   Obj/Interventions       Row Name 09/25/23 1846 09/25/23 1842       Sensory Assessment (Somatosensory)    Sensory Assessment (Somatosensory) left UE  -AR --    Sensory Subjective Reports numbness  -AR --  -AR      Row Name 09/25/23 1846 09/25/23 1842       Vision Assessment/Intervention    Visual Impairment/Limitations corrective lenses full-time  -AR --  -AR      Row Name 09/25/23 1846 09/25/23 1842       Range of Motion Comprehensive    Comment, General Range of Motion RUE WFL, L elbow/wrist/hand WFL  -AR RUE WFL, L elbow/wrist/hand WFL  -AR      Row Name 09/25/23 1846          Strength Comprehensive (MMT)    Comment, General Manual Muscle Testing (MMT) Assessment RUE 3+/5, LUE deferred  -AR       Row Name 09/25/23 1846 09/25/23 1842       Shoulder (Therapeutic Exercise)    Shoulder (Therapeutic Exercise) -- --  -AR     Shoulder AROM (Therapeutic Exercise) bilateral;scapular retraction;10 repetitions;supine  -AR --  -AR    Shoulder PROM (Therapeutic Exercise) left;flexion;extension;external rotation;internal rotation;supine;10 repetitions  -AR --  -AR      Row Name 09/25/23 1846 09/25/23 1842       Elbow/Forearm (Therapeutic Exercise)    Elbow/Forearm (Therapeutic Exercise) AAROM (active assistive range of motion)  -AR --  -AR    Elbow/Forearm AAROM (Therapeutic Exercise) left;flexion;extension;supination;pronation;supine;10 repetitions  -AR --  -AR      Row Name 09/25/23 1846 09/25/23 1842       Wrist (Therapeutic Exercise)    Wrist (Therapeutic Exercise) -- --  -AR    Wrist AROM (Therapeutic Exercise) left;flexion;extension;10 repetitions  -AR --  -AR      Row Name 09/25/23 1846 09/25/23 1842       Hand (Therapeutic Exercise)    Hand (Therapeutic Exercise) AROM (active range of motion)  -AR --  -AR    Hand AROM/AAROM (Therapeutic Exercise) left;AROM (active range of motion);finger flexion;finger extension;10 repetitions  -AR --  -AR      Row Name 09/25/23 1846 09/25/23 1842       Motor Skills    Therapeutic Exercise shoulder;elbow/forearm;wrist;hand  Issued and reviewed written LUE HEP  -AR --  -AR      Row Name 09/25/23 1846          Balance    Balance Assessment sitting static balance;sitting dynamic balance;standing static balance;standing dynamic balance  -AR     Static Sitting Balance supervision  -AR     Dynamic Sitting Balance contact guard  -AR     Position, Sitting Balance unsupported;sitting edge of bed  -AR     Static Standing Balance moderate assist;2-person assist  -AR     Dynamic Standing Balance moderate assist;2-person assist  -AR     Position/Device Used, Standing Balance supported  -AR               User Key  (r) = Recorded By, (t) = Taken By, (c) = Cosigned By      Initials Name Provider Type    Debbie Bledsoe OT Occupational Therapist                   Goals/Plan       Row Name 09/25/23 4301           Transfer Goal 1 (OT)    Activity/Assistive Device (Transfer Goal 1, OT) sit-to-stand/stand-to-sit;commode, bedside without drop arms  -AR     [place] Level/Cues Needed (Transfer Goal 1, OT) minimum assist (75% or more patient effort);verbal cues required  -AR     Time Frame (Transfer Goal 1, OT) long term goal (LTG);by discharge  -AR     Progress/Outcome (Transfer Goal 1, OT) goal ongoing  -AR       Row Name 09/25/23 1854          Dressing Goal 1 (OT)    Activity/Device (Dressing Goal 1, OT) upper body dressing  -AR     [place]/Cues Needed (Dressing Goal 1, OT) moderate assist (50-74% patient effort);verbal cues required  -AR     Time Frame (Dressing Goal 1, OT) long term goal (LTG);by discharge  -AR     Progress/Outcome (Dressing Goal 1, OT) goal ongoing  -AR       Row Name 09/25/23 1854          ROM Goal 1 (OT)    ROM Goal 1 (OT) Pt and spouse will complete LUE HEP within physician parameters with min assist  -AR     Time Frame (ROM Goal 1, OT) long term goal (LTG);by discharge  -AR     Progress/Outcome (ROM Goal 1, OT) goal ongoing  -AR       Row Name 09/25/23 1854          Therapy Assessment/Plan (OT)    Planned Therapy Interventions (OT) activity tolerance training;BADL retraining;edema control/reduction;functional balance retraining;IADL retraining;neuromuscular control/coordination retraining;occupation/activity based interventions;orthotic fabrication/fitting/training;passive ROM/stretching;patient/caregiver education/training;ROM/therapeutic exercise;transfer/mobility retraining  -AR               User Key  (r) = Recorded By, (t) = Taken By, (c) = Cosigned By      Initials Name Provider Type    Debbie Bledsoe, OT Occupational Therapist                   Clinical Impression       Row Name 09/25/23 1848          Pain Assessment    Pretreatment Pain Rating 0/10 - no pain  -AR     Posttreatment Pain Rating 0/10 - no pain  -AR       Row Name 09/25/23 1848          Plan of Care Review    Plan of  Care Reviewed With patient;spouse  -AR     Outcome Evaluation OT educated pt and spouse on L shoulder precautions, ADL retraining to maintain, sling management and HEP. He tolerated L shoulder PROM , IR chest/ER 30 with good teachback from spouse. Spouse required mod-max assist secure sling straps. He ambulated 4 feet with mod assist demo severe retrograde posture and scored 11 on mobility screen, uses RW at baseline and reports poor balance- recommend PT. Recommend IPR based on present function.  -AR       Row Name 09/25/23 1848          Therapy Assessment/Plan (OT)    Rehab Potential (OT) good, to achieve stated therapy goals  -AR     Criteria for Skilled Therapeutic Interventions Met (OT) yes  -AR     Therapy Frequency (OT) daily  -AR       Row Name 09/25/23 1848          Therapy Plan Review/Discharge Plan (OT)    Anticipated Discharge Disposition (OT) inpatient rehabilitation facility  -AR       Row Name 09/25/23 1848          Vital Signs    Pre SpO2 (%) 93  -AR     O2 Delivery Pre Treatment room air  -AR     Intra SpO2 (%) 94  -AR     O2 Delivery Intra Treatment room air  -AR     Post SpO2 (%) 94  -AR     O2 Delivery Post Treatment room air  -AR     Pre Patient Position Supine  -AR     Intra Patient Position Standing  -AR     Post Patient Position Sitting  -AR       Row Name 09/25/23 1848          Positioning and Restraints    Pre-Treatment Position in bed  -AR     Post Treatment Position chair  -AR     In Chair reclined;call light within reach;encouraged to call for assist;exit alarm on;with family/caregiver;with nsg;waffle cushion;on mechanical lift sling;with brace;legs elevated  -AR               User Key  (r) = Recorded By, (t) = Taken By, (c) = Cosigned By      Initials Name Provider Type    Debbie Bledsoe, OT Occupational Therapist                   Outcome Measures       Row Name 09/25/23 7750          How much help from another is currently needed...    Putting on and taking off regular  lower body clothing? 1  -AR     Bathing (including washing, rinsing, and drying) 2  -AR     Toileting (which includes using toilet bed pan or urinal) 1  -AR     Putting on and taking off regular upper body clothing 1  -AR     Taking care of personal grooming (such as brushing teeth) 3  -AR     Eating meals 3  -AR     AM-PAC 6 Clicks Score (OT) 11  -AR       Row Name 09/25/23 1849          How much help from another person do you currently need...    Turning from your back to your side while in flat bed without using bedrails? 2  -HP     Moving from lying on back to sitting on the side of a flat bed without bedrails? 2  -HP     Moving to and from a bed to a chair (including a wheelchair)? 2  -HP     Standing up from a chair using your arms (e.g., wheelchair, bedside chair)? 2  -HP     Climbing 3-5 steps with a railing? 1  -HP     To walk in hospital room? 2  -HP     AM-PAC 6 Clicks Score (PT) 11  -HP     Highest level of mobility 4 --> Transferred to chair/commode  -HP       Row Name 09/25/23 1855 09/25/23 1849       Functional Assessment    Outcome Measure Options AM-PAC 6 Clicks Daily Activity (OT)  -AR AM-PAC 6 Clicks Basic Mobility (PT)  -HP              User Key  (r) = Recorded By, (t) = Taken By, (c) = Cosigned By      Initials Name Provider Type    Dbebie Bledsoe OT Occupational Therapist    HP Fouzia Woody, PT Physical Therapist                    Occupational Therapy Education       Title: PT OT SLP Therapies (In Progress)       Topic: Occupational Therapy (In Progress)       Point: ADL training (In Progress)       Description:   Instruct learner(s) on proper safety adaptation and remediation techniques during self care or transfers.   Instruct in proper use of assistive devices.                  Learning Progress Summary             Patient CHRISSY Martinez,CRISTHIAN,D,H, NR by AR at 9/25/2023 1856   Family CHRISSY Martinez TB,D,H, NR by AR at 9/25/2023 1856                         Point: Home exercise program (In  Progress)       Description:   Instruct learner(s) on appropriate technique for monitoring, assisting and/or progressing therapeutic exercises/activities.                  Learning Progress Summary             Patient Eager, E,TB,D,H, NR by AR at 9/25/2023 1856   Family Eager, E,TB,D,H, NR by AR at 9/25/2023 1856                         Point: Precautions (In Progress)       Description:   Instruct learner(s) on prescribed precautions during self-care and functional transfers.                  Learning Progress Summary             Patient Eager, E,TB,D,H, NR by AR at 9/25/2023 1856   Family Eager, E,TB,D,H, NR by AR at 9/25/2023 1856                         Point: Body mechanics (In Progress)       Description:   Instruct learner(s) on proper positioning and spine alignment during self-care, functional mobility activities and/or exercises.                  Learning Progress Summary             Patient Eager, E,TB,D,H, NR by AR at 9/25/2023 1856   Family Eager, E,TB,D,H, NR by AR at 9/25/2023 1856                                         User Key       Initials Effective Dates Name Provider Type Discipline    AR 07/11/23 -  Debbie Espinosa OT Occupational Therapist OT                  OT Recommendation and Plan  Planned Therapy Interventions (OT): activity tolerance training, BADL retraining, edema control/reduction, functional balance retraining, IADL retraining, neuromuscular control/coordination retraining, occupation/activity based interventions, orthotic fabrication/fitting/training, passive ROM/stretching, patient/caregiver education/training, ROM/therapeutic exercise, transfer/mobility retraining  Therapy Frequency (OT): daily  Plan of Care Review  Plan of Care Reviewed With: patient, spouse  Outcome Evaluation: OT educated pt and spouse on L shoulder precautions, ADL retraining to maintain, sling management and HEP. He tolerated L shoulder PROM , IR chest/ER 30 with good teachback from spouse.  Spouse required mod-max assist secure sling straps. He ambulated 4 feet with mod assist demo severe retrograde posture and scored 11 on mobility screen, uses RW at baseline and reports poor balance- recommend PT. Recommend IPR based on present function.     Time Calculation:   Evaluation Complexity (OT)  Review Occupational Profile/Medical/Therapy History Complexity: brief/low complexity  Assessment, Occupational Performance/Identification of Deficit Complexity: 1-3 performance deficits  Clinical Decision Making Complexity (OT): problem focused assessment/low complexity  Overall Complexity of Evaluation (OT): low complexity     Time Calculation- OT       Row Name 09/25/23 1857             Time Calculation- OT    OT Start Time 1720  -AR      OT Received On 09/25/23  -AR      OT Goal Re-Cert Due Date 10/05/23  -AR         Timed Charges    23026 - OT Therapeutic Exercise Minutes 20  -AR      03224 - OT Therapeutic Activity Minutes 8  -AR      48385 - OT Self Care/Mgmt Minutes 20  -AR         Untimed Charges    OT Eval/Re-eval Minutes 61  -AR         Total Minutes    Timed Charges Total Minutes 48  -AR      Untimed Charges Total Minutes 61  -AR       Total Minutes 109  -AR                User Key  (r) = Recorded By, (t) = Taken By, (c) = Cosigned By      Initials Name Provider Type    AR Debbie Espinosa OT Occupational Therapist                  Therapy Charges for Today       Code Description Service Date Service Provider Modifiers Qty    93507179363 HC OT THER PROC EA 15 MIN 9/25/2023 Debbie Espinosa OT GO 1    07851358856 HC OT THERAPEUTIC ACT EA 15 MIN 9/25/2023 Debbie Espinosa OT GO 1    57191585870 HC OT SELF CARE/MGMT/TRAIN EA 15 MIN 9/25/2023 Debbie Espinosa OT GO 1    81552410239 HC OT EVAL LOW COMPLEXITY 4 9/25/2023 Debbie Espinosa OT GO 1                 Debbie Espinosa OT  9/25/2023

## 2023-09-25 NOTE — H&P
Pre-Op H&P  Enrike Tomas  [id number]  1935      Chief complaint: Left shoulder pain      Subjective:  Patient is a 88 y.o.male presents for scheduled surgery by Dr. Tan. He anticipates a REVERSE TOTAL SHOULDER ARTHROPLASTY, POSSIBLE HEMIARTHROPLASTY, BICEPS TENODESIS - LEFT  today. He has a long history of shoulder pain and limited ROM. Conservative treatments failed.   Of note, he had hemodialysis this morning through RUE AV fistula. 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       Review of Systems:  Constitutional-- No fever, chills or sweats. No fatigue.  CV-- No chest pain, palpitation or syncope. +HTN, HLD, afib, CHF  +cardiac clearance   Resp-- No SOB, cough, hemoptysis  Skin--No rashes or lesions      Allergies: No Known Allergies      Home Meds:  Medications Prior to Admission   Medication Sig Dispense Refill Last Dose    acetaminophen (TYLENOL) 500 MG tablet Take 1 tablet by mouth Every 6 (Six) Hours As Needed for Mild Pain or Headache.       amiodarone (PACERONE) 200 MG tablet Take 1 tablet by mouth Daily.       apixaban (ELIQUIS) 2.5 MG tablet tablet Take 1 tablet by mouth 2 (Two) Times a Day. PT TO HOLD 48 HRS PRIOR TO PROCEDURE PER MERCEDEZ VARGHESE.       atorvastatin (LIPITOR) 20 MG tablet Take 1 tablet by mouth Every Night.       finasteride (PROSCAR) 5 MG tablet Take 1 tablet by mouth Daily.       fluticasone (FLONASE) 50 MCG/ACT nasal spray 2 sprays into the nostril(s) as directed by provider Daily.       folic acid (FOLVITE) 1 MG tablet Take 1 tablet by mouth Daily.       HYDROcodone-acetaminophen (NORCO) 5-325 MG per tablet Take 1 tablet by mouth Every 4 (Four) Hours As Needed for Moderate Pain. (Patient taking differently: Take 1 tablet by mouth 2 (Two) Times a Day.) 18 tablet 0     HYDROcodone-acetaminophen (NORCO) 7.5-325 MG per tablet Take 1 tablet by mouth Every Night.       melatonin 5 MG tablet tablet Take 1 tablet by mouth Every Night.       midodrine (PROAMATINE) 5 MG  tablet Take 1 tablet by mouth 3 (Three) Times a Day Before Meals.       mirtazapine (REMERON) 7.5 MG tablet Take 1 tablet by mouth Every Night.       naloxone (NARCAN) 4 MG/0.1ML nasal spray 1 spray into the nostril(s) as directed by provider As Needed (sedation).       pantoprazole (PROTONIX) 40 MG EC tablet Take 1 tablet by mouth Daily.       sennosides-docusate (PERICOLACE) 8.6-50 MG per tablet Take 2 tablets by mouth 2 (Two) Times a Day.       sertraline (ZOLOFT) 50 MG tablet Take 1 tablet by mouth Daily.       Teriparatide, Recombinant, (FORTEO) 620 MCG/2.48ML injection Inject 20 mcg under the skin into the appropriate area as directed Daily.            PMH:   Past Medical History:   Diagnosis Date    A-fib     Abnormal ECG     occasional extra heartbeat    Arthritis     Atrial fibrillation     BPH (benign prostatic hyperplasia)     Cervical compression fracture 06/05/2023    currently wearing a c collar    Diverticulosis     Diverticulum of esophagus     puree diet and speech therapy    Elevated cholesterol     ESRD on hemodialysis     GERD (gastroesophageal reflux disease)     Heart failure     Hemodialysis patient     M,W,F    Hyperlipidemia     Hypertension     Low back pain 2022    NERY (obstructive sleep apnea)     NOT USING CPAP    Recurrent falls     UTI (urinary tract infection)      PSH:    Past Surgical History:   Procedure Laterality Date    CATARACT EXTRACTION      COLONOSCOPY      DIALYSIS FISTULA CREATION      ENDOSCOPY      INGUINAL HERNIA REPAIR      PROSTATE BIOPSY      THROAT SURGERY      diverticulum in throat    TONSILLECTOMY AND ADENOIDECTOMY         Immunization History:  Influenza: No  Pneumococcal: UTD  Tetanus: UTD  Covid : x3    Social History:   Tobacco:   Social History     Tobacco Use   Smoking Status Never   Smokeless Tobacco Never      Alcohol:     Social History     Substance and Sexual Activity   Alcohol Use Not Currently    Alcohol/week: 1.0 standard drink    Types: 1 Glasses  of wine per week    Comment: once monthly typically         Physical Exam: VS: /65  HR 75  RR 16  T 97.7  Sat 97%RA      General Appearance:    Alert, cooperative, no distress, appears stated age   Head:    Normocephalic, without obvious abnormality, atraumatic   Lungs:     Clear to auscultation bilaterally, respirations unlabored    Heart:   Regular rate and rhythm, S1 and S2 normal    Abdomen:    Soft without tenderness   Extremities:   Extremities normal, atraumatic, no cyanosis or edema   Skin:   Skin color, texture, turgor normal, no rashes or lesions   Neurologic:   Grossly intact     Results Review:     LABS:  Lab Results   Component Value Date    WBC 7.63 09/12/2023    HGB 11.7 (L) 09/12/2023    HCT 35.8 (L) 09/12/2023    MCV 99.4 (H) 09/12/2023     09/12/2023    NEUTROABS 5.36 06/16/2023    GLUCOSE 116 (H) 09/12/2023    BUN 47 (H) 09/12/2023    CREATININE 3.80 (H) 09/12/2023     09/12/2023    K 5.0 09/12/2023    CL 91 (L) 09/12/2023    CO2 30.0 (H) 09/12/2023    MG 1.6 06/17/2023    CALCIUM 10.1 09/12/2023    ALBUMIN 4.1 06/16/2023    AST 20 06/16/2023    ALT 15 06/16/2023    BILITOT 0.5 06/16/2023       RADIOLOGY:  Imaging Results (Last 72 Hours)       ** No results found for the last 72 hours. **            I reviewed the patient's new clinical results.    Cancer Staging (if applicable)  Cancer Patient: __ yes __no __unknown; If yes, clinical stage T:__ N:__M:__, stage group or __N/A      Impression: Left shoulder pain      Plan: REVERSE TOTAL SHOULDER ARTHROPLASTY, POSSIBLE HEMIARTHROPLASTY, BICEPS TENODESIS - LEFT       Debbie Mal, MERCEDEZ   9/25/2023   10:12 EDT

## 2023-09-25 NOTE — H&P
Patient Name: Enrike Tomas  MRN: [id number]  : 1935  DOS: 2023    Attending: Matteo Tan MD    Primary Care Provider: Blair Dhaliwal MD      Chief complaint: Left shoulder pain    Subjective   Patient is a pleasant 88 y.o. male presented for scheduled surgery by .    He has a long history of shoulder pain and limited ROM. Conservative treatments failed.   Of note, he had hemodialysis this morning through RUE AV fistula. 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     He underwent left shoulder hemiarthroplasty and left biceps tenodesis,  under GA and a block, tolerated surgery well, was admitted for further management.    Seen in PACU postoperatively, doing fairly well, good pain control, no complains of nausea, vomiting, or shortness of breath.    Reviewed patient's past medical history including history of atrial fibrillation, BPH, end-stage renal disease.  He is on chronic anticoagulation which is on hold for surgery.      Allergies:  No Known Allergies    Meds:  Medications Prior to Admission   Medication Sig Dispense Refill Last Dose    amiodarone (PACERONE) 200 MG tablet Take 1 tablet by mouth Daily.   2023 at 0830    atorvastatin (LIPITOR) 20 MG tablet Take 1 tablet by mouth Every Night.   2023 at 2030    finasteride (PROSCAR) 5 MG tablet Take 1 tablet by mouth Daily.   2023 at 0830    fluticasone (FLONASE) 50 MCG/ACT nasal spray 2 sprays into the nostril(s) as directed by provider Daily.   Past Week    HYDROcodone-acetaminophen (NORCO) 5-325 MG per tablet Take 1 tablet by mouth Every 4 (Four) Hours As Needed for Moderate Pain. (Patient taking differently: Take 1 tablet by mouth 2 (Two) Times a Day.) 18 tablet 0 2023 at 2130    melatonin 5 MG tablet tablet Take 1 tablet by mouth Every Night.   2023 at 2030    midodrine (PROAMATINE) 5 MG tablet Take 1 tablet by mouth 3 (Three) Times a Day Before Meals.   2023 at 1900    mirtazapine  (REMERON) 7.5 MG tablet Take 1 tablet by mouth Every Night.   Past Month    pantoprazole (PROTONIX) 40 MG EC tablet Take 1 tablet by mouth Daily.   9/24/2023 at 0830    sennosides-docusate (PERICOLACE) 8.6-50 MG per tablet Take 2 tablets by mouth 2 (Two) Times a Day.   9/24/2023 at 1900    sertraline (ZOLOFT) 50 MG tablet Take 1 tablet by mouth Daily.   9/24/2023    Teriparatide, Recombinant, (FORTEO) 620 MCG/2.48ML injection Inject 20 mcg under the skin into the appropriate area as directed Daily.   9/24/2023    acetaminophen (TYLENOL) 500 MG tablet Take 1 tablet by mouth Every 6 (Six) Hours As Needed for Mild Pain or Headache.   More than a month    apixaban (ELIQUIS) 2.5 MG tablet tablet Take 1 tablet by mouth 2 (Two) Times a Day. PT TO HOLD 48 HRS PRIOR TO PROCEDURE PER MERCEDEZ VARGHESE.   9/22/2023    folic acid (FOLVITE) 1 MG tablet Take 1 tablet by mouth Daily.       HYDROcodone-acetaminophen (NORCO) 7.5-325 MG per tablet Take 1 tablet by mouth Every Night.       naloxone (NARCAN) 4 MG/0.1ML nasal spray 1 spray into the nostril(s) as directed by provider As Needed (sedation).            Past Medical History:   Diagnosis Date    A-fib     Abnormal ECG     occasional extra heartbeat    Arthritis     Atrial fibrillation     BPH (benign prostatic hyperplasia)     Cervical compression fracture 06/05/2023    currently wearing a c collar    Diverticulosis     Diverticulum of esophagus     puree diet and speech therapy    Elevated cholesterol     ESRD on hemodialysis     GERD (gastroesophageal reflux disease)     Heart failure     Hemodialysis patient     M,W,F    Hyperlipidemia     Hypertension     Low back pain 2022    NERY (obstructive sleep apnea)     NOT USING CPAP    Recurrent falls     UTI (urinary tract infection)      Past Surgical History:   Procedure Laterality Date    CATARACT EXTRACTION      COLONOSCOPY      DIALYSIS FISTULA CREATION      ENDOSCOPY      INGUINAL HERNIA REPAIR      PROSTATE BIOPSY       THROAT SURGERY      diverticulum in throat    TONSILLECTOMY AND ADENOIDECTOMY       Family History   Problem Relation Age of Onset    Stomach cancer Mother     Heart disease Mother     Arthritis Mother     Heart disease Father     Stroke Father     Kidney failure Father     Cancer Father     Kidney disease Father      Social History     Tobacco Use    Smoking status: Never    Smokeless tobacco: Never   Vaping Use    Vaping Use: Never used   Substance Use Topics    Alcohol use: Not Currently     Alcohol/week: 1.0 standard drink     Types: 1 Glasses of wine per week     Comment: once monthly typically    Drug use: Never       Review of Systems  Pertinent items are noted in HPI    Vital Signs  /63   Pulse 66   Temp 98.1 °F (36.7 °C) (Temporal)   Resp 16   SpO2 92%     Physical Exam:    General Appearance:    Alert, cooperative, in no acute distress   Head:    Normocephalic, without obvious abnormality, atraumatic   Eyes:            Lids and lashes normal, conjunctivae and sclerae normal, no   icterus, no pallor, corneas clear,    Ears:    Ears appear intact with no abnormalities noted   Throat:   No oral lesions, no thrush, oral mucosa moist   Neck:   No adenopathy, supple, trachea midline, no thyromegaly         Lungs:     Clear to auscultation,respirations regular, even and                   unlabored    Heart:    Regular rhythm and normal rate, normal S1 and S2, no      murmur    Abdomen:     Normal bowel sounds, no masses, no organomegaly, soft        non-tender, non-distended, no guarding, no rebound                 tenderness   Genitalia:    Deferred   Extremities: Left UE in a sling, CDI Aquacel dressing over left shoulder. Interscalene nerve block cath present.  Distal pulses, cap refill, intact.  Able to move his left hand and fingers.     Pulses:   Pulses palpable and equal bilaterally   Skin:   No bleeding, bruising or rash   Neurologic:   Cranial nerves 2 - 12 grossly intact      I reviewed  the patient's new clinical results.             Invalid input(s): NEUTOPHILPCT,  EOSPCT  Results from last 7 days   Lab Units 09/25/23  1111   POTASSIUM mmol/L 3.4*     Lab Results   Component Value Date    HGBA1C 4.60 (L) 09/12/2023      Latest Reference Range & Units 09/12/23 09:50   Sodium 136 - 145 mmol/L 136   Potassium 3.5 - 5.2 mmol/L 5.0   Chloride 98 - 107 mmol/L 91 (L)   CO2 22.0 - 29.0 mmol/L 30.0 (H)   Anion Gap 5.0 - 15.0 mmol/L 15.0   BUN 8 - 23 mg/dL 47 (H)   Creatinine 0.76 - 1.27 mg/dL 3.80 (H)   BUN/Creatinine Ratio 7.0 - 25.0  12.4   eGFR >60.0 mL/min/1.73 14.6 (L)   Glucose 65 - 99 mg/dL 116 (H)   Calcium 8.6 - 10.5 mg/dL 10.1   Hemoglobin A1C 4.80 - 5.60 % 4.60 (L)   Protime 12.2 - 14.5 Seconds 19.3 (H)   INR 0.89 - 1.12  1.61 (H)   WBC 3.40 - 10.80 10*3/mm3 7.63   RBC 4.14 - 5.80 10*6/mm3 3.60 (L)   Hemoglobin 13.0 - 17.7 g/dL 11.7 (L)   Hematocrit 37.5 - 51.0 % 35.8 (L)   Platelets 140 - 450 10*3/mm3 223   RDW 12.3 - 15.4 % 15.5 (H)   MCV 79.0 - 97.0 fL 99.4 (H)   MCH 26.6 - 33.0 pg 32.5   MCHC 31.5 - 35.7 g/dL 32.7   MPV 6.0 - 12.0 fL 8.6   RDW-SD 37.0 - 54.0 fl 57.5 (H)   (L): Data is abnormally low  (H): Data is abnormally high      Assessment and Plan:       S/P shoulder hemiarthroplasty, left    (HFpEF) heart failure with preserved ejection fraction    Benign prostatic hyperplasia    Depression    ESRD (end stage renal disease)    Essential hypertension    GERD (gastroesophageal reflux disease)    Mixed hyperlipidemia    NERY (obstructive sleep apnea)    Paroxysmal atrial fibrillation      Plan    1. PT/OT. NWB, left UE, ROM hand, wrist, elbow.  2. Pain control-prns, interscalene nerve block cath with ropivacaine infusion.   3. IS-encourage  4. DVT proph- Mech/ mobilize.  5. Bowel regimen  6. Resume home medications as appropriate  7. Monitor post-op labs  8. DC planning      -GERD:  Resume PPI.  Formulary substitution when indicated.    -Dyslipidemia:  Resume home regimen Statin (  formulary substitution when appropriate).    -PAF: resume amiodarone, BB, and will resume DOAC tomorrow if ok with surgery.    -BPH: maintain on home regimen.  Monitor for adequate spontaneous voiding.    -End-stage renal disease: If unable to discharge home tomorrow, will consult with nephrology for hemodialysis Wednesday    Dragon disclaimer:  Part of this encounter note is an electronic transcription/translation of spoken language to printed text. The electronic translation of spoken language may permit erroneous, or at times, nonsensical words or phrases to be inadvertently transcribed; Although I have reviewed the note for such errors, some may still exist.    Gian Vazquez MD  09/25/23  15:43 EDT

## 2023-09-25 NOTE — LETTER
EMS Transport Request  For use at [facility], [place], [place], and [place] only   Patient Name: Enrike Tomas : 1935   Weight:54.2 kg (119 lb 7.8 oz) Pick-up Location: [facility] BLS/ALS: BLS/ALS: BLS   Insurance: [facility] MEDICARE REPLACEMENT Auth End Date:   Pre-Cert #: D/C Summary complete:    Destination: Other   [facility] in [facility] [place]    Contact Precautions: None   Equipment (O2, Fluids, etc.): O2, settings @2L or room air    Arrive By Date/Time: Thursday 10- after lunch, need insurance approval  Stretcher/WC: Stretcher   CM Requesting: Nani Juarez RN Ext: [phone]   Notes/Medical Necessity: confusion, s/p hip surgery 3-23, s/p shoulder surgery on left, weakness, debility     ______________________________________________________________________    *Only 2 patient bags OR 1 carry-on size bag are permitted.  Wheelchairs and walkers CANNOT transported with the patient. Acknowledge: Yes

## 2023-09-25 NOTE — ANESTHESIA PROCEDURE NOTES
"Pecs 1 and 2      Patient reassessed immediately prior to procedure    Patient location during procedure: OR  Reason for block: at surgeon's request and post-op pain management  Performed by  CRNA/CAA: Rio Blevins CRNA  Preanesthetic Checklist  Completed: patient identified, IV checked, site marked, risks and benefits discussed, surgical consent, monitors and equipment checked, pre-op evaluation and timeout performed  Prep:  Pt Position: supine  Sterile barriers:cap, gloves, mask and washed/disinfected hands  Prep: ChloraPrep  Patient monitoring: blood pressure monitoring, continuous pulse oximetry and EKG  Procedure  Performed under: general  Guidance:ultrasound guided and landmark technique  Images:still images obtained, printed/placed on chart    Laterality:left  Block Type:PECS I and PECS II  Injection Technique:single-shot  Needle Type:short-bevel  Needle Gauge:20 G  Resistance on Injection: none          Medications  Preservative Free Saline:10ml  Comment:        Post Assessment  Injection Assessment: negative aspiration for heme, incremental injection and no paresthesia on injection  Patient Tolerance:comfortable throughout block  Complications:no  Additional Notes  Interpectoral-Pectoserratus Plane   A high-frequency linear transducer, with sterile cover, was placed medial to the coracoid process in the paramedian sagittal plane. The transducer was moved caudally to the 4th rib and rotated slightly to allow an in-plane needle trajectory from medial to lateral. Pectoralis Major Muscle (PMM), Pectoralis Minor Muscle (PmM), Thoracoacromial Artery, Ribs, and Pleura were identified under ultrasound. The insertion site was prepped in sterile fashion and then localized with 2-5 ml of 1% Lidocaine. Using ultrasound-guidance, a 20-gauge B-Brennan 4\" Ultraplex 360 non-stimulating echogenic needle was advanced in plane until the tip of the needle was in the fascial plane between the PMM and PmM, lateral to the " Thoracoacromial Artery. 1-3ml of preservative free normal saline was used to hydro-dissect the fascial planes. After the fascial plane was verified, 10ml local anesthetic (LA) was injected for Interpectoral fascial plane block. The needle was continued along the same path to the level of the 4th rib below PmM.  Initially preservative free normal saline was used to confirm needle position and then 20 ml of LA was injected for Pectoserratus fascial plane block. Aspiration every 5 ml to prevent intravascular injection. Injection was completed with negative aspiration of blood and negative intravascular injection. Injection pressures were normal with minimal resistance.

## 2023-09-25 NOTE — LETTER
EMS Transport Request  For use at [facility], [place], [place], and [place] only   Patient Name: Enrike Tomas : 1935   Weight:  Pick-up Location: [facility] BLS/ALS: bls   Insurance: [facility] MEDICARE REPLACEMENT Auth End Date:    Pre-Cert #: D/C Summary complete:    Destination: [facility] in [place]   Contact Precautions: none   Equipment (O2, Fluids, etc.): O2 @ 2lpm   Arrive By Date/Time: Friday,  afternoon - due to am dialysis here Stretcher/WC: stretcher   CM Requesting: Erin Espana RN Ext: [phone]   Notes/Medical Necessity:max assist x 2     ______________________________________________________________________    *Only 2 patient bags OR 1 carry-on size bag are permitted.  Wheelchairs and walkers CANNOT transported with the patient. Acknowledge: yes

## 2023-09-25 NOTE — ANESTHESIA POSTPROCEDURE EVALUATION
Patient: Enrike Tomas    Procedure Summary       Date: 09/25/23 Room / Location:  [facility] OR 14 /  [facility]    Anesthesia Start: 1205 Anesthesia Stop: 1331    Procedure: HEMIARTHROPLASTY, BICEPS TENODESIS - LEFT (Left: Shoulder) Diagnosis:       Primary osteoarthritis, left shoulder      Left bicipital tenosynovitis      (Primary osteoarthritis, left shoulder [M19.012])    Surgeons: Matteo Tan MD Provider: Charles Steven Jr., MD    Anesthesia Type: general with block ASA Status: 4            Anesthesia Type: general with block    Vitals  No vitals data found for the desired time range.          Post Anesthesia Care and Evaluation    Patient location during evaluation: PACU  Patient participation: complete - patient participated  Level of consciousness: awake and alert  Pain management: adequate    Airway patency: patent  Anesthetic complications: No anesthetic complications  PONV Status: none  Cardiovascular status: hemodynamically stable and acceptable  Respiratory status: nonlabored ventilation, acceptable and nasal cannula  Hydration status: acceptable

## 2023-09-25 NOTE — ANESTHESIA PROCEDURE NOTES
Airway  Urgency: elective    Date/Time: 9/25/2023 12:13 PM  Airway not difficult    General Information and Staff    Patient location during procedure: OR  CRNA/CAA: Issa Gandhi CRNA    Indications and Patient Condition  Indications for airway management: airway protection    Preoxygenated: yes  MILS not maintained throughout  Mask difficulty assessment: 1 - vent by mask    Final Airway Details  Final airway type: endotracheal airway      Successful airway: ETT  Cuffed: yes   Successful intubation technique: video laryngoscopy  Facilitating devices/methods: intubating stylet  Endotracheal tube insertion site: oral  Blade: Cast  Blade size: 3  ETT size (mm): 6.5  Cormack-Lehane Classification: grade I - full view of glottis  Placement verified by: chest auscultation and capnometry   Cuff volume (mL): 5  Measured from: lips  ETT/EBT  to lips (cm): 20  Number of attempts at approach: 1  Assessment: lips, teeth, and gum same as pre-op and atraumatic intubation    Additional Comments  Negative epigastric sounds, Breath sound equal bilaterally with symmetric chest rise and fall. VL utilized due to prior neck trauma. In line neck stabilization maintained throughout induction and intubation. Head and neck maintained neutral.

## 2023-09-25 NOTE — PLAN OF CARE
Goal Outcome Evaluation:  Plan of Care Reviewed With: patient, family        Progress: no change  Outcome Evaluation: Pt performed STS and SPT to chair with RUE support and Mod A x2. Posterior lean noted with short shuffling steps. Assistance for stability required. Activity limited by balance deficits and fatigue. Decreased safety awareness noted. H/o recent falls. Recommend d/c to IRF to promote decrease in risk of falls.      Anticipated Discharge Disposition (PT): inpatient rehabilitation facility

## 2023-09-25 NOTE — OP NOTE
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    POSTOPERATIVE DIAGNOSES:  1. 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    2. Biceps tenosynovitis.    PROCEDURES PERFORMED:  1. Left shoulder hemiarthroplasty.    2. Left biceps tenodesis.    SURGEON: Matteo Tan MD  ASSISTANTS:  1. Rosanne Bryant DO, PGY-6 Sports Fellow  2. Jl Rushing MD, PGY-5  ANESTHESIA: General plus block.    SURGICAL APPROACH: Deltopectoral      SURGICAL TECHNIQUE: Tenotomy       ESTIMATED BLOOD LOSS:125mL.    COMPLICATIONS: None.    DISPOSITION: Recovery room in stable condition.    IMPLANTS: Ex[employer]ech Equinoxe total shoulder system, 9 mm preserve stem press-fit, 4.5 replicator plate, 53 expanded humeral head.    INDICATIONS: This is a 88-year-old male with left shoulder pain and limited function and motion secondary to DJD. They have failed conservative treatment and after a discussion of risks, benefits, and alternatives, wished to proceed with shoulder arthroplasty.  By CT scan he had severe glenoid erosion which according to preoperative planning was clearly insufficient to support a glenoid prosthesis.  Hemiarthroplasty was therefore planned.  DESCRIPTION OF PROCEDURE: On the day of surgery, he identified the left shoulder as the correct operative extremity. This was initialed by the surgeon with the patients's acknowledgment. The patient underwent placement of an interscalene block and was taken to the operating room and placed in the supine position. Upon induction of adequate anesthesia, the patient was brought up to the beach chair position and the shoulder and upper extremity were prepped and draped in the usual sterile fashion. Timeout confirmed the correct patient and operative extremity as well as that antibiotics were on board. A standard deltopectoral approach to the shoulder was  carried out. It was carried sharply through the skin and subcutaneous tissue. Medial and lateral flaps were developed over the deltopectoral fascia. The cephalic vein was identified and mobilized laterally with the deltoid. The subdeltoid and subpectoral spaces were mobilized and a blunt retractor was placed deep to this. The clavipectoral fascia was opened on the lateral edge of the conjoined tendon and the retractor was moved deep to this. The leading edge of the pectoralis was released exposing the long head of the biceps. This was tenosynovitic. It was tenodesed to the pectoralis and released proximal to this. The 3 sisters were identified and coagulated. A subscapularis tenotomy was performed 1 cm medial to the lesser tuberosity and rotator interval was released to the glenoid exposing the humeral head. The inferior capsule was released directly off the humerus to allow greater than 90° of external rotation.  The humeral head was severely eroded involving the anterior half.  The anatomic neck was exposed and the humeral head osteotomy was performed in approximately 30° of retroversion. The remainder of the osteophytes were removed. The canal was then entered, reamed, and broached. The final stem was then impacted in in approximately 30° of retroversion. A head protector was placed. The humerus was subluxated posteriorly. The glenoid exposed.  Gentle debridement of the glenoid surface was performed and any identified loose bodies were removed from the soft tissue and the inadequate glenoid bone was confirmed.  The humerus was carefully subluxated back anteriorly. A 4.5 replicator plate was chosen and trialing was carried out. The appropriate head size and position were chosen and allowed as much passive range of motion as possible while maintaining stability within the CA arch.  The largest size was chosen and the thickest was also chosen to provide some lateral tensioning of the deltoid.  The replicator plate  and the final head was secured and the shoulder reduced.  The joint was copiously irrigated with orthopedic irrigation mixed with Betadine after the final implants were assembled and locked into place. The subscapularis was not repairable.  The deltopectoral interval was approximated with 0 Vicryl, the subcutaneous tissue with 2-0 Vicryl, and the skin with Monocryl and Dermabond. A sterile dressing was placed. Anesthesia was reversed and the patient was taken to the recovery room in stable condition. All instrument, needle, and sponge counts were correct.      Matteo Tan MD*

## 2023-09-25 NOTE — ANESTHESIA PROCEDURE NOTES
Interscalene cath      Patient reassessed immediately prior to procedure    Patient location during procedure: pre-op  Reason for block: at surgeon's request and post-op pain management  Performed by  CRNA/CAA: Rio Blevins CRNA  Preanesthetic Checklist  Completed: patient identified, IV checked, site marked, risks and benefits discussed, surgical consent, monitors and equipment checked, pre-op evaluation and timeout performed  Prep:  Sterile barriers:cap, gloves, mask and washed/disinfected hands  Prep: ChloraPrep  Patient monitoring: blood pressure monitoring, continuous pulse oximetry and EKG  Procedure    Sedation: yes  Performed under: local infiltration  Guidance:ultrasound guided    ULTRASOUND INTERPRETATION.  Using ultrasound guidance a 20 G gauge needle was placed in close proximity to the nerve, at which point, under ultrasound guidance anesthetic was injected in the area of the nerve and spread of the anesthesia was seen on ultrasound in close proximity thereto.  There were no abnormalities seen on ultrasound; a digital image was taken; and the patient tolerated the procedure with no complications. Images:still images obtained, printed/placed on chart    Laterality:left  Block Type:interscalene  Injection Technique:catheter  Needle Type:Tuohy and echogenic  Needle Gauge:18 G  Resistance on Injection: none  Catheter Size:20 G (20g)  Cath Depth at skin: 10 cm    Medications Used: fentaNYL citrate (PF) (SUBLIMAZE) injection - Intravenous   100 mcg - 9/25/2023 11:32:00 AM  bupivacaine PF (MARCAINE) 0.25 % injection - Injection   15 mL - 9/25/2023 11:33:00 AM      Post Assessment  Injection Assessment: negative aspiration for heme, no paresthesia on injection and incremental injection  Patient Tolerance:comfortable throughout block  Complications:no  Additional Notes  CATHETER  A high-frequency linear transducer, with sterile cover, was placed in the supraclavicular fossa to identify the subclavian artery  "and trunks and divisions of the brachial plexus. The transducer was then moved in a cephalad orientation with a slight rotation to continue visualization of the brachial plexus from the trunks and divisions, on to the C5-C7 roots. The insertion site was prepped and draped in sterile fashion. Skin and cutaneous tissue was infiltrated with 2-5 ml of 1% Lidocaine. Using ultrasound-guidance, an 18-gauge Contiplex Ultra 360 Touhy needle was advanced in plane from lateral to medial. Preservative-free normal saline was utilized for hydro-dissection of tissue, advancement of Touhy, and to confirm final needle placement at the fascial plane between the middle scalene muscle and sheath of the brachial plexus (C5-C7). A 20-gauge Contiplex Echo catheter was placed through the needle and advance out the tip of the Touhy 3-5 cm with the \"Rogers Flip\". The Touhy needle was then removed, and final catheter position verified lateral to the brachial plexus with local anesthetic (LA) and ultrasound visualization. The catheter was secured in the usual fashion with skin glue, benzoin, steri-strips, CHG tegaderm and label noting \"Nerve Block Catheter\". Jerk tape applied at yellow connector and catheter connection. All LA was injected in increments of 3-5 ml after catheter secured. Aspiration every 5 ml to prevent intravascular injection. Injection was completed with negative aspiration of blood and negative intravascular injection. Injection pressures were normal with minimal resistance.           "

## 2023-09-26 ENCOUNTER — APPOINTMENT (OUTPATIENT)
Dept: GENERAL RADIOLOGY | Facility: HOSPITAL | Age: 88
End: 2023-09-26
Payer: MEDICARE

## 2023-09-26 LAB
ANION GAP SERPL CALCULATED.3IONS-SCNC: 17 MMOL/L (ref 5–15)
BASOPHILS # BLD AUTO: 0.02 10*3/MM3 (ref 0–0.2)
BASOPHILS NFR BLD AUTO: 0.3 % (ref 0–1.5)
BUN SERPL-MCNC: 34 MG/DL (ref 8–23)
BUN/CREAT SERPL: 10.4 (ref 7–25)
CALCIUM SPEC-SCNC: 9.1 MG/DL (ref 8.6–10.5)
CHLORIDE SERPL-SCNC: 92 MMOL/L (ref 98–107)
CO2 SERPL-SCNC: 25 MMOL/L (ref 22–29)
CREAT SERPL-MCNC: 3.28 MG/DL (ref 0.76–1.27)
DEPRECATED RDW RBC AUTO: 51.7 FL (ref 37–54)
EGFRCR SERPLBLD CKD-EPI 2021: 17.4 ML/MIN/1.73
EOSINOPHIL # BLD AUTO: 0.01 10*3/MM3 (ref 0–0.4)
EOSINOPHIL NFR BLD AUTO: 0.2 % (ref 0.3–6.2)
ERYTHROCYTE [DISTWIDTH] IN BLOOD BY AUTOMATED COUNT: 14.4 % (ref 12.3–15.4)
GLUCOSE SERPL-MCNC: 100 MG/DL (ref 65–99)
HCT VFR BLD AUTO: 28.8 % (ref 37.5–51)
HGB BLD-MCNC: 9.6 G/DL (ref 13–17.7)
IMM GRANULOCYTES # BLD AUTO: 0.02 10*3/MM3 (ref 0–0.05)
IMM GRANULOCYTES NFR BLD AUTO: 0.3 % (ref 0–0.5)
LYMPHOCYTES # BLD AUTO: 0.7 10*3/MM3 (ref 0.7–3.1)
LYMPHOCYTES NFR BLD AUTO: 10.6 % (ref 19.6–45.3)
MCH RBC QN AUTO: 32.7 PG (ref 26.6–33)
MCHC RBC AUTO-ENTMCNC: 33.3 G/DL (ref 31.5–35.7)
MCV RBC AUTO: 98 FL (ref 79–97)
MONOCYTES # BLD AUTO: 0.58 10*3/MM3 (ref 0.1–0.9)
MONOCYTES NFR BLD AUTO: 8.8 % (ref 5–12)
NEUTROPHILS NFR BLD AUTO: 5.29 10*3/MM3 (ref 1.7–7)
NEUTROPHILS NFR BLD AUTO: 79.8 % (ref 42.7–76)
NRBC BLD AUTO-RTO: 0 /100 WBC (ref 0–0.2)
PLATELET # BLD AUTO: 204 10*3/MM3 (ref 140–450)
PMV BLD AUTO: 8.7 FL (ref 6–12)
POTASSIUM SERPL-SCNC: 4.3 MMOL/L (ref 3.5–5.2)
RBC # BLD AUTO: 2.94 10*6/MM3 (ref 4.14–5.8)
SODIUM SERPL-SCNC: 134 MMOL/L (ref 136–145)
WBC NRBC COR # BLD: 6.62 10*3/MM3 (ref 3.4–10.8)

## 2023-09-26 PROCEDURE — 97535 SELF CARE MNGMENT TRAINING: CPT

## 2023-09-26 PROCEDURE — A9270 NON-COVERED ITEM OR SERVICE: HCPCS | Performed by: INTERNAL MEDICINE

## 2023-09-26 PROCEDURE — 63710000001 APIXABAN 2.5 MG TABLET: Performed by: INTERNAL MEDICINE

## 2023-09-26 PROCEDURE — 63710000001 MIDODRINE 5 MG TABLET: Performed by: INTERNAL MEDICINE

## 2023-09-26 PROCEDURE — 71045 X-RAY EXAM CHEST 1 VIEW: CPT

## 2023-09-26 PROCEDURE — 63710000001 OXYCODONE 5 MG TABLET: Performed by: ORTHOPAEDIC SURGERY

## 2023-09-26 PROCEDURE — 63710000001 MELATONIN 5 MG TABLET: Performed by: INTERNAL MEDICINE

## 2023-09-26 PROCEDURE — 63710000001 SERTRALINE 50 MG TABLET: Performed by: INTERNAL MEDICINE

## 2023-09-26 PROCEDURE — 85025 COMPLETE CBC W/AUTO DIFF WBC: CPT | Performed by: ORTHOPAEDIC SURGERY

## 2023-09-26 PROCEDURE — 63710000001 TRAZODONE 50 MG TABLET: Performed by: INTERNAL MEDICINE

## 2023-09-26 PROCEDURE — 80048 BASIC METABOLIC PNL TOTAL CA: CPT | Performed by: ORTHOPAEDIC SURGERY

## 2023-09-26 PROCEDURE — 63710000001 AMIODARONE 200 MG TABLET: Performed by: INTERNAL MEDICINE

## 2023-09-26 PROCEDURE — 63710000001 MIRTAZAPINE 15 MG TABLET: Performed by: INTERNAL MEDICINE

## 2023-09-26 PROCEDURE — 63710000001 SENNOSIDES-DOCUSATE 8.6-50 MG TABLET: Performed by: INTERNAL MEDICINE

## 2023-09-26 PROCEDURE — 63710000001 PANTOPRAZOLE 40 MG TABLET DELAYED-RELEASE: Performed by: INTERNAL MEDICINE

## 2023-09-26 PROCEDURE — 63710000001 FINASTERIDE 5 MG TABLET: Performed by: INTERNAL MEDICINE

## 2023-09-26 PROCEDURE — 63710000001 HYDROXYZINE 25 MG TABLET: Performed by: INTERNAL MEDICINE

## 2023-09-26 PROCEDURE — 63710000001 ATORVASTATIN 20 MG TABLET: Performed by: INTERNAL MEDICINE

## 2023-09-26 PROCEDURE — 97530 THERAPEUTIC ACTIVITIES: CPT

## 2023-09-26 PROCEDURE — 97110 THERAPEUTIC EXERCISES: CPT

## 2023-09-26 PROCEDURE — A9270 NON-COVERED ITEM OR SERVICE: HCPCS | Performed by: ORTHOPAEDIC SURGERY

## 2023-09-26 RX ORDER — CHOLECALCIFEROL (VITAMIN D3) 125 MCG
5 CAPSULE ORAL NIGHTLY
Status: DISCONTINUED | OUTPATIENT
Start: 2023-09-26 | End: 2023-10-05 | Stop reason: HOSPADM

## 2023-09-26 RX ORDER — HYDROXYZINE HYDROCHLORIDE 25 MG/1
25 TABLET, FILM COATED ORAL 3 TIMES DAILY PRN
Status: DISCONTINUED | OUTPATIENT
Start: 2023-09-26 | End: 2023-10-05 | Stop reason: HOSPADM

## 2023-09-26 RX ORDER — TRAZODONE HYDROCHLORIDE 50 MG/1
50 TABLET ORAL NIGHTLY
Status: DISCONTINUED | OUTPATIENT
Start: 2023-09-26 | End: 2023-10-05 | Stop reason: HOSPADM

## 2023-09-26 RX ADMIN — PANTOPRAZOLE SODIUM 40 MG: 40 TABLET, DELAYED RELEASE ORAL at 08:13

## 2023-09-26 RX ADMIN — SERTRALINE 50 MG: 50 TABLET, FILM COATED ORAL at 08:13

## 2023-09-26 RX ADMIN — SENNOSIDES AND DOCUSATE SODIUM 2 TABLET: 50; 8.6 TABLET ORAL at 20:15

## 2023-09-26 RX ADMIN — SODIUM CHLORIDE 50 ML/HR: 4.5 INJECTION, SOLUTION INTRAVENOUS at 14:36

## 2023-09-26 RX ADMIN — ATORVASTATIN CALCIUM 20 MG: 20 TABLET, FILM COATED ORAL at 20:15

## 2023-09-26 RX ADMIN — AMIODARONE HYDROCHLORIDE 200 MG: 200 TABLET ORAL at 08:13

## 2023-09-26 RX ADMIN — FINASTERIDE 5 MG: 5 TABLET, FILM COATED ORAL at 08:13

## 2023-09-26 RX ADMIN — TRAZODONE HYDROCHLORIDE 50 MG: 50 TABLET ORAL at 20:14

## 2023-09-26 RX ADMIN — Medication 5 MG: at 20:14

## 2023-09-26 RX ADMIN — HYDROXYZINE HYDROCHLORIDE 25 MG: 25 TABLET, FILM COATED ORAL at 16:35

## 2023-09-26 RX ADMIN — OXYCODONE 5 MG: 5 TABLET ORAL at 13:29

## 2023-09-26 RX ADMIN — MIRTAZAPINE 7.5 MG: 15 TABLET, FILM COATED ORAL at 20:15

## 2023-09-26 RX ADMIN — APIXABAN 2.5 MG: 2.5 TABLET, FILM COATED ORAL at 20:15

## 2023-09-26 NOTE — THERAPY TREATMENT NOTE
Patient Name: Enirke Tomas  : 1935    MRN: [id number]                              Today's Date: 2023       Admit Date: 2023    Visit Dx: No diagnosis found.  Patient Active Problem List   Diagnosis    (HFpEF) heart failure with preserved ejection fraction    Anemia of renal disease    At high risk for falls    Benign prostatic hyperplasia    Closed fracture of left acetabulum    Compression fracture of thoracic vertebra    Deficiency of other specified B group vitamins    Depression    ESRD (end stage renal disease)    Essential hypertension    GERD (gastroesophageal reflux disease)    Hepatic cyst    Long term (current) use of anticoagulants    Major depressive disorder, single episode, moderate    Mixed hyperlipidemia    Morgagni hernia    Occlusion of right vertebral artery    NERY (obstructive sleep apnea)    Fall    Severe malnutrition    Sustained SVT    Paroxysmal atrial fibrillation    Chronic heart failure with preserved ejection fraction (HFpEF)    S/P shoulder hemiarthroplasty, left     Past Medical History:   Diagnosis Date    A-fib     Abnormal ECG     occasional extra heartbeat    Arthritis     Atrial fibrillation     BPH (benign prostatic hyperplasia)     Cervical compression fracture 2023    currently wearing a c collar    Diverticulosis     Diverticulum of esophagus     puree diet and speech therapy    Elevated cholesterol     ESRD on hemodialysis     GERD (gastroesophageal reflux disease)     Heart failure     Hemodialysis patient     M,W,F    Hyperlipidemia     Hypertension     Low back pain     NERY (obstructive sleep apnea)     NOT USING CPAP    Recurrent falls     UTI (urinary tract infection)      Past Surgical History:   Procedure Laterality Date    CATARACT EXTRACTION      COLONOSCOPY      DIALYSIS FISTULA CREATION      ENDOSCOPY      INGUINAL HERNIA REPAIR      PROSTATE BIOPSY      THROAT SURGERY      diverticulum in throat    TONSILLECTOMY AND ADENOIDECTOMY       TOTAL SHOULDER ARTHROPLASTY W/ DISTAL CLAVICLE EXCISION Left 9/25/2023    Procedure: HEMIARTHROPLASTY, BICEPS TENODESIS - LEFT;  Surgeon: Matteo Tan MD;  Location: [facility];  Service: Orthopedics;  Laterality: Left;      General Information       Row Name 09/26/23 1243          OT Time and Intention    Document Type therapy note (daily note)  -TB     Mode of Treatment occupational therapy;individual therapy  -TB       Row Name 09/26/23 1243          General Information    Patient Profile Reviewed yes  -TB     Existing Precautions/Restrictions fall;left;shoulder;non-weight bearing;other (see comments)  s/p L Shoulder Hemiarthroplasty with NWB precautions, sling with abduction pillow, IS nerve block  -TB     Barriers to Rehab previous functional deficit  -TB       Row Name 09/26/23 1243          Occupational Profile    Reason for Services/Referral (Occupational Profile) Occupational decline  -TB       Row Name 09/26/23 1243          Cognition    Orientation Status (Cognition) oriented to;person  -TB       Row Name 09/26/23 1243          Safety Issues, Functional Mobility    Safety Issues Affecting Function (Mobility) ability to follow commands;at risk behavior observed;awareness of need for assistance;impulsivity;insight into deficits/self-awareness;judgment;problem-solving;safety precaution awareness;safety precautions follow-through/compliance;sequencing abilities  -TB     Impairments Affecting Function (Mobility) cognition;strength;range of motion (ROM);postural/trunk control;sensation/sensory awareness  -TB     Cognitive Impairments, Mobility Safety/Performance attention;awareness, need for assistance;impulsivity;insight into deficits/self-awareness;judgment;problem-solving/reasoning;safety precaution awareness;safety precaution follow-through;sequencing abilities  -TB     Comment, Safety Issues/Impairments (Mobility) OOB deferred d/t acute confusion and safety deficits  -TB               User Key  (r)  = Recorded By, (t) = Taken By, (c) = Cosigned By      Initials Name Provider Type    TB Chery Rene, OT Occupational Therapist                     Mobility/ADL's       Row Name 09/26/23 1245          Bed Mobility    Bed Mobility rolling left;rolling right;scooting/bridging  -TB     Rolling Left [place] (Bed Mobility) maximum assist (25% patient effort)  -TB     Rolling Right [place] (Bed Mobility) maximum assist (25% patient effort)  -TB     Scooting/Bridging [place] (Bed Mobility) maximum assist (25% patient effort);2 person assist;verbal cues  -TB     Assistive Device (Bed Mobility) draw sheet  -TB     Comment, (Bed Mobility) Pt repositioned to [facility] for activity and comfort. Requires assist for LUE NWB precautions.  -TB       Row Name 09/26/23 1245          Transfers    Comment, (Transfers) OOB deferred d/t acute confusion and safety deficits  -TB       Row Name 09/26/23 1245          Functional Mobility    Functional Mobility- Comment OOB deferred d/t acute confusion and safety deficits  -TB       Row Name 09/26/23 1245          Activities of Daily Living    BADL Assessment/Intervention bathing;upper body dressing;lower body dressing;toileting  -TB       Row Name 09/26/23 1245          Mobility    Extremity Weight-bearing Status left upper extremity  -TB     Left Upper Extremity (Weight-bearing Status) non weight-bearing (NWB)   -TB       Row Name 09/26/23 1245          Bathing Assessment/Intervention    [place] Level (Bathing) dependent (less than 25% patient effort);upper body;bathing skills  -TB     Position (Bathing) supine  -TB     Comment, (Bathing) Education reviewed with spouse for L shoulder precautions, nerve block precautions (no showers until block removed), and axilla care. Pt limited by acute confusion.  -TB       Row Name 09/26/23 1245          Upper Body Dressing Assessment/Training    [place] Level (Upper Body Dressing) dependent (less than 25% patient  effort);upper body dressing skills  sling application  -TB     Position (Upper Body Dressing) supine  -TB     Comment, (Upper Body Dressing) Education reviewed with spouse in L shoulder precautions, sling teaching, and ADL retraining. Follow up teaching needed. Pt limited by acute confusion.  -TB       Row Name 09/26/23 1245          Lower Body Dressing Assessment/Training    [place] Level (Lower Body Dressing) lower body dressing skills;dependent (less than 25% patient effort)  -TB     Position (Lower Body Dressing) supine  -TB       Row Name 09/26/23 1245          Toileting Assessment/Training    [place] Level (Toileting) toileting skills;dependent (less than 25% patient effort)  -TB     Position (Toileting) supine  -TB               User Key  (r) = Recorded By, (t) = Taken By, (c) = Cosigned By      Initials Name Provider Type    TB Chery Rene, OT Occupational Therapist                   Obj/Interventions       Row Name 09/26/23 1250          Shoulder (Therapeutic Exercise)    Shoulder (Therapeutic Exercise) PROM (passive range of motion)  -TB     Shoulder PROM (Therapeutic Exercise) left;flexion;extension;external rotation;internal rotation;10 repetitions  -TB       Row Name 09/26/23 1250          Elbow/Forearm (Therapeutic Exercise)    Elbow/Forearm (Therapeutic Exercise) PROM (passive range of motion)  -TB     Elbow/Forearm PROM (Therapeutic Exercise) left;flexion;extension;supination;pronation;10 repetitions  -TB       Row Name 09/26/23 1250          Wrist (Therapeutic Exercise)    Wrist (Therapeutic Exercise) PROM (passive range of motion)  -TB     Wrist AROM (Therapeutic Exercise) left;flexion;extension;10 repetitions  -TB       Row Name 09/26/23 1250          Hand (Therapeutic Exercise)    Hand (Therapeutic Exercise) PROM (passive range of motion)  -TB     Hand PROM (Therapeutic Exercise) left;finger flexion;finger extension;10 repetitions  -TB       Row Name 09/26/23 1250           Motor Skills    Therapeutic Exercise hand;wrist;elbow/forearm;shoulder  Education reviewed for LUE HEP per MD parameters. Spouse present for teaching. All HEP completed with PROM. Pt limited by acute confusion. Unable to complete scapular retractions.  -TB               User Key  (r) = Recorded By, (t) = Taken By, (c) = Cosigned By      Initials Name Provider Type    TB Chery Rene, OT Occupational Therapist                   Goals/Plan    No documentation.                  Clinical Impression       Row Name 09/26/23 1253          Pain Assessment    Pain Intervention(s) Ambulation/increased activity;Repositioned;Other (Comment)  LUE IS nerve cath infusing  -TB     Additional Documentation Pain Scale: FACES Pre/Post-Treatment (Group)  -TB       Row Name 09/26/23 1259          Pain Scale: FACES Pre/Post-Treatment    Pain: FACES Scale, Pretreatment 0-->no hurt  -TB     Posttreatment Pain Rating 0-->no hurt  -TB     Pain Location - Side/Orientation Left  -TB     Pain Location - shoulder  -TB     Pre/Posttreatment Pain Comment No indication of pain with activity  -TB       Row Name 09/26/23 1251          Plan of Care Review    Plan of Care Reviewed With patient;spouse  -TB     Progress declining  -TB     Outcome Evaluation Pt is A/Ox1 and restless. Max Ax2 to reposition in bed for activity. Education reviewed with pt's spouse for L shoulder precautions, sling teaching, and ADL retraining. LUE HEP completed with PROM. Pt is acutely confused with limited participation this session. OT will continue to follow IP. Recommend IRF at d/c for best outcome.  -TB       Row Name 09/26/23 1257          Therapy Plan Review/Discharge Plan (OT)    Anticipated Discharge Disposition (OT) inpatient rehabilitation facility  -TB       Row Name 09/26/23 1257          Vital Signs    Pre Systolic BP Rehab --  RN cleared OT  -TB     O2 Delivery Pre Treatment room air  -TB     Pre Patient Position Supine  -TB     Post Patient Position  Supine  -TB       Row Name 09/26/23 1253          Positioning and Restraints    Pre-Treatment Position in bed  -TB     Post Treatment Position bed  -TB     In Bed notified nsg;supine;call light within reach;encouraged to call for assist;exit alarm on;with brace  -TB               User Key  (r) = Recorded By, (t) = Taken By, (c) = Cosigned By      Initials Name Provider Type    TB Chery Rene, OT Occupational Therapist                   Outcome Measures    No documentation.                   Occupational Therapy Education       Title: PT OT SLP Therapies (In Progress)       Topic: Occupational Therapy (In Progress)       Point: ADL training (Done)       Description:   Instruct learner(s) on proper safety adaptation and remediation techniques during self care or transfers.   Instruct in proper use of assistive devices.                  Learning Progress Summary             Patient Acceptance, E,D, VU,NR by TB at 9/26/2023 1257    Comment: Spouse present and participating in teaching. Pt limited by acute confusion.    Eager, E,TB,D,H, NR by AR at 9/25/2023 1856   Family Acceptance, E,D, VU,NR by TB at 9/26/2023 1257    Comment: Spouse present and participating in teaching. Pt limited by acute confusion.    Eager, E,TB,D,H, NR by AR at 9/25/2023 1856                         Point: Home exercise program (Done)       Description:   Instruct learner(s) on appropriate technique for monitoring, assisting and/or progressing therapeutic exercises/activities.                  Learning Progress Summary             Patient Acceptance, E,D, VU,NR by TB at 9/26/2023 1257    Comment: Spouse present and participating in teaching. Pt limited by acute confusion.    Eager, E,TB,D,H, NR by AR at 9/25/2023 1856   Family Acceptance, E,D, VU,NR by TB at 9/26/2023 1257    Comment: Spouse present and participating in teaching. Pt limited by acute confusion.    Eager, E,TB,D,H, NR by AR at 9/25/2023 1856                          Point: Precautions (Done)       Description:   Instruct learner(s) on prescribed precautions during self-care and functional transfers.                  Learning Progress Summary             Patient Acceptance, E,D, VU,NR by TB at 9/26/2023 1257    Comment: Spouse present and participating in teaching. Pt limited by acute confusion.    Eager, E,TB,D,H, NR by AR at 9/25/2023 1856   Family Acceptance, E,D, VU,NR by TB at 9/26/2023 1257    Comment: Spouse present and participating in teaching. Pt limited by acute confusion.    Eager, E,TB,D,H, NR by AR at 9/25/2023 1856                         Point: Body mechanics (In Progress)       Description:   Instruct learner(s) on proper positioning and spine alignment during self-care, functional mobility activities and/or exercises.                  Learning Progress Summary             Patient Eager, E,TB,D,H, NR by AR at 9/25/2023 1856   Family Eager, E,TB,D,H, NR by AR at 9/25/2023 1856                                         User Key       Initials Effective Dates Name Provider Type Discipline     07/11/23 -  Chery Rene, OT Occupational Therapist OT    AR 07/11/23 -  Debbie Espinosa, OT Occupational Therapist OT                  OT Recommendation and Plan     Plan of Care Review  Plan of Care Reviewed With: patient, spouse  Progress: declining  Outcome Evaluation: Pt is A/Ox1 and restless. Max Ax2 to reposition in bed for activity. Education reviewed with pt's spouse for L shoulder precautions, sling teaching, and ADL retraining. LUE HEP completed with PROM. Pt is acutely confused with limited participation this session. OT will continue to follow IP. Recommend IRF at d/c for best outcome.     Time Calculation:         Time Calculation- OT       Row Name 09/26/23 1026             Time Calculation- OT    OT Start Time 1026  -TB      OT Received On 09/26/23  -      OT Goal Re-Cert Due Date 10/05/23  -TB         Timed Charges    43096 - OT Therapeutic  Exercise Minutes 11  -TB      54989 - OT Self Care/Mgmt Minutes 28  -TB         Total Minutes    Timed Charges Total Minutes 39  -TB       Total Minutes 39  -TB                User Key  (r) = Recorded By, (t) = Taken By, (c) = Cosigned By      Initials Name Provider Type    TB Chery Rene OT Occupational Therapist                  Therapy Charges for Today       Code Description Service Date Service Provider Modifiers Qty    [id number]  OT THER PROC EA 15 MIN 9/26/2023 Chery Rene OT GO 1    [id number]  OT SELF CARE/MGMT/TRAIN EA 15 MIN 9/26/2023 Chery Rene OT GO 2                 Chery Rene OT  9/26/2023

## 2023-09-26 NOTE — PLAN OF CARE
Goal Outcome Evaluation:           Progress: improving          BP controlled with PRN Labetalol, pt did not sleep very well last night, unsteady gait when pivoting to bedside commode, sling and infu block in place, wife at bed side.

## 2023-09-26 NOTE — THERAPY TREATMENT NOTE
Patient Name: Enrike Tomas  : 1935    MRN: [id number]                              Today's Date: 2023       Admit Date: 2023    Visit Dx: No diagnosis found.  Patient Active Problem List   Diagnosis    (HFpEF) heart failure with preserved ejection fraction    Anemia of renal disease    At high risk for falls    Benign prostatic hyperplasia    Closed fracture of left acetabulum    Compression fracture of thoracic vertebra    Deficiency of other specified B group vitamins    Depression    ESRD (end stage renal disease)    Essential hypertension    GERD (gastroesophageal reflux disease)    Hepatic cyst    Long term (current) use of anticoagulants    Major depressive disorder, single episode, moderate    Mixed hyperlipidemia    Morgagni hernia    Occlusion of right vertebral artery    NERY (obstructive sleep apnea)    Fall    Severe malnutrition    Sustained SVT    Paroxysmal atrial fibrillation    Chronic heart failure with preserved ejection fraction (HFpEF)    S/P shoulder hemiarthroplasty, left     Past Medical History:   Diagnosis Date    A-fib     Abnormal ECG     occasional extra heartbeat    Arthritis     Atrial fibrillation     BPH (benign prostatic hyperplasia)     Cervical compression fracture 2023    currently wearing a c collar    Diverticulosis     Diverticulum of esophagus     puree diet and speech therapy    Elevated cholesterol     ESRD on hemodialysis     GERD (gastroesophageal reflux disease)     Heart failure     Hemodialysis patient     M,W,F    Hyperlipidemia     Hypertension     Low back pain     NERY (obstructive sleep apnea)     NOT USING CPAP    Recurrent falls     UTI (urinary tract infection)      Past Surgical History:   Procedure Laterality Date    CATARACT EXTRACTION      COLONOSCOPY      DIALYSIS FISTULA CREATION      ENDOSCOPY      INGUINAL HERNIA REPAIR      PROSTATE BIOPSY      THROAT SURGERY      diverticulum in throat    TONSILLECTOMY AND ADENOIDECTOMY       TOTAL SHOULDER ARTHROPLASTY W/ DISTAL CLAVICLE EXCISION Left 9/25/2023    Procedure: HEMIARTHROPLASTY, BICEPS TENODESIS - LEFT;  Surgeon: Matteo Tan MD;  Location: [facility];  Service: Orthopedics;  Laterality: Left;      General Information       Row Name 09/26/23 1619          Physical Therapy Time and Intention    Document Type therapy note (daily note)  -     Mode of Treatment physical therapy  -       Row Name 09/26/23 1619          General Information    Patient Profile Reviewed yes  -     Existing Precautions/Restrictions fall;left;shoulder;non-weight bearing;other (see comments)  s/p L Shoulder Hemiarthroplasty with NWB precautions, sling with abduction pillow, IS nerve block  -       Row Name 09/26/23 1619          Cognition    Orientation Status (Cognition) oriented to;person;disoriented to;place;situation;time  unable to reorient pt  -       Row Name 09/26/23 1619          Safety Issues, Functional Mobility    Impairments Affecting Function (Mobility) cognition;strength;range of motion (ROM);postural/trunk control;sensation/sensory awareness  -               User Key  (r) = Recorded By, (t) = Taken By, (c) = Cosigned By      Initials Name Provider Type     Fouzia Woody PT Physical Therapist                   Mobility       Row Name 09/26/23 1620          Bed Mobility    Bed Mobility supine-sit;sit-supine  -     Supine-Sit Deer (Bed Mobility) maximum assist (25% patient effort);verbal cues;2 person assist;nonverbal cues (demo/gesture)  -     Sit-Supine De[place] (Bed Mobility) 2 person assist;verbal cues;maximum assist (25% patient effort);nonverbal cues (demo/gesture)  -     Assistive Device (Bed Mobility) draw sheet  -     Comment, (Bed Mobility) Pt required assistance to sit EOB at trunk and hips. Once sitting EOB, signficant posterior lean and BLE extension noted. Assistance to support trunk and prevent BLE sliding noted. Pt reported fear of falling. Five  minutes of sitting EOB resulted in improved sitting balance and safety awareness. Further mobility deferred due to confusion and fear of falling.  -       Row Name 09/26/23 1620          Mobility    Extremity Weight-bearing Status left upper extremity  -     Left Upper Extremity (Weight-bearing Status) non weight-bearing (NWB)   -               User Key  (r) = Recorded By, (t) = Taken By, (c) = Cosigned By      Initials Name Provider Type     Fouzia Woody PT Physical Therapist                   Obj/Interventions       Row Name 09/26/23 1626          Balance    Balance Assessment sitting static balance;sitting dynamic balance  -     Static Sitting Balance minimal assist;moderate assist;non-verbal cues (demo/gesture);verbal cues;2-person assist  -     Dynamic Sitting Balance maximum assist;2-person assist  -     Balance Interventions sitting  -     Comment, Balance Once sitting EOB, signficant posterior lean and BLE extension noted. Assistance to support trunk and prevent BLE sliding noted. Pt reported fear of falling. Five minutes of sitting EOB resulted in improved sitting balance and safety awareness. Further mobility deferred due to confusion and fear of falling.  -               User Key  (r) = Recorded By, (t) = Taken By, (c) = Cosigned By      Initials Name Provider Type     Fouzia Woody, KIM Physical Therapist                   Goals/Plan    No documentation.                  Clinical Impression       Row Name 09/26/23 1627          Pain    Pretreatment Pain Rating 0/10 - no pain  -     Posttreatment Pain Rating 0/10 - no pain  -       Row Name 09/26/23 1627          Plan of Care Review    Plan of Care Reviewed With patient;daughter  -     Progress declining  -     Outcome Evaluation Pt with increased confusion this date. Bed mobility performed with Max A x2. Once sitting EOB, signficant posterior lean observed and fear of falling reported. Sitting balance improved with VC and  time. Further mobility deferred due to balance deficits and fear of falling. Continue to recommend d/c to IRF to decrease risk of falls.  -       Row Name 09/26/23 1627          Vital Signs    Pre Patient Position Supine  -HP     Intra Patient Position Sitting  -HP     Post Patient Position Supine  -       Row Name 09/26/23 1627          Positioning and Restraints    Pre-Treatment Position in bed  -HP     Post Treatment Position bed  -HP     In Bed notified nsg;supine;fowlers;call light within reach;encouraged to call for assist;exit alarm on;side rails up x3  -               User Key  (r) = Recorded By, (t) = Taken By, (c) = Cosigned By      Initials Name Provider Type     Fouzia Woody PT Physical Therapist                   Outcome Measures       Row Name 09/26/23 1711          How much help from another person do you currently need...    Turning from your back to your side while in flat bed without using bedrails? 2  -HP     Moving from lying on back to sitting on the side of a flat bed without bedrails? 2  -HP     Moving to and from a bed to a chair (including a wheelchair)? 2  -HP     Standing up from a chair using your arms (e.g., wheelchair, bedside chair)? 2  -HP     Climbing 3-5 steps with a railing? 1  -HP     To walk in hospital room? 1  -HP     AM-PAC 6 Clicks Score (PT) 10  -HP     Highest level of mobility 4 --> Transferred to chair/commode  -       Row Name 09/26/23 1711          Functional Assessment    Outcome Measure Options AM-PAC 6 Clicks Basic Mobility (PT)  -               User Key  (r) = Recorded By, (t) = Taken By, (c) = Cosigned By      Initials Name Provider Type     Fouzia Woody PT Physical Therapist                                 Physical Therapy Education       Title: PT OT SLP Therapies (In Progress)       Topic: Physical Therapy (Done)       Point: Mobility training (Done)       Learning Progress Summary             Patient Acceptance, E,RAGHU, MONICA,FLORENCE by  at 9/26/2023  1711    Acceptance, E,D, VU,NR by  at 9/25/2023 1849                         Point: Home exercise program (Done)       Learning Progress Summary             Patient Acceptance, E,D, VU,DU by  at 9/26/2023 1711    Acceptance, E,D, VU,NR by  at 9/25/2023 1849                         Point: Body mechanics (Done)       Learning Progress Summary             Patient Acceptance, E,D, VU,DU by  at 9/26/2023 1711    Acceptance, E,D, VU,NR by  at 9/25/2023 1849                         Point: Precautions (Done)       Learning Progress Summary             Patient Acceptance, E,D, VU,DU by  at 9/26/2023 1711    Acceptance, E,D, VU,NR by  at 9/25/2023 1849                                         User Key       Initials Effective Dates Name Provider Type Discipline     06/01/21 -  Fouzia Woody, PT Physical Therapist PT                  PT Recommendation and Plan  Planned Therapy Interventions (PT): balance training, bed mobility training, gait training, home exercise program, transfer training, patient/family education, stretching, strengthening, stair training  Plan of Care Reviewed With: patient, daughter  Progress: declining  Outcome Evaluation: Pt with increased confusion this date. Bed mobility performed with Max A x2. Once sitting EOB, signficant posterior lean observed and fear of falling reported. Sitting balance improved with VC and time. Further mobility deferred due to balance deficits and fear of falling. Continue to recommend d/c to IRF to decrease risk of falls.     Time Calculation:   PT Evaluation Complexity  History, PT Evaluation Complexity: 1-2 personal factors and/or comorbidities  Examination of Body Systems (PT Eval Complexity): total of 3 or more elements  Clinical Presentation (PT Evaluation Complexity): evolving  Clinical Decision Making (PT Evaluation Complexity): moderate complexity  Overall Complexity (PT Evaluation Complexity): moderate complexity     PT Charges       Row Name  09/26/23 1555             Time Calculation    Start Time 1555  -HP      PT Received On 09/26/23  -HP         Timed Charges    84322 - PT Therapeutic Activity Minutes 15  -HP         Total Minutes    Timed Charges Total Minutes 15  -HP       Total Minutes 15  -HP                User Key  (r) = Recorded By, (t) = Taken By, (c) = Cosigned By      Initials Name Provider Type    HP Fouzia Woody, PT Physical Therapist                  Therapy Charges for Today       Code Description Service Date Service Provider Modifiers Qty    28899110101 HC PT EVAL MOD COMPLEXITY 4 9/25/2023 Fouzia Woody, PT GP 1    04880079890 HC PT THERAPEUTIC ACT EA 15 MIN 9/26/2023 Fouzia Woody, PT GP 1    10598547389 HC PT THER SUPP EA 15 MIN 9/26/2023 Fouzia Woody, PT GP 2            PT G-Codes  Outcome Measure Options: AM-PAC 6 Clicks Basic Mobility (PT)  AM-PAC 6 Clicks Score (PT): 10  AM-PAC 6 Clicks Score (OT): 11  PT Discharge Summary  Anticipated Discharge Disposition (PT): inpatient rehabilitation facility    Fouzia Woody PT  9/26/2023

## 2023-09-26 NOTE — PROGRESS NOTES
[facility]    Acute pain service Inpatient Progress Note    Patient Name: Enrike Tomas  :  1935  MRN:  [id number]        Acute Pain  Service Inpatient Progress Note:    Analgesia:Excellent  Pain Score:0/10  LOC: alert and awake  Resp Status: room air  Cardiac: VS stable  Side Effects:None  Catheter Site:clean, dressing intact and dry  Cath type: peripheral nerve cath with ON Q  Volume: 1mL,5ml, 5ml InfuSystem Pump.  Dosing/Volume: ropivacaine 0.2%  Catheter Plan:Catheter to remain Insitu and Continue catheter infusion rate unchanged  Comments: The neuro assessment of the operative extremity includes the ability to do finger flexion and extension; includes the ability to do wrist flexion and extension, includes the ability to do elbow flexion and extension.  The neuro exam of the patient includes sensory function throughout the operative extremity.

## 2023-09-26 NOTE — PLAN OF CARE
Goal Outcome Evaluation:  Plan of Care Reviewed With: patient, daughter        Progress: declining  Outcome Evaluation: Pt with increased confusion this date. Bed mobility performed with Max A x2. Once sitting EOB, signficant posterior lean observed and fear of falling reported. Sitting balance improved with VC and time. Further mobility deferred due to balance deficits and fear of falling. Continue to recommend d/c to IRF to decrease risk of falls.      Anticipated Discharge Disposition (PT): inpatient rehabilitation facility

## 2023-09-26 NOTE — PROGRESS NOTES
IM progress note      Enrike Tomas  [id number]  1935     LOS: 0 days     Attending: Matteo Tan MD    Primary Care Provider: Blair Dhaliwal MD      Chief Complaint/Reason for visit:  No chief complaint on file.      Subjective    Confusion noted. Some difficulty sleeping last night.  No n/vom/sob.   Wife and daughter present.    Objective        Visit Vitals  BP (!) 193/84 (BP Location: Left leg, Patient Position: Lying)   Pulse 66   Temp 98 °F (36.7 °C) (Oral)   Resp 18   SpO2 96%     Temp (24hrs), Av.2 °F (36.8 °C), Min:97.9 °F (36.6 °C), Max:98.6 °F (37 °C)      Intake/Output:    Intake/Output Summary (Last 24 hours) at 2023 1523  Last data filed at 2023 1300  Gross per 24 hour   Intake 600 ml   Output 100 ml   Net 500 ml   OT:  Problem: Adult Inpatient Plan of Care  Goal: Plan of Care Review  Recent Flowsheet Documentation  Taken 2023 1253 by hCery Rene OT  Progress: declining  Plan of Care Reviewed With:   patient   spouse  Outcome Evaluation: Pt is A/Ox1 and restless. Max Ax2 to reposition in bed for activity. Education reviewed with pt's spouse for L shoulder precautions, sling teaching, and ADL retraining. LUE HEP completed with PROM. Pt is acutely confused with limited participation this session. OT will continue to follow IP. Recommend IRF at d/c for best outcome.       Physical Exam:     General Appearance:    Alert, cooperative, in no acute distress   Head:    Normocephalic, without obvious abnormality, atraumatic    Lungs:     Normal effort, symmetric chest rise,  clear to      auscultation bilaterally              Heart:    Regular rhythm and normal rate, normal S1 and S2    Abdomen:     Normal bowel sounds, no masses, no organomegaly, soft        non-tender, non-distended, no guarding, no rebound                tenderness   Extremities:   Sling on. CDI dressing. PNB cath present. Moves hand, fingers well.   No clubbing, cyanosis or edema.  No  deformities.    Pulses:   Pulses palpable and equal bilaterally   Skin:   No bleeding, bruising or rash          Results Review:     I reviewed the patient's new clinical results.   Results from last 7 days   Lab Units 09/26/23  0519   WBC 10*3/mm3 6.62   HEMOGLOBIN g/dL 9.6*   HEMATOCRIT % 28.8*   PLATELETS 10*3/mm3 204     Results from last 7 days   Lab Units 09/26/23 0519 09/25/23 2017 09/25/23  1111   SODIUM mmol/L 134*  --   --    POTASSIUM mmol/L 4.3 4.1 3.4*   CHLORIDE mmol/L 92*  --   --    CO2 mmol/L 25.0  --   --    BUN mg/dL 34*  --   --    CREATININE mg/dL 3.28*  --   --    CALCIUM mg/dL 9.1  --   --    GLUCOSE mg/dL 100*  --   --      I reviewed the patient's new imaging including images and reports.    All medications reviewed.   amiodarone, 200 mg, Oral, Daily  atorvastatin, 20 mg, Oral, Nightly  finasteride, 5 mg, Oral, Daily  midodrine, 5 mg, Oral, TID AC  mirtazapine, 7.5 mg, Oral, Nightly  pantoprazole, 40 mg, Oral, Daily  sennosides-docusate, 2 tablet, Oral, BID  sertraline, 50 mg, Oral, Daily  traZODone, 50 mg, Oral, Nightly      acetaminophen, 650 mg, Q4H PRN   Or  acetaminophen, 650 mg, Q4H PRN  HYDROmorphone, 0.5 mg, Q2H PRN   And  naloxone, 0.1 mg, Q5 Min PRN  hydrOXYzine, 25 mg, TID PRN  labetalol, 10 mg, Q4H PRN  oxyCODONE, 5 mg, Q4H PRN  sodium chloride, 500 mL, TID PRN        Assessment & Plan       S/P shoulder hemiarthroplasty, left    (HFpEF) heart failure with preserved ejection fraction    Benign prostatic hyperplasia    Depression    ESRD (end stage renal disease)    Essential hypertension    GERD (gastroesophageal reflux disease)    Mixed hyperlipidemia    NERY (obstructive sleep apnea)    Paroxysmal atrial fibrillation      Plan   1. PT/OT. NWB, left UE, ROM hand, wrist, elbow.  2. Pain control-prns, interscalene nerve block cath with ropivacaine infusion.           3. IS-encourage  4. DVT proph- Mech/ mobilize.  5. Bowel regimen  6. Resume home medications as appropriate  7.  Monitor post-op labs  8. DC planning . IPR desired.  following.      -GERD:  Resume PPI.  Formulary substitution when indicated.     -Dyslipidemia:  Resume home regimen Statin ( formulary substitution when appropriate).     -PAF: resume amiodarone, BB, and will resume DOAC       -BPH: maintain on home regimen.  Monitor for adequate spontaneous voiding.     -End-stage renal disease: NAL consulted. Will oversee HD.    Gian Vazquez MD  09/26/23  15:23 EDT

## 2023-09-26 NOTE — PROGRESS NOTES
Orthopaedic Surgery Progress Note      SUBJECTIVE:      No acute events overnight. Doing well. Pain controlled. Tolerating diet. No nausea, vomiting, fevers or chills. Discussed continuing therapy today. PT eval pending, discussed possible IPR stay pending his progress. Post op restrictions reviewed.        OBJECTIVE:  /68   Pulse 63   Temp 98.4 °F (36.9 °C) (Oral)   Resp 16   SpO2 96%       PE:  No acute distress  Non labored breathing  Peripheral perfusion intact  Left Upper Extremity:  Sling in place  Dressings c/d/I  Motor activity to epl, fpl, ff, fe, fabd  Slit to peripheral n   2+ pulses        Results from last 7 days   Lab Units 09/26/23  0519   WBC 10*3/mm3 6.62   HEMOGLOBIN g/dL 9.6*   PLATELETS 10*3/mm3 204     Results from last 7 days   Lab Units 09/26/23  0519   SODIUM mmol/L 134*   POTASSIUM mmol/L 4.3   CO2 mmol/L 25.0   CREATININE mg/dL 3.28*   GLUCOSE mg/dL 100*         ASSESSMENT/PLAN:  Enrike is a 88 y.o. male  with L shoulder end stage degeneration s/p hemiarthroplasty (9/25)    WB Status: NWB LUE, Sling at all times except for hygiene and rom exercises  Pain control and bowel regimen   Nutritional optimization   PT/OT: pending further eval, likely IPR requirement  Follow up: 1 week for post op check  Disposition: pending progress

## 2023-09-26 NOTE — CONSULTS
Patient Care Team:  Blair Dhaliwal MD as PCP - General (Internal Medicine)  Blair Packer MD as Consulting Physician (Cardiology)  Michelle Aponte MD (Nephrology)    Chief complaint: ESRD     History of Present Illness: Mr Tomas is a 87 yo gentleman with past medical hx of ESRD on MWF [facility]. He is admitted for Left shoulder hemiarthroplasty. Nephrology has been consulted for dialysis needs during this admission. Patient appears confused upon interview today but he did mention he goes to [facility] for HD on MWF [facility] he has right arm AV fistula. Last dialysis 1 day prior to admission.     Review of Systems   Constitutional: Negative.    HENT: Negative.     Respiratory: Negative.     Cardiovascular: Negative.    Gastrointestinal: Negative.    Genitourinary: Negative.    Musculoskeletal: Negative.    Skin: Negative.    Hematological: Negative.    Psychiatric/Behavioral: Negative.        Past Medical History:   Diagnosis Date    A-fib     Abnormal ECG     occasional extra heartbeat    Arthritis     Atrial fibrillation     BPH (benign prostatic hyperplasia)     Cervical compression fracture 06/05/2023    currently wearing a c collar    Diverticulosis     Diverticulum of esophagus     puree diet and speech therapy    Elevated cholesterol     ESRD on hemodialysis     GERD (gastroesophageal reflux disease)     Heart failure     Hemodialysis patient     M,W,F    Hyperlipidemia     Hypertension     Low back pain 2022    NERY (obstructive sleep apnea)     NOT USING CPAP    Recurrent falls     UTI (urinary tract infection)    ,   Past Surgical History:   Procedure Laterality Date    CATARACT EXTRACTION      COLONOSCOPY      DIALYSIS FISTULA CREATION      ENDOSCOPY      INGUINAL HERNIA REPAIR      PROSTATE BIOPSY      THROAT SURGERY      diverticulum in throat    TONSILLECTOMY AND ADENOIDECTOMY      TOTAL SHOULDER ARTHROPLASTY W/ DISTAL CLAVICLE EXCISION Left 9/25/2023    Procedure: HEMIARTHROPLASTY, BICEPS TENODESIS -  LEFT;  Surgeon: Matteo Tan MD;  Location: [facility];  Service: Orthopedics;  Laterality: Left;   ,   Family History   Problem Relation Age of Onset    Stomach cancer Mother     Heart disease Mother     Arthritis Mother     Heart disease Father     Stroke Father     Kidney failure Father     Cancer Father     Kidney disease Father    ,   Social History     Socioeconomic History    Marital status:    Tobacco Use    Smoking status: Never    Smokeless tobacco: Never   Vaping Use    Vaping Use: Never used   Substance and Sexual Activity    Alcohol use: Not Currently     Alcohol/week: 1.0 standard drink     Types: 1 Glasses of wine per week     Comment: once monthly typically    Drug use: Never    Sexual activity: Defer     E-cigarette/Vaping    E-cigarette/Vaping Use Never User      E-cigarette/Vaping Substances     E-cigarette/Vaping Devices         ,   Medications Prior to Admission   Medication Sig Dispense Refill Last Dose    amiodarone (PACERONE) 200 MG tablet Take 1 tablet by mouth Daily.   9/24/2023 at 0830    atorvastatin (LIPITOR) 20 MG tablet Take 1 tablet by mouth Every Night.   9/24/2023 at 2030    finasteride (PROSCAR) 5 MG tablet Take 1 tablet by mouth Daily.   9/24/2023 at 0830    fluticasone (FLONASE) 50 MCG/ACT nasal spray 2 sprays into the nostril(s) as directed by provider Daily.   Past Week    HYDROcodone-acetaminophen (NORCO) 5-325 MG per tablet Take 1 tablet by mouth Every 4 (Four) Hours As Needed for Moderate Pain. (Patient taking differently: Take 1 tablet by mouth 2 (Two) Times a Day.) 18 tablet 0 9/24/2023 at 2130    melatonin 5 MG tablet tablet Take 1 tablet by mouth Every Night.   9/24/2023 at 2030    midodrine (PROAMATINE) 5 MG tablet Take 1 tablet by mouth 3 (Three) Times a Day Before Meals.   9/24/2023 at 1900    mirtazapine (REMERON) 7.5 MG tablet Take 1 tablet by mouth Every Night.   Past Month    pantoprazole (PROTONIX) 40 MG EC tablet Take 1 tablet by mouth Daily.    9/24/2023 at 0830    sennosides-docusate (PERICOLACE) 8.6-50 MG per tablet Take 2 tablets by mouth 2 (Two) Times a Day.   9/24/2023 at 1900    sertraline (ZOLOFT) 50 MG tablet Take 1 tablet by mouth Daily.   9/24/2023    Teriparatide, Recombinant, (FORTEO) 620 MCG/2.48ML injection Inject 20 mcg under the skin into the appropriate area as directed Daily.   9/24/2023    acetaminophen (TYLENOL) 500 MG tablet Take 1 tablet by mouth Every 6 (Six) Hours As Needed for Mild Pain or Headache.   More than a month    apixaban (ELIQUIS) 2.5 MG tablet tablet Take 1 tablet by mouth 2 (Two) Times a Day. PT TO HOLD 48 HRS PRIOR TO PROCEDURE PER MERCEDEZ VARGHESE.   9/22/2023    folic acid (FOLVITE) 1 MG tablet Take 1 tablet by mouth Daily.       HYDROcodone-acetaminophen (NORCO) 7.5-325 MG per tablet Take 1 tablet by mouth Every Night.       naloxone (NARCAN) 4 MG/0.1ML nasal spray 1 spray into the nostril(s) as directed by provider As Needed (sedation).      , and Scheduled Meds:  amiodarone, 200 mg, Oral, Daily  atorvastatin, 20 mg, Oral, Nightly  finasteride, 5 mg, Oral, Daily  midodrine, 5 mg, Oral, TID AC  mirtazapine, 7.5 mg, Oral, Nightly  pantoprazole, 40 mg, Oral, Daily  sennosides-docusate, 2 tablet, Oral, BID  sertraline, 50 mg, Oral, Daily  traZODone, 50 mg, Oral, Nightly       Objective     Vital Signs  Temp:  [97.9 °F (36.6 °C)-98.6 °F (37 °C)] 98 °F (36.7 °C)  Heart Rate:  [63-81] 66  Resp:  [16-20] 18  BP: (122-208)/(52-90) 193/84    I/O this shift:  In: 400 [P.O.:400]  Out: 100 [Urine:100]  I/O last 3 completed shifts:  In: 500 [P.O.:200; I.V.:300]  Out: 125 [Blood:125]    Physical Exam  Constitutional:       General: He is not in acute distress.     Appearance: Normal appearance. He is not ill-appearing or diaphoretic.   HENT:      Head: Normocephalic and atraumatic.      Nose: Nose normal.      Mouth/Throat:      Mouth: Mucous membranes are moist.   Eyes:      Pupils: Pupils are equal, round, and reactive to  light.   Cardiovascular:      Rate and Rhythm: Normal rate and regular rhythm.      Pulses: Normal pulses.      Heart sounds: Normal heart sounds.   Pulmonary:      Effort: Pulmonary effort is normal.      Breath sounds: Normal breath sounds.   Abdominal:      General: Abdomen is flat.   Musculoskeletal:         General: Normal range of motion.      Cervical back: Normal range of motion.      Right lower leg: No edema.      Left lower leg: No edema.   Skin:     General: Skin is warm.      Coloration: Skin is not jaundiced or pale.   Neurological:      General: No focal deficit present.      Mental Status: He is alert and oriented to person, place, and time.   Psychiatric:         Mood and Affect: Mood normal.       Results Review:    I reviewed the patient's new clinical results.    WBC WBC   Date Value Ref Range Status   09/26/2023 6.62 3.40 - 10.80 10*3/mm3 Final      HGB Hemoglobin   Date Value Ref Range Status   09/26/2023 9.6 (L) 13.0 - 17.7 g/dL Final      HCT Hematocrit   Date Value Ref Range Status   09/26/2023 28.8 (L) 37.5 - 51.0 % Final      Platlets No results found for: LABPLAT   MCV MCV   Date Value Ref Range Status   09/26/2023 98.0 (H) 79.0 - 97.0 fL Final          Sodium Sodium   Date Value Ref Range Status   09/26/2023 134 (L) 136 - 145 mmol/L Final      Potassium Potassium   Date Value Ref Range Status   09/26/2023 4.3 3.5 - 5.2 mmol/L Final   09/25/2023 4.1 3.5 - 5.2 mmol/L Final   09/25/2023 3.4 (L) 3.5 - 5.2 mmol/L Final      Chloride Chloride   Date Value Ref Range Status   09/26/2023 92 (L) 98 - 107 mmol/L Final      CO2 CO2   Date Value Ref Range Status   09/26/2023 25.0 22.0 - 29.0 mmol/L Final      BUN BUN   Date Value Ref Range Status   09/26/2023 34 (H) 8 - 23 mg/dL Final      Creatinine Creatinine   Date Value Ref Range Status   09/26/2023 3.28 (H) 0.76 - 1.27 mg/dL Final      Calcium Calcium   Date Value Ref Range Status   09/26/2023 9.1 8.6 - 10.5 mg/dL Final      PO4 No results found  for: CAPO4   Albumin No results found for: ALBUMIN   Magnesium No results found for: MG   Uric Acid No results found for: URICACID         Assessment & Plan       S/P shoulder hemiarthroplasty, left    (HFpEF) heart failure with preserved ejection fraction    Benign prostatic hyperplasia    Depression    ESRD (end stage renal disease)    Essential hypertension    GERD (gastroesophageal reflux disease)    Mixed hyperlipidemia    NERY (obstructive sleep apnea)    Paroxysmal atrial fibrillation      Assessment & Plan    ESRD:  On MWF saray. [facility]. RUE AV fistula     Anemia:  JENNYFER on HD days    Volume status :  Stable       Recs  HD per MWF saray  JENNYFER on HD days  Renal Diet   Resume home meds       I discussed the patients findings and my recommendations with patient    Pancho Carpenter MD  09/26/23  15:00 EDT

## 2023-09-26 NOTE — PLAN OF CARE
Problem: Adult Inpatient Plan of Care  Goal: Plan of Care Review  Recent Flowsheet Documentation  Taken 9/26/2023 1253 by Chery Rene OT  Progress: declining  Plan of Care Reviewed With:   patient   spouse  Outcome Evaluation: Pt is A/Ox1 and restless. Max Ax2 to reposition in bed for activity. Education reviewed with pt's spouse for L shoulder precautions, sling teaching, and ADL retraining. LUE HEP completed with PROM. Pt is acutely confused with limited participation this session. OT will continue to follow IP. Recommend IRF at d/c for best outcome.

## 2023-09-26 NOTE — CASE MANAGEMENT/SOCIAL WORK
"Discharge Planning Assessment  [facility]     Patient Name: Enrike Tomas  MRN: [id number]  Today's Date: 9/26/2023    Admit Date: 9/25/2023    Plan: [facility] [place] with in house Hemodialysis, pending insurance auth                   Discharge Plan       Row Name 09/26/23 1637       Plan    Plan [facility] [place] with in house Hemodialysis    Patient/Family in Agreement with Plan yes    Plan Comments Met with Mr. Tomas, his wife, Cleo, and daughter, Mireya, at the bedside for discharge planning.    Mr. Tomas lives with his wife in [facility].  Cleo states that prior to admission, her  ambulated with a rolling walker or wheelchair.  He also has a stair lift and power recliner at home.    Mr. Tomas goes to hemodialysis at [facility] on MWF with chair time at 10:30am.  He has a caregiver that assists him at home from 7am-11am on Mon-Fri.  He is current with home health, but he can't remember the name of the agency.    PCP is Blair Dhaliwal.  Insurance is [facility] with no interruption in coverage.  No ACP documents.    Mr. Tomas has been evaluated by PT and per notes, \"Pt performed STS and SPT to chair with RUE support and Mod A x2. Posterior lean noted with short shuffling steps. Assistance for stability required. Activity limited by balance deficits and fatigue. Decreased safety awareness noted. H/o recent falls. Recommend d/c to IRF to promote decrease in risk of falls.\"  Both PT and OT recommend IPR.      Discussed inpatient rehab and Mr. Tomas would likely need to be placed in a rehab facility that provides in-house hemodialysis, as transferring him multiple times from a wheelchair, into and out of, a HD chair would be difficult if trying to stay non weight bearing to his left UE.    Discussed rehab with HD and the family is agreeable to [facility] in [place] with in house HD.  Referral given to Cristine at [facility] and she " accepted the patient for IPR and will initiate the prior auth with the patient's [facility] Medicare for the rehab admission.  Mr. Tomas is receiving HD on MWF.  [facility] requested that the patient transfer to them on Friday, 9/9/23, after HD.  The insurance auth will likely be received by Friday.    Mr. Tomas' family is agreeable to the DC plan.    CM will follow up.                  Continued Care and Services - Admitted Since 9/25/2023       Destination Coordination complete.      Service Provider Request Status Selected Services Address Phone Fax Patient Preferred    [facility]  Selected Skilled Nursing [facility] [address] [facility] [id number] [phone] --                  Expected Discharge Date and Time       Expected Discharge Date Expected Discharge Time    Sep 29, 2023                      Belkis Maldonado RN

## 2023-09-27 ENCOUNTER — APPOINTMENT (OUTPATIENT)
Dept: NEPHROLOGY | Facility: HOSPITAL | Age: 88
End: 2023-09-27
Payer: MEDICARE

## 2023-09-27 LAB — HBV SURFACE AG SERPL QL IA: NORMAL

## 2023-09-27 PROCEDURE — A9270 NON-COVERED ITEM OR SERVICE: HCPCS | Performed by: INTERNAL MEDICINE

## 2023-09-27 PROCEDURE — 63710000001 PANTOPRAZOLE 40 MG TABLET DELAYED-RELEASE: Performed by: INTERNAL MEDICINE

## 2023-09-27 PROCEDURE — 63710000001 MELATONIN 5 MG TABLET: Performed by: INTERNAL MEDICINE

## 2023-09-27 PROCEDURE — 63710000001 FINASTERIDE 5 MG TABLET: Performed by: INTERNAL MEDICINE

## 2023-09-27 PROCEDURE — 63710000001 APIXABAN 2.5 MG TABLET: Performed by: INTERNAL MEDICINE

## 2023-09-27 PROCEDURE — 63710000001 TRAZODONE 50 MG TABLET: Performed by: INTERNAL MEDICINE

## 2023-09-27 PROCEDURE — 63710000001 MIRTAZAPINE 15 MG TABLET: Performed by: INTERNAL MEDICINE

## 2023-09-27 PROCEDURE — 63710000001 SERTRALINE 50 MG TABLET: Performed by: INTERNAL MEDICINE

## 2023-09-27 PROCEDURE — 63710000001 AMIODARONE 200 MG TABLET: Performed by: INTERNAL MEDICINE

## 2023-09-27 PROCEDURE — 63710000001 HYDROXYZINE 25 MG TABLET: Performed by: INTERNAL MEDICINE

## 2023-09-27 PROCEDURE — 63710000001 ATORVASTATIN 20 MG TABLET: Performed by: INTERNAL MEDICINE

## 2023-09-27 PROCEDURE — G0257 UNSCHED DIALYSIS ESRD PT HOS: HCPCS

## 2023-09-27 PROCEDURE — 63710000001 SENNOSIDES-DOCUSATE 8.6-50 MG TABLET: Performed by: INTERNAL MEDICINE

## 2023-09-27 PROCEDURE — 87340 HEPATITIS B SURFACE AG IA: CPT | Performed by: INTERNAL MEDICINE

## 2023-09-27 RX ADMIN — SODIUM CHLORIDE 50 ML/HR: 4.5 INJECTION, SOLUTION INTRAVENOUS at 14:40

## 2023-09-27 RX ADMIN — ATORVASTATIN CALCIUM 20 MG: 20 TABLET, FILM COATED ORAL at 20:04

## 2023-09-27 RX ADMIN — Medication 10 MG: at 12:20

## 2023-09-27 RX ADMIN — APIXABAN 2.5 MG: 2.5 TABLET, FILM COATED ORAL at 12:20

## 2023-09-27 RX ADMIN — HYDROXYZINE HYDROCHLORIDE 25 MG: 25 TABLET, FILM COATED ORAL at 00:29

## 2023-09-27 RX ADMIN — MIRTAZAPINE 7.5 MG: 15 TABLET, FILM COATED ORAL at 20:04

## 2023-09-27 RX ADMIN — FINASTERIDE 5 MG: 5 TABLET, FILM COATED ORAL at 12:21

## 2023-09-27 RX ADMIN — SENNOSIDES AND DOCUSATE SODIUM 2 TABLET: 50; 8.6 TABLET ORAL at 12:20

## 2023-09-27 RX ADMIN — APIXABAN 2.5 MG: 2.5 TABLET, FILM COATED ORAL at 20:04

## 2023-09-27 RX ADMIN — Medication 5 MG: at 20:04

## 2023-09-27 RX ADMIN — TRAZODONE HYDROCHLORIDE 50 MG: 50 TABLET ORAL at 20:04

## 2023-09-27 RX ADMIN — Medication 10 MG: at 01:13

## 2023-09-27 RX ADMIN — SENNOSIDES AND DOCUSATE SODIUM 2 TABLET: 50; 8.6 TABLET ORAL at 20:04

## 2023-09-27 RX ADMIN — HYDROXYZINE HYDROCHLORIDE 25 MG: 25 TABLET, FILM COATED ORAL at 12:39

## 2023-09-27 RX ADMIN — SERTRALINE 50 MG: 50 TABLET, FILM COATED ORAL at 12:21

## 2023-09-27 RX ADMIN — PANTOPRAZOLE SODIUM 40 MG: 40 TABLET, DELAYED RELEASE ORAL at 12:20

## 2023-09-27 RX ADMIN — AMIODARONE HYDROCHLORIDE 200 MG: 200 TABLET ORAL at 12:20

## 2023-09-27 NOTE — PROGRESS NOTES
Orthopedic Daily Progress Note      CC: Sleepy right now.    Pain controlled  General: no fevers, chills    No other complaints    Physical Exam:  I have reviewed the vital signs.  Temp:  [97.4 °F (36.3 °C)-98.9 °F (37.2 °C)] 98.1 °F (36.7 °C)  Heart Rate:  [52-79] 55  Resp:  [18-20] 20  BP: (157-228)/() 160/73    Objective:  Vital signs: (most recent): Blood pressure 160/73, pulse 55, temperature 98.1 °F (36.7 °C), temperature source Oral, resp. rate 20, SpO2 95 %.            General Appearance:    Alert, cooperative, no distress  Extremities: No clubbing, cyanosis, or edema to lower extremities  Pulses:  2+ in distal surgical extremity  Skin: Dressing Clean/dry/intact      Results Review:    I have reviewed the labs, radiology results and diagnostic studies:yes    Results from last 7 days   Lab Units 09/26/23  0519   WBC 10*3/mm3 6.62   HEMOGLOBIN g/dL 9.6*   PLATELETS 10*3/mm3 204     Results from last 7 days   Lab Units 09/26/23  0519   SODIUM mmol/L 134*   POTASSIUM mmol/L 4.3   CO2 mmol/L 25.0   CREATININE mg/dL 3.28*   GLUCOSE mg/dL 100*       I have reviewed the medications.    Assessment/Problem List  POD# 2 S/p Left shoulder hemiarthroplasty    Plan  PT/OT  To Rehab on Friday  Bandage can be removed on Monday  Ok to shower and rinse bandage/incision after nerve catheter comes out  F/u with me 3 weeks after d/c.  For Rehab/SNF: Left shoulder Passive ROM, FE no limit, ER to 30, IR to chest  Active ROM elbow/wrist/hand  NWB JOHN Tan MD  09/27/23  18:27 EDT

## 2023-09-27 NOTE — PLAN OF CARE
HD completed. Tolerated well. UF goal reached. Blood returned. Report given to primary RN.  Problem: Device-Related Complication Risk (Hemodialysis)  Goal: Safe, Effective Therapy Delivery  Outcome: Ongoing, Progressing     Problem: Hemodynamic Instability (Hemodialysis)  Goal: Effective Tissue Perfusion  Outcome: Ongoing, Progressing     Problem: Infection (Hemodialysis)  Goal: Absence of Infection Signs and Symptoms  Outcome: Ongoing, Progressing   Goal Outcome Evaluation:

## 2023-09-27 NOTE — PROGRESS NOTES
LOS: 0 days   Patient Care Team:  Blair Dhaliwal MD as PCP - General (Internal Medicine)  Blair Packer MD as Consulting Physician (Cardiology)  Michelle Aponte MD (Nephrology)    Chief Complaint: ESRD  Left shoulder hemiarthroplasty     Subjective     Seen on HD tolerating well so far. Goal UF 2.5 liter as tolerated. Bp 188 SBP. Good access pressure.       Subjective    History taken from: patient    Objective     Vital Sign Min/Max for last 24 hours  Temp  Min: 97.4 °F (36.3 °C)  Max: 98.9 °F (37.2 °C)   BP  Min: 150/66  Max: 224/96   Pulse  Min: 58  Max: 79   Resp  Min: 18  Max: 20   SpO2  Min: 92 %  Max: 98 %   Flow (L/min)  Min: 2  Max: 2   No data recorded         No intake/output data recorded.  I/O last 3 completed shifts:  In: 600 [P.O.:600]  Out: 150 [Urine:150]    Objective:  General Appearance:  Comfortable.    Vital signs: (most recent): Blood pressure (!) 208/82, pulse 62, temperature 97.4 °F (36.3 °C), temperature source Tympanic, resp. rate 18, SpO2 95 %.  Vital signs are normal.    Output: Minimal urine output.    HEENT: Normal HEENT exam.    Lungs:  Normal effort and normal respiratory rate.  Breath sounds clear to auscultation.  He is not in respiratory distress.  No decreased breath sounds.    Heart: Normal rate.  Regular rhythm.  S1 normal and S2 normal.  No murmur or gallop.   Abdomen: Abdomen is soft.  Bowel sounds are normal.     Pulses: Distal pulses are intact.    Neurological: Patient is alert and oriented to person, place and time.  Normal strength.    Pupils:  Pupils are equal, round, and reactive to light.              Results Review:     I reviewed the patient's new clinical results.    WBC WBC   Date Value Ref Range Status   09/26/2023 6.62 3.40 - 10.80 10*3/mm3 Final      HGB Hemoglobin   Date Value Ref Range Status   09/26/2023 9.6 (L) 13.0 - 17.7 g/dL Final      HCT Hematocrit   Date Value Ref Range Status   09/26/2023 28.8 (L) 37.5 - 51.0 % Final      Platlets No  results found for: LABPLAT   MCV MCV   Date Value Ref Range Status   09/26/2023 98.0 (H) 79.0 - 97.0 fL Final          Sodium Sodium   Date Value Ref Range Status   09/26/2023 134 (L) 136 - 145 mmol/L Final      Potassium Potassium   Date Value Ref Range Status   09/26/2023 4.3 3.5 - 5.2 mmol/L Final   09/25/2023 4.1 3.5 - 5.2 mmol/L Final   09/25/2023 3.4 (L) 3.5 - 5.2 mmol/L Final      Chloride Chloride   Date Value Ref Range Status   09/26/2023 92 (L) 98 - 107 mmol/L Final      CO2 CO2   Date Value Ref Range Status   09/26/2023 25.0 22.0 - 29.0 mmol/L Final      BUN BUN   Date Value Ref Range Status   09/26/2023 34 (H) 8 - 23 mg/dL Final      Creatinine Creatinine   Date Value Ref Range Status   09/26/2023 3.28 (H) 0.76 - 1.27 mg/dL Final      Calcium Calcium   Date Value Ref Range Status   09/26/2023 9.1 8.6 - 10.5 mg/dL Final      PO4 No results found for: CAPO4   Albumin No results found for: ALBUMIN   Magnesium No results found for: MG   Uric Acid No results found for: URICACID     Medication Review: yes    Assessment & Plan       S/P shoulder hemiarthroplasty, left    (HFpEF) heart failure with preserved ejection fraction    Benign prostatic hyperplasia    Depression    ESRD (end stage renal disease)    Essential hypertension    GERD (gastroesophageal reflux disease)    Mixed hyperlipidemia    NERY (obstructive sleep apnea)    Paroxysmal atrial fibrillation      Assessment & Plan    ESRD:  On MWF saray. [facility]. RUE AV fistula. Admitted for shoulder surgery      Anemia:  JENNYFER on HD days     Volume status :  Stable     HTN: Optimization of volume status with HD.        Recs  HD per MWF saray  JENNYFER on HD days  Renal Diet   Resume home meds     Pancho Carpenter MD  09/27/23  10:06 EDT

## 2023-09-27 NOTE — PROGRESS NOTES
IM progress note      Enrike Tomas  [id number]  1935     LOS: 0 days     Attending: Matteo Tan MD    Primary Care Provider: Blair Dhaliwal MD      Chief Complaint/Reason for visit:  No chief complaint on file.      Subjective   HD this AM. Confused when seen after. Denies f/c/n/v/sob/cp.    Objective        Visit Vitals  /73 (BP Location: Left arm, Patient Position: Lying)   Pulse 55   Temp 98.1 °F (36.7 °C) (Oral)   Resp 20   SpO2 95%     Temp (24hrs), Av.1 °F (36.7 °C), Min:97.4 °F (36.3 °C), Max:98.9 °F (37.2 °C)      Physical Therapy: 23: Pt with increased confusion this date. Bed mobility performed with Max A x2. Once sitting EOB, signficant posterior lean observed and fear of falling reported. Sitting balance improved with VC and time. Further mobility deferred due to balance deficits and fear of falling. Continue to recommend d/c to IRF to decrease risk of falls.        Physical Exam:     General Appearance:    Alert, cooperative, in no acute distress   Head:    Normocephalic, without obvious abnormality, atraumatic    Lungs:     Normal effort, symmetric chest rise,  clear to      auscultation bilaterally              Heart:    Regular rhythm and normal rate, normal S1 and S2    Abdomen:     Normal bowel sounds, no masses, no organomegaly, soft        non-tender, non-distended, no guarding, no rebound                tenderness   Extremities:   Sling on. CDI dressing. PNB cath present. Moves hand, fingers well.   No clubbing, cyanosis or edema.  No deformities.    Pulses:   Pulses palpable and equal bilaterally   Skin:   No bleeding, bruising or rash          Results Review:     I reviewed the patient's new clinical results.   Results from last 7 days   Lab Units 23  0519   WBC 10*3/mm3 6.62   HEMOGLOBIN g/dL 9.6*   HEMATOCRIT % 28.8*   PLATELETS 10*3/mm3 204       Results from last 7 days   Lab Units 23  0519 23  1111   SODIUM mmol/L 134*   --   --    POTASSIUM mmol/L 4.3 4.1 3.4*   CHLORIDE mmol/L 92*  --   --    CO2 mmol/L 25.0  --   --    BUN mg/dL 34*  --   --    CREATININE mg/dL 3.28*  --   --    CALCIUM mg/dL 9.1  --   --    GLUCOSE mg/dL 100*  --   --        I reviewed the patient's new imaging including images and reports.    All medications reviewed.   amiodarone, 200 mg, Oral, Daily  apixaban, 2.5 mg, Oral, BID  atorvastatin, 20 mg, Oral, Nightly  finasteride, 5 mg, Oral, Daily  melatonin, 5 mg, Oral, Nightly  mirtazapine, 7.5 mg, Oral, Nightly  pantoprazole, 40 mg, Oral, Daily  sennosides-docusate, 2 tablet, Oral, BID  sertraline, 50 mg, Oral, Daily  traZODone, 50 mg, Oral, Nightly      acetaminophen, 650 mg, Q4H PRN   Or  acetaminophen, 650 mg, Q4H PRN  HYDROmorphone, 0.5 mg, Q2H PRN   And  naloxone, 0.1 mg, Q5 Min PRN  hydrOXYzine, 25 mg, TID PRN  oxyCODONE, 5 mg, Q4H PRN  sodium chloride, 500 mL, TID PRN        Assessment & Plan       S/P shoulder hemiarthroplasty, left    (HFpEF) heart failure with preserved ejection fraction    Benign prostatic hyperplasia    Depression    ESRD (end stage renal disease)    Essential hypertension    GERD (gastroesophageal reflux disease)    Mixed hyperlipidemia    NERY (obstructive sleep apnea)    Paroxysmal atrial fibrillation      Plan  1. PT/OT. NWB, left UE, ROM hand, wrist, elbow.  2. Pain control-prns, interscalene nerve block cath with ropivacaine infusion.           3. IS-encourage  4. DVT proph- Mech/ mobilize.  5. Bowel regimen  6. Monitor post-op labs  7. DC planning . IPR desired.  following.      -GERD:  Resume PPI.  Formulary substitution when indicated.     -Dyslipidemia:  Resume home regimen Statin ( formulary substitution when appropriate).     -PAF: resume amiodarone, BB, and will resume DOAC       -BPH: maintain on home regimen.  Monitor for adequate spontaneous voiding.     -End-stage renal disease: NAL consulted. Will oversee HD.      MERCEDEZ Ryan  09/27/23  15:28  EDT

## 2023-09-27 NOTE — CASE MANAGEMENT/SOCIAL WORK
Continued Stay Note  [facility]     Patient Name: Enrike Tomas  MRN: [id number]  Today's Date: 9/27/2023    Admit Date: 9/25/2023    Plan: [facility] [place] with in house hemodialysis   Discharge Plan       Row Name 09/27/23 1435       Plan    Plan [facility] [place] with in house hemodialysis    Patient/Family in Agreement with Plan yes    Plan Comments CM spoke with patient's spouse via phone regarding DC planning. Updated spouse on pending referral at [facility]. Set up tentative [facility] ambulance for Friday, 9/29 @ 1545. Awaiting insurance authorization. CM following.    Final Discharge Disposition Code 03 - skilled nursing facility (SNF)                   Discharge Codes    No documentation.                 Expected Discharge Date and Time       Expected Discharge Date Expected Discharge Time    Sep 29, 2023               Erin Espana RN

## 2023-09-27 NOTE — PROGRESS NOTES
[facility]    Acute pain service Inpatient Progress Note    Patient Name: Enrike Tomas  :  1935  MRN:  [id number]        Acute Pain  Service Inpatient Progress Note:    Analgesia:Excellent  Pain Score:0/10  LOC: alert and awake  Resp Status: room air  Cardiac: VS stable  Side Effects:None  Catheter Site:clean, dressing intact and dry  Cath type: peripheral nerve cath with ON Q  Infusion rate: Ext/Pop: Basal: 1ml/hr, PIB: 5ml q 2 h, PCA: 5 ml q 30 min (1mL,5ml, 5ml InfuSystem Pump)  Dosing/Volume: ropivacaine 0.2%  Catheter Plan:Catheter to remain Insitu and Continue catheter infusion rate unchanged  Comments: Upon assessment, pt is back from dialysis and pt is sleeping. Patients daughter is in the room. Patients daughter states patient has been free from pain with the block. Patient is resting comfortably at present time

## 2023-09-28 PROCEDURE — A9270 NON-COVERED ITEM OR SERVICE: HCPCS | Performed by: INTERNAL MEDICINE

## 2023-09-28 PROCEDURE — 63710000001 MIRTAZAPINE 15 MG TABLET: Performed by: INTERNAL MEDICINE

## 2023-09-28 PROCEDURE — 63710000001 SERTRALINE 50 MG TABLET: Performed by: INTERNAL MEDICINE

## 2023-09-28 PROCEDURE — 63710000001 SENNOSIDES-DOCUSATE 8.6-50 MG TABLET: Performed by: INTERNAL MEDICINE

## 2023-09-28 PROCEDURE — 63710000001 HYDROXYZINE 25 MG TABLET: Performed by: INTERNAL MEDICINE

## 2023-09-28 PROCEDURE — 97110 THERAPEUTIC EXERCISES: CPT

## 2023-09-28 PROCEDURE — 63710000001 MELATONIN 5 MG TABLET: Performed by: INTERNAL MEDICINE

## 2023-09-28 PROCEDURE — 63710000001 FINASTERIDE 5 MG TABLET: Performed by: INTERNAL MEDICINE

## 2023-09-28 PROCEDURE — 63710000001 ATORVASTATIN 20 MG TABLET: Performed by: INTERNAL MEDICINE

## 2023-09-28 PROCEDURE — 97530 THERAPEUTIC ACTIVITIES: CPT

## 2023-09-28 PROCEDURE — 63710000001 APIXABAN 2.5 MG TABLET: Performed by: INTERNAL MEDICINE

## 2023-09-28 PROCEDURE — 63710000001 AMIODARONE 200 MG TABLET: Performed by: INTERNAL MEDICINE

## 2023-09-28 PROCEDURE — A9270 NON-COVERED ITEM OR SERVICE: HCPCS | Performed by: ORTHOPAEDIC SURGERY

## 2023-09-28 PROCEDURE — 97535 SELF CARE MNGMENT TRAINING: CPT

## 2023-09-28 PROCEDURE — 63710000001 OXYCODONE 5 MG TABLET: Performed by: ORTHOPAEDIC SURGERY

## 2023-09-28 PROCEDURE — 63710000001 TRAZODONE 50 MG TABLET: Performed by: INTERNAL MEDICINE

## 2023-09-28 PROCEDURE — 63710000001 PANTOPRAZOLE 40 MG TABLET DELAYED-RELEASE: Performed by: INTERNAL MEDICINE

## 2023-09-28 RX ADMIN — Medication 5 MG: at 20:35

## 2023-09-28 RX ADMIN — SENNOSIDES AND DOCUSATE SODIUM 2 TABLET: 50; 8.6 TABLET ORAL at 09:26

## 2023-09-28 RX ADMIN — PANTOPRAZOLE SODIUM 40 MG: 40 TABLET, DELAYED RELEASE ORAL at 09:26

## 2023-09-28 RX ADMIN — ATORVASTATIN CALCIUM 20 MG: 20 TABLET, FILM COATED ORAL at 20:35

## 2023-09-28 RX ADMIN — TRAZODONE HYDROCHLORIDE 50 MG: 50 TABLET ORAL at 20:35

## 2023-09-28 RX ADMIN — OXYCODONE 5 MG: 5 TABLET ORAL at 23:12

## 2023-09-28 RX ADMIN — APIXABAN 2.5 MG: 2.5 TABLET, FILM COATED ORAL at 09:26

## 2023-09-28 RX ADMIN — FINASTERIDE 5 MG: 5 TABLET, FILM COATED ORAL at 09:25

## 2023-09-28 RX ADMIN — SERTRALINE 50 MG: 50 TABLET, FILM COATED ORAL at 09:26

## 2023-09-28 RX ADMIN — SENNOSIDES AND DOCUSATE SODIUM 2 TABLET: 50; 8.6 TABLET ORAL at 20:35

## 2023-09-28 RX ADMIN — AMIODARONE HYDROCHLORIDE 200 MG: 200 TABLET ORAL at 09:26

## 2023-09-28 RX ADMIN — MIRTAZAPINE 7.5 MG: 15 TABLET, FILM COATED ORAL at 20:35

## 2023-09-28 RX ADMIN — SODIUM CHLORIDE 50 ML/HR: 4.5 INJECTION, SOLUTION INTRAVENOUS at 09:25

## 2023-09-28 RX ADMIN — APIXABAN 2.5 MG: 2.5 TABLET, FILM COATED ORAL at 20:35

## 2023-09-28 RX ADMIN — HYDROXYZINE HYDROCHLORIDE 25 MG: 25 TABLET, FILM COATED ORAL at 23:12

## 2023-09-28 NOTE — PROGRESS NOTES
LOS: 0 days   Patient Care Team:  Blair Dhaliwal MD as PCP - General (Internal Medicine)  Blair Packer MD as Consulting Physician (Cardiology)  Michelle Aponte MD (Nephrology)    Chief Complaint: ESRD  Left shoulder hemiarthroplasty     Subjective     Sitting in chair comfortable. No complains, Denies chest pain or shortness of breath.   Had dialysis yesterday.     History taken from: patient    Objective     Vital Sign Min/Max for last 24 hours  Temp  Min: 97.8 °F (36.6 °C)  Max: 98.3 °F (36.8 °C)   BP  Min: 141/68  Max: 184/72   Pulse  Min: 58  Max: 72   Resp  Min: 18  Max: 20   SpO2  Min: 93 %  Max: 97 %   Flow (L/min)  Min: 2  Max: 2   No data recorded         I/O this shift:  In: 400 [P.O.:400]  Out: -   I/O last 3 completed shifts:  In: 2391.7 [P.O.:180; I.V.:2211.7]  Out: 2550 [Urine:50]    Objective:  General Appearance:  Comfortable.    Vital signs: (most recent): Blood pressure 141/68, pulse 72, temperature 98.2 °F (36.8 °C), temperature source Oral, resp. rate 18, SpO2 97 %.  Vital signs are normal.    Output: Minimal urine output.    HEENT: Normal HEENT exam.    Lungs:  Normal effort and normal respiratory rate.  Breath sounds clear to auscultation.  He is not in respiratory distress.  No decreased breath sounds.    Heart: Normal rate.  Regular rhythm.  S1 normal and S2 normal.  No murmur or gallop.   Abdomen: Abdomen is soft.  Bowel sounds are normal.     Pulses: Distal pulses are intact.    Neurological: Patient is alert and oriented to person, place and time.  Normal strength.    Pupils:  Pupils are equal, round, and reactive to light.          Results Review:     I reviewed the patient's new clinical results.    WBC WBC   Date Value Ref Range Status   09/26/2023 6.62 3.40 - 10.80 10*3/mm3 Final      HGB Hemoglobin   Date Value Ref Range Status   09/26/2023 9.6 (L) 13.0 - 17.7 g/dL Final      HCT Hematocrit   Date Value Ref Range Status   09/26/2023 28.8 (L) 37.5 - 51.0 % Final       Platlets No results found for: LABPLAT   MCV MCV   Date Value Ref Range Status   09/26/2023 98.0 (H) 79.0 - 97.0 fL Final          Sodium Sodium   Date Value Ref Range Status   09/26/2023 134 (L) 136 - 145 mmol/L Final      Potassium Potassium   Date Value Ref Range Status   09/26/2023 4.3 3.5 - 5.2 mmol/L Final   09/25/2023 4.1 3.5 - 5.2 mmol/L Final      Chloride Chloride   Date Value Ref Range Status   09/26/2023 92 (L) 98 - 107 mmol/L Final      CO2 CO2   Date Value Ref Range Status   09/26/2023 25.0 22.0 - 29.0 mmol/L Final      BUN BUN   Date Value Ref Range Status   09/26/2023 34 (H) 8 - 23 mg/dL Final      Creatinine Creatinine   Date Value Ref Range Status   09/26/2023 3.28 (H) 0.76 - 1.27 mg/dL Final      Calcium Calcium   Date Value Ref Range Status   09/26/2023 9.1 8.6 - 10.5 mg/dL Final      PO4 No results found for: CAPO4   Albumin No results found for: ALBUMIN   Magnesium No results found for: MG   Uric Acid No results found for: URICACID     Medication Review: yes    Assessment & Plan       S/P shoulder hemiarthroplasty, left    (HFpEF) heart failure with preserved ejection fraction    Benign prostatic hyperplasia    Depression    ESRD (end stage renal disease)    Essential hypertension    GERD (gastroesophageal reflux disease)    Mixed hyperlipidemia    NERY (obstructive sleep apnea)    Paroxysmal atrial fibrillation      Assessment & Plan    ESRD:  On [facility] [place]. RUE AV fistula. Admitted for shoulder surgery      Anemia:  JENNYFER on HD days     Volume status :  Stable     HTN: Optimization of volume status with HD.        Recs  HD per [facility] while inpatient. No need for dialysis today.   JENNYFER on HD days  Renal Diet         Simone Jacobson MD  09/28/23  16:02 EDT

## 2023-09-28 NOTE — THERAPY TREATMENT NOTE
Patient Name: Enrike Tomas  : 1935    MRN: [id number]                              Today's Date: 2023       Admit Date: 2023    Visit Dx: No diagnosis found.  Patient Active Problem List   Diagnosis    (HFpEF) heart failure with preserved ejection fraction    Anemia of renal disease    At high risk for falls    Benign prostatic hyperplasia    Closed fracture of left acetabulum    Compression fracture of thoracic vertebra    Deficiency of other specified B group vitamins    Depression    ESRD (end stage renal disease)    Essential hypertension    GERD (gastroesophageal reflux disease)    Hepatic cyst    Long term (current) use of anticoagulants    Major depressive disorder, single episode, moderate    Mixed hyperlipidemia    Morgagni hernia    Occlusion of right vertebral artery    NERY (obstructive sleep apnea)    Fall    Severe malnutrition    Sustained SVT    Paroxysmal atrial fibrillation    Chronic heart failure with preserved ejection fraction (HFpEF)    S/P shoulder hemiarthroplasty, left     Past Medical History:   Diagnosis Date    A-fib     Abnormal ECG     occasional extra heartbeat    Arthritis     Atrial fibrillation     BPH (benign prostatic hyperplasia)     Cervical compression fracture 2023    currently wearing a c collar    Diverticulosis     Diverticulum of esophagus     puree diet and speech therapy    Elevated cholesterol     ESRD on hemodialysis     GERD (gastroesophageal reflux disease)     Heart failure     Hemodialysis patient     M,W,F    Hyperlipidemia     Hypertension     Low back pain     NERY (obstructive sleep apnea)     NOT USING CPAP    Recurrent falls     UTI (urinary tract infection)      Past Surgical History:   Procedure Laterality Date    CATARACT EXTRACTION      COLONOSCOPY      DIALYSIS FISTULA CREATION      ENDOSCOPY      INGUINAL HERNIA REPAIR      PROSTATE BIOPSY      THROAT SURGERY      diverticulum in throat    TONSILLECTOMY AND ADENOIDECTOMY       TOTAL SHOULDER ARTHROPLASTY W/ DISTAL CLAVICLE EXCISION Left 9/25/2023    Procedure: HEMIARTHROPLASTY, BICEPS TENODESIS - LEFT;  Surgeon: Matteo Tan MD;  Location: [facility];  Service: Orthopedics;  Laterality: Left;      General Information       Row Name 09/28/23 1023          Physical Therapy Time and Intention    Document Type therapy note (daily note)  -CM     Mode of Treatment individual therapy;physical therapy  -CM       Row Name 09/28/23 1023          General Information    Patient Profile Reviewed yes  -CM     Existing Precautions/Restrictions fall;left;shoulder;non-weight bearing;other (see comments)  sling with abduction pillow, IS nerve block  -CM     Barriers to Rehab medically complex;previous functional deficit;cognitive status  -CM       Row Name 09/28/23 1023          Cognition    Orientation Status (Cognition) oriented to;person;disoriented to;place;situation;time  -CM       Row Name 09/28/23 1023          Safety Issues, Functional Mobility    Safety Issues Affecting Function (Mobility) awareness of need for assistance;insight into deficits/self-awareness;safety precaution awareness;safety precautions follow-through/compliance;sequencing abilities  -CM     Impairments Affecting Function (Mobility) cognition;strength;range of motion (ROM);postural/trunk control;sensation/sensory awareness;endurance/activity tolerance;pain;balance  -CM               User Key  (r) = Recorded By, (t) = Taken By, (c) = Cosigned By      Initials Name Provider Type    Hortencia Rusos, PT Physical Therapist                   Mobility       Row Name 09/28/23 1023          Bed Mobility    Bed Mobility supine-sit  -CM     Supine-Sit [place] (Bed Mobility) verbal cues;moderate assist (50% patient effort);1 person assist  -       Row Name 09/28/23 1023          Bed-Chair Transfer    Bed-Chair [place] (Transfers) maximum assist (25% patient effort);1 person assist;verbal cues  -CM     Assistive  Device (Bed-Chair Transfers) other (see comments)  RUE support  -CM     Comment, (Bed-Chair Transfer) stand pivot from bed to chair, patient demo'd bilat knee buckling/tremors  -CM       Row Name 09/28/23 1023          Sit-Stand Transfer    Sit-Stand [place] (Transfers) moderate assist (50% patient effort);verbal cues;1 person assist  -CM     Assistive Device (Sit-Stand Transfers) other (see comments)  RUE support  -CM     Comment, (Sit-Stand Transfer) preparatory rocking performed, modA to clear hips, upon standing he has knee buckling/tremors  -CM       Row Name 09/28/23 1023          Mobility    Extremity Weight-bearing Status left upper extremity  -CM     Left Upper Extremity (Weight-bearing Status) non weight-bearing (NWB)  -CM               User Key  (r) = Recorded By, (t) = Taken By, (c) = Cosigned By      Initials Name Provider Type    Hortencia Russo, PT Physical Therapist                   Obj/Interventions       Row Name 09/28/23 1026          Motor Skills    Therapeutic Exercise hip;knee;ankle  -CM       Row Name 09/28/23 1026          Hip (Therapeutic Exercise)    Hip (Therapeutic Exercise) isometric exercises  -CM     Hip Isometrics (Therapeutic Exercise) bilateral;gluteal sets;10 repetitions;3 second hold  -CM       Row Name 09/28/23 1026          Knee (Therapeutic Exercise)    Knee (Therapeutic Exercise) isometric exercises;strengthening exercise  -CM     Knee Isometrics (Therapeutic Exercise) bilateral;quad sets;10 repetitions;3 second hold  -CM     Knee Strengthening (Therapeutic Exercise) bilateral;LAQ (long arc quad);10 repetitions  -CM       Row Name 09/28/23 1026          Ankle (Therapeutic Exercise)    Ankle (Therapeutic Exercise) AROM (active range of motion)  -CM     Ankle AROM (Therapeutic Exercise) bilateral;dorsiflexion;plantarflexion;10 repetitions  -CM       Row Name 09/28/23 1026          Balance    Balance Assessment sitting static balance;standing static balance;standing  dynamic balance  -CM     Static Sitting Balance contact guard  -CM     Position, Sitting Balance unsupported;sitting edge of bed  -CM     Static Standing Balance moderate assist  -CM     Position/Device Used, Standing Balance supported;other (see comments)  RUE support  -CM               User Key  (r) = Recorded By, (t) = Taken By, (c) = Cosigned By      Initials Name Provider Type    Hortencia Russo PT Physical Therapist                   Goals/Plan    No documentation.                  Clinical Impression       Row Name 09/28/23 1027          Pain    Pretreatment Pain Rating 0/10 - no pain  -CM     Posttreatment Pain Rating 0/10 - no pain  -CM       Row Name 09/28/23 1027          Plan of Care Review    Plan of Care Reviewed With patient;caregiver  -CM     Progress improving  -CM     Outcome Evaluation Patient remains confused however pleasant today and participated well with mobility. He performed bed mobility with modA with improved sitting balance at EOB today. He also tolerated transfer to chair, limited by bilateral knee buckling. Therex performed without complaint. Recommend D/C to SNF.  -CM       Row Name 09/28/23 1027          Vital Signs    O2 Delivery Pre Treatment nasal cannula  -CM     O2 Delivery Intra Treatment nasal cannula  -CM     O2 Delivery Post Treatment nasal cannula  -CM     Pre Patient Position Supine  -CM     Intra Patient Position Sitting  -CM     Post Patient Position Sitting  -CM       Row Name 09/28/23 1027          Positioning and Restraints    Pre-Treatment Position in bed  -CM     Post Treatment Position chair  -CM     In Chair reclined;call light within reach;encouraged to call for assist;exit alarm on;with family/caregiver;on mechanical lift sling;waffle cushion;with brace;notified nsg  -CM               User Key  (r) = Recorded By, (t) = Taken By, (c) = Cosigned By      Initials Name Provider Type    Hortencia Russo PT Physical Therapist                   Outcome  Measures       Row Name 09/28/23 1030          How much help from another person do you currently need...    Turning from your back to your side while in flat bed without using bedrails? 2  -CM     Moving from lying on back to sitting on the side of a flat bed without bedrails? 2  -CM     Moving to and from a bed to a chair (including a wheelchair)? 2  -CM     Standing up from a chair using your arms (e.g., wheelchair, bedside chair)? 2  -CM     Climbing 3-5 steps with a railing? 1  -CM     To walk in hospital room? 1  -CM     AM-PAC 6 Clicks Score (PT) 10  -CM     Highest level of mobility 4 --> Transferred to chair/commode  -CM       Row Name 09/28/23 1030 09/28/23 0953       Functional Assessment    Outcome Measure Options AM-PAC 6 Clicks Basic Mobility (PT)  -CM AM-PAC 6 Clicks Daily Activity (OT)  -KF              User Key  (r) = Recorded By, (t) = Taken By, (c) = Cosigned By      Initials Name Provider Type    CM Hortencia Sommer, PT Physical Therapist    KF Sadia Razo, OT Occupational Therapist                                 Physical Therapy Education       Title: PT OT SLP Therapies (In Progress)       Topic: Physical Therapy (In Progress)       Point: Mobility training (In Progress)       Learning Progress Summary             Patient Acceptance, E, NR by CM at 9/28/2023 1030    Acceptance, E,D, VU,DU by HP at 9/26/2023 1711    Acceptance, E,D, VU,NR by HP at 9/25/2023 1849   Caregiver Acceptance, E, NR by CM at 9/28/2023 1030                         Point: Home exercise program (In Progress)       Learning Progress Summary             Patient Acceptance, E, NR by CM at 9/28/2023 1030    Acceptance, E,D, VU,DU by HP at 9/26/2023 1711    Acceptance, E,D, VU,NR by HP at 9/25/2023 1849   Caregiver Acceptance, E, NR by CM at 9/28/2023 1030                         Point: Body mechanics (In Progress)       Learning Progress Summary             Patient Acceptance, E, NR by CM at 9/28/2023 1030     Acceptance, E,D, VU,DU by  at 9/26/2023 1711    Acceptance, E,D, VU,NR by  at 9/25/2023 1849   Caregiver Acceptance, E, NR by  at 9/28/2023 1030                         Point: Precautions (In Progress)       Learning Progress Summary             Patient Acceptance, E, NR by  at 9/28/2023 1030    Acceptance, E,D, VU,DU by  at 9/26/2023 1711    Acceptance, E,D, VU,NR by  at 9/25/2023 1849   Caregiver Acceptance, E, NR by  at 9/28/2023 1030                                         User Key       Initials Effective Dates Name Provider Type Discipline     06/01/21 -  Fouzia Woody, KIM Physical Therapist PT     09/22/22 -  Hortencia Sommer PT Physical Therapist PT                  PT Recommendation and Plan     Plan of Care Reviewed With: patient, caregiver  Progress: improving  Outcome Evaluation: Patient remains confused however pleasant today and participated well with mobility. He performed bed mobility with modA with improved sitting balance at EOB today. He also tolerated transfer to chair, limited by bilateral knee buckling. Therex performed without complaint. Recommend D/C to SNF.     Time Calculation:         PT Charges       Row Name 09/28/23 1030             Time Calculation    Start Time 0944  -CM      PT Received On 09/28/23  -CM      PT Goal Re-Cert Due Date 10/05/23  -CM         Timed Charges    36938 - PT Therapeutic Exercise Minutes 10  -CM      92394 - PT Therapeutic Activity Minutes 20  -CM         Total Minutes    Timed Charges Total Minutes 30  -CM       Total Minutes 30  -CM                User Key  (r) = Recorded By, (t) = Taken By, (c) = Cosigned By      Initials Name Provider Type    Hortencia Russo, KIM Physical Therapist                  Therapy Charges for Today       Code Description Service Date Service Provider Modifiers Qty    36060712162 HC PT THER PROC EA 15 MIN 9/28/2023 Hortencia Sommer, KIM GP 1    18856039552 HC PT THERAPEUTIC ACT EA 15 MIN 9/28/2023  Hortencia Sommer, PT GP 1            PT G-Codes  Outcome Measure Options: AM-PAC 6 Clicks Basic Mobility (PT)  AM-PAC 6 Clicks Score (PT): 10  AM-PAC 6 Clicks Score (OT): 9  PT Discharge Summary  Anticipated Discharge Disposition (PT): skilled nursing facility    Hortencia Sommer, PT  9/28/2023

## 2023-09-28 NOTE — PLAN OF CARE
Goal Outcome Evaluation:  Plan of Care Reviewed With: patient, caregiver        Progress: no change  Outcome Evaluation: Pt with limited active participation during OT session this date due to increased lethargy. OT reviewed L shoulder precautions, ADL retraining to maintain, sling management, and LUE HEP within MD parameters. Pt's caregiver was at the bedside and participatory during session. Pt tolerated PROM shoulder FE to 120, IR to chest and ER to 30. Elbow/wrist/hand AA/PROM completed due to pt lethargy and confusion. EOB/OOB activity deferred due to pt fatigue. OT will continue to follow. Recommend a d/c to SNF for best functional outcome.      Anticipated Discharge Disposition (OT): skilled nursing facility

## 2023-09-28 NOTE — PROGRESS NOTES
[place]    Acute pain service Inpatient Progress Note    Patient Name: Enrike Tomas  :  1935  MRN:  [id number]        Acute Pain  Service Inpatient Progress Note:    Analgesia:Good  Pain Score:4/10  LOC: alert and awake  Resp Status: room air  Cardiac: VS stable  Side Effects:None  Catheter Site:clean, dressing intact and dry  Cath type: peripheral nerve cath with ON Q  Infusion rate: Ext/Pop: Basal: 1ml/hr, PIB: 5ml q 2 h, PCA: 5 ml q 30 min (1mL,5ml, 5ml InfuSystem Pump)  Dosing/Volume: ropivacaine 0.2%  Catheter Plan:Catheter to remain Insitu and Continue catheter infusion rate unchanged

## 2023-09-28 NOTE — PLAN OF CARE
Goal Outcome Evaluation: Confusion resolving. Sling in place Pain controlled with infu block. Neuro checks WNL. Up to BSC with 2 max assist. Dialysis this AM. Wife at bedside,

## 2023-09-28 NOTE — PLAN OF CARE
Goal Outcome Evaluation:  Plan of Care Reviewed With: patient, caregiver        Progress: improving  Outcome Evaluation: Patient remains confused however pleasant today and participated well with mobility. He performed bed mobility with modA with improved sitting balance at EOB today. He also tolerated transfer to chair, limited by bilateral knee buckling. Therex performed without complaint. Recommend D/C to SNF.      Anticipated Discharge Disposition (PT): skilled nursing facility

## 2023-09-28 NOTE — THERAPY TREATMENT NOTE
Patient Name: Enrike Tomas  : 1935    MRN: [id number]                              Today's Date: 2023       Admit Date: 2023    Visit Dx: No diagnosis found.  Patient Active Problem List   Diagnosis    (HFpEF) heart failure with preserved ejection fraction    Anemia of renal disease    At high risk for falls    Benign prostatic hyperplasia    Closed fracture of left acetabulum    Compression fracture of thoracic vertebra    Deficiency of other specified B group vitamins    Depression    ESRD (end stage renal disease)    Essential hypertension    GERD (gastroesophageal reflux disease)    Hepatic cyst    Long term (current) use of anticoagulants    Major depressive disorder, single episode, moderate    Mixed hyperlipidemia    Morgagni hernia    Occlusion of right vertebral artery    NERY (obstructive sleep apnea)    Fall    Severe malnutrition    Sustained SVT    Paroxysmal atrial fibrillation    Chronic heart failure with preserved ejection fraction (HFpEF)    S/P shoulder hemiarthroplasty, left     Past Medical History:   Diagnosis Date    A-fib     Abnormal ECG     occasional extra heartbeat    Arthritis     Atrial fibrillation     BPH (benign prostatic hyperplasia)     Cervical compression fracture 2023    currently wearing a c collar    Diverticulosis     Diverticulum of esophagus     puree diet and speech therapy    Elevated cholesterol     ESRD on hemodialysis     GERD (gastroesophageal reflux disease)     Heart failure     Hemodialysis patient     M,W,F    Hyperlipidemia     Hypertension     Low back pain     NERY (obstructive sleep apnea)     NOT USING CPAP    Recurrent falls     UTI (urinary tract infection)      Past Surgical History:   Procedure Laterality Date    CATARACT EXTRACTION      COLONOSCOPY      DIALYSIS FISTULA CREATION      ENDOSCOPY      INGUINAL HERNIA REPAIR      PROSTATE BIOPSY      THROAT SURGERY      diverticulum in throat    TONSILLECTOMY AND ADENOIDECTOMY       TOTAL SHOULDER ARTHROPLASTY W/ DISTAL CLAVICLE EXCISION Left 9/25/2023    Procedure: HEMIARTHROPLASTY, BICEPS TENODESIS - LEFT;  Surgeon: Matteo Tan MD;  Location: [facility];  Service: Orthopedics;  Laterality: Left;      General Information       Row Name 09/28/23 0940          OT Time and Intention    Document Type therapy note (daily note)  -KF     Mode of Treatment occupational therapy;individual therapy  -KF       Row Name 09/28/23 0940          General Information    Patient Profile Reviewed yes  -KF     Existing Precautions/Restrictions fall;left;shoulder;non-weight bearing;other (see comments)  sling with abduction pillow, IS nerve block  -KF     Barriers to Rehab medically complex;previous functional deficit;cognitive status  -KF       Row Name 09/28/23 0940          Cognition    Orientation Status (Cognition) oriented to;person;disoriented to;place;situation;time  -KF       Row Name 09/28/23 0940          Safety Issues, Functional Mobility    Safety Issues Affecting Function (Mobility) ability to follow commands;awareness of need for assistance;impulsivity;insight into deficits/self-awareness;judgment;problem-solving;safety precaution awareness;safety precautions follow-through/compliance;sequencing abilities;at risk behavior observed  -KF     Impairments Affecting Function (Mobility) cognition;strength;range of motion (ROM);postural/trunk control;sensation/sensory awareness;endurance/activity tolerance;pain;balance  -KF               User Key  (r) = Recorded By, (t) = Taken By, (c) = Cosigned By      Initials Name Provider Type    KF Sadia Razo OT Occupational Therapist                     Mobility/ADL's       Row Name 09/28/23 0941          Bed Mobility    Comment, (Bed Mobility) EOB/OOB activity deferred this date due to patient lethargy and inability to remain awake during session.  -KF       Row Name 09/28/23 0941          Activities of Daily Living    BADL Assessment/Intervention  bathing;upper body dressing  -       Row Name 09/28/23 0941          Mobility    Extremity Weight-bearing Status left upper extremity  -     Left Upper Extremity (Weight-bearing Status) non weight-bearing (NWB)  -       Row Name 09/28/23 0941          Bathing Assessment/Intervention    Comment, (Bathing) Education reviewed with pt's caregiver for L shoulder precautions, nerve cath management including no showering while in place, and axilla care. Pt limited by confusion and lethargy.  -       Row Name 09/28/23 0941          Upper Body Dressing Assessment/Training    Rush Level (Upper Body Dressing) dependent (less than 25% patient effort);upper body dressing skills;other (see comments)  sling managment  -     Position (Upper Body Dressing) supine  -     Comment, (Upper Body Dressing) Education reviewed with pt's caregiver for L shoulder precautions, ADL retraining, and sling management. Pt's caregiver participatory during session. Pt limited by confusion and lethargy.  -               User Key  (r) = Recorded By, (t) = Taken By, (c) = Cosigned By      Initials Name Provider Type    Sadia Brunner OT Occupational Therapist                   Obj/Interventions       Row Name 09/28/23 0944          Shoulder (Therapeutic Exercise)    Shoulder (Therapeutic Exercise) PROM (passive range of motion)  -     Shoulder PROM (Therapeutic Exercise) left;flexion;extension;external rotation;internal rotation;supine;10 repetitions;other (see comments)  Pt tolerated PROM FE to 120, ER to 30.  -       Row Name 09/28/23 0944          Elbow/Forearm (Therapeutic Exercise)    Elbow/Forearm (Therapeutic Exercise) AAROM (active assistive range of motion);PROM (passive range of motion)  -     Elbow/Forearm AAROM (Therapeutic Exercise) left;flexion;extension;supination;pronation;supine;3 repetitions  -     Elbow/Forearm PROM (Therapeutic Exercise) left;flexion;extension;supination;pronation;supine;10  repetitions  -KF       Row Name 09/28/23 0944          Wrist (Therapeutic Exercise)    Wrist (Therapeutic Exercise) AAROM (active assistive range of motion);PROM (passive range of motion)  -KF     Wrist AAROM (Therapeutic Exercise) left;flexion;extension;3 repetitions  -KF     Wrist PROM (Therapeutic Exercise) left;flexion;extension;10 repetitions  -KF       Row Name 09/28/23 0944          Hand (Therapeutic Exercise)    Hand (Therapeutic Exercise) AROM (active range of motion);PROM (passive range of motion)  -KF     Hand AROM/AAROM (Therapeutic Exercise) left;AAROM (active assistive range of motion);finger flexion;finger extension;3 repetitions  -KF     Hand PROM (Therapeutic Exercise) left;finger flexion;finger extension;10 repetitions  -KF       Row Name 09/28/23 0944          Motor Skills    Therapeutic Exercise shoulder;elbow/forearm;wrist;hand;other (see comments)  Reviewed written HEP within MD parameters with the pt's caregiver. Pt able to complete x1-2 reps actively at the hand/wrist/elbow before falling asleep, otherwise all exercises completed passively. Unable to perform scap retractions due to pt lethargy.  -KF               User Key  (r) = Recorded By, (t) = Taken By, (c) = Cosigned By      Initials Name Provider Type    KF Sadia Razo, BETSY Occupational Therapist                   Goals/Plan    No documentation.                  Clinical Impression       Row Name 09/28/23 0948          Pain Assessment    Pretreatment Pain Rating 0/10 - no pain  -KF     Posttreatment Pain Rating 0/10 - no pain  -KF     Pain Intervention(s) Repositioned;Ambulation/increased activity  -       Row Name 09/28/23 0948          Plan of Care Review    Plan of Care Reviewed With patient;caregiver  -KF     Progress no change  -KF     Outcome Evaluation Pt with limited active participation during OT session this date due to increased lethargy. OT reviewed L shoulder precautions, ADL retraining to maintain, sling management,  and LUE HEP within MD parameters. Pt's caregiver was at the bedside and participatory during session. Pt tolerated PROM shoulder FE to 120, IR to chest and ER to 30. Elbow/wrist/hand AA/PROM completed due to pt lethargy and confusion. EOB/OOB activity deferred due to pt fatigue. OT will continue to follow. Recommend a d/c to SNF for best functional outcome.  -       Row Name 09/28/23 0948          Therapy Assessment/Plan (OT)    Rehab Potential (OT) good, to achieve stated therapy goals  -     Criteria for Skilled Therapeutic Interventions Met (OT) yes  -KF     Therapy Frequency (OT) daily  -       Row Name 09/28/23 0948          Therapy Plan Review/Discharge Plan (OT)    Anticipated Discharge Disposition (OT) skilled nursing facility  -       Row Name 09/28/23 0948          Vital Signs    Pre SpO2 (%) 93  -KF     O2 Delivery Pre Treatment nasal cannula  -KF     Intra SpO2 (%) 92  -KF     O2 Delivery Intra Treatment nasal cannula  -KF     Post SpO2 (%) 93  -KF     O2 Delivery Post Treatment nasal cannula  -KF     Pre Patient Position Supine  -KF     Intra Patient Position Supine  -KF     Post Patient Position Supine  -KF       Row Name 09/28/23 0948          Positioning and Restraints    Pre-Treatment Position in bed  -KF     Post Treatment Position bed  -KF     In Bed supine;call light within reach;encouraged to call for assist;exit alarm on;with family/caregiver;with brace  -KF               User Key  (r) = Recorded By, (t) = Taken By, (c) = Cosigned By      Initials Name Provider Type    Sadia Brunner, BETSY Occupational Therapist                   Outcome Measures       Row Name 09/28/23 0953          How much help from another is currently needed...    Putting on and taking off regular lower body clothing? 1  -KF     Bathing (including washing, rinsing, and drying) 2  -KF     Toileting (which includes using toilet bed pan or urinal) 1  -KF     Putting on and taking off regular upper body clothing 1   -KF     Taking care of personal grooming (such as brushing teeth) 2  -KF     Eating meals 2  -KF     AM-PAC 6 Clicks Score (OT) 9  -KF       Row Name 09/27/23 2223          How much help from another person do you currently need...    Turning from your back to your side while in flat bed without using bedrails? 2  -WM     Moving from lying on back to sitting on the side of a flat bed without bedrails? 2  -WM     Moving to and from a bed to a chair (including a wheelchair)? 2  -WM     Standing up from a chair using your arms (e.g., wheelchair, bedside chair)? 2  -WM     Climbing 3-5 steps with a railing? 1  -WM     To walk in hospital room? 1  -WM     AM-PAC 6 Clicks Score (PT) 10  -WM     Highest level of mobility 4 --> Transferred to chair/commode  -WM       Row Name 09/28/23 0953          Functional Assessment    Outcome Measure Options AM-PAC 6 Clicks Daily Activity (OT)  -KF               User Key  (r) = Recorded By, (t) = Taken By, (c) = Cosigned By      Initials Name Provider Type     Megan Lu, RN Registered Nurse    Sadia Brunner, BETSY Occupational Therapist                    Occupational Therapy Education       Title: PT OT SLP Therapies (In Progress)       Topic: Occupational Therapy (In Progress)       Point: ADL training (In Progress)       Description:   Instruct learner(s) on proper safety adaptation and remediation techniques during self care or transfers.   Instruct in proper use of assistive devices.                  Learning Progress Summary             Patient Acceptance, E, NR by KF at 9/28/2023 0850    Acceptance, E,D, VU,NR by TB at 9/26/2023 1257    Comment: Spouse present and participating in teaching. Pt limited by acute confusion.    Eager, CHRISSY,TB,D,H, NR by AR at 9/25/2023 1856   Family Acceptance, E,D, VU,NR by TB at 9/26/2023 1257    Comment: Spouse present and participating in teaching. Pt limited by acute confusion.    Juan, CHRISSY,TB,D,H, NR by AR at 9/25/2023 1856   Caregiver  Acceptance, E, NR by KF at 9/28/2023 0850                         Point: Home exercise program (In Progress)       Description:   Instruct learner(s) on appropriate technique for monitoring, assisting and/or progressing therapeutic exercises/activities.                  Learning Progress Summary             Patient Acceptance, E, NR by KF at 9/28/2023 0850    Acceptance, E,D, VU,NR by TB at 9/26/2023 1257    Comment: Spouse present and participating in teaching. Pt limited by acute confusion.    Eager, E,TB,D,H, NR by AR at 9/25/2023 1856   Family Acceptance, E,D, VU,NR by TB at 9/26/2023 1257    Comment: Spouse present and participating in teaching. Pt limited by acute confusion.    Eager, E,TB,D,H, NR by AR at 9/25/2023 1856   Caregiver Acceptance, E, NR by KF at 9/28/2023 0850                         Point: Precautions (In Progress)       Description:   Instruct learner(s) on prescribed precautions during self-care and functional transfers.                  Learning Progress Summary             Patient Acceptance, E, NR by KF at 9/28/2023 0850    Acceptance, E,D, VU,NR by TB at 9/26/2023 1257    Comment: Spouse present and participating in teaching. Pt limited by acute confusion.    Eager, E,TB,D,H, NR by AR at 9/25/2023 1856   Family Acceptance, E,D, VU,NR by TB at 9/26/2023 1257    Comment: Spouse present and participating in teaching. Pt limited by acute confusion.    Eager, E,TB,D,H, NR by AR at 9/25/2023 1856   Caregiver Acceptance, E, NR by KF at 9/28/2023 0850                         Point: Body mechanics (In Progress)       Description:   Instruct learner(s) on proper positioning and spine alignment during self-care, functional mobility activities and/or exercises.                  Learning Progress Summary             Patient Acceptance, E, NR by KF at 9/28/2023 0850    Eager, E,TB,D,H, NR by AR at 9/25/2023 1856   Family Eager, E,TB,D,H, NR by AR at 9/25/2023 1856   Caregiver Acceptance, E, NR by KF at  9/28/2023 0850                                         User Key       Initials Effective Dates Name Provider Type Discipline    TB 07/11/23 -  Chery Rene OT Occupational Therapist OT    AR 07/11/23 -  Debbie Espinosa OT Occupational Therapist OT    KF 08/09/23 -  Sadia Razo OT Occupational Therapist OT                  OT Recommendation and Plan  Therapy Frequency (OT): daily  Plan of Care Review  Plan of Care Reviewed With: patient, caregiver  Progress: no change  Outcome Evaluation: Pt with limited active participation during OT session this date due to increased lethargy. OT reviewed L shoulder precautions, ADL retraining to maintain, sling management, and LUE HEP within MD parameters. Pt's caregiver was at the bedside and participatory during session. Pt tolerated PROM shoulder FE to 120, IR to chest and ER to 30. Elbow/wrist/hand AA/PROM completed due to pt lethargy and confusion. EOB/OOB activity deferred due to pt fatigue. OT will continue to follow. Recommend a d/c to SNF for best functional outcome.     Time Calculation:         Time Calculation- OT       Row Name 09/28/23 0954             Time Calculation- OT    OT Start Time 0850  -KF      OT Received On 09/28/23  -KF      OT Goal Re-Cert Due Date 10/05/23  -KF         Timed Charges    68410 - OT Therapeutic Exercise Minutes 15  -KF      99347 - OT Self Care/Mgmt Minutes 10  -KF         Total Minutes    Timed Charges Total Minutes 25  -KF       Total Minutes 25  -KF                User Key  (r) = Recorded By, (t) = Taken By, (c) = Cosigned By      Initials Name Provider Type     Sadia Razo OT Occupational Therapist                  Therapy Charges for Today       Code Description Service Date Service Provider Modifiers Qty    01890244986 HC OT THER PROC EA 15 MIN 9/28/2023 Sadia Razo OT GO 1    78432109265 HC OT SELF CARE/MGMT/TRAIN EA 15 MIN 9/28/2023 Sadia Razo OT GO 1                 Sadia Razo  OT  9/28/2023

## 2023-09-28 NOTE — PROGRESS NOTES
Clinical Nutrition   Nutrition Support Assessment  Reason for Visit: Identified at risk by screening criteria, MST score 2+, Reduced oral intake      Patient Name: Enrike Tomas  YOB: 1935  MRN: [id number]  Date of Encounter: 09/28/23 17:15 EDT  Admission date: 9/25/2023    Comments:    Nutrition Assessment   Admission Diagnosis:  S/P shoulder hemiarthroplasty, left [Z96.612]      Problem List:    S/P shoulder hemiarthroplasty, left    (HFpEF) heart failure with preserved ejection fraction    Benign prostatic hyperplasia    Depression    ESRD (end stage renal disease)    Essential hypertension    GERD (gastroesophageal reflux disease)    Mixed hyperlipidemia    NERY (obstructive sleep apnea)    Paroxysmal atrial fibrillation        PMH:   He  has a past medical history of A-fib, Abnormal ECG, Arthritis, Atrial fibrillation, BPH (benign prostatic hyperplasia), Cervical compression fracture (06/05/2023), Diverticulosis, Diverticulum of esophagus, Elevated cholesterol, ESRD on hemodialysis, GERD (gastroesophageal reflux disease), Heart failure, Hemodialysis patient, Hyperlipidemia, Hypertension, Low back pain (2022), NERY (obstructive sleep apnea), Recurrent falls, and UTI (urinary tract infection).    PSH:  He  has a past surgical history that includes Tonsillectomy and adenoidectomy; Cataract extraction; Dialysis fistula creation; Inguinal hernia repair; Prostate biopsy; Throat surgery; Colonoscopy; Esophagogastroduodenoscopy; and Total shoulder arthroplasty w/ distal clavicle excision (Left, 9/25/2023).    Applicable Nutrition Concerns:   Skin:  Oral:  GI:    Applicable Interval History:   9/25 s/p 1. Left shoulder hemiarthroplasty.    2. Left biceps tenodesis.        Reported/Observed/Food/Nutrition Related History:   Pt's daughter reports pt has not been eating much at at home a few bites at each meal, he drinks water, has a cup of coffee int he morning. He is also drinking 4 Nepro  ONS  daily    Anthropometrics   Height:  167.6 cm (66') per EHR review  Last Filed Weight:  54.2 kg  Method:    BMI:  19.2  BMI classification: Normal: 18.5-24.9kg/m2 low based on age  IBW:      Weight Weight (kg) Weight (lbs) Weight Method Visit Report                 9/12/2023 54.2 kg 119 lb 7.8 oz Standing scale -   9/10/2023 53.071 kg 117 lb Stated -   8/14/2023 50.803 kg 112 lb - Report   7/11/2023 48 kg 105 lb 13.1 oz Standing scale -   6/29/2023 47.628 kg 105 lb - Report   6/16/2023 49.17 kg  58.514 kg 108 lb 6.4 oz  129 lb Stated -   6/8/2023 58.9 kg 129 lb 13.6 oz - -   6/7/2023 59.421 kg 131 lb Bed scale -   6/6/2023 51.574 kg 113 lb 11.2 oz - -   6/5/2023 51.574 kg  58 kg 113 lb 11.2 oz  127 lb 13.9 oz Bed scale  Standing scale      UBW:    Weight change:  8 lb loss over 3 months; 6 % loss of UBW; does not meet criterion for malnutrition. Pt has demonstrated weight regain.    Nutrition Focused Physical Exam     Date:         Unable to perform exam due to: Patient body position, Pt unable to participate at time of visit, Defer pending indication    NFPE completed, patient does not meet criteria for MSA at this time.     Patient meets criteria for malnutrition diagnosis, see MSA note.    Current Nutrition Prescription   PO: Diet: Regular/House Diet; Texture: Regular Texture (IDDSI 7); Fluid Consistency: Thin (IDDSI 0)  Oral Nutrition Supplement:   Average Intake: 3 Days:  38% of 9 meals 50% doc of Nepro supplement intake     Nutrition Diagnosis   Date:              Updated:    Problem Predicted suboptimal energy intake   Etiology Post-op pain, ESRD   Signs/Symptoms ~ 40% average intake plus ONS   Status:     Date:      9/28 Updated:     Problem Altered nutrition related laboratory values   Etiology ESRD -[facility] -MWF   Signs/Symptoms Elev BUN and creatinine   Status: new    Goal:   General: Nutrition to support treatment, Improve nutrition related labs  PO: Increase intake, Meet estimated needs  EN/PN:  N/A    Nutrition Intervention      Follow treatment progress, Care plan reviewed, Encourage intake, Supplement provided    Provide Novasource renal twice daily    Monitoring/Evaluation:   Per protocol, I&O, PO intake, Supplement intake, Pertinent labs, Weight, Symptoms, POC/GOC      Airam Rios MS,RD,LD  Time Spent: 20 mins

## 2023-09-28 NOTE — PROGRESS NOTES
[place]    Acute pain service Inpatient Progress Note    Patient Name: Enrike Tomas  :  1935  MRN:  [id number]        Acute Pain  Service Inpatient Progress Note:    Analgesia:Excellent  Pain Score:0/10  LOC: alert and awake  Resp Status: room air  Cardiac: VS stable  Side Effects:None  Catheter Site:clean, dressing intact and dry  Cath type: peripheral nerve cath with ON Q  Infusion rate: Ext/Pop: Basal: 1ml/hr, PIB: 5ml q 2 h, PCA: 5 ml q 30 min (1mL,5ml, 5ml InfuSystem Pump)  Dosing/Volume: ropivacaine 0.2%  Catheter Plan:Catheter to remain Insitu and Continue catheter infusion rate unchanged  Comments: The neuro assessment of the operative extremity includes the ability to do finger flexion and extension; includes the ability to do wrist flexion and extension, includes the ability to do elbow flexion and extension.  The neuro exam of the patient includes sensory function throughout the operative extremity.

## 2023-09-28 NOTE — PROGRESS NOTES
IM progress note      Enrike Tomas  [id number]  1935     LOS: 0 days     Attending: Matteo Tan MD    Primary Care Provider: Blair Dhaliwal MD      Chief Complaint/Reason for visit:  No chief complaint on file.      Subjective   Doing ok. No new complaints. Daughter present.   No n/vom/sob.     Objective        Visit Vitals  /77 (BP Location: Left leg, Patient Position: Sitting)   Pulse 68   Temp 98 °F (36.7 °C) (Oral)   Resp 18   SpO2 94%     Temp (24hrs), Av °F (36.7 °C), Min:97.8 °F (36.6 °C), Max:98.3 °F (36.8 °C)      Physical Therapy:    Date of Service: 23  Creation Time: 23     Signed         Goal Outcome Evaluation:  Plan of Care Reviewed With: patient, caregiver  Progress: improving  Outcome Evaluation: Patient remains confused however pleasant today and participated well with mobility. He performed bed mobility with modA with improved sitting balance at EOB today. He also tolerated transfer to chair, limited by bilateral knee buckling. Therex performed without complaint. Recommend D/C to SNF.        Anticipated Discharge Disposition (PT): skilled nursing facility                   Physical Exam:     General Appearance:    Alert, cooperative, in no acute distress   Head:    Normocephalic, without obvious abnormality, atraumatic    Lungs:     Normal effort, symmetric chest rise,  clear to      auscultation bilaterally              Heart:    Regular rhythm and normal rate, normal S1 and S2    Abdomen:     Normal bowel sounds, no masses, no organomegaly, soft        non-tender, non-distended, no guarding, no rebound                tenderness   Extremities:   Sling on. CDI dressing. PNB cath present. Moves hand, fingers well.   No clubbing, cyanosis or edema.  No deformities.    Pulses:   Pulses palpable and equal bilaterally   Skin:   No bleeding, bruising or rash          Results Review:     I reviewed the patient's new clinical results.   Results from  last 7 days   Lab Units 09/26/23 0519   WBC 10*3/mm3 6.62   HEMOGLOBIN g/dL 9.6*   HEMATOCRIT % 28.8*   PLATELETS 10*3/mm3 204       Results from last 7 days   Lab Units 09/26/23 0519 09/25/23 2017 09/25/23  1111   SODIUM mmol/L 134*  --   --    POTASSIUM mmol/L 4.3 4.1 3.4*   CHLORIDE mmol/L 92*  --   --    CO2 mmol/L 25.0  --   --    BUN mg/dL 34*  --   --    CREATININE mg/dL 3.28*  --   --    CALCIUM mg/dL 9.1  --   --    GLUCOSE mg/dL 100*  --   --        I reviewed the patient's new imaging including images and reports.    All medications reviewed.   amiodarone, 200 mg, Oral, Daily  apixaban, 2.5 mg, Oral, BID  atorvastatin, 20 mg, Oral, Nightly  finasteride, 5 mg, Oral, Daily  melatonin, 5 mg, Oral, Nightly  mirtazapine, 7.5 mg, Oral, Nightly  pantoprazole, 40 mg, Oral, Daily  sennosides-docusate, 2 tablet, Oral, BID  sertraline, 50 mg, Oral, Daily  traZODone, 50 mg, Oral, Nightly      acetaminophen, 650 mg, Q4H PRN   Or  acetaminophen, 650 mg, Q4H PRN  HYDROmorphone, 0.5 mg, Q2H PRN   And  naloxone, 0.1 mg, Q5 Min PRN  hydrOXYzine, 25 mg, TID PRN  oxyCODONE, 5 mg, Q4H PRN  sodium chloride, 500 mL, TID PRN        Assessment & Plan       S/P shoulder hemiarthroplasty, left    (HFpEF) heart failure with preserved ejection fraction    Benign prostatic hyperplasia    Depression    ESRD (end stage renal disease)    Essential hypertension    GERD (gastroesophageal reflux disease)    Mixed hyperlipidemia    NERY (obstructive sleep apnea)    Paroxysmal atrial fibrillation      Plan  1. PT/OT. NWB, left UE, ROM hand, wrist, elbow.  2. Pain control-prns, interscalene nerve block cath with ropivacaine infusion.           3. IS-encourage  4. DVT proph- Mech/ mobilize.  5. Bowel regimen  6. Monitor post-op labs  7. DC planning . IPR tomorrow afternoon.       -GERD:  Resume PPI.  Formulary substitution when indicated.     -Dyslipidemia:  Resume home regimen Statin ( formulary substitution when appropriate).     -PAF:  resume amiodarone, BB, and will resume DOAC       -BPH: maintain on home regimen.  Monitor for adequate spontaneous voiding.     -End-stage renal disease: NAL consulted. Following HD.      Gian Vazquez MD  09/28/23  14:45 EDT

## 2023-09-28 NOTE — CASE MANAGEMENT/SOCIAL WORK
Continued Stay Note  [facility]     Patient Name: Enrike Tomas  MRN: [id number]  Today's Date: 9/28/2023    Admit Date: 9/25/2023    Plan: [facility]   Discharge Plan       Row Name 09/28/23 1113       Plan    Plan [facility]    Patient/Family in Agreement with Plan yes    Plan Comments Plan is [facility] pending insurance approval. [facility] is scheduled for Fri. 9/29 at 15:45. PCS is on the chart. CM will continue to follow.    Final Discharge Disposition Code 03 - skilled nursing facility (SNF)                   Discharge Codes    No documentation.                 Expected Discharge Date and Time       Expected Discharge Date Expected Discharge Time    Sep 29, 2023               Boris Doe RN

## 2023-09-29 ENCOUNTER — APPOINTMENT (OUTPATIENT)
Dept: NEPHROLOGY | Facility: HOSPITAL | Age: 88
End: 2023-09-29
Payer: MEDICARE

## 2023-09-29 PROCEDURE — G0257 UNSCHED DIALYSIS ESRD PT HOS: HCPCS

## 2023-09-29 PROCEDURE — A9270 NON-COVERED ITEM OR SERVICE: HCPCS | Performed by: INTERNAL MEDICINE

## 2023-09-29 PROCEDURE — 97530 THERAPEUTIC ACTIVITIES: CPT

## 2023-09-29 PROCEDURE — 63710000001 SERTRALINE 50 MG TABLET: Performed by: INTERNAL MEDICINE

## 2023-09-29 PROCEDURE — 63710000001 MELATONIN 5 MG TABLET: Performed by: INTERNAL MEDICINE

## 2023-09-29 PROCEDURE — 63710000001 APIXABAN 2.5 MG TABLET: Performed by: INTERNAL MEDICINE

## 2023-09-29 PROCEDURE — 97110 THERAPEUTIC EXERCISES: CPT

## 2023-09-29 PROCEDURE — 63710000001 PANTOPRAZOLE 40 MG TABLET DELAYED-RELEASE: Performed by: INTERNAL MEDICINE

## 2023-09-29 PROCEDURE — 63710000001 MIRTAZAPINE 15 MG TABLET: Performed by: INTERNAL MEDICINE

## 2023-09-29 PROCEDURE — 63710000001 SENNOSIDES-DOCUSATE 8.6-50 MG TABLET: Performed by: INTERNAL MEDICINE

## 2023-09-29 PROCEDURE — 63710000001 AMIODARONE 200 MG TABLET: Performed by: INTERNAL MEDICINE

## 2023-09-29 PROCEDURE — 97535 SELF CARE MNGMENT TRAINING: CPT

## 2023-09-29 PROCEDURE — 63710000001 TRAZODONE 50 MG TABLET: Performed by: INTERNAL MEDICINE

## 2023-09-29 PROCEDURE — 63710000001 FINASTERIDE 5 MG TABLET: Performed by: INTERNAL MEDICINE

## 2023-09-29 PROCEDURE — 63710000001 ATORVASTATIN 20 MG TABLET: Performed by: INTERNAL MEDICINE

## 2023-09-29 RX ADMIN — PANTOPRAZOLE SODIUM 40 MG: 40 TABLET, DELAYED RELEASE ORAL at 11:20

## 2023-09-29 RX ADMIN — SERTRALINE 50 MG: 50 TABLET, FILM COATED ORAL at 11:20

## 2023-09-29 RX ADMIN — Medication 5 MG: at 20:55

## 2023-09-29 RX ADMIN — TRAZODONE HYDROCHLORIDE 50 MG: 50 TABLET ORAL at 20:55

## 2023-09-29 RX ADMIN — APIXABAN 2.5 MG: 2.5 TABLET, FILM COATED ORAL at 20:55

## 2023-09-29 RX ADMIN — SENNOSIDES AND DOCUSATE SODIUM 2 TABLET: 50; 8.6 TABLET ORAL at 11:20

## 2023-09-29 RX ADMIN — SENNOSIDES AND DOCUSATE SODIUM 2 TABLET: 50; 8.6 TABLET ORAL at 20:55

## 2023-09-29 RX ADMIN — FINASTERIDE 5 MG: 5 TABLET, FILM COATED ORAL at 11:20

## 2023-09-29 RX ADMIN — AMIODARONE HYDROCHLORIDE 200 MG: 200 TABLET ORAL at 11:20

## 2023-09-29 RX ADMIN — ATORVASTATIN CALCIUM 20 MG: 20 TABLET, FILM COATED ORAL at 20:55

## 2023-09-29 RX ADMIN — APIXABAN 2.5 MG: 2.5 TABLET, FILM COATED ORAL at 11:20

## 2023-09-29 RX ADMIN — MIRTAZAPINE 7.5 MG: 15 TABLET, FILM COATED ORAL at 20:55

## 2023-09-29 NOTE — THERAPY TREATMENT NOTE
Patient Name: Enrike Tomas  : 1935    MRN: [id number]                              Today's Date: 2023       Admit Date: 2023    Visit Dx: No diagnosis found.  Patient Active Problem List   Diagnosis    (HFpEF) heart failure with preserved ejection fraction    Anemia of renal disease    At high risk for falls    Benign prostatic hyperplasia    Closed fracture of left acetabulum    Compression fracture of thoracic vertebra    Deficiency of other specified B group vitamins    Depression    ESRD (end stage renal disease)    Essential hypertension    GERD (gastroesophageal reflux disease)    Hepatic cyst    Long term (current) use of anticoagulants    Major depressive disorder, single episode, moderate    Mixed hyperlipidemia    Morgagni hernia    Occlusion of right vertebral artery    NERY (obstructive sleep apnea)    Fall    Severe malnutrition    Sustained SVT    Paroxysmal atrial fibrillation    Chronic heart failure with preserved ejection fraction (HFpEF)    S/P shoulder hemiarthroplasty, left     Past Medical History:   Diagnosis Date    A-fib     Abnormal ECG     occasional extra heartbeat    Arthritis     Atrial fibrillation     BPH (benign prostatic hyperplasia)     Cervical compression fracture 2023    currently wearing a c collar    Diverticulosis     Diverticulum of esophagus     puree diet and speech therapy    Elevated cholesterol     ESRD on hemodialysis     GERD (gastroesophageal reflux disease)     Heart failure     Hemodialysis patient     M,W,F    Hyperlipidemia     Hypertension     Low back pain     NERY (obstructive sleep apnea)     NOT USING CPAP    Recurrent falls     UTI (urinary tract infection)      Past Surgical History:   Procedure Laterality Date    CATARACT EXTRACTION      COLONOSCOPY      DIALYSIS FISTULA CREATION      ENDOSCOPY      INGUINAL HERNIA REPAIR      PROSTATE BIOPSY      THROAT SURGERY      diverticulum in throat    TONSILLECTOMY AND ADENOIDECTOMY       TOTAL SHOULDER ARTHROPLASTY W/ DISTAL CLAVICLE EXCISION Left 9/25/2023    Procedure: HEMIARTHROPLASTY, BICEPS TENODESIS - LEFT;  Surgeon: Matteo aTn MD;  Location: [facility];  Service: Orthopedics;  Laterality: Left;      General Information       Row Name 09/29/23 1532          Physical Therapy Time and Intention    Document Type therapy note (daily note)  -LO     Mode of Treatment individual therapy;physical therapy  -       Row Name 09/29/23 1532          General Information    Patient Profile Reviewed yes  -LO     Existing Precautions/Restrictions fall;left;shoulder;non-weight bearing;other (see comments)  donjoy ultra II sling w/ pillow, IS nerve cath. confusion  -       Row Name 09/29/23 1532          Cognition    Orientation Status (Cognition) oriented to;person;disoriented to;place;situation;time  -       Row Name 09/29/23 1532          Safety Issues, Functional Mobility    Impairments Affecting Function (Mobility) cognition;strength;range of motion (ROM);postural/trunk control;sensation/sensory awareness;endurance/activity tolerance;pain;balance;motor control;motor planning  -     Cognitive Impairments, Mobility Safety/Performance awareness, need for assistance;insight into deficits/self-awareness;judgment;problem-solving/reasoning;safety precaution awareness;safety precaution follow-through;sequencing abilities  -LO               User Key  (r) = Recorded By, (t) = Taken By, (c) = Cosigned By      Initials Name Provider Type    LO Julisa Lanza PT Physical Therapist                   Mobility       Row Name 09/29/23 1534          Bed Mobility    Bed Mobility supine-sit  -LO     Supine-Sit [place] (Bed Mobility) moderate assist (50% patient effort);2 person assist;verbal cues;nonverbal cues (demo/gesture)  -     Assistive Device (Bed Mobility) draw sheet;bed rails;head of bed elevated  -LO     Comment, (Bed Mobility) frequent cuing to maintain WB precautions on L, draw sheet to  complete advancement to EOB  -LO       Row Name 09/29/23 1534          Transfers    Comment, (Transfers) EOB>recliner; attempted stand pivot transfer but patient unable to maintain BLE stable under his weight, required maxA squat pivot  -       Row Name 09/29/23 1534          Bed-Chair Transfer    Bed-Chair [place] (Transfers) maximum assist (25% patient effort);verbal cues;nonverbal cues (demo/gesture)  -     Assistive Device (Bed-Chair Transfers) other (see comments)  UE support  -LO     Comment, (Bed-Chair Transfer) continued knee buckling, feet sliding forward  -       Row Name 09/29/23 1534          Sit-Stand Transfer    Sit-Stand [place] (Transfers) moderate assist (50% patient effort);2 person assist;verbal cues;nonverbal cues (demo/gesture)  -     Assistive Device (Sit-Stand Transfers) other (see comments)  UE support  -LO       Row Name 09/29/23 1534          Gait/Stairs (Locomotion)    [place] Level (Gait) unable to assess  -     Comment, (Gait/Stairs) Unable to assess due to poor standing balance with knee buckling B  -       Row Name 09/29/23 1534          Mobility    Extremity Weight-bearing Status left upper extremity  -               User Key  (r) = Recorded By, (t) = Taken By, (c) = Cosigned By      Initials Name Provider Type    Julisa Herrera, PT Physical Therapist                   Obj/Interventions       Row Name 09/29/23 1538          Motor Skills    Therapeutic Exercise other (see comments)  B LE DF/PF, LAQ, hip abd x 10 each  -       Row Name 09/29/23 1538          Balance    Balance Assessment sitting static balance;sitting dynamic balance;standing static balance;standing dynamic balance  -     Static Sitting Balance contact guard  -     Dynamic Sitting Balance minimal assist  -LO     Position, Sitting Balance supported;sitting edge of bed  -     Static Standing Balance moderate assist;2-person assist;verbal cues;non-verbal cues (demo/gesture)  -      Dynamic Standing Balance maximum assist;verbal cues;non-verbal cues (demo/gesture)  -     Position/Device Used, Standing Balance supported;other (see comments)  UE support  -     Comment, Balance lateral L lean sitting EOB, assist to return to midline again this date. Very unsteady in standing with B knee buckling.  -               User Key  (r) = Recorded By, (t) = Taken By, (c) = Cosigned By      Initials Name Provider Type    Julisa Herrera, PT Physical Therapist                   Goals/Plan    No documentation.                  Clinical Impression       Row Name 09/29/23 1541          Pain    Pretreatment Pain Rating 0/10 - no pain  -     Posttreatment Pain Rating 0/10 - no pain  -LO       Row Name 09/29/23 1541          Plan of Care Review    Plan of Care Reviewed With patient;daughter  -     Progress no change  -     Outcome Evaluation Patient continuing to require 2 assist for safe bed mobility and transfers in order to maintain WB precautions. Still unable to initate gait due to B knee buckling in standing, requiring squat pivot transfers for safety. Patient will continue to benefit from skilled IP PT services to address impairments for return to PLOF. Cont IP PT POC.  -       Row Name 09/29/23 1541          Therapy Assessment/Plan (PT)    Rehab Potential (PT) good, to achieve stated therapy goals  -     Criteria for Skilled Interventions Met (PT) yes;meets criteria;skilled treatment is necessary  -       Row Name 09/29/23 1541          Vital Signs    O2 Delivery Pre Treatment room air  -LO     O2 Delivery Intra Treatment room air  -LO     O2 Delivery Post Treatment room air  -LO     Pre Patient Position Sitting  -LO     Intra Patient Position Standing  -LO     Post Patient Position Sitting  -LO       Row Name 09/29/23 1541          Positioning and Restraints    Pre-Treatment Position in bed  -LO     Post Treatment Position chair  -LO     In Chair notified nsg;reclined;call light within  reach;encouraged to call for assist;exit alarm on;waffle cushion  -LO               User Key  (r) = Recorded By, (t) = Taken By, (c) = Cosigned By      Initials Name Provider Type    Julisa Herrera, PT Physical Therapist                   Outcome Measures       Row Name 09/29/23 1544          How much help from another person do you currently need...    Turning from your back to your side while in flat bed without using bedrails? 2  -LO     Moving from lying on back to sitting on the side of a flat bed without bedrails? 2  -LO     Moving to and from a bed to a chair (including a wheelchair)? 2  -LO     Standing up from a chair using your arms (e.g., wheelchair, bedside chair)? 2  -LO     Climbing 3-5 steps with a railing? 1  -LO     To walk in hospital room? 1  -LO     AM-PAC 6 Clicks Score (PT) 10  -LO     Highest level of mobility 4 --> Transferred to chair/commode  -LO       Row Name 09/29/23 1544 09/29/23 1321       Functional Assessment    Outcome Measure Options AM-PAC 6 Clicks Basic Mobility (PT)  -LO AM-PAC 6 Clicks Daily Activity (OT)  -JY              User Key  (r) = Recorded By, (t) = Taken By, (c) = Cosigned By      Initials Name Provider Type    Alma Delia Corral, OT Occupational Therapist    Julisa Herrera, PT Physical Therapist                                 Physical Therapy Education       Title: PT OT SLP Therapies (In Progress)       Topic: Physical Therapy (In Progress)       Point: Mobility training (In Progress)       Learning Progress Summary             Patient Acceptance, E, VU,NR by LO at 9/29/2023 1443    Comment: PT POC    Acceptance, E, NR by CM at 9/28/2023 1030    Acceptance, E,D, VU,DU by HP at 9/26/2023 1711    Acceptance, E,D, VU,NR by HP at 9/25/2023 1849   Caregiver Acceptance, E, NR by CM at 9/28/2023 1030                         Point: Home exercise program (In Progress)       Learning Progress Summary             Patient Acceptance, E, VU,NR by LO at 9/29/2023 1443     Comment: PT POC    Acceptance, E, NR by CM at 9/28/2023 1030    Acceptance, E,D, VU,DU by HP at 9/26/2023 1711    Acceptance, E,D, VU,NR by HP at 9/25/2023 1849   Caregiver Acceptance, E, NR by CM at 9/28/2023 1030                         Point: Body mechanics (In Progress)       Learning Progress Summary             Patient Acceptance, E, VU,NR by LO at 9/29/2023 1443    Comment: PT POC    Acceptance, E, NR by CM at 9/28/2023 1030    Acceptance, E,D, VU,DU by HP at 9/26/2023 1711    Acceptance, E,D, VU,NR by HP at 9/25/2023 1849   Caregiver Acceptance, E, NR by CM at 9/28/2023 1030                         Point: Precautions (In Progress)       Learning Progress Summary             Patient Acceptance, E, VU,NR by LO at 9/29/2023 1443    Comment: PT POC    Acceptance, E, NR by CM at 9/28/2023 1030    Acceptance, E,D, VU,DU by HP at 9/26/2023 1711    Acceptance, E,D, VU,NR by HP at 9/25/2023 1849   Caregiver Acceptance, E, NR by CM at 9/28/2023 1030                                         User Key       Initials Effective Dates Name Provider Type Discipline     06/16/21 -  Julisa Lanza, PT Physical Therapist PT     06/01/21 -  Fouzia Woody, PT Physical Therapist PT     09/22/22 -  Hortencia Sommer, PT Physical Therapist PT                  PT Recommendation and Plan     Plan of Care Reviewed With: patient, daughter  Progress: no change  Outcome Evaluation: Patient continuing to require 2 assist for safe bed mobility and transfers in order to maintain WB precautions. Still unable to initate gait due to B knee buckling in standing, requiring squat pivot transfers for safety. Patient will continue to benefit from skilled IP PT services to address impairments for return to PLOF. Cont IP PT POC.     Time Calculation:         PT Charges       Row Name 09/29/23 1443             Time Calculation    Start Time 1443  -      PT Received On 09/29/23  -      PT Goal Re-Cert Due Date 10/05/23  -         Timed  Charges    63165 - PT Therapeutic Exercise Minutes 8  -LO      07437 - PT Therapeutic Activity Minutes 31  -LO         Total Minutes    Timed Charges Total Minutes 39  -LO       Total Minutes 39  -LO                User Key  (r) = Recorded By, (t) = Taken By, (c) = Cosigned By      Initials Name Provider Type    Julisa Herrera PT Physical Therapist                  Therapy Charges for Today       Code Description Service Date Service Provider Modifiers Qty    42176465528 HC PT THER PROC EA 15 MIN 9/29/2023 Julisa Lanza, PT GP 1    86939368999 HC PT THERAPEUTIC ACT EA 15 MIN 9/29/2023 Julisa Lanza, PT GP 2            PT G-Codes  Outcome Measure Options: AM-PAC 6 Clicks Basic Mobility (PT)  AM-PAC 6 Clicks Score (PT): 10  AM-PAC 6 Clicks Score (OT): 8  PT Discharge Summary  Anticipated Discharge Disposition (PT): skilled nursing facility    Julisa Lanza PT  9/29/2023

## 2023-09-29 NOTE — PROGRESS NOTES
"Orthopedic Daily Progress Note      CC: None    Pain controlled  General: no fevers, chills  Abdomen: no nausea, vomiting, or diarrhea    No other complaints    Physical Exam:  I have reviewed the vital signs.  Temp:  [97.2 °F (36.2 °C)-98.2 °F (36.8 °C)] 97.6 °F (36.4 °C)  Heart Rate:  [62-80] 80  Resp:  [16-18] 18  BP: ()/(52-90) 130/58    Objective:  Vital signs: (most recent): Blood pressure 130/58, pulse 80, temperature 97.6 °F (36.4 °C), temperature source Axillary, resp. rate 18, height 157.5 cm (62.01\"), weight 54.2 kg (119 lb 7.8 oz), SpO2 93 %.            General Appearance:    Alert, cooperative, no distress  Extremities: No clubbing, cyanosis, or edema to lower extremities  Pulses:  2+ in distal surgical extremity  Skin: Dressing Clean/dry/intact      Results Review:    I have reviewed the labs, radiology results and diagnostic studies:    Results from last 7 days   Lab Units 09/26/23  0519   WBC 10*3/mm3 6.62   HEMOGLOBIN g/dL 9.6*   PLATELETS 10*3/mm3 204     Results from last 7 days   Lab Units 09/26/23  0519   SODIUM mmol/L 134*   POTASSIUM mmol/L 4.3   CO2 mmol/L 25.0   CREATININE mg/dL 3.28*   GLUCOSE mg/dL 100*       I have reviewed the medications.    Assessment/Problem List  POD# 4 S/p Left shoulder hemiarthroplasty    Plan  PT/OT  To Rehab on Friday  Bandage can be removed on Monday  Ok to shower and rinse bandage/incision after nerve catheter comes out  F/u with me 3 weeks after d/c.  For Rehab/SNF: Left shoulder Passive ROM, FE no limit, ER to 30, IR to chest  Active ROM elbow/wrist/hand  NWB JOHN Tan MD  09/29/23  12:34 EDT            "

## 2023-09-29 NOTE — DISCHARGE INSTRUCTIONS
Per Dr. Tan Instructions:    Bandage can be removed on Monday  Ok to shower and rinse bandage/incision after nerve catheter comes out  F/u with me 3 weeks after d/c.  For Rehab/SNF: Left shoulder Passive ROM, FE no limit, ER to 30, IR to chest  Active ROM elbow/wrist/hand  NWB JOHN      InfuBLOCK - Patient Information    What is a pain pump?  The InfuBLOCK pump delivers post-operative, non-narcotic, numbing medication to the nerve near the surgical site for pain relief.     Where can I find information about my pain pump?           For more information about your pain pump, scan the QR code.  For additional patient resources, visit [email or web address].                                                                                               While your physician is your primary source for information about your treatment there may be times during your treatment that you need assistance with your infusion pump.     If you need assistance take the following steps:    The [employer] [facility] Hotline is Here for You 24/7.  Please call [phone] for the following concerns or complications:    Answers to questions about your infusion pump                 Tubing disconnect  Assistance with pump alarms                                                      Dislodged catheter  Excessive leakage noted from pump                                         Inadequate pain control    2.   [facility] Anesthesia Acute Pain Service: [phone] is available 24/7 for any further needs or concerns about medication or pain control.     -------------------------------------------------------------------------    Nerve Catheter Removal Instructions  When your device is empty:    Remove your catheter by pulling the dressing off slowly (like you would remove a regular bandage). The catheter should pull right out of the skin.  Check that the BLUE tip is intact.                                                                                      If the catheter is stuck, reposition your   extremity and pull slowly until removed.  *If catheter is HURTING and WON'T come out, stop and call [phone] for further assistance.    Remove medication bag from the black carrying case.  Cut the tubing on right and left side of pump, and discard the medication bag and tubing into garbage.  Place the pump and black carrying case into the plastic bag and then place this into the return box.  Seal box with blue stickers and return to [employer] postal service. THIS IS PRE-PAID POSTAGE.        -------------------------------------------------------------------------    [facility] COLD THERAPY - PATIENT INSTRUCTION SHEET    Cold Compression Therapy for your comfort and rehabilitation  Your caregivers want you to be productive in your rehab and comfortable during your stay. In keeping with those goals, you will be receiving an [facility] Cold Therapy Wrap to help ease post-operative pain and swelling that might keep you from getting back on track! Your SMI Cold Therapy Wrap is effective and simple-to-use, and you will be encouraged to apply it throughout your hospital stay and at home through the duration of your recovery.    When you are ready to go home  Be sure to take your SMI Cold Therapy Wrap and both sets of Gel Bags with you for continued comfort and use throughout your rehabilitation. If you don't already have them, ask your nurse or aide to retrieve your SMI Gel Bags from the patient freezer.    Home use precautions  Always follow your medical professional's application instructions upon discharge. Your SMI Cold Therapy Wrap and Gel Bags are designed to last for months following your surgery. Never heat the Gel Bags unless specified by your healthcare provider. Supervision is advised when using this product on children or geriatric patients. To avoid danger of suffocation, please keep the outer plastic packaging away from children & pets.    Cold  Therapy Instructions  Place Gel Bags in a freezer set ¾ of the way to max temperature for at least (4) hours. For best results, lay the Gel Bags flat and [id number] in the freezer. Once frozen, slide Gel Bags into the gel pouch and secure your wrap to the affected area with the straps.  Gel wraps that have been stored in a freezer for an extended period of time may require a (10) minute period of softening up in a room temperature environment before application.  The gel pouch acts as a protective barrier. NEVER place frozen bags directly onto skin, as this may cause frostbite injury.  The [facility] Cold Therapy Wrap is designed to be able to be worm while ambulating. The compression straps can be secured well enough so that the Wrap won't fall off while moving.  Wrap Application Videos can be viewed at [email or web address].  An additional protective barrier such as clothing, a washcloth, hand-towel or pillowcase may be used during prolonged treatment applications.  The Gel-Pouch and Wrap are both Latex-Free and the Gel Bag ingredients are non toxic.    [facility] Wrap care instructions  The [facility] Cold Therapy Wrap may be hand washed and hung to dry when needed.    [facility] re-order information  Additional [facility] body specific wraps and/or Gel Bags can be re-ordered from [email or web address] or call [email or web address] ([phone])

## 2023-09-29 NOTE — PLAN OF CARE
Problem: Adult Inpatient Plan of Care  Goal: Plan of Care Review  Outcome: Ongoing, Progressing  Goal: Patient-Specific Goal (Individualized)  Outcome: Ongoing, Progressing  Goal: Absence of Hospital-Acquired Illness or Injury  Outcome: Ongoing, Progressing  Intervention: Identify and Manage Fall Risk  Recent Flowsheet Documentation  Taken 9/29/2023 0632 by Dilma Tomas, RN  Safety Promotion/Fall Prevention:   clutter free environment maintained   fall prevention program maintained   lighting adjusted   room organization consistent   safety round/check completed   toileting scheduled  Taken 9/29/2023 0458 by Dilma Tomas, RN  Safety Promotion/Fall Prevention:   fall prevention program maintained   clutter free environment maintained   nonskid shoes/slippers when out of bed   room organization consistent   safety round/check completed   toileting scheduled  Taken 9/29/2023 0208 by Dilma Tomas RN  Safety Promotion/Fall Prevention:   clutter free environment maintained   fall prevention program maintained   room organization consistent   safety round/check completed   toileting scheduled  Taken 9/28/2023 2228 by Dilma Tomas RN  Safety Promotion/Fall Prevention:   fall prevention program maintained   toileting scheduled   safety round/check completed   room organization consistent  Taken 9/28/2023 2035 by Dilma Tomas RN  Safety Promotion/Fall Prevention:   clutter free environment maintained   fall prevention program maintained   lighting adjusted   nonskid shoes/slippers when out of bed   room organization consistent   safety round/check completed   toileting scheduled  Intervention: Prevent Skin Injury  Recent Flowsheet Documentation  Taken 9/29/2023 0632 by Dilma Tomas, RN  Body Position: turned  Skin Protection:   adhesive use limited   incontinence pads utilized   transparent dressing maintained   tubing/devices free from skin contact  Taken 9/29/2023 0458 by Dilma Tomas, AGUSTÍN  Body  Position:   turned   position changed independently  Skin Protection:   adhesive use limited   transparent dressing maintained   tubing/devices free from skin contact   incontinence pads utilized  Taken 9/29/2023 0208 by iDlma Tomas RN  Body Position:   turned   position changed independently  Skin Protection:   adhesive use limited   incontinence pads utilized   transparent dressing maintained   tubing/devices free from skin contact  Taken 9/28/2023 2228 by Dilma Tomas RN  Body Position: position changed independently  Skin Protection:   adhesive use limited   incontinence pads utilized   tubing/devices free from skin contact   transparent dressing maintained  Taken 9/28/2023 2035 by Dilma Tomas RN  Body Position:   turned   legs elevated  Skin Protection:   adhesive use limited   incontinence pads utilized   transparent dressing maintained   tubing/devices free from skin contact  Intervention: Prevent and Manage VTE (Venous Thromboembolism) Risk  Recent Flowsheet Documentation  Taken 9/29/2023 0632 by Dilma Tomas RN  VTE Prevention/Management: patient refused intervention  Taken 9/29/2023 0458 by Dilma Tomas RN  VTE Prevention/Management: patient refused intervention  Taken 9/29/2023 0208 by Dilma Tomas RN  VTE Prevention/Management: patient refused intervention  Taken 9/28/2023 2228 by Dilma Tomas RN  VTE Prevention/Management: patient refused intervention  Taken 9/28/2023 2035 by Dilma Tomas RN  VTE Prevention/Management:   bilateral   sequential compression devices off  Range of Motion: active ROM (range of motion) encouraged  Intervention: Prevent Infection  Recent Flowsheet Documentation  Taken 9/29/2023 0632 by Dilma Tomas RN  Infection Prevention: environmental surveillance performed  Taken 9/29/2023 0458 by Dilma Tomas RN  Infection Prevention: environmental surveillance performed  Taken 9/29/2023 0208 by Dilma Tomas RN  Infection Prevention:    environmental surveillance performed   rest/sleep promoted   single patient room provided   equipment surfaces disinfected  Taken 9/28/2023 2228 by Dilma Tomas, RN  Infection Prevention:   environmental surveillance performed   rest/sleep promoted   single patient room provided  Taken 9/28/2023 2035 by Dilma Tomas RN  Infection Prevention:   environmental surveillance performed   rest/sleep promoted   single patient room provided  Goal: Optimal Comfort and Wellbeing  Outcome: Ongoing, Progressing  Intervention: Monitor Pain and Promote Comfort  Recent Flowsheet Documentation  Taken 9/28/2023 2312 by Dilma Tomas RN  Pain Management Interventions: see MAR  Taken 9/28/2023 2035 by Dilma Tomas RN  Pain Management Interventions:   pillow support provided   pain pump in use   quiet environment facilitated   position adjusted  Intervention: Provide Person-Centered Care  Recent Flowsheet Documentation  Taken 9/28/2023 2035 by Dilma Tomas RN  Trust Relationship/Rapport:   care explained   thoughts/feelings acknowledged   empathic listening provided   emotional support provided  Goal: Readiness for Transition of Care  Outcome: Ongoing, Progressing     Problem: Fall Injury Risk  Goal: Absence of Fall and Fall-Related Injury  Outcome: Ongoing, Progressing  Intervention: Identify and Manage Contributors  Recent Flowsheet Documentation  Taken 9/28/2023 2035 by Dilma Tomas RN  Medication Review/Management: medications reviewed  Intervention: Promote Injury-Free Environment  Recent Flowsheet Documentation  Taken 9/29/2023 0632 by Dilma Tomas, RN  Safety Promotion/Fall Prevention:   clutter free environment maintained   fall prevention program maintained   lighting adjusted   room organization consistent   safety round/check completed   toileting scheduled  Taken 9/29/2023 0458 by Dilma Tomas, RN  Safety Promotion/Fall Prevention:   fall prevention program maintained   clutter free environment  maintained   nonskid shoes/slippers when out of bed   room organization consistent   safety round/check completed   toileting scheduled  Taken 9/29/2023 0208 by Dilma Tomas RN  Safety Promotion/Fall Prevention:   clutter free environment maintained   fall prevention program maintained   room organization consistent   safety round/check completed   toileting scheduled  Taken 9/28/2023 2228 by Dilma Tomas RN  Safety Promotion/Fall Prevention:   fall prevention program maintained   toileting scheduled   safety round/check completed   room organization consistent  Taken 9/28/2023 2035 by Dilma Tomas RN  Safety Promotion/Fall Prevention:   clutter free environment maintained   fall prevention program maintained   lighting adjusted   nonskid shoes/slippers when out of bed   room organization consistent   safety round/check completed   toileting scheduled     Problem: Skin Injury Risk Increased  Goal: Skin Health and Integrity  Outcome: Ongoing, Progressing  Intervention: Optimize Skin Protection  Recent Flowsheet Documentation  Taken 9/29/2023 0632 by Dilma Tomas, RN  Pressure Reduction Techniques:   frequent weight shift encouraged   pressure points protected  Head of Bed (HOB) Positioning: HOB at 20-30 degrees  Pressure Reduction Devices:   pressure-redistributing mattress utilized   positioning supports utilized  Skin Protection:   adhesive use limited   incontinence pads utilized   transparent dressing maintained   tubing/devices free from skin contact  Taken 9/29/2023 0458 by Dilma Tomas RN  Pressure Reduction Techniques:   frequent weight shift encouraged   pressure points protected  Head of Bed (HOB) Positioning: HOB at 20-30 degrees  Pressure Reduction Devices:   pressure-redistributing mattress utilized   positioning supports utilized  Skin Protection:   adhesive use limited   transparent dressing maintained   tubing/devices free from skin contact   incontinence pads utilized  Taken  9/29/2023 0208 by Dilma Tomas RN  Pressure Reduction Techniques:   frequent weight shift encouraged   pressure points protected  Head of Bed (HOB) Positioning: HOB at 20-30 degrees  Pressure Reduction Devices:   positioning supports utilized   pressure-redistributing mattress utilized  Skin Protection:   adhesive use limited   incontinence pads utilized   transparent dressing maintained   tubing/devices free from skin contact  Taken 9/28/2023 2228 by Dilma Tomas RN  Pressure Reduction Techniques:   frequent weight shift encouraged   pressure points protected  Head of Bed (HOB) Positioning: HOB at 20-30 degrees  Pressure Reduction Devices:   pressure-redistributing mattress utilized   positioning supports utilized  Skin Protection:   adhesive use limited   incontinence pads utilized   tubing/devices free from skin contact   transparent dressing maintained  Taken 9/28/2023 2035 by Dilma Tomas RN  Pressure Reduction Techniques:   frequent weight shift encouraged   pressure points protected   heels elevated off bed  Head of Bed (HOB) Positioning: HOB at 20-30 degrees  Pressure Reduction Devices:   pressure-redistributing mattress utilized   positioning supports utilized  Skin Protection:   adhesive use limited   incontinence pads utilized   transparent dressing maintained   tubing/devices free from skin contact     Problem: Device-Related Complication Risk (Hemodialysis)  Goal: Safe, Effective Therapy Delivery  Outcome: Ongoing, Progressing  Intervention: Optimize Device Care and Function  Recent Flowsheet Documentation  Taken 9/28/2023 2035 by Dilma Tomas, RN  Medication Review/Management: medications reviewed     Problem: Hemodynamic Instability (Hemodialysis)  Goal: Effective Tissue Perfusion  Outcome: Ongoing, Progressing     Problem: Infection (Hemodialysis)  Goal: Absence of Infection Signs and Symptoms  Outcome: Ongoing, Progressing   Goal Outcome Evaluation:

## 2023-09-29 NOTE — PLAN OF CARE
Goal Outcome Evaluation:  Plan of Care Reviewed With: patient, daughter        Progress: no change  Outcome Evaluation: Patient continuing to require 2 assist for safe bed mobility and transfers in order to maintain WB precautions. Still unable to initate gait due to B knee buckling in standing, requiring squat pivot transfers for safety. Patient will continue to benefit from skilled IP PT services to address impairments for return to PLOF. Cont IP PT POC.      Anticipated Discharge Disposition (PT): skilled nursing facility

## 2023-09-29 NOTE — SIGNIFICANT NOTE
09/27/23 0800   Assessments (Pre/Post)   Consent Obtained yes   Safety vein preservation armband present   Hepatitis Status negative   Date Hepatitis Profile Obtained 05/17/23   Cognitive/Neuro/Behavioral WDL   Cognitive/Neuro/Behavioral WDL X   Level of Consciousness Responds to Voice   Arousal Level arouses to voice   Orientation disoriented to;place;time;situation   Speech illogical   Mood/Behavior anxious   Safety WDL   Safety WDL WDL   Cardiovascular WDL   Cardiac WDL WDL   Breath Sounds   Breath Sounds All Fields   All Lung Fields Breath Sounds Anterior:;Lateral:;diminished;equal bilaterally   Pain   Preferred Pain Scale number (Numeric Rating Pain Scale)   Pain Goal/Acceptable Level 3   (0-10) Pain Rating: Rest 0   (0-10) Pain Rating: Activity 0   Interventions   Safety Promotion/Fall Prevention assistive device/personal items within reach;clutter free environment maintained;safety round/check completed   Enhanced Safety Measures room near unit station   Medication Review/Management medications reviewed   Machine Checks   Machine Number 7   Station Number [id number]   RO Number 2   Machine Temperature 96.8 °F (36 °C)   Conductivity 13.7   pH Level 7.2   Chloramines 0   Alarms Activated yes   Hemodialysis Prescription   Mode of Treatment HD (hemodialysis)   Vitals   Initiation air foam detector engaged;all connections secured;parameters set, arterial;parameters set, venous;prime given (specify mL);saline line double clamped   Temp 97.4 °F (36.3 °C)   Temp src Tympanic   Heart Rate 64   Heart Rate Source Monitor   Resp 18   Resp Rate Source Visual   BP (!) 202/113   Noninvasive MAP (mmHg) 121   BP Location Left leg   BP Method Automatic   Patient Position Lying   Oxygen Therapy   SpO2 98 %   Pulse Oximetry Type Continuous   Device (Oxygen Therapy) nasal cannula   Flow (L/min) 2   Treatment Assessment   Blood Flow Rate (BFR) 400   Venous Pressure () 180   Arterial Pressure (AP) -160   Total Mean Pressure (TMP) 5    Ultrafiltration Rate (UFR) 1250   Safety Check WDL WDL   Hemodialysis, Safety Factors supplemental oxygenation;access site visible and intact;vital signs stable   Hemodialysis, Comments HD initiated per MD order   Hemodialysis AV Access   No placement date or time found.   Orientation: Right  Access Location: Upper arm  Access Type: AV fistula   AV Fistula Present;Thrill;Bruit   Site Assessment Clean;Dry;Intact   Status Accessed   Needle size 15 G

## 2023-09-29 NOTE — SIGNIFICANT NOTE
09/29/23 0715   Assessments (Pre/Post)   Consent Obtained yes   Safety vein preservation armband present   Hepatitis Status negative   Cognitive/Neuro/Behavioral WDL   Cognitive/Neuro/Behavioral WDL WDL   Level of Consciousness Alert   Arousal Level opens eyes spontaneously   Orientation oriented x 4   Speech clear;spontaneous   Mood/Behavior calm   Motor Response   Motor Response general motor response   General Motor Response purposeful motor response;muscle jerking   Safety WDL   Safety WDL WDL   Safety Factors wheels locked;upper side rails raised x 2;ID band on;call light in reach   Cardiovascular WDL   Cardiac WDL WDL   Cardiac Rhythm apical pulse irregular;radial pulse irregular   ECG   Lead Monitored Lead II   Rhythm normal sinus rhythm   Respiratory WDL   Respiratory WDL WDL   Rhythm/Pattern, Respiratory no shortness of breath reported   Expansion/Accessory Muscles/Retractions no use of accessory muscles   Breath Sounds   Breath Sounds DANIELLE;LLL;RUL;RLL   All Lung Fields Breath Sounds Anterior:;Lateral:;diminished;equal bilaterally   Pain   Preferred Pain Scale number (Numeric Rating Pain Scale)   Pain Goal/Acceptable Level 0   (0-10) Pain Rating: Rest 0   (0-10) Pain Rating: Activity 0   Interventions   Safety Promotion/Fall Prevention activity supervised;assistive device/personal items within reach;clutter free environment maintained;toileting scheduled;safety round/check completed;room organization consistent;lighting adjusted   Enhanced Safety Measures room near unit station   Medication Review/Management medications reviewed;high-risk medications identified   Machine Checks   Machine Number 9   Station Number [id number]   RO Number 1   Machine Temperature 96.8 °F (36 °C)   Conductivity 13.7   pH Level 7.2   Chloramines 0   Alarms Activated yes   Hemodialysis Prescription   Mode of Treatment HD (hemodialysis)   Treatment Duration (hours) 3.5   Blood Flow (BFR) 400   Dialysate (K) 3   Dialysate (Ca) 2.5   Na+  Program 137   Bicarb (mEq/L) 30   UF Goal 2500   Heparin Concentration not applicable   Heparin Dosage none   Heparin Comment no heparin   Dialysate Flow 700   Dialyzer Revaclear   Special Orders keep systolic BP above (specify)   Vitals   Initiation air foam detector engaged;all connections secured;parameters set, arterial;parameters set, venous;prime given (specify mL);saline line double clamped   Temp 97.2 °F (36.2 °C)   Temp src Oral   Heart Rate 68   Heart Rate Source Monitor   Resp 16   Resp Rate Source Visual   /62   BP Location Right leg   BP Method Automatic   Patient Position Lying   Oxygen Therapy   SpO2 99 %   Pulse Oximetry Type Continuous   Device (Oxygen Therapy) room air   Treatment Assessment   Blood Flow Rate (BFR) 400   Venous Pressure () 170   Arterial Pressure (AP) -160   Hemodialysis, Fluids 310   Total Mean Pressure (TMP) 10   Ultrafiltration Rate (UFR) 260   Safety Check WDL WDL   Hemodialysis, Safety Factors access site visible and intact;vital signs stable;no supplemental oxygenation needed   Hemodialysis AV Access   No placement date or time found.   Orientation: Right  Access Location: Upper arm  Access Type: AV fistula   AV Fistula Present;Thrill;Bruit   Site Assessment Clean;Dry;Intact   Status Accessed   Needle size 15 G

## 2023-09-29 NOTE — PLAN OF CARE
Goal Outcome Evaluation:  Plan of Care Reviewed With: patient, daughter        Progress: no change  Outcome Evaluation: Pt still presents with confusion however participatory in OT interventions focused on ADL retraining, sling mgmt, LUE TE HEP as indicated by MD with pt's dtr present and intermittently involved in return demo. Pt presented with concerns for optimal sling mgmt and fit and was attempting to feed self w/ LUE upon OT arrival not adhering to precautions. OT educated all on precautions related to LUE post op and facilitated TE. Pt tolerated L PROM shoulder FE to 110 to 120 degrees, ER 30, IR chest, AROM w/ frequent cues for attention and follow through to complete scapula retractions, elbow, wrist and hand. Pt grossly dep for d/d gown and L axilla care with recommendation for assistance provided for safety purposes. Recommend SNF at d/c when medically ready, otherwise progress pt as able during IPOT POC.      Anticipated Discharge Disposition (OT): skilled nursing facility

## 2023-09-29 NOTE — PROGRESS NOTES
"IM progress note      Enrike Tomas  [id number]  1935     LOS: 0 days     Attending: Matteo Tan MD    Primary Care Provider: Blair Dhaliwal MD      Chief Complaint/Reason for visit:  No chief complaint on file.      Subjective   Resting comfortably. Received dialysis this morning. No complaints.   ( Feels ok. Mild confusion persists. Daughter present. ) wy    Objective        Visit Vitals  /56 (BP Location: Left leg, Patient Position: Lying)   Pulse 75   Temp 97.6 °F (36.4 °C) (Oral)   Resp 18   Ht 157.5 cm (62.01\")   Wt 54.2 kg (119 lb 7.8 oz)   SpO2 90%   BMI 21.85 kg/m²     Temp (24hrs), Av.6 °F (36.4 °C), Min:97.2 °F (36.2 °C), Max:97.9 °F (36.6 °C)      Physical Therapy:    Date of Service: 23  Creation Time: 23 103     Signed         Goal Outcome Evaluation:  Plan of Care Reviewed With: patient, caregiver  Progress: improving  Outcome Evaluation: Patient remains confused however pleasant today and participated well with mobility. He performed bed mobility with modA with improved sitting balance at EOB today. He also tolerated transfer to chair, limited by bilateral knee buckling. Therex performed without complaint. Recommend D/C to SNF.        Anticipated Discharge Disposition (PT): skilled nursing facility                   Physical Exam:     General Appearance:    Alert, cooperative, in no acute distress   Head:    Normocephalic, without obvious abnormality, atraumatic    Lungs:     Normal effort, symmetric chest rise,  clear to      auscultation bilaterally              Heart:    Regular rhythm and normal rate, normal S1 and S2    Abdomen:     Normal bowel sounds, no masses, no organomegaly, soft        non-tender, non-distended, no guarding, no rebound                tenderness   Extremities:   Sling on. CDI dressing. PNB cath present. Moves hand, fingers well.   No clubbing, cyanosis or edema.  No deformities.    Pulses:   Pulses palpable and equal " bilaterally   Skin:   No bleeding, bruising or rash          Results Review:     I reviewed the patient's new clinical results.   Results from last 7 days   Lab Units 09/26/23  0519   WBC 10*3/mm3 6.62   HEMOGLOBIN g/dL 9.6*   HEMATOCRIT % 28.8*   PLATELETS 10*3/mm3 204       Results from last 7 days   Lab Units 09/26/23  0519 09/25/23 2017 09/25/23  1111   SODIUM mmol/L 134*  --   --    POTASSIUM mmol/L 4.3 4.1 3.4*   CHLORIDE mmol/L 92*  --   --    CO2 mmol/L 25.0  --   --    BUN mg/dL 34*  --   --    CREATININE mg/dL 3.28*  --   --    CALCIUM mg/dL 9.1  --   --    GLUCOSE mg/dL 100*  --   --        I reviewed the patient's new imaging including images and reports.    All medications reviewed.   amiodarone, 200 mg, Oral, Daily  apixaban, 2.5 mg, Oral, BID  atorvastatin, 20 mg, Oral, Nightly  finasteride, 5 mg, Oral, Daily  melatonin, 5 mg, Oral, Nightly  mirtazapine, 7.5 mg, Oral, Nightly  pantoprazole, 40 mg, Oral, Daily  sennosides-docusate, 2 tablet, Oral, BID  sertraline, 50 mg, Oral, Daily  traZODone, 50 mg, Oral, Nightly      acetaminophen, 650 mg, Q4H PRN   Or  acetaminophen, 650 mg, Q4H PRN  HYDROmorphone, 0.5 mg, Q2H PRN   And  naloxone, 0.1 mg, Q5 Min PRN  hydrOXYzine, 25 mg, TID PRN  oxyCODONE, 5 mg, Q4H PRN        Assessment & Plan       S/P shoulder hemiarthroplasty, left    (HFpEF) heart failure with preserved ejection fraction    Benign prostatic hyperplasia    Depression    ESRD (end stage renal disease)    Essential hypertension    GERD (gastroesophageal reflux disease)    Mixed hyperlipidemia    NERY (obstructive sleep apnea)    Paroxysmal atrial fibrillation      Plan  1. PT/OT. NWB, left UE, ROM hand, wrist, elbow.  2. Pain control-prns, interscalene nerve block cath with ropivacaine infusion.           3. IS-encourage  4. DVT proph- Mech/ mobilize.  5. Bowel regimen  6. Monitor post-op labs  7. DC planning . IPR still awaiting approval, ambulance planned for Monday if approved       -GERD:  Resume PPI.  Formulary substitution when indicated.     -Dyslipidemia:  Resume home regimen Statin ( formulary substitution when appropriate).     -PAF: resume amiodarone, BB, and will resume DOAC       -BPH: maintain on home regimen.  Monitor for adequate spontaneous voiding.     -End-stage renal disease: NAL consulted. Following HD.    Electronically signed by MERCEDEZ Lake, 09/29/23, 3:47 PM EDT.    I have personally performed the evaluation on this patient. My history is consistent  with HPI obtained. My exam findings are listed above. I have personally reviewed and formulated the above treatment plan with Reid

## 2023-09-29 NOTE — PLAN OF CARE
Problem: Device-Related Complication Risk (Hemodialysis)  Goal: Safe, Effective Therapy Delivery  Outcome: Ongoing, Progressing  Intervention: Optimize Device Care and Function  Recent Flowsheet Documentation  Taken 9/29/2023 [id number] by Andrei Pulido RN  Medication Review/Management:   medications reviewed   high-risk medications identified     Problem: Hemodynamic Instability (Hemodialysis)  Goal: Effective Tissue Perfusion  Outcome: Ongoing, Progressing     Problem: Infection (Hemodialysis)  Goal: Absence of Infection Signs and Symptoms  Outcome: Ongoing, Progressing   Goal Outcome Evaluation:               HD completed. UF goal reached. Tolerated well. Blood pressure dropped right at end of tx, but next check was normal. Blood returned to PT. Report to AGUSTÍN Elizondo

## 2023-09-29 NOTE — THERAPY TREATMENT NOTE
Patient Name: Enrike Tomas  : 1935    MRN: [id number]                              Today's Date: 2023       Admit Date: 2023    Visit Dx: No diagnosis found.  Patient Active Problem List   Diagnosis    (HFpEF) heart failure with preserved ejection fraction    Anemia of renal disease    At high risk for falls    Benign prostatic hyperplasia    Closed fracture of left acetabulum    Compression fracture of thoracic vertebra    Deficiency of other specified B group vitamins    Depression    ESRD (end stage renal disease)    Essential hypertension    GERD (gastroesophageal reflux disease)    Hepatic cyst    Long term (current) use of anticoagulants    Major depressive disorder, single episode, moderate    Mixed hyperlipidemia    Morgagni hernia    Occlusion of right vertebral artery    NERY (obstructive sleep apnea)    Fall    Severe malnutrition    Sustained SVT    Paroxysmal atrial fibrillation    Chronic heart failure with preserved ejection fraction (HFpEF)    S/P shoulder hemiarthroplasty, left     Past Medical History:   Diagnosis Date    A-fib     Abnormal ECG     occasional extra heartbeat    Arthritis     Atrial fibrillation     BPH (benign prostatic hyperplasia)     Cervical compression fracture 2023    currently wearing a c collar    Diverticulosis     Diverticulum of esophagus     puree diet and speech therapy    Elevated cholesterol     ESRD on hemodialysis     GERD (gastroesophageal reflux disease)     Heart failure     Hemodialysis patient     M,W,F    Hyperlipidemia     Hypertension     Low back pain     NERY (obstructive sleep apnea)     NOT USING CPAP    Recurrent falls     UTI (urinary tract infection)      Past Surgical History:   Procedure Laterality Date    CATARACT EXTRACTION      COLONOSCOPY      DIALYSIS FISTULA CREATION      ENDOSCOPY      INGUINAL HERNIA REPAIR      PROSTATE BIOPSY      THROAT SURGERY      diverticulum in throat    TONSILLECTOMY AND ADENOIDECTOMY       TOTAL SHOULDER ARTHROPLASTY W/ DISTAL CLAVICLE EXCISION Left 9/25/2023    Procedure: HEMIARTHROPLASTY, BICEPS TENODESIS - LEFT;  Surgeon: Matteo Tan MD;  Location: [facility];  Service: Orthopedics;  Laterality: Left;      General Information       Row Name 09/29/23 1254          OT Time and Intention    Document Type therapy note (daily note)  -JY     Mode of Treatment occupational therapy;individual therapy  -JY       Row Name 09/29/23 1254          General Information    Patient Profile Reviewed yes  -JY     Existing Precautions/Restrictions fall;left;shoulder;non-weight bearing;other (see comments)  donjoy ultra II sling w/ pillow, IS nerve cath. confusion  -JY     Barriers to Rehab medically complex;previous functional deficit;cognitive status  -JY       Row Name 09/29/23 1254          Cognition    Orientation Status (Cognition) oriented to;person;disoriented to;place;situation;time  -JY       Row Name 09/29/23 1254          Safety Issues, Functional Mobility    Safety Issues Affecting Function (Mobility) awareness of need for assistance;insight into deficits/self-awareness;safety precaution awareness;safety precautions follow-through/compliance;sequencing abilities  -JY     Impairments Affecting Function (Mobility) cognition;strength;range of motion (ROM);postural/trunk control;sensation/sensory awareness;endurance/activity tolerance;pain;balance  -JY     Cognitive Impairments, Mobility Safety/Performance awareness, need for assistance;attention;insight into deficits/self-awareness;judgment;problem-solving/reasoning;safety precaution awareness;safety precaution follow-through;sequencing abilities;impulsivity  -JY     Comment, Safety Issues/Impairments (Mobility) OOB deferred d/t urgent need for sling adjustment and fit prior to pt finishing self feeding in bed where pt was sat upright toward midline for improved stability  -JY               User Key  (r) = Recorded By, (t) = Taken By, (c) = Cosigned  By      Initials Name Provider Type    Alma Delia Corral, OT Occupational Therapist                     Mobility/ADL's       Row Name 09/29/23 1256          Bed Mobility    Rolling Left [place] (Bed Mobility) not tested  -MAGDALENE     Rolling Right [place] (Bed Mobility) not tested  -MAGDALENE     Scooting/Bridging [place] (Bed Mobility) not tested  -MAGDALENE     Supine-Sit [place] (Bed Mobility) not tested  -MAGDALENE     Sit-Supine [place] (Bed Mobility) not tested  -MAGDALENE     Comment, (Bed Mobility) EOB/OOB activity deferred this date as pt presented with urgent need for sling adjustment and fit for safety during self feeding already initiated, completed sling mgmt and returned set up to promote self feeding with pt sitting up in bed  -MAGDALENE       Row Name 09/29/23 1256          Transfers    Comment, (Transfers) EOB/OOB activity deferred this date as pt presented with urgent need for sling adjustment and fit for safety during self feeding already initiated, completed sling mgmt and returned set up to promote self feeding with pt sitting up in bed  -MAGDALENE       Row Name 09/29/23 1256          Bed-Chair Transfer    Bed-Chair [place] (Transfers) not tested  -MAGDALENE       Row Name 09/29/23 1256          Sit-Stand Transfer    Sit-Stand [place] (Transfers) not tested  -MAGDALENE       Row Name 09/29/23 1256          Stand-Sit Transfer    Stand-Sit [place] (Transfers) not tested  -MAGDALENE       Row Name 09/29/23 1256          Functional Mobility    Functional Mobility- Comment defer to PT for specifics  -MAGDALENE       Row Name 09/29/23 1256          Activities of Daily Living    BADL Assessment/Intervention upper body dressing;bathing;feeding  -MAGDALENE       Row Name 09/29/23 1256          Mobility    Extremity Weight-bearing Status left upper extremity  -     Left Upper Extremity (Weight-bearing Status) non weight-bearing (NWB)   -[facility] Name 09/29/23 1256          Bathing Assessment/Intervention    [place] Level (Bathing)  upper body;bathing skills;dependent (less than 25% patient effort)  -JY     Assistive Devices (Bathing) other (see comments)  trent tech  -JY     Position (Bathing) sitting up in bed  -JY     Comment, (Bathing) education reviewed with pt's daughter who was present in room, specifically optimal position, tech, seq for L axilla care while adhering to L shoulder precautions throughout; grossly recommend pt receive A vs more I attempts given pt's cognitive deficits and safety concerns; emphasized no showering while nerve cath still in place  -[employer]       Row Name 09/29/23 125          Upper Body Dressing Assessment/Training    [place] Level (Upper Body Dressing) doff;don;pajama/robe;dependent (less than 25% patient effort);verbal cues  -JY     Comment, (Upper Body Dressing) education reviewed with pt's daughter who was present in room, specifically optimal position, tech, seq for UBD while adhering to L shoulder precautions throughout; grossly recommend pt receive A vs more I attempts given pt's cognitive deficits and safety concerns; emphasized need for close monitoring of nerve cath with d/d garments to avoid accidental dislodging  -[employer]       Row Name 09/29/23 1256          Lower Body Dressing Assessment/Training    [place] Level (Lower Body Dressing) doff;don;socks;dependent (less than 25% patient effort)  -JY     Position (Lower Body Dressing) supine  -JY       Row Name 09/29/23 1256          Self-Feeding Assessment/Training    [place] Level (Feeding) scoop food and bring to mouth;liquids to mouth;supervision;verbal cues;dependent (less than 25% patient effort);set up  -JY     Position (Self-Feeding) sitting up in bed  -JY     Comment, (Feeding) pt initially observed using LUE to feed self from sling not well donned thus educated on proper sling mgmt and fit and using RUE to feed self w/ A as needed vs using LUE as pt not able to consistently adhere to precautions at LUE when using  -JY               User  Key  (r) = Recorded By, (t) = Taken By, (c) = Cosigned By      Initials Name Provider Type    Alma Delia Corral, OT Occupational Therapist                   Obj/Interventions       [facility] Name 09/29/23 1308          Sensory Assessment (Somatosensory)    Sensory Subjective Reports numbness  -     Sensory Assessment pt still reports numbness at LUE upon sensory awareness inquiry  -JY       Row Name 09/29/23 1308          Shoulder (Therapeutic Exercise)    Shoulder (Therapeutic Exercise) PROM (passive range of motion);AROM (active range of motion)  -     Shoulder AROM (Therapeutic Exercise) bilateral;scapular retraction;sitting;10 repetitions;other (see comments)  sitting up in bed  -     Shoulder PROM (Therapeutic Exercise) left;flexion;extension;external rotation;internal rotation;sitting;10 repetitions  sitting up in bed, pt tolerated PROM FE to 110-120 degrees, ER 30, IR chest  -JY       Row Name 09/29/23 1308          Elbow/Forearm (Therapeutic Exercise)    Elbow/Forearm (Therapeutic Exercise) AROM (active range of motion)  -     Elbow/Forearm AROM (Therapeutic Exercise) left;flexion;extension;supination;pronation;sitting;10 repetitions  -JY       Row Name 09/29/23 1308          Wrist (Therapeutic Exercise)    Wrist (Therapeutic Exercise) AROM (active range of motion)  -     Wrist AROM (Therapeutic Exercise) left;flexion;extension;10 repetitions  -JY       Row Name 09/29/23 1308          Hand (Therapeutic Exercise)    Hand (Therapeutic Exercise) AROM (active range of motion)  -     Hand AROM/AAROM (Therapeutic Exercise) left;AROM (active range of motion);finger flexion;finger extension;10 repetitions  -JY       Row Name 09/29/23 1308          Motor Skills    Therapeutic Exercise shoulder;elbow/forearm;wrist;hand;other (see comments)  facilitated ROM HEP as indicated per MD with education to both pt and dtr present on parameters and recommended frequency from safe position, provided dtr with additional  handout on HEP; pt req'd cues for continuation of TE, reaching full available ROM  -JY       Row Name 09/29/23 1308          Balance    Balance Assessment sitting static balance;sitting dynamic balance  -JY     Static Sitting Balance contact guard  -JY     Dynamic Sitting Balance minimal assist  -JY     Position, Sitting Balance supported;long sitting;other (see comments)  sitting up in bed  -JY     Balance Interventions sitting;supported;static;occupation based/functional task  -JY     Comment, Balance pt presented lateral lean to L side when sitting up in bed, req'd manaual A to return to midline for stability prior to mobility, utilized pillow laterally at L side to assist with stability  -JY               User Key  (r) = Recorded By, (t) = Taken By, (c) = Cosigned By      Initials Name Provider Type    Alma Delia Corral, BETSY Occupational Therapist                   Goals/Plan    No documentation.                  Clinical Impression       Row Name 09/29/23 1314          Pain Assessment    Pretreatment Pain Rating 0/10 - no pain  -JY     Posttreatment Pain Rating 0/10 - no pain  -JY     Pre/Posttreatment Pain Comment denies any pain and tolerated all OT interventions  -JY     Pain Intervention(s) Repositioned;Ambulation/increased activity;Rest  -JY     Additional Documentation Pain Scale: FACES Pre/Post-Treatment (Group)  -JY       Row Name 09/29/23 1314          Pain Scale: FACES Pre/Post-Treatment    Pain: FACES Scale, Pretreatment 0-->no hurt  -JY     Posttreatment Pain Rating 0-->no hurt  -JY     Pre/Posttreatment Pain Comment denies any pain and tolerated all OT interventions  -JY       Row Name 09/29/23 1314          Plan of Care Review    Plan of Care Reviewed With patient;daughter  -JY     Progress no change  -JY     Outcome Evaluation Pt still presents with confusion however participatory in OT interventions focused on ADL retraining, sling mgmt, LUE TE HEP as indicated by MD with pt's dtr present and  intermittently involved in return demo. Pt presented with concerns for optimal sling mgmt and fit and was attempting to feed self w/ LUE upon OT arrival not adhering to precautions. OT educated all on precautions related to LUE post op and facilitated TE. Pt tolerated L PROM shoulder FE to 110 to 120 degrees, ER 30, IR chest, AROM w/ frequent cues for attention and follow through to complete scapula retractions, elbow, wrist and hand. Pt grossly dep for d/d gown and L axilla care with recommendation for assistance provided for safety purposes. Recommend SNF at d/c when medically ready, otherwise progress pt as able during IPOT POC.  -MAGDALENE       Row Name 09/29/23 1314          Therapy Assessment/Plan (OT)    Rehab Potential (OT) good, to achieve stated therapy goals  -JY     Criteria for Skilled Therapeutic Interventions Met (OT) yes  -JY     Therapy Frequency (OT) daily  -MAGDALENE       Row Name 09/29/23 1314          Therapy Plan Review/Discharge Plan (OT)    Anticipated Discharge Disposition (OT) skilled nursing facility  -MAGDALENE       Row Name 09/29/23 1314          Vital Signs    Pre Systolic BP Rehab 130  -JY     Pre Treatment Diastolic BP 58  -JY     Pretreatment Heart Rate (beats/min) 79  -JY     Posttreatment Heart Rate (beats/min) 77  -JY     Pre SpO2 (%) 97  -JY     O2 Delivery Pre Treatment room air  -JY     O2 Delivery Intra Treatment room air  -JY     Post SpO2 (%) 96  -JY     O2 Delivery Post Treatment room air  -JY     Pre Patient Position Sitting  -JY     Intra Patient Position Sitting  -JY     Post Patient Position Sitting  -JY       Row Name 09/29/23 1314          Positioning and Restraints    Pre-Treatment Position in bed  -JY     Post Treatment Position bed  -JY     In Bed notified nsg;fowlers;call light within reach;encouraged to call for assist;exit alarm on;with family/caregiver;side rails up x3;with brace  LUE in sling, infublock infusing  -JY               User Key  (r) = Recorded By, (t) = Taken By,  (c) = Cosigned By      Initials Name Provider Type    Alma Delia Corral OT Occupational Therapist                   Outcome Measures       Row Name 09/29/23 1321          How much help from another is currently needed...    Putting on and taking off regular lower body clothing? 1  -JY     Bathing (including washing, rinsing, and drying) 1  -JY     Toileting (which includes using toilet bed pan or urinal) 1  -JY     Putting on and taking off regular upper body clothing 1  -JY     Taking care of personal grooming (such as brushing teeth) 2  -JY     Eating meals 2  -JY     AM-PAC 6 Clicks Score (OT) 8  -JY       Row Name 09/29/23 1321          Functional Assessment    Outcome Measure Options AM-PAC 6 Clicks Daily Activity (OT)  -JY               User Key  (r) = Recorded By, (t) = Taken By, (c) = Cosigned By      Initials Name Provider Type    Alma Delia Corral OT Occupational Therapist                    Occupational Therapy Education       Title: PT OT SLP Therapies (In Progress)       Topic: Occupational Therapy (In Progress)       Point: ADL training (In Progress)       Description:   Instruct learner(s) on proper safety adaptation and remediation techniques during self care or transfers.   Instruct in proper use of assistive devices.                  Learning Progress Summary             Patient Acceptance, E,D, NR by MAGDALENE at 9/29/2023 1155    Acceptance, E, NR by KF at 9/28/2023 0850    Acceptance, E,D, VU,NR by TB at 9/26/2023 1257    Comment: Spouse present and participating in teaching. Pt limited by acute confusion.    CHRISSY Martinez,TB,D,H, NR by AR at 9/25/2023 1856   Family Acceptance, E,D, NR by MAGDALENE at 9/29/2023 1155    Acceptance, E,D, VU,NR by TB at 9/26/2023 1257    Comment: Spouse present and participating in teaching. Pt limited by acute confusion.    YannaerCHRISSY,TB,D,H, NR by AR at 9/25/2023 1856   Caregiver Acceptance, E, NR by KF at 9/28/2023 0850                         Point: Home exercise program (In  Progress)       Description:   Instruct learner(s) on appropriate technique for monitoring, assisting and/or progressing therapeutic exercises/activities.                  Learning Progress Summary             Patient Acceptance, E,D, NR by JY at 9/29/2023 1155    Acceptance, E, NR by KF at 9/28/2023 0850    Acceptance, E,D, VU,NR by TB at 9/26/2023 1257    Comment: Spouse present and participating in teaching. Pt limited by acute confusion.    Eager, E,TB,D,H, NR by AR at 9/25/2023 1856   Family Acceptance, E,D, NR by JY at 9/29/2023 1155    Acceptance, E,D, VU,NR by TB at 9/26/2023 1257    Comment: Spouse present and participating in teaching. Pt limited by acute confusion.    Eager, E,TB,D,H, NR by AR at 9/25/2023 1856   Caregiver Acceptance, E, NR by KF at 9/28/2023 0850                         Point: Precautions (In Progress)       Description:   Instruct learner(s) on prescribed precautions during self-care and functional transfers.                  Learning Progress Summary             Patient Acceptance, E,D, NR by JY at 9/29/2023 1155    Acceptance, E, NR by KF at 9/28/2023 0850    Acceptance, E,D, VU,NR by TB at 9/26/2023 1257    Comment: Spouse present and participating in teaching. Pt limited by acute confusion.    Eager, E,TB,D,H, NR by AR at 9/25/2023 1856   Family Acceptance, E,D, NR by JY at 9/29/2023 1155    Acceptance, E,D, VU,NR by TB at 9/26/2023 1257    Comment: Spouse present and participating in teaching. Pt limited by acute confusion.    Eager, E,TB,D,H, NR by AR at 9/25/2023 1856   Caregiver Acceptance, E, NR by KF at 9/28/2023 0850                         Point: Body mechanics (In Progress)       Description:   Instruct learner(s) on proper positioning and spine alignment during self-care, functional mobility activities and/or exercises.                  Learning Progress Summary             Patient Acceptance, E,D, NR by JY at 9/29/2023 1155    Acceptance, E, NR by KF at 9/28/2023 0850     Eager, E,TB,D,H, NR by AR at 9/25/2023 1856   Family Acceptance, E,D, NR by JY at 9/29/2023 1155    Eager, E,TB,D,H, NR by AR at 9/25/2023 1856   Caregiver Acceptance, E, NR by KF at 9/28/2023 0850                                         User Key       Initials Effective Dates Name Provider Type Discipline    TB 07/11/23 -  Chery Rene, OT Occupational Therapist OT    AR 07/11/23 -  Debbie Espinosa, OT Occupational Therapist OT    JY 06/16/21 -  Alma Delia Barrera, OT Occupational Therapist OT    KF 08/09/23 -  Sadia Razo, OT Occupational Therapist OT                  OT Recommendation and Plan  Therapy Frequency (OT): daily  Plan of Care Review  Plan of Care Reviewed With: patient, daughter  Progress: no change  Outcome Evaluation: Pt still presents with confusion however participatory in OT interventions focused on ADL retraining, sling mgmt, LUE TE HEP as indicated by MD with pt's dtr present and intermittently involved in return demo. Pt presented with concerns for optimal sling mgmt and fit and was attempting to feed self w/ LUE upon OT arrival not adhering to precautions. OT educated all on precautions related to LUE post op and facilitated TE. Pt tolerated L PROM shoulder FE to 110 to 120 degrees, ER 30, IR chest, AROM w/ frequent cues for attention and follow through to complete scapula retractions, elbow, wrist and hand. Pt grossly dep for d/d gown and L axilla care with recommendation for assistance provided for safety purposes. Recommend SNF at d/c when medically ready, otherwise progress pt as able during IPOT POC.     Time Calculation:         Time Calculation- OT       Row Name 09/29/23 1323             Time Calculation- OT    OT Start Time 1155  -JY      OT Received On 09/29/23  -JY      OT Goal Re-Cert Due Date 10/05/23  -JY         Timed Charges    06721 - OT Therapeutic Exercise Minutes 19  -JY      70804 - OT Therapeutic Activity Minutes 18  -JY      64026 - OT Self Care/Mgmt  Minutes 8  -JY         Total Minutes    Timed Charges Total Minutes 45  -JY       Total Minutes 45  -JY                User Key  (r) = Recorded By, (t) = Taken By, (c) = Cosigned By      Initials Name Provider Type    Alma Delia Corral OT Occupational Therapist                  Therapy Charges for Today       Code Description Service Date Service Provider Modifiers Qty    82853820874  OT THER PROC EA 15 MIN 9/29/2023 Alma Delia Barrera OT GO 1    [id number]  OT THERAPEUTIC ACT EA 15 MIN 9/29/2023 Alma Delia Barrera OT GO 1    [id number]  OT SELF CARE/MGMT/TRAIN EA 15 MIN 9/29/2023 Alma Delia Barrera OT GO 1                 Alma Delia Barrera OT  9/29/2023

## 2023-09-29 NOTE — CASE MANAGEMENT/SOCIAL WORK
Discharge Planning Assessment  [facility]     Patient Name: Enrike Tomas  MRN: [id number]  Today's Date: 9/29/2023    Admit Date: 9/25/2023    Plan: [facility] with in house hemodialysis   Discharge Needs Assessment    No documentation.                  Discharge Plan       Row Name 09/29/23 1431       Plan    Plan [facility] with in house hemodialysis    Patient/Family in Agreement with Plan yes    Plan Comments Followed up with Cristine with [facility] and she states that the facility does not have insurance auth yet.    Rescheduled [facility] ambulance to Monday, 10/2/23 at 5:30pm.    Updated Mr. Tomas' family and they are agreeable to the DC plan.    CM will continue to follow.    Final Discharge Disposition Code 03 - skilled nursing facility (SNF)                  Continued Care and Services - Admitted Since 9/25/2023       Destination Coordination complete.      Service Provider Request Status Selected Services Address Phone Fax Patient Preferred    [facility]  Selected Skilled Nursing [address] [facility] [address] [phone] --                  Expected Discharge Date and Time       Expected Discharge Date Expected Discharge Time    Sep 29, 2023             Belkis Maldonado RN

## 2023-09-29 NOTE — PROGRESS NOTES
" LOS: 0 days   Patient Care Team:  Blair Dhaliwal MD as PCP - General (Internal Medicine)  Blair Packer MD as Consulting Physician (Cardiology)  Michelle Aponte MD (Nephrology)    Chief Complaint: ESRD  Left shoulder hemiarthroplasty     Subjective     Seen on dialysis, tolerating well. No complains.   Denies chest pain or shortness of breath.     History taken from: patient    Objective     Vital Sign Min/Max for last 24 hours  Temp  Min: 97.2 °F (36.2 °C)  Max: 98.2 °F (36.8 °C)   BP  Min: 139/61  Max: 189/74   Pulse  Min: 62  Max: 76   Resp  Min: 16  Max: 18   SpO2  Min: 94 %  Max: 100 %   Flow (L/min)  Min: 2  Max: 2   Weight  Min: 54.2 kg (119 lb 7.8 oz)  Max: 54.2 kg (119 lb 7.8 oz)       No intake/output data recorded.  I/O last 3 completed shifts:  In: 780 [P.O.:780]  Out: 50 [Urine:50]    Objective:  General Appearance:  Comfortable.    Vital signs: (most recent): Blood pressure 152/80, pulse 70, temperature 97.2 °F (36.2 °C), temperature source Oral, resp. rate 16, height 157.5 cm (62.01\"), weight 54.2 kg (119 lb 7.8 oz), SpO2 100 %.  Vital signs are normal.    Output: Minimal urine output.    HEENT: Normal HEENT exam.    Lungs:  Normal effort and normal respiratory rate.  Breath sounds clear to auscultation.  He is not in respiratory distress.  No decreased breath sounds.    Heart: Normal rate.  Regular rhythm.  S1 normal and S2 normal.  No murmur or gallop.   Abdomen: Abdomen is soft.  Bowel sounds are normal.     Pulses: Distal pulses are intact.    Neurological: Patient is alert and oriented to person, place and time.  Normal strength.    Pupils:  Pupils are equal, round, and reactive to light.          Results Review:     I reviewed the patient's new clinical results.    WBC No results found for: WBC     HGB No results found for: HGB     HCT No results found for: HCT     Platlets No results found for: LABPLAT   MCV No results found for: MCV         Sodium No results found for: NA   "   Potassium No results found for: K     Chloride No results found for: CL     CO2 No results found for: CO2     BUN No results found for: BUN     Creatinine No results found for: CREATININE     Calcium No results found for: CALCIUM     PO4 No results found for: CAPO4   Albumin No results found for: ALBUMIN   Magnesium No results found for: MG   Uric Acid No results found for: URICACID     Medication Review: yes    Assessment & Plan       S/P shoulder hemiarthroplasty, left    (HFpEF) heart failure with preserved ejection fraction    Benign prostatic hyperplasia    Depression    ESRD (end stage renal disease)    Essential hypertension    GERD (gastroesophageal reflux disease)    Mixed hyperlipidemia    NERY (obstructive sleep apnea)    Paroxysmal atrial fibrillation      Assessment & Plan    ESRD:  On MWF saray. [facility]. RUE AV fistula. Admitted for shoulder surgery      Anemia:  JENNYFER on HD days     Volume status :  Stable     HTN: Optimization of volume status with HD.        Recs  Getting HD per MWF saray.   JENNYFER on HD days  Renal Diet       Simone Jcaobson MD  09/29/23  09:16 EDT

## 2023-09-30 ENCOUNTER — APPOINTMENT (OUTPATIENT)
Dept: GENERAL RADIOLOGY | Facility: HOSPITAL | Age: 88
End: 2023-09-30
Payer: MEDICARE

## 2023-09-30 PROBLEM — D64.9 ANEMIA: Status: ACTIVE | Noted: 2023-09-30

## 2023-09-30 PROBLEM — R41.0 POSTOPERATIVE CONFUSION: Status: ACTIVE | Noted: 2023-09-30

## 2023-09-30 PROCEDURE — 97110 THERAPEUTIC EXERCISES: CPT

## 2023-09-30 PROCEDURE — A9270 NON-COVERED ITEM OR SERVICE: HCPCS | Performed by: INTERNAL MEDICINE

## 2023-09-30 PROCEDURE — 63710000001 SENNOSIDES-DOCUSATE 8.6-50 MG TABLET: Performed by: INTERNAL MEDICINE

## 2023-09-30 PROCEDURE — A9270 NON-COVERED ITEM OR SERVICE: HCPCS | Performed by: ORTHOPAEDIC SURGERY

## 2023-09-30 PROCEDURE — 63710000001 PANTOPRAZOLE 40 MG TABLET DELAYED-RELEASE: Performed by: INTERNAL MEDICINE

## 2023-09-30 PROCEDURE — 97116 GAIT TRAINING THERAPY: CPT

## 2023-09-30 PROCEDURE — 63710000001 MELATONIN 5 MG TABLET: Performed by: INTERNAL MEDICINE

## 2023-09-30 PROCEDURE — 63710000001 TRAZODONE 50 MG TABLET: Performed by: INTERNAL MEDICINE

## 2023-09-30 PROCEDURE — 63710000001 SERTRALINE 50 MG TABLET: Performed by: INTERNAL MEDICINE

## 2023-09-30 PROCEDURE — 63710000001 AMIODARONE 200 MG TABLET: Performed by: INTERNAL MEDICINE

## 2023-09-30 PROCEDURE — 63710000001 ATORVASTATIN 20 MG TABLET: Performed by: INTERNAL MEDICINE

## 2023-09-30 PROCEDURE — 63710000001 APIXABAN 2.5 MG TABLET: Performed by: INTERNAL MEDICINE

## 2023-09-30 PROCEDURE — 97535 SELF CARE MNGMENT TRAINING: CPT

## 2023-09-30 PROCEDURE — 63710000001 FINASTERIDE 5 MG TABLET: Performed by: INTERNAL MEDICINE

## 2023-09-30 PROCEDURE — 71045 X-RAY EXAM CHEST 1 VIEW: CPT

## 2023-09-30 PROCEDURE — 97530 THERAPEUTIC ACTIVITIES: CPT

## 2023-09-30 PROCEDURE — 63710000001 MIRTAZAPINE 15 MG TABLET: Performed by: INTERNAL MEDICINE

## 2023-09-30 PROCEDURE — 63710000001 OXYCODONE 5 MG TABLET: Performed by: ORTHOPAEDIC SURGERY

## 2023-09-30 RX ORDER — CLONIDINE HYDROCHLORIDE 0.1 MG/1
0.1 TABLET ORAL 2 TIMES DAILY PRN
Status: DISCONTINUED | OUTPATIENT
Start: 2023-09-30 | End: 2023-10-05 | Stop reason: HOSPADM

## 2023-09-30 RX ORDER — GUAIFENESIN 200 MG/10ML
200 LIQUID ORAL EVERY 4 HOURS PRN
Status: DISCONTINUED | OUTPATIENT
Start: 2023-09-30 | End: 2023-10-05 | Stop reason: HOSPADM

## 2023-09-30 RX ADMIN — APIXABAN 2.5 MG: 2.5 TABLET, FILM COATED ORAL at 08:36

## 2023-09-30 RX ADMIN — TRAZODONE HYDROCHLORIDE 50 MG: 50 TABLET ORAL at 20:53

## 2023-09-30 RX ADMIN — SERTRALINE 50 MG: 50 TABLET, FILM COATED ORAL at 08:35

## 2023-09-30 RX ADMIN — SENNOSIDES AND DOCUSATE SODIUM 2 TABLET: 50; 8.6 TABLET ORAL at 08:36

## 2023-09-30 RX ADMIN — APIXABAN 2.5 MG: 2.5 TABLET, FILM COATED ORAL at 20:53

## 2023-09-30 RX ADMIN — MIRTAZAPINE 7.5 MG: 15 TABLET, FILM COATED ORAL at 20:54

## 2023-09-30 RX ADMIN — AMIODARONE HYDROCHLORIDE 200 MG: 200 TABLET ORAL at 08:36

## 2023-09-30 RX ADMIN — ATORVASTATIN CALCIUM 20 MG: 20 TABLET, FILM COATED ORAL at 20:54

## 2023-09-30 RX ADMIN — FINASTERIDE 5 MG: 5 TABLET, FILM COATED ORAL at 08:36

## 2023-09-30 RX ADMIN — PANTOPRAZOLE SODIUM 40 MG: 40 TABLET, DELAYED RELEASE ORAL at 08:36

## 2023-09-30 RX ADMIN — OXYCODONE 5 MG: 5 TABLET ORAL at 17:16

## 2023-09-30 RX ADMIN — Medication 5 MG: at 20:53

## 2023-09-30 NOTE — PLAN OF CARE
Goal Outcome Evaluation:  Plan of Care Reviewed With: patient, spouse        Progress: improving  Outcome Evaluation: Pt able to progress to short-distance ambulation this date with overall distance limited by significant weakness in BLEs. Pt will continue to benefit from PT to address ongoing strength, balance, and endurance deficits and return to PLOF.

## 2023-09-30 NOTE — THERAPY TREATMENT NOTE
Patient Name: Enrike Tomas  : 1935    MRN: [id number]                              Today's Date: 2023       Admit Date: 2023    Visit Dx: No diagnosis found.  Patient Active Problem List   Diagnosis    (HFpEF) heart failure with preserved ejection fraction    Anemia of renal disease    At high risk for falls    Benign prostatic hyperplasia    Closed fracture of left acetabulum    Compression fracture of thoracic vertebra    Deficiency of other specified B group vitamins    Depression    ESRD (end stage renal disease)    Essential hypertension    GERD (gastroesophageal reflux disease)    Hepatic cyst    Long term (current) use of anticoagulants    Major depressive disorder, single episode, moderate    Mixed hyperlipidemia    Morgagni hernia    Occlusion of right vertebral artery    NERY (obstructive sleep apnea)    Fall    Severe malnutrition    Sustained SVT    Paroxysmal atrial fibrillation    Chronic heart failure with preserved ejection fraction (HFpEF)    S/P shoulder hemiarthroplasty, left     Past Medical History:   Diagnosis Date    A-fib     Abnormal ECG     occasional extra heartbeat    Arthritis     Atrial fibrillation     BPH (benign prostatic hyperplasia)     Cervical compression fracture 2023    currently wearing a c collar    Diverticulosis     Diverticulum of esophagus     puree diet and speech therapy    Elevated cholesterol     ESRD on hemodialysis     GERD (gastroesophageal reflux disease)     Heart failure     Hemodialysis patient     M,W,F    Hyperlipidemia     Hypertension     Low back pain     NERY (obstructive sleep apnea)     NOT USING CPAP    Recurrent falls     UTI (urinary tract infection)      Past Surgical History:   Procedure Laterality Date    CATARACT EXTRACTION      COLONOSCOPY      DIALYSIS FISTULA CREATION      ENDOSCOPY      INGUINAL HERNIA REPAIR      PROSTATE BIOPSY      THROAT SURGERY      diverticulum in throat    TONSILLECTOMY AND ADENOIDECTOMY       TOTAL SHOULDER ARTHROPLASTY W/ DISTAL CLAVICLE EXCISION Left 9/25/2023    Procedure: HEMIARTHROPLASTY, BICEPS TENODESIS - LEFT;  Surgeon: Matteo Tan MD;  Location: [facility];  Service: Orthopedics;  Laterality: Left;      General Information       Row Name 09/30/23 1300          Physical Therapy Time and Intention    Document Type therapy note (daily note)  -KR     Mode of Treatment individual therapy;physical therapy  -KR       Row Name 09/30/23 1300          General Information    Patient Profile Reviewed yes  -KR     Existing Precautions/Restrictions fall;left;shoulder;non-weight bearing;other (see comments)  confusion, LUE NWB precautions, sling with abduction pillow, IS nerve block  -KR     Barriers to Rehab medically complex;previous functional deficit;cognitive status  -KR       Row Name 09/30/23 1300          Safety Issues, Functional Mobility    Safety Issues Affecting Function (Mobility) insight into deficits/self-awareness;awareness of need for assistance;safety precaution awareness;sequencing abilities;safety precautions follow-through/compliance  -KR     Impairments Affecting Function (Mobility) cognition;range of motion (ROM);strength;sensation/sensory awareness;motor control  -KR     Cognitive Impairments, Mobility Safety/Performance awareness, need for assistance;insight into deficits/self-awareness;problem-solving/reasoning;judgment;sequencing abilities;safety precaution follow-through;safety precaution awareness  -KR     Comment, Safety Issues/Impairments (Mobility) pt required frequent cues for maintaining alertness during session.  -KR               User Key  (r) = Recorded By, (t) = Taken By, (c) = Cosigned By      Initials Name Provider Type    KR Nazia Murray PT Physical Therapist                   Mobility       Row Name 09/30/23 1337          Bed Mobility    Bed Mobility supine-sit  -KR     Supine-Sit [place] (Bed Mobility) moderate assist (50% patient effort);2 person  assist;verbal cues;nonverbal cues (demo/gesture)  -KR     Assistive Device (Bed Mobility) draw sheet;bed rails;head of bed elevated  -KR     Comment, (Bed Mobility) cues for utilizing RUE to assist in pushing up, assist req'd at trunk and BLEs. Cues for scooting hips towards EOB.  -KR       Row Name 09/30/23 1337          Bed-Chair Transfer    Bed-Chair [place] (Transfers) moderate assist (50% patient effort);2 person assist  -KR     Assistive Device (Bed-Chair Transfers) other (see comments)  RUE support  -KR     Comment, (Bed-Chair Transfer) lateral steps to chair.  -KR       Row Name 09/30/23 1337          Sit-Stand Transfer    Sit-Stand [place] (Transfers) 2 person assist;verbal cues;nonverbal cues (demo/gesture);minimum assist (75% patient effort)  -KR     Assistive Device (Sit-Stand Transfers) other (see comments)  RUE support  -KR     Comment, (Sit-Stand Transfer) pt with heavy posterior lean upon initial stand requiring cues for upright posture and anterior weight shift. 1x STS from bed, 2x from chair. STS from chair with RUE push up from arm rests.  -KR       Row Name 09/30/23 0892          Gait/Stairs (Locomotion)    [place] Level (Gait) moderate assist (50% patient effort);1 person assist;1 person to manage equipment  -KR     Assistive Device (Gait) other (see comments)  RUE support  -KR     Distance in Feet (Gait) 8 + 8  -KR     Deviations/Abnormal Patterns (Gait) amberly decreased;gait speed decreased;stride length decreased;weight shifting decreased;bilateral deviations;base of support, narrow  -KR     Bilateral Gait Deviations forward flexed posture;heel strike decreased;knee buckling bilaterally  -KR     Comment, (Gait/Stairs) cues for weight shifting and LE progression, as well as cues for upright posture throughout. Seated rest between trials. Close chair follow by 2nd person for safety.  -KR       Row Name 09/30/23 6422          Mobility    Extremity Weight-bearing Status left upper  extremity  -KR     Left Upper Extremity (Weight-bearing Status) non weight-bearing (NWB)  -KR               User Key  (r) = Recorded By, (t) = Taken By, (c) = Cosigned By      Initials Name Provider Type    Nazia Jones PT Physical Therapist                   Obj/Interventions       Row Name 09/30/23 1339          Motor Skills    Therapeutic Exercise hip;knee  -KR       Row Name 09/30/23 1339          Hip (Therapeutic Exercise)    Hip (Therapeutic Exercise) strengthening exercise  -KR     Hip Strengthening (Therapeutic Exercise) 10 repetitions;bilateral;marching while seated;aDduction;aBduction  -KR       Row Name 09/30/23 1339          Knee (Therapeutic Exercise)    Knee (Therapeutic Exercise) strengthening exercise;isometric exercises  -KR     Knee Isometrics (Therapeutic Exercise) 10 repetitions;bilateral;quad sets  -KR     Knee Strengthening (Therapeutic Exercise) 10 repetitions;bilateral;LAQ (long arc quad)  -KR       Row Name 09/30/23 1339          Balance    Balance Assessment sitting static balance;sitting dynamic balance;standing static balance;standing dynamic balance  -KR     Static Sitting Balance contact guard  -KR     Dynamic Sitting Balance minimal assist;1-person assist  -KR     Position, Sitting Balance unsupported;sitting in chair;sitting edge of bed  -KR     Static Standing Balance minimal assist;2-person assist;verbal cues;non-verbal cues (demo/gesture)  -KR     Dynamic Standing Balance moderate assist;2-person assist;verbal cues;non-verbal cues (demo/gesture)  -KR     Position/Device Used, Standing Balance supported;other (see comments)  RUE support  -KR     Balance Interventions sitting;standing;sit to stand;supported;static;dynamic  -KR     Comment, Balance pt with L lateral lean while seated unsupported EOB. Cues for midline and upright posture.  -KR               User Key  (r) = Recorded By, (t) = Taken By, (c) = Cosigned By      Initials Name Provider Type    Nazia Jones PT  Physical Therapist                   Goals/Plan    No documentation.                  Clinical Impression       Row Name 09/30/23 1340          Pain    Pretreatment Pain Rating 0/10 - no pain  -KR     Posttreatment Pain Rating 0/10 - no pain  -KR       Row Name 09/30/23 1340          Plan of Care Review    Plan of Care Reviewed With patient;spouse  -KR     Progress improving  -KR     Outcome Evaluation Pt able to progress to short-distance ambulation this date with overall distance limited by significant weakness in BLEs. Pt will continue to benefit from PT to address ongoing strength, balance, and endurance deficits and return to PLOF.  -KR       Row Name 09/30/23 1340          Vital Signs    Intra SpO2 (%) 95  -KR     O2 Delivery Intra Treatment room air  -KR     Post SpO2 (%) 93  -KR     O2 Delivery Post Treatment room air  -KR     Pre Patient Position Supine  -KR     Intra Patient Position Standing  -KR     Post Patient Position Sitting  -KR       Row Name 09/30/23 1340          Positioning and Restraints    Pre-Treatment Position in bed  -KR     Post Treatment Position chair  -KR     In Chair notified nsg;reclined;call light within reach;encouraged to call for assist;exit alarm on;with family/caregiver;waffle cushion;on mechanical lift sling;legs elevated;LUE elevated;with brace  -KR               User Key  (r) = Recorded By, (t) = Taken By, (c) = Cosigned By      Initials Name Provider Type    KR Nazia Murray, PT Physical Therapist                   Outcome Measures       Row Name 09/30/23 1341          How much help from another person do you currently need...    Turning from your back to your side while in flat bed without using bedrails? 2  -KR     Moving from lying on back to sitting on the side of a flat bed without bedrails? 2  -KR     Moving to and from a bed to a chair (including a wheelchair)? 2  -KR     Standing up from a chair using your arms (e.g., wheelchair, bedside chair)? 3  -KR     Climbing  3-5 steps with a railing? 1  -KR     To walk in hospital room? 2  -KR     AM-PAC 6 Clicks Score (PT) 12  -KR     Highest level of mobility 4 --> Transferred to chair/commode  -KR       Row Name 09/30/23 1034          Functional Assessment    Outcome Measure Options AM-PAC 6 Clicks Daily Activity (OT)  -TB               User Key  (r) = Recorded By, (t) = Taken By, (c) = Cosigned By      Initials Name Provider Type    TB Chery Rene, OT Occupational Therapist    Nazia Jones, PT Physical Therapist                                 Physical Therapy Education       Title: PT OT SLP Therapies (In Progress)       Topic: Physical Therapy (In Progress)       Point: Mobility training (In Progress)       Learning Progress Summary             Patient Acceptance, E, VU,NR by KR at 9/30/2023 1342    Acceptance, E, VU,NR by LO at 9/29/2023 1443    Comment: PT POC    Acceptance, E, NR by CM at 9/28/2023 1030    Acceptance, E,D, VU,DU by HP at 9/26/2023 1711    Acceptance, E,D, VU,NR by HP at 9/25/2023 1849   Family Acceptance, E, VU,NR by KR at 9/30/2023 1342   Caregiver Acceptance, E, NR by CM at 9/28/2023 1030                         Point: Home exercise program (In Progress)       Learning Progress Summary             Patient Acceptance, E, VU,NR by KR at 9/30/2023 1342    Acceptance, E, VU,NR by LO at 9/29/2023 1443    Comment: PT POC    Acceptance, E, NR by CM at 9/28/2023 1030    Acceptance, E,D, VU,DU by HP at 9/26/2023 1711    Acceptance, E,D, VU,NR by HP at 9/25/2023 1849   Family Acceptance, E, VU,NR by KR at 9/30/2023 1342   Caregiver Acceptance, E, NR by CM at 9/28/2023 1030                         Point: Body mechanics (In Progress)       Learning Progress Summary             Patient Acceptance, E, VU,NR by KR at 9/30/2023 1342    Acceptance, E, VU,NR by LO at 9/29/2023 1443    Comment: PT POC    Acceptance, E, NR by CM at 9/28/2023 1030    Acceptance, ERAGHU, MONICA,DU by  at 9/26/2023 1711    Acceptance,  E,D, VU,NR by HP at 9/25/2023 1849   Family Acceptance, E, VU,NR by KR at 9/30/2023 1342   Caregiver Acceptance, E, NR by CM at 9/28/2023 1030                         Point: Precautions (In Progress)       Learning Progress Summary             Patient Acceptance, E, VU,NR by KR at 9/30/2023 1342    Acceptance, E, VU,NR by LO at 9/29/2023 1443    Comment: PT POC    Acceptance, E, NR by CM at 9/28/2023 1030    Acceptance, E,D, VU,DU by HP at 9/26/2023 1711    Acceptance, E,D, VU,NR by HP at 9/25/2023 1849   Family Acceptance, E, VU,NR by KR at 9/30/2023 1342   Caregiver Acceptance, E, NR by CM at 9/28/2023 1030                                         User Key       Initials Effective Dates Name Provider Type Discipline    LO 06/16/21 -  Julisa Lanza, PT Physical Therapist PT    HP 06/01/21 -  Fouzia Woody, PT Physical Therapist PT    CM 09/22/22 -  Hortencia Sommer, PT Physical Therapist PT    KR 12/30/22 -  Nazia Murray, PT Physical Therapist PT                  PT Recommendation and Plan     Plan of Care Reviewed With: patient, spouse  Progress: improving  Outcome Evaluation: Pt able to progress to short-distance ambulation this date with overall distance limited by significant weakness in BLEs. Pt will continue to benefit from PT to address ongoing strength, balance, and endurance deficits and return to PLOF.     Time Calculation:         PT Charges       Row Name 09/30/23 1342             Time Calculation    Start Time 1300  -KR      PT Received On 09/30/23  -KR      PT Goal Re-Cert Due Date 10/05/23  -KR         Timed Charges    04841 - PT Therapeutic Exercise Minutes 10  -KR      31352 - Gait Training Minutes  15  -KR      48308 - PT Therapeutic Activity Minutes 13  -KR         Total Minutes    Timed Charges Total Minutes 38  -KR       Total Minutes 38  -KR                User Key  (r) = Recorded By, (t) = Taken By, (c) = Cosigned By      Initials Name Provider Type    KR Nazia Murray, PT Physical  Therapist                  Therapy Charges for Today       Code Description Service Date Service Provider Modifiers Qty    26515070408  PT THER PROC EA 15 MIN 9/30/2023 Nazia Murray, PT GP 1    91733575610 HC GAIT TRAINING EA 15 MIN 9/30/2023 Nazia Murray, PT GP 1    34862627681  PT THERAPEUTIC ACT EA 15 MIN 9/30/2023 Nazia Murray, PT GP 1    17604589398 HC PT THER SUPP EA 15 MIN 9/30/2023 Nazia Murray, PT GP 2            PT G-Codes  Outcome Measure Options: AM-PAC 6 Clicks Daily Activity (OT)  AM-PAC 6 Clicks Score (PT): 12  AM-PAC 6 Clicks Score (OT): 8       Nazia Murray, PT  9/30/2023

## 2023-09-30 NOTE — PLAN OF CARE
Goal Outcome Evaluation:  Plan of Care Reviewed With: patient, daughter, spouse        Progress: improving   Pt alert, disoriented to time/situation, vss, room air, 2L when sleeping, up to chair this shift, pain controlled with prn meds and infu block, encouraged IS-tolerates well, dsg cdi to lue, sling/ice on, scds on, family at bedside, no changes this shift, plan is for rehab Monday.

## 2023-09-30 NOTE — PLAN OF CARE
Problem: Adult Inpatient Plan of Care  Goal: Plan of Care Review  Recent Flowsheet Documentation  Taken 9/30/2023 1030 by Chery Rene OT  Progress: no change  Plan of Care Reviewed With: patient  Outcome Evaluation: Pt is A/Ox2 with cues and participates in therapy with good effort. Pt remains below his baseline limited by confusion, strength, balance, and functional endurance deficits. Education reviewed for L shoulder precautions, sling teaching, and ADL retraining. LUE HEP completed per MD parameters. Pt tolerates PROM , ER to 30, IR to chest. Pt continues to require assist for all ADLs. OT will continue to follow IP. Plan is SNF - awaiting insurance authorization.

## 2023-09-30 NOTE — PROGRESS NOTES
"IM progress note      Enrike Tomas  [id number]  1935     LOS: 0 days     Attending: Matteo Tan MD    Primary Care Provider: Blair Dhaliwal MD      Chief Complaint/Reason for visit:  No chief complaint on file.      Subjective   Doing ok. Reports adequate pain control. Very confused. Intermittently snoring with conversation. Wife at bedside concerned with altered mental status, worse than baseline confusion and not normal for post HD treatments. Did report he felt like he had more sputum production. Denies f/c/n/v/cp.     Objective        Visit Vitals  /84 (BP Location: Left leg, Patient Position: Lying)   Pulse 69   Temp 98.8 °F (37.1 °C) (Oral)   Resp 18   Ht 157.5 cm (62.01\")   Wt 54.2 kg (119 lb 7.8 oz)   SpO2 94%   BMI 21.85 kg/m²     Temp (24hrs), Av.1 °F (36.7 °C), Min:97.5 °F (36.4 °C), Max:98.8 °F (37.1 °C)      Physical Therapy:  Pt able to progress to short-distance ambulation this date with overall distance limited by significant weakness in BLEs. Pt will continue to benefit from PT to address ongoing strength, balance, and endurance deficits and return to PLOF.      Physical Exam:     General Appearance:    Alert, cooperative, in no acute distress   Head:    Normocephalic, without obvious abnormality, atraumatic    Lungs:     Normal effort, symmetric chest rise,  clear to      auscultation bilaterally              Heart:    Regular rhythm and normal rate, normal S1 and S2    Abdomen:     Normal bowel sounds, no masses, no organomegaly, soft        non-tender, non-distended, no guarding, no rebound                tenderness   Extremities:   Sling on. CDI dressing. PNB cath present. Moves hand, fingers well.   No clubbing, cyanosis or edema.  No deformities.    Pulses:   Pulses palpable and equal bilaterally   Skin:   No bleeding, bruising or rash          Results Review:     I reviewed the patient's new clinical results.   Results from last 7 days   Lab Units 23  0519 "   WBC 10*3/mm3 6.62   HEMOGLOBIN g/dL 9.6*   HEMATOCRIT % 28.8*   PLATELETS 10*3/mm3 204      Latest Reference Range & Units 09/12/23 09:50   Hemoglobin 13.0 - 17.7 g/dL 11.7 (L)   Hematocrit 37.5 - 51.0 % 35.8 (L)   (L): Data is abnormally low    Results from last 7 days   Lab Units 09/26/23  0519 09/25/23 2017 09/25/23  1111   SODIUM mmol/L 134*  --   --    POTASSIUM mmol/L 4.3 4.1 3.4*   CHLORIDE mmol/L 92*  --   --    CO2 mmol/L 25.0  --   --    BUN mg/dL 34*  --   --    CREATININE mg/dL 3.28*  --   --    CALCIUM mg/dL 9.1  --   --    GLUCOSE mg/dL 100*  --   --      I reviewed the patient's new imaging including images and reports.    All medications reviewed.   amiodarone, 200 mg, Oral, Daily  apixaban, 2.5 mg, Oral, BID  atorvastatin, 20 mg, Oral, Nightly  finasteride, 5 mg, Oral, Daily  melatonin, 5 mg, Oral, Nightly  mirtazapine, 7.5 mg, Oral, Nightly  pantoprazole, 40 mg, Oral, Daily  sennosides-docusate, 2 tablet, Oral, BID  sertraline, 50 mg, Oral, Daily  traZODone, 50 mg, Oral, Nightly      acetaminophen, 650 mg, Q4H PRN   Or  acetaminophen, 650 mg, Q4H PRN  guaifenesin, 200 mg, Q4H PRN  HYDROmorphone, 0.5 mg, Q2H PRN   And  naloxone, 0.1 mg, Q5 Min PRN  hydrOXYzine, 25 mg, TID PRN  oxyCODONE, 5 mg, Q4H PRN        Assessment & Plan       S/P shoulder hemiarthroplasty, left    (HFpEF) heart failure with preserved ejection fraction    Benign prostatic hyperplasia    Depression    ESRD (end stage renal disease)    Essential hypertension    GERD (gastroesophageal reflux disease)    Mixed hyperlipidemia    NERY (obstructive sleep apnea)    Paroxysmal atrial fibrillation    Anemia, acute on chronic    Postoperative confusion      Plan  1. PT/OT. NWB, left UE, ROM hand, wrist, elbow.  2. Pain control-prns, interscalene nerve block cath with ropivacaine infusion.           3. IS-encourage  4. DVT proph- Mech/ mobilize.  5. Bowel regimen  6. Monitor post-op labs  7. DC planning . IPR still awaiting approval,  ambulance planned for Monday if approved     -Chest congestion:  nursing to assist with IS  robitussin PRN  CXR in Am     -GERD:  Resume PPI.  Formulary substitution when indicated.     -Dyslipidemia:  Resume home regimen Statin ( formulary substitution when appropriate).     -PAF: resume amiodarone, BB, and will resume DOAC       -BPH: maintain on home regimen.  Monitor for adequate spontaneous voiding.     -End-stage renal disease: NAL consulted. Following HD.      Debbie Resendez, APRN  9/30/23  @ [id number]

## 2023-09-30 NOTE — PROGRESS NOTES
[facility]    Acute pain service Inpatient Progress Note    Patient Name: Enrike Tomas  :  1935  MRN:  [id number]        Acute Pain  Service Inpatient Progress Note:    Pain Score:1/10  LOC: alert and awake  Resp Status: room air  Cardiac: VS stable  Side Effects:None  Catheter Site:clean  Cath type: peripheral nerve cath(InfuSystem)  Infusion rate: Ext/Pop: Basal: 1ml/hr, PIB: 5ml q 2 h, PCA: 5 ml q 30 min  Catheter Plan:Catheter to remain Insitu and Continue catheter infusion rate unchanged

## 2023-09-30 NOTE — PROGRESS NOTES
" LOS: 0 days   Patient Care Team:  Blair Dhaliwal MD as PCP - General (Internal Medicine)  Blair Packer MD as Consulting Physician (Cardiology)  Michelle Aponte MD (Nephrology)    Chief Complaint: ESRD  Left shoulder hemiarthroplasty     Subjective     HD yesterday tolerated well. Doing well otherwise. Plan for d/c on Monday     History taken from: patient    Objective     Vital Sign Min/Max for last 24 hours  Temp  Min: 97.5 °F (36.4 °C)  Max: 98.4 °F (36.9 °C)   BP  Min: 128/87  Max: 211/77   Pulse  Min: 64  Max: 82   Resp  Min: 18  Max: 18   SpO2  Min: 90 %  Max: 96 %   Flow (L/min)  Min: 2  Max: 2   No data recorded       I/O this shift:  In: 520 [P.O.:520]  Out: -   I/O last 3 completed shifts:  In: 400 [P.O.:400]  Out: 2520     Objective:  General Appearance:  Comfortable.    Vital signs: (most recent): Blood pressure (!) 148/102, pulse 67, temperature 98.4 °F (36.9 °C), temperature source Oral, resp. rate 18, height 157.5 cm (62.01\"), weight 54.2 kg (119 lb 7.8 oz), SpO2 91 %.  Vital signs are normal.    Output: Minimal urine output.    HEENT: Normal HEENT exam.    Lungs:  Normal effort and normal respiratory rate.  Breath sounds clear to auscultation.  He is not in respiratory distress.  No decreased breath sounds.    Heart: Normal rate.  Regular rhythm.  S1 normal and S2 normal.  No murmur or gallop.   Abdomen: Abdomen is soft.  Bowel sounds are normal.     Pulses: Distal pulses are intact.    Neurological: Patient is alert and oriented to person, place and time.  Normal strength.    Pupils:  Pupils are equal, round, and reactive to light.          Results Review:     I reviewed the patient's new clinical results.    WBC No results found for: WBC     HGB No results found for: HGB     HCT No results found for: HCT     Platlets No results found for: LABPLAT   MCV No results found for: MCV         Sodium No results found for: NA     Potassium No results found for: K     Chloride No results " found for: CL     CO2 No results found for: CO2     BUN No results found for: BUN     Creatinine No results found for: CREATININE     Calcium No results found for: CALCIUM     PO4 No results found for: CAPO4   Albumin No results found for: ALBUMIN   Magnesium No results found for: MG   Uric Acid No results found for: URICACID     Medication Review: yes    Assessment & Plan       S/P shoulder hemiarthroplasty, left    (HFpEF) heart failure with preserved ejection fraction    Benign prostatic hyperplasia    Depression    ESRD (end stage renal disease)    Essential hypertension    GERD (gastroesophageal reflux disease)    Mixed hyperlipidemia    NERY (obstructive sleep apnea)    Paroxysmal atrial fibrillation      Assessment & Plan    ESRD:  On MWF saray. [facility]. RUE AV fistula. Admitted for shoulder surgery      Anemia:  JENNYFER on HD days     Volume status :  Stable     HTN: Optimization of volume status with HD.        Recs  HD per MWF saray.   JENNYFER on HD days  Renal Diet       Pancho Carpenter MD  09/30/23  14:05 EDT

## 2023-09-30 NOTE — THERAPY TREATMENT NOTE
Patient Name: Enrike Tomas  : 1935    MRN: [id number]                              Today's Date: 2023       Admit Date: 2023    Visit Dx: No diagnosis found.  Patient Active Problem List   Diagnosis    (HFpEF) heart failure with preserved ejection fraction    Anemia of renal disease    At high risk for falls    Benign prostatic hyperplasia    Closed fracture of left acetabulum    Compression fracture of thoracic vertebra    Deficiency of other specified B group vitamins    Depression    ESRD (end stage renal disease)    Essential hypertension    GERD (gastroesophageal reflux disease)    Hepatic cyst    Long term (current) use of anticoagulants    Major depressive disorder, single episode, moderate    Mixed hyperlipidemia    Morgagni hernia    Occlusion of right vertebral artery    NERY (obstructive sleep apnea)    Fall    Severe malnutrition    Sustained SVT    Paroxysmal atrial fibrillation    Chronic heart failure with preserved ejection fraction (HFpEF)    S/P shoulder hemiarthroplasty, left     Past Medical History:   Diagnosis Date    A-fib     Abnormal ECG     occasional extra heartbeat    Arthritis     Atrial fibrillation     BPH (benign prostatic hyperplasia)     Cervical compression fracture 2023    currently wearing a c collar    Diverticulosis     Diverticulum of esophagus     puree diet and speech therapy    Elevated cholesterol     ESRD on hemodialysis     GERD (gastroesophageal reflux disease)     Heart failure     Hemodialysis patient     M,W,F    Hyperlipidemia     Hypertension     Low back pain     NERY (obstructive sleep apnea)     NOT USING CPAP    Recurrent falls     UTI (urinary tract infection)      Past Surgical History:   Procedure Laterality Date    CATARACT EXTRACTION      COLONOSCOPY      DIALYSIS FISTULA CREATION      ENDOSCOPY      INGUINAL HERNIA REPAIR      PROSTATE BIOPSY      THROAT SURGERY      diverticulum in throat    TONSILLECTOMY AND ADENOIDECTOMY       TOTAL SHOULDER ARTHROPLASTY W/ DISTAL CLAVICLE EXCISION Left 9/25/2023    Procedure: HEMIARTHROPLASTY, BICEPS TENODESIS - LEFT;  Surgeon: Matteo Tan MD;  Location: [facility];  Service: Orthopedics;  Laterality: Left;      General Information       Row Name 09/30/23 1024          OT Time and Intention    Document Type therapy note (daily note)  -TB     Mode of Treatment occupational therapy;individual therapy  -TB       Row Name 09/30/23 1024          General Information    Patient Profile Reviewed yes  -TB     Existing Precautions/Restrictions fall;left;shoulder;non-weight bearing;other (see comments)  confusion, LUE NWB precautions, sling with abduction pillow, IS nerve block  -TB     Barriers to Rehab medically complex;previous functional deficit;cognitive status  -TB       Row Name 09/30/23 1024          Cognition    Orientation Status (Cognition) oriented to;person;situation;verbal cues/prompts needed for orientation;disoriented to;time  -TB       Row Name 09/30/23 1024          Safety Issues, Functional Mobility    Safety Issues Affecting Function (Mobility) insight into deficits/self-awareness;awareness of need for assistance;safety precaution awareness;safety precautions follow-through/compliance;sequencing abilities;judgment;problem-solving  -TB     Impairments Affecting Function (Mobility) cognition;range of motion (ROM);strength;sensation/sensory awareness;motor control  -TB     Cognitive Impairments, Mobility Safety/Performance awareness, need for assistance;insight into deficits/self-awareness;judgment;problem-solving/reasoning;safety precaution awareness;safety precaution follow-through;sequencing abilities  -TB               User Key  (r) = Recorded By, (t) = Taken By, (c) = Cosigned By      Initials Name Provider Type    TB Chery Rene OT Occupational Therapist                     Mobility/ADL's       Row Name 09/30/23 1026          Activities of Daily Living    BADL  Assessment/Intervention bathing;upper body dressing;grooming  -TB       Row Name 09/30/23 1026          Mobility    Extremity Weight-bearing Status left upper extremity  -TB     Left Upper Extremity (Weight-bearing Status) non weight-bearing (NWB)   -       Row Name 09/30/23 1026          Bathing Assessment/Intervention    [place] Level (Bathing) upper body;bathing skills;maximum assist (25% patient effort);verbal cues  -TB     Position (Bathing) sitting up in bed  -TB     Comment, (Bathing) Education reviewed for L shoulder precautions, nerve block precautions (no showers until block removed), and axilla care. No caregiver present for teaching.  -TB       Row Name 09/30/23 1026          Upper Body Dressing Assessment/Training    [place] Level (Upper Body Dressing) doff;don;pajama/robe;dependent (less than 25% patient effort);verbal cues  sling mgmt/proper fit  -TB     Position (Upper Body Dressing) sitting up in bed  -TB     Comment, (Upper Body Dressing) Education reviewed for L shoulder precautions, sling teaching, and ADL retraining. No caregiver present for teaching.  -       Row Name 09/30/23 1026          Grooming Assessment/Training    [place] Level (Grooming) minimum assist (75% patient effort);wash face, hands;maximum assist (25% patient effort);hair care, combing/brushing  -TB     Position (Grooming) sitting up in bed  -TB               User Key  (r) = Recorded By, (t) = Taken By, (c) = Cosigned By      Initials Name Provider Type     Chery Rene OT Occupational Therapist                   Obj/Interventions       [facility] Name 09/30/23 1028          Shoulder (Therapeutic Exercise)    Shoulder (Therapeutic Exercise) AROM (active range of motion);PROM (passive range of motion)  -     Shoulder AROM (Therapeutic Exercise) bilateral;scapular retraction;sitting;10 repetitions  -     Shoulder PROM (Therapeutic Exercise) left;flexion;extension;external rotation;internal rotation;10  repetitions;sitting  Pt tolerates PROM , ER to 30, IR to chest  -TB       Row Name 09/30/23 1028          Elbow/Forearm (Therapeutic Exercise)    Elbow/Forearm (Therapeutic Exercise) AROM (active range of motion)  -TB     Elbow/Forearm AROM (Therapeutic Exercise) left;flexion;extension;supination;pronation;sitting;10 repetitions  -TB       Row Name 09/30/23 1028          Wrist (Therapeutic Exercise)    Wrist (Therapeutic Exercise) AROM (active range of motion)  -TB     Wrist AROM (Therapeutic Exercise) left;flexion;extension;10 repetitions  -TB       Row Name 09/30/23 1028          Hand (Therapeutic Exercise)    Hand (Therapeutic Exercise) AROM (active range of motion)  -TB     Hand AROM/AAROM (Therapeutic Exercise) left;AROM (active range of motion);finger flexion;finger extension;10 repetitions  -TB       Row Name 09/30/23 1028          Motor Skills    Therapeutic Exercise hand;wrist;elbow/forearm;shoulder  Education reviewed for LUE HEP per MD parameters. No caregiver present for teaching.  -TB               User Key  (r) = Recorded By, (t) = Taken By, (c) = Cosigned By      Initials Name Provider Type    TB Chery Rene OT Occupational Therapist                   Goals/Plan    No documentation.                  Clinical Impression       Row Name 09/30/23 1030          Pain Assessment    Pretreatment Pain Rating 0/10 - no pain  -TB     Posttreatment Pain Rating 0/10 - no pain  -TB     Pre/Posttreatment Pain Comment Pt denies pain with activities  -TB     Pain Intervention(s) Ambulation/increased activity;Repositioned;Other (Comment);Cold applied  LUE IS nerve block infusing  -TB       Row Name 09/30/23 1030          Plan of Care Review    Plan of Care Reviewed With patient  -TB     Progress no change  -TB     Outcome Evaluation Pt is A/Ox2 with cues and participates in therapy with good effort. Pt remains below his baseline limited by confusion, strength, balance, and funcational endurance  deficits. Education reviewed for L shoulder precautions, sling teaching, and ADL retraining. LUE HEP completed per MD parameters. Pt tolerates PROM , ER to 30, IR to chest. Pt continues to require assist for all ADLs. OT will continue to follow IP. Plan is SNF - awaiting insurance authorization.  -TB       Row Name 09/30/23 1030          Therapy Plan Review/Discharge Plan (OT)    Anticipated Discharge Disposition (OT) skilled nursing facility  -TB       Row Name 09/30/23 1030          Vital Signs    Pre Systolic BP Rehab --  RN cleared OT  -TB     Pre SpO2 (%) 97  -TB     O2 Delivery Pre Treatment room air  -TB     Post SpO2 (%) 96  -TB     O2 Delivery Post Treatment room air  -TB     Pre Patient Position Supine  -TB     Post Patient Position Supine  -TB       Row Name 09/30/23 1030          Positioning and Restraints    Pre-Treatment Position in bed  -TB     Post Treatment Position bed  -TB     In Bed notified nsg;fowlers;call light within reach;encouraged to call for assist;exit alarm on;with brace  -TB               User Key  (r) = Recorded By, (t) = Taken By, (c) = Cosigned By      Initials Name Provider Type    TB Chery Rene, OT Occupational Therapist                   Outcome Measures       Row Name 09/30/23 1034          How much help from another is currently needed...    Putting on and taking off regular lower body clothing? 1  -TB     Bathing (including washing, rinsing, and drying) 1  -TB     Toileting (which includes using toilet bed pan or urinal) 1  -TB     Putting on and taking off regular upper body clothing 1  -TB     Taking care of personal grooming (such as brushing teeth) 2  -TB     Eating meals 2  -TB     AM-PAC 6 Clicks Score (OT) 8  -TB       Row Name 09/30/23 1034          Functional Assessment    Outcome Measure Options AM-PAC 6 Clicks Daily Activity (OT)  -TB               User Key  (r) = Recorded By, (t) = Taken By, (c) = Cosigned By      Initials Name Provider Type    TB  Chery Rene, OT Occupational Therapist                    Occupational Therapy Education       Title: PT OT SLP Therapies (In Progress)       Topic: Occupational Therapy (In Progress)       Point: ADL training (In Progress)       Description:   Instruct learner(s) on proper safety adaptation and remediation techniques during self care or transfers.   Instruct in proper use of assistive devices.                  Learning Progress Summary             Patient Acceptance, E,D, NR by TB at 9/30/2023 1034    Acceptance, E,D, NR by JY at 9/29/2023 1155    Acceptance, E, NR by KF at 9/28/2023 0850    Acceptance, E,D, VU,NR by TB at 9/26/2023 1257    Comment: Spouse present and participating in teaching. Pt limited by acute confusion.    Eager, E,TB,D,H, NR by AR at 9/25/2023 1856   Family Acceptance, E,D, NR by JY at 9/29/2023 1155    Acceptance, E,D, VU,NR by TB at 9/26/2023 1257    Comment: Spouse present and participating in teaching. Pt limited by acute confusion.    Eager, E,TB,D,H, NR by AR at 9/25/2023 1856   Caregiver Acceptance, E, NR by KF at 9/28/2023 0850                         Point: Home exercise program (In Progress)       Description:   Instruct learner(s) on appropriate technique for monitoring, assisting and/or progressing therapeutic exercises/activities.                  Learning Progress Summary             Patient Acceptance, E,D, NR by TB at 9/30/2023 1034    Acceptance, E,D, NR by JY at 9/29/2023 1155    Acceptance, E, NR by KF at 9/28/2023 0850    Acceptance, E,D, VU,NR by TB at 9/26/2023 1257    Comment: Spouse present and participating in teaching. Pt limited by acute confusion.    Eager, E,TB,D,H, NR by AR at 9/25/2023 1856   Family Acceptance, E,D, NR by JY at 9/29/2023 1155    Acceptance, E,D, VU,NR by TB at 9/26/2023 1257    Comment: Spouse present and participating in teaching. Pt limited by acute confusion.    Eager, E,TB,D,H, NR by AR at 9/25/2023 1856   Caregiver Acceptance, E,  NR by KF at 9/28/2023 0850                         Point: Precautions (In Progress)       Description:   Instruct learner(s) on prescribed precautions during self-care and functional transfers.                  Learning Progress Summary             Patient Acceptance, E,D, NR by TB at 9/30/2023 1034    Acceptance, E,D, NR by JY at 9/29/2023 1155    Acceptance, E, NR by KF at 9/28/2023 0850    Acceptance, E,D, VU,NR by TB at 9/26/2023 1257    Comment: Spouse present and participating in teaching. Pt limited by acute confusion.    Eager, E,TB,D,H, NR by AR at 9/25/2023 1856   Family Acceptance, E,D, NR by JY at 9/29/2023 1155    Acceptance, E,D, VU,NR by TB at 9/26/2023 1257    Comment: Spouse present and participating in teaching. Pt limited by acute confusion.    Eager, E,TB,D,H, NR by AR at 9/25/2023 1856   Caregiver Acceptance, E, NR by KF at 9/28/2023 0850                         Point: Body mechanics (In Progress)       Description:   Instruct learner(s) on proper positioning and spine alignment during self-care, functional mobility activities and/or exercises.                  Learning Progress Summary             Patient Acceptance, E,D, NR by JY at 9/29/2023 1155    Acceptance, E, NR by KF at 9/28/2023 0850    Eager, E,TB,D,H, NR by AR at 9/25/2023 1856   Family Acceptance, E,D, NR by JY at 9/29/2023 1155    Eager, E,TB,D,H, NR by AR at 9/25/2023 1856   Caregiver Acceptance, E, NR by KF at 9/28/2023 0850                                         User Key       Initials Effective Dates Name Provider Type Discipline    TB 07/11/23 -  Chery Rene, OT Occupational Therapist OT    AR 07/11/23 -  Debbie Espinosa, OT Occupational Therapist OT    JY 06/16/21 -  Alma Delia Barrera, OT Occupational Therapist OT    KF 08/09/23 -  Sadia Razo, OT Occupational Therapist OT                  OT Recommendation and Plan     Plan of Care Review  Plan of Care Reviewed With: patient  Progress: no change  Outcome  Evaluation: Pt is A/Ox2 with cues and participates in therapy with good effort. Pt remains below his baseline limited by confusion, strength, balance, and funcational endurance deficits. Education reviewed for L shoulder precautions, sling teaching, and ADL retraining. LUE HEP completed per MD parameters. Pt tolerates PROM , ER to 30, IR to chest. Pt continues to require assist for all ADLs. OT will continue to follow IP. Plan is SNF - awaiting insurance authorization.     Time Calculation:         Time Calculation- OT       Row Name 09/30/23 0841             Time Calculation- OT    OT Start Time 0841  -TB      OT Received On 09/30/23  -TB      OT Goal Re-Cert Due Date 10/05/23  -TB         Timed Charges    06191 - OT Therapeutic Exercise Minutes 16  -TB      47157 - OT Self Care/Mgmt Minutes 18  -TB         Total Minutes    Timed Charges Total Minutes 34  -TB       Total Minutes 34  -TB                User Key  (r) = Recorded By, (t) = Taken By, (c) = Cosigned By      Initials Name Provider Type    TB Chery Rene OT Occupational Therapist                  Therapy Charges for Today       Code Description Service Date Service Provider Modifiers Qty    65917336142  OT THER PROC EA 15 MIN 9/30/2023 Chery Rene OT GO 1    54584777613 HC OT SELF CARE/MGMT/TRAIN EA 15 MIN 9/30/2023 Chery Rene OT GO 1                 Chery Rene OT  9/30/2023

## 2023-09-30 NOTE — PLAN OF CARE
Goal Outcome Evaluation:                        Problem: Adult Inpatient Plan of Care  Goal: Absence of Hospital-Acquired Illness or Injury  Intervention: Identify and Manage Fall Risk  Recent Flowsheet Documentation  Taken 9/30/2023 0200 by Juan Khan RN  Safety Promotion/Fall Prevention:   activity supervised   safety round/check completed   toileting scheduled  Taken 9/30/2023 0000 by Juan Khan RN  Safety Promotion/Fall Prevention:   activity supervised   safety round/check completed   toileting scheduled  Taken 9/29/2023 2200 by Juan Khan RN  Safety Promotion/Fall Prevention:   activity supervised   safety round/check completed   toileting scheduled  Taken 9/29/2023 2000 by Juan Khan RN  Safety Promotion/Fall Prevention:   activity supervised   safety round/check completed   toileting scheduled  Intervention: Prevent Skin Injury  Recent Flowsheet Documentation  Taken 9/30/2023 0200 by Juan Khan RN  Body Position: supine  Skin Protection: adhesive use limited  Taken 9/30/2023 0000 by Juan Khan RN  Body Position: supine  Skin Protection: adhesive use limited  Taken 9/29/2023 2200 by Juan Khan RN  Body Position: supine  Skin Protection: adhesive use limited  Taken 9/29/2023 2000 by Juan Khan RN  Body Position: supine  Skin Protection: adhesive use limited  Intervention: Prevent and Manage VTE (Venous Thromboembolism) Risk  Recent Flowsheet Documentation  Taken 9/30/2023 0200 by Juan Khan RN  VTE Prevention/Management:   bilateral   sequential compression devices on  Taken 9/30/2023 0000 by Juan Khan RN  VTE Prevention/Management:   bilateral   sequential compression devices on  Taken 9/29/2023 2200 by Juan Khan RN  VTE Prevention/Management:   bilateral   sequential compression devices on  Taken 9/29/2023 2000 by Juan Khan RN  VTE Prevention/Management:   bilateral   sequential compression devices on  Intervention: Prevent  Infection  Recent Flowsheet Documentation  Taken 9/30/2023 0200 by Juan Khan RN  Infection Prevention: environmental surveillance performed  Taken 9/30/2023 0000 by Juan Khan RN  Infection Prevention: environmental surveillance performed  Taken 9/29/2023 2200 by Juan Khan RN  Infection Prevention: environmental surveillance performed  Taken 9/29/2023 2000 by Juan Khan RN  Infection Prevention: environmental surveillance performed  Goal: Optimal Comfort and Wellbeing  Intervention: Monitor Pain and Promote Comfort  Recent Flowsheet Documentation  Taken 9/30/2023 0200 by Juan Khan RN  Pain Management Interventions: quiet environment facilitated  Taken 9/30/2023 0000 by Juan Khan RN  Pain Management Interventions: relaxation techniques promoted  Taken 9/29/2023 2200 by Juan Khan RN  Pain Management Interventions: quiet environment facilitated  Taken 9/29/2023 2000 by Juan Khan RN  Pain Management Interventions: quiet environment facilitated  Intervention: Provide Person-Centered Care  Recent Flowsheet Documentation  Taken 9/30/2023 0200 by Juan Khan RN  Trust Relationship/Rapport: care explained  Taken 9/30/2023 0000 by Juan Khan RN  Trust Relationship/Rapport: care explained  Taken 9/29/2023 2200 by Juan Khan RN  Trust Relationship/Rapport: care explained  Taken 9/29/2023 2000 by Juan Khan RN  Trust Relationship/Rapport: care explained     VSS, voids well, rested throughout the night, pain managed with RN medications, will continue to monitor for changes.

## 2023-10-01 LAB
ANION GAP SERPL CALCULATED.3IONS-SCNC: 18 MMOL/L (ref 5–15)
BUN SERPL-MCNC: 42 MG/DL (ref 8–23)
BUN/CREAT SERPL: 10.4 (ref 7–25)
CALCIUM SPEC-SCNC: 9.4 MG/DL (ref 8.6–10.5)
CHLORIDE SERPL-SCNC: 99 MMOL/L (ref 98–107)
CO2 SERPL-SCNC: 17 MMOL/L (ref 22–29)
CREAT SERPL-MCNC: 4.03 MG/DL (ref 0.76–1.27)
DEPRECATED RDW RBC AUTO: 52.6 FL (ref 37–54)
EGFRCR SERPLBLD CKD-EPI 2021: 13.6 ML/MIN/1.73
ERYTHROCYTE [DISTWIDTH] IN BLOOD BY AUTOMATED COUNT: 14.5 % (ref 12.3–15.4)
GLUCOSE SERPL-MCNC: 73 MG/DL (ref 65–99)
HCT VFR BLD AUTO: 37.3 % (ref 37.5–51)
HGB BLD-MCNC: 12.1 G/DL (ref 13–17.7)
MCH RBC QN AUTO: 32.6 PG (ref 26.6–33)
MCHC RBC AUTO-ENTMCNC: 32.4 G/DL (ref 31.5–35.7)
MCV RBC AUTO: 100.5 FL (ref 79–97)
PLATELET # BLD AUTO: 247 10*3/MM3 (ref 140–450)
PMV BLD AUTO: 8.7 FL (ref 6–12)
POTASSIUM SERPL-SCNC: 5.3 MMOL/L (ref 3.5–5.2)
RBC # BLD AUTO: 3.71 10*6/MM3 (ref 4.14–5.8)
SODIUM SERPL-SCNC: 134 MMOL/L (ref 136–145)
WBC NRBC COR # BLD: 6.8 10*3/MM3 (ref 3.4–10.8)

## 2023-10-01 PROCEDURE — 63710000001 ATORVASTATIN 20 MG TABLET: Performed by: INTERNAL MEDICINE

## 2023-10-01 PROCEDURE — 80048 BASIC METABOLIC PNL TOTAL CA: CPT | Performed by: NURSE PRACTITIONER

## 2023-10-01 PROCEDURE — 63710000001 SENNOSIDES-DOCUSATE 8.6-50 MG TABLET: Performed by: INTERNAL MEDICINE

## 2023-10-01 PROCEDURE — 63710000001 HYDROXYZINE 25 MG TABLET: Performed by: INTERNAL MEDICINE

## 2023-10-01 PROCEDURE — A9270 NON-COVERED ITEM OR SERVICE: HCPCS | Performed by: INTERNAL MEDICINE

## 2023-10-01 PROCEDURE — 63710000001 OXYCODONE 5 MG TABLET: Performed by: ORTHOPAEDIC SURGERY

## 2023-10-01 PROCEDURE — 63710000001 TRAZODONE 50 MG TABLET: Performed by: INTERNAL MEDICINE

## 2023-10-01 PROCEDURE — 63710000001 PANTOPRAZOLE 40 MG TABLET DELAYED-RELEASE: Performed by: INTERNAL MEDICINE

## 2023-10-01 PROCEDURE — 63710000001 SERTRALINE 50 MG TABLET: Performed by: INTERNAL MEDICINE

## 2023-10-01 PROCEDURE — 63710000001 MELATONIN 5 MG TABLET: Performed by: INTERNAL MEDICINE

## 2023-10-01 PROCEDURE — 97110 THERAPEUTIC EXERCISES: CPT

## 2023-10-01 PROCEDURE — 63710000001 APIXABAN 2.5 MG TABLET: Performed by: INTERNAL MEDICINE

## 2023-10-01 PROCEDURE — 97535 SELF CARE MNGMENT TRAINING: CPT

## 2023-10-01 PROCEDURE — 63710000001 FINASTERIDE 5 MG TABLET: Performed by: INTERNAL MEDICINE

## 2023-10-01 PROCEDURE — 63710000001 AMIODARONE 200 MG TABLET: Performed by: INTERNAL MEDICINE

## 2023-10-01 PROCEDURE — A9270 NON-COVERED ITEM OR SERVICE: HCPCS | Performed by: ORTHOPAEDIC SURGERY

## 2023-10-01 PROCEDURE — 63710000001 MIRTAZAPINE 15 MG TABLET: Performed by: INTERNAL MEDICINE

## 2023-10-01 PROCEDURE — 85027 COMPLETE CBC AUTOMATED: CPT | Performed by: NURSE PRACTITIONER

## 2023-10-01 PROCEDURE — 97530 THERAPEUTIC ACTIVITIES: CPT

## 2023-10-01 RX ADMIN — OXYCODONE 5 MG: 5 TABLET ORAL at 10:35

## 2023-10-01 RX ADMIN — ATORVASTATIN CALCIUM 20 MG: 20 TABLET, FILM COATED ORAL at 20:44

## 2023-10-01 RX ADMIN — MIRTAZAPINE 7.5 MG: 15 TABLET, FILM COATED ORAL at 20:44

## 2023-10-01 RX ADMIN — AMIODARONE HYDROCHLORIDE 200 MG: 200 TABLET ORAL at 08:36

## 2023-10-01 RX ADMIN — SENNOSIDES AND DOCUSATE SODIUM 2 TABLET: 50; 8.6 TABLET ORAL at 20:43

## 2023-10-01 RX ADMIN — Medication 5 MG: at 20:44

## 2023-10-01 RX ADMIN — TRAZODONE HYDROCHLORIDE 50 MG: 50 TABLET ORAL at 20:43

## 2023-10-01 RX ADMIN — APIXABAN 2.5 MG: 2.5 TABLET, FILM COATED ORAL at 20:44

## 2023-10-01 RX ADMIN — OXYCODONE 5 MG: 5 TABLET ORAL at 23:08

## 2023-10-01 RX ADMIN — PANTOPRAZOLE SODIUM 40 MG: 40 TABLET, DELAYED RELEASE ORAL at 08:36

## 2023-10-01 RX ADMIN — FINASTERIDE 5 MG: 5 TABLET, FILM COATED ORAL at 08:36

## 2023-10-01 RX ADMIN — APIXABAN 2.5 MG: 2.5 TABLET, FILM COATED ORAL at 08:36

## 2023-10-01 RX ADMIN — OXYCODONE 5 MG: 5 TABLET ORAL at 16:57

## 2023-10-01 RX ADMIN — HYDROXYZINE HYDROCHLORIDE 25 MG: 25 TABLET, FILM COATED ORAL at 23:08

## 2023-10-01 RX ADMIN — SERTRALINE 50 MG: 50 TABLET, FILM COATED ORAL at 08:36

## 2023-10-01 NOTE — PROGRESS NOTES
"IM progress note      Enrike Tomas  [id number]  1935     LOS: 0 days     Attending: Matteo Tan MD    Primary Care Provider: Blair Dhaliwal MD      Chief Complaint/Reason for visit:  No chief complaint on file.      Subjective   Doing better. Sitting up in chair. Confusion profoundly improved, still some confused conversation (thinking it was Easter ) but much clearer conversation. Denies f/c/n/v/sob/cp.    Objective        Visit Vitals  /63 (BP Location: Left leg, Patient Position: Sitting)   Pulse 75   Temp 98.3 °F (36.8 °C) (Oral)   Resp 18   Ht 157.5 cm (62.01\")   Wt 54.2 kg (119 lb 7.8 oz)   SpO2 93%   BMI 21.85 kg/m²     Temp (24hrs), Av °F (36.7 °C), Min:97 °F (36.1 °C), Max:98.8 °F (37.1 °C)      Physical Therapy:  Pt with improved alertness and participation in therapy this date, however required increased assist for STS and bed to chair transfer. Pt with significant fear of falling contributing to a heavy posterior lean limiting ability to achieve full upright standing posture and thus participate in further mobility tasks. Pt will continue to benefit from PT to address ongoing strength, balance, and endurance deficits.      Physical Exam:     General Appearance:    Alert, cooperative, in no acute distress   Head:    Normocephalic, without obvious abnormality, atraumatic    Lungs:     Normal effort, symmetric chest rise,  clear to      auscultation bilaterally              Heart:    Regular rhythm and normal rate, normal S1 and S2    Abdomen:     Normal bowel sounds, no masses, no organomegaly, soft        non-tender, non-distended, no guarding, no rebound                tenderness   Extremities:   Sling on. CDI dressing. PNB cath present. Moves hand, fingers well.   No clubbing, cyanosis or edema.  No deformities.    Pulses:   Pulses palpable and equal bilaterally   Skin:   No bleeding, bruising or rash          Results Review:     I reviewed the patient's new clinical " results.   Results from last 7 days   Lab Units 10/01/23  0536 09/26/23 0519   WBC 10*3/mm3 6.80 6.62   HEMOGLOBIN g/dL 12.1* 9.6*   HEMATOCRIT % 37.3* 28.8*   PLATELETS 10*3/mm3 247 204        Latest Reference Range & Units 09/12/23 09:50   Hemoglobin 13.0 - 17.7 g/dL 11.7 (L)   Hematocrit 37.5 - 51.0 % 35.8 (L)   (L): Data is abnormally low    Results from last 7 days   Lab Units 10/01/23  0536 09/26/23 0519 09/25/23 2017   SODIUM mmol/L 134* 134*  --    POTASSIUM mmol/L 5.3* 4.3 4.1   CHLORIDE mmol/L 99 92*  --    CO2 mmol/L 17.0* 25.0  --    BUN mg/dL 42* 34*  --    CREATININE mg/dL 4.03* 3.28*  --    CALCIUM mg/dL 9.4 9.1  --    GLUCOSE mg/dL 73 100*  --        I reviewed the patient's new imaging including images and reports.    All medications reviewed.   amiodarone, 200 mg, Oral, Daily  apixaban, 2.5 mg, Oral, BID  atorvastatin, 20 mg, Oral, Nightly  finasteride, 5 mg, Oral, Daily  melatonin, 5 mg, Oral, Nightly  mirtazapine, 7.5 mg, Oral, Nightly  pantoprazole, 40 mg, Oral, Daily  sennosides-docusate, 2 tablet, Oral, BID  sertraline, 50 mg, Oral, Daily  traZODone, 50 mg, Oral, Nightly      acetaminophen, 650 mg, Q4H PRN   Or  acetaminophen, 650 mg, Q4H PRN  cloNIDine, 0.1 mg, BID PRN  guaifenesin, 200 mg, Q4H PRN  HYDROmorphone, 0.5 mg, Q2H PRN   And  naloxone, 0.1 mg, Q5 Min PRN  hydrOXYzine, 25 mg, TID PRN  oxyCODONE, 5 mg, Q4H PRN        Assessment & Plan       S/P shoulder hemiarthroplasty, left    (HFpEF) heart failure with preserved ejection fraction    Benign prostatic hyperplasia    Depression    ESRD (end stage renal disease)    Essential hypertension    GERD (gastroesophageal reflux disease)    Mixed hyperlipidemia    NERY (obstructive sleep apnea)    Paroxysmal atrial fibrillation    Anemia, acute on chronic    Postoperative confusion      Plan  1. PT/OT. NWB, left UE, ROM hand, wrist, elbow.  2. Pain control-prns, interscalene nerve block cath with ropivacaine infusion.           3.  IS-encourage  4. DVT proph- Mech/ mobilize.  5. Bowel regimen  6. Monitor post-op labs  7. DC planning . IPR still awaiting approval, ambulance planned for Monday if approved     -Chest congestion:  nursing to assist with IS  robitussin PRN  CXR in [place] (pending 10/1/23)     -GERD:  Resume PPI.  Formulary substitution when indicated.     -Dyslipidemia:  Resume home regimen Statin ( formulary substitution when appropriate).     -PAF: resume amiodarone, BB, and will resume DOAC       -BPH: maintain on home regimen.  Monitor for adequate spontaneous voiding.     -End-stage renal disease: NAL consulted. Following HD. HD tomorrow      MERCEDEZ Ryan  10/1/23  @ [phone]

## 2023-10-01 NOTE — PLAN OF CARE
Problem: Adult Inpatient Plan of Care  Goal: Plan of Care Review  Recent Flowsheet Documentation  Taken 10/1/2023 0950 by Chery Rene OT  Progress: no change  Plan of Care Reviewed With: patient  Outcome Evaluation: Pt is A/Ox2 with cues and participates in therapy with encouragement and time. Education reviewed for L shoulder precautions, sling teaching, and ADL retraining. LUE HEP completed per MD parameters. Pt tolerates PROM , ER to 30, IR to chest. No caregivers present today. Pt up with Max A x2 Std Pvt to chair. Limited by fear of falling and strong posterior lean this session. OT will continue to follow IP.

## 2023-10-01 NOTE — THERAPY TREATMENT NOTE
Patient Name: Enrike Tomas  : 1935    MRN: [id number]                              Today's Date: 10/1/2023       Admit Date: 2023    Visit Dx: No diagnosis found.  Patient Active Problem List   Diagnosis    (HFpEF) heart failure with preserved ejection fraction    Anemia of renal disease    At high risk for falls    Benign prostatic hyperplasia    Closed fracture of left acetabulum    Compression fracture of thoracic vertebra    Deficiency of other specified B group vitamins    Depression    ESRD (end stage renal disease)    Essential hypertension    GERD (gastroesophageal reflux disease)    Hepatic cyst    Long term (current) use of anticoagulants    Major depressive disorder, single episode, moderate    Mixed hyperlipidemia    Morgagni hernia    Occlusion of right vertebral artery    NERY (obstructive sleep apnea)    Fall    Severe malnutrition    Sustained SVT    Paroxysmal atrial fibrillation    Chronic heart failure with preserved ejection fraction (HFpEF)    S/P shoulder hemiarthroplasty, left    Anemia, acute on chronic    Postoperative confusion     Past Medical History:   Diagnosis Date    A-fib     Abnormal ECG     occasional extra heartbeat    Anemia, acute on chronic 2023    Arthritis     Atrial fibrillation     BPH (benign prostatic hyperplasia)     Cervical compression fracture 2023    currently wearing a c collar    Diverticulosis     Diverticulum of esophagus     puree diet and speech therapy    Elevated cholesterol     ESRD on hemodialysis     GERD (gastroesophageal reflux disease)     Heart failure     Hemodialysis patient     M,W,F    Hyperlipidemia     Hypertension     Low back pain     NERY (obstructive sleep apnea)     NOT USING CPAP    Recurrent falls     UTI (urinary tract infection)      Past Surgical History:   Procedure Laterality Date    CATARACT EXTRACTION      COLONOSCOPY      DIALYSIS FISTULA CREATION      ENDOSCOPY      INGUINAL HERNIA REPAIR      PROSTATE  "BIOPSY      THROAT SURGERY      diverticulum in throat    TONSILLECTOMY AND ADENOIDECTOMY      TOTAL SHOULDER ARTHROPLASTY W/ DISTAL CLAVICLE EXCISION Left 9/25/2023    Procedure: HEMIARTHROPLASTY, BICEPS TENODESIS - LEFT;  Surgeon: Matteo Tan MD;  Location: [facility];  Service: Orthopedics;  Laterality: Left;      General Information       Row Name 10/01/23 0904          Physical Therapy Time and Intention    Document Type therapy note (daily note)  -KR     Mode of Treatment physical therapy  -KR       Row Name 10/01/23 0904          General Information    Patient Profile Reviewed yes  -KR     Existing Precautions/Restrictions fall;left;shoulder;non-weight bearing;other (see comments)  confusion, fear of falling. LUE NWB precautions, sling with abduction pillow, IS nerve block  -KR     Barriers to Rehab medically complex;previous functional deficit;cognitive status  -KR       Row Name 10/01/23 0904          Cognition    Orientation Status (Cognition) oriented to;person;verbal cues/prompts needed for orientation;situation;disoriented to;time;other (see comments)  Wishing staff \"happy Easter [place]\"  -KR       Row Name 10/01/23 0904          Safety Issues, Functional Mobility    Safety Issues Affecting Function (Mobility) awareness of need for assistance;insight into deficits/self-awareness;positioning of assistive device;problem-solving;safety precaution awareness;sequencing abilities;safety precautions follow-through/compliance;judgment  -KR     Impairments Affecting Function (Mobility) balance;cognition;endurance/activity tolerance;motor control;pain;postural/trunk control;range of motion (ROM);strength  -KR     Cognitive Impairments, Mobility Safety/Performance awareness, need for assistance;insight into deficits/self-awareness;judgment;problem-solving/reasoning;sequencing abilities;safety precaution follow-through;safety precaution awareness  -KR               User Key  (r) = Recorded By, (t) = Taken " By, (c) = Cosigned By      Initials Name Provider Type    KR Nazia Murray, PT Physical Therapist                   Mobility       Row Name 10/01/23 1118          Bed Mobility    Bed Mobility supine-sit  -KR     Supine-Sit [place] (Bed Mobility) moderate assist (50% patient effort);2 person assist;verbal cues  -KR     Assistive Device (Bed Mobility) draw sheet  -KR     Comment, (Bed Mobility) cues and assist required at LEs and trunk.  -KR       Row Name 10/01/23 1118          Bed-Chair Transfer    Bed-Chair [place] (Transfers) maximum assist (25% patient effort);2 person assist;verbal cues  -KR     Assistive Device (Bed-Chair Transfers) other (see comments)  RUE support  -KR     Comment, (Bed-Chair Transfer) Pt unable to achieve full upright d/t fear of falling. Heavy posterior lean, unable to take steps this date, ultimately performed stand-pivot towards R.  -KR       Row Name 10/01/23 1118          Sit-Stand Transfer    Sit-Stand [place] (Transfers) maximum assist (25% patient effort);2 person assist;verbal cues  -KR     Assistive Device (Sit-Stand Transfers) other (see comments)  RUE support  -KR     Comment, (Sit-Stand Transfer) 2x from bed, 2x from chair. Heavy posterior lean upon standing and significant fear of falling. Pt unable to achieve full upright demonstrating trunk flexion d/t fear of falling. Resistant to TCs for weight shifting anteriorly and unable to incorporate VCs.  -KR       Row Name 10/01/23 1118          Gait/Stairs (Locomotion)    [place] Level (Gait) unable to assess  -KR     Comment, (Gait/Stairs) pt unable to achieve full upright this date d/t fear of falling, thus attempting ambulation unsafe.  -KR       Row Name 10/01/23 1118          Mobility    Extremity Weight-bearing Status left upper extremity  -KR     Left Upper Extremity (Weight-bearing Status) non weight-bearing (NWB)  -KR               User Key  (r) = Recorded By, (t) = Taken By, (c) = Cosigned By       Initials Name Provider Type    Nazia Jones PT Physical Therapist                   Obj/Interventions       Row Name 10/01/23 1121          Motor Skills    Therapeutic Exercise hip;knee;ankle  -       Row Name 10/01/23 1121          Hip (Therapeutic Exercise)    Hip (Therapeutic Exercise) strengthening exercise  -KR     Hip Strengthening (Therapeutic Exercise) bilateral;marching while seated;aBduction;aDduction;heel slides;15 repititions  -       Row Name 10/01/23 1121          Knee (Therapeutic Exercise)    Knee (Therapeutic Exercise) strengthening exercise  -KR     Knee Strengthening (Therapeutic Exercise) bilateral;SLR (straight leg raise);LAQ (long arc quad);15 repititions  -KR       Row Name 10/01/23 1121          Ankle (Therapeutic Exercise)    Ankle (Therapeutic Exercise) AROM (active range of motion)  -KR     Ankle AROM (Therapeutic Exercise) bilateral;dorsiflexion;plantarflexion;15 repititions  -       Row Name 10/01/23 1121          Balance    Balance Assessment sitting static balance;sitting dynamic balance;standing static balance;standing dynamic balance  -KR     Static Sitting Balance contact guard  -KR     Dynamic Sitting Balance 1-person assist;verbal cues;non-verbal cues (demo/gesture)  -KR     Position, Sitting Balance supported;sitting in chair;sitting edge of bed  RUE support on bed  -KR     Static Standing Balance 2-person assist;maximum assist;verbal cues;non-verbal cues (demo/gesture)  -KR     Dynamic Standing Balance maximum assist;2-person assist;verbal cues;non-verbal cues (demo/gesture)  -KR     Position/Device Used, Standing Balance supported;other (see comments)  RUE support  -KR     Balance Interventions sitting;standing;sit to stand;supported;dynamic;static  -KR               User Key  (r) = Recorded By, (t) = Taken By, (c) = Cosigned By      Initials Name Provider Type    Nazia Jones PT Physical Therapist                   Goals/Plan    No documentation.                   Clinical Impression       Row Name 10/01/23 1122          Pain    Pretreatment Pain Rating 0/10 - no pain  -KR     Posttreatment Pain Rating 0/10 - no pain  -KR       Row Name 10/01/23 1122          Plan of Care Review    Plan of Care Reviewed With patient  -KR     Progress no change  -KR     Outcome Evaluation Pt with improved alertness and participation in therapy this date, however required increased assist for STS and bed to chair transfer. Pt with significant fear of falling contributing to a heavy posterior lean limiting ability to achieve full upright standing posture and thus participate in further mobility tasks. Pt will continue to benefit from PT to address ongoing strength, balance, and endurance deficits.  -KR       Row Name 10/01/23 1122          Vital Signs    Pre Patient Position Supine  -KR     Intra Patient Position Standing  -KR     Post Patient Position Sitting  -KR       Row Name 10/01/23 1122          Positioning and Restraints    Pre-Treatment Position in bed  -KR     Post Treatment Position chair  -KR     In Chair notified nsg;reclined;call light within reach;encouraged to call for assist;exit alarm on;waffle cushion;legs elevated;heels elevated;LUE elevated  -KR               User Key  (r) = Recorded By, (t) = Taken By, (c) = Cosigned By      Initials Name Provider Type    Nazia Jones, PT Physical Therapist                   Outcome Measures       Row Name 10/01/23 1124 10/01/23 0824       How much help from another person do you currently need...    Turning from your back to your side while in flat bed without using bedrails? 2  -KR 2  -AP    Moving from lying on back to sitting on the side of a flat bed without bedrails? 2  -KR 2  -AP    Moving to and from a bed to a chair (including a wheelchair)? 2  -KR 2  -AP    Standing up from a chair using your arms (e.g., wheelchair, bedside chair)? 2  -KR 3  -AP    Climbing 3-5 steps with a railing? 1  -KR 1  -AP    To walk in  hospital room? 2  -KR 2  -AP    AM-PAC 6 Clicks Score (PT) 11  -KR 12  -AP    Highest level of mobility 4 --> Transferred to chair/commode  -KR 4 --> Transferred to chair/commode  -AP              User Key  (r) = Recorded By, (t) = Taken By, (c) = Cosigned By      Initials Name Provider Type    Caro Ochoa, RN Registered Nurse    Nazia Jones, PT Physical Therapist                                 Physical Therapy Education       Title: PT OT SLP Therapies (In Progress)       Topic: Physical Therapy (In Progress)       Point: Mobility training (In Progress)       Learning Progress Summary             Patient Acceptance, E, NR by KR at 10/1/2023 1124    Acceptance, E, VU,NR by KR at 9/30/2023 1342    Acceptance, E, VU,NR by LO at 9/29/2023 1443    Comment: PT POC    Acceptance, E, NR by CM at 9/28/2023 1030    Acceptance, E,D, VU,DU by HP at 9/26/2023 1711    Acceptance, E,D, VU,NR by HP at 9/25/2023 1849   Family Acceptance, E, VU,NR by KR at 9/30/2023 1342   Caregiver Acceptance, E, NR by CM at 9/28/2023 1030                         Point: Home exercise program (In Progress)       Learning Progress Summary             Patient Acceptance, E, VU,NR by KR at 9/30/2023 1342    Acceptance, E, VU,NR by LO at 9/29/2023 1443    Comment: PT POC    Acceptance, E, NR by CM at 9/28/2023 1030    Acceptance, E,D, VU,DU by HP at 9/26/2023 1711    Acceptance, E,D, VU,NR by HP at 9/25/2023 1849   Family Acceptance, E, VU,NR by KR at 9/30/2023 1342   Caregiver Acceptance, E, NR by CM at 9/28/2023 1030                         Point: Body mechanics (In Progress)       Learning Progress Summary             Patient Acceptance, E, NR by KR at 10/1/2023 1124    Acceptance, E, VU,NR by KR at 9/30/2023 1342    Acceptance, E, VU,NR by LO at 9/29/2023 1443    Comment: PT POC    Acceptance, E, NR by CM at 9/28/2023 1030    Acceptance, E,D, VU,DU by HP at 9/26/2023 1711    Acceptance, E,D, VU,NR by HP at 9/25/2023 1849   Family  Acceptance, E, VU,NR by KR at 9/30/2023 1342   Caregiver Acceptance, E, NR by CM at 9/28/2023 1030                         Point: Precautions (In Progress)       Learning Progress Summary             Patient Acceptance, E, NR by KR at 10/1/2023 1124    Acceptance, E, VU,NR by KR at 9/30/2023 1342    Acceptance, E, VU,NR by LO at 9/29/2023 1443    Comment: PT POC    Acceptance, E, NR by CM at 9/28/2023 1030    Acceptance, E,D, VU,DU by HP at 9/26/2023 1711    Acceptance, E,D, VU,NR by HP at 9/25/2023 1849   Family Acceptance, E, VU,NR by KR at 9/30/2023 1342   Caregiver Acceptance, E, NR by CM at 9/28/2023 1030                                         User Key       Initials Effective Dates Name Provider Type Discipline    LO 06/16/21 -  Julisa Lanza, PT Physical Therapist PT    HP 06/01/21 -  Fouzia Woody, PT Physical Therapist PT    CM 09/22/22 -  Hortencia Sommer, PT Physical Therapist PT    KR 12/30/22 -  Nazia Murray, PT Physical Therapist PT                  PT Recommendation and Plan     Plan of Care Reviewed With: patient  Progress: no change  Outcome Evaluation: Pt with improved alertness and participation in therapy this date, however required increased assist for STS and bed to chair transfer. Pt with significant fear of falling contributing to a heavy posterior lean limiting ability to achieve full upright standing posture and thus participate in further mobility tasks. Pt will continue to benefit from PT to address ongoing strength, balance, and endurance deficits.     Time Calculation:         PT Charges       Row Name 10/01/23 1124             Time Calculation    Start Time 0904  -KR      PT Received On 10/01/23  -KR      PT Goal Re-Cert Due Date 10/05/23  -KR         Timed Charges    68139 - PT Therapeutic Exercise Minutes 15  -KR      70577 - PT Therapeutic Activity Minutes 23  -KR         Total Minutes    Timed Charges Total Minutes 38  -KR       Total Minutes 38  -KR                User Key   (r) = Recorded By, (t) = Taken By, (c) = Cosigned By      Initials Name Provider Type     Nazia Murray, PT Physical Therapist                  Therapy Charges for Today       Code Description Service Date Service Provider Modifiers Qty    11599570571 HC PT THER PROC EA 15 MIN 9/30/2023 Nazia Murray, PT GP 1    83536492137 HC GAIT TRAINING EA 15 MIN 9/30/2023 Nazia Murray, PT GP 1    99880013361 HC PT THERAPEUTIC ACT EA 15 MIN 9/30/2023 Nazia Murray, PT GP 1    05221013486 HC PT THER SUPP EA 15 MIN 9/30/2023 Nazia Murray, PT GP 2    97449341242 HC PT THER PROC EA 15 MIN 10/1/2023 Nazia Murray, PT GP 1    24831386862 HC PT THERAPEUTIC ACT EA 15 MIN 10/1/2023 Nazia Murray, PT GP 2            PT G-Codes  Outcome Measure Options: AM-PAC 6 Clicks Daily Activity (OT)  AM-PAC 6 Clicks Score (PT): 11  AM-PAC 6 Clicks Score (OT): 8  PT Discharge Summary  Anticipated Discharge Disposition (PT): skilled nursing facility    Nazia Murray PT  10/1/2023

## 2023-10-01 NOTE — THERAPY TREATMENT NOTE
Patient Name: Enrike Tomas  : 1935    MRN: [id number]                              Today's Date: 10/1/2023       Admit Date: 2023    Visit Dx: No diagnosis found.  Patient Active Problem List   Diagnosis    (HFpEF) heart failure with preserved ejection fraction    Anemia of renal disease    At high risk for falls    Benign prostatic hyperplasia    Closed fracture of left acetabulum    Compression fracture of thoracic vertebra    Deficiency of other specified B group vitamins    Depression    ESRD (end stage renal disease)    Essential hypertension    GERD (gastroesophageal reflux disease)    Hepatic cyst    Long term (current) use of anticoagulants    Major depressive disorder, single episode, moderate    Mixed hyperlipidemia    Morgagni hernia    Occlusion of right vertebral artery    NERY (obstructive sleep apnea)    Fall    Severe malnutrition    Sustained SVT    Paroxysmal atrial fibrillation    Chronic heart failure with preserved ejection fraction (HFpEF)    S/P shoulder hemiarthroplasty, left    Anemia, acute on chronic    Postoperative confusion     Past Medical History:   Diagnosis Date    A-fib     Abnormal ECG     occasional extra heartbeat    Anemia, acute on chronic 2023    Arthritis     Atrial fibrillation     BPH (benign prostatic hyperplasia)     Cervical compression fracture 2023    currently wearing a c collar    Diverticulosis     Diverticulum of esophagus     puree diet and speech therapy    Elevated cholesterol     ESRD on hemodialysis     GERD (gastroesophageal reflux disease)     Heart failure     Hemodialysis patient     M,W,F    Hyperlipidemia     Hypertension     Low back pain     NERY (obstructive sleep apnea)     NOT USING CPAP    Recurrent falls     UTI (urinary tract infection)      Past Surgical History:   Procedure Laterality Date    CATARACT EXTRACTION      COLONOSCOPY      DIALYSIS FISTULA CREATION      ENDOSCOPY      INGUINAL HERNIA REPAIR      PROSTATE  "BIOPSY      THROAT SURGERY      diverticulum in throat    TONSILLECTOMY AND ADENOIDECTOMY      TOTAL SHOULDER ARTHROPLASTY W/ DISTAL CLAVICLE EXCISION Left 9/25/2023    Procedure: HEMIARTHROPLASTY, BICEPS TENODESIS - LEFT;  Surgeon: Matteo Tan MD;  Location: [facility];  Service: Orthopedics;  Laterality: Left;      General Information       Row Name 10/01/23 0940          OT Time and Intention    Document Type therapy note (daily note)  -TB     Mode of Treatment occupational therapy;individual therapy  -TB       Row Name 10/01/23 0940          General Information    Patient Profile Reviewed yes  -TB     Existing Precautions/Restrictions fall;left;shoulder;non-weight bearing;other (see comments)  confusion, fear of falling. LUE NWB precautions, sling with abduction pillow, IS nerve block  -TB     Barriers to Rehab medically complex;previous functional deficit;cognitive status  -TB       Row Name 10/01/23 0940          Cognition    Orientation Status (Cognition) oriented to;person;verbal cues/prompts needed for orientation;situation;disoriented to;time  Wishing staff \"happy Easter [place]\"  -TB       Row Name 10/01/23 0940          Safety Issues, Functional Mobility    Safety Issues Affecting Function (Mobility) at risk behavior observed;awareness of need for assistance;insight into deficits/self-awareness;judgment;problem-solving;safety precaution awareness;safety precautions follow-through/compliance;sequencing abilities  -TB     Impairments Affecting Function (Mobility) balance;cognition;endurance/activity tolerance;motor control;pain;postural/trunk control;range of motion (ROM);strength  -TB     Cognitive Impairments, Mobility Safety/Performance awareness, need for assistance;insight into deficits/self-awareness;judgment;problem-solving/reasoning;safety precaution awareness;safety precaution follow-through;sequencing abilities  -TB     Comment, Safety Issues/Impairments (Mobility) Max Ax2 STS and Std Pvt " to chair. Limited by fear of falling and strong posterio lean.  -               User Key  (r) = Recorded By, (t) = Taken By, (c) = Cosigned By      Initials Name Provider Type    TB Chery Rene OT Occupational Therapist                     Mobility/ADL's       Row Name 10/01/23 0943          Bed Mobility    Bed Mobility supine-sit  -TB     Supine-Sit [place] (Bed Mobility) moderate assist (50% patient effort);2 person assist;verbal cues  -TB     Bed Mobility, Safety Issues decreased use of arms for pushing/pulling;impaired trunk control for bed mobility  -TB     Assistive Device (Bed Mobility) draw sheet  -TB     Comment, (Bed Mobility) Cues and assist to sequence transition to EOB sitting and maintain LUE NWB precautions  -       Row Name 10/01/23 0943          Transfers    Transfers sit-stand transfer;stand-sit transfer;bed-chair transfer  -TB     Comment, (Transfers) Max Ax2 STS and Std Pvt to chair. Limited by fear of falling and strong posterio lean.  -       Row Name 10/01/23 0943          Bed-Chair Transfer    Bed-Chair [place] (Transfers) maximum assist (25% patient effort);2 person assist;verbal cues  -       Row Name 10/01/23 0943          Sit-Stand Transfer    Sit-Stand [place] (Transfers) maximum assist (25% patient effort);2 person assist;verbal cues  -       Row Name 10/01/23 0943          Stand-Sit Transfer    Stand-Sit [place] (Transfers) maximum assist (25% patient effort);2 person assist;verbal cues  -       Row Name 10/01/23 0943          Functional Mobility    Functional Mobility- Comment Unable to come to full upright stand x5 attempts. Limited by fear of falling and strong posterior lean.  -       Row Name 10/01/23 0943          Activities of Daily Living    BADL Assessment/Intervention bathing;upper body dressing;lower body dressing;grooming;feeding;toileting  -       Row Name 10/01/23 0943          Mobility    Extremity Weight-bearing Status  left upper extremity  -TB     Left Upper Extremity (Weight-bearing Status) non weight-bearing (NWB)  -TB       Row Name 10/01/23 0943          Bathing Assessment/Intervention    [place] Level (Bathing) upper body;bathing skills;maximum assist (25% patient effort);verbal cues  -TB     Position (Bathing) sitting up in bed  -TB     Comment, (Bathing) Education reviewed for L shoulder precautions, nerve block precautions (no showers until block removed), and axilla care. No caregiver present for teaching.  -TB       Row Name 10/01/23 0943          Upper Body Dressing Assessment/Training    [place] Level (Upper Body Dressing) doff;don;pajama/robe;dependent (less than 25% patient effort);verbal cues  sling mgmt/proper fit  -TB     Position (Upper Body Dressing) sitting up in bed  -TB     Comment, (Upper Body Dressing) Education reviewed for L shoulder precautions, sling teaching, and ADL retraining. No caregiver present for teaching.  -TB       Row Name 10/01/23 0943          Lower Body Dressing Assessment/Training    [place] Level (Lower Body Dressing) don;socks;dependent (less than 25% patient effort)  -TB     Position (Lower Body Dressing) edge of bed sitting  -TB       Row Name 10/01/23 0943          Toileting Assessment/Training    [place] Level (Toileting) maximum assist (25% patient effort)  -TB     Assistive Devices (Toileting) urinal  -TB     Position (Toileting) sitting up in bed  -TB       Row Name 10/01/23 0943          Self-Feeding Assessment/Training    [place] Level (Feeding) maximum assist (25% patient effort);liquids to mouth  -TB     Position (Self-Feeding) sitting up in bed  -TB       Row Name 10/01/23 0943          Grooming Assessment/Training    [place] Level (Grooming) maximum assist (25% patient effort);hair care, combing/brushing  -TB     Position (Grooming) sitting up in bed  -TB               User Key  (r) = Recorded By, (t) = Taken By, (c) = Cosigned By       Initials Name Provider Type     Chery Rene OT Occupational Therapist                   Obj/Interventions       Row Name 10/01/23 0947          Shoulder (Therapeutic Exercise)    Shoulder (Therapeutic Exercise) AROM (active range of motion);PROM (passive range of motion)  -     Shoulder AROM (Therapeutic Exercise) bilateral;scapular retraction;sitting;10 repetitions  -     Shoulder PROM (Therapeutic Exercise) left;flexion;extension;external rotation;internal rotation;sitting;10 repetitions  Pt tolerates PROM , ER to 30, IR to chest  -TB       Row Name 10/01/23 0947          Elbow/Forearm (Therapeutic Exercise)    Elbow/Forearm (Therapeutic Exercise) AROM (active range of motion)  -     Elbow/Forearm AROM (Therapeutic Exercise) left;flexion;extension;supination;pronation;sitting;10 repetitions  -       Row Name 10/01/23 0947          Wrist (Therapeutic Exercise)    Wrist (Therapeutic Exercise) AROM (active range of motion)  -     Wrist AROM (Therapeutic Exercise) left;flexion;extension;10 repetitions  -       Row Name 10/01/23 0947          Hand (Therapeutic Exercise)    Hand (Therapeutic Exercise) AROM (active range of motion)  -     Hand AROM/AAROM (Therapeutic Exercise) left;AROM (active range of motion);finger flexion;finger extension;10 repetitions  -       Row Name 10/01/23 0947          Motor Skills    Therapeutic Exercise hand;wrist;elbow/forearm;shoulder  Education reviewed for LUE HEP per MD parameters. No pain with HEP. No caregivers present for teaching.  -       Row Name 10/01/23 0947          Balance    Balance Assessment sitting dynamic balance;sit to stand dynamic balance;standing dynamic balance  -     Dynamic Sitting Balance minimal assist;1-person assist;verbal cues  -     Position, Sitting Balance supported;sitting in chair;sitting edge of bed  -     Sit to Stand Dynamic Balance maximum assist;2-person assist;verbal cues  -     Dynamic Standing Balance  maximum assist;2-person assist;verbal cues  -TB     Position/Device Used, Standing Balance supported  -TB     Balance Interventions sitting;standing;sit to stand;supported;dynamic;occupation based/functional task;UE activity with balance activity  -TB     Comment, Balance Pt presents with strong posterior lean and fear of falling. Significant fall risk in standing.  -TB               User Key  (r) = Recorded By, (t) = Taken By, (c) = Cosigned By      Initials Name Provider Type    TB Chery Rene, BETSY Occupational Therapist                   Goals/Plan    No documentation.                  Clinical Impression       Row Name 10/01/23 0950          Pain Assessment    Pain Intervention(s) Ambulation/increased activity;Repositioned;Other (Comment)  LUE IS nerve block infusing  -TB     Additional Documentation Pain Scale: FACES Pre/Post-Treatment (Group)  -TB       Row Name 10/01/23 0950          Pain Scale: FACES Pre/Post-Treatment    Pain: FACES Scale, Pretreatment 2-->hurts little bit  -TB     Posttreatment Pain Rating 2-->hurts little bit  -TB     Pain Location generalized  -TB     Pain Location - back  -TB       Row Name 10/01/23 0950          Plan of Care Review    Plan of Care Reviewed With patient  -TB     Progress no change  -TB     Outcome Evaluation Pt is A/Ox2 with cues and participates in therapy with encouragement and time. Education reviewed for L shoulder precautions, sling teaching, and ADL retraining. LUE HEP completed per MD parameters. Pt tolerates PROM , ER to 30, IR to chest. No caregivers present today. Pt up with Max A x2 Std Pvt to chair. Limited by fear of falling and strong posterior lean this session. OT will continue to follow IP.  -TB       Row Name 10/01/23 0950          Therapy Plan Review/Discharge Plan (OT)    Anticipated Discharge Disposition (OT) skilled nursing facility  -TB       Row Name 10/01/23 0950          Vital Signs    Pre Systolic BP Rehab --  RN cleared OT   -TB     Pre SpO2 (%) 97  -TB     O2 Delivery Pre Treatment nasal cannula  -TB     Intra SpO2 (%) 94  -TB     O2 Delivery Intra Treatment room air  -TB     Post SpO2 (%) 94  -TB     O2 Delivery Post Treatment room air  -TB     Pre Patient Position Supine  -TB     Intra Patient Position Standing  -TB     Post Patient Position Sitting  -TB       Row Name 10/01/23 0950          Positioning and Restraints    Pre-Treatment Position in bed  -TB     Post Treatment Position chair  -TB     In Chair sitting;with PT  -TB               User Key  (r) = Recorded By, (t) = Taken By, (c) = Cosigned By      Initials Name Provider Type    TB Chery Rene, OT Occupational Therapist                   Outcome Measures    No documentation.                   Occupational Therapy Education       Title: PT OT SLP Therapies (In Progress)       Topic: Occupational Therapy (In Progress)       Point: ADL training (In Progress)       Description:   Instruct learner(s) on proper safety adaptation and remediation techniques during self care or transfers.   Instruct in proper use of assistive devices.                  Learning Progress Summary             Patient Acceptance, E, NR by TB at 10/1/2023 0955    Acceptance, E,D, NR by TB at 9/30/2023 1034    Acceptance, E,D, NR by JY at 9/29/2023 1155    Acceptance, E, NR by KF at 9/28/2023 0850    Acceptance, E,D, VU,NR by TB at 9/26/2023 1257    Comment: Spouse present and participating in teaching. Pt limited by acute confusion.    Eager, E,TB,D,H, NR by AR at 9/25/2023 1856   Family Acceptance, E,D, NR by JY at 9/29/2023 1155    Acceptance, E,D, VU,NR by TB at 9/26/2023 1257    Comment: Spouse present and participating in teaching. Pt limited by acute confusion.    Eager, E,TB,D,H, NR by AR at 9/25/2023 1856   Caregiver Acceptance, E, NR by KF at 9/28/2023 0850                         Point: Home exercise program (In Progress)       Description:   Instruct learner(s) on appropriate  technique for monitoring, assisting and/or progressing therapeutic exercises/activities.                  Learning Progress Summary             Patient Acceptance, E, NR by TB at 10/1/2023 0955    Acceptance, E,D, NR by TB at 9/30/2023 1034    Acceptance, E,D, NR by JY at 9/29/2023 1155    Acceptance, E, NR by KF at 9/28/2023 0850    Acceptance, E,D, VU,NR by TB at 9/26/2023 1257    Comment: Spouse present and participating in teaching. Pt limited by acute confusion.    Eager, E,TB,D,H, NR by AR at 9/25/2023 1856   Family Acceptance, E,D, NR by JY at 9/29/2023 1155    Acceptance, E,D, VU,NR by TB at 9/26/2023 1257    Comment: Spouse present and participating in teaching. Pt limited by acute confusion.    Eager, E,TB,D,H, NR by AR at 9/25/2023 1856   Caregiver Acceptance, E, NR by KF at 9/28/2023 0850                         Point: Precautions (In Progress)       Description:   Instruct learner(s) on prescribed precautions during self-care and functional transfers.                  Learning Progress Summary             Patient Acceptance, E, NR by TB at 10/1/2023 0955    Acceptance, E,D, NR by TB at 9/30/2023 1034    Acceptance, E,D, NR by JY at 9/29/2023 1155    Acceptance, E, NR by KF at 9/28/2023 0850    Acceptance, E,D, VU,NR by TB at 9/26/2023 1257    Comment: Spouse present and participating in teaching. Pt limited by acute confusion.    Eager, E,TB,D,H, NR by AR at 9/25/2023 1856   Family Acceptance, E,D, NR by JY at 9/29/2023 1155    Acceptance, E,D, VU,NR by TB at 9/26/2023 1257    Comment: Spouse present and participating in teaching. Pt limited by acute confusion.    Eager, E,TB,D,H, NR by AR at 9/25/2023 1856   Caregiver Acceptance, E, NR by KF at 9/28/2023 0850                         Point: Body mechanics (In Progress)       Description:   Instruct learner(s) on proper positioning and spine alignment during self-care, functional mobility activities and/or exercises.                  Learning Progress  Summary             Patient Acceptance, E,D, NR by MAGDALENE at 9/29/2023 1155    Acceptance, E, NR by KF at 9/28/2023 0850    Eager, E,TB,D,H, NR by AR at 9/25/2023 1856   Family Acceptance, E,D, NR by JY at 9/29/2023 1155    Eager, E,TB,D,H, NR by AR at 9/25/2023 1856   Caregiver Acceptance, E, NR by KF at 9/28/2023 0850                                         User Key       Initials Effective Dates Name Provider Type Discipline     07/11/23 -  Chery Rene, OT Occupational Therapist OT    AR 07/11/23 -  Debbie Espinosa, OT Occupational Therapist OT    MAGDALENE 06/16/21 -  Alma Delia Barrera, OT Occupational Therapist OT    KF 08/09/23 -  Sadia Razo OT Occupational Therapist OT                  OT Recommendation and Plan     Plan of Care Review  Plan of Care Reviewed With: patient  Progress: no change  Outcome Evaluation: Pt is A/Ox2 with cues and participates in therapy with encouragement and time. Education reviewed for L shoulder precautions, sling teaching, and ADL retraining. LUE HEP completed per MD parameters. Pt tolerates PROM , ER to 30, IR to chest. No caregivers present today. Pt up with Max A x2 Std Pvt to chair. Limited by fear of falling and strong posterior lean this session. OT will continue to follow IP.     Time Calculation:         Time Calculation- OT       Row Name 10/01/23 0822             Time Calculation- OT    OT Start Time 0822  -TB      OT Received On 10/01/23  -TB      OT Goal Re-Cert Due Date 10/05/23  -TB         Timed Charges    76017 - OT Therapeutic Exercise Minutes 23  -TB      38941 - OT Self Care/Mgmt Minutes 30  -TB         Total Minutes    Timed Charges Total Minutes 53  -TB       Total Minutes 53  -TB                User Key  (r) = Recorded By, (t) = Taken By, (c) = Cosigned By      Initials Name Provider Type    TB Chery Rene, OT Occupational Therapist                  Therapy Charges for Today       Code Description Service Date Service Provider  Modifiers Qty    13911924809 HC OT THER PROC EA 15 MIN 9/30/2023 Chery Rene, OT GO 1    83833397034 HC OT SELF CARE/MGMT/TRAIN EA 15 MIN 9/30/2023 Chery Rene, OT GO 1    74002656976 HC OT THER PROC EA 15 MIN 10/1/2023 Chery Rene, OT GO 2    15113811170 HC OT SELF CARE/MGMT/TRAIN EA 15 MIN 10/1/2023 Chery Rene, OT GO 2                 Chery Rene, OT  10/1/2023

## 2023-10-01 NOTE — PLAN OF CARE
SBP elevated this shift, reported to Debbie Resendez APRN, prn BP med ordered, deferred at this time pt's SBP decreased on own. Denies pain. Infusystem present. Chest x ray results pending. AM labs pending. Pt slept with 2L of O2 via NC. Dr. Tan, Y, and nephro following. Possible d/c to rehab Monday.     Goal Outcome Evaluation:

## 2023-10-01 NOTE — PLAN OF CARE
Goal Outcome Evaluation:  Plan of Care Reviewed With: patient        Progress: no change  Outcome Evaluation: Pt with improved alertness and participation in therapy this date, however required increased assist for STS and bed to chair transfer. Pt with significant fear of falling contributing to a heavy posterior lean limiting ability to achieve full upright standing posture and thus participate in further mobility tasks. Pt will continue to benefit from PT to address ongoing strength, balance, and endurance deficits.      Anticipated Discharge Disposition (PT): skilled nursing facility

## 2023-10-01 NOTE — PROGRESS NOTES
" LOS: 0 days   Patient Care Team:  Blair Dhaliwal MD as PCP - General (Internal Medicine)  Blair Packer MD as Consulting Physician (Cardiology)  Michelle Aponte MD (Nephrology)    Chief Complaint: ESRD  Left shoulder hemiarthroplasty     Subjective     Plan for HD in AM.     History taken from: patient    Objective     Vital Sign Min/Max for last 24 hours  Temp  Min: 97 °F (36.1 °C)  Max: 98.8 °F (37.1 °C)   BP  Min: 121/63  Max: 196/94   Pulse  Min: 63  Max: 75   Resp  Min: 16  Max: 18   SpO2  Min: 93 %  Max: 99 %   Flow (L/min)  Min: 2  Max: 2   No data recorded       I/O this shift:  In: 320 [P.O.:320]  Out: 250 [Urine:250]  I/O last 3 completed shifts:  In: 520 [P.O.:520]  Out: -     Objective:  General Appearance:  Comfortable.    Vital signs: (most recent): Blood pressure 121/63, pulse 75, temperature 98.3 °F (36.8 °C), temperature source Oral, resp. rate 18, height 157.5 cm (62.01\"), weight 54.2 kg (119 lb 7.8 oz), SpO2 93 %.  Vital signs are normal.    Output: Minimal urine output.    HEENT: Normal HEENT exam.    Lungs:  Normal effort and normal respiratory rate.  Breath sounds clear to auscultation.  He is not in respiratory distress.  No decreased breath sounds.    Heart: Normal rate.  Regular rhythm.  S1 normal and S2 normal.  No murmur or gallop.   Abdomen: Abdomen is soft.  Bowel sounds are normal.     Pulses: Distal pulses are intact.    Neurological: Patient is alert and oriented to person, place and time.  Normal strength.    Pupils:  Pupils are equal, round, and reactive to light.          Results Review:     I reviewed the patient's new clinical results.    WBC WBC   Date Value Ref Range Status   10/01/2023 6.80 3.40 - 10.80 10*3/mm3 Final        HGB Hemoglobin   Date Value Ref Range Status   10/01/2023 12.1 (L) 13.0 - 17.7 g/dL Final        HCT Hematocrit   Date Value Ref Range Status   10/01/2023 37.3 (L) 37.5 - 51.0 % Final        Platlets No results found for: LABPLAT   MCV MCV "   Date Value Ref Range Status   10/01/2023 100.5 (H) 79.0 - 97.0 fL Final            Sodium Sodium   Date Value Ref Range Status   10/01/2023 134 (L) 136 - 145 mmol/L Final        Potassium Potassium   Date Value Ref Range Status   10/01/2023 5.3 (H) 3.5 - 5.2 mmol/L Final     Comment:     Slight hemolysis detected by analyzer. Results may be affected.        Chloride Chloride   Date Value Ref Range Status   10/01/2023 99 98 - 107 mmol/L Final        CO2 CO2   Date Value Ref Range Status   10/01/2023 17.0 (L) 22.0 - 29.0 mmol/L Final        BUN BUN   Date Value Ref Range Status   10/01/2023 42 (H) 8 - 23 mg/dL Final        Creatinine Creatinine   Date Value Ref Range Status   10/01/2023 4.03 (H) 0.76 - 1.27 mg/dL Final        Calcium Calcium   Date Value Ref Range Status   10/01/2023 9.4 8.6 - 10.5 mg/dL Final        PO4 No results found for: CAPO4   Albumin No results found for: ALBUMIN   Magnesium No results found for: MG   Uric Acid No results found for: URICACID     Medication Review: yes    Assessment & Plan       S/P shoulder hemiarthroplasty, left    (HFpEF) heart failure with preserved ejection fraction    Benign prostatic hyperplasia    Depression    ESRD (end stage renal disease)    Essential hypertension    GERD (gastroesophageal reflux disease)    Mixed hyperlipidemia    NERY (obstructive sleep apnea)    Paroxysmal atrial fibrillation    Anemia, acute on chronic    Postoperative confusion      Assessment & Plan    ESRD:  On MWF saray. [facility]. RUE AV fistula. Admitted for shoulder surgery      Anemia:  JENNYFER on HD days     Volume status :  Stable     HTN: Optimization of volume status with HD.        Recs  HD per MW saray.   JENNYFER on HD days  Renal Diet       Pancho Carpenter MD  10/01/23  13:10 EDT

## 2023-10-02 ENCOUNTER — APPOINTMENT (OUTPATIENT)
Dept: NEPHROLOGY | Facility: HOSPITAL | Age: 88
End: 2023-10-02
Payer: MEDICARE

## 2023-10-02 PROCEDURE — 97110 THERAPEUTIC EXERCISES: CPT

## 2023-10-02 PROCEDURE — 63710000001 TRAZODONE 50 MG TABLET: Performed by: INTERNAL MEDICINE

## 2023-10-02 PROCEDURE — A9270 NON-COVERED ITEM OR SERVICE: HCPCS | Performed by: INTERNAL MEDICINE

## 2023-10-02 PROCEDURE — 63710000001 ATORVASTATIN 20 MG TABLET: Performed by: INTERNAL MEDICINE

## 2023-10-02 PROCEDURE — 63710000001 MIRTAZAPINE 15 MG TABLET: Performed by: INTERNAL MEDICINE

## 2023-10-02 PROCEDURE — A9270 NON-COVERED ITEM OR SERVICE: HCPCS | Performed by: ORTHOPAEDIC SURGERY

## 2023-10-02 PROCEDURE — G0257 UNSCHED DIALYSIS ESRD PT HOS: HCPCS

## 2023-10-02 PROCEDURE — 97535 SELF CARE MNGMENT TRAINING: CPT

## 2023-10-02 PROCEDURE — A9270 NON-COVERED ITEM OR SERVICE: HCPCS | Performed by: NURSE PRACTITIONER

## 2023-10-02 PROCEDURE — 63710000001 CLONIDINE 0.1 MG TABLET: Performed by: NURSE PRACTITIONER

## 2023-10-02 PROCEDURE — 63710000001 PANTOPRAZOLE 40 MG TABLET DELAYED-RELEASE: Performed by: INTERNAL MEDICINE

## 2023-10-02 PROCEDURE — 63710000001 SERTRALINE 50 MG TABLET: Performed by: INTERNAL MEDICINE

## 2023-10-02 PROCEDURE — 97530 THERAPEUTIC ACTIVITIES: CPT

## 2023-10-02 PROCEDURE — 63710000001 APIXABAN 2.5 MG TABLET: Performed by: INTERNAL MEDICINE

## 2023-10-02 PROCEDURE — P9047 ALBUMIN (HUMAN), 25%, 50ML: HCPCS | Performed by: INTERNAL MEDICINE

## 2023-10-02 PROCEDURE — 63710000001 HYDROXYZINE 25 MG TABLET: Performed by: INTERNAL MEDICINE

## 2023-10-02 PROCEDURE — 25010000002 ALBUMIN HUMAN 25% PER 50 ML: Performed by: INTERNAL MEDICINE

## 2023-10-02 PROCEDURE — 63710000001 AMIODARONE 200 MG TABLET: Performed by: INTERNAL MEDICINE

## 2023-10-02 PROCEDURE — 63710000001 MELATONIN 5 MG TABLET: Performed by: INTERNAL MEDICINE

## 2023-10-02 PROCEDURE — 63710000001 SENNOSIDES-DOCUSATE 8.6-50 MG TABLET: Performed by: INTERNAL MEDICINE

## 2023-10-02 PROCEDURE — 63710000001 OXYCODONE 5 MG TABLET: Performed by: ORTHOPAEDIC SURGERY

## 2023-10-02 PROCEDURE — 63710000001 FINASTERIDE 5 MG TABLET: Performed by: INTERNAL MEDICINE

## 2023-10-02 RX ORDER — ALBUMIN (HUMAN) 12.5 G/50ML
12.5 SOLUTION INTRAVENOUS AS NEEDED
Status: ACTIVE | OUTPATIENT
Start: 2023-10-02 | End: 2023-10-03

## 2023-10-02 RX ORDER — ALBUMIN (HUMAN) 12.5 G/50ML
12.5 SOLUTION INTRAVENOUS AS NEEDED
Status: ACTIVE | OUTPATIENT
Start: 2023-10-02 | End: 2023-10-02

## 2023-10-02 RX ADMIN — PANTOPRAZOLE SODIUM 40 MG: 40 TABLET, DELAYED RELEASE ORAL at 11:28

## 2023-10-02 RX ADMIN — APIXABAN 2.5 MG: 2.5 TABLET, FILM COATED ORAL at 11:28

## 2023-10-02 RX ADMIN — SERTRALINE 50 MG: 50 TABLET, FILM COATED ORAL at 11:27

## 2023-10-02 RX ADMIN — AMIODARONE HYDROCHLORIDE 200 MG: 200 TABLET ORAL at 11:28

## 2023-10-02 RX ADMIN — SENNOSIDES AND DOCUSATE SODIUM 2 TABLET: 50; 8.6 TABLET ORAL at 20:09

## 2023-10-02 RX ADMIN — FINASTERIDE 5 MG: 5 TABLET, FILM COATED ORAL at 11:28

## 2023-10-02 RX ADMIN — OXYCODONE 5 MG: 5 TABLET ORAL at 20:09

## 2023-10-02 RX ADMIN — OXYCODONE 5 MG: 5 TABLET ORAL at 09:20

## 2023-10-02 RX ADMIN — SENNOSIDES AND DOCUSATE SODIUM 2 TABLET: 50; 8.6 TABLET ORAL at 11:27

## 2023-10-02 RX ADMIN — HYDROXYZINE HYDROCHLORIDE 25 MG: 25 TABLET, FILM COATED ORAL at 20:09

## 2023-10-02 RX ADMIN — APIXABAN 2.5 MG: 2.5 TABLET, FILM COATED ORAL at 20:08

## 2023-10-02 RX ADMIN — Medication 5 MG: at 20:09

## 2023-10-02 RX ADMIN — CLONIDINE HYDROCHLORIDE 0.1 MG: 0.1 TABLET ORAL at 05:29

## 2023-10-02 RX ADMIN — ATORVASTATIN CALCIUM 20 MG: 20 TABLET, FILM COATED ORAL at 20:09

## 2023-10-02 RX ADMIN — TRAZODONE HYDROCHLORIDE 50 MG: 50 TABLET ORAL at 20:08

## 2023-10-02 RX ADMIN — ALBUMIN (HUMAN) 25 G: 0.25 INJECTION, SOLUTION INTRAVENOUS at 10:15

## 2023-10-02 RX ADMIN — MIRTAZAPINE 7.5 MG: 15 TABLET, FILM COATED ORAL at 20:08

## 2023-10-02 NOTE — PLAN OF CARE
Goal Outcome Evaluation:   Pt rested in bed comfortably most of the night, pain controlled with PRN pain medications, O2 desats while asleep- 2L NC applied, Dressing remains C/D/I arm sling remained on throughout the shift, ROM/activity performed per order, pt with intermittent confusion- easily reoriented, fall precautions in place, will continue to monitor

## 2023-10-02 NOTE — PLAN OF CARE
Goal Outcome Evaluation:  Plan of Care Reviewed With: patient        Progress: improving  Outcome Evaluation: Pt. continues to present below baseline function w/generalized weakness, balance deficits and decreased functional endurance affecting his ability to safely participate in functional mobility. He performed bed mobility and transfers w/mod-max assist. Activity limited by posterior lean. He tolerated ther-ex well. Continue IPPT to progress as tolerated.      Anticipated Discharge Disposition (PT): skilled nursing facility

## 2023-10-02 NOTE — SIGNIFICANT NOTE
09/27/23 0800 09/27/23 0830 09/27/23 0900   Treatment Assessment   Blood Flow Rate (BFR) 400 400 400   Venous Pressure () 180 190 180   Arterial Pressure (AP) -160 -190 -200   Hemodialysis, Fluids  --   --   --    Total Mean Pressure (TMP) 5 0 0   Ultrafiltration Rate (UFR) 1250 1250 530   Blood Liters Processed (BLP)  --   --   --    Safety Check WDL WDL WDL WDL   Hemodialysis, Safety Factors supplemental oxygenation;access site visible and intact;vital signs stable supplemental oxygenation;access site visible and intact;vital signs stable supplemental oxygenation;access site visible and intact;vital signs stable   Hemodialysis, Comments HD initiated per MD order  --   --       09/27/23 0930 09/27/23 1000 09/27/23 1030   Treatment Assessment   Blood Flow Rate (BFR) 400 400 400   Venous Pressure () 180 170 170   Arterial Pressure (AP) -180 -180 -190   Hemodialysis, Fluids  --   --   --    Total Mean Pressure (TMP) 0 0 0   Ultrafiltration Rate (UFR) 1050 660 920   Blood Liters Processed (BLP)  --   --   --    Safety Check WDL WDL WDL WDL   Hemodialysis, Safety Factors supplemental oxygenation;access site visible and intact;vital signs stable supplemental oxygenation;access site visible and intact;vital signs stable supplemental oxygenation;access site visible and intact;vital signs stable   Hemodialysis, Comments  --   --   --       09/27/23 1100 09/27/23 1130 09/29/23 0715   Treatment Assessment   Blood Flow Rate (BFR) 400  --  400   Venous Pressure () 160  --  170   Arterial Pressure (AP) -190  --  -160   Hemodialysis, Fluids  --   --  310   Total Mean Pressure (TMP) 0  --  10   Ultrafiltration Rate (UFR) 720  --  260   Blood Liters Processed (BLP)  --  82.1  --    Safety Check WDL WDL  --  WDL   Hemodialysis, Safety Factors supplemental oxygenation;access site visible and intact;vital signs stable  --  access site visible and intact;vital signs stable;no supplemental oxygenation needed   Hemodialysis,  Comments  --  HD complete per MD order  --       09/29/23 0730 09/29/23 0800 09/29/23 0830   Treatment Assessment   Blood Flow Rate (BFR) 400 400 400   Venous Pressure () 180 180 190   Arterial Pressure (AP) -160 -170 -170   Hemodialysis, Fluids 0 0 0   Total Mean Pressure (TMP) 10 10 5   Ultrafiltration Rate (UFR) 1250 530 530   Blood Liters Processed (BLP)  --   --   --    Safety Check WDL WDL WDL WDL   Hemodialysis, Safety Factors access site visible and intact;vital signs stable;no supplemental oxygenation needed access site visible and intact;vital signs stable;no supplemental oxygenation needed access site visible and intact;vital signs stable;no supplemental oxygenation needed   Hemodialysis, Comments  --   --   --       09/29/23 0900 09/29/23 0930 09/29/23 1000   Treatment Assessment   Blood Flow Rate (BFR) 400 400 400   Venous Pressure () 180 180 170   Arterial Pressure (AP) -170 -170 -150   Hemodialysis, Fluids 0 0 0   Total Mean Pressure (TMP) 5 5 5   Ultrafiltration Rate (UFR) 1050 1050 720   Blood Liters Processed (BLP)  --   --   --    Safety Check WDL WDL WDL WDL   Hemodialysis, Safety Factors access site visible and intact;vital signs stable;no supplemental oxygenation needed access site visible and intact;vital signs stable;no supplemental oxygenation needed access site visible and intact;vital signs stable;no supplemental oxygenation needed   Hemodialysis, Comments  --   --   --       09/29/23 1030 09/29/23 1045 10/02/23 0730   Treatment Assessment   Blood Flow Rate (BFR) 400 400 400   Venous Pressure () 170 170 170   Arterial Pressure (AP) -150 -150 -180   Hemodialysis, Fluids 0 0  --    Total Mean Pressure (TMP) 5 5 -5   Ultrafiltration Rate (UFR) 720 720 760   Blood Liters Processed (BLP)  --  81.2  --    Safety Check WDL WDL WDL WDL   Hemodialysis, Safety Factors access site visible and intact;vital signs stable;no supplemental oxygenation needed access site visible and intact;vital signs  stable;no supplemental oxygenation needed access site visible and intact;vital signs stable;no supplemental oxygenation needed   Hemodialysis, Comments  --  HD completed. Report to primary nurse. HD initiated per MD order      10/02/23 0800 10/02/23 0830 10/02/23 0900   Treatment Assessment   Blood Flow Rate (BFR) 400 400 400   Venous Pressure () 180 180 190   Arterial Pressure (AP) -190 -180 -180   Hemodialysis, Fluids  --   --   --    Total Mean Pressure (TMP) 0 0 0   Ultrafiltration Rate (UFR) 710 690 670   Blood Liters Processed (BLP)  --   --   --    Safety Check WDL WDL WDL WDL   Hemodialysis, Safety Factors access site visible and intact;vital signs stable;no supplemental oxygenation needed access site visible and intact;vital signs stable;no supplemental oxygenation needed access site visible and intact;vital signs stable;no supplemental oxygenation needed   Hemodialysis, Comments  --   --   --

## 2023-10-02 NOTE — THERAPY TREATMENT NOTE
Patient Name: Enrike Tomas  : 1935    MRN: [id number]                              Today's Date: 10/2/2023       Admit Date: 2023    Visit Dx: No diagnosis found.  Patient Active Problem List   Diagnosis    (HFpEF) heart failure with preserved ejection fraction    Anemia of renal disease    At high risk for falls    Benign prostatic hyperplasia    Closed fracture of left acetabulum    Compression fracture of thoracic vertebra    Deficiency of other specified B group vitamins    Depression    ESRD (end stage renal disease)    Essential hypertension    GERD (gastroesophageal reflux disease)    Hepatic cyst    Long term (current) use of anticoagulants    Major depressive disorder, single episode, moderate    Mixed hyperlipidemia    Morgagni hernia    Occlusion of right vertebral artery    NERY (obstructive sleep apnea)    Fall    Severe malnutrition    Sustained SVT    Paroxysmal atrial fibrillation    Chronic heart failure with preserved ejection fraction (HFpEF)    S/P shoulder hemiarthroplasty, left    Anemia, acute on chronic    Postoperative confusion     Past Medical History:   Diagnosis Date    A-fib     Abnormal ECG     occasional extra heartbeat    Anemia, acute on chronic 2023    Arthritis     Atrial fibrillation     BPH (benign prostatic hyperplasia)     Cervical compression fracture 2023    currently wearing a c collar    Diverticulosis     Diverticulum of esophagus     puree diet and speech therapy    Elevated cholesterol     ESRD on hemodialysis     GERD (gastroesophageal reflux disease)     Heart failure     Hemodialysis patient     M,W,F    Hyperlipidemia     Hypertension     Low back pain     NERY (obstructive sleep apnea)     NOT USING CPAP    Recurrent falls     UTI (urinary tract infection)      Past Surgical History:   Procedure Laterality Date    CATARACT EXTRACTION      COLONOSCOPY      DIALYSIS FISTULA CREATION      ENDOSCOPY      INGUINAL HERNIA REPAIR      PROSTATE  BIOPSY      THROAT SURGERY      diverticulum in throat    TONSILLECTOMY AND ADENOIDECTOMY      TOTAL SHOULDER ARTHROPLASTY W/ DISTAL CLAVICLE EXCISION Left 9/25/2023    Procedure: HEMIARTHROPLASTY, BICEPS TENODESIS - LEFT;  Surgeon: Matteo Tan MD;  Location: [facility];  Service: Orthopedics;  Laterality: Left;      General Information       Row Name 10/02/23 1501          Physical Therapy Time and Intention    Document Type therapy note (daily note)  -     Mode of Treatment physical therapy  -SS       Row Name 10/02/23 150          General Information    Patient Profile Reviewed yes  -SS     Existing Precautions/Restrictions fall;left;shoulder;non-weight bearing;other (see comments)  sling with abduction pillow  -SS     Barriers to Rehab medically complex;previous functional deficit;cognitive status  -SS       Row Name 10/02/23 1500          Cognition    Orientation Status (Cognition) oriented to;person;disoriented to;place;situation;time  -SS       Row Name 10/02/23 1507          Safety Issues, Functional Mobility    Safety Issues Affecting Function (Mobility) at risk behavior observed;awareness of need for assistance;insight into deficits/self-awareness;judgment;problem-solving;safety precaution awareness;safety precautions follow-through/compliance;sequencing abilities  -SS     Impairments Affecting Function (Mobility) balance;cognition;endurance/activity tolerance;motor control;pain;postural/trunk control;range of motion (ROM);strength  -SS     Cognitive Impairments, Mobility Safety/Performance awareness, need for assistance;insight into deficits/self-awareness;judgment;problem-solving/reasoning;safety precaution awareness;safety precaution follow-through;sequencing abilities  -SS               User Key  (r) = Recorded By, (t) = Taken By, (c) = Cosigned By      Initials Name Provider Type    SS Bing Castellanos PT Physical Therapist                   Mobility       Row Name 10/02/23 1505           Bed Mobility    Bed Mobility supine-sit;scooting/bridging  -     Scooting/Bridging [place] (Bed Mobility) maximum assist (25% patient effort);verbal cues;nonverbal cues (demo/gesture)  -     Supine-Sit [place] (Bed Mobility) maximum assist (25% patient effort);verbal cues;nonverbal cues (demo/gesture)  -     Assistive Device (Bed Mobility) draw sheet;head of bed elevated  -     Comment, (Bed Mobility) V/TC for NWB status, upright posture, sequencing; posterior lean  -       Row Name 10/02/23 1504          Bed-Chair Transfer    Bed-Chair [place] (Transfers) maximum assist (25% patient effort);verbal cues;nonverbal cues (demo/gesture)  -SS     Assistive Device (Bed-Chair Transfers) other (see comments)  UE support  -     Comment, (Bed-Chair Transfer) V/TC for sequencing  -       Row Name 10/02/23 1504          Sit-Stand Transfer    Sit-Stand [place] (Transfers) moderate assist (50% patient effort);verbal cues;nonverbal cues (demo/gesture);maximum assist (25% patient effort)  -     Assistive Device (Sit-Stand Transfers) other (see comments)  UE support  -     Comment, (Sit-Stand Transfer) V/TC for LE set up, hand placement, sequencing, anterior shift of COG; posterior lean w/minimal improvement for upright posture despite cueing and several attempts  -       Row Name 10/02/23 1500          Gait/Stairs (Locomotion)    [place] Level (Gait) unable to assess  -     Comment, (Gait/Stairs) deferred secondary to limited upright posture  -       Row Name 10/02/23 1508          Mobility    Extremity Weight-bearing Status left upper extremity  -     Left Upper Extremity (Weight-bearing Status) non weight-bearing (NWB)   -               User Key  (r) = Recorded By, (t) = Taken By, (c) = Cosigned By      Initials Name Provider Type     Bing Castellanos PT Physical Therapist                   Obj/Interventions       Row Name 10/02/23 1504          Motor Skills    Therapeutic  Exercise hip;knee;ankle  -       Row Name 10/02/23 1509          Hip (Therapeutic Exercise)    Hip (Therapeutic Exercise) strengthening exercise;isometric exercises  -     Hip Isometrics (Therapeutic Exercise) bilateral;gluteal sets;10 repetitions;5 repetitions  -     Hip Strengthening (Therapeutic Exercise) bilateral;aBduction;aDduction;internal rotation;external rotation;heel slides;10 repetitions  -       Row Name 10/02/23 1509          Knee (Therapeutic Exercise)    Knee (Therapeutic Exercise) strengthening exercise;isometric exercises  -     Knee Isometrics (Therapeutic Exercise) bilateral;quad sets;10 repetitions  -     Knee Strengthening (Therapeutic Exercise) bilateral;SLR (straight leg raise);10 repetitions  -       Row Name 10/02/23 1509          Ankle (Therapeutic Exercise)    Ankle (Therapeutic Exercise) AROM (active range of motion)  -     Ankle AROM (Therapeutic Exercise) bilateral;dorsiflexion;plantarflexion;10 repetitions  -       Row Name 10/02/23 1509          Balance    Balance Assessment sitting static balance;sitting dynamic balance;sit to stand dynamic balance;standing static balance;standing dynamic balance  -     Static Sitting Balance contact guard  -     Dynamic Sitting Balance minimal assist  -     Position, Sitting Balance unsupported;sitting edge of bed  -     Sit to Stand Dynamic Balance moderate assist;maximum assist  -     Static Standing Balance moderate assist  -     Dynamic Standing Balance maximum assist  -     Position/Device Used, Standing Balance supported;other (see comments)  UE support  -     Balance Interventions sitting;standing;sit to stand;supported;static;dynamic  -     Comment, Balance significant posterior lean w/BLE extension  -               User Key  (r) = Recorded By, (t) = Taken By, (c) = Cosigned By      Initials Name Provider Type    SS Bing Castellanos PT Physical Therapist                   Goals/Plan    No  documentation.                  Clinical Impression       Row Name 10/02/23 1511          Pain    Pretreatment Pain Rating 0/10 - no pain  -     Posttreatment Pain Rating 0/10 - no pain  -     Pain Intervention(s) Repositioned;Ambulation/increased activity;Elevated  -     Additional Documentation Pain Scale: Numbers Pre/Post-Treatment (Group)  -       Row Name 10/02/23 1511          Plan of Care Review    Plan of Care Reviewed With patient  -     Progress improving  -     Outcome Evaluation Pt. continues to present below baseline function w/generalized weakness, balance deficits and decreased functional endurance affecting his ability to safely participate in functional mobility. He performed bed mobility and transfers w/mod-max assist. Activity limited by posterior lean. He tolerated ther-ex well. Continue IPPT to progress as tolerated.  -       Row Name 10/02/23 1511          Therapy Assessment/Plan (PT)    Rehab Potential (PT) good, to achieve stated therapy goals  -     Criteria for Skilled Interventions Met (PT) yes;meets criteria;skilled treatment is necessary  -     Therapy Frequency (PT) daily  -       Row Name 10/02/23 1511          Vital Signs    Pre Systolic BP Rehab 149  -SS     Pre Treatment Diastolic BP 67  -SS     Pre Patient Position Supine  -       Row Name 10/02/23 1511          Positioning and Restraints    Pre-Treatment Position in bed  -     Post Treatment Position chair  -SS     In Chair notified nsg;reclined;call light within reach;encouraged to call for assist;exit alarm on;with family/caregiver;with nsg;on mechanical lift sling;with brace;waffle cushion;legs elevated;RUE elevated;LUE elevated  -               User Key  (r) = Recorded By, (t) = Taken By, (c) = Cosigned By      Initials Name Provider Type     Bing Castellanos, PT Physical Therapist                   Outcome Measures       Row Name 10/02/23 1514          How much help from another person do you  currently need...    Turning from your back to your side while in flat bed without using bedrails? 2  -SS     Moving from lying on back to sitting on the side of a flat bed without bedrails? 2  -SS     Moving to and from a bed to a chair (including a wheelchair)? 2  -SS     Standing up from a chair using your arms (e.g., wheelchair, bedside chair)? 2  -SS     Climbing 3-5 steps with a railing? 1  -SS     To walk in hospital room? 2  -SS     AM-PAC 6 Clicks Score (PT) 11  -SS     Highest level of mobility 4 --> Transferred to chair/commode  -SS       Row Name 10/02/23 1514 10/02/23 1419       Functional Assessment    Outcome Measure Options AM-PAC 6 Clicks Basic Mobility (PT)  -SS AM-PAC 6 Clicks Daily Activity (OT)  -              User Key  (r) = Recorded By, (t) = Taken By, (c) = Cosigned By      Initials Name Provider Type     Bing Castellanos, PT Physical Therapist    KF Sadia Razo, OT Occupational Therapist                                 Physical Therapy Education       Title: PT OT SLP Therapies (In Progress)       Topic: Physical Therapy (In Progress)       Point: Mobility training (In Progress)       Learning Progress Summary             Patient Eager, E, VU,DU,NR by SS at 10/2/2023 1514    Comment: Reviewed safety/technique w/bed mobility, NWB status, transfers, HEP, PT POC    Acceptance, E, NR by KR at 10/1/2023 1124    Acceptance, E, VU,NR by KR at 9/30/2023 1342    Acceptance, E, VU,NR by LO at 9/29/2023 1443    Comment: PT POC    Acceptance, E, NR by CM at 9/28/2023 1030    Acceptance, E,D, VU,DU by HP at 9/26/2023 1711    Acceptance, E,D, VU,NR by HP at 9/25/2023 1849   Family Eager, E, VU,DU,NR by SS at 10/2/2023 1514    Comment: Reviewed safety/technique w/bed mobility, NWB status, transfers, HEP, PT POC    Acceptance, E, VU,NR by KR at 9/30/2023 1342   Caregiver Acceptance, E, NR by CM at 9/28/2023 1030                         Point: Home exercise program (In Progress)       Learning Progress  Summary             Patient Eager, E, VU,DU,NR by SS at 10/2/2023 1514    Comment: Reviewed safety/technique w/bed mobility, NWB status, transfers, HEP, PT POC    Acceptance, E, VU,NR by KR at 9/30/2023 1342    Acceptance, E, VU,NR by LO at 9/29/2023 1443    Comment: PT POC    Acceptance, E, NR by CM at 9/28/2023 1030    Acceptance, E,D, VU,DU by HP at 9/26/2023 1711    Acceptance, E,D, VU,NR by HP at 9/25/2023 1849   Family Eager, E, VU,DU,NR by SS at 10/2/2023 1514    Comment: Reviewed safety/technique w/bed mobility, NWB status, transfers, HEP, PT POC    Acceptance, E, VU,NR by KR at 9/30/2023 1342   Caregiver Acceptance, E, NR by CM at 9/28/2023 1030                         Point: Body mechanics (In Progress)       Learning Progress Summary             Patient Eager, E, VU,DU,NR by SS at 10/2/2023 1514    Comment: Reviewed safety/technique w/bed mobility, NWB status, transfers, HEP, PT POC    Acceptance, E, NR by KR at 10/1/2023 1124    Acceptance, E, VU,NR by KR at 9/30/2023 1342    Acceptance, E, VU,NR by LO at 9/29/2023 1443    Comment: PT POC    Acceptance, E, NR by CM at 9/28/2023 1030    Acceptance, E,D, VU,DU by HP at 9/26/2023 1711    Acceptance, E,D, VU,NR by HP at 9/25/2023 1849   Family Eager, E, VU,DU,NR by SS at 10/2/2023 1514    Comment: Reviewed safety/technique w/bed mobility, NWB status, transfers, HEP, PT POC    Acceptance, E, VU,NR by KR at 9/30/2023 1342   Caregiver Acceptance, E, NR by CM at 9/28/2023 1030                         Point: Precautions (In Progress)       Learning Progress Summary             Patient Eager, E, VU,DU,NR by SS at 10/2/2023 1514    Comment: Reviewed safety/technique w/bed mobility, NWB status, transfers, HEP, PT POC    Acceptance, E, NR by KR at 10/1/2023 1124    Acceptance, E, VU,NR by KR at 9/30/2023 1342    Acceptance, E, VU,NR by LO at 9/29/2023 1443    Comment: PT POC    Acceptance, E, NR by CM at 9/28/2023 1030    Acceptance, E,D, VU,DU by HP at 9/26/2023  1711    Acceptance, E,D, VU,NR by  at 9/25/2023 1849   Family Eager, E, VU,DU,NR by  at 10/2/2023 1514    Comment: Reviewed safety/technique w/bed mobility, NWB status, transfers, HEP, PT POC    Acceptance, E, VU,NR by KR at 9/30/2023 1342   Caregiver Acceptance, E, NR by CM at 9/28/2023 1030                                         User Key       Initials Effective Dates Name Provider Type Discipline    LO 06/16/21 -  Julisa Lanza, PT Physical Therapist PT    HP 06/01/21 -  Fouzia Woody, PT Physical Therapist PT    SS 06/01/21 -  Bing Castellanos, PT Physical Therapist PT    CM 09/22/22 -  Hortencia Sommer, PT Physical Therapist PT    KR 12/30/22 -  Nazia Murray, PT Physical Therapist PT                  PT Recommendation and Plan     Plan of Care Reviewed With: patient  Progress: improving  Outcome Evaluation: Pt. continues to present below baseline function w/generalized weakness, balance deficits and decreased functional endurance affecting his ability to safely participate in functional mobility. He performed bed mobility and transfers w/mod-max assist. Activity limited by posterior lean. He tolerated ther-ex well. Continue IPPT to progress as tolerated.     Time Calculation:         PT Charges       Row Name 10/02/23 1515             Time Calculation    Start Time 1400  -SS      PT Received On 10/02/23  -SS         Timed Charges    44716 - PT Therapeutic Exercise Minutes 10  -SS      87219 - PT Therapeutic Activity Minutes 14  -SS         Total Minutes    Timed Charges Total Minutes 24  -SS       Total Minutes 24  -SS                User Key  (r) = Recorded By, (t) = Taken By, (c) = Cosigned By      Initials Name Provider Type     Bing Castellanos, PT Physical Therapist                  Therapy Charges for Today       Code Description Service Date Service Provider Modifiers Qty    75055132127 HC PT THER PROC EA 15 MIN 10/2/2023 Bing Castellanos, PT GP 1    13000786583 HC PT THERAPEUTIC ACT EA 15 MIN  10/2/2023 Bing Castellanos, PT GP 1            PT G-Codes  Outcome Measure Options: AM-PAC 6 Clicks Basic Mobility (PT)  AM-PAC 6 Clicks Score (PT): 11  AM-PAC 6 Clicks Score (OT): 12  PT Discharge Summary  Anticipated Discharge Disposition (PT): skilled nursing facility    Bing Castellanos, KIM  10/2/2023

## 2023-10-02 NOTE — PROGRESS NOTES
" LOS: 0 days   Patient Care Team:  Blair Dhaliwal MD as PCP - General (Internal Medicine)  Blair Packer MD as Consulting Physician (Cardiology)  Michelle Aponte MD (Nephrology)    Chief Complaint: ESRD  Left shoulder hemiarthroplasty     Subjective     Seen on HD tolerating well so far. Goal UF 2 liter as tolerated. Intra-dialytic hypotension noted. UF put on hold in last 30 min    History taken from: patient    Objective     Vital Sign Min/Max for last 24 hours  Temp  Min: 97.4 °F (36.3 °C)  Max: 98.4 °F (36.9 °C)   BP  Min: 74/41  Max: 189/87   Pulse  Min: 63  Max: 79   Resp  Min: 18  Max: 18   SpO2  Min: 92 %  Max: 99 %   No data recorded   No data recorded       No intake/output data recorded.  I/O last 3 completed shifts:  In: 320 [P.O.:320]  Out: 450 [Urine:450]    Objective:  General Appearance:  Comfortable.    Vital signs: (most recent): Blood pressure 103/52, pulse 64, temperature 97.4 °F (36.3 °C), temperature source Tympanic, resp. rate 18, height 157.5 cm (62.01\"), weight 54.2 kg (119 lb 7.8 oz), SpO2 97 %.  Vital signs are normal.    Output: Minimal urine output.    HEENT: Normal HEENT exam.    Lungs:  Normal effort and normal respiratory rate.  Breath sounds clear to auscultation.  He is not in respiratory distress.  No decreased breath sounds.    Heart: Normal rate.  Regular rhythm.  S1 normal and S2 normal.  No murmur or gallop.   Abdomen: Abdomen is soft.  Bowel sounds are normal.     Pulses: Distal pulses are intact.    Neurological: Patient is alert and oriented to person, place and time.  Normal strength.    Pupils:  Pupils are equal, round, and reactive to light.          Results Review:     I reviewed the patient's new clinical results.    WBC WBC   Date Value Ref Range Status   10/01/2023 6.80 3.40 - 10.80 10*3/mm3 Final        HGB Hemoglobin   Date Value Ref Range Status   10/01/2023 12.1 (L) 13.0 - 17.7 g/dL Final        HCT Hematocrit   Date Value Ref Range Status "   10/01/2023 37.3 (L) 37.5 - 51.0 % Final        Platlets No results found for: LABPLAT   MCV MCV   Date Value Ref Range Status   10/01/2023 100.5 (H) 79.0 - 97.0 fL Final            Sodium Sodium   Date Value Ref Range Status   10/01/2023 134 (L) 136 - 145 mmol/L Final        Potassium Potassium   Date Value Ref Range Status   10/01/2023 5.3 (H) 3.5 - 5.2 mmol/L Final     Comment:     Slight hemolysis detected by analyzer. Results may be affected.        Chloride Chloride   Date Value Ref Range Status   10/01/2023 99 98 - 107 mmol/L Final        CO2 CO2   Date Value Ref Range Status   10/01/2023 17.0 (L) 22.0 - 29.0 mmol/L Final        BUN BUN   Date Value Ref Range Status   10/01/2023 42 (H) 8 - 23 mg/dL Final        Creatinine Creatinine   Date Value Ref Range Status   10/01/2023 4.03 (H) 0.76 - 1.27 mg/dL Final        Calcium Calcium   Date Value Ref Range Status   10/01/2023 9.4 8.6 - 10.5 mg/dL Final        PO4 No results found for: CAPO4   Albumin No results found for: ALBUMIN   Magnesium No results found for: MG   Uric Acid No results found for: URICACID     Medication Review: yes    Assessment & Plan       S/P shoulder hemiarthroplasty, left    (HFpEF) heart failure with preserved ejection fraction    Benign prostatic hyperplasia    Depression    ESRD (end stage renal disease)    Essential hypertension    GERD (gastroesophageal reflux disease)    Mixed hyperlipidemia    NERY (obstructive sleep apnea)    Paroxysmal atrial fibrillation    Anemia, acute on chronic    Postoperative confusion      Assessment & Plan    ESRD:  On MWF saray. [facility]. RUE AV fistula. Admitted for shoulder surgery      Anemia:  JENNYFER on HD days     Volume status :  Stable     HTN: Optimization of volume status with HD.        Recs  HD per [facility] saray. Hyperkalemia and met acidosis managed with HD.  JENNYFER on HD days  Renal Diet       Pancho Carpenter MD  10/02/23  10:55 EDT

## 2023-10-02 NOTE — SIGNIFICANT NOTE
09/29/23 1114 09/29/23 1431 09/29/23 1842   Vital Signs   Temp 97.6 °F (36.4 °C) 97.6 °F (36.4 °C) 98 °F (36.7 °C)   Temp src Axillary Oral Oral   Heart Rate 74 75 75   Heart Rate Source Monitor Monitor Monitor   Resp 18 18 18   Resp Rate Source Visual Visual Visual   /58 140/56 128/87   Noninvasive MAP (mmHg) 76 82 105   BP Location Left leg Left leg Left leg   BP Method Automatic Automatic Automatic   Patient Position Lying Lying Lying      09/30/23 0701 09/30/23 1042 09/30/23 1442   Vital Signs   Temp 98.1 °F (36.7 °C) 98.4 °F (36.9 °C) 98.8 °F (37.1 °C)   Temp src Oral Oral Oral   Heart Rate 64 67 69   Heart Rate Source Monitor Monitor Monitor   Resp 18 18 18   Resp Rate Source Visual Visual Visual   BP (!) 211/77 (!) 148/102 158/84   Noninvasive MAP (mmHg) 113 108 98   BP Location Left leg Left leg Left leg   BP Method Automatic Automatic Automatic   Patient Position Lying Lying Lying      09/30/23 2004 10/01/23 0309 10/01/23 0717   Vital Signs   Temp  --  98.1 °F (36.7 °C) 98.1 °F (36.7 °C)   Temp src  --  Axillary Axillary   Heart Rate  --  67 70   Heart Rate Source  --  Monitor Monitor   Resp 16 18 16   Resp Rate Source  --  Visual Visual   BP  --  178/69 173/92   Noninvasive MAP (mmHg)  --  106 100   BP Location  --  Left leg Left leg   BP Method  --  Automatic Automatic   Patient Position  --  Lying Lying      10/01/23 1105 10/01/23 1519 10/01/23 2018   Vital Signs   Temp 98.3 °F (36.8 °C) 98.4 °F (36.9 °C) 98.1 °F (36.7 °C)   Temp src Oral Oral Oral   Heart Rate 75 76 70   Heart Rate Source Monitor Monitor Monitor   Resp 18 18 18   Resp Rate Source Visual Visual Visual   /63 167/72 146/54   Noninvasive MAP (mmHg) 81 99 80   BP Location Left leg Left leg Left leg   BP Method Automatic Automatic Automatic   Patient Position Sitting Sitting Lying      10/02/23 0021 10/02/23 0436 10/02/23 0701   Vital Signs   Temp 97.7 °F (36.5 °C) 97.9 °F (36.6 °C) 97.8 °F (36.6 °C)   Temp src Oral Oral  Axillary   Heart Rate 67 71 79   Heart Rate Source Monitor Monitor Monitor   Resp 18 18 18   Resp Rate Source Visual Visual Visual   /72 172/88 (!) 186/97   Noninvasive MAP (mmHg) 105 120 134   BP Location Left leg Left leg Left leg   BP Method Automatic Automatic Automatic   Patient Position Lying Lying Lying      10/02/23 0730   Vital Signs   Temp 97.4 °F (36.3 °C)   Temp src Tympanic   Heart Rate 68   Heart Rate Source Monitor   Resp 18   Resp Rate Source Visual   /67   Noninvasive MAP (mmHg) 95   BP Location Left leg   BP Method Automatic   Patient Position Lying

## 2023-10-02 NOTE — PLAN OF CARE
Goal Outcome Evaluation:  Plan of Care Reviewed With: patient, spouse        Progress: improving  Outcome Evaluation: Pt with improved alertness and participation in OT this date, however continues to present below his functional baseline due to LUE NWB/immoblization and deficits in balance, strength, activity tolerance, and safety awareness. OT reviewed with pt and pt's spouse L shoulder precautions, ADL retraining to maintain, and sling management. Pt completed LUE HEP within MD parameters, tolerating passive FE to 50, IR to chest, and ER to 5. Pt performed sit to stand x4 with min/modA, needing verbal and tactile cues to extend hips for upright posture. OT will continue to follow. Recommend a d/c to SNF for best functional outcome.      Anticipated Discharge Disposition (OT): skilled nursing facility

## 2023-10-02 NOTE — PROGRESS NOTES
Clinical Nutrition   Nutrition Support Assessment  Reason for Visit: Identified at risk by screening criteria, MST score 2+, Reduced oral intake      Patient Name: Enrike Tomas  YOB: 1935  MRN: [id number]  Date of Encounter: 10/02/23 16:36 EDT  Admission date: 9/25/2023    Comments: Pt oral intake is trending positive ~ 50% of meals plus Nepro supplement twice daily.    Nutrition Assessment   Admission Diagnosis:  S/P shoulder hemiarthroplasty, left [Z96.612]      Problem List:    S/P shoulder hemiarthroplasty, left    (HFpEF) heart failure with preserved ejection fraction    Benign prostatic hyperplasia    Depression    ESRD (end stage renal disease)    Essential hypertension    GERD (gastroesophageal reflux disease)    Mixed hyperlipidemia    NERY (obstructive sleep apnea)    Paroxysmal atrial fibrillation    Anemia, acute on chronic    Postoperative confusion        PMH:   He  has a past medical history of A-fib, Abnormal ECG, Anemia, acute on chronic (9/30/2023), Arthritis, Atrial fibrillation, BPH (benign prostatic hyperplasia), Cervical compression fracture (06/05/2023), Diverticulosis, Diverticulum of esophagus, Elevated cholesterol, ESRD on hemodialysis, GERD (gastroesophageal reflux disease), Heart failure, Hemodialysis patient, Hyperlipidemia, Hypertension, Low back pain (2022), NERY (obstructive sleep apnea), Recurrent falls, and UTI (urinary tract infection).    PSH:  He  has a past surgical history that includes Tonsillectomy and adenoidectomy; Cataract extraction; Dialysis fistula creation; Inguinal hernia repair; Prostate biopsy; Throat surgery; Colonoscopy; Esophagogastroduodenoscopy; and Total shoulder arthroplasty w/ distal clavicle excision (Left, 9/25/2023).    Applicable Nutrition Concerns:   Skin:  Oral:  GI:    Applicable Interval History:   9/25 s/p 1. Left shoulder hemiarthroplasty.    2. Left biceps tenodesis.        Reported/Observed/Food/Nutrition Related  "History:   Pt asleep at time of visit , dtr reports he is drinking the berry Nepro well, she is not sure how ell he eats. Advised her he is eating ~ 50% of his food from what has been documented. She feels that is an improvement.    Pt's daughter reports pt has not been eating much at at home a few bites at each meal, he drinks water, has a cup of coffee int he morning. He is also drinking 4 Nepro ONS  daily    Anthropometrics   Height: Height: 157.5 cm (62.01\")167.6 cm (66') per EHR review  Last Filed Weight: Weight: 54.2 kg (119 lb 7.8 oz) (09/28/23 1953)54.2 kg  Method: Weight Method: Estimated  BMI: BMI (Calculated): 21.819.2  BMI classification: Normal: 18.5-24.9kg/m2 low based on age  IBW:      Weight Weight (kg) Weight (lbs) Weight Method Visit Report   9/12/2023 54.2 kg 119 lb 7.8 oz Standing scale -   9/10/2023 53.071 kg 117 lb Stated -   8/14/2023 50.803 kg 112 lb - Report   7/11/2023 48 kg 105 lb 13.1 oz Standing scale -   6/29/2023 47.628 kg 105 lb - Report   6/16/2023 49.17 kg  58.514 kg 108 lb 6.4 oz  129 lb Stated -   6/8/2023 58.9 kg 129 lb 13.6 oz - -   6/7/2023 59.421 kg 131 lb Bed scale -   6/6/2023 51.574 kg 113 lb 11.2 oz - -   6/5/2023 51.574 kg  58 kg 113 lb 11.2 oz  127 lb 13.9 oz Bed scale  Standing scale      UBW:    Weight change:  8 lb loss over 3 months; 6 % loss of UBW; does not meet criterion for malnutrition. Pt has demonstrated weight regain.    Nutrition Focused Physical Exam     Date:         Unable to perform exam due to: Patient body position, Pt unable to participate at time of visit, Defer pending indication    NFPE completed, patient does not meet criteria for MSA at this time.     Patient meets criteria for malnutrition diagnosis, see MSA note.    Current Nutrition Prescription   PO: Diet: Regular/House Diet; Texture: Regular Texture (IDDSI 7); Fluid Consistency: Thin (IDDSI 0)  Oral Nutrition Supplement:   Average Intake: 3 Days:  53% of 7 meals plus Nepro supplements   "     Nutrition Diagnosis   Date:   9/28           Updated:  10/2  Problem Predicted suboptimal energy intake   Etiology Post-op pain, ESRD   Signs/Symptoms ~50% average intake plus ONS   Status: improving    Date:      9/28 Updated:   10/2  Problem Altered nutrition related laboratory values   Etiology ESRD -IHD -MWF   Signs/Symptoms Elev BUN and creatinine, K+   Status: ongoing    Goal:   General: Nutrition to support treatment, Improve nutrition related labs  PO: Increase intake, Meet estimated needs  EN/PN: N/A    Nutrition Intervention      Follow treatment progress, Care plan reviewed, Encourage intake, Supplement provided    Provide Novasource renal / Nepro twice daily    Monitoring/Evaluation:   Per protocol, I&O, PO intake, Supplement intake, Pertinent labs, Weight, Symptoms, POC/GOC      Airam Rios, MS,RD,LD  Time Spent: 20 mins

## 2023-10-02 NOTE — THERAPY TREATMENT NOTE
Patient Name: Enrike Tomas  : 1935    MRN: [id number]                              Today's Date: 10/2/2023       Admit Date: 2023    Visit Dx: No diagnosis found.  Patient Active Problem List   Diagnosis    (HFpEF) heart failure with preserved ejection fraction    Anemia of renal disease    At high risk for falls    Benign prostatic hyperplasia    Closed fracture of left acetabulum    Compression fracture of thoracic vertebra    Deficiency of other specified B group vitamins    Depression    ESRD (end stage renal disease)    Essential hypertension    GERD (gastroesophageal reflux disease)    Hepatic cyst    Long term (current) use of anticoagulants    Major depressive disorder, single episode, moderate    Mixed hyperlipidemia    Morgagni hernia    Occlusion of right vertebral artery    NERY (obstructive sleep apnea)    Fall    Severe malnutrition    Sustained SVT    Paroxysmal atrial fibrillation    Chronic heart failure with preserved ejection fraction (HFpEF)    S/P shoulder hemiarthroplasty, left    Anemia, acute on chronic    Postoperative confusion     Past Medical History:   Diagnosis Date    A-fib     Abnormal ECG     occasional extra heartbeat    Anemia, acute on chronic 2023    Arthritis     Atrial fibrillation     BPH (benign prostatic hyperplasia)     Cervical compression fracture 2023    currently wearing a c collar    Diverticulosis     Diverticulum of esophagus     puree diet and speech therapy    Elevated cholesterol     ESRD on hemodialysis     GERD (gastroesophageal reflux disease)     Heart failure     Hemodialysis patient     M,W,F    Hyperlipidemia     Hypertension     Low back pain     NERY (obstructive sleep apnea)     NOT USING CPAP    Recurrent falls     UTI (urinary tract infection)      Past Surgical History:   Procedure Laterality Date    CATARACT EXTRACTION      COLONOSCOPY      DIALYSIS FISTULA CREATION      ENDOSCOPY      INGUINAL HERNIA REPAIR      PROSTATE  BIOPSY      THROAT SURGERY      diverticulum in throat    TONSILLECTOMY AND ADENOIDECTOMY      TOTAL SHOULDER ARTHROPLASTY W/ DISTAL CLAVICLE EXCISION Left 9/25/2023    Procedure: HEMIARTHROPLASTY, BICEPS TENODESIS - LEFT;  Surgeon: Matteo Tan MD;  Location: [facility];  Service: Orthopedics;  Laterality: Left;      General Information       Row Name 10/02/23 1404          OT Time and Intention    Document Type therapy note (daily note)  -KF     Mode of Treatment occupational therapy;individual therapy  -KF       Row Name 10/02/23 1404          General Information    Patient Profile Reviewed yes  -KF     Existing Precautions/Restrictions fall;left;shoulder;non-weight bearing;other (see comments)  confusion, fear of falling, sling with abduction pillow  -KF     Barriers to Rehab medically complex;previous functional deficit;cognitive status  -KF       Row Name 10/02/23 1404          Cognition    Orientation Status (Cognition) oriented to;person;disoriented to;place;situation;time  -KF       Row Name 10/02/23 1403          Safety Issues, Functional Mobility    Safety Issues Affecting Function (Mobility) at risk behavior observed;awareness of need for assistance;insight into deficits/self-awareness;judgment;problem-solving;safety precaution awareness;safety precautions follow-through/compliance;sequencing abilities  -KF     Impairments Affecting Function (Mobility) balance;cognition;endurance/activity tolerance;motor control;pain;postural/trunk control;range of motion (ROM);strength  -KF               User Key  (r) = Recorded By, (t) = Taken By, (c) = Cosigned By      Initials Name Provider Type    KF Sadia Razo OT Occupational Therapist                     Mobility/ADL's       Row Name 10/02/23 1401          Bed Mobility    Bed Mobility supine-sit;sit-supine;scooting/bridging  -KF     Scooting/Bridging [place] (Bed Mobility) dependent (less than 25% patient effort);2 person assist  -KF      Supine-Sit [place] (Bed Mobility) minimum assist (75% patient effort);1 person assist;verbal cues;nonverbal cues (demo/gesture)  -KF     Sit-Supine [place] (Bed Mobility) moderate assist (50% patient effort);1 person assist;verbal cues;nonverbal cues (demo/gesture)  -KF     Bed Mobility, Safety Issues decreased use of arms for pushing/pulling;impaired trunk control for bed mobility  -KF     Assistive Device (Bed Mobility) draw sheet;head of bed elevated  -     Comment, (Bed Mobility) Verbal and tactile cues to maintain L shoulder precautions and for sequencing/hand placement  -       Row Name 10/02/23 1405          Transfers    Transfers sit-stand transfer;stand-sit transfer  -     Comment, (Transfers) STS x2 completed min/modA x1. Verbal and tactile cues to extended hips for upright posture. Pt verbalizing fear of falling.  -       Row Name 10/02/23 1405          Sit-Stand Transfer    Sit-Stand [place] (Transfers) minimum assist (75% patient effort);moderate assist (50% patient effort);1 person assist;verbal cues;nonverbal cues (demo/gesture)  -     Assistive Device (Sit-Stand Transfers) other (see comments)  RUE support  -       Row Name 10/02/23 1405          Stand-Sit Transfer    Stand-Sit [place] (Transfers) minimum assist (75% patient effort);moderate assist (50% patient effort);1 person assist;verbal cues;nonverbal cues (demo/gesture)  -     Assistive Device (Stand-Sit Transfers) other (see comments)  RUE support  -       Row Name 10/02/23 1405          Activities of Daily Living    BADL Assessment/Intervention bathing;upper body dressing  -       Row Name 10/02/23 1405          Mobility    Extremity Weight-bearing Status left upper extremity  -     Left Upper Extremity (Weight-bearing Status) non weight-bearing (NWB)   -       Row Name 10/02/23 1405          Bathing Assessment/Intervention    Comment, (Bathing) OT reviewed with pt and pt's spouse R shoulder  precautions and ADL retraining to maintain, including R axilla care.  -       Row Name 10/02/23 1405          Upper Body Dressing Assessment/Training    [place] Level (Upper Body Dressing) doff;don;other (see comments);maximum assist (25% patient effort)  Sling  -     Position (Upper Body Dressing) edge of bed sitting  -     Comment, (Upper Body Dressing) OT reviewed with pt and pt's spouse R shoulder precautions, ADL retraining to maintain, and sling management.  -               User Key  (r) = Recorded By, (t) = Taken By, (c) = Cosigned By      Initials Name Provider Type    KF Sadia Razo, OT Occupational Therapist                   Obj/Interventions       Row Name 10/02/23 1409          Shoulder (Therapeutic Exercise)    Shoulder (Therapeutic Exercise) AROM (active range of motion);PROM (passive range of motion)  -     Shoulder AROM (Therapeutic Exercise) bilateral;scapular retraction;sitting;10 repetitions  -     Shoulder PROM (Therapeutic Exercise) left;flexion;extension;external rotation;internal rotation;sitting;10 repetitions  -       Row Name 10/02/23 1409          Elbow/Forearm (Therapeutic Exercise)    Elbow/Forearm (Therapeutic Exercise) AROM (active range of motion)  -     Elbow/Forearm AROM (Therapeutic Exercise) left;flexion;extension;supination;pronation;sitting;10 repetitions  -       Row Name 10/02/23 1409          Wrist (Therapeutic Exercise)    Wrist (Therapeutic Exercise) AROM (active range of motion)  -     Wrist AROM (Therapeutic Exercise) left;flexion;extension;10 repetitions  -       Row Name 10/02/23 1409          Hand (Therapeutic Exercise)    Hand (Therapeutic Exercise) AROM (active range of motion)  -     Hand AROM/AAROM (Therapeutic Exercise) left;AROM (active range of motion);finger flexion;finger extension;10 repetitions  -       Row Name 10/02/23 1409          Motor Skills    Therapeutic Exercise shoulder;elbow/forearm;wrist;hand;other (see  comments)  Reviewed written RUE HEP within MD parameters  -KF       Row Name 10/02/23 1409          Balance    Balance Assessment sitting static balance;sitting dynamic balance;sit to stand dynamic balance;standing static balance  -KF     Static Sitting Balance standby assist  -KF     Dynamic Sitting Balance contact guard;1-person assist;verbal cues;non-verbal cues (demo/gesture)  -KF     Position, Sitting Balance unsupported;sitting edge of bed  -KF     Sit to Stand Dynamic Balance minimal assist;moderate assist;1-person assist;verbal cues;non-verbal cues (demo/gesture)  -KF     Static Standing Balance minimal assist;moderate assist;1-person assist;verbal cues;non-verbal cues (demo/gesture)  -KF     Position/Device Used, Standing Balance supported;other (see comments)  RUE support  -KF     Balance Interventions sitting;standing;sit to stand;supported;static;dynamic;occupation based/functional task  -KF               User Key  (r) = Recorded By, (t) = Taken By, (c) = Cosigned By      Initials Name Provider Type    KF Sadia Razo OT Occupational Therapist                   Goals/Plan    No documentation.                  Clinical Impression       Row Name 10/02/23 1415          Pain Assessment    Pain Intervention(s) Repositioned;Ambulation/increased activity  -KF     Additional Documentation Pain Scale: FACES Pre/Post-Treatment (Group)  -KF       Row Name 10/02/23 1415          Pain Scale: FACES Pre/Post-Treatment    Pain: FACES Scale, Pretreatment 2-->hurts little bit  -KF     Posttreatment Pain Rating 2-->hurts little bit  -KF     Pain Location - Side/Orientation Left  -KF     Pain Location generalized  -KF     Pain Location - shoulder  -KF       Row Name 10/02/23 1415          Plan of Care Review    Plan of Care Reviewed With patient;spouse  -KF     Progress improving  -KF     Outcome Evaluation Pt with improved alertness and participation in OT this date, however continues to present below his functional  baseline due to LUE NWB/immoblization and deficits in balance, strength, activity tolerance, and safety awareness. OT reviewed with pt and pt's spouse L shoulder precautions, ADL retraining to maintain, and sling management. Pt completed LUE HEP within MD parameters, tolerating passive FE to 50, IR to chest, and ER to 5. Pt performed sit to stand x4 with min/modA, needing verbal and tactile cues to extend hips for upright posture. OT will continue to follow. Recommend a d/c to SNF for best functional outcome.  -KF       Row Name 10/02/23 1415          Therapy Assessment/Plan (OT)    Rehab Potential (OT) good, to achieve stated therapy goals  -KF     Criteria for Skilled Therapeutic Interventions Met (OT) yes  -KF     Therapy Frequency (OT) daily  -KF       Row Name 10/02/23 1415          Therapy Plan Review/Discharge Plan (OT)    Anticipated Discharge Disposition (OT) skilled nursing facility  -KF       Row Name 10/02/23 1415          Vital Signs    Pre Patient Position Supine  -KF     Intra Patient Position Standing  -KF     Post Patient Position Supine  -KF       Row Name 10/02/23 1415          Positioning and Restraints    Pre-Treatment Position in bed  -KF     Post Treatment Position bed  -KF     In Bed supine;call light within reach;encouraged to call for assist;with PT  -KF               User Key  (r) = Recorded By, (t) = Taken By, (c) = Cosigned By      Initials Name Provider Type    Sadia Brunner, OT Occupational Therapist                   Outcome Measures       Row Name 10/02/23 1419          How much help from another is currently needed...    Putting on and taking off regular lower body clothing? 1  -KF     Bathing (including washing, rinsing, and drying) 2  -KF     Toileting (which includes using toilet bed pan or urinal) 1  -KF     Putting on and taking off regular upper body clothing 2  -KF     Taking care of personal grooming (such as brushing teeth) 3  -KF     Eating meals 3  -KF     AM-PAC 6  Clicks Score (OT) 12  -KF       Row Name 10/02/23 1419          Functional Assessment    Outcome Measure Options AM-PAC 6 Clicks Daily Activity (OT)  -KF               User Key  (r) = Recorded By, (t) = Taken By, (c) = Cosigned By      Initials Name Provider Type    Sadia Brunner OT Occupational Therapist                    Occupational Therapy Education       Title: PT OT SLP Therapies (In Progress)       Topic: Occupational Therapy (In Progress)       Point: ADL training (In Progress)       Description:   Instruct learner(s) on proper safety adaptation and remediation techniques during self care or transfers.   Instruct in proper use of assistive devices.                  Learning Progress Summary             Patient Acceptance, E,TB, NR by KF at 10/2/2023 1315    Acceptance, E, NR by TB at 10/1/2023 0955    Acceptance, E,D, NR by TB at 9/30/2023 1034    Acceptance, E,D, NR by JY at 9/29/2023 1155    Acceptance, E, NR by KF at 9/28/2023 0850    Acceptance, E,D, VU,NR by TB at 9/26/2023 1257    Comment: Spouse present and participating in teaching. Pt limited by acute confusion.    Eager, E,TB,D,H, NR by AR at 9/25/2023 1856   Family Acceptance, E,TB, NR by KF at 10/2/2023 1315    Acceptance, E,D, NR by JY at 9/29/2023 1155    Acceptance, E,D, VU,NR by TB at 9/26/2023 1257    Comment: Spouse present and participating in teaching. Pt limited by acute confusion.    Eager, E,TB,D,H, NR by AR at 9/25/2023 1856   Caregiver Acceptance, E, NR by KF at 9/28/2023 0850                         Point: Home exercise program (In Progress)       Description:   Instruct learner(s) on appropriate technique for monitoring, assisting and/or progressing therapeutic exercises/activities.                  Learning Progress Summary             Patient Acceptance, E,TB, NR by KF at 10/2/2023 1315    Acceptance, E, NR by TB at 10/1/2023 0955    Acceptance, E,D, NR by TB at 9/30/2023 1034    Acceptance, E,D, NR by JY at 9/29/2023 1155     Acceptance, E, NR by KF at 9/28/2023 0850    Acceptance, E,D, VU,NR by TB at 9/26/2023 1257    Comment: Spouse present and participating in teaching. Pt limited by acute confusion.    Eager, E,TB,D,H, NR by AR at 9/25/2023 1856   Family Acceptance, E,TB, NR by KF at 10/2/2023 1315    Acceptance, E,D, NR by JY at 9/29/2023 1155    Acceptance, E,D, VU,NR by TB at 9/26/2023 1257    Comment: Spouse present and participating in teaching. Pt limited by acute confusion.    Eager, E,TB,D,H, NR by AR at 9/25/2023 1856   Caregiver Acceptance, E, NR by KF at 9/28/2023 0850                         Point: Precautions (In Progress)       Description:   Instruct learner(s) on prescribed precautions during self-care and functional transfers.                  Learning Progress Summary             Patient Acceptance, E,TB, NR by KF at 10/2/2023 1315    Acceptance, E, NR by TB at 10/1/2023 0955    Acceptance, E,D, NR by TB at 9/30/2023 1034    Acceptance, E,D, NR by JY at 9/29/2023 1155    Acceptance, E, NR by KF at 9/28/2023 0850    Acceptance, E,D, VU,NR by TB at 9/26/2023 1257    Comment: Spouse present and participating in teaching. Pt limited by acute confusion.    Eager, E,TB,D,H, NR by AR at 9/25/2023 1856   Family Acceptance, E,TB, NR by KF at 10/2/2023 1315    Acceptance, E,D, NR by JY at 9/29/2023 1155    Acceptance, E,D, VU,NR by TB at 9/26/2023 1257    Comment: Spouse present and participating in teaching. Pt limited by acute confusion.    Eager, E,TB,D,H, NR by AR at 9/25/2023 1856   Caregiver Acceptance, E, NR by KF at 9/28/2023 0850                         Point: Body mechanics (In Progress)       Description:   Instruct learner(s) on proper positioning and spine alignment during self-care, functional mobility activities and/or exercises.                  Learning Progress Summary             Patient Acceptance, E,TB, NR by KF at 10/2/2023 1315    Acceptance, E,D, NR by MAGDALENE at 9/29/2023 1155    Acceptance, E, NR by KF  at 9/28/2023 0850    Eager, E,TB,D,H, NR by AR at 9/25/2023 1856   Family Acceptance, E,TB, NR by KF at 10/2/2023 1315    Acceptance, E,D, NR by JY at 9/29/2023 1155    Eager, E,TB,D,H, NR by AR at 9/25/2023 1856   Caregiver Acceptance, E, NR by KF at 9/28/2023 0850                                         User Key       Initials Effective Dates Name Provider Type Discipline    TB 07/11/23 -  Chery Rene, OT Occupational Therapist OT    AR 07/11/23 -  Debbie Espinosa, OT Occupational Therapist OT    MAGDALENE 06/16/21 -  Alma Delia Barrera, OT Occupational Therapist OT    KF 08/09/23 -  Sadia Razo, OT Occupational Therapist OT                  OT Recommendation and Plan  Therapy Frequency (OT): daily  Plan of Care Review  Plan of Care Reviewed With: patient, spouse  Progress: improving  Outcome Evaluation: Pt with improved alertness and participation in OT this date, however continues to present below his functional baseline due to LUE NWB/immoblization and deficits in balance, strength, activity tolerance, and safety awareness. OT reviewed with pt and pt's spouse L shoulder precautions, ADL retraining to maintain, and sling management. Pt completed LUE HEP within MD parameters, tolerating passive FE to 50, IR to chest, and ER to 5. Pt performed sit to stand x4 with min/modA, needing verbal and tactile cues to extend hips for upright posture. OT will continue to follow. Recommend a d/c to SNF for best functional outcome.     Time Calculation:         Time Calculation- OT       Row Name 10/02/23 1420             Time Calculation- OT    OT Start Time 1315  -KF      OT Received On 10/02/23  -KF      OT Goal Re-Cert Due Date 10/05/23  -KF         Timed Charges    61905 - OT Therapeutic Exercise Minutes 17  -KF      02477 - OT Therapeutic Activity Minutes 20  -KF      02226 - OT Self Care/Mgmt Minutes 10  -KF         Total Minutes    Timed Charges Total Minutes 47  -KF       Total Minutes 47  -KF                 User Key  (r) = Recorded By, (t) = Taken By, (c) = Cosigned By      Initials Name Provider Type     Sadia Razo OT Occupational Therapist                  Therapy Charges for Today       Code Description Service Date Service Provider Modifiers Qty    70858397321  OT THER PROC EA 15 MIN 10/2/2023 Sadia Razo OT GO 1    [id number]  OT THERAPEUTIC ACT EA 15 MIN 10/2/2023 Sadia Razo OT GO 1    [id number]  OT SELF CARE/MGMT/TRAIN EA 15 MIN 10/2/2023 Sadia Razo OT GO 1                 Sadia Razo OT  10/2/2023

## 2023-10-02 NOTE — CASE MANAGEMENT/SOCIAL WORK
Discharge Planning Assessment  [facility]     Patient Name: Enrike Tomas  MRN: [id number]  Today's Date: 10/2/2023    Admit Date: 9/25/2023    Plan: [facility] with In House Hemodialysis                Discharge Plan       Row Name 10/02/23 1517       Plan    Plan [facility]    Patient/Family in Agreement with Plan yes    Plan Comments Followed up with Mr. Tomas' wife, Cleo, by phone, for discharge planning.    Mr. Tomas was referred to Crist[facility], at [facility] with in-house HD, last week.  Cristine requested HD flowsheets today.  Received assistance from [facility] to condense HD flowsheets.  Condensed notes faxed to the facility.  The patient is still pending insurance auth, but the facility cannot initiate until the HD unit at the facility accepts the patient.    Updated Cleo, and she is agreeable to DC plan.    CM will follow up.    Final Discharge Disposition Code 03 - skilled nursing facility (SNF)                  Continued Care and Services - Admitted Since 9/25/2023       Destination Coordination complete.      Service Provider Request Status Selected Services Address Phone Fax Patient Preferred    [facility]  Selected Skilled Nursing [address] [facility] [phone] --                  Expected Discharge Date and Time       Expected Discharge Date Expected Discharge Time    Oct 3, 2023                      Belkis Maldonado RN

## 2023-10-02 NOTE — PLAN OF CARE
Goal Outcome Evaluation:  Plan of Care Reviewed With: patient        Progress: no change  Outcome Evaluation: Up to recliner and tolerates well. Dialysis completed today. Aquacel dressing CDI. Pt remains confused to time place and situation. Sling remains in place.Waiting a discharge plan when medically ready.

## 2023-10-02 NOTE — PROGRESS NOTES
"IM progress note      Enrike Tomas  [id number]  1935     LOS: 0 days     Attending: Matteo Tan MD    Primary Care Provider: Blair Dhaliwal MD      Chief Complaint/Reason for visit:  No chief complaint on file.      Subjective   Feels ok. Had increased pain in the shoulder earlier. Better now. Had HD earlier.    Objective        Visit Vitals  /67   Pulse 61   Temp 97.5 °F (36.4 °C) (Oral)   Resp 16   Ht 157.5 cm (62.01\")   Wt 54.2 kg (119 lb 7.8 oz)   SpO2 96%   BMI 21.85 kg/m²     Temp (24hrs), Av.8 °F (36.6 °C), Min:97.3 °F (36.3 °C), Max:98.4 °F (36.9 °C)      Physical Therapy:  Pt with improved alertness and participation in therapy this date, however required increased assist for STS and bed to chair transfer. Pt with significant fear of falling contributing to a heavy posterior lean limiting ability to achieve full upright standing posture and thus participate in further mobility tasks. Pt will continue to benefit from PT to address ongoing strength, balance, and endurance deficits.      Physical Exam:     General Appearance:    Alert, cooperative, in no acute distress   Head:    Normocephalic, without obvious abnormality, atraumatic    Lungs:     Normal effort, symmetric chest rise,  clear to      auscultation bilaterally              Heart:    Regular rhythm and normal rate, normal S1 and S2    Abdomen:     Normal bowel sounds, no masses, no organomegaly, soft        non-tender, non-distended, no guarding, no rebound                tenderness   Extremities:   Sling on. CDI dressing.   Moves hand, fingers well.   No clubbing, cyanosis or edema.  No deformities.    Pulses:   Pulses palpable and equal bilaterally   Skin:   No bleeding, bruising or rash          Results Review:     I reviewed the patient's new clinical results.   Results from last 7 days   Lab Units 10/01/23  0536 23  0519   WBC 10*3/mm3 6.80 6.62   HEMOGLOBIN g/dL 12.1* 9.6*   HEMATOCRIT % 37.3* 28.8* "   PLATELETS 10*3/mm3 247 204        Latest Reference Range & Units 09/12/23 09:50   Hemoglobin 13.0 - 17.7 g/dL 11.7 (L)   Hematocrit 37.5 - 51.0 % 35.8 (L)   (L): Data is abnormally low    Results from last 7 days   Lab Units 10/01/23  0536 09/26/23  0519 09/25/23 2017   SODIUM mmol/L 134* 134*  --    POTASSIUM mmol/L 5.3* 4.3 4.1   CHLORIDE mmol/L 99 92*  --    CO2 mmol/L 17.0* 25.0  --    BUN mg/dL 42* 34*  --    CREATININE mg/dL 4.03* 3.28*  --    CALCIUM mg/dL 9.4 9.1  --    GLUCOSE mg/dL 73 100*  --        I reviewed the patient's new imaging including images and reports.    All medications reviewed.   amiodarone, 200 mg, Oral, Daily  apixaban, 2.5 mg, Oral, BID  atorvastatin, 20 mg, Oral, Nightly  finasteride, 5 mg, Oral, Daily  melatonin, 5 mg, Oral, Nightly  mirtazapine, 7.5 mg, Oral, Nightly  pantoprazole, 40 mg, Oral, Daily  sennosides-docusate, 2 tablet, Oral, BID  sertraline, 50 mg, Oral, Daily  traZODone, 50 mg, Oral, Nightly      acetaminophen, 650 mg, Q4H PRN   Or  acetaminophen, 650 mg, Q4H PRN  albumin human, 12.5 g, PRN  albumin human, 12.5 g, PRN  albumin human, 12.5 g, PRN  cloNIDine, 0.1 mg, BID PRN  guaifenesin, 200 mg, Q4H PRN  HYDROmorphone, 0.5 mg, Q2H PRN   And  naloxone, 0.1 mg, Q5 Min PRN  hydrOXYzine, 25 mg, TID PRN  oxyCODONE, 5 mg, Q4H PRN        Assessment & Plan       S/P shoulder hemiarthroplasty, left    (HFpEF) heart failure with preserved ejection fraction    Benign prostatic hyperplasia    Depression    ESRD (end stage renal disease)    Essential hypertension    GERD (gastroesophageal reflux disease)    Mixed hyperlipidemia    NERY (obstructive sleep apnea)    Paroxysmal atrial fibrillation    Anemia, acute on chronic    Postoperative confusion      Plan  1. PT/OT. NWB, left UE, ROM hand, wrist, elbow.  2. Pain control-prns, s/p interscalene nerve block cath with ropivacaine infusion.           3. IS-encourage  4. DVT proph- Mech/ mobilize.  5. Bowel regimen  6. Monitor post-op  labs  7. DC planning . IPR still awaiting approval.      -Chest congestion:  Nursing to assist with IS  Robitussin PRN  CXR in Am (pending 10/1/23)     -GERD:  Resume PPI.  Formulary substitution when indicated.     -Dyslipidemia:  Resume home regimen Statin ( formulary substitution when appropriate).     -PAF: resume amiodarone, BB, and  resumed DOAC       -BPH: maintain on home regimen.  Monitor for adequate spontaneous voiding.     -End-stage renal disease: NAL consulted. Following HD. M/W/F.

## 2023-10-02 NOTE — PLAN OF CARE
HD completed. Tolerated fair. UF goal not reached. Blood returned. Report given to primary RN.  Problem: Device-Related Complication Risk (Hemodialysis)  Goal: Safe, Effective Therapy Delivery  Outcome: Ongoing, Progressing  Intervention: Optimize Device Care and Function  Description: Maintain flow rate, anticoagulation parameters, pressure ranges and prescribed fluid balance with gradual adjustments to maintain hemodynamic stability and achieve therapy goals.  Monitor laboratory results (e.g., electrolytes, glucose, albumin, coagulation studies, hemoglobin, hematocrit) and clinical status (e.g., cramping; nausea, vomiting, blood pressure fluctuations) for response to therapy; advocate for treatment or adju  Assess vascular access site for patency, securement and position to meet flow demands. Assess perfusion distal to access site to ensure adequate tissue oxygenation.  For arteriovenous fistula, assess presence of thrill to ensure presence of blood flow. Avoid blood pressure readings, lab draws, tight clothing or jewelry on the AV (arteriovenous) fistula extremity to prevent trauma.  Maintain circuit monitoring (e.g., arterial, venous and transmembrane pressure; ultrafiltrate removal; dialysate flow, conductivity and temperature); address alarms promptly to decrease risk to patient and preserve circuit function.  Evaluate circuit for disconnections, cracks, clotting, malfunction, leaks or rupture of hemofilter; intervene promptly to prevent risk to patient.  Consider distal vascular access for antibiotic or vasoactive medication administration to prevent filtration of medication effect prior to being delivered systemically to the patient.  Maintain infusion of anticoagulation; adjust to keep laboratory values within ordered range.  Provide emergency equipment, such as clamps, replacement devices and resuscitative supplies, if malfunction, tubing rupture, clot formation or migration occur.  Recent Flowsheet  Documentation  Taken 10/2/2023 7830 by Phillip Pradhan, RN  Medication Review/Management: medications reviewed     Problem: Hemodynamic Instability (Hemodialysis)  Goal: Effective Tissue Perfusion  Outcome: Ongoing, Progressing     Problem: Infection (Hemodialysis)  Goal: Absence of Infection Signs and Symptoms  Outcome: Ongoing, Progressing   Goal Outcome Evaluation:

## 2023-10-03 PROCEDURE — A9270 NON-COVERED ITEM OR SERVICE: HCPCS | Performed by: INTERNAL MEDICINE

## 2023-10-03 PROCEDURE — 63710000001 CLONIDINE 0.1 MG TABLET: Performed by: NURSE PRACTITIONER

## 2023-10-03 PROCEDURE — 63710000001 SENNOSIDES-DOCUSATE 8.6-50 MG TABLET: Performed by: INTERNAL MEDICINE

## 2023-10-03 PROCEDURE — 97530 THERAPEUTIC ACTIVITIES: CPT

## 2023-10-03 PROCEDURE — 63710000001 APIXABAN 2.5 MG TABLET: Performed by: INTERNAL MEDICINE

## 2023-10-03 PROCEDURE — 63710000001 ATORVASTATIN 20 MG TABLET: Performed by: INTERNAL MEDICINE

## 2023-10-03 PROCEDURE — 97110 THERAPEUTIC EXERCISES: CPT

## 2023-10-03 PROCEDURE — A9270 NON-COVERED ITEM OR SERVICE: HCPCS | Performed by: NURSE PRACTITIONER

## 2023-10-03 PROCEDURE — 63710000001 HYDROXYZINE 25 MG TABLET: Performed by: INTERNAL MEDICINE

## 2023-10-03 PROCEDURE — 63710000001 PANTOPRAZOLE 40 MG TABLET DELAYED-RELEASE: Performed by: INTERNAL MEDICINE

## 2023-10-03 PROCEDURE — A9270 NON-COVERED ITEM OR SERVICE: HCPCS | Performed by: ORTHOPAEDIC SURGERY

## 2023-10-03 PROCEDURE — 97116 GAIT TRAINING THERAPY: CPT

## 2023-10-03 PROCEDURE — 63710000001 SERTRALINE 50 MG TABLET: Performed by: INTERNAL MEDICINE

## 2023-10-03 PROCEDURE — 63710000001 ACETAMINOPHEN 325 MG TABLET: Performed by: ORTHOPAEDIC SURGERY

## 2023-10-03 PROCEDURE — 63710000001 MELATONIN 5 MG TABLET: Performed by: INTERNAL MEDICINE

## 2023-10-03 PROCEDURE — 63710000001 MIRTAZAPINE 15 MG TABLET: Performed by: INTERNAL MEDICINE

## 2023-10-03 PROCEDURE — 97535 SELF CARE MNGMENT TRAINING: CPT

## 2023-10-03 PROCEDURE — 63710000001 TRAZODONE 50 MG TABLET: Performed by: INTERNAL MEDICINE

## 2023-10-03 PROCEDURE — 63710000001 AMIODARONE 200 MG TABLET: Performed by: INTERNAL MEDICINE

## 2023-10-03 PROCEDURE — 63710000001 OXYCODONE 5 MG TABLET: Performed by: ORTHOPAEDIC SURGERY

## 2023-10-03 PROCEDURE — 63710000001 FINASTERIDE 5 MG TABLET: Performed by: INTERNAL MEDICINE

## 2023-10-03 PROCEDURE — 63710000001 AMLODIPINE 5 MG TABLET: Performed by: INTERNAL MEDICINE

## 2023-10-03 RX ORDER — AMLODIPINE BESYLATE 5 MG/1
5 TABLET ORAL
Status: DISCONTINUED | OUTPATIENT
Start: 2023-10-03 | End: 2023-10-05 | Stop reason: HOSPADM

## 2023-10-03 RX ADMIN — APIXABAN 2.5 MG: 2.5 TABLET, FILM COATED ORAL at 20:52

## 2023-10-03 RX ADMIN — SENNOSIDES AND DOCUSATE SODIUM 2 TABLET: 50; 8.6 TABLET ORAL at 08:19

## 2023-10-03 RX ADMIN — SENNOSIDES AND DOCUSATE SODIUM 2 TABLET: 50; 8.6 TABLET ORAL at 20:51

## 2023-10-03 RX ADMIN — TRAZODONE HYDROCHLORIDE 50 MG: 50 TABLET ORAL at 20:52

## 2023-10-03 RX ADMIN — OXYCODONE 5 MG: 5 TABLET ORAL at 20:53

## 2023-10-03 RX ADMIN — AMIODARONE HYDROCHLORIDE 200 MG: 200 TABLET ORAL at 08:19

## 2023-10-03 RX ADMIN — SERTRALINE 50 MG: 50 TABLET, FILM COATED ORAL at 08:19

## 2023-10-03 RX ADMIN — ATORVASTATIN CALCIUM 20 MG: 20 TABLET, FILM COATED ORAL at 20:52

## 2023-10-03 RX ADMIN — FINASTERIDE 5 MG: 5 TABLET, FILM COATED ORAL at 08:19

## 2023-10-03 RX ADMIN — PANTOPRAZOLE SODIUM 40 MG: 40 TABLET, DELAYED RELEASE ORAL at 08:19

## 2023-10-03 RX ADMIN — HYDROXYZINE HYDROCHLORIDE 25 MG: 25 TABLET, FILM COATED ORAL at 20:52

## 2023-10-03 RX ADMIN — ACETAMINOPHEN 650 MG: 325 TABLET ORAL at 10:36

## 2023-10-03 RX ADMIN — APIXABAN 2.5 MG: 2.5 TABLET, FILM COATED ORAL at 08:19

## 2023-10-03 RX ADMIN — CLONIDINE HYDROCHLORIDE 0.1 MG: 0.1 TABLET ORAL at 08:25

## 2023-10-03 RX ADMIN — OXYCODONE 5 MG: 5 TABLET ORAL at 16:40

## 2023-10-03 RX ADMIN — Medication 5 MG: at 20:53

## 2023-10-03 RX ADMIN — AMLODIPINE BESYLATE 5 MG: 5 TABLET ORAL at 16:43

## 2023-10-03 RX ADMIN — MIRTAZAPINE 7.5 MG: 15 TABLET, FILM COATED ORAL at 20:52

## 2023-10-03 NOTE — THERAPY TREATMENT NOTE
Patient Name: Enrike Tomas  : 1935    MRN: [id number]                              Today's Date: 10/3/2023       Admit Date: 2023    Visit Dx: No diagnosis found.  Patient Active Problem List   Diagnosis    (HFpEF) heart failure with preserved ejection fraction    Anemia of renal disease    At high risk for falls    Benign prostatic hyperplasia    Closed fracture of left acetabulum    Compression fracture of thoracic vertebra    Deficiency of other specified B group vitamins    Depression    ESRD (end stage renal disease)    Essential hypertension    GERD (gastroesophageal reflux disease)    Hepatic cyst    Long term (current) use of anticoagulants    Major depressive disorder, single episode, moderate    Mixed hyperlipidemia    Morgagni hernia    Occlusion of right vertebral artery    NERY (obstructive sleep apnea)    Fall    Severe malnutrition    Sustained SVT    Paroxysmal atrial fibrillation    Chronic heart failure with preserved ejection fraction (HFpEF)    S/P shoulder hemiarthroplasty, left    Anemia, acute on chronic    Postoperative confusion     Past Medical History:   Diagnosis Date    A-fib     Abnormal ECG     occasional extra heartbeat    Anemia, acute on chronic 2023    Arthritis     Atrial fibrillation     BPH (benign prostatic hyperplasia)     Cervical compression fracture 2023    currently wearing a c collar    Diverticulosis     Diverticulum of esophagus     puree diet and speech therapy    Elevated cholesterol     ESRD on hemodialysis     GERD (gastroesophageal reflux disease)     Heart failure     Hemodialysis patient     M,W,F    Hyperlipidemia     Hypertension     Low back pain     NERY (obstructive sleep apnea)     NOT USING CPAP    Recurrent falls     UTI (urinary tract infection)      Past Surgical History:   Procedure Laterality Date    CATARACT EXTRACTION      COLONOSCOPY      DIALYSIS FISTULA CREATION      ENDOSCOPY      INGUINAL HERNIA REPAIR      PROSTATE  BIOPSY      THROAT SURGERY      diverticulum in throat    TONSILLECTOMY AND ADENOIDECTOMY      TOTAL SHOULDER ARTHROPLASTY W/ DISTAL CLAVICLE EXCISION Left 9/25/2023    Procedure: HEMIARTHROPLASTY, BICEPS TENODESIS - LEFT;  Surgeon: Matteo Tan MD;  Location: [facility];  Service: Orthopedics;  Laterality: Left;      General Information       Row Name 10/03/23 1424          Physical Therapy Time and Intention    Document Type therapy note (daily note)  -CM     Mode of Treatment individual therapy;physical therapy  -CM       Row Name 10/03/23 1424          General Information    Patient Profile Reviewed yes  -CM     Existing Precautions/Restrictions fall;left;shoulder;non-weight bearing;other (see comments)  LUE NWB with sling  -CM     Barriers to Rehab medically complex;previous functional deficit;cognitive status  -CM       Row Name 10/03/23 1424          Cognition    Orientation Status (Cognition) oriented to;person;disoriented to;place;situation;time  -CM       Row Name 10/03/23 1424          Safety Issues, Functional Mobility    Safety Issues Affecting Function (Mobility) awareness of need for assistance;insight into deficits/self-awareness;problem-solving;safety precaution awareness;safety precautions follow-through/compliance;sequencing abilities  -CM     Impairments Affecting Function (Mobility) balance;cognition;endurance/activity tolerance;motor control;pain;postural/trunk control;range of motion (ROM);strength  -CM               User Key  (r) = Recorded By, (t) = Taken By, (c) = Cosigned By      Initials Name Provider Type    CM Hortencia Sommer PT Physical Therapist                   Mobility       Row Name 10/03/23 1426          Bed Mobility    Comment, (Bed Mobility) [facility] pre/post treatment  -       Row Name 10/03/23 1426          Sit-Stand Transfer    Sit-Stand [place] (Transfers) minimum assist (75% patient effort);1 person assist;verbal cues;nonverbal cues (demo/gesture)  -CM      Assistive Device (Sit-Stand Transfers) walker, trent  -CM     Comment, (Sit-Stand Transfer) cues for safe hand/foot placement, assist required primarily for balance with forward flexed posture and posterior lean in transition, improving upon standing with cues and use of hemiwalker on R  -CM       Row Name 10/03/23 1426          Gait/Stairs (Locomotion)    [place] Level (Gait) moderate assist (50% patient effort)  -CM     Assistive Device (Gait) walker, trent  -CM     Distance in Feet (Gait) 5+5  -CM     Deviations/Abnormal Patterns (Gait) amberly decreased;gait speed decreased;stride length decreased;bilateral deviations;base of support, narrow  -CM     Bilateral Gait Deviations forward flexed posture;heel strike decreased  -CM     Comment, (Gait/Stairs) Patient ambulated in room with a step to gait pattern with chair in tow and use of hemiwalker on his right side. Cues provided for sequencing with hemiwalker, upright posture, and anterior weightshifting as patient has a posterior lean. He demonstrates increased hip/knee flexion such as steppage gait bilaterally with each step. He fatigues very quickly and requires a prolonged seated rest break between ambulation attempts.  -CM       Row Name 10/03/23 1426          Mobility    Extremity Weight-bearing Status left upper extremity  -CM     Left Upper Extremity (Weight-bearing Status) non weight-bearing (NWB)  -CM               User Key  (r) = Recorded By, (t) = Taken By, (c) = Cosigned By      Initials Name Provider Type    Hortencia Russo PT Physical Therapist                   Obj/Interventions       Row Name 10/03/23 1513          Motor Skills    Therapeutic Exercise hip;knee;ankle  -CM       Row Name 10/03/23 1513          Hip (Therapeutic Exercise)    Hip (Therapeutic Exercise) isometric exercises;strengthening exercise  -CM     Hip Isometrics (Therapeutic Exercise) bilateral;gluteal sets;10 repetitions;3 second hold  -CM     Hip Strengthening  (Therapeutic Exercise) bilateral;aBduction;aDduction;heel slides;10 repetitions  -CM       Row Name 10/03/23 1513          Knee (Therapeutic Exercise)    Knee (Therapeutic Exercise) strengthening exercise  -CM     Knee Isometrics (Therapeutic Exercise) bilateral;quad sets;10 repetitions;3 second hold  -CM     Knee Strengthening (Therapeutic Exercise) bilateral;SLR (straight leg raise);10 repetitions  -CM       Row Name 10/03/23 1513          Ankle (Therapeutic Exercise)    Ankle (Therapeutic Exercise) AROM (active range of motion)  -CM     Ankle AROM (Therapeutic Exercise) bilateral;dorsiflexion;plantarflexion;10 repetitions  -CM       Row Name 10/03/23 1513          Balance    Balance Assessment sitting static balance;standing static balance;standing dynamic balance  -CM     Static Sitting Balance standby assist  -CM     Position, Sitting Balance unsupported;sitting in chair  -CM     Static Standing Balance minimal assist  -CM     Dynamic Standing Balance moderate assist  -CM     Position/Device Used, Standing Balance supported;walker, trent  -CM     Comment, Balance posterior lean with ambulation  -CM               User Key  (r) = Recorded By, (t) = Taken By, (c) = Cosigned By      Initials Name Provider Type    CM Hortencia Sommer, PT Physical Therapist                   Goals/Plan    No documentation.                  Clinical Impression       Row Name 10/03/23 1515          Pain    Pretreatment Pain Rating 0/10 - no pain  -CM     Posttreatment Pain Rating 0/10 - no pain  -CM       Row Name 10/03/23 1515          Plan of Care Review    Plan of Care Reviewed With patient  -CM     Progress improving  -CM     Outcome Evaluation Patient showing improvement requiring decreased assistance for transfers today and was able to tolerate gait training 5'+5' modA with use of hemiwalker on his right. His fear of falling continues to contribute to his mobility deficits as well as generalized weakness and deficits in  balance/endurance. IPPT remains indicated to address these deficits. Continue to recommend D/C to SNF.  -CM       Row Name 10/03/23 1515          Vital Signs    Pre Systolic BP Rehab 155  -CM     Pre Treatment Diastolic BP 66  -CM     Pretreatment Heart Rate (beats/min) 81  -CM     Posttreatment Heart Rate (beats/min) 75  -CM     Pre SpO2 (%) 96  -CM     O2 Delivery Pre Treatment room air  -CM     O2 Delivery Intra Treatment room air  -CM     Post SpO2 (%) 96  -CM     O2 Delivery Post Treatment room air  -CM     Pre Patient Position Sitting  -CM     Intra Patient Position Standing  -CM     Post Patient Position Sitting  -CM       Row Name 10/03/23 1515          Positioning and Restraints    Pre-Treatment Position sitting in chair/recliner  -CM     Post Treatment Position chair  -CM     In Chair reclined;call light within reach;encouraged to call for assist;exit alarm on;waffle cushion;on mechanical lift sling;[place] [facility] [place]               User Key  (r) = Recorded By, (t) = Taken By, (c) = Cosigned By      Initials Name Provider Type    Hortencia Russo, PT Physical Therapist                   Outcome Measures       Row Name 10/03/23 1522          How much help from another person do you currently need...    Turning from your back to your side while in flat bed without using bedrails? 2  -CM     Moving from lying on back to sitting on the side of a flat bed without bedrails? 2  -CM     Moving to and from a bed to a chair (including a wheelchair)? 2  -CM     Standing up from a chair using your arms (e.g., wheelchair, bedside chair)? 3  -CM     Climbing 3-5 steps with a railing? 1  -CM     To walk in hospital room? 2  -CM     AM-PAC 6 Clicks Score (PT) 12  -CM     Highest level of mobility 4 --> Transferred to chair/commode  -CM       Row Name 10/03/23 1522 10/03/23 1134       Functional Assessment    Outcome Measure Options AM-PAC 6 Clicks Basic Mobility (PT)  -CM AM-PAC 6 Clicks Daily Activity (OT)  -KF               User Key  (r) = Recorded By, (t) = Taken By, (c) = Cosigned By      Initials Name Provider Type    CM Hortencia Sommer, PT Physical Therapist    Sadia Brunner OT Occupational Therapist                                 Physical Therapy Education       Title: PT OT SLP Therapies (In Progress)       Topic: Physical Therapy (In Progress)       Point: Mobility training (In Progress)       Learning Progress Summary             Patient Acceptance, E, NR by CM at 10/3/2023 1523    Eager, E, VU,DU,NR by SS at 10/2/2023 1514    Comment: Reviewed safety/technique w/bed mobility, NWB status, transfers, HEP, PT POC    Acceptance, E, NR by KR at 10/1/2023 1124    Acceptance, E, VU,NR by KR at 9/30/2023 1342    Acceptance, E, VU,NR by LO at 9/29/2023 1443    Comment: PT POC    Acceptance, E, NR by CM at 9/28/2023 1030    Acceptance, E,D, VU,DU by HP at 9/26/2023 1711    Acceptance, E,D, VU,NR by HP at 9/25/2023 1849   Family Eager, E, VU,DU,NR by SS at 10/2/2023 1514    Comment: Reviewed safety/technique w/bed mobility, NWB status, transfers, HEP, PT POC    Acceptance, E, VU,NR by KR at 9/30/2023 1342   Caregiver Acceptance, E, NR by CM at 9/28/2023 1030                         Point: Home exercise program (In Progress)       Learning Progress Summary             Patient Acceptance, E, NR by CM at 10/3/2023 1523    Eager, E, VU,DU,NR by SS at 10/2/2023 1514    Comment: Reviewed safety/technique w/bed mobility, NWB status, transfers, HEP, PT POC    Acceptance, E, VU,NR by KR at 9/30/2023 1342    Acceptance, E, VU,NR by LO at 9/29/2023 1443    Comment: PT POC    Acceptance, E, NR by CM at 9/28/2023 1030    Acceptance, E,D, VU,DU by HP at 9/26/2023 1711    Acceptance, E,D, VU,NR by HP at 9/25/2023 1849   Family Eager, E, VU,DU,NR by  at 10/2/2023 1514    Comment: Reviewed safety/technique w/bed mobility, NWB status, transfers, HEP, PT POC    Acceptance, E, MONICA,NR by  at 9/30/2023 1342   Caregiver Acceptance, E, NR  by CM at 9/28/2023 1030                         Point: Body mechanics (In Progress)       Learning Progress Summary             Patient Acceptance, E, NR by CM at 10/3/2023 1523    Eager, E, VU,DU,NR by SS at 10/2/2023 1514    Comment: Reviewed safety/technique w/bed mobility, NWB status, transfers, HEP, PT POC    Acceptance, E, NR by KR at 10/1/2023 1124    Acceptance, E, VU,NR by KR at 9/30/2023 1342    Acceptance, E, VU,NR by LO at 9/29/2023 1443    Comment: PT POC    Acceptance, E, NR by CM at 9/28/2023 1030    Acceptance, E,D, VU,DU by HP at 9/26/2023 1711    Acceptance, E,D, VU,NR by HP at 9/25/2023 1849   Family Eager, E, VU,DU,NR by SS at 10/2/2023 1514    Comment: Reviewed safety/technique w/bed mobility, NWB status, transfers, HEP, PT POC    Acceptance, E, VU,NR by KR at 9/30/2023 1342   Caregiver Acceptance, E, NR by CM at 9/28/2023 1030                         Point: Precautions (In Progress)       Learning Progress Summary             Patient Acceptance, E, NR by CM at 10/3/2023 1523    Eager, E, VU,DU,NR by SS at 10/2/2023 1514    Comment: Reviewed safety/technique w/bed mobility, NWB status, transfers, HEP, PT POC    Acceptance, E, NR by KR at 10/1/2023 1124    Acceptance, E, VU,NR by KR at 9/30/2023 1342    Acceptance, E, VU,NR by LO at 9/29/2023 1443    Comment: PT POC    Acceptance, E, NR by CM at 9/28/2023 1030    Acceptance, E,D, VU,DU by HP at 9/26/2023 1711    Acceptance, E,D, VU,NR by HP at 9/25/2023 1849   Family Eager, E, VU,DU,NR by SS at 10/2/2023 1514    Comment: Reviewed safety/technique w/bed mobility, NWB status, transfers, HEP, PT POC    Acceptance, E, VU,NR by KR at 9/30/2023 1342   Caregiver Acceptance, E, NR by CM at 9/28/2023 1030                                         User Key       Initials Effective Dates Name Provider Type Discipline    LO 06/16/21 -  Julisa Lanza, PT Physical Therapist PT    HP 06/01/21 -  Fouzia Woody, PT Physical Therapist PT    SS 06/01/21 -  Emanuel,  Bing, PT Physical Therapist PT    LEONARDO 09/22/22 -  Hortencia Sommer PT Physical Therapist PT    KR 12/30/22 -  Nazia Murray PT Physical Therapist PT                  PT Recommendation and Plan     Plan of Care Reviewed With: patient  Progress: improving  Outcome Evaluation: Patient showing improvement requiring decreased assistance for transfers today and was able to tolerate gait training 5'+5' modA with use of hemiwalker on his right. His fear of falling continues to contribute to his mobility deficits as well as generalized weakness and deficits in balance/endurance. IPPT remains indicated to address these deficits. Continue to recommend D/C to SNF.     Time Calculation:         PT Charges       Row Name 10/03/23 1523             Time Calculation    Start Time 1124  -CM      PT Received On 10/03/23  -CM      PT Goal Re-Cert Due Date 10/05/23  -CM         Timed Charges    68426 - PT Therapeutic Exercise Minutes 15  -CM      46326 - Gait Training Minutes  10  -CM         Total Minutes    Timed Charges Total Minutes 25  -CM       Total Minutes 25  -CM                User Key  (r) = Recorded By, (t) = Taken By, (c) = Cosigned By      Initials Name Provider Type    Hortencia Russo, KIM Physical Therapist                  Therapy Charges for Today       Code Description Service Date Service Provider Modifiers Qty    66489163777 HC PT THER PROC EA 15 MIN 10/3/2023 Hortencia Sommer, PT GP 1    12782937317 HC GAIT TRAINING EA 15 MIN 10/3/2023 Hortencia Sommer, PT GP 1            PT G-Codes  Outcome Measure Options: AM-PAC 6 Clicks Basic Mobility (PT)  AM-PAC 6 Clicks Score (PT): 12  AM-PAC 6 Clicks Score (OT): 13  PT Discharge Summary  Anticipated Discharge Disposition (PT): skilled nursing facility    Hortencia Sommer PT  10/3/2023

## 2023-10-03 NOTE — CASE MANAGEMENT/SOCIAL WORK
Discharge Planning Assessment  [facility]     Patient Name: Enrike Tomas  MRN: [id number]  Today's Date: 10/3/2023    Admit Date: 9/25/2023    Plan: Plan to transfer to [place] pending insurance on 10-5   Discharge Needs Assessment    No documentation.                  Discharge Plan       Row Name 10/03/23 1447       Plan    Plan Plan to transfer to [place] pending insurance on 10-5    Patient/Family in Agreement with Plan yes    Plan Comments Patient finally accepted by [facility] and submitted to insurance today for approval. The [facility] has an on-site Hemodialysis and needed their acceptance before facility could offer a bed and submit to insurance. I have sent down a request to our ambulance/EMS to request and ambulance for 10-5. I called and updated wife Cleo with all this information.    Final Discharge Disposition Code 03 - skilled nursing facility (SNF)                  Continued Care and Services - Admitted Since 9/25/2023       Destination Coordination complete.      Service Provider Request Status Selected Services Address Phone Fax Patient Preferred    [facility]  Selected Skilled Nursing [address] [facility] [address] [phone] --                  Expected Discharge Date and Time       Expected Discharge Date Expected Discharge Time    Oct 3, 2023            Demographic Summary    No documentation.                  Functional Status    No documentation.                  Psychosocial    No documentation.                  Abuse/Neglect    No documentation.                  Legal    No documentation.                  Substance Abuse    No documentation.                  Patient Forms    No documentation.                     Nani Juarez, RN

## 2023-10-03 NOTE — PLAN OF CARE
Goal Outcome Evaluation:  Plan of Care Reviewed With: patient        Progress: improving  Outcome Evaluation: Pt with good participation and progress toward goals during OT today. Pt completed bed mobility and SPT to chair with min/modA. Pt continues to present with posterior lean when standing, though this was improved today. Pt tolerated LUE HEP within MD parameters, completing passive shoulder FE to 90, IR to chest, and ER to 20. ADLs completed with mod/maxA. Pt remains below his functional baseline due to LUE NWB/immobiliation, and deficits in strength, balance, safety awareness, and activity tolerance. Recommend a d/c to SNF for best functional outcome.      Anticipated Discharge Disposition (OT): skilled nursing facility

## 2023-10-03 NOTE — THERAPY TREATMENT NOTE
Patient Name: Enrike Tomas  : 1935    MRN: [id number]                              Today's Date: 10/3/2023       Admit Date: 2023    Visit Dx: No diagnosis found.  Patient Active Problem List   Diagnosis    (HFpEF) heart failure with preserved ejection fraction    Anemia of renal disease    At high risk for falls    Benign prostatic hyperplasia    Closed fracture of left acetabulum    Compression fracture of thoracic vertebra    Deficiency of other specified B group vitamins    Depression    ESRD (end stage renal disease)    Essential hypertension    GERD (gastroesophageal reflux disease)    Hepatic cyst    Long term (current) use of anticoagulants    Major depressive disorder, single episode, moderate    Mixed hyperlipidemia    Morgagni hernia    Occlusion of right vertebral artery    NERY (obstructive sleep apnea)    Fall    Severe malnutrition    Sustained SVT    Paroxysmal atrial fibrillation    Chronic heart failure with preserved ejection fraction (HFpEF)    S/P shoulder hemiarthroplasty, left    Anemia, acute on chronic    Postoperative confusion     Past Medical History:   Diagnosis Date    A-fib     Abnormal ECG     occasional extra heartbeat    Anemia, acute on chronic 2023    Arthritis     Atrial fibrillation     BPH (benign prostatic hyperplasia)     Cervical compression fracture 2023    currently wearing a c collar    Diverticulosis     Diverticulum of esophagus     puree diet and speech therapy    Elevated cholesterol     ESRD on hemodialysis     GERD (gastroesophageal reflux disease)     Heart failure     Hemodialysis patient     M,W,F    Hyperlipidemia     Hypertension     Low back pain     NERY (obstructive sleep apnea)     NOT USING CPAP    Recurrent falls     UTI (urinary tract infection)      Past Surgical History:   Procedure Laterality Date    CATARACT EXTRACTION      COLONOSCOPY      DIALYSIS FISTULA CREATION      ENDOSCOPY      INGUINAL HERNIA REPAIR      PROSTATE  BIOPSY      THROAT SURGERY      diverticulum in throat    TONSILLECTOMY AND ADENOIDECTOMY      TOTAL SHOULDER ARTHROPLASTY W/ DISTAL CLAVICLE EXCISION Left 9/25/2023    Procedure: HEMIARTHROPLASTY, BICEPS TENODESIS - LEFT;  Surgeon: Matteo Tan MD;  Location: [facility];  Service: Orthopedics;  Laterality: Left;      General Information       Row Name 10/03/23 1120          OT Time and Intention    Document Type therapy note (daily note)  -KF     Mode of Treatment occupational therapy;individual therapy  -KF       Row Name 10/03/23 1120          General Information    Patient Profile Reviewed yes  -KF     Existing Precautions/Restrictions fall;left;shoulder;non-weight bearing;other (see comments)  sling with abduction pillow  -KF     Barriers to Rehab medically complex;previous functional deficit;cognitive status  -KF       Row Name 10/03/23 1120          Cognition    Orientation Status (Cognition) oriented to;person;disoriented to;place;situation;time  -KF       Row Name 10/03/23 1120          Safety Issues, Functional Mobility    Safety Issues Affecting Function (Mobility) at risk behavior observed;awareness of need for assistance;insight into deficits/self-awareness;judgment;problem-solving;safety precaution awareness;safety precautions follow-through/compliance;sequencing abilities  -KF     Impairments Affecting Function (Mobility) balance;cognition;endurance/activity tolerance;motor control;pain;postural/trunk control;range of motion (ROM);strength  -KF               User Key  (r) = Recorded By, (t) = Taken By, (c) = Cosigned By      Initials Name Provider Type    KF Sadia Razo OT Occupational Therapist                     Mobility/ADL's       Row Name 10/03/23 1121          Bed Mobility    Bed Mobility supine-sit;scooting/bridging  -KF     Scooting/Bridging [place] (Bed Mobility) minimum assist (75% patient effort);1 person assist;verbal cues  -KF     Supine-Sit [place] (Bed Mobility)  minimum assist (75% patient effort);1 person assist;verbal cues;nonverbal cues (demo/gesture)  -KF     Comment, (Bed Mobility) Verbal and tactile cues to maintain L shoulder precautions, for sequencing, and to maintain upright posture  -       Row Name 10/03/23 1121          Transfers    Transfers sit-stand transfer;stand-sit transfer;bed-chair transfer  -     Comment, (Transfers) STS x2 with min/modA x1. Verbal and tactile cues to upright posture. Pt able to better correct posterior lean this date with cueing.  -       Row Name 10/03/23 1121          Bed-Chair Transfer    Bed-Chair [place] (Transfers) minimum assist (75% patient effort);moderate assist (50% patient effort);1 person assist;verbal cues;nonverbal cues (demo/gesture);other (see comments)  Stand pivot  -KF     Assistive Device (Bed-Chair Transfers) other (see comments)  -     Comment, (Bed-Chair Transfer) Verbal and tactile cues for sequencing and to correct posterior lean  -       Row Name 10/03/23 1121          Sit-Stand Transfer    Sit-Stand [place] (Transfers) minimum assist (75% patient effort);1 person assist;verbal cues;nonverbal cues (demo/gesture)  -KF     Assistive Device (Sit-Stand Transfers) other (see comments)  RUE support  -       Row Name 10/03/23 1121          Stand-Sit Transfer    Stand-Sit [place] (Transfers) minimum assist (75% patient effort);1 person assist;verbal cues;nonverbal cues (demo/gesture)  -KF     Assistive Device (Stand-Sit Transfers) other (see comments)  RUE support  -       Row Name 10/03/23 1121          Activities of Daily Living    BADL Assessment/Intervention bathing;upper body dressing;grooming  -       Row Name 10/03/23 1121          Mobility    Extremity Weight-bearing Status left upper extremity  -     Left Upper Extremity (Weight-bearing Status) non weight-bearing (NWB)   -       Row Name 10/03/23 1121          Bathing Assessment/Intervention    Comment, (Bathing) Reviewed L  shoulder precautions and ADL retraining to maintain, including axilla care.  -KF       Row Name 10/03/23 1121          Upper Body Dressing Assessment/Training    [place] Level (Upper Body Dressing) doff;don;other (see comments);moderate assist (50% patient effort);verbal cues;nonverbal cues (demo/gesture)  Sling and gown  -KF     Position (Upper Body Dressing) edge of bed sitting;unsupported sitting  -KF     Comment, (Upper Body Dressing) OT reviewed L shoulder precautions, ADL retraining to maintain, and sling management. Pt able to correctly identify 3/4 sling strap, requiring assistance with all velcro.  -KF       Row Name 10/03/23 1121          Lower Body Dressing Assessment/Training    [place] Level (Lower Body Dressing) don;socks;dependent (less than 25% patient effort)  -KF     Position (Lower Body Dressing) unsupported sitting  -KF       Row Name 10/03/23 1121          Grooming Assessment/Training    [place] Level (Grooming) wash face, hands;set up;hair care, combing/brushing;maximum assist (25% patient effort)  -KF     Position (Grooming) supported sitting  -KF     Comment, (Grooming) Pt completed face washing with set up while up in chair, needing maxA to put hair into ponytail.  -KF               User Key  (r) = Recorded By, (t) = Taken By, (c) = Cosigned By      Initials Name Provider Type    Sadia Brunner OT Occupational Therapist                   Obj/Interventions       Row Name 10/03/23 1127          Shoulder (Therapeutic Exercise)    Shoulder (Therapeutic Exercise) AROM (active range of motion);PROM (passive range of motion)  -     Shoulder AROM (Therapeutic Exercise) bilateral;scapular retraction;sitting;10 repetitions  -     Shoulder PROM (Therapeutic Exercise) left;flexion;extension;external rotation;internal rotation;sitting;10 repetitions;other (see comments)  Pt tolerated FE to 90  -       Row Name 10/03/23 1127          Elbow/Forearm (Therapeutic Exercise)     Elbow/Forearm (Therapeutic Exercise) AROM (active range of motion)  -     Elbow/Forearm AROM (Therapeutic Exercise) left;flexion;extension;supination;pronation;10 repetitions  -       Row Name 10/03/23 1127          Wrist (Therapeutic Exercise)    Wrist (Therapeutic Exercise) AROM (active range of motion)  -KF     Wrist AROM (Therapeutic Exercise) left;flexion;extension;10 repetitions  -       Row Name 10/03/23 1127          Hand (Therapeutic Exercise)    Hand (Therapeutic Exercise) AROM (active range of motion)  -KF     Hand AROM/AAROM (Therapeutic Exercise) left;AROM (active range of motion);finger flexion;finger extension;10 repetitions  -       Row Name 10/03/23 1127          Motor Skills    Therapeutic Exercise shoulder;elbow/forearm;wrist;hand;other (see comments)  Reviewed written LUE HEP within MD parameters  -       Row Name 10/03/23 1127          Balance    Balance Assessment sitting static balance;sitting dynamic balance;standing static balance;standing dynamic balance  -     Static Sitting Balance standby assist  -     Dynamic Sitting Balance minimal assist  -KF     Position, Sitting Balance unsupported;sitting in chair;sitting edge of bed  -     Static Standing Balance minimal assist;1-person assist;verbal cues;non-verbal cues (demo/gesture)  -KF     Dynamic Standing Balance moderate assist;1-person assist;verbal cues;non-verbal cues (demo/gesture)  -KF     Position/Device Used, Standing Balance supported;other (see comments)  RUE support  -     Balance Interventions sitting;standing;sit to stand;supported;static;dynamic;occupation based/functional task  -     Comment, Balance Pt presents with posterior lean when standing, though better able to correct today.  -KF               User Key  (r) = Recorded By, (t) = Taken By, (c) = Cosigned By      Initials Name Provider Type    KF Sadia Razo OT Occupational Therapist                   Goals/Plan    No documentation.                   Clinical Impression       Row Name 10/03/23 1130          Pain Assessment    Pretreatment Pain Rating 0/10 - no pain  -KF     Posttreatment Pain Rating 0/10 - no pain  -KF     Pain Intervention(s) Repositioned;Ambulation/increased activity  -       Row Name 10/03/23 1130          Plan of Care Review    Plan of Care Reviewed With patient  -KF     Progress improving  -KF     Outcome Evaluation Pt with good participation and progress toward goals during OT today. Pt completed bed mobility and SPT to chair with min/modA. Pt continues to present with posterior lean when standing, though this was improved today. Pt tolerated LUE HEP within MD parameters, completing passive shoulder FE to 90, IR to chest, and ER to 20. ADLs completed with mod/maxA. Pt remains below his functional baseline due to LUE NWB/immobiliation, and deficits in strength, balance, safety awareness, and activity tolerance. Recommend a d/c to SNF for best functional outcome.  -       Row Name 10/03/23 1130          Therapy Assessment/Plan (OT)    Rehab Potential (OT) good, to achieve stated therapy goals  -     Criteria for Skilled Therapeutic Interventions Met (OT) yes  -KF     Therapy Frequency (OT) daily  -       Row Name 10/03/23 1130          Therapy Plan Review/Discharge Plan (OT)    Anticipated Discharge Disposition (OT) skilled nursing facility  -       Row Name 10/03/23 1130          Vital Signs    Pre Patient Position Supine  -KF     Intra Patient Position Standing  -KF     Post Patient Position Sitting  -       Row Name 10/03/23 1130          Positioning and Restraints    Pre-Treatment Position in bed  -KF     Post Treatment Position chair  -KF     In Chair notified nsg;reclined;call light within reach;encouraged to call for assist;exit alarm on;waffle cushion;on mechanical lift sling;with brace;legs elevated  -KF               User Key  (r) = Recorded By, (t) = Taken By, (c) = Cosigned By      Initials Name Provider Type     Sadia Brunner OT Occupational Therapist                   Outcome Measures       Row Name 10/03/23 1134          How much help from another is currently needed...    Putting on and taking off regular lower body clothing? 2  -KF     Bathing (including washing, rinsing, and drying) 2  -KF     Toileting (which includes using toilet bed pan or urinal) 1  -KF     Putting on and taking off regular upper body clothing 2  -KF     Taking care of personal grooming (such as brushing teeth) 3  -KF     Eating meals 3  -KF     AM-PAC 6 Clicks Score (OT) 13  -KF       Row Name 10/03/23 1134          Functional Assessment    Outcome Measure Options AM-PAC 6 Clicks Daily Activity (OT)  -KF               User Key  (r) = Recorded By, (t) = Taken By, (c) = Cosigned By      Initials Name Provider Type    Sadia Brunner OT Occupational Therapist                    Occupational Therapy Education       Title: PT OT SLP Therapies (In Progress)       Topic: Occupational Therapy (In Progress)       Point: ADL training (In Progress)       Description:   Instruct learner(s) on proper safety adaptation and remediation techniques during self care or transfers.   Instruct in proper use of assistive devices.                  Learning Progress Summary             Patient Acceptance, E,TB, NR by KF at 10/3/2023 1021    Acceptance, E,TB, NR by KF at 10/2/2023 1315    Acceptance, E, NR by TB at 10/1/2023 0955    Acceptance, E,D, NR by TB at 9/30/2023 1034    Acceptance, E,D, NR by JY at 9/29/2023 1155    Acceptance, E, NR by KF at 9/28/2023 0850    Acceptance, E,D, VU,NR by TB at 9/26/2023 1257    Comment: Spouse present and participating in teaching. Pt limited by acute confusion.    Eager, E,TB,D,H, NR by AR at 9/25/2023 1856   Family Acceptance, E,TB, NR by KF at 10/2/2023 1315    Acceptance, E,D, NR by JY at 9/29/2023 1155    Acceptance, E,D, VU,NR by TB at 9/26/2023 1257    Comment: Spouse present and participating in teaching. Pt  limited by acute confusion.    Eager, E,TB,D,H, NR by AR at 9/25/2023 1856   Caregiver Acceptance, E, NR by KF at 9/28/2023 0850                         Point: Home exercise program (In Progress)       Description:   Instruct learner(s) on appropriate technique for monitoring, assisting and/or progressing therapeutic exercises/activities.                  Learning Progress Summary             Patient Acceptance, E,TB, NR by KF at 10/3/2023 1021    Acceptance, E,TB, NR by KF at 10/2/2023 1315    Acceptance, E, NR by TB at 10/1/2023 0955    Acceptance, E,D, NR by TB at 9/30/2023 1034    Acceptance, E,D, NR by JY at 9/29/2023 1155    Acceptance, E, NR by KF at 9/28/2023 0850    Acceptance, E,D, VU,NR by TB at 9/26/2023 1257    Comment: Spouse present and participating in teaching. Pt limited by acute confusion.    Eager, E,TB,D,H, NR by AR at 9/25/2023 1856   Family Acceptance, E,TB, NR by KF at 10/2/2023 1315    Acceptance, E,D, NR by JY at 9/29/2023 1155    Acceptance, E,D, VU,NR by TB at 9/26/2023 1257    Comment: Spouse present and participating in teaching. Pt limited by acute confusion.    Eager, E,TB,D,H, NR by AR at 9/25/2023 1856   Caregiver Acceptance, E, NR by KF at 9/28/2023 0850                         Point: Precautions (In Progress)       Description:   Instruct learner(s) on prescribed precautions during self-care and functional transfers.                  Learning Progress Summary             Patient Acceptance, E,TB, NR by KF at 10/3/2023 1021    Acceptance, E,TB, NR by KF at 10/2/2023 1315    Acceptance, E, NR by TB at 10/1/2023 0955    Acceptance, E,D, NR by TB at 9/30/2023 1034    Acceptance, E,D, NR by JY at 9/29/2023 1155    Acceptance, E, NR by KF at 9/28/2023 0850    Acceptance, E,D, VU,NR by TB at 9/26/2023 1257    Comment: Spouse present and participating in teaching. Pt limited by acute confusion.    CHRISSY Martinez,CRISTHIAN,D,H, NR by AR at 9/25/2023 1856   Family CHRISSY Kwan TB, NR by KF at 10/2/2023  1315    Acceptance, E,D, NR by JY at 9/29/2023 1155    Acceptance, E,D, VU,NR by TB at 9/26/2023 1257    Comment: Spouse present and participating in teaching. Pt limited by acute confusion.    Eager, E,TB,D,H, NR by AR at 9/25/2023 1856   Caregiver Acceptance, E, NR by KF at 9/28/2023 0850                         Point: Body mechanics (In Progress)       Description:   Instruct learner(s) on proper positioning and spine alignment during self-care, functional mobility activities and/or exercises.                  Learning Progress Summary             Patient Acceptance, E,TB, NR by KF at 10/3/2023 1021    Acceptance, E,TB, NR by KF at 10/2/2023 1315    Acceptance, E,D, NR by JY at 9/29/2023 1155    Acceptance, E, NR by KF at 9/28/2023 0850    Eager, E,TB,D,H, NR by AR at 9/25/2023 1856   Family Acceptance, E,TB, NR by KF at 10/2/2023 1315    Acceptance, E,D, NR by JY at 9/29/2023 1155    Eager, E,TB,D,H, NR by AR at 9/25/2023 1856   Caregiver Acceptance, E, NR by KF at 9/28/2023 0850                                         User Key       Initials Effective Dates Name Provider Type Discipline     07/11/23 -  Chery Rene OT Occupational Therapist OT    AR 07/11/23 -  Debbie Espinosa OT Occupational Therapist OT    JY 06/16/21 -  Alma Delia Barrera OT Occupational Therapist OT    KF 08/09/23 -  Sadia Razo OT Occupational Therapist OT                  OT Recommendation and Plan  Therapy Frequency (OT): daily  Plan of Care Review  Plan of Care Reviewed With: patient  Progress: improving  Outcome Evaluation: Pt with good participation and progress toward goals during OT today. Pt completed bed mobility and SPT to chair with min/modA. Pt continues to present with posterior lean when standing, though this was improved today. Pt tolerated LUE HEP within MD parameters, completing passive shoulder FE to 90, IR to chest, and ER to 20. ADLs completed with mod/maxA. Pt remains below his functional baseline  due to LUE NWB/immobiliation, and deficits in strength, balance, safety awareness, and activity tolerance. Recommend a d/c to SNF for best functional outcome.     Time Calculation:         Time Calculation- OT       Row Name 10/03/23 1134             Time Calculation- OT    OT Start Time 1021  -KF      OT Received On 10/03/23  -KF      OT Goal Re-Cert Due Date 10/05/23  -KF         Timed Charges    36180 - OT Therapeutic Exercise Minutes 17  -KF      33329 - OT Therapeutic Activity Minutes 22  -KF      04413 - OT Self Care/Mgmt Minutes 15  -KF         Total Minutes    Timed Charges Total Minutes 54  -KF       Total Minutes 54  -KF                User Key  (r) = Recorded By, (t) = Taken By, (c) = Cosigned By      Initials Name Provider Type    KF Sadia Razo OT Occupational Therapist                  Therapy Charges for Today       Code Description Service Date Service Provider Modifiers Qty    04972605902 HC OT THER PROC EA 15 MIN 10/2/2023 Sadia Razo OT GO 1    31038751987 HC OT THERAPEUTIC ACT EA 15 MIN 10/2/2023 Sadia Razo OT GO 1    77048079279 HC OT SELF CARE/MGMT/TRAIN EA 15 MIN 10/2/2023 Sadia Razo OT GO 1    38803162815 HC OT THER PROC EA 15 MIN 10/3/2023 Sadia Razo OT GO 1    91210263115 HC OT THERAPEUTIC ACT EA 15 MIN 10/3/2023 Sadia Razo OT GO 2    41896886527 HC OT SELF CARE/MGMT/TRAIN EA 15 MIN 10/3/2023 Sadia Razo OT GO 1                 Sadia Razo OT  10/3/2023

## 2023-10-03 NOTE — PROGRESS NOTES
"IM progress note      Enrike Tomas  [id number]  1935     LOS: 0 days     Attending: Matteo Tan MD    Primary Care Provider: Blair Dhaliwal MD      Chief Complaint/Reason for visit:  No chief complaint on file.      Subjective   Feels ok. Caregiver in the room when seen.  No n/vom/sob.   BP better after clonidine , down to 150s.    Objective        Visit Vitals  BP (!) 202/82   Pulse 72   Temp 98 °F (36.7 °C) (Oral)   Resp 16   Ht 157.5 cm (62.01\")   Wt 54.2 kg (119 lb 7.8 oz)   SpO2 97%   BMI 21.85 kg/m²     Temp (24hrs), Av.1 °F (36.7 °C), Min:97.6 °F (36.4 °C), Max:98.6 °F (37 °C)      OT:  Date of Service: 10/03/23 1021  Creation Time: 10/03/23 1134     Signed         Goal Outcome Evaluation:  Plan of Care Reviewed With: patient  Progress: improving  Outcome Evaluation: Pt with good participation and progress toward goals during OT today. Pt completed bed mobility and SPT to chair with min/modA. Pt continues to present with posterior lean when standing, though this was improved today. Pt tolerated LUE HEP within MD parameters, completing passive shoulder FE to 90, IR to chest, and ER to 20. ADLs completed with mod/maxA. Pt remains below his functional baseline due to LUE NWB/immobiliation, and deficits in strength, balance, safety awareness, and activity tolerance. Recommend a d/c to SNF for best functional outcome.        Anticipated Discharge Disposition (OT): skilled nursing facility                   Physical Exam:     General Appearance:    Alert, cooperative, in no acute distress   Head:    Normocephalic, without obvious abnormality, atraumatic    Lungs:     Normal effort, symmetric chest rise,  clear to      auscultation bilaterally              Heart:    Regular rhythm and normal rate, normal S1 and S2    Abdomen:     Normal bowel sounds, no masses, no organomegaly, soft        non-tender, non-distended, no guarding, no rebound                tenderness   Extremities:   Sling on. " CDI dressing.   Moves hand, fingers well.   No clubbing, cyanosis or edema.  No deformities.    Pulses:   Pulses palpable and equal bilaterally   Skin:   No bleeding, bruising or rash          Results Review:     I reviewed the patient's new clinical results.   Results from last 7 days   Lab Units 10/01/23  0536   WBC 10*3/mm3 6.80   HEMOGLOBIN g/dL 12.1*   HEMATOCRIT % 37.3*   PLATELETS 10*3/mm3 247        Latest Reference Range & Units 09/12/23 09:50   Hemoglobin 13.0 - 17.7 g/dL 11.7 (L)   Hematocrit 37.5 - 51.0 % 35.8 (L)   (L): Data is abnormally low    Results from last 7 days   Lab Units 10/01/23  0536   SODIUM mmol/L 134*   POTASSIUM mmol/L 5.3*   CHLORIDE mmol/L 99   CO2 mmol/L 17.0*   BUN mg/dL 42*   CREATININE mg/dL 4.03*   CALCIUM mg/dL 9.4   GLUCOSE mg/dL 73       I reviewed the patient's new imaging including images and reports.    All medications reviewed.   amiodarone, 200 mg, Oral, Daily  amLODIPine, 5 mg, Oral, Q24H  apixaban, 2.5 mg, Oral, BID  atorvastatin, 20 mg, Oral, Nightly  finasteride, 5 mg, Oral, Daily  melatonin, 5 mg, Oral, Nightly  mirtazapine, 7.5 mg, Oral, Nightly  pantoprazole, 40 mg, Oral, Daily  sennosides-docusate, 2 tablet, Oral, BID  sertraline, 50 mg, Oral, Daily  traZODone, 50 mg, Oral, Nightly      acetaminophen, 650 mg, Q4H PRN   Or  acetaminophen, 650 mg, Q4H PRN  albumin human, 12.5 g, PRN  albumin human, 12.5 g, PRN  cloNIDine, 0.1 mg, BID PRN  guaifenesin, 200 mg, Q4H PRN  HYDROmorphone, 0.5 mg, Q2H PRN   And  naloxone, 0.1 mg, Q5 Min PRN  hydrOXYzine, 25 mg, TID PRN  oxyCODONE, 5 mg, Q4H PRN        Assessment & Plan       S/P shoulder hemiarthroplasty, left    (HFpEF) heart failure with preserved ejection fraction    Benign prostatic hyperplasia    Depression    ESRD (end stage renal disease)    Essential hypertension    GERD (gastroesophageal reflux disease)    Mixed hyperlipidemia    NERY (obstructive sleep apnea)    Paroxysmal atrial fibrillation    Anemia, acute on  chronic    Postoperative confusion      Plan  1. PT/OT. NWB, left UE, ROM hand, wrist, elbow.  2. Pain control-prns, s/p interscalene nerve block cath with ropivacaine infusion.           3. IS-encourage  4. DVT proph- Mech/ mobilize.  5. Bowel regimen  6. Monitor post-op labs  7. DC planning . IPR still awaiting approval.      -Chest congestion:  Nursing to assist with IS  Robitussin PRN  CXR in Am (pending 10/1/23)     -GERD:  Resume PPI.  Formulary substitution when indicated.     -Dyslipidemia:  Resume home regimen Statin ( formulary substitution when appropriate).     -PAF: resume amiodarone, and  resumed DOAC       -BPH: maintain on home regimen.  Monitor for adequate spontaneous voiding.     -End-stage renal disease: NAL consulted. Following HD. M/W/F.    -HTN: Added Norvasc 10/3/23. ( Not on beta blocker at admit)

## 2023-10-03 NOTE — PLAN OF CARE
"  Problem: Adult Inpatient Plan of Care  Goal: Plan of Care Review  Outcome: Ongoing, Progressing  Goal: Patient-Specific Goal (Individualized)  Outcome: Ongoing, Progressing  Goal: Absence of Hospital-Acquired Illness or Injury  Outcome: Ongoing, Progressing  Intervention: Identify and Manage Fall Risk  Recent Flowsheet Documentation  Taken 10/3/2023 0235 by Kimberli Guadarrama RN  Safety Promotion/Fall Prevention:   activity supervised   safety round/check completed  Taken 10/3/2023 0052 by Kimberli Guadarrama RN  Safety Promotion/Fall Prevention:   safety round/check completed   activity supervised  Taken 10/2/2023 2239 by Kimberli Guadarrama RN  Safety Promotion/Fall Prevention:   safety round/check completed   lighting adjusted   activity supervised  Taken 10/2/2023 1945 by Kimberli Guadarrama RN  Safety Promotion/Fall Prevention: activity supervised  Intervention: Prevent Skin Injury  Recent Flowsheet Documentation  Taken 10/3/2023 0235 by Kimberli Guadarrama RN  Body Position: upper extremity elevated  Skin Protection: adhesive use limited  Taken 10/3/2023 0052 by Kimberli Guadarrama RN  Body Position:   upper extremity elevated   weight shifting  Skin Protection: adhesive use limited  Taken 10/2/2023 2239 by Kimberli Guadarrama RN  Body Position: right  Skin Protection:   adhesive use limited   hydrocolloids used  Taken 10/2/2023 1945 by Kimberli Guadarrama RN  Body Position: position changed independently  Skin Protection: adhesive use limited  Intervention: Prevent and Manage VTE (Venous Thromboembolism) Risk  Recent Flowsheet Documentation  Taken 10/3/2023 0235 by Kimberli Guadarrama RN  VTE Prevention/Management: (patient request a \"break\" from them)   sequential compression devices off   patient refused intervention  Taken 10/3/2023 0052 by Kimberli Guadarrama RN  VTE Prevention/Management:   sequential compression devices on   bilateral  Taken 10/2/2023 1945 by Kimberli Guadarrama RN  Activity Management:   dorsiflexion/plantar " flexion performed   back to bed   ambulated to bathroom  VTE Prevention/Management:   sequential compression devices on   bilateral  Range of Motion: active ROM (range of motion) encouraged  Intervention: Prevent Infection  Recent Flowsheet Documentation  Taken 10/3/2023 0052 by Kimberli Guadarrama RN  Infection Prevention: environmental surveillance performed  Taken 10/2/2023 2239 by Kimberli Guadarrama RN  Infection Prevention:   environmental surveillance performed   hand hygiene promoted   rest/sleep promoted  Taken 10/2/2023 1945 by Kimberli Guadarrama RN  Infection Prevention: environmental surveillance performed  Goal: Optimal Comfort and Wellbeing  Outcome: Ongoing, Progressing  Intervention: Provide Person-Centered Care  Recent Flowsheet Documentation  Taken 10/2/2023 1945 by Kimberli Guadarrama RN  Trust Relationship/Rapport:   care explained   emotional support provided  Goal: Readiness for Transition of Care  Outcome: Ongoing, Progressing     Problem: Fall Injury Risk  Goal: Absence of Fall and Fall-Related Injury  Outcome: Ongoing, Progressing  Intervention: Promote Injury-Free Environment  Recent Flowsheet Documentation  Taken 10/3/2023 0235 by Kimberli Guadarrama RN  Safety Promotion/Fall Prevention:   activity supervised   safety round/check completed  Taken 10/3/2023 0052 by Kimberli Guadarrama RN  Safety Promotion/Fall Prevention:   safety round/check completed   activity supervised  Taken 10/2/2023 2239 by Kimberli Guadarrama RN  Safety Promotion/Fall Prevention:   safety round/check completed   lighting adjusted   activity supervised  Taken 10/2/2023 1945 by Kimberli Guadarrama RN  Safety Promotion/Fall Prevention: activity supervised     Problem: Skin Injury Risk Increased  Goal: Skin Health and Integrity  Outcome: Ongoing, Progressing  Intervention: Optimize Skin Protection  Recent Flowsheet Documentation  Taken 10/3/2023 0235 by Kimberli Guadarrama RN  Pressure Reduction Techniques: frequent weight shift  encouraged  Head of Bed (HOB) Positioning: HOB at 20-30 degrees  Pressure Reduction Devices: pressure-redistributing mattress utilized  Skin Protection: adhesive use limited  Taken 10/3/2023 0052 by Kimberli Guadarrama RN  Pressure Reduction Techniques: frequent weight shift encouraged  Head of Bed (HOB) Positioning: HOB at 30 degrees  Pressure Reduction Devices: pressure-redistributing mattress utilized  Skin Protection: adhesive use limited  Taken 10/2/2023 2239 by Kimberli Guadarrama RN  Pressure Reduction Techniques:   frequent weight shift encouraged   weight shift assistance provided   heels elevated off bed  Head of Bed (HOB) Positioning: HOB at 30 degrees  Pressure Reduction Devices: pressure-redistributing mattress utilized  Skin Protection:   adhesive use limited   hydrocolloids used  Taken 10/2/2023 1945 by Kimberli Guadarrama RN  Pressure Reduction Techniques:   frequent weight shift encouraged   heels elevated off bed  Pressure Reduction Devices:   pressure-redistributing mattress utilized   positioning supports utilized   heel offloading device utilized  Skin Protection: adhesive use limited     Problem: Device-Related Complication Risk (Hemodialysis)  Goal: Safe, Effective Therapy Delivery  Outcome: Ongoing, Progressing     Problem: Hemodynamic Instability (Hemodialysis)  Goal: Effective Tissue Perfusion  Outcome: Ongoing, Progressing     Problem: Infection (Hemodialysis)  Goal: Absence of Infection Signs and Symptoms  Outcome: Ongoing, Progressing   Goal Outcome Evaluation:   Pt rested in bed comfortably most of the night, pain controlled with PRN pain medications, O2 desats while asleep- 2L NC applied, Dressing remains C/D/I arm sling remained on throughout the shift, ROM/activity performed per order, pt with intermittent confusion- easily reoriented, fall precautions in place, will continue to monitor

## 2023-10-03 NOTE — PROGRESS NOTES
" LOS: 0 days   Patient Care Team:  Blair Dhaliwal MD as PCP - General (Internal Medicine)  Blair Packer MD as Consulting Physician (Cardiology)  Michelle Aponte MD (Nephrology)    Chief Complaint: ESRD  Left shoulder hemiarthroplasty     Subjective     Plan for HD in AM     History taken from: patient    Objective     Vital Sign Min/Max for last 24 hours  Temp  Min: 97.6 °F (36.4 °C)  Max: 98.6 °F (37 °C)   BP  Min: 126/64  Max: 202/82   Pulse  Min: 70  Max: 75   Resp  Min: 14  Max: 18   SpO2  Min: 95 %  Max: 97 %   Flow (L/min)  Min: 2  Max: 2   No data recorded       No intake/output data recorded.  I/O last 3 completed shifts:  In: 440 [P.O.:440]  Out: 1900 [Urine:200]    Objective:  General Appearance:  Comfortable.    Vital signs: (most recent): Blood pressure 129/52, pulse 75, temperature 98.3 °F (36.8 °C), temperature source Oral, resp. rate 16, height 157.5 cm (62.01\"), weight 54.2 kg (119 lb 7.8 oz), SpO2 97 %.  Vital signs are normal.    Output: Minimal urine output.    HEENT: Normal HEENT exam.    Lungs:  Normal effort and normal respiratory rate.  Breath sounds clear to auscultation.  He is not in respiratory distress.  No decreased breath sounds.    Heart: Normal rate.  Regular rhythm.  S1 normal and S2 normal.  No murmur or gallop.   Abdomen: Abdomen is soft.  Bowel sounds are normal.     Pulses: Distal pulses are intact.    Neurological: Patient is alert and oriented to person, place and time.  Normal strength.    Pupils:  Pupils are equal, round, and reactive to light.          Results Review:     I reviewed the patient's new clinical results.    WBC WBC   Date Value Ref Range Status   10/01/2023 6.80 3.40 - 10.80 10*3/mm3 Final        HGB Hemoglobin   Date Value Ref Range Status   10/01/2023 12.1 (L) 13.0 - 17.7 g/dL Final        HCT Hematocrit   Date Value Ref Range Status   10/01/2023 37.3 (L) 37.5 - 51.0 % Final        Platlets No results found for: LABPLAT   MCV MCV   Date Value " Ref Range Status   10/01/2023 100.5 (H) 79.0 - 97.0 fL Final            Sodium Sodium   Date Value Ref Range Status   10/01/2023 134 (L) 136 - 145 mmol/L Final        Potassium Potassium   Date Value Ref Range Status   10/01/2023 5.3 (H) 3.5 - 5.2 mmol/L Final     Comment:     Slight hemolysis detected by analyzer. Results may be affected.        Chloride Chloride   Date Value Ref Range Status   10/01/2023 99 98 - 107 mmol/L Final        CO2 CO2   Date Value Ref Range Status   10/01/2023 17.0 (L) 22.0 - 29.0 mmol/L Final        BUN BUN   Date Value Ref Range Status   10/01/2023 42 (H) 8 - 23 mg/dL Final        Creatinine Creatinine   Date Value Ref Range Status   10/01/2023 4.03 (H) 0.76 - 1.27 mg/dL Final        Calcium Calcium   Date Value Ref Range Status   10/01/2023 9.4 8.6 - 10.5 mg/dL Final        PO4 No results found for: CAPO4   Albumin No results found for: ALBUMIN   Magnesium No results found for: MG   Uric Acid No results found for: URICACID     Medication Review: yes    Assessment & Plan       S/P shoulder hemiarthroplasty, left    (HFpEF) heart failure with preserved ejection fraction    Benign prostatic hyperplasia    Depression    ESRD (end stage renal disease)    Essential hypertension    GERD (gastroesophageal reflux disease)    Mixed hyperlipidemia    NERY (obstructive sleep apnea)    Paroxysmal atrial fibrillation    Anemia, acute on chronic    Postoperative confusion      Assessment & Plan    ESRD:  On MWF saray. [facility]. RUE AV fistula. Admitted for shoulder surgery      Anemia:  JENNYFER on HD days     Volume status :  Stable     HTN: Optimization of volume status with HD.        Recs  HD per [facility] saray. Hyperkalemia and met acidosis managed with HD.  JENNYFER on HD days  Renal Diet       Pancho Carpenter MD  10/03/23  13:14 EDT

## 2023-10-03 NOTE — PLAN OF CARE
Goal Outcome Evaluation:  Plan of Care Reviewed With: patient        Progress: improving  Outcome Evaluation: Patient showing improvement requiring decreased assistance for transfers today and was able to tolerate gait training 5'+5' modA with use of hemiwalker on his right. His fear of falling continues to contribute to his mobility deficits as well as generalized weakness and deficits in balance/endurance. IPPT remains indicated to address these deficits. Continue to recommend D/C to SNF.      Anticipated Discharge Disposition (PT): skilled nursing facility

## 2023-10-03 NOTE — PLAN OF CARE
Goal Outcome Evaluation:  Plan of Care Reviewed With: patient        Progress: no change  Outcome Evaluation: Dressing is CDI. Dialysis access is present with thrill. Pain managed with oral medications today. Up to chair and tolerates well. Worked with PT today. Waiting a discharge plan luisa monitor.

## 2023-10-04 ENCOUNTER — APPOINTMENT (OUTPATIENT)
Dept: NEPHROLOGY | Facility: HOSPITAL | Age: 88
End: 2023-10-04
Payer: MEDICARE

## 2023-10-04 LAB
ALBUMIN SERPL-MCNC: 3.6 G/DL (ref 3.5–5.2)
ANION GAP SERPL CALCULATED.3IONS-SCNC: 12 MMOL/L (ref 5–15)
BUN SERPL-MCNC: 47 MG/DL (ref 8–23)
BUN/CREAT SERPL: 10.4 (ref 7–25)
CALCIUM SPEC-SCNC: 9.1 MG/DL (ref 8.6–10.5)
CHLORIDE SERPL-SCNC: 97 MMOL/L (ref 98–107)
CO2 SERPL-SCNC: 25 MMOL/L (ref 22–29)
CREAT SERPL-MCNC: 4.53 MG/DL (ref 0.76–1.27)
EGFRCR SERPLBLD CKD-EPI 2021: 11.8 ML/MIN/1.73
GLUCOSE SERPL-MCNC: 120 MG/DL (ref 65–99)
PHOSPHATE SERPL-MCNC: 4.7 MG/DL (ref 2.5–4.5)
POTASSIUM SERPL-SCNC: 3.8 MMOL/L (ref 3.5–5.2)
SODIUM SERPL-SCNC: 134 MMOL/L (ref 136–145)

## 2023-10-04 PROCEDURE — A9270 NON-COVERED ITEM OR SERVICE: HCPCS | Performed by: INTERNAL MEDICINE

## 2023-10-04 PROCEDURE — 63710000001 SERTRALINE 50 MG TABLET: Performed by: INTERNAL MEDICINE

## 2023-10-04 PROCEDURE — 63710000001 MIRTAZAPINE 15 MG TABLET: Performed by: INTERNAL MEDICINE

## 2023-10-04 PROCEDURE — 97110 THERAPEUTIC EXERCISES: CPT

## 2023-10-04 PROCEDURE — A9270 NON-COVERED ITEM OR SERVICE: HCPCS | Performed by: NURSE PRACTITIONER

## 2023-10-04 PROCEDURE — 63710000001 SENNOSIDES-DOCUSATE 8.6-50 MG TABLET: Performed by: INTERNAL MEDICINE

## 2023-10-04 PROCEDURE — 97530 THERAPEUTIC ACTIVITIES: CPT

## 2023-10-04 PROCEDURE — 63710000001 OXYCODONE 5 MG TABLET: Performed by: ORTHOPAEDIC SURGERY

## 2023-10-04 PROCEDURE — 97535 SELF CARE MNGMENT TRAINING: CPT

## 2023-10-04 PROCEDURE — 80069 RENAL FUNCTION PANEL: CPT | Performed by: INTERNAL MEDICINE

## 2023-10-04 PROCEDURE — 63710000001 FINASTERIDE 5 MG TABLET: Performed by: INTERNAL MEDICINE

## 2023-10-04 PROCEDURE — 63710000001 CLONIDINE 0.1 MG TABLET: Performed by: NURSE PRACTITIONER

## 2023-10-04 PROCEDURE — 63710000001 APIXABAN 2.5 MG TABLET: Performed by: INTERNAL MEDICINE

## 2023-10-04 PROCEDURE — 25010000002 ALBUMIN HUMAN 25% PER 50 ML: Performed by: INTERNAL MEDICINE

## 2023-10-04 PROCEDURE — 63710000001 TRAZODONE 50 MG TABLET: Performed by: INTERNAL MEDICINE

## 2023-10-04 PROCEDURE — 63710000001 AMIODARONE 200 MG TABLET: Performed by: INTERNAL MEDICINE

## 2023-10-04 PROCEDURE — 63710000001 MELATONIN 5 MG TABLET: Performed by: INTERNAL MEDICINE

## 2023-10-04 PROCEDURE — A9270 NON-COVERED ITEM OR SERVICE: HCPCS | Performed by: ORTHOPAEDIC SURGERY

## 2023-10-04 PROCEDURE — G0257 UNSCHED DIALYSIS ESRD PT HOS: HCPCS

## 2023-10-04 PROCEDURE — P9047 ALBUMIN (HUMAN), 25%, 50ML: HCPCS | Performed by: INTERNAL MEDICINE

## 2023-10-04 PROCEDURE — 63710000001 PANTOPRAZOLE 40 MG TABLET DELAYED-RELEASE: Performed by: INTERNAL MEDICINE

## 2023-10-04 PROCEDURE — 63710000001 ATORVASTATIN 20 MG TABLET: Performed by: INTERNAL MEDICINE

## 2023-10-04 PROCEDURE — 63710000001 HYDROXYZINE 25 MG TABLET: Performed by: INTERNAL MEDICINE

## 2023-10-04 RX ORDER — ALBUMIN (HUMAN) 12.5 G/50ML
12.5 SOLUTION INTRAVENOUS AS NEEDED
Status: DISPENSED | OUTPATIENT
Start: 2023-10-04 | End: 2023-10-04

## 2023-10-04 RX ADMIN — AMIODARONE HYDROCHLORIDE 200 MG: 200 TABLET ORAL at 14:29

## 2023-10-04 RX ADMIN — ALBUMIN (HUMAN) 12.5 G: 0.25 INJECTION, SOLUTION INTRAVENOUS at 09:49

## 2023-10-04 RX ADMIN — SERTRALINE 50 MG: 50 TABLET, FILM COATED ORAL at 14:29

## 2023-10-04 RX ADMIN — TRAZODONE HYDROCHLORIDE 50 MG: 50 TABLET ORAL at 21:46

## 2023-10-04 RX ADMIN — FINASTERIDE 5 MG: 5 TABLET, FILM COATED ORAL at 14:29

## 2023-10-04 RX ADMIN — ATORVASTATIN CALCIUM 20 MG: 20 TABLET, FILM COATED ORAL at 21:47

## 2023-10-04 RX ADMIN — CLONIDINE HYDROCHLORIDE 0.1 MG: 0.1 TABLET ORAL at 05:50

## 2023-10-04 RX ADMIN — OXYCODONE 5 MG: 5 TABLET ORAL at 09:39

## 2023-10-04 RX ADMIN — ALBUMIN (HUMAN) 25 G: 0.25 INJECTION, SOLUTION INTRAVENOUS at 10:04

## 2023-10-04 RX ADMIN — HYDROXYZINE HYDROCHLORIDE 25 MG: 25 TABLET, FILM COATED ORAL at 21:47

## 2023-10-04 RX ADMIN — SENNOSIDES AND DOCUSATE SODIUM 2 TABLET: 50; 8.6 TABLET ORAL at 21:46

## 2023-10-04 RX ADMIN — APIXABAN 2.5 MG: 2.5 TABLET, FILM COATED ORAL at 21:46

## 2023-10-04 RX ADMIN — MIRTAZAPINE 7.5 MG: 15 TABLET, FILM COATED ORAL at 21:47

## 2023-10-04 RX ADMIN — APIXABAN 2.5 MG: 2.5 TABLET, FILM COATED ORAL at 14:29

## 2023-10-04 RX ADMIN — PANTOPRAZOLE SODIUM 40 MG: 40 TABLET, DELAYED RELEASE ORAL at 14:29

## 2023-10-04 RX ADMIN — SENNOSIDES AND DOCUSATE SODIUM 2 TABLET: 50; 8.6 TABLET ORAL at 14:29

## 2023-10-04 RX ADMIN — Medication 5 MG: at 21:47

## 2023-10-04 NOTE — PLAN OF CARE
Problem: Device-Related Complication Risk (Hemodialysis)  Goal: Safe, Effective Therapy Delivery  Outcome: Ongoing, Progressing  Intervention: Optimize Device Care and Function  Recent Flowsheet Documentation  Taken 10/4/2023 1135 by Alma Delia Zelaya, RN  Medication Review/Management:   medications reviewed   high-risk medications identified  Taken 10/4/2023 0809 by Alma Delia Zelaya, RN  Medication Review/Management:   medications reviewed   high-risk medications identified     Problem: Hemodynamic Instability (Hemodialysis)  Goal: Effective Tissue Perfusion  Outcome: Ongoing, Progressing     Problem: Infection (Hemodialysis)  Goal: Absence of Infection Signs and Symptoms  Outcome: Ongoing, Progressing   Goal Outcome Evaluation:               HD complete, hypotensive episode during tx. Albumin given and minimum UFR utilized, hypotension resolved. Pain complaints per pt, PRN given per MAR orders. Blood reinfused, report given to primary RN Sherron.

## 2023-10-04 NOTE — PROGRESS NOTES
" LOS: 0 days   Patient Care Team:  Blair Dhaliwal MD as PCP - General (Internal Medicine)  Blair Packer MD as Consulting Physician (Cardiology)  Michelle Aponte MD (Nephrology)    Chief Complaint: ESRD  Left shoulder hemiarthroplasty     Subjective     Seen on HD tolerating well so far. Goal UF 2 liter as tolerated. Bp stable. Good access pressure.       History taken from: patient    Objective     Vital Sign Min/Max for last 24 hours  Temp  Min: 97.9 °F (36.6 °C)  Max: 98.3 °F (36.8 °C)   BP  Min: 108/54  Max: 199/82   Pulse  Min: 60  Max: 75   Resp  Min: 16  Max: 16   SpO2  Min: 95 %  Max: 100 %   Flow (L/min)  Min: 2  Max: 2   No data recorded       No intake/output data recorded.  No intake/output data recorded.    Objective:  General Appearance:  Comfortable.    Vital signs: (most recent): Blood pressure 147/51, pulse 60, temperature 98.2 °F (36.8 °C), temperature source Oral, resp. rate 16, height 157.5 cm (62.01\"), weight 54.2 kg (119 lb 7.8 oz), SpO2 95 %.  Vital signs are normal.    Output: Minimal urine output.    HEENT: Normal HEENT exam.    Lungs:  Normal effort and normal respiratory rate.  Breath sounds clear to auscultation.  He is not in respiratory distress.  No decreased breath sounds.    Heart: Normal rate.  Regular rhythm.  S1 normal and S2 normal.  No murmur or gallop.   Abdomen: Abdomen is soft.  Bowel sounds are normal.     Pulses: Distal pulses are intact.    Neurological: Patient is alert and oriented to person, place and time.  Normal strength.    Pupils:  Pupils are equal, round, and reactive to light.          Results Review:     I reviewed the patient's new clinical results.    WBC No results found for: WBC       HGB No results found for: HGB       HCT No results found for: HCT       Platlets No results found for: LABPLAT   MCV No results found for: MCV           Sodium No results found for: NA       Potassium No results found for: K       Chloride No results found for: " CL       CO2 No results found for: CO2       BUN No results found for: BUN       Creatinine No results found for: CREATININE       Calcium No results found for: CALCIUM       PO4 No results found for: CAPO4   Albumin No results found for: ALBUMIN   Magnesium No results found for: MG   Uric Acid No results found for: URICACID     Medication Review: yes    Assessment & Plan       S/P shoulder hemiarthroplasty, left    (HFpEF) heart failure with preserved ejection fraction    Benign prostatic hyperplasia    Depression    ESRD (end stage renal disease)    Essential hypertension    GERD (gastroesophageal reflux disease)    Mixed hyperlipidemia    NERY (obstructive sleep apnea)    Paroxysmal atrial fibrillation    Anemia, acute on chronic    Postoperative confusion      Assessment & Plan    ESRD:  On MWF saray. [facility]. RUE AV fistula. Admitted for shoulder surgery      Anemia:  Last Hb 12 10/1/23 JENNYFER on hold  Volume status :  Stable     HTN: Optimization of volume status with HD.        Recs  HD per [facility] saray. Hyperkalemia and met acidosis managed with HD.  Renal Diet       Pancho Carpenter MD  10/04/23  08:28 EDT

## 2023-10-04 NOTE — PLAN OF CARE
Goal Outcome Evaluation:  Plan of Care Reviewed With: patient, spouse        Progress: improving  Outcome Evaluation: Pt with good progress and participation in OT this date. Pt performed bed mobility and ambulated 4' to the chair with min/modA, limited by posterior lean. Pt completed LUE HEP within MD parameters, tolerating passive shoulder FE to 80, ER to 10, IR to chest. OT reviewed L shoulder precautions, ADL retraining to maintain, and sling management. Pt continues to present below functional baseline due to LUE NWB/immobilization, generalized weakness, balance impairments, and decreased activity tolerance. Recommend a d/c to SNF for best functional outcome.      Anticipated Discharge Disposition (OT): skilled nursing facility

## 2023-10-04 NOTE — PLAN OF CARE
Goal Outcome Evaluation:   Pt rested in bed comfortably most of the night, pain controlled with PRN pain medications, O2 desats while asleep- 2L NC applied, Dressing remains C/D/I arm sling remained on throughout the shift, ROM/activity performed per order, pt with intermittent confusion- easily reoriented, fall precautions in place, will continue to monitor        Problem: Adult Inpatient Plan of Care  Goal: Plan of Care Review  Outcome: Ongoing, Progressing  Goal: Patient-Specific Goal (Individualized)  Outcome: Ongoing, Progressing  Goal: Absence of Hospital-Acquired Illness or Injury  Outcome: Ongoing, Progressing  Intervention: Identify and Manage Fall Risk  Recent Flowsheet Documentation  Taken 10/4/2023 0224 by Kimberli Guadarrama RN  Safety Promotion/Fall Prevention:   safety round/check completed   activity supervised  Taken 10/4/2023 0011 by Kimberli Guadarrama RN  Safety Promotion/Fall Prevention: safety round/check completed  Taken 10/3/2023 2220 by Kimberli Guadarrama RN  Safety Promotion/Fall Prevention: safety round/check completed  Taken 10/3/2023 2010 by Kimberli Guadarrama RN  Safety Promotion/Fall Prevention:   safety round/check completed   toileting scheduled   nonskid shoes/slippers when out of bed   activity supervised  Intervention: Prevent Skin Injury  Recent Flowsheet Documentation  Taken 10/4/2023 0224 by Kimberli Guadarrama RN  Body Position: position changed independently  Skin Protection: adhesive use limited  Taken 10/4/2023 0011 by Kimberli Guadarrama RN  Body Position: position changed independently  Skin Protection: adhesive use limited  Taken 10/3/2023 2220 by Kimberli Guadarrama RN  Body Position: 30 degrees  Skin Protection: adhesive use limited  Taken 10/3/2023 2010 by Kimberli Guadarrama RN  Body Position: position changed independently  Skin Protection: adhesive use limited  Intervention: Prevent and Manage VTE (Venous Thromboembolism) Risk  Recent Flowsheet Documentation  Taken 10/4/2023 0224 by  Guadarrama, Kimberli, RN  Activity Management: activity encouraged  VTE Prevention/Management:   patient refused intervention   sequential compression devices off  Taken 10/4/2023 0011 by Kimberli Guadarrama RN  VTE Prevention/Management:   patient refused intervention   sequential compression devices off  Taken 10/3/2023 2220 by Kimberli Guadarrama RN  VTE Prevention/Management:   sequential compression devices off   patient refused intervention  Taken 10/3/2023 2010 by Kimberli Guadarrama RN  VTE Prevention/Management:   sequential compression devices on   bilateral  Range of Motion: active ROM (range of motion) encouraged  Goal: Optimal Comfort and Wellbeing  Outcome: Ongoing, Progressing  Intervention: Provide Person-Centered Care  Recent Flowsheet Documentation  Taken 10/3/2023 2010 by Kimberli Guadarrama RN  Trust Relationship/Rapport: care explained  Goal: Readiness for Transition of Care  Outcome: Ongoing, Progressing     Problem: Fall Injury Risk  Goal: Absence of Fall and Fall-Related Injury  Outcome: Ongoing, Progressing  Intervention: Identify and Manage Contributors  Recent Flowsheet Documentation  Taken 10/3/2023 2010 by Kimberli Guadarrama RN  Medication Review/Management: medications reviewed  Intervention: Promote Injury-Free Environment  Recent Flowsheet Documentation  Taken 10/4/2023 0224 by Kimberli Guadarrama RN  Safety Promotion/Fall Prevention:   safety round/check completed   activity supervised  Taken 10/4/2023 0011 by Kimberli Guadarrama RN  Safety Promotion/Fall Prevention: safety round/check completed  Taken 10/3/2023 2220 by Kimberli Guadarrama RN  Safety Promotion/Fall Prevention: safety round/check completed  Taken 10/3/2023 2010 by Kimberli Guadarrama RN  Safety Promotion/Fall Prevention:   safety round/check completed   toileting scheduled   nonskid shoes/slippers when out of bed   activity supervised     Problem: Skin Injury Risk Increased  Goal: Skin Health and Integrity  Outcome: Ongoing,  Progressing  Intervention: Optimize Skin Protection  Recent Flowsheet Documentation  Taken 10/4/2023 0224 by Kimberli Guadarrama RN  Pressure Reduction Techniques:   frequent weight shift encouraged   weight shift assistance provided  Head of Bed (HOB) Positioning: HOB elevated  Pressure Reduction Devices: pressure-redistributing mattress utilized  Skin Protection: adhesive use limited  Taken 10/4/2023 0011 by Kimberli Guadarrama RN  Pressure Reduction Techniques: frequent weight shift encouraged  Head of Bed (HOB) Positioning: HOB at 20-30 degrees  Pressure Reduction Devices: pressure-redistributing mattress utilized  Skin Protection: adhesive use limited  Taken 10/3/2023 2220 by Kimberli Guadarrama RN  Pressure Reduction Techniques: frequent weight shift encouraged  Head of Bed (HOB) Positioning: HOB elevated  Pressure Reduction Devices:   positioning supports utilized   pressure-redistributing mattress utilized  Skin Protection: adhesive use limited  Taken 10/3/2023 2010 by Kimberli Guadarrama RN  Pressure Reduction Techniques: frequent weight shift encouraged  Head of Bed (HOB) Positioning: HOB at 20-30 degrees  Pressure Reduction Devices: pressure-redistributing mattress utilized  Skin Protection: adhesive use limited     Problem: Device-Related Complication Risk (Hemodialysis)  Goal: Safe, Effective Therapy Delivery  Outcome: Ongoing, Progressing  Intervention: Optimize Device Care and Function  Recent Flowsheet Documentation  Taken 10/3/2023 2010 by Kimberli Guadarrama RN  Medication Review/Management: medications reviewed     Problem: Hemodynamic Instability (Hemodialysis)  Goal: Effective Tissue Perfusion  Outcome: Ongoing, Progressing     Problem: Infection (Hemodialysis)  Goal: Absence of Infection Signs and Symptoms  Outcome: Ongoing, Progressing

## 2023-10-04 NOTE — CASE MANAGEMENT/SOCIAL WORK
Continued Stay Note  [facility]     Patient Name: Enrike Tomas  MRN: [id number]  Today's Date: 10/4/2023    Admit Date: 9/25/2023    Plan: [facility]   Discharge Plan       Row Name 10/04/23 1544       Plan    Plan [facility]    Patient/Family in Agreement with Plan yes    Plan Comments Rec'd SNF approval for [facility] per [facility] portal. CM left Cristine, davidison a  confirming acceptance to facility on Thursday, 10/5. Awaiting CB. [facility] tentatively scheduled for Thursday, 10/5 @ 1600. PCS form on chartlet. CM following.    Final Discharge Disposition Code 03 - skilled nursing facility (SNF)                   Discharge Codes    No documentation.                 Expected Discharge Date and Time       Expected Discharge Date Expected Discharge Time    Oct 5, 2023               Erin Espana RN

## 2023-10-04 NOTE — PROGRESS NOTES
"IM progress note      Enrike Tomas  [id number]  1935     LOS: 0 days     Attending: Matteo Tan MD    Primary Care Provider: Blair Dhaliwal MD      Chief Complaint/Reason for visit:  No chief complaint on file.      Subjective   Doing well. Good pain control. Had HD this AM. Denies f/c/n/v/sob/cp.    Objective        Visit Vitals  /62 (BP Location: Left leg, Patient Position: Lying)   Pulse 60   Temp 97.7 °F (36.5 °C) (Oral)   Resp 16   Ht 157.5 cm (62.01\")   Wt 54.2 kg (119 lb 7.8 oz)   SpO2 99%   BMI 21.85 kg/m²     Temp (24hrs), Av.8 °F (36.6 °C), Min:97.6 °F (36.4 °C), Max:98.2 °F (36.8 °C)      OT:  Date of Service: 10/03/23 1021  Creation Time: 10/03/23 1134     Signed         Goal Outcome Evaluation:  Plan of Care Reviewed With: patient  Progress: improving  Outcome Evaluation: Pt with good participation and progress toward goals during OT today. Pt completed bed mobility and SPT to chair with min/modA. Pt continues to present with posterior lean when standing, though this was improved today. Pt tolerated LUE HEP within MD parameters, completing passive shoulder FE to 90, IR to chest, and ER to 20. ADLs completed with mod/maxA. Pt remains below his functional baseline due to LUE NWB/immobiliation, and deficits in strength, balance, safety awareness, and activity tolerance. Recommend a d/c to SNF for best functional outcome.        Anticipated Discharge Disposition (OT): skilled nursing facility                   Physical Exam:     General Appearance:    Alert, cooperative, in no acute distress   Head:    Normocephalic, without obvious abnormality, atraumatic    Lungs:     Normal effort, symmetric chest rise,  clear to      auscultation bilaterally              Heart:    Regular rhythm and normal rate, normal S1 and S2    Abdomen:     Normal bowel sounds, no masses, no organomegaly, soft        non-tender, non-distended, no guarding, no rebound                tenderness "   Extremities:   Sling on. CDI dressing.   Moves hand, fingers well.   No clubbing, cyanosis or edema.  No deformities.    Pulses:   Pulses palpable and equal bilaterally   Skin:   No bleeding, bruising or rash          Results Review:     I reviewed the patient's new clinical results.   Results from last 7 days   Lab Units 10/01/23  0536   WBC 10*3/mm3 6.80   HEMOGLOBIN g/dL 12.1*   HEMATOCRIT % 37.3*   PLATELETS 10*3/mm3 247        Latest Reference Range & Units 09/12/23 09:50   Hemoglobin 13.0 - 17.7 g/dL 11.7 (L)   Hematocrit 37.5 - 51.0 % 35.8 (L)   (L): Data is abnormally low    Results from last 7 days   Lab Units 10/04/23  0751 10/01/23  0536   SODIUM mmol/L 134* 134*   POTASSIUM mmol/L 3.8 5.3*   CHLORIDE mmol/L 97* 99   CO2 mmol/L 25.0 17.0*   BUN mg/dL 47* 42*   CREATININE mg/dL 4.53* 4.03*   CALCIUM mg/dL 9.1 9.4   GLUCOSE mg/dL 120* 73       I reviewed the patient's new imaging including images and reports.    All medications reviewed.   amiodarone, 200 mg, Oral, Daily  amLODIPine, 5 mg, Oral, Q24H  apixaban, 2.5 mg, Oral, BID  atorvastatin, 20 mg, Oral, Nightly  finasteride, 5 mg, Oral, Daily  melatonin, 5 mg, Oral, Nightly  mirtazapine, 7.5 mg, Oral, Nightly  pantoprazole, 40 mg, Oral, Daily  sennosides-docusate, 2 tablet, Oral, BID  sertraline, 50 mg, Oral, Daily  traZODone, 50 mg, Oral, Nightly      acetaminophen, 650 mg, Q4H PRN   Or  acetaminophen, 650 mg, Q4H PRN  albumin human, 12.5 g, PRN  cloNIDine, 0.1 mg, BID PRN  guaifenesin, 200 mg, Q4H PRN  HYDROmorphone, 0.5 mg, Q2H PRN   And  naloxone, 0.1 mg, Q5 Min PRN  hydrOXYzine, 25 mg, TID PRN  oxyCODONE, 5 mg, Q4H PRN        Assessment & Plan       S/P shoulder hemiarthroplasty, left    (HFpEF) heart failure with preserved ejection fraction    Benign prostatic hyperplasia    Depression    ESRD (end stage renal disease)    Essential hypertension    GERD (gastroesophageal reflux disease)    Mixed hyperlipidemia    NERY (obstructive sleep apnea)     Paroxysmal atrial fibrillation    Chronic heart failure with preserved ejection fraction (HFpEF)    Anemia, acute on chronic    Postoperative confusion      Plan  1. PT/OT. NWB, left UE, ROM hand, wrist, elbow.  2. Pain control-prns, s/p interscalene nerve block cath with ropivacaine infusion.           3. IS-encourage  4. DVT proph- Mech/ mobilize.  5. Bowel regimen  6. Monitor post-op labs  7. DC planning . IPR still awaiting approval.      -Chest congestion:  Nursing to assist with IS  Robitussin PRN  CXR 9/30/23     -GERD:  Resume PPI.  Formulary substitution when indicated.     -Dyslipidemia:  Resume home regimen Statin ( formulary substitution when appropriate).     -PAF: resume amiodarone, and  resumed DOAC       -BPH: maintain on home regimen.  Monitor for adequate spontaneous voiding.     -End-stage renal disease: NAL consulted. Following HD. M/W/F.    -HTN: Added Norvasc 10/3/23. ( Not on beta blocker at admit)      MERCEDEZ Ryan  10/4/23  @ 1399

## 2023-10-04 NOTE — THERAPY TREATMENT NOTE
Patient Name: Enrike Tomas  : 1935    MRN: [id number]                              Today's Date: 10/4/2023       Admit Date: 2023    Visit Dx: No diagnosis found.  Patient Active Problem List   Diagnosis    (HFpEF) heart failure with preserved ejection fraction    Anemia of renal disease    At high risk for falls    Benign prostatic hyperplasia    Closed fracture of left acetabulum    Compression fracture of thoracic vertebra    Deficiency of other specified B group vitamins    Depression    ESRD (end stage renal disease)    Essential hypertension    GERD (gastroesophageal reflux disease)    Hepatic cyst    Long term (current) use of anticoagulants    Major depressive disorder, single episode, moderate    Mixed hyperlipidemia    Morgagni hernia    Occlusion of right vertebral artery    NERY (obstructive sleep apnea)    Fall    Severe malnutrition    Sustained SVT    Paroxysmal atrial fibrillation    Chronic heart failure with preserved ejection fraction (HFpEF)    S/P shoulder hemiarthroplasty, left    Anemia, acute on chronic    Postoperative confusion     Past Medical History:   Diagnosis Date    A-fib     Abnormal ECG     occasional extra heartbeat    Anemia, acute on chronic 2023    Arthritis     Atrial fibrillation     BPH (benign prostatic hyperplasia)     Cervical compression fracture 2023    currently wearing a c collar    Diverticulosis     Diverticulum of esophagus     puree diet and speech therapy    Elevated cholesterol     ESRD on hemodialysis     GERD (gastroesophageal reflux disease)     Heart failure     Hemodialysis patient     M,W,F    Hyperlipidemia     Hypertension     Low back pain     NERY (obstructive sleep apnea)     NOT USING CPAP    Recurrent falls     UTI (urinary tract infection)      Past Surgical History:   Procedure Laterality Date    CATARACT EXTRACTION      COLONOSCOPY      DIALYSIS FISTULA CREATION      ENDOSCOPY      INGUINAL HERNIA REPAIR      PROSTATE  BIOPSY      THROAT SURGERY      diverticulum in throat    TONSILLECTOMY AND ADENOIDECTOMY      TOTAL SHOULDER ARTHROPLASTY W/ DISTAL CLAVICLE EXCISION Left 9/25/2023    Procedure: HEMIARTHROPLASTY, BICEPS TENODESIS - LEFT;  Surgeon: Matteo Tan MD;  Location: [facility];  Service: Orthopedics;  Laterality: Left;      General Information       Row Name 10/04/23 1404          OT Time and Intention    Document Type therapy note (daily note)  -KF     Mode of Treatment occupational therapy;individual therapy  -KF       Row Name 10/04/23 1404          General Information    Patient Profile Reviewed yes  -KF     Existing Precautions/Restrictions fall;left;shoulder;non-weight bearing;other (see comments)  Sling with abduction pillow  -KF     Barriers to Rehab medically complex;previous functional deficit;cognitive status  -KF       Row Name 10/04/23 1404          Cognition    Orientation Status (Cognition) oriented to;person;disoriented to;place;situation;time  -KF       Row Name 10/04/23 1404          Safety Issues, Functional Mobility    Safety Issues Affecting Function (Mobility) awareness of need for assistance;insight into deficits/self-awareness;judgment;problem-solving;safety precaution awareness;safety precautions follow-through/compliance;sequencing abilities  -KF     Impairments Affecting Function (Mobility) balance;cognition;endurance/activity tolerance;pain;postural/trunk control;range of motion (ROM);strength  -KF               User Key  (r) = Recorded By, (t) = Taken By, (c) = Cosigned By      Initials Name Provider Type    KF Sadia Razo OT Occupational Therapist                     Mobility/ADL's       Row Name 10/04/23 1404          Bed Mobility    Bed Mobility supine-sit;scooting/bridging  -KF     Scooting/Bridging [place] (Bed Mobility) minimum assist (75% patient effort);1 person assist  -KF     Supine-Sit [place] (Bed Mobility) minimum assist (75% patient effort);1 person  assist;verbal cues;nonverbal cues (demo/gesture)  -KF     Comment, (Bed Mobility) Verbal cues to maintain L shoulder precautions  -       Row Name 10/04/23 1404          Transfers    Transfers sit-stand transfer;stand-sit transfer;bed-chair transfer  -       Row Name 10/04/23 1404          Bed-Chair Transfer    Bed-Chair [place] (Transfers) minimum assist (75% patient effort);1 person assist;verbal cues;nonverbal cues (demo/gesture)  -KF     Assistive Device (Bed-Chair Transfers) other (see comments)  UE support  -       Row Name 10/04/23 1404          Sit-Stand Transfer    Sit-Stand [place] (Transfers) minimum assist (75% patient effort);1 person assist;verbal cues;nonverbal cues (demo/gesture)  -KF     Assistive Device (Sit-Stand Transfers) other (see comments)  UE support  -       Row Name 10/04/23 1404          Stand-Sit Transfer    Stand-Sit [place] (Transfers) minimum assist (75% patient effort);1 person assist;verbal cues;nonverbal cues (demo/gesture)  -KF     Assistive Device (Stand-Sit Transfers) other (see comments)  UE support  -       Row Name 10/04/23 1404          Functional Mobility    Functional Mobility- Ind. Level minimum assist (75% patient effort);1 person;verbal cues required;nonverbal cues required (demo/gesture)  -KF     Functional Mobility- Device other (see comments)  UE support  -     Functional Mobility-Distance (Feet) 4  -KF     Functional Mobility- Safety Issues loses balance backward  -     Functional Mobility- Comment Verbal and tactile cues for upright posture; limited by posterior lean  -       Row Name 10/04/23 1404          Activities of Daily Living    BADL Assessment/Intervention bathing;upper body dressing  -       Row Name 10/04/23 1404          Mobility    Extremity Weight-bearing Status left upper extremity  -     Left Upper Extremity (Weight-bearing Status) non weight-bearing (NWB)   -       Row Name 10/04/23 1404          Bathing  Assessment/Intervention    Comment, (Bathing) Reviewed L shoulder precautions and ADL retraining to maintain  -       Row Name 10/04/23 1404          Upper Body Dressing Assessment/Training    [place] Level (Upper Body Dressing) doff;don;maximum assist (25% patient effort);other (see comments)  Sling  -KF     Position (Upper Body Dressing) unsupported sitting  -     Comment, (Upper Body Dressing) Reviewed L shoulder precautions, ADL retraining to maintain, and sling management. Pt able to correctly identify 3/4 slip straps, able to accurately fasten 1/4 straps.  -               User Key  (r) = Recorded By, (t) = Taken By, (c) = Cosigned By      Initials Name Provider Type    KF Sadia Razo OT Occupational Therapist                   Obj/Interventions       Row Name 10/04/23 1408          Shoulder (Therapeutic Exercise)    Shoulder (Therapeutic Exercise) AROM (active range of motion);PROM (passive range of motion)  -     Shoulder AROM (Therapeutic Exercise) bilateral;scapular retraction;sitting;10 repetitions  -     Shoulder PROM (Therapeutic Exercise) left;flexion;extension;external rotation;internal rotation;other (see comments)  Pt tolerated FE to 80  -       Row Name 10/04/23 1408          Elbow/Forearm (Therapeutic Exercise)    Elbow/Forearm (Therapeutic Exercise) AROM (active range of motion)  -     Elbow/Forearm AROM (Therapeutic Exercise) left;flexion;extension;supination;pronation;sitting;10 repetitions  -       Row Name 10/04/23 1408          Wrist (Therapeutic Exercise)    Wrist (Therapeutic Exercise) AROM (active range of motion)  -     Wrist AROM (Therapeutic Exercise) left;flexion;extension;10 repetitions  -       Row Name 10/04/23 1408          Hand (Therapeutic Exercise)    Hand (Therapeutic Exercise) AROM (active range of motion)  -     Hand AROM/AAROM (Therapeutic Exercise) left;AROM (active range of motion);finger flexion;finger extension;10 repetitions  -        Row Name 10/04/23 1408          Motor Skills    Therapeutic Exercise shoulder;elbow/forearm;wrist;hand;other (see comments)  Reviewed written LUE HEP within MD parameters  -KF       Row Name 10/04/23 1408          Balance    Balance Assessment sitting static balance;sitting dynamic balance;standing static balance;standing dynamic balance  -KF     Static Sitting Balance standby assist  -KF     Dynamic Sitting Balance contact guard  -KF     Position, Sitting Balance unsupported;sitting in chair;sitting edge of bed  -KF     Static Standing Balance minimal assist  -KF     Dynamic Standing Balance minimal assist;moderate assist  -KF     Position/Device Used, Standing Balance other (see comments)  UE support  -KF     Balance Interventions sitting;standing;supported;static;dynamic;occupation based/functional task  -KF     Comment, Balance Posterior lean during standing, fear of falling  -KF               User Key  (r) = Recorded By, (t) = Taken By, (c) = Cosigned By      Initials Name Provider Type    KF Sadia Razo OT Occupational Therapist                   Goals/Plan    No documentation.                  Clinical Impression       Row Name 10/04/23 1410          Pain Assessment    Pretreatment Pain Rating 0/10 - no pain  -KF     Posttreatment Pain Rating 2/10  -KF     Pain Location - Side/Orientation Left  -KF     Pain Location generalized  -KF     Pain Location - shoulder  -KF     Pain Intervention(s) Repositioned;Ambulation/increased activity  -KF       Row Name 10/04/23 1410          Plan of Care Review    Plan of Care Reviewed With patient;spouse  -KF     Progress improving  -KF     Outcome Evaluation Pt with good progress and participation in OT this date. Pt performed bed mobility and ambulated 4' to the chair with min/modA, limited by posterior lean. Pt completed LUE HEP within MD parameters, tolerating passive shoulder FE to 80, ER to 10, IR to chest. OT reviewed L shoulder precautions, ADL retraining to  maintain, and sling management. Pt continues to present below functional baseline due to LUE NWB/immobilization, generalized weakness, balance impairments, and decreased activity tolerance. Recommend a d/c to SNF for best functional outcome.  -KF       Row Name 10/04/23 1410          Therapy Assessment/Plan (OT)    Rehab Potential (OT) good, to achieve stated therapy goals  -KF     Criteria for Skilled Therapeutic Interventions Met (OT) yes  -KF     Therapy Frequency (OT) daily  -KF       Row Name 10/04/23 1410          Therapy Plan Review/Discharge Plan (OT)    Anticipated Discharge Disposition (OT) skilled nursing facility  -KF       Row Name 10/04/23 1410          Vital Signs    Post Systolic BP Rehab 113  -KF     Post Treatment Diastolic BP 61  -KF     Posttreatment Heart Rate (beats/min) 73  -KF     Post SpO2 (%) 99  -KF     O2 Delivery Post Treatment room air  -KF     Pre Patient Position Supine  -KF     Intra Patient Position Standing  -KF     Post Patient Position Sitting  -KF       Row Name 10/04/23 1410          Positioning and Restraints    Pre-Treatment Position in bed  -KF     Post Treatment Position chair  -KF     In Chair reclined;call light within reach;encouraged to call for assist;exit alarm on;with family/caregiver;waffle cushion;on mechanical lift sling;with brace;legs elevated  -KF               User Key  (r) = Recorded By, (t) = Taken By, (c) = Cosigned By      Initials Name Provider Type    Sadia Brunner, OT Occupational Therapist                   Outcome Measures       Row Name 10/04/23 1413          How much help from another is currently needed...    Putting on and taking off regular lower body clothing? 2  -KF     Bathing (including washing, rinsing, and drying) 2  -KF     Toileting (which includes using toilet bed pan or urinal) 2  -KF     Putting on and taking off regular upper body clothing 2  -KF     Taking care of personal grooming (such as brushing teeth) 3  -KF     Eating  meals 3  -KF     AM-PAC 6 Clicks Score (OT) 14  -KF       Row Name 10/04/23 1413          Functional Assessment    Outcome Measure Options AM-PAC 6 Clicks Daily Activity (OT)  -KF               User Key  (r) = Recorded By, (t) = Taken By, (c) = Cosigned By      Initials Name Provider Type    Sadia Brunner OT Occupational Therapist                    Occupational Therapy Education       Title: PT OT SLP Therapies (In Progress)       Topic: Occupational Therapy (In Progress)       Point: ADL training (In Progress)       Description:   Instruct learner(s) on proper safety adaptation and remediation techniques during self care or transfers.   Instruct in proper use of assistive devices.                  Learning Progress Summary             Patient Acceptance, E, VU by KF at 10/4/2023 1308    Acceptance, E,TB, NR by KF at 10/3/2023 1021    Acceptance, E,TB, NR by KF at 10/2/2023 1315    Acceptance, E, NR by TB at 10/1/2023 0955    Acceptance, E,D, NR by TB at 9/30/2023 1034    Acceptance, E,D, NR by JY at 9/29/2023 1155    Acceptance, E, NR by KF at 9/28/2023 0850    Acceptance, E,D, VU,NR by TB at 9/26/2023 1257    Comment: Spouse present and participating in teaching. Pt limited by acute confusion.    Eager, E,TB,D,H, NR by AR at 9/25/2023 1856   Family Acceptance, E, VU by KF at 10/4/2023 1308    Acceptance, E,TB, NR by KF at 10/2/2023 1315    Acceptance, E,D, NR by JY at 9/29/2023 1155    Acceptance, E,D, VU,NR by TB at 9/26/2023 1257    Comment: Spouse present and participating in teaching. Pt limited by acute confusion.    Eager, E,TB,D,H, NR by AR at 9/25/2023 1856   Caregiver Acceptance, E, NR by KF at 9/28/2023 0850                         Point: Home exercise program (In Progress)       Description:   Instruct learner(s) on appropriate technique for monitoring, assisting and/or progressing therapeutic exercises/activities.                  Learning Progress Summary             Patient Acceptance, E, VU  by KF at 10/4/2023 1308    Acceptance, E,TB, NR by KF at 10/3/2023 1021    Acceptance, E,TB, NR by KF at 10/2/2023 1315    Acceptance, E, NR by TB at 10/1/2023 0955    Acceptance, E,D, NR by TB at 9/30/2023 1034    Acceptance, E,D, NR by JY at 9/29/2023 1155    Acceptance, E, NR by KF at 9/28/2023 0850    Acceptance, E,D, VU,NR by TB at 9/26/2023 1257    Comment: Spouse present and participating in teaching. Pt limited by acute confusion.    Eager, E,TB,D,H, NR by AR at 9/25/2023 1856   Family Acceptance, E, VU by KF at 10/4/2023 1308    Acceptance, E,TB, NR by KF at 10/2/2023 1315    Acceptance, E,D, NR by JY at 9/29/2023 1155    Acceptance, E,D, VU,NR by TB at 9/26/2023 1257    Comment: Spouse present and participating in teaching. Pt limited by acute confusion.    Eager, E,TB,D,H, NR by AR at 9/25/2023 1856   Caregiver Acceptance, E, NR by KF at 9/28/2023 0850                         Point: Precautions (In Progress)       Description:   Instruct learner(s) on prescribed precautions during self-care and functional transfers.                  Learning Progress Summary             Patient Acceptance, E, VU by KF at 10/4/2023 1308    Acceptance, E,TB, NR by KF at 10/3/2023 1021    Acceptance, E,TB, NR by KF at 10/2/2023 1315    Acceptance, E, NR by TB at 10/1/2023 0955    Acceptance, E,D, NR by TB at 9/30/2023 1034    Acceptance, E,D, NR by JY at 9/29/2023 1155    Acceptance, E, NR by KF at 9/28/2023 0850    Acceptance, E,D, VU,NR by TB at 9/26/2023 1257    Comment: Spouse present and participating in teaching. Pt limited by acute confusion.    Eager, E,TB,D,H, NR by AR at 9/25/2023 1856   Family Acceptance, E, VU by KF at 10/4/2023 1308    Acceptance, E,TB, NR by KF at 10/2/2023 1315    Acceptance, E,D, NR by JY at 9/29/2023 1155    Acceptance, E,D, VU,NR by TB at 9/26/2023 1257    Comment: Spouse present and participating in teaching. Pt limited by acute confusion.    Juan, CHRISSY,TB,D,H, NR by AR at 9/25/2023 1856    Caregiver Acceptance, E, NR by KF at 9/28/2023 0850                         Point: Body mechanics (In Progress)       Description:   Instruct learner(s) on proper positioning and spine alignment during self-care, functional mobility activities and/or exercises.                  Learning Progress Summary             Patient Acceptance, E, VU by KF at 10/4/2023 1308    Acceptance, E,TB, NR by KF at 10/3/2023 1021    Acceptance, E,TB, NR by KF at 10/2/2023 1315    Acceptance, E,D, NR by JY at 9/29/2023 1155    Acceptance, E, NR by KF at 9/28/2023 0850    Eager, E,TB,D,H, NR by AR at 9/25/2023 1856   Family Acceptance, E, VU by KF at 10/4/2023 1308    Acceptance, E,TB, NR by KF at 10/2/2023 1315    Acceptance, E,D, NR by JY at 9/29/2023 1155    Eager, E,TB,D,H, NR by AR at 9/25/2023 1856   Caregiver Acceptance, E, NR by KF at 9/28/2023 0850                                         User Key       Initials Effective Dates Name Provider Type Discipline    TB 07/11/23 -  Chery Rene, OT Occupational Therapist OT    AR 07/11/23 -  Debbie Espinosa, OT Occupational Therapist OT    JY 06/16/21 -  Alma Delia Barrera OT Occupational Therapist OT    KF 08/09/23 -  Sadia Razo OT Occupational Therapist OT                  OT Recommendation and Plan  Therapy Frequency (OT): daily  Plan of Care Review  Plan of Care Reviewed With: patient, spouse  Progress: improving  Outcome Evaluation: Pt with good progress and participation in OT this date. Pt performed bed mobility and ambulated 4' to the chair with min/modA, limited by posterior lean. Pt completed LUE HEP within MD parameters, tolerating passive shoulder FE to 80, ER to 10, IR to chest. OT reviewed L shoulder precautions, ADL retraining to maintain, and sling management. Pt continues to present below functional baseline due to LUE NWB/immobilization, generalized weakness, balance impairments, and decreased activity tolerance. Recommend a d/c to SNF for best  functional outcome.     Time Calculation:         Time Calculation- OT       Row Name 10/04/23 1414             Time Calculation- OT    OT Start Time 1308  -KF      OT Received On 10/04/23  -KF      OT Goal Re-Cert Due Date 10/05/23  -KF         Timed Charges    54481 - OT Therapeutic Exercise Minutes 18  -KF      78658 - OT Therapeutic Activity Minutes 20  -KF      68794 - OT Self Care/Mgmt Minutes 17  -KF         Total Minutes    Timed Charges Total Minutes 55  -KF       Total Minutes 55  -KF                User Key  (r) = Recorded By, (t) = Taken By, (c) = Cosigned By      Initials Name Provider Type    KF Sadia Razo OT Occupational Therapist                  Therapy Charges for Today       Code Description Service Date Service Provider Modifiers Qty    45911047824 HC OT THER PROC EA 15 MIN 10/3/2023 Sadia Razo OT GO 1    15733905599 HC OT THERAPEUTIC ACT EA 15 MIN 10/3/2023 Sadia Razo OT GO 2    42103561203 HC OT SELF CARE/MGMT/TRAIN EA 15 MIN 10/3/2023 Sadia Razo OT GO 1    10998189403 HC OT THER PROC EA 15 MIN 10/4/2023 Sadia Razo OT GO 1    26291435633 HC OT THERAPEUTIC ACT EA 15 MIN 10/4/2023 Sadia Razo OT GO 2    73597278713 HC OT SELF CARE/MGMT/TRAIN EA 15 MIN 10/4/2023 Sadia Razo OT GO 1                 Sadia Razo OT  10/4/2023

## 2023-10-05 VITALS
DIASTOLIC BLOOD PRESSURE: 52 MMHG | SYSTOLIC BLOOD PRESSURE: 101 MMHG | OXYGEN SATURATION: 91 % | HEART RATE: 66 BPM | TEMPERATURE: 98.1 F | HEIGHT: 62 IN | BODY MASS INDEX: 21.99 KG/M2 | WEIGHT: 119.49 LBS | RESPIRATION RATE: 18 BRPM

## 2023-10-05 PROCEDURE — A9270 NON-COVERED ITEM OR SERVICE: HCPCS | Performed by: ORTHOPAEDIC SURGERY

## 2023-10-05 PROCEDURE — 97530 THERAPEUTIC ACTIVITIES: CPT

## 2023-10-05 PROCEDURE — 63710000001 SERTRALINE 50 MG TABLET: Performed by: INTERNAL MEDICINE

## 2023-10-05 PROCEDURE — 97116 GAIT TRAINING THERAPY: CPT

## 2023-10-05 PROCEDURE — A9270 NON-COVERED ITEM OR SERVICE: HCPCS | Performed by: INTERNAL MEDICINE

## 2023-10-05 PROCEDURE — 63710000001 APIXABAN 2.5 MG TABLET: Performed by: INTERNAL MEDICINE

## 2023-10-05 PROCEDURE — 63710000001 PANTOPRAZOLE 40 MG TABLET DELAYED-RELEASE: Performed by: INTERNAL MEDICINE

## 2023-10-05 PROCEDURE — 63710000001 OXYCODONE 5 MG TABLET: Performed by: ORTHOPAEDIC SURGERY

## 2023-10-05 PROCEDURE — 97110 THERAPEUTIC EXERCISES: CPT

## 2023-10-05 PROCEDURE — 63710000001 FINASTERIDE 5 MG TABLET: Performed by: INTERNAL MEDICINE

## 2023-10-05 PROCEDURE — 97535 SELF CARE MNGMENT TRAINING: CPT

## 2023-10-05 PROCEDURE — 63710000001 SENNOSIDES-DOCUSATE 8.6-50 MG TABLET: Performed by: INTERNAL MEDICINE

## 2023-10-05 RX ORDER — HYDROCODONE BITARTRATE AND ACETAMINOPHEN 7.5; 325 MG/1; MG/1
1 TABLET ORAL NIGHTLY
Qty: 15 TABLET | Refills: 0 | Status: SHIPPED | OUTPATIENT
Start: 2023-10-05

## 2023-10-05 RX ADMIN — FINASTERIDE 5 MG: 5 TABLET, FILM COATED ORAL at 09:58

## 2023-10-05 RX ADMIN — PANTOPRAZOLE SODIUM 40 MG: 40 TABLET, DELAYED RELEASE ORAL at 09:57

## 2023-10-05 RX ADMIN — SERTRALINE 50 MG: 50 TABLET, FILM COATED ORAL at 09:58

## 2023-10-05 RX ADMIN — SENNOSIDES AND DOCUSATE SODIUM 2 TABLET: 50; 8.6 TABLET ORAL at 09:57

## 2023-10-05 RX ADMIN — APIXABAN 2.5 MG: 2.5 TABLET, FILM COATED ORAL at 09:57

## 2023-10-05 RX ADMIN — OXYCODONE 5 MG: 5 TABLET ORAL at 11:15

## 2023-10-05 NOTE — PLAN OF CARE
Goal Outcome Evaluation:  Plan of Care Reviewed With: patient        Progress: improving  Outcome Evaluation: OT re-cert completed. Pt demonstrated gains this date as evidenced by improved cognition w/ increased alertness and engagement in OT interventions, increased activity tolerance sitting at EOB (~ 23 mins supported and unsupported collectively), tolerance to HEP with some decreased PROM FE at L shoulder with pt sitting up EOB vs semi reclined or supine in bed. Pt participates some in UBD, sling mgmt w/ cues for improved tech, seq, position yet still with need for A for safety purposes. Pt able to briefly stand at EOB with min A x 1 w/ postural cues for stability. Pt is still limited by decreased activity tolerance, impaired balance, ROM limitations w/ s/p sx precautions, muscle weakness and would benefit from cont'd IPOT POC and SNF at d/c when medically ready.      Anticipated Discharge Disposition (OT): skilled nursing facility

## 2023-10-05 NOTE — THERAPY PROGRESS REPORT/RE-CERT
Patient Name: Enrike Tomas  : 1935    MRN: [id number]                              Today's Date: 10/5/2023       Admit Date: 2023    Visit Dx: No diagnosis found.  Patient Active Problem List   Diagnosis    (HFpEF) heart failure with preserved ejection fraction    Anemia of renal disease    At high risk for falls    Benign prostatic hyperplasia    Closed fracture of left acetabulum    Compression fracture of thoracic vertebra    Deficiency of other specified B group vitamins    Depression    ESRD (end stage renal disease)    Essential hypertension    GERD (gastroesophageal reflux disease)    Hepatic cyst    Long term (current) use of anticoagulants    Major depressive disorder, single episode, moderate    Mixed hyperlipidemia    Morgagni hernia    Occlusion of right vertebral artery    NERY (obstructive sleep apnea)    Fall    Severe malnutrition    Sustained SVT    Paroxysmal atrial fibrillation    Chronic heart failure with preserved ejection fraction (HFpEF)    S/P shoulder hemiarthroplasty, left    Anemia, acute on chronic    Postoperative confusion     Past Medical History:   Diagnosis Date    A-fib     Abnormal ECG     occasional extra heartbeat    Anemia, acute on chronic 2023    Arthritis     Atrial fibrillation     BPH (benign prostatic hyperplasia)     Cervical compression fracture 2023    currently wearing a c collar    Diverticulosis     Diverticulum of esophagus     puree diet and speech therapy    Elevated cholesterol     ESRD on hemodialysis     GERD (gastroesophageal reflux disease)     Heart failure     Hemodialysis patient     M,W,F    Hyperlipidemia     Hypertension     Low back pain     NERY (obstructive sleep apnea)     NOT USING CPAP    Recurrent falls     UTI (urinary tract infection)      Past Surgical History:   Procedure Laterality Date    CATARACT EXTRACTION      COLONOSCOPY      DIALYSIS FISTULA CREATION      ENDOSCOPY      INGUINAL HERNIA REPAIR      PROSTATE  BIOPSY      THROAT SURGERY      diverticulum in throat    TONSILLECTOMY AND ADENOIDECTOMY      TOTAL SHOULDER ARTHROPLASTY W/ DISTAL CLAVICLE EXCISION Left 9/25/2023    Procedure: HEMIARTHROPLASTY, BICEPS TENODESIS - LEFT;  Surgeon: Matteo Tan MD;  Location: [facility];  Service: Orthopedics;  Laterality: Left;      General Information       Row Name 10/05/23 1452          OT Time and Intention    Document Type progress note/recertification  -JY     Mode of Treatment occupational therapy;individual therapy  -JY       Row Name 10/05/23 1452          General Information    Patient Profile Reviewed yes  -JY     Prior Level of Function --  see IE  -JY     Existing Precautions/Restrictions fall;left;shoulder;non-weight bearing;other (see comments)  sling with abduction pillow  -JY     Barriers to Rehab medically complex;previous functional deficit;cognitive status  cognition improved this date  -JY       Row Name 10/05/23 1452          Cognition    Orientation Status (Cognition) oriented to;person;place;situation;disoriented to;time  -JY       Row Name 10/05/23 1452          Safety Issues, Functional Mobility    Safety Issues Affecting Function (Mobility) awareness of need for assistance;insight into deficits/self-awareness;safety precaution awareness;safety precautions follow-through/compliance;sequencing abilities  -JY     Impairments Affecting Function (Mobility) balance;endurance/activity tolerance;pain;postural/trunk control;range of motion (ROM);strength  -JY     Cognitive Impairments, Mobility Safety/Performance awareness, need for assistance;insight into deficits/self-awareness;problem-solving/reasoning;judgment;safety precaution awareness;safety precaution follow-through;sequencing abilities  -JY     Comment, Safety Issues/Impairments (Mobility) pt more alert and engaged in OT interventions with decreased confusion noted allowing pt to be more safely mobilized  -JY               User Key  (r) =  Recorded By, (t) = Taken By, (c) = Cosigned By      Initials Name Provider Type    Alma Delia Corral OT Occupational Therapist                     Mobility/ADL's       Row Name 10/05/23 9904          Bed Mobility    Bed Mobility supine-sit;sit-supine;scooting/bridging  -JY     Scooting/Bridging Ackerman (Bed Mobility) minimum assist (75% patient effort);1 person assist;verbal cues  -JY     Supine-Sit Ackerman (Bed Mobility) minimum assist (75% patient effort);1 person assist;verbal cues  -JY     Sit-Supine Ackerman (Bed Mobility) minimum assist (75% patient effort);moderate assist (50% patient effort);verbal cues  -JY     Bed Mobility, Safety Issues decreased use of arms for pushing/pulling;impaired trunk control for bed mobility  -JY     Assistive Device (Bed Mobility) head of bed elevated;bed rails  -JJORDANA     Comment, (Bed Mobility) pt observed in good resting position within bed upon OT arrival w/ needs present for sling adjustment; pt demonstrated good seq to advance LEs to EOB and upright trunk into sitting coming to R side of bed while adhering to NWB at LUE, gross min A for lattermost uprighting with mild unsteadiness noted upon sitting; able to initiate and progress scooting hips to EOB with min A to bring LLE forward for symmetry for stability  -MAGDALENE       Row Name 10/05/23 9716          Transfers    Transfers sit-stand transfer;stand-sit transfer  -JJORDANA     Comment, (Transfers) skilled cues for optimal hand placement for controlled ascend, descend specifically to push up through RUE and to reach back prior to sitting for controlled ascend, descend; further postural cues to stand upright as possible for greater stability, better response to cues this date improving stability during brief stand, pt fearful of falling; pt with support at posterior LEs up against bed surface throughout  -JY       Row Name 10/05/23 8577          Sit-Stand Transfer    Sit-Stand Ackerman (Transfers) minimum assist (75%  patient effort);verbal cues  -JY     Assistive Device (Sit-Stand Transfers) other (see comments)  RUE support  -JY     Comment, (Sit-Stand Transfer) initial posterior lean corrected some with anterior weight shift and proper UE support  -JY       Row Name 10/05/23 1455          Stand-Sit Transfer    Stand-Sit Cameron (Transfers) minimum assist (75% patient effort);verbal cues  -JY     Assistive Device (Stand-Sit Transfers) other (see comments)  RUE support  -JY       Row Name 10/05/23 1455          Functional Mobility    Functional Mobility- Comment defer to PT for specifics  -JY       Row Name 10/05/23 1455          Activities of Daily Living    BADL Assessment/Intervention bathing;upper body dressing  -JY       Row Name 10/05/23 1455          Mobility    Extremity Weight-bearing Status left upper extremity  -JY     Left Upper Extremity (Weight-bearing Status) non weight-bearing (NWB)   -JY       Row Name 10/05/23 1455          Bathing Assessment/Intervention    Cameron Level (Bathing) upper body;bathing skills;maximum assist (25% patient effort);verbal cues  -JY     Assistive Devices (Bathing) other (see comments)  trent tech  -JY     Position (Bathing) edge of bed sitting  -JY     Comment, (Bathing) reviewed with pt L shoulder precautions specific to L axilla care with pt requiring gross max A to complete for safety, any attempt from pt lends concern for non adherence to L shoulder position  -JY       Row Name 10/05/23 1455          Upper Body Dressing Assessment/Training    Cameron Level (Upper Body Dressing) doff;don;maximum assist (25% patient effort);other (see comments)  sling mgmt  -JY     Assistive Devices (Upper Body Dressing) other (see comments)  trent tech  -JY     Position (Upper Body Dressing) edge of bed sitting  -JY     Comment, (Upper Body Dressing) reviewed with pt L shoulder precautions and implications toward dressing, reviewed with pt optimal seq for threading and unthreading  given more impacted LUE; pt has general knowledge of strap placement however has difficulty fastening and unfastening w/o A; reviewed with pt wear schedule, strap palcement, adjustment needs and facilitated sling mgmt to improve mgmt  -               User Key  (r) = Recorded By, (t) = Taken By, (c) = Cosigned By      Initials Name Provider Type    Alma Delia Corral, OT Occupational Therapist                   Obj/Interventions       Row Name 10/05/23 1506          Sensory Assessment (Somatosensory)    Sensory Assessment (Somatosensory) bilateral UE;sensation intact  -     Bilateral UE Sensory Assessment general sensation;light touch awareness;intact  -     Sensory Assessment denies any numbness or tingling and able to recognize LT stimuli as intact and symmetrical at BUEs  -       Row Name 10/05/23 1506          Vision Assessment/Intervention    Visual Impairment/Limitations corrective lenses full-time  -     Vision Assessment Comment denies any acute changes to vision  -       Row Name 10/05/23 1506          Shoulder (Therapeutic Exercise)    Shoulder (Therapeutic Exercise) AROM (active range of motion);PROM (passive range of motion)  -     Shoulder AROM (Therapeutic Exercise) scapular retraction;sitting;10 repetitions;bilateral  -     Shoulder PROM (Therapeutic Exercise) left;flexion;extension;external rotation;internal rotation;10 repetitions  pt tolerated FE to 50 degrees this date prior to limiting pain which is less than previous sessions however pt assisted from  supported EOB position vs supine or semi reclined  -       Row Name 10/05/23 1506          Elbow/Forearm (Therapeutic Exercise)    Elbow/Forearm (Therapeutic Exercise) AROM (active range of motion)  -     Elbow/Forearm AROM (Therapeutic Exercise) left;flexion;extension;supination;pronation;10 repetitions  -       Row Name 10/05/23 1506          Wrist (Therapeutic Exercise)    Wrist (Therapeutic Exercise) AROM (active range of  motion)  -JY     Wrist AROM (Therapeutic Exercise) left;flexion;extension;10 repetitions  -JY       Row Name 10/05/23 1506          Hand (Therapeutic Exercise)    Hand (Therapeutic Exercise) AROM (active range of motion)  -JY     Hand AROM/AAROM (Therapeutic Exercise) left;AROM (active range of motion);finger flexion;finger extension  -JY       Row Name 10/05/23 1506          Motor Skills    Motor Skills functional endurance  -J     Functional Endurance still limited in more active demands however this date tolerated sitting EOB 23 mins with support and unsupported to complete TE, UBD, bathing tasks  -J     Therapeutic Exercise shoulder;elbow/forearm;wrist;hand;other (see comments)  facilitated HEP at EOB (less supported) per ortho MD orders with graded ROM i.e. PROM FE 50 degrees prior to pain, IR chest, ER 30 and AROM elbow, wrist, hand, scapula retractions; increase in pain to 6/10 L forward elevation  -       Row Name 10/05/23 1506          Balance    Balance Assessment sitting static balance;sitting dynamic balance;standing static balance  -JY     Static Sitting Balance standby assist  -JY     Dynamic Sitting Balance contact guard;other (see comments)  RUE support at bedrail  -JY     Position, Sitting Balance supported;unsupported;sitting edge of bed  -JY     Static Standing Balance minimal assist;verbal cues  -JY     Position/Device Used, Standing Balance supported;other (see comments)  RUE support  -JY     Balance Interventions sitting;standing;static;dynamic;sit to stand;supported  -J     Comment, Balance initial posterior lean upon standing requiring cues and postural adjustments  -J               User Key  (r) = Recorded By, (t) = Taken By, (c) = Cosigned By      Initials Name Provider Type    Alma Delia Corral OT Occupational Therapist                   Goals/Plan       Row Name 10/05/23 1528          Transfer Goal 1 (OT)    Activity/Assistive Device (Transfer Goal 1, OT)  sit-to-stand/stand-to-sit;toilet;commode, bedside without drop arms  -JY     Thayer Level/Cues Needed (Transfer Goal 1, OT) minimum assist (75% or more patient effort);verbal cues required  -JY     Time Frame (Transfer Goal 1, OT) long term goal (LTG);by discharge  -JY     Progress/Outcome (Transfer Goal 1, OT) goal ongoing  -Ecrebo       Row Name 10/05/23 1523          Dressing Goal 1 (OT)    Activity/Device (Dressing Goal 1, OT) upper body dressing  -JY     Thayer/Cues Needed (Dressing Goal 1, OT) moderate assist (50-74% patient effort);verbal cues required  -JY     Time Frame (Dressing Goal 1, OT) long term goal (LTG);by discharge  -JY     Progress/Outcome (Dressing Goal 1, OT) goal ongoing  -       Row Name 10/05/23 1523          ROM Goal 1 (OT)    ROM Goal 1 (OT) Pt and spouse will complete LUE HEP within physician parameters with min assist  -JY     Time Frame (ROM Goal 1, OT) long term goal (LTG);by discharge  -JY     Progress/Outcome (ROM Goal 1, OT) goal ongoing  -Ecrebo       Row Name 10/05/23 1523          Therapy Assessment/Plan (OT)    Planned Therapy Interventions (OT) activity tolerance training;BADL retraining;functional balance retraining;occupation/activity based interventions;passive ROM/stretching;patient/caregiver education/training;ROM/therapeutic exercise;strengthening exercise;transfer/mobility retraining  -JY               User Key  (r) = Recorded By, (t) = Taken By, (c) = Cosigned By      Initials Name Provider Type    Alma Delia Corral, OT Occupational Therapist                   Clinical Impression       Row Name 10/05/23 1515          Pain Assessment    Pretreatment Pain Rating 0/10 - no pain  -JY     Posttreatment Pain Rating 0/10 - no pain  -JY     Pain Location - Side/Orientation Left  -JY     Pain Location generalized  -JY     Pain Location - shoulder  -JY     Pre/Posttreatment Pain Comment brief increase in L shoulder pain w/ TE and general movement, pt denied pain at rest; RN  aware of pain reported  -JY     Pain Intervention(s) Repositioned;Ambulation/increased activity;Rest  -JY       Row Name 10/05/23 1515          Plan of Care Review    Plan of Care Reviewed With patient  -JY     Progress improving  -JY     Outcome Evaluation OT re-cert completed. Pt demonstrated gains this date as evidenced by improved cognition w/ increased alertness and engagement in OT interventions, increased activity tolerance sitting at EOB (~ 23 mins supported and unsupported collectively), tolerance to HEP with some decreased PROM FE at L shoulder with pt sitting up EOB vs semi reclined or supine in bed. Pt participates some in UBD, sling mgmt w/ cues for improved tech, seq, position yet still with need for A for safety purposes. Pt able to briefly stand at EOB with min A x 1 w/ postural cues for stability. Pt is still limited by decreased activity tolerance, impaired balance, ROM limitations w/ s/p sx precautions, muscle weakness and would benefit from cont'd IPOT POC and SNF at d/c when medically ready.  -JJORDANA       Row Name 10/05/23 1515          Therapy Assessment/Plan (OT)    Rehab Potential (OT) good, to achieve stated therapy goals  -JY     Criteria for Skilled Therapeutic Interventions Met (OT) yes  -JY     Therapy Frequency (OT) daily  -JJORDANA       Row Name 10/05/23 1515          Therapy Plan Review/Discharge Plan (OT)    Anticipated Discharge Disposition (OT) skilled nursing facility  -MAGDALENE       Row Name 10/05/23 1515          Vital Signs    Pre Systolic BP Rehab 130  -JY     Pre Treatment Diastolic BP 65  -JY     Pretreatment Heart Rate (beats/min) 73  -JY     Posttreatment Heart Rate (beats/min) 76  -JY     Pre SpO2 (%) 96  -JY     O2 Delivery Pre Treatment room air  -JY     O2 Delivery Intra Treatment room air  -JY     Post SpO2 (%) 96  -JY     O2 Delivery Post Treatment room air  -JY     Pre Patient Position Supine  -JY     Intra Patient Position Standing  -JY     Post Patient Position Supine  -JY        Row Name 10/05/23 1515          Positioning and Restraints    Pre-Treatment Position in bed  -JY     Post Treatment Position bed  -JY     In Bed notified nsg;fowlers;call light within reach;encouraged to call for assist;exit alarm on;side rails up x2;SCD pump applied  -JY               User Key  (r) = Recorded By, (t) = Taken By, (c) = Cosigned By      Initials Name Provider Type    Alma Delia Corral OT Occupational Therapist                   Outcome Measures       Row Name 10/05/23 1524          How much help from another is currently needed...    Putting on and taking off regular lower body clothing? 2  -JY     Bathing (including washing, rinsing, and drying) 2  -JY     Toileting (which includes using toilet bed pan or urinal) 2  -JY     Putting on and taking off regular upper body clothing 2  -JY     Taking care of personal grooming (such as brushing teeth) 3  -JY     Eating meals 3  -JY     AM-PAC 6 Clicks Score (OT) 14  -JY       Row Name 10/05/23 0942 10/05/23 0845       How much help from another person do you currently need...    Turning from your back to your side while in flat bed without using bedrails? 3  -AB 3  -BC    Moving from lying on back to sitting on the side of a flat bed without bedrails? 3  -AB 3  -BC    Moving to and from a bed to a chair (including a wheelchair)? 3  -AB 3  -BC    Standing up from a chair using your arms (e.g., wheelchair, bedside chair)? 3  -AB 3  -BC    Climbing 3-5 steps with a railing? 1  -AB 1  -BC    To walk in hospital room? 2  -AB 3  -BC    AM-PAC 6 Clicks Score (PT) 15  -AB 16  -BC    Highest level of mobility 4 --> Transferred to chair/commode  -AB 5 --> Static standing  -BC      Row Name 10/05/23 1524 10/05/23 0942       Functional Assessment    Outcome Measure Options AM-PAC 6 Clicks Daily Activity (OT)  -JY AM-PAC 6 Clicks Basic Mobility (PT)  -AB              User Key  (r) = Recorded By, (t) = Taken By, (c) = Cosigned By      Initials Name Provider Type     Sherron Chan, RN Registered Nurse    Alma Delia Corral, OT Occupational Therapist    Caro Draper, PT Physical Therapist                    Occupational Therapy Education       Title: PT OT SLP Therapies (In Progress)       Topic: Occupational Therapy (In Progress)       Point: ADL training (In Progress)       Description:   Instruct learner(s) on proper safety adaptation and remediation techniques during self care or transfers.   Instruct in proper use of assistive devices.                  Learning Progress Summary             Patient Acceptance, E,D, NR by JY at 10/5/2023 1340    Acceptance, E, VU by KF at 10/4/2023 1308    Acceptance, E,TB, NR by KF at 10/3/2023 1021    Acceptance, E,TB, NR by KF at 10/2/2023 1315    Acceptance, E, NR by TB at 10/1/2023 0955    Acceptance, E,D, NR by TB at 9/30/2023 1034    Acceptance, E,D, NR by JY at 9/29/2023 1155    Acceptance, E, NR by KF at 9/28/2023 0850    Acceptance, E,D, VU,NR by TB at 9/26/2023 1257    Comment: Spouse present and participating in teaching. Pt limited by acute confusion.    Eager, E,TB,D,H, NR by AR at 9/25/2023 1856   Family Acceptance, E, VU by KF at 10/4/2023 1308    Acceptance, E,TB, NR by KF at 10/2/2023 1315    Acceptance, E,D, NR by JY at 9/29/2023 1155    Acceptance, E,D, VU,NR by TB at 9/26/2023 1257    Comment: Spouse present and participating in teaching. Pt limited by acute confusion.    Eager, E,TB,D,H, NR by AR at 9/25/2023 1856   Caregiver Acceptance, E, NR by KF at 9/28/2023 0850                         Point: Home exercise program (In Progress)       Description:   Instruct learner(s) on appropriate technique for monitoring, assisting and/or progressing therapeutic exercises/activities.                  Learning Progress Summary             Patient Acceptance, E,D, NR by JY at 10/5/2023 1340    Acceptance, E, VU by KF at 10/4/2023 1308    Acceptance, E,TB, NR by KF at 10/3/2023 1021    Acceptance, E,TB, NR by KF at  10/2/2023 1315    Acceptance, E, NR by TB at 10/1/2023 0955    Acceptance, E,D, NR by TB at 9/30/2023 1034    Acceptance, E,D, NR by JY at 9/29/2023 1155    Acceptance, E, NR by KF at 9/28/2023 0850    Acceptance, E,D, VU,NR by TB at 9/26/2023 1257    Comment: Spouse present and participating in teaching. Pt limited by acute confusion.    Eager, E,TB,D,H, NR by AR at 9/25/2023 1856   Family Acceptance, E, VU by KF at 10/4/2023 1308    Acceptance, E,TB, NR by KF at 10/2/2023 1315    Acceptance, E,D, NR by JY at 9/29/2023 1155    Acceptance, E,D, VU,NR by TB at 9/26/2023 1257    Comment: Spouse present and participating in teaching. Pt limited by acute confusion.    Eager, E,TB,D,H, NR by AR at 9/25/2023 1856   Caregiver Acceptance, E, NR by KF at 9/28/2023 0850                         Point: Precautions (In Progress)       Description:   Instruct learner(s) on prescribed precautions during self-care and functional transfers.                  Learning Progress Summary             Patient Acceptance, E,D, NR by JY at 10/5/2023 1340    Acceptance, E, VU by KF at 10/4/2023 1308    Acceptance, E,TB, NR by KF at 10/3/2023 1021    Acceptance, E,TB, NR by KF at 10/2/2023 1315    Acceptance, E, NR by TB at 10/1/2023 0955    Acceptance, E,D, NR by TB at 9/30/2023 1034    Acceptance, E,D, NR by JY at 9/29/2023 1155    Acceptance, E, NR by KF at 9/28/2023 0850    Acceptance, E,D, VU,NR by TB at 9/26/2023 1257    Comment: Spouse present and participating in teaching. Pt limited by acute confusion.    Eager, E,TB,D,H, NR by AR at 9/25/2023 1856   Family Acceptance, E, VU by KF at 10/4/2023 1308    Acceptance, E,TB, NR by KF at 10/2/2023 1315    Acceptance, E,D, NR by JY at 9/29/2023 1155    Acceptance, E,D, VU,NR by TB at 9/26/2023 1257    Comment: Spouse present and participating in teaching. Pt limited by acute confusion.    Juan, CHRISSY,TB,D,H, NR by AR at 9/25/2023 1856   Caregiver Acceptance, E, NR by KF at 9/28/2023 0850                          Point: Body mechanics (In Progress)       Description:   Instruct learner(s) on proper positioning and spine alignment during self-care, functional mobility activities and/or exercises.                  Learning Progress Summary             Patient Acceptance, E,D, NR by JY at 10/5/2023 1340    Acceptance, E, VU by KF at 10/4/2023 1308    Acceptance, E,TB, NR by KF at 10/3/2023 1021    Acceptance, E,TB, NR by KF at 10/2/2023 1315    Acceptance, E,D, NR by JY at 9/29/2023 1155    Acceptance, E, NR by KF at 9/28/2023 0850    Eager, E,TB,D,H, NR by AR at 9/25/2023 1856   Family Acceptance, E, VU by KF at 10/4/2023 1308    Acceptance, E,TB, NR by KF at 10/2/2023 1315    Acceptance, E,D, NR by JY at 9/29/2023 1155    Eager, E,TB,D,H, NR by AR at 9/25/2023 1856   Caregiver Acceptance, E, NR by KF at 9/28/2023 0850                                         User Key       Initials Effective Dates Name Provider Type Discipline    TB 07/11/23 -  Chery Rene, OT Occupational Therapist OT    AR 07/11/23 -  Debbie Espinosa, OT Occupational Therapist OT    JY 06/16/21 -  Alma Delia Barrera OT Occupational Therapist OT    KF 08/09/23 -  Sadia Razo OT Occupational Therapist OT                  OT Recommendation and Plan  Planned Therapy Interventions (OT): activity tolerance training, BADL retraining, functional balance retraining, occupation/activity based interventions, passive ROM/stretching, patient/caregiver education/training, ROM/therapeutic exercise, strengthening exercise, transfer/mobility retraining  Therapy Frequency (OT): daily  Plan of Care Review  Plan of Care Reviewed With: patient  Progress: improving  Outcome Evaluation: OT re-cert completed. Pt demonstrated gains this date as evidenced by improved cognition w/ increased alertness and engagement in OT interventions, increased activity tolerance sitting at EOB (~ 23 mins supported and unsupported collectively), tolerance to HEP with  some decreased PROM FE at L shoulder with pt sitting up EOB vs semi reclined or supine in bed. Pt participates some in UBD, sling mgmt w/ cues for improved tech, seq, position yet still with need for A for safety purposes. Pt able to briefly stand at EOB with min A x 1 w/ postural cues for stability. Pt is still limited by decreased activity tolerance, impaired balance, ROM limitations w/ s/p sx precautions, muscle weakness and would benefit from cont'd IPOT POC and SNF at d/c when medically ready.     Time Calculation:         Time Calculation- OT       Row Name 10/05/23 1526 10/05/23 0943          Time Calculation- OT    OT Start Time 1340  -JY --     OT Received On 10/05/23  -JY --     OT Goal Re-Cert Due Date 10/15/23  -JY --        Timed Charges    55128 - OT Therapeutic Exercise Minutes 20  -JY --     16848 - Gait Training Minutes  -- 15  -AB     83017 - OT Therapeutic Activity Minutes 23  -JY --     24949 - OT Self Care/Mgmt Minutes 12  -JY --        Total Minutes    Timed Charges Total Minutes 55  -JY 15  -AB      Total Minutes 55  -JY 15  -AB               User Key  (r) = Recorded By, (t) = Taken By, (c) = Cosigned By      Initials Name Provider Type    Alma Delia Corral OT Occupational Therapist    AB Caro Youngblood, PT Physical Therapist                  Therapy Charges for Today       Code Description Service Date Service Provider Modifiers Qty    83436177756 HC OT THER PROC EA 15 MIN 10/5/2023 Alma Delia Barrera OT GO 1    91726077948 HC OT THERAPEUTIC ACT EA 15 MIN 10/5/2023 Alma Delia Barrera OT GO 2    49852633723 HC OT SELF CARE/MGMT/TRAIN EA 15 MIN 10/5/2023 Alma Delia Barrera OT GO 1                 Alma Delia Barrera OT  10/5/2023

## 2023-10-05 NOTE — DISCHARGE SUMMARY
Patient Name: Enrike Tomas  MRN: [id number]  : 1935  DOS: 10/5/2023    Attending: Matteo Tan MD    Primary Care Provider: Blair Dhaliwal MD    Date of Admission:.2023  9:41 AM    Date of Discharge:  10/5/2023    Discharge Diagnosis:   S/P shoulder hemiarthroplasty, left    (HFpEF) heart failure with preserved ejection fraction    Benign prostatic hyperplasia    Depression    ESRD (end stage renal disease)    Essential hypertension    GERD (gastroesophageal reflux disease)    Mixed hyperlipidemia    NERY (obstructive sleep apnea)    Paroxysmal atrial fibrillation    Chronic heart failure with preserved ejection fraction (HFpEF)    Anemia, acute on chronic    Postoperative confusion      Hospital Course    At admit:  Patient is a pleasant 88 y.o. male presented for scheduled surgery by .     He has a long history of shoulder pain and limited ROM. Conservative treatments failed.   Of note, he had hemodialysis this morning through RUE AV fistula. 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      He underwent left shoulder hemiarthroplasty and left biceps tenodesis,  under GA and a block, tolerated surgery well, was admitted for further management.     Seen in PACU postoperatively, doing fairly well, good pain control, no complains of nausea, vomiting, or shortness of breath.     Reviewed patient's past medical history including history of atrial fibrillation, BPH, end-stage renal disease.  He is on chronic anticoagulation which is on hold for surgery.       After admit:  Patient was provided pain medications as needed for pain control, along with interscalene nerve block infusion of Ropivacaine    Adjustments were made to pain medications to optimize postop pain management.   Risks and benefits of opiate medications discussed with patient. ROSAS report on chart was reviewed.    The patient was seen by OT and PT and was taught exercises for his arm.  It was recommended that he  be discharged to a skilled nursing facility.  The patient used an IS for atelectasis prophylaxis and mechanicals for DVT prophylaxis.  Patient's Eliquis was resumed for history of atrial fibrillation.    Home medications were resumed as appropriate, and labs were monitored and remained fairly stable.  Nephrology was consulted and he received scheduled dialysis during his admission.    With the progress he has made, he is ready for DC to SNF today.          Procedures Performed  Procedure(s):  HEMIARTHROPLASTY, BICEPS TENODESIS - LEFT       Pertinent Test Results:    I reviewed the patient's new clinical results.   Results from last 7 days   Lab Units 10/01/23  0536   WBC 10*3/mm3 6.80   HEMOGLOBIN g/dL 12.1*   HEMATOCRIT % 37.3*   PLATELETS 10*3/mm3 247     Results from last 7 days   Lab Units 10/04/23  0751 10/01/23  0536   SODIUM mmol/L 134* 134*   POTASSIUM mmol/L 3.8 5.3*   CHLORIDE mmol/L 97* 99   CO2 mmol/L 25.0 17.0*   BUN mg/dL 47* 42*   CREATININE mg/dL 4.53* 4.03*   CALCIUM mg/dL 9.1 9.4   GLUCOSE mg/dL 120* 73     I reviewed the patient's new imaging including images and reports.      Occupational Therapy  Progress: improving  Outcome Evaluation: Pt with good progress and participation in OT this date. Pt performed bed mobility and ambulated 4' to the chair with min/modA, limited by posterior lean. Pt completed LUE HEP within MD parameters, tolerating passive shoulder FE to 80, ER to 10, IR to chest. OT reviewed L shoulder precautions, ADL retraining to maintain, and sling management. Pt continues to present below functional baseline due to LUE NWB/immobilization, generalized weakness, balance impairments, and decreased activity tolerance. Recommend a d/c to SNF for best functional outcome.        Anticipated Discharge Disposition (OT): skilled nursing facility    Physical therapy  Progress: improving  Outcome Evaluation: PT re-cert completed. Pt with good participation and demonstrates improvements in  "gait mechanics and endurance this session. Pt ambulated 18' with min Ax2 and trent-walker. Posterior lean noted in standing. No overt LOB but instability throughout gait training. Pt continues to make progress towards PT goals. He is limited by weakness, WB status, impaired endurance, and increased pain causing functional mobility below baseline. Further IPPT is warrented. PT rec d/c to SNF when medically appropriate.        Anticipated Discharge Disposition (PT): skilled nursing facility    Discharge Assessment:       Visit Vitals  /54   Pulse 78   Temp 98.3 °F (36.8 °C) (Oral)   Resp 18   Ht 157.5 cm (62.01\")   Wt 54.2 kg (119 lb 7.8 oz)   SpO2 96%   BMI 21.85 kg/m²     Temp (24hrs), Av.9 °F (36.6 °C), Min:97.6 °F (36.4 °C), Max:98.4 °F (36.9 °C)      General Appearance:    Alert, cooperative, in no acute distress   Lungs:     Clear to auscultation,respirations regular, even and   unlabored    Heart:    Regular rhythm and normal rate, normal S1 and S2    Abdomen:     Normal bowel sounds, no masses, no organomegaly, soft        non-tender, non-distended, no guarding, no rebound                 tenderness   Extremities:   LUE in a sling, CDI dressing over left shoulder incision . Interscalene nerve block cath present.  Distal pulses, cap refill,  intact. Movement and sensation intact     Pulses:   Pulses palpable and equal bilaterally   Skin:   No bleeding, bruising or rash        Discharge Disposition: Skilled nursing facility          Discharge Medications        Continue These Medications        Instructions Start Date   acetaminophen 500 MG tablet  Commonly known as: TYLENOL   500 mg, Oral, Every 6 Hours PRN      amiodarone 200 MG tablet  Commonly known as: PACERONE   200 mg, Oral, Daily      apixaban 2.5 MG tablet tablet  Commonly known as: ELIQUIS   2.5 mg, Oral, 2 Times Daily, PT TO HOLD 48 HRS PRIOR TO PROCEDURE PER MERCEDEZ VARGHESE.      atorvastatin 20 MG tablet  Commonly known as: LIPITOR   20 " mg, Oral, Nightly      finasteride 5 MG tablet  Commonly known as: PROSCAR   Take 1 tablet by mouth Daily.      fluticasone 50 MCG/ACT nasal spray  Commonly known as: FLONASE   2 sprays, Nasal, Daily      folic acid 1 MG tablet  Commonly known as: FOLVITE   1 mg, Oral, Daily      HYDROcodone-acetaminophen 7.5-325 MG per tablet  Commonly known as: NORCO   1 tablet, Oral, Nightly      HYDROcodone-acetaminophen 5-325 MG per tablet  Commonly known as: NORCO   1 tablet, Oral, Every 4 Hours PRN      melatonin 5 MG tablet tablet   5 mg, Oral, Nightly      midodrine 5 MG tablet  Commonly known as: PROAMATINE   5 mg, Oral, 3 Times Daily Before Meals      mirtazapine 7.5 MG tablet  Commonly known as: REMERON   7.5 mg, Oral, Nightly      naloxone 4 MG/0.1ML nasal spray  Commonly known as: NARCAN   4 mg, Nasal, As Needed      pantoprazole 40 MG EC tablet  Commonly known as: PROTONIX   40 mg, Oral, Daily      sennosides-docusate 8.6-50 MG per tablet  Commonly known as: PERICOLACE   2 tablets, Oral, 2 Times Daily      sertraline 50 MG tablet  Commonly known as: ZOLOFT   50 mg, Oral, Daily      Teriparatide (Recombinant) 620 MCG/2.48ML injection  Commonly known as: FORTEO   Inject 20 mcg under the skin into the appropriate area as directed Daily.               Discharge Diet: Resume home diet    Activity at Discharge:   Passive ROM shoulder LUE  FE no limit   ER to 30   IR to chest   Active ROM elbow/wrist/hand   Scapula retractions     Follow-up Appointments:  Future Appointments   Date Time Provider Department Center   10/5/2023  4:00 PM EMS 1  [facility]         Discharge took less than 30 minutes    Electronically signed by MERCEDEZ Lake, 10/05/23, 2:17 PM EDT.

## 2023-10-05 NOTE — CASE MANAGEMENT/SOCIAL WORK
Case Management Discharge Note      Final Note:     Mr. Tomas has a skilled bed at [facility] today, if medically ready.  Confirmed bed with Cristine at facility.  Insurance auth received from the patient's [facility] Medicare for the rehab admission.      Mr. Tomas' wife and daughter are agreeable to DC plan.      [facility] ambulance scheduled for today at 4pm. PCS form on the chart.      Facility pharmacy provider is [facility] in [place], [place], and is noted in EPIC for e-scribing.      Please call report to [facility] at . [phone].   will fax the DC summary to fax [phone].    Thank you.         Selected Continued Care - Admitted Since 9/25/2023       Destination Coordination complete.      Service Provider Selected Services Address Phone Fax Patient Preferred    [facility] [address] [facility] [address] [phone] --                      Transportation Services  Ambulance: [facility]    Final Discharge Disposition Code: 03 - skilled nursing facility (SNF)

## 2023-10-05 NOTE — THERAPY TREATMENT NOTE
Patient Name: Enrike Tomas  : 1935    MRN: [id number]                              Today's Date: 10/5/2023       Admit Date: 2023    Visit Dx: No diagnosis found.  Patient Active Problem List   Diagnosis    (HFpEF) heart failure with preserved ejection fraction    Anemia of renal disease    At high risk for falls    Benign prostatic hyperplasia    Closed fracture of left acetabulum    Compression fracture of thoracic vertebra    Deficiency of other specified B group vitamins    Depression    ESRD (end stage renal disease)    Essential hypertension    GERD (gastroesophageal reflux disease)    Hepatic cyst    Long term (current) use of anticoagulants    Major depressive disorder, single episode, moderate    Mixed hyperlipidemia    Morgagni hernia    Occlusion of right vertebral artery    NERY (obstructive sleep apnea)    Fall    Severe malnutrition    Sustained SVT    Paroxysmal atrial fibrillation    Chronic heart failure with preserved ejection fraction (HFpEF)    S/P shoulder hemiarthroplasty, left    Anemia, acute on chronic    Postoperative confusion     Past Medical History:   Diagnosis Date    A-fib     Abnormal ECG     occasional extra heartbeat    Anemia, acute on chronic 2023    Arthritis     Atrial fibrillation     BPH (benign prostatic hyperplasia)     Cervical compression fracture 2023    currently wearing a c collar    Diverticulosis     Diverticulum of esophagus     puree diet and speech therapy    Elevated cholesterol     ESRD on hemodialysis     GERD (gastroesophageal reflux disease)     Heart failure     Hemodialysis patient     M,W,F    Hyperlipidemia     Hypertension     Low back pain     NERY (obstructive sleep apnea)     NOT USING CPAP    Recurrent falls     UTI (urinary tract infection)      Past Surgical History:   Procedure Laterality Date    CATARACT EXTRACTION      COLONOSCOPY      DIALYSIS FISTULA CREATION      ENDOSCOPY      INGUINAL HERNIA REPAIR      PROSTATE  BIOPSY      THROAT SURGERY      diverticulum in throat    TONSILLECTOMY AND ADENOIDECTOMY      TOTAL SHOULDER ARTHROPLASTY W/ DISTAL CLAVICLE EXCISION Left 9/25/2023    Procedure: HEMIARTHROPLASTY, BICEPS TENODESIS - LEFT;  Surgeon: Matteo Tan MD;  Location: [facility];  Service: Orthopedics;  Laterality: Left;      General Information       Row Name 10/05/23 0921          Physical Therapy Time and Intention    Document Type progress note/recertification  -AB     Mode of Treatment physical therapy  -AB       Row Name 10/05/23 0921          General Information    Patient Profile Reviewed yes  -AB     Prior Level of Function --  See IE  -AB     Existing Precautions/Restrictions fall;left;shoulder;non-weight bearing;other (see comments)  Sling with abduction pillow  -AB     Barriers to Rehab medically complex;previous functional deficit;cognitive status  -AB       Row Name 10/05/23 0921          Cognition    Orientation Status (Cognition) oriented to;person;place;situation;disoriented to;time  -AB       Row Name 10/05/23 0921          Safety Issues, Functional Mobility    Safety Issues Affecting Function (Mobility) awareness of need for assistance;insight into deficits/self-awareness;safety precaution awareness;safety precautions follow-through/compliance;sequencing abilities  -AB     Impairments Affecting Function (Mobility) balance;endurance/activity tolerance;pain;postural/trunk control;range of motion (ROM);strength  -AB               User Key  (r) = Recorded By, (t) = Taken By, (c) = Cosigned By      Initials Name Provider Type    AB Caro Youngblood PT Physical Therapist                   Mobility       Row Name 10/05/23 0926          Bed Mobility    Bed Mobility supine-sit;scooting/bridging  -AB     Scooting/Bridging [place] (Bed Mobility) minimum assist (75% patient effort);1 person assist;verbal cues  -AB     Supine-Sit [place] (Bed Mobility) minimum assist (75% patient effort);1 person  assist;verbal cues  -AB     Assistive Device (Bed Mobility) draw sheet;head of bed elevated  -AB     Comment, (Bed Mobility) Boost at trunk to sit EOB.  -AB       Row Name 10/05/23 0926          Transfers    Comment, (Transfers) Cues for hand placement, sequencing, and AD usage. Pt with improved cognition this session and motivated to participate in therapy.  -AB       Row Name 10/05/23 0926          Sit-Stand Transfer    Sit-Stand [place] (Transfers) minimum assist (75% patient effort);verbal cues;nonverbal cues (demo/gesture);2 person assist  -AB     Assistive Device (Sit-Stand Transfers) walker, trent  -AB     Comment, (Sit-Stand Transfer) Cues for anterior weight shifting in order to stand upright. Posterior lean initially but able to correct with time.  -AB       Row Name 10/05/23 0926          Gait/Stairs (Locomotion)    [place] Level (Gait) minimum assist (75% patient effort);2 person assist;verbal cues;nonverbal cues (demo/gesture)  -AB     Assistive Device (Gait) walker, trent  -AB     Distance in Feet (Gait) 18'  -AB     Deviations/Abnormal Patterns (Gait) amberly decreased;gait speed decreased;stride length decreased;bilateral deviations;base of support, narrow  -AB     Bilateral Gait Deviations forward flexed posture;heel strike decreased  -AB     Comment, (Gait/Stairs) Pt ambulated with step to gait pattern and short/shuffling steps. Cues for sequencing with trent walker and upright posture. Pt able to improved with cues. One standing rest break needed. Chair pulled up behind pt at end of gait training. No overt LOB but unsteadiness with posterior lean noted throughout. Further activity limited by fatigue.  -AB       Row Name 10/05/23 0926          Mobility    Extremity Weight-bearing Status left upper extremity  -AB     Left Upper Extremity (Weight-bearing Status) non weight-bearing (NWB)   -AB               User Key  (r) = Recorded By, (t) = Taken By, (c) = Cosigned By      Initials Name  Provider Type    AB Caro Youngblood, PT Physical Therapist                   Obj/Interventions       Row Name 10/05/23 0935          Range of Motion Comprehensive    General Range of Motion bilateral lower extremity ROM WFL  -AB       Row Name 10/05/23 0935          Strength Comprehensive (MMT)    General Manual Muscle Testing (MMT) Assessment lower extremity strength deficits identified  -AB     Comment, General Manual Muscle Testing (MMT) Assessment BLE strength 4-/5  -AB       Row Name 10/05/23 0935          Balance    Balance Assessment sitting static balance;sitting dynamic balance;standing static balance;standing dynamic balance  -AB     Static Sitting Balance standby assist  -AB     Dynamic Sitting Balance contact guard  -AB     Position, Sitting Balance unsupported;sitting edge of bed  -AB     Static Standing Balance minimal assist;2-person assist;verbal cues  -AB     Dynamic Standing Balance minimal assist;2-person assist;verbal cues  -AB     Position/Device Used, Standing Balance supported;walker, trent  -AB     Balance Interventions sitting;standing;sit to stand;supported;static;dynamic;occupation based/functional task  -AB     Comment, Balance Posterior lean in standing requiring min Ax2 for correction.  -AB       Row Name 10/05/23 0935          Sensory Assessment (Somatosensory)    Sensory Assessment (Somatosensory) LE sensation intact  -AB               User Key  (r) = Recorded By, (t) = Taken By, (c) = Cosigned By      Initials Name Provider Type    AB Caro Youngblood, PT Physical Therapist                   Goals/Plan    No documentation.                  Clinical Impression       Row Name 10/05/23 0937          Pain    Pretreatment Pain Rating 0/10 - no pain  -AB     Posttreatment Pain Rating 2/10  -AB     Pain Location - Side/Orientation Left  -AB     Pain Location generalized  -AB     Pain Location - shoulder  -AB     Pre/Posttreatment Pain Comment Tolerated.  -AB     Pain Intervention(s)  Ambulation/increased activity;Repositioned;Rest  -AB       Row Name 10/05/23 0937          Plan of Care Review    Plan of Care Reviewed With patient;caregiver  -AB     Progress improving  -AB     Outcome Evaluation PT re-cert completed. Pt with good participation and demonstrates improvements in gait mechanics and endurance this session. Pt ambulated 18' with min Ax2 and trent-walker. Posterior lean noted in standing. No overt LOB but instability throughout gait training. Pt continues to make progress towards PT goals. He is limited by weakness, WB status, impaired endurance, and increased pain causing functional mobility below baseline. Further IPPT is warrented. PT rec d/c to SNF when medically appropriate.  -AB       Row Name 10/05/23 0937          Therapy Assessment/Plan (PT)    Rehab Potential (PT) good, to achieve stated therapy goals  -AB     Criteria for Skilled Interventions Met (PT) yes;meets criteria;skilled treatment is necessary  -AB     Therapy Frequency (PT) daily  -AB       Row Name 10/05/23 0937          Vital Signs    Pre Systolic BP Rehab 141  -AB     Pre Treatment Diastolic BP 58  -AB     Pretreatment Heart Rate (beats/min) 70  -AB     Posttreatment Heart Rate (beats/min) 75  -AB     Pre SpO2 (%) 90  -AB     O2 Delivery Pre Treatment room air  -AB     O2 Delivery Intra Treatment room air  -AB     Post SpO2 (%) 99  -AB     O2 Delivery Post Treatment room air  -AB     Pre Patient Position Supine  -AB     Intra Patient Position Standing  -AB     Post Patient Position Sitting  -AB       Row Name 10/05/23 0937          Positioning and Restraints    Pre-Treatment Position in bed  -AB     Post Treatment Position chair  -AB     In Chair reclined;notified nsg;sitting;call light within reach;encouraged to call for assist;exit alarm on;legs elevated;with family/caregiver;waffle cushion;with brace  -AB               User Key  (r) = Recorded By, (t) = Taken By, (c) = Cosigned By      Initials Name Provider  Type    AB Caro Youngblood PT Physical Therapist                   Outcome Measures       Row Name 10/05/23 0942          How much help from another person do you currently need...    Turning from your back to your side while in flat bed without using bedrails? 3  -AB     Moving from lying on back to sitting on the side of a flat bed without bedrails? 3  -AB     Moving to and from a bed to a chair (including a wheelchair)? 3  -AB     Standing up from a chair using your arms (e.g., wheelchair, bedside chair)? 3  -AB     Climbing 3-5 steps with a railing? 1  -AB     To walk in hospital room? 2  -AB     AM-PAC 6 Clicks Score (PT) 15  -AB     Highest level of mobility 4 --> Transferred to chair/commode  -AB       Row Name 10/05/23 0942          Functional Assessment    Outcome Measure Options AM-PAC 6 Clicks Basic Mobility (PT)  -AB               User Key  (r) = Recorded By, (t) = Taken By, (c) = Cosigned By      Initials Name Provider Type    Caro Draper PT Physical Therapist                                 Physical Therapy Education       Title: PT OT SLP Therapies (In Progress)       Topic: Physical Therapy (In Progress)       Point: Mobility training (In Progress)       Learning Progress Summary             Patient Acceptance, E,D, VU,NR by AB at 10/5/2023 0942    Acceptance, E, NR by CM at 10/3/2023 1523    Eager, E, VU,DU,NR by SS at 10/2/2023 1514    Comment: Reviewed safety/technique w/bed mobility, NWB status, transfers, HEP, PT POC    Acceptance, E, NR by KR at 10/1/2023 1124    Acceptance, E, VU,NR by KR at 9/30/2023 1342    Acceptance, E, VU,NR by LO at 9/29/2023 1443    Comment: PT POC    Acceptance, E, NR by CM at 9/28/2023 1030    Acceptance, E,D, VU,DU by HP at 9/26/2023 1711    Acceptance, E,D, VU,NR by HP at 9/25/2023 1849   Family Eager, E, VU,DU,NR by SS at 10/2/2023 1514    Comment: Reviewed safety/technique w/bed mobility, NWB status, transfers, HEP, PT POC    Acceptance, E, VU,NR by MYRIAM at  9/30/2023 1342   Caregiver Acceptance, E, NR by CM at 9/28/2023 1030                         Point: Home exercise program (In Progress)       Learning Progress Summary             Patient Acceptance, E, NR by CM at 10/3/2023 1523    Eager, E, VU,DU,NR by SS at 10/2/2023 1514    Comment: Reviewed safety/technique w/bed mobility, NWB status, transfers, HEP, PT POC    Acceptance, E, VU,NR by KR at 9/30/2023 1342    Acceptance, E, VU,NR by LO at 9/29/2023 1443    Comment: PT POC    Acceptance, E, NR by CM at 9/28/2023 1030    Acceptance, E,D, VU,DU by HP at 9/26/2023 1711    Acceptance, E,D, VU,NR by HP at 9/25/2023 1849   Family Eager, E, VU,DU,NR by SS at 10/2/2023 1514    Comment: Reviewed safety/technique w/bed mobility, NWB status, transfers, HEP, PT POC    Acceptance, E, VU,NR by KR at 9/30/2023 1342   Caregiver Acceptance, E, NR by CM at 9/28/2023 1030                         Point: Body mechanics (In Progress)       Learning Progress Summary             Patient Acceptance, E,D, VU,NR by AB at 10/5/2023 0942    Acceptance, E, NR by CM at 10/3/2023 1523    Eager, E, VU,DU,NR by SS at 10/2/2023 1514    Comment: Reviewed safety/technique w/bed mobility, NWB status, transfers, HEP, PT POC    Acceptance, E, NR by KR at 10/1/2023 1124    Acceptance, E, VU,NR by KR at 9/30/2023 1342    Acceptance, E, VU,NR by LO at 9/29/2023 1443    Comment: PT POC    Acceptance, E, NR by CM at 9/28/2023 1030    Acceptance, E,D, VU,DU by HP at 9/26/2023 1711    Acceptance, E,D, VU,NR by HP at 9/25/2023 1849   Family Eager, E, VU,DU,NR by SS at 10/2/2023 1514    Comment: Reviewed safety/technique w/bed mobility, NWB status, transfers, HEP, PT POC    Acceptance, E, VU,NR by KR at 9/30/2023 1342   Caregiver Acceptance, E, NR by CM at 9/28/2023 1030                         Point: Precautions (In Progress)       Learning Progress Summary             Patient Acceptance, E,D, VU,NR by AB at 10/5/2023 0942    Acceptance, E, NR by CM at  10/3/2023 1523    Eager, E, VU,DU,NR by SS at 10/2/2023 1514    Comment: Reviewed safety/technique w/bed mobility, NWB status, transfers, HEP, PT POC    Acceptance, E, NR by KR at 10/1/2023 1124    Acceptance, E, VU,NR by KR at 9/30/2023 1342    Acceptance, E, VU,NR by LO at 9/29/2023 1443    Comment: PT POC    Acceptance, E, NR by CM at 9/28/2023 1030    Acceptance, E,D, VU,DU by HP at 9/26/2023 1711    Acceptance, E,D, VU,NR by HP at 9/25/2023 1849   Family Eager, E, VU,DU,NR by SS at 10/2/2023 1514    Comment: Reviewed safety/technique w/bed mobility, NWB status, transfers, HEP, PT POC    Acceptance, E, VU,NR by KR at 9/30/2023 1342   Caregiver Acceptance, E, NR by CM at 9/28/2023 1030                                         User Key       Initials Effective Dates Name Provider Type Discipline    LO 06/16/21 -  Julisa Lanza, PT Physical Therapist PT    HP 06/01/21 -  Fouzia Woody, PT Physical Therapist PT    SS 06/01/21 -  Bing Castellanos, PT Physical Therapist PT    AB 09/22/22 -  Caro Youngblood, PT Physical Therapist PT    CM 09/22/22 -  Hortencia Sommer, PT Physical Therapist PT    KR 12/30/22 -  Nazia Murray, PT Physical Therapist PT                  PT Recommendation and Plan     Plan of Care Reviewed With: patient, caregiver  Progress: improving  Outcome Evaluation: PT re-cert completed. Pt with good participation and demonstrates improvements in gait mechanics and endurance this session. Pt ambulated 18' with min Ax2 and trent-walker. Posterior lean noted in standing. No overt LOB but instability throughout gait training. Pt continues to make progress towards PT goals. He is limited by weakness, WB status, impaired endurance, and increased pain causing functional mobility below baseline. Further IPPT is warrented. PT rec d/c to SNF when medically appropriate.     Time Calculation:         PT Charges       Row Name 10/05/23 7109             Time Calculation    Start Time 0840  -AB      PT Received  On 10/05/23  -AB      PT Goal Re-Cert Due Date 10/15/23  -AB         Timed Charges    31284 - Gait Training Minutes  15  -AB      01659 - PT Therapeutic Activity Minutes 8  -AB         Total Minutes    Timed Charges Total Minutes 23  -AB       Total Minutes 23  -AB                User Key  (r) = Recorded By, (t) = Taken By, (c) = Cosigned By      Initials Name Provider Type    AB Caro Youngblood, PT Physical Therapist                  Therapy Charges for Today       Code Description Service Date Service Provider Modifiers Qty    15693099168 HC GAIT TRAINING EA 15 MIN 10/5/2023 Caro Youngblood, PT GP 1    03202450498 HC PT THERAPEUTIC ACT EA 15 MIN 10/5/2023 Caro Youngblood, PT GP 1    14168613593 HC PT THER SUPP EA 15 MIN 10/5/2023 Caro Youngblood, PT GP 2            PT G-Codes  Outcome Measure Options: AM-PAC 6 Clicks Basic Mobility (PT)  AM-PAC 6 Clicks Score (PT): 15  AM-PAC 6 Clicks Score (OT): 14  PT Discharge Summary  Anticipated Discharge Disposition (PT): skilled nursing facility    Caro Youngblood PT  10/5/2023

## 2023-10-05 NOTE — PLAN OF CARE
Goal Outcome Evaluation:  Plan of Care Reviewed With: patient, caregiver        Progress: improving  Outcome Evaluation: PT re-cert completed. Pt with good participation and demonstrates improvements in gait mechanics and endurance this session. Pt ambulated 18' with min Ax2 and trent-walker. Posterior lean noted in standing. No overt LOB but instability throughout gait training. Pt continues to make progress towards PT goals. He is limited by weakness, WB status, impaired endurance, and increased pain causing functional mobility below baseline. Further IPPT is warrented. PT rec d/c to SNF when medically appropriate.      Anticipated Discharge Disposition (PT): skilled nursing facility

## 2023-10-05 NOTE — PLAN OF CARE
Goal Outcome Evaluation:  Plan of Care Reviewed With: patient           Outcome Evaluation: D/C to [facility]

## 2024-01-08 ENCOUNTER — APPOINTMENT (OUTPATIENT)
Dept: GENERAL RADIOLOGY | Facility: HOSPITAL | Age: 89
End: 2024-01-08
Payer: MEDICARE

## 2024-01-08 ENCOUNTER — HOSPITAL ENCOUNTER (EMERGENCY)
Facility: HOSPITAL | Age: 89
Discharge: HOME | End: 2024-01-08
Attending: EMERGENCY MEDICINE | Admitting: EMERGENCY MEDICINE
Payer: MEDICARE

## 2024-01-08 VITALS
HEIGHT: 66 IN | OXYGEN SATURATION: 96 % | HEART RATE: 76 BPM | TEMPERATURE: 98.2 F | WEIGHT: 118 LBS | BODY MASS INDEX: 18.96 KG/M2 | RESPIRATION RATE: 19 BRPM | DIASTOLIC BLOOD PRESSURE: 76 MMHG | SYSTOLIC BLOOD PRESSURE: 126 MMHG

## 2024-01-08 DIAGNOSIS — Z87.81 HISTORY OF RIB FRACTURE: ICD-10-CM

## 2024-01-08 DIAGNOSIS — S52.022A CLOSED FRACTURE OF OLECRANON PROCESS OF LEFT ULNA, INITIAL ENCOUNTER: Primary | ICD-10-CM

## 2024-01-08 PROCEDURE — 99282 EMERGENCY DEPT VISIT SF MDM: CPT

## 2024-01-08 PROCEDURE — 71101 X-RAY EXAM UNILAT RIBS/CHEST: CPT

## 2024-01-08 PROCEDURE — 73030 X-RAY EXAM OF SHOULDER: CPT

## 2024-01-08 PROCEDURE — 73080 X-RAY EXAM OF ELBOW: CPT

## 2024-01-08 RX ORDER — ACETAMINOPHEN 500 MG
500 TABLET ORAL ONCE
Status: DISCONTINUED | OUTPATIENT
Start: 2024-01-08 | End: 2024-01-08 | Stop reason: HOSPADM

## 2024-01-08 NOTE — ED PROVIDER NOTES
Subjective   History of Present Illness patient is an 88-year-old gentleman accompanied by his wife, who reports that he fell on Saturday night at their home hitting his left elbow complaining of pain there as well has some bruising and swelling.  Further examination and inquiry, patient also reports some left-sided chest wall pain which she reports has been present for months but worse with coughing, as well as left shoulder pain, which she attributes to the shoulder replacement surgery he had this past year by Dr. Daniels.  His wife reports they scheduled an outpatient appointment tomorrow with Dr. Quijano but wanted to come in sooner upon the advice of the hemodialysis staff who are concerned about his swelling in the left elbow after his treatment today.  Patient has a functioning right upper extremity fistula and a nonfunctioning left upper extremity fistula.  Patient is awake, alert, nontoxic-appearing.  Patient denies loss of consciousness, headache or altered mental status following the fall this weekend.    Review of Systems   Constitutional: Negative.    HENT: Negative.     Respiratory: Negative.     Cardiovascular: Negative.    Gastrointestinal: Negative.    Musculoskeletal:  Positive for arthralgias and joint swelling.   Skin:  Positive for color change.   Neurological: Negative.    Psychiatric/Behavioral: Negative.         Past Medical History:   Diagnosis Date   • A-fib    • Abnormal ECG     occasional extra heartbeat   • Anemia, acute on chronic 9/30/2023   • Arthritis    • Atrial fibrillation    • BPH (benign prostatic hyperplasia)    • Cervical compression fracture 06/05/2023    currently wearing a c collar   • Diverticulosis    • Diverticulum of esophagus     puree diet and speech therapy   • Elevated cholesterol    • ESRD on hemodialysis    • GERD (gastroesophageal reflux disease)    • Heart failure    • Hemodialysis patient     M,W,F   • Hyperlipidemia    • Hypertension    • Low back pain 2022    • NERY (obstructive sleep apnea)     NOT USING CPAP   • Recurrent falls    • UTI (urinary tract infection)        No Known Allergies    Past Surgical History:   Procedure Laterality Date   • CATARACT EXTRACTION     • COLONOSCOPY     • DIALYSIS FISTULA CREATION     • ENDOSCOPY     • INGUINAL HERNIA REPAIR     • PROSTATE BIOPSY     • THROAT SURGERY      diverticulum in throat   • TONSILLECTOMY AND ADENOIDECTOMY     • TOTAL SHOULDER ARTHROPLASTY W/ DISTAL CLAVICLE EXCISION Left 9/25/2023    Procedure: HEMIARTHROPLASTY, BICEPS TENODESIS - LEFT;  Surgeon: Matteo Tan MD;  Location: [facility];  Service: Orthopedics;  Laterality: Left;       Family History   Problem Relation Age of Onset   • Stomach cancer Mother    • Heart disease Mother    • Arthritis Mother    • Heart disease Father    • Stroke Father    • Kidney failure Father    • Cancer Father    • Kidney disease Father        Social History     Socioeconomic History   • Marital status:    Tobacco Use   • Smoking status: Never   • Smokeless tobacco: Never   Vaping Use   • Vaping Use: Never used   Substance and Sexual Activity   • Alcohol use: Not Currently     Alcohol/week: 1.0 standard drink of alcohol     Types: 1 Glasses of wine per week     Comment: once monthly typically   • Drug use: Never   • Sexual activity: Defer           Objective   Physical Exam  Constitutional:       General: He is not in acute distress.     Appearance: Normal appearance. He is not ill-appearing.   HENT:      Head: Normocephalic and atraumatic.   Eyes:      Extraocular Movements: Extraocular movements intact.      Conjunctiva/sclera: Conjunctivae normal.      Pupils: Pupils are equal, round, and reactive to light.   Cardiovascular:      Rate and Rhythm: Normal rate.      Pulses: Normal pulses.      Comments: Distal pulses in the left upper extremity patent at 2+ at the radial aspect.  No bruit or thrill appreciated left upper extremity fistula, however right upper  extremity is functioning as bandaged after hemodialysis treatment today.  Pulmonary:      Effort: Pulmonary effort is normal.      Breath sounds: Normal breath sounds.   Abdominal:      General: Bowel sounds are normal.      Palpations: Abdomen is soft.   Musculoskeletal:         General: Swelling, tenderness and signs of injury present. No deformity. Normal range of motion.      Cervical back: Normal range of motion.   Skin:     General: Skin is warm and dry.      Capillary Refill: Capillary refill takes 2 to 3 seconds.      Findings: Bruising and erythema present.   Neurological:      General: No focal deficit present.      Mental Status: He is alert and oriented to person, place, and time.   Psychiatric:         Mood and Affect: Mood normal.         Behavior: Behavior normal.         Procedures           ED Course  ED Course as of 01/08/24 1723   Mon Jan 08, 2024   1507 Patient initially evaluated in ED pit, accompanied by his wife [JH]   1636 Reevaluated the patient in ED lobby, explaining results of his imaging studies indicating an avulsion that is nondisplaced to the left elbow.  His wife and he understand this and the indication to place in a long-arm splint at 90 degrees with a sling and keep her follow-up with orthopedics tomorrow.  They are agreeable to plan as explained.  Will attempt to accomplish this in the virtual waiting room assignment in room 1 for the splint placement. []   1722 Check the patient's splint, distal neurovascular intact, patient complemented the care provided by the team and he will be discharged to follow-up with his orthopedist. [JH]      ED Course User Index  [JH] Marcus Eng APRN                                             Medical Decision Making  Given the patient's presenting symptoms and report of low mechanism of trauma, my differential diagnosis includes muscular skill sprain for strain, bony deformity to the avulsion, fracture, subluxation of the left upper extremity,  left shoulder, chest wall, ribs.  Patient have [place] imaging of the chest, ribs, left shoulder, left elbow.  Patient be reevaluated results communicated disposition considered.  Patient appreciative of care provided.    Amount and/or Complexity of Data Reviewed  Radiology: ordered.    Risk  OTC drugs.        Final diagnoses:   Closed fracture of olecranon process of left ulna, initial encounter   History of rib fracture       ED Disposition  ED Disposition       ED Disposition   Discharge    Condition   Stable    Comment   --               Matteo Tan MD  [address] [place] [address]  DON [address] [place] [address]  [phone]    On 1/9/2024  With orthopedist appointment as scheduled         Medication List      No changes were made to your prescriptions during this visit.            Marcus Eng, APRN  01/08/24 2496

## 2024-04-20 ENCOUNTER — APPOINTMENT (OUTPATIENT)
Dept: GENERAL RADIOLOGY | Facility: HOSPITAL | Age: 89
End: 2024-04-20
Payer: MEDICARE

## 2024-04-20 ENCOUNTER — APPOINTMENT (OUTPATIENT)
Dept: CT IMAGING | Facility: HOSPITAL | Age: 89
End: 2024-04-20
Payer: MEDICARE

## 2024-04-20 ENCOUNTER — HOSPITAL ENCOUNTER (EMERGENCY)
Facility: HOSPITAL | Age: 89
Discharge: HOME | End: 2024-04-20
Attending: EMERGENCY MEDICINE
Payer: MEDICARE

## 2024-04-20 VITALS
SYSTOLIC BLOOD PRESSURE: 88 MMHG | TEMPERATURE: 97.9 F | RESPIRATION RATE: 14 BRPM | WEIGHT: 117 LBS | OXYGEN SATURATION: 97 % | DIASTOLIC BLOOD PRESSURE: 76 MMHG | HEART RATE: 68 BPM | HEIGHT: 66 IN | BODY MASS INDEX: 18.8 KG/M2

## 2024-04-20 DIAGNOSIS — S30.0XXA CONTUSION OF BUTTOCK, INITIAL ENCOUNTER: Primary | ICD-10-CM

## 2024-04-20 DIAGNOSIS — N18.9 CHRONIC RENAL FAILURE, UNSPECIFIED CKD STAGE: ICD-10-CM

## 2024-04-20 DIAGNOSIS — I95.1 ORTHOSTATIC HYPOTENSION: ICD-10-CM

## 2024-04-20 LAB
ALBUMIN SERPL-MCNC: 3.7 G/DL (ref 3.5–5.2)
ALBUMIN/GLOB SERPL: 1.1 G/DL
ALP SERPL-CCNC: 93 U/L (ref 39–117)
ALT SERPL W P-5'-P-CCNC: 15 U/L (ref 1–41)
ANION GAP SERPL CALCULATED.3IONS-SCNC: 12 MMOL/L (ref 5–15)
AST SERPL-CCNC: 23 U/L (ref 1–40)
BASOPHILS # BLD AUTO: 0.05 10*3/MM3 (ref 0–0.2)
BASOPHILS NFR BLD AUTO: 0.8 % (ref 0–1.5)
BILIRUB SERPL-MCNC: 0.3 MG/DL (ref 0–1.2)
BUN SERPL-MCNC: 23 MG/DL (ref 8–23)
BUN/CREAT SERPL: 6.4 (ref 7–25)
CALCIUM SPEC-SCNC: 9.2 MG/DL (ref 8.6–10.5)
CHLORIDE SERPL-SCNC: 93 MMOL/L (ref 98–107)
CK SERPL-CCNC: 70 U/L (ref 20–200)
CO2 SERPL-SCNC: 30 MMOL/L (ref 22–29)
CREAT SERPL-MCNC: 3.59 MG/DL (ref 0.76–1.27)
DEPRECATED RDW RBC AUTO: 56.9 FL (ref 37–54)
EGFRCR SERPLBLD CKD-EPI 2021: 15.6 ML/MIN/1.73
EOSINOPHIL # BLD AUTO: 0.22 10*3/MM3 (ref 0–0.4)
EOSINOPHIL NFR BLD AUTO: 3.5 % (ref 0.3–6.2)
ERYTHROCYTE [DISTWIDTH] IN BLOOD BY AUTOMATED COUNT: 15.1 % (ref 12.3–15.4)
FLUAV RNA RESP QL NAA+PROBE: NOT DETECTED
FLUBV RNA RESP QL NAA+PROBE: NOT DETECTED
GEN 5 2HR TROPONIN T REFLEX: 278 NG/L
GLOBULIN UR ELPH-MCNC: 3.4 GM/DL
GLUCOSE SERPL-MCNC: 95 MG/DL (ref 65–99)
HCT VFR BLD AUTO: 38 % (ref 37.5–51)
HGB BLD-MCNC: 12.5 G/DL (ref 13–17.7)
IMM GRANULOCYTES # BLD AUTO: 0.02 10*3/MM3 (ref 0–0.05)
IMM GRANULOCYTES NFR BLD AUTO: 0.3 % (ref 0–0.5)
LYMPHOCYTES # BLD AUTO: 1.19 10*3/MM3 (ref 0.7–3.1)
LYMPHOCYTES NFR BLD AUTO: 19 % (ref 19.6–45.3)
MCH RBC QN AUTO: 33.5 PG (ref 26.6–33)
MCHC RBC AUTO-ENTMCNC: 32.9 G/DL (ref 31.5–35.7)
MCV RBC AUTO: 101.9 FL (ref 79–97)
MONOCYTES # BLD AUTO: 0.62 10*3/MM3 (ref 0.1–0.9)
MONOCYTES NFR BLD AUTO: 9.9 % (ref 5–12)
NEUTROPHILS NFR BLD AUTO: 4.17 10*3/MM3 (ref 1.7–7)
NEUTROPHILS NFR BLD AUTO: 66.5 % (ref 42.7–76)
NRBC BLD AUTO-RTO: 0 /100 WBC (ref 0–0.2)
PLATELET # BLD AUTO: 184 10*3/MM3 (ref 140–450)
PMV BLD AUTO: 8.8 FL (ref 6–12)
POTASSIUM SERPL-SCNC: 4 MMOL/L (ref 3.5–5.2)
PROT SERPL-MCNC: 7.1 G/DL (ref 6–8.5)
QT INTERVAL: 452 MS
QTC INTERVAL: 484 MS
RBC # BLD AUTO: 3.73 10*6/MM3 (ref 4.14–5.8)
SARS-COV-2 RNA RESP QL NAA+PROBE: NOT DETECTED
SODIUM SERPL-SCNC: 135 MMOL/L (ref 136–145)
TROPONIN T DELTA: -9 NG/L
TROPONIN T SERPL HS-MCNC: 287 NG/L
WBC NRBC COR # BLD AUTO: 6.27 10*3/MM3 (ref 3.4–10.8)

## 2024-04-20 PROCEDURE — 71045 X-RAY EXAM CHEST 1 VIEW: CPT

## 2024-04-20 PROCEDURE — 85025 COMPLETE CBC W/AUTO DIFF WBC: CPT | Performed by: EMERGENCY MEDICINE

## 2024-04-20 PROCEDURE — 93005 ELECTROCARDIOGRAM TRACING: CPT | Performed by: EMERGENCY MEDICINE

## 2024-04-20 PROCEDURE — 36415 COLL VENOUS BLD VENIPUNCTURE: CPT

## 2024-04-20 PROCEDURE — 80053 COMPREHEN METABOLIC PANEL: CPT | Performed by: EMERGENCY MEDICINE

## 2024-04-20 PROCEDURE — 72131 CT LUMBAR SPINE W/O DYE: CPT

## 2024-04-20 PROCEDURE — 87636 SARSCOV2 & INF A&B AMP PRB: CPT | Performed by: EMERGENCY MEDICINE

## 2024-04-20 PROCEDURE — 82550 ASSAY OF CK (CPK): CPT | Performed by: EMERGENCY MEDICINE

## 2024-04-20 PROCEDURE — 25810000003 SODIUM CHLORIDE 0.9 % SOLUTION: Performed by: EMERGENCY MEDICINE

## 2024-04-20 PROCEDURE — 84484 ASSAY OF TROPONIN QUANT: CPT | Performed by: EMERGENCY MEDICINE

## 2024-04-20 PROCEDURE — 99284 EMERGENCY DEPT VISIT MOD MDM: CPT

## 2024-04-20 RX ORDER — MIDODRINE HYDROCHLORIDE 5 MG/1
5 TABLET ORAL ONCE
Status: COMPLETED | OUTPATIENT
Start: 2024-04-20 | End: 2024-04-20

## 2024-04-20 RX ORDER — SODIUM CHLORIDE 0.9 % (FLUSH) 0.9 %
10 SYRINGE (ML) INJECTION AS NEEDED
Status: DISCONTINUED | OUTPATIENT
Start: 2024-04-20 | End: 2024-04-20 | Stop reason: HOSPADM

## 2024-04-20 RX ORDER — HYDROCODONE BITARTRATE AND ACETAMINOPHEN 5; 325 MG/1; MG/1
1 TABLET ORAL ONCE
Status: COMPLETED | OUTPATIENT
Start: 2024-04-20 | End: 2024-04-20

## 2024-04-20 RX ADMIN — MIDODRINE HYDROCHLORIDE 5 MG: 5 TABLET ORAL at 12:27

## 2024-04-20 RX ADMIN — SODIUM CHLORIDE 1000 ML: 9 INJECTION, SOLUTION INTRAVENOUS at 13:06

## 2024-04-20 RX ADMIN — HYDROCODONE BITARTRATE AND ACETAMINOPHEN 1 TABLET: 5; 325 TABLET ORAL at 17:47

## 2024-04-20 NOTE — ED PROVIDER NOTES
Subjective   History of Present Illness  88-year-old male who presents for evaluation of low back/buttocks pain.  The patient suffered mechanical fall on Wednesday, 3 days ago.  He ultimately just lost his balance and fell down to the ground.  The wife states that she braced him to some degree before he hit the ground.  He primarily does not walk at baseline anyway.  The pain is continued to persist which has prompted current presentation ER.  He denies any new trauma or injury.  He denies striking his head.  No neck or midline back pain.  No chest or abdominal pain.  The patient does have a history of orthostatic hypotension is supposed to take midodrine and did not take midodrine this morning.  Upon presentation the patient's blood pressure is in the 80s but he is completely awake and alert and nontoxic in appearance and exhibits mentation consistent with his baseline.  No pain throughout the lower extremities.  He is moving the lower extremities consistent with his previous baseline.  No other acute complaints.      Review of Systems   Constitutional:  Negative for chills, fatigue and fever.   HENT:  Negative for congestion, ear pain, postnasal drip, sinus pressure and sore throat.    Eyes:  Negative for pain, redness and visual disturbance.   Respiratory:  Negative for cough, chest tightness and shortness of breath.    Cardiovascular:  Negative for chest pain, palpitations and leg swelling.   Gastrointestinal:  Negative for abdominal pain, anal bleeding, blood in stool, diarrhea, nausea and vomiting.   Endocrine: Negative for polydipsia and polyuria.   Genitourinary:  Negative for difficulty urinating, dysuria, frequency and urgency.   Musculoskeletal:  Positive for arthralgias and back pain. Negative for neck pain.   Skin:  Negative for pallor and rash.   Allergic/Immunologic: Negative for environmental allergies and immunocompromised state.   Neurological:  Negative for dizziness, weakness and headaches.    Hematological:  Negative for adenopathy.   Psychiatric/Behavioral:  Negative for confusion, self-injury and suicidal ideas. The patient is not nervous/anxious.    All other systems reviewed and are negative.      Past Medical History:   Diagnosis Date    A-fib     Abnormal ECG     occasional extra heartbeat    Anemia, acute on chronic 9/30/2023    Arthritis     Atrial fibrillation     BPH (benign prostatic hyperplasia)     Cervical compression fracture 06/05/2023    currently wearing a c collar    Diverticulosis     Diverticulum of esophagus     puree diet and speech therapy    Elevated cholesterol     ESRD on hemodialysis     GERD (gastroesophageal reflux disease)     Heart failure     Hemodialysis patient     M,W,F    Hyperlipidemia     Hypertension     Low back pain 2022    NERY (obstructive sleep apnea)     NOT USING CPAP    Recurrent falls     UTI (urinary tract infection)        No Known Allergies    Past Surgical History:   Procedure Laterality Date    CATARACT EXTRACTION      COLONOSCOPY      DIALYSIS FISTULA CREATION      ENDOSCOPY      INGUINAL HERNIA REPAIR      PROSTATE BIOPSY      THROAT SURGERY      diverticulum in throat    TONSILLECTOMY AND ADENOIDECTOMY      TOTAL SHOULDER ARTHROPLASTY W/ DISTAL CLAVICLE EXCISION Left 9/25/2023    Procedure: HEMIARTHROPLASTY, BICEPS TENODESIS - LEFT;  Surgeon: Matteo Tan MD;  Location: [facility];  Service: Orthopedics;  Laterality: Left;       Family History   Problem Relation Age of Onset    Stomach cancer Mother     Heart disease Mother     Arthritis Mother     Heart disease Father     Stroke Father     Kidney failure Father     Cancer Father     Kidney disease Father        Social History     Socioeconomic History    Marital status:    Tobacco Use    Smoking status: Never    Smokeless tobacco: Never   Vaping Use    Vaping status: Never Used   Substance and Sexual Activity    Alcohol use: Not Currently     Alcohol/week: 1.0 standard drink of  alcohol     Types: 1 Glasses of wine per week     Comment: once monthly typically    Drug use: Never    Sexual activity: Defer           Objective   Physical Exam  Vitals and nursing note reviewed.   Constitutional:       General: He is not in acute distress.     Appearance: Normal appearance. He is well-developed. He is not toxic-appearing or diaphoretic.   HENT:      Head: Normocephalic and atraumatic.      Right Ear: External ear normal.      Left Ear: External ear normal.      Nose: Nose normal.   Eyes:      General: Lids are normal.      Pupils: Pupils are equal, round, and reactive to light.   Neck:      Trachea: No tracheal deviation.   Cardiovascular:      Rate and Rhythm: Normal rate and regular rhythm.      Pulses: No decreased pulses.      Heart sounds: Normal heart sounds. No murmur heard.     No friction rub. No gallop.   Pulmonary:      Effort: Pulmonary effort is normal. No respiratory distress.      Breath sounds: Normal breath sounds. No decreased breath sounds, wheezing, rhonchi or rales.   Abdominal:      General: Bowel sounds are normal.      Palpations: Abdomen is soft.      Tenderness: There is no abdominal tenderness. There is no guarding or rebound.   Musculoskeletal:         General: No deformity. Normal range of motion.      Cervical back: Normal range of motion and neck supple. No tenderness.      Thoracic back: No tenderness.      Lumbar back: No swelling, deformity or tenderness.      Comments: No tenderness palpation of the cervical, thoracic, or lumbar spine.  He does report some pain in the coccyx.  No redness or breakdown of the skin.   Lymphadenopathy:      Cervical: No cervical adenopathy.   Skin:     General: Skin is warm and dry.      Findings: No rash.   Neurological:      Mental Status: He is alert and oriented to person, place, and time.      Cranial Nerves: No cranial nerve deficit.      Sensory: No sensory deficit.   Psychiatric:         Speech: Speech normal.          Behavior: Behavior normal.         Thought Content: Thought content normal.         Judgment: Judgment normal.         Procedures           ED Course                                               Medical Decision Making  Differential diagnosis includes contusion, coccyx fracture, lumbar spine fracture, dehydration, renal insufficiency, dysrhythmia, acute coronary syndrome, viral illness, other unspecified etiology    The patient suffered mechanical fall that was observed by the wife.  There is no change in mental status.  The patient's mentation has stay consistent with baseline since the given time.  He does continue to complain of pain in his buttocks but has been able to stand and essentially function consistent with his baseline.    He has a history of orthostatic hypotension.  He had low blood pressure upon initial evaluation had not taken his midodrine.  After receiving midodrine and fluids.  Blood pressure improved and remained good throughout the ER course.  There is not an accurate blood pressure recorded at the end of the ER stay but I have personally observed the patient reevaluate the patient multiple times and observed appropriate blood pressures of 150s systolics and normal mentation.    Lab evaluation shows chronic renal insufficiency.  Troponin is elevated consistent with chronic renal insufficiency but remained stable on serial reevaluation.  He denies chest pain on multiple repeat evaluations throughout the ER course.    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  No acute ischemic changes.    CT scan of the lumbar spine shows bony demineralization with no occult fracture.  Severe degenerative changes    Chest x-ray shows cardiomegaly or with bibasilar atelectasis no definitive abnormality.    The patient has remained stable through several hours of observation and the patient and wife desire to go home.  Discharged  appearing well with medication to help with pain provided here, the patient will be advised to continue to take home pain medication as prescribed.    Problems Addressed:  Chronic renal failure, unspecified CKD stage: chronic illness or injury  Contusion of buttock, initial encounter: complicated acute illness or injury with systemic symptoms  Orthostatic hypotension: chronic illness or injury with exacerbation, progression, or side effects of treatment    Amount and/or Complexity of Data Reviewed  Independent Historian: spouse     Details: Wife provides additional history.  External Data Reviewed: labs, radiology and ECG.  Labs: ordered. Decision-making details documented in ED Course.  Radiology: ordered and independent interpretation performed. Decision-making details documented in ED Course.  ECG/medicine tests: ordered and independent interpretation performed. Decision-making details documented in ED Course.    Risk  Prescription drug management.        Final diagnoses:   Contusion of buttock, initial encounter   Chronic renal failure, unspecified CKD stage   Orthostatic hypotension       ED Disposition  ED Disposition       ED Disposition   Discharge    Condition   Stable    Comment   --               Blair Dhaliwal MD  [address] [place] [address]  [phone]    In 1 week           Medication List      No changes were made to your prescriptions during this visit.            Dao Brennan MD  04/21/24 0834

## 2024-04-20 NOTE — DISCHARGE INSTRUCTIONS
Called patient twice to inform her that she needed an appt to fill out [address] ordering physician form. Unfortunately the phone continued to show busy and was unable to contact patient. Paperwork was scanned into chart. Take already prescribed Lortab as prescribed.    Use padding to help prevent excessive pain of buttock secondary to recent fall.    Take midodrine as already prescribed for treatment of orthostatic hypotension.    Drink appropriate amount of fluids.    Follow-up primary care physician for recheck within the next week.

## 2024-05-02 LAB
QT INTERVAL: 452 MS
QTC INTERVAL: 484 MS

## 2024-08-05 ENCOUNTER — HOSPITAL ENCOUNTER (INPATIENT)
Facility: HOSPITAL | Age: 89
LOS: 3 days | Discharge: HOME | End: 2024-08-08
Attending: EMERGENCY MEDICINE | Admitting: FAMILY MEDICINE
Payer: MEDICARE

## 2024-08-05 ENCOUNTER — APPOINTMENT (OUTPATIENT)
Dept: CARDIOLOGY | Facility: HOSPITAL | Age: 89
End: 2024-08-05
Payer: MEDICARE

## 2024-08-05 ENCOUNTER — APPOINTMENT (OUTPATIENT)
Dept: GENERAL RADIOLOGY | Facility: HOSPITAL | Age: 89
End: 2024-08-05
Payer: MEDICARE

## 2024-08-05 DIAGNOSIS — E87.5 HYPERKALEMIA: Primary | ICD-10-CM

## 2024-08-05 DIAGNOSIS — T14.8XXA HEMATOMA: ICD-10-CM

## 2024-08-05 DIAGNOSIS — T85.618A SHUNT MALFUNCTION, INITIAL ENCOUNTER: ICD-10-CM

## 2024-08-05 DIAGNOSIS — N18.6 END STAGE RENAL DISEASE: ICD-10-CM

## 2024-08-05 LAB
ANION GAP SERPL CALCULATED.3IONS-SCNC: 12 MMOL/L (ref 5–15)
BASOPHILS # BLD AUTO: 0.05 10*3/MM3 (ref 0–0.2)
BASOPHILS NFR BLD AUTO: 1.1 % (ref 0–1.5)
BH CV VAS DIALYSIS ARTERIAL ANASTOMOSIS DIAMETER: 0.47 CM
BH CV VAS DIALYSIS ARTERIAL ANASTOMOSIS EDV: 291 CM/SEC
BH CV VAS DIALYSIS ARTERIAL ANASTOMOSIS PSV: 665 CM/SEC
BH CV VAS DIALYSIS CONDUIT DIST DEPTH: 0.83 CM
BH CV VAS DIALYSIS CONDUIT DIST DIAMETER: 1.07 CM
BH CV VAS DIALYSIS CONDUIT DIST EDV: 61 CM/SEC
BH CV VAS DIALYSIS CONDUIT DIST FLOW VOL: 2116 ML/MIN
BH CV VAS DIALYSIS CONDUIT DIST PSV: 125 CM/SEC
BH CV VAS DIALYSIS CONDUIT MID DEPTH: 1.13 CM
BH CV VAS DIALYSIS CONDUIT MID DIAMETER: 2.04 CM
BH CV VAS DIALYSIS CONDUIT MID EDV: 21 CM/SEC
BH CV VAS DIALYSIS CONDUIT MID FLOW VOL: 2812 ML/MIN
BH CV VAS DIALYSIS CONDUIT MID PSV: 125 CM/SEC
BH CV VAS DIALYSIS CONDUIT PROX DEPTH: 0.75 CM
BH CV VAS DIALYSIS CONDUIT PROX DIAMETER: 0.92 CM
BH CV VAS DIALYSIS CONDUIT PROX EDV: 252 CM/SEC
BH CV VAS DIALYSIS CONDUIT PROX FLOW VOL: 1468 ML/MIN
BH CV VAS DIALYSIS CONDUIT PROX PSV: 506 CM/SEC
BH CV VAS DIALYSIS PRE-INFLOW BRACHIAL DIAMETER: 0.68 CM
BH CV VAS DIALYSIS PRE-INFLOW BRACHIAL EDV: 89 CM/SEC
BH CV VAS DIALYSIS PRE-INFLOW BRACHIAL FLOW VOL: 1361 ML/MIN
BH CV VAS DIALYSIS PRE-INFLOW BRACHIAL PSV: 216 CM/SEC
BH CV VAS DIALYSIS VENOUS OUTFLOW AXILLARY EDV: 61 CM/SEC
BH CV VAS DIALYSIS VENOUS OUTFLOW AXILLARY PSV: 116 CM/SEC
BH CV VAS DIALYSIS VENOUS OUTFLOW SUBCLAVIAN EDV: 57 CM/SEC
BH CV VAS DIALYSIS VENOUS OUTFLOW SUBCLAVIAN PSV: 98 CM/SEC
BUN SERPL-MCNC: 41 MG/DL (ref 8–23)
BUN/CREAT SERPL: 6.8 (ref 7–25)
CALCIUM SPEC-SCNC: 9.7 MG/DL (ref 8.6–10.5)
CHLORIDE SERPL-SCNC: 93 MMOL/L (ref 98–107)
CO2 SERPL-SCNC: 32 MMOL/L (ref 22–29)
CREAT SERPL-MCNC: 6.04 MG/DL (ref 0.76–1.27)
DEPRECATED RDW RBC AUTO: 51.8 FL (ref 37–54)
EGFRCR SERPLBLD CKD-EPI 2021: 8.3 ML/MIN/1.73
EOSINOPHIL # BLD AUTO: 0.2 10*3/MM3 (ref 0–0.4)
EOSINOPHIL NFR BLD AUTO: 4.2 % (ref 0.3–6.2)
ERYTHROCYTE [DISTWIDTH] IN BLOOD BY AUTOMATED COUNT: 13.6 % (ref 12.3–15.4)
GLUCOSE BLDC GLUCOMTR-MCNC: 102 MG/DL (ref 70–130)
GLUCOSE BLDC GLUCOMTR-MCNC: 109 MG/DL (ref 70–130)
GLUCOSE BLDC GLUCOMTR-MCNC: 57 MG/DL (ref 70–130)
GLUCOSE BLDC GLUCOMTR-MCNC: 96 MG/DL (ref 70–130)
GLUCOSE SERPL-MCNC: 125 MG/DL (ref 65–99)
HCT VFR BLD AUTO: 36.5 % (ref 37.5–51)
HCT VFR BLD AUTO: 36.7 % (ref 37.5–51)
HGB BLD-MCNC: 12.1 G/DL (ref 13–17.7)
HGB BLD-MCNC: 12.1 G/DL (ref 13–17.7)
HOLD SPECIMEN: NORMAL
IMM GRANULOCYTES # BLD AUTO: 0.02 10*3/MM3 (ref 0–0.05)
IMM GRANULOCYTES NFR BLD AUTO: 0.4 % (ref 0–0.5)
LYMPHOCYTES # BLD AUTO: 0.85 10*3/MM3 (ref 0.7–3.1)
LYMPHOCYTES NFR BLD AUTO: 18 % (ref 19.6–45.3)
MCH RBC QN AUTO: 34.6 PG (ref 26.6–33)
MCHC RBC AUTO-ENTMCNC: 33.2 G/DL (ref 31.5–35.7)
MCV RBC AUTO: 104.3 FL (ref 79–97)
MONOCYTES # BLD AUTO: 0.42 10*3/MM3 (ref 0.1–0.9)
MONOCYTES NFR BLD AUTO: 8.9 % (ref 5–12)
NEUTROPHILS NFR BLD AUTO: 3.18 10*3/MM3 (ref 1.7–7)
NEUTROPHILS NFR BLD AUTO: 67.4 % (ref 42.7–76)
NRBC BLD AUTO-RTO: 0 /100 WBC (ref 0–0.2)
PLATELET # BLD AUTO: 173 10*3/MM3 (ref 140–450)
PMV BLD AUTO: 8.4 FL (ref 6–12)
POTASSIUM SERPL-SCNC: 5.7 MMOL/L (ref 3.5–5.2)
RBC # BLD AUTO: 3.5 10*6/MM3 (ref 4.14–5.8)
SODIUM SERPL-SCNC: 137 MMOL/L (ref 136–145)
WBC NRBC COR # BLD AUTO: 4.72 10*3/MM3 (ref 3.4–10.8)
WHOLE BLOOD HOLD COAG: NORMAL
WHOLE BLOOD HOLD SPECIMEN: NORMAL

## 2024-08-05 PROCEDURE — 85014 HEMATOCRIT: CPT | Performed by: HOSPITALIST

## 2024-08-05 PROCEDURE — 80048 BASIC METABOLIC PNL TOTAL CA: CPT | Performed by: EMERGENCY MEDICINE

## 2024-08-05 PROCEDURE — 71045 X-RAY EXAM CHEST 1 VIEW: CPT

## 2024-08-05 PROCEDURE — 85018 HEMOGLOBIN: CPT | Performed by: HOSPITALIST

## 2024-08-05 PROCEDURE — 82948 REAGENT STRIP/BLOOD GLUCOSE: CPT

## 2024-08-05 PROCEDURE — 85025 COMPLETE CBC W/AUTO DIFF WBC: CPT | Performed by: EMERGENCY MEDICINE

## 2024-08-05 PROCEDURE — 63710000001 INSULIN REGULAR HUMAN PER 5 UNITS: Performed by: EMERGENCY MEDICINE

## 2024-08-05 PROCEDURE — 99285 EMERGENCY DEPT VISIT HI MDM: CPT

## 2024-08-05 PROCEDURE — 99223 1ST HOSP IP/OBS HIGH 75: CPT | Performed by: HOSPITALIST

## 2024-08-05 PROCEDURE — 93990 DOPPLER FLOW TESTING: CPT | Performed by: INTERNAL MEDICINE

## 2024-08-05 PROCEDURE — 93990 DOPPLER FLOW TESTING: CPT

## 2024-08-05 PROCEDURE — 36415 COLL VENOUS BLD VENIPUNCTURE: CPT

## 2024-08-05 RX ORDER — SODIUM CHLORIDE 0.9 % (FLUSH) 0.9 %
10 SYRINGE (ML) INJECTION EVERY 12 HOURS SCHEDULED
Status: DISCONTINUED | OUTPATIENT
Start: 2024-08-05 | End: 2024-08-08 | Stop reason: HOSPADM

## 2024-08-05 RX ORDER — ACETAMINOPHEN 325 MG/1
650 TABLET ORAL EVERY 4 HOURS PRN
Status: DISCONTINUED | OUTPATIENT
Start: 2024-08-05 | End: 2024-08-08 | Stop reason: HOSPADM

## 2024-08-05 RX ORDER — ACETAMINOPHEN 650 MG/1
650 SUPPOSITORY RECTAL EVERY 4 HOURS PRN
Status: DISCONTINUED | OUTPATIENT
Start: 2024-08-05 | End: 2024-08-08 | Stop reason: HOSPADM

## 2024-08-05 RX ORDER — ONDANSETRON 2 MG/ML
4 INJECTION INTRAMUSCULAR; INTRAVENOUS EVERY 6 HOURS PRN
Status: DISCONTINUED | OUTPATIENT
Start: 2024-08-05 | End: 2024-08-08 | Stop reason: HOSPADM

## 2024-08-05 RX ORDER — TAMSULOSIN HYDROCHLORIDE 0.4 MG/1
1 CAPSULE ORAL DAILY
COMMUNITY

## 2024-08-05 RX ORDER — AMIODARONE HYDROCHLORIDE 200 MG/1
100 TABLET ORAL DAILY
Status: DISCONTINUED | OUTPATIENT
Start: 2024-08-05 | End: 2024-08-08 | Stop reason: HOSPADM

## 2024-08-05 RX ORDER — DEXTROSE MONOHYDRATE 25 G/50ML
25 INJECTION, SOLUTION INTRAVENOUS ONCE
Status: COMPLETED | OUTPATIENT
Start: 2024-08-05 | End: 2024-08-05

## 2024-08-05 RX ORDER — AMOXICILLIN 250 MG
2 CAPSULE ORAL 2 TIMES DAILY
Status: DISCONTINUED | OUTPATIENT
Start: 2024-08-05 | End: 2024-08-08 | Stop reason: HOSPADM

## 2024-08-05 RX ORDER — HYDROCODONE BITARTRATE AND ACETAMINOPHEN 7.5; 325 MG/1; MG/1
1 TABLET ORAL 3 TIMES DAILY PRN
Status: DISCONTINUED | OUTPATIENT
Start: 2024-08-05 | End: 2024-08-08 | Stop reason: HOSPADM

## 2024-08-05 RX ORDER — MIDODRINE HYDROCHLORIDE 5 MG/1
5 TABLET ORAL
Status: DISCONTINUED | OUTPATIENT
Start: 2024-08-05 | End: 2024-08-08 | Stop reason: HOSPADM

## 2024-08-05 RX ORDER — AMOXICILLIN 250 MG
2 CAPSULE ORAL 2 TIMES DAILY PRN
Status: DISCONTINUED | OUTPATIENT
Start: 2024-08-05 | End: 2024-08-08 | Stop reason: HOSPADM

## 2024-08-05 RX ORDER — LOSARTAN POTASSIUM 25 MG/1
12.5 TABLET ORAL AS NEEDED
COMMUNITY

## 2024-08-05 RX ORDER — ACETAMINOPHEN 160 MG/5ML
650 SOLUTION ORAL EVERY 4 HOURS PRN
Status: DISCONTINUED | OUTPATIENT
Start: 2024-08-05 | End: 2024-08-08 | Stop reason: HOSPADM

## 2024-08-05 RX ORDER — PANTOPRAZOLE SODIUM 40 MG/1
40 TABLET, DELAYED RELEASE ORAL DAILY
Status: DISCONTINUED | OUTPATIENT
Start: 2024-08-05 | End: 2024-08-08 | Stop reason: HOSPADM

## 2024-08-05 RX ORDER — FOLIC ACID 1 MG/1
1 TABLET ORAL DAILY
Status: DISCONTINUED | OUTPATIENT
Start: 2024-08-05 | End: 2024-08-08 | Stop reason: HOSPADM

## 2024-08-05 RX ORDER — POLYETHYLENE GLYCOL 3350 17 G/17G
17 POWDER, FOR SOLUTION ORAL DAILY PRN
Status: DISCONTINUED | OUTPATIENT
Start: 2024-08-05 | End: 2024-08-08 | Stop reason: HOSPADM

## 2024-08-05 RX ORDER — BISACODYL 5 MG/1
5 TABLET, DELAYED RELEASE ORAL DAILY PRN
Status: DISCONTINUED | OUTPATIENT
Start: 2024-08-05 | End: 2024-08-08 | Stop reason: HOSPADM

## 2024-08-05 RX ORDER — MIRTAZAPINE 15 MG/1
7.5 TABLET, FILM COATED ORAL NIGHTLY
Status: DISCONTINUED | OUTPATIENT
Start: 2024-08-05 | End: 2024-08-08 | Stop reason: HOSPADM

## 2024-08-05 RX ORDER — SODIUM CHLORIDE 0.9 % (FLUSH) 0.9 %
10 SYRINGE (ML) INJECTION AS NEEDED
Status: DISCONTINUED | OUTPATIENT
Start: 2024-08-05 | End: 2024-08-08 | Stop reason: HOSPADM

## 2024-08-05 RX ORDER — FOLIC ACID/VIT B COMPLEX AND C 0.8 MG
1 TABLET ORAL DAILY
COMMUNITY

## 2024-08-05 RX ORDER — SODIUM CHLORIDE 9 MG/ML
40 INJECTION, SOLUTION INTRAVENOUS AS NEEDED
Status: DISCONTINUED | OUTPATIENT
Start: 2024-08-05 | End: 2024-08-08 | Stop reason: HOSPADM

## 2024-08-05 RX ORDER — BISACODYL 10 MG
10 SUPPOSITORY, RECTAL RECTAL DAILY PRN
Status: DISCONTINUED | OUTPATIENT
Start: 2024-08-05 | End: 2024-08-08 | Stop reason: HOSPADM

## 2024-08-05 RX ORDER — FINASTERIDE 5 MG/1
5 TABLET, FILM COATED ORAL DAILY
Status: DISCONTINUED | OUTPATIENT
Start: 2024-08-05 | End: 2024-08-05

## 2024-08-05 RX ORDER — ATORVASTATIN CALCIUM 20 MG/1
20 TABLET, FILM COATED ORAL NIGHTLY
Status: DISCONTINUED | OUTPATIENT
Start: 2024-08-05 | End: 2024-08-08 | Stop reason: HOSPADM

## 2024-08-05 RX ADMIN — FOLIC ACID 1 MG: 1 TABLET ORAL at 17:15

## 2024-08-05 RX ADMIN — Medication 5 MG: at 20:53

## 2024-08-05 RX ADMIN — MIRTAZAPINE 7.5 MG: 15 TABLET, FILM COATED ORAL at 20:53

## 2024-08-05 RX ADMIN — Medication 10 ML: at 20:55

## 2024-08-05 RX ADMIN — INSULIN HUMAN 5 UNITS: 100 INJECTION, SOLUTION PARENTERAL at 11:17

## 2024-08-05 RX ADMIN — SODIUM BICARBONATE 25 MEQ: 84 INJECTION INTRAVENOUS at 11:15

## 2024-08-05 RX ADMIN — SENNOSIDES AND DOCUSATE SODIUM 2 TABLET: 50; 8.6 TABLET ORAL at 20:53

## 2024-08-05 RX ADMIN — SERTRALINE 50 MG: 50 TABLET, FILM COATED ORAL at 17:15

## 2024-08-05 RX ADMIN — ATORVASTATIN CALCIUM 20 MG: 40 TABLET, FILM COATED ORAL at 20:53

## 2024-08-05 RX ADMIN — DEXTROSE MONOHYDRATE 25 G: 25 INJECTION, SOLUTION INTRAVENOUS at 11:18

## 2024-08-05 RX ADMIN — PANTOPRAZOLE SODIUM 40 MG: 40 TABLET, DELAYED RELEASE ORAL at 17:15

## 2024-08-05 RX ADMIN — SODIUM ZIRCONIUM CYCLOSILICATE 10 G: 10 POWDER, FOR SUSPENSION ORAL at 12:22

## 2024-08-05 RX ADMIN — AMIODARONE HYDROCHLORIDE 100 MG: 200 TABLET ORAL at 17:15

## 2024-08-05 NOTE — PLAN OF CARE
Goal Outcome Evaluation:         Patient admitted from ER for hemodialysis. Unable to get outpatient until Wednesday. Right AV fistula swollen, tender, and bruised. Hemodialysis tomorrow if okay with vascular.

## 2024-08-05 NOTE — ED PROVIDER NOTES
Subjective   History of Present Illness  Mr. Tomas presents with pain and swelling around his AV fistula in his right upper extremity.  His wife tells me it was placed many years ago at [facility].  He woke up this morning preparing to go to dialysis when he noticed that the shunt itself was sore and swollen.  His wife called the dialysis company and they told him to come here.  He feels fine otherwise.  He is on Eliquis for atrial fibrillation.      Review of Systems    Past Medical History:   Diagnosis Date    A-fib     Abnormal ECG     occasional extra heartbeat    Anemia, acute on chronic 9/30/2023    Arthritis     Atrial fibrillation     BPH (benign prostatic hyperplasia)     Cervical compression fracture 06/05/2023    currently wearing a c collar    Diverticulosis     Diverticulum of esophagus     puree diet and speech therapy    Elevated cholesterol     ESRD on hemodialysis     GERD (gastroesophageal reflux disease)     Heart failure     Hemodialysis patient     M,W,F    Hyperlipidemia     Hypertension     Low back pain 2022    NERY (obstructive sleep apnea)     NOT USING CPAP    Recurrent falls     UTI (urinary tract infection)        No Known Allergies    Past Surgical History:   Procedure Laterality Date    CATARACT EXTRACTION      COLONOSCOPY      DIALYSIS FISTULA CREATION      ENDOSCOPY      INGUINAL HERNIA REPAIR      PROSTATE BIOPSY      THROAT SURGERY      diverticulum in throat    TONSILLECTOMY AND ADENOIDECTOMY      TOTAL SHOULDER ARTHROPLASTY W/ DISTAL CLAVICLE EXCISION Left 9/25/2023    Procedure: HEMIARTHROPLASTY, BICEPS TENODESIS - LEFT;  Surgeon: Matteo Tan MD;  Location: [facility];  Service: Orthopedics;  Laterality: Left;       Family History   Problem Relation Age of Onset    Stomach cancer Mother     Heart disease Mother     Arthritis Mother     Heart disease Father     Stroke Father     Kidney failure Father     Cancer Father     Kidney disease Father        Social  History     Socioeconomic History    Marital status:    Tobacco Use    Smoking status: Never    Smokeless tobacco: Never   Vaping Use    Vaping status: Never Used   Substance and Sexual Activity    Alcohol use: Not Currently     Alcohol/week: 1.0 standard drink of alcohol     Types: 1 Glasses of wine per week     Comment: once monthly typically    Drug use: Never    Sexual activity: Defer           Objective   Physical Exam  Vitals and nursing note reviewed.   Constitutional:       General: He is not in acute distress.  Neck:      Comments: No JVD  Cardiovascular:      Rate and Rhythm: Normal rate and regular rhythm.      Heart sounds: Murmur heard.   Pulmonary:      Effort: Pulmonary effort is normal.      Breath sounds: Normal breath sounds. No wheezing or rales.   Abdominal:      Palpations: Abdomen is soft.      Tenderness: There is no abdominal tenderness. There is no guarding.   Musculoskeletal:         General: Tenderness present.      Cervical back: Normal range of motion and neck supple.      Right lower leg: Edema present.      Left lower leg: Edema present.      Comments: He has a dialysis shunt in the right upper extremity.  It has palpable thrill.  It is tender.  This got bruising around it.   Skin:     Findings: No bruising or erythema.      Comments: His wife reports concern about swollen black appearing feet.  I examined his feet.  I do not appreciate any pitting edema.  They are pink and warm and well-perfused.   Neurological:      General: No focal deficit present.      Mental Status: He is alert.   Psychiatric:         Mood and Affect: Mood normal.         Procedures           ED Course  ED Course as of 08/05/24 1527   Mon Aug 05, 2024   0108 Paged Dr Hughes [DT]   3239 D/w Dr Hughes, asked that we obtain duplex. [DT]   6257 Conferred with the vascular tech who performed the study and does not show pseudoaneurysm, is patent. [DT]   1303 Reexamined and has not changed any, likely  is hematoma from Eliquis. [DT]   1307 Paged Dr. Smith. [DT]   1340 Mr. Tomas wife tells me that Janie Aponte is his nephrologist.  I called her office, they advised they do not have him as a patient but transferred me to Dr. Aponte's phone.  The person they transferred me to was a private individual and tells me that she frequently gets phone calls looking for Dr. Aponte. [DT]   1345 Discussed with Dr. Smith.  He felt that if we could ensure that Mr. Tomas could be dialyzed at his regular facility tomorrow wouldn't need admit for dialysis, otherwise would need admit for dialysis due to hyperkalemia. [DT]   1353 Called [facility].  They cannot see him for another 2 days.  Will admit for dialysis due to hyperkalemia [DT]      ED Course User Index  [DT] Sundeep Rueda MD                                             Medical Decision Making  Please see course notes.  I had consultation with vascular surgery and nephrology.  Ordered and interpreted multiple labs.  Gave multiple medications to treat hyperkalemia.  Made multiple phone calls trying to arrange outpatient dialysis.  Ultimately admitted him to the hospital    Problems Addressed:  End stage renal disease: chronic illness or injury  Hematoma: complicated acute illness or injury  Hyperkalemia: complicated acute illness or injury  Shunt malfunction, initial encounter: complicated acute illness or injury that poses a threat to life or bodily functions    Amount and/or Complexity of Data Reviewed  Labs: ordered. Decision-making details documented in ED Course.  Radiology: ordered. Decision-making details documented in ED Course.    Risk  OTC drugs.  Prescription drug management.  Decision regarding hospitalization.        Final diagnoses:   Hyperkalemia   End stage renal disease   Shunt malfunction, initial encounter   Hematoma       ED Disposition  ED Disposition       ED Disposition   Decision to Admit    Condition   --    Comment   Level of Care:  Med/Surg [1]   Diagnosis: Hyperkalemia [097168]   Certification: I Certify That Inpatient Hospital Services Are Medically Necessary For Greater Than 2 Midnights                 No follow-up provider specified.       Medication List      No changes were made to your prescriptions during this visit.            Sundeep Rueda MD  08/05/24 1520

## 2024-08-05 NOTE — H&P
[facility] Medicine Services  HISTORY AND PHYSICAL    Patient Name: Enrike Tomas  : 1935  MRN: [id number]  Primary Care Physician: Blair Dhaliwal MD  Date of admission: 2024    Subjective   Subjective     Chief Complaint: Hyperkalemia.  AV fistula defect    HPI:  Enrike Tomas is a 89 y.o. male with a past medical history of end-stage renal disease on hemodialysis .  Hypertension.  Paroxysmal A-fib.  And hyperlipidemia who was presented to our ED with right arm swelling around the fistula.  Per his wife patient woke up this morning with swallowing around his AV fistula.  Worsening.  Associated with the bruises.  Patient on Eliquis.  In the ED patient was hemodynamically stable.  Potassium mildly high at 5.7.  Vascular surgery  consulted by the ED provider.  Recommended Doppler for further eval.  Apparently patient will not be able to dialyze as an outpatient.  Nephrology consulted in the ED.  [facility] was asked to admit the patient for vascular eval and urgent hemodialysis.        Review of Systems   Constitutional:  Negative for chills and fever.   Respiratory:  Negative for cough and shortness of breath.    Cardiovascular:  Negative for chest pain and leg swelling.   Gastrointestinal:  Negative for abdominal pain.   Neurological:  Negative for dizziness.   All other systems reviewed and are negative.         Personal History     Past Medical History:   Diagnosis Date    A-fib     Abnormal ECG     occasional extra heartbeat    Anemia, acute on chronic 2023    Arthritis     Atrial fibrillation     BPH (benign prostatic hyperplasia)     Cervical compression fracture 2023    currently wearing a c collar    Diverticulosis     Diverticulum of esophagus     puree diet and speech therapy    Elevated cholesterol     ESRD on hemodialysis     GERD (gastroesophageal reflux disease)     Heart failure     Hemodialysis patient     M,W,F     Hyperlipidemia     Hypertension     Low back pain 2022    NERY (obstructive sleep apnea)     NOT USING CPAP    Recurrent falls     UTI (urinary tract infection)              Past Surgical History:   Procedure Laterality Date    CATARACT EXTRACTION      COLONOSCOPY      DIALYSIS FISTULA CREATION      ENDOSCOPY      INGUINAL HERNIA REPAIR      PROSTATE BIOPSY      THROAT SURGERY      diverticulum in throat    TONSILLECTOMY AND ADENOIDECTOMY      TOTAL SHOULDER ARTHROPLASTY W/ DISTAL CLAVICLE EXCISION Left 9/25/2023    Procedure: HEMIARTHROPLASTY, BICEPS TENODESIS - LEFT;  Surgeon: Matteo Tan MD;  Location: [facility];  Service: Orthopedics;  Laterality: Left;       Family History:  family history includes Arthritis in his mother; Cancer in his father; Heart disease in his father and mother; Kidney disease in his father; Kidney failure in his father; Stomach cancer in his mother; Stroke in his father.     Social History:  reports that he has never smoked. He has never used smokeless tobacco. He reports that he does not currently use alcohol after a past usage of about 1.0 standard drink of alcohol per week. He reports that he does not use drugs.  Social History     Social History Narrative    Not on file       Medications:  HYDROcodone-acetaminophen, Teriparatide (Recombinant), acetaminophen, amiodarone, apixaban, atorvastatin, finasteride, fluticasone, folic acid, melatonin, midodrine, mirtazapine, pantoprazole, sennosides-docusate, and sertraline    No Known Allergies    Objective   Objective     Vital Signs:   Temp:  [98 °F (36.7 °C)] 98 °F (36.7 °C)  Heart Rate:  [60-74] 64  Resp:  [20] 20  BP: (114-138)/(56-87) 114/74    Physical Exam  Vitals and nursing note reviewed.   Constitutional:       Appearance: Normal appearance.   HENT:      Head: Normocephalic.      Mouth/Throat:      Mouth: Mucous membranes are moist.   Cardiovascular:      Rate and Rhythm: Normal rate.   Pulmonary:      Effort: Pulmonary  effort is normal.   Abdominal:      General: Abdomen is flat.   Neurological:      General: No focal deficit present.      Mental Status: He is alert.   Psychiatric:         Mood and Affect: Mood normal.            Result Review:  I have personally reviewed the results from the time of this admission to 8/5/2024 15:07 EDT and agree with these findings:  [x]  Laboratory list / accordion  [x]  Microbiology  [x]  Radiology  []  EKG/Telemetry   []  Cardiology/Vascular   []  Pathology  [x]  Old records  []  Other:  Most notable findings include:     LAB RESULTS:      Lab 08/05/24  1019   WBC 4.72   HEMOGLOBIN 12.1*   HEMATOCRIT 36.5*   PLATELETS 173   NEUTROS ABS 3.18   IMMATURE GRANS (ABS) 0.02   LYMPHS ABS 0.85   MONOS ABS 0.42   EOS ABS 0.20   .3*         Lab 08/05/24  1019   SODIUM 137   POTASSIUM 5.7*   CHLORIDE 93*   CO2 32.0*   ANION GAP 12.0   BUN 41*   CREATININE 6.04*   EGFR 8.3*   GLUCOSE 125*   CALCIUM 9.7                         Brief Urine Lab Results  (Last result in the past 365 days)        Color   Clarity   Blood   Leuk Est   Nitrite   Protein   CREAT   Urine HCG        08/07/23 1457 Yellow   Clear     Negative                     Microbiology Results (last 10 days)       ** No results found for the last 240 hours. **            Duplex Hemodialysis Access CAR    Result Date: 8/5/2024    A right sided brachial-basilic autogenous arteriovenous (AV) fistula is present.Right brachial-basilic AVF appears patent with velocities and volume flows noted below.     XR Chest 1 View    Result Date: 8/5/2024  XR CHEST 1 VW Date of Exam: 8/5/2024 9:56 AM EDT Indication: volume overload Comparison: 4/20/2024 Findings: Cardiomediastinal silhouette is enlarged with previously noted right anterior fat-containing mediastinal hernia. No airspace disease, pneumothorax, nor pleural effusion. No acute osseous abnormality identified.     Impression: Impression: 1.No evidence of CHF/volume overload. Electronically  Signed: Sundeep Workman MD  8/5/2024 10:16 AM EDT  Workstation ID: [id number]         Assessment & Plan   Assessment & Plan       ESRD (end stage renal disease)    (HFpEF) heart failure with preserved ejection fraction    Benign prostatic hyperplasia    Depression    Major depressive disorder, single episode, moderate    Mixed hyperlipidemia    Paroxysmal atrial fibrillation    Hyperkalemia      Enrike Tomas is a 89 y.o. male with a past medical history of end-stage renal disease on hemodialysis Monday Wednesday Friday.  Hypertension.  Paroxysmal A-fib.  And hyperlipidemia who was presented to our ED with right arm swelling around the fistula.        ## Right arm(AV fistula) swelling/hematoma  ## End-stage renal disease on hemodialysis Monday Wednesday Friday:  ## Mild hyperkalemia  - In the ED patient was hemodynamically stable.   - Potassium mildly high at 5.7.    -Vascular surgery  consulted by the ED provider.    - Recommended Doppler for further eval.    - Apparently patient will not be able to dialyze as an outpatient.    - Nephrology consulted in the ED. plan to hemolyze patient tomorrow after vascular eval.  -For tonight we will monitor H&H.  Hold Eliquis.  -Pain control    ## Paroxysmal A-fib;  -Resume amiodarone.  -Hold Eliquis    ## Hyperlipidemia:  -Continue statin    ## Depression:  -Continue home Zoloft    ## BPH:  -Continue home meds      DVT prophylaxis:  scd            Expected Discharge(click hyperlink to enter date then refresh the note)  Expected discharge date/ time has not been documented.      This note has been completed as part of a split-shared workflow.     Signature: Electronically signed by Dale Barrera MD, 08/05/24, 3:15 PM EDT

## 2024-08-05 NOTE — Clinical Note
Level of Care: Telemetry [5]   Diagnosis: ESRD (end stage renal disease) [744516]   Admitting Physician: KATHY ORTEZ [[id number]]   Attending Physician: KATHY ORTEZ [[id number]]   Certification: I Certify That Inpatient Hospital Services Are Medically Necessary For Greater Than 2 Midnights

## 2024-08-05 NOTE — CONSULTS
NAL Consult Note    Enrike Tomas  1935  [id number]    Date of Admit:  8/5/2024  Date of Consult: 8/5/2024    Reason for Consultation: ESRD on HD.    History of present illness:    Patient is a 89 y.o.  Yr old male with past medical history of end-stage renal disease on hemodialysis MWF schedule, follows up with [facility] admit the medical problems as below, who came to the ER today after he noticed that right arm fistula is swollen and bruised.  Vascular surgery is consulted already.  Nephrology is consulted for dialysis management as he missed dialysis today.     On evaluation at bedside, patient reports feeling better, denies chest pain or shortness of breath, denies nausea vomiting or abdominal pain, denies fever or chills.  Patient follows up with [facility].  He is under care of Dr. Aponte..  Dialysis days are Monday Wednesday Friday.     I called dialysis unit.  Patient had AV fistula infiltrated on last dialysis treatment.  HD unit can' not accommodate him for dialysis today or tomorrow.     Past Medical History:   Diagnosis Date    A-fib     Abnormal ECG     occasional extra heartbeat    Anemia, acute on chronic 9/30/2023    Arthritis     Atrial fibrillation     BPH (benign prostatic hyperplasia)     Cervical compression fracture 06/05/2023    currently wearing a c collar    Diverticulosis     Diverticulum of esophagus     puree diet and speech therapy    Elevated cholesterol     ESRD on hemodialysis     GERD (gastroesophageal reflux disease)     Heart failure     Hemodialysis patient     M,W,F    Hyperlipidemia     Hypertension     Low back pain 2022    NERY (obstructive sleep apnea)     NOT USING CPAP    Recurrent falls     UTI (urinary tract infection)        Past Surgical History:   Procedure Laterality Date    CATARACT EXTRACTION      COLONOSCOPY      DIALYSIS FISTULA CREATION      ENDOSCOPY      INGUINAL HERNIA REPAIR      PROSTATE BIOPSY      THROAT SURGERY      diverticulum in throat     TONSILLECTOMY AND ADENOIDECTOMY      TOTAL SHOULDER ARTHROPLASTY W/ DISTAL CLAVICLE EXCISION Left 9/25/2023    Procedure: HEMIARTHROPLASTY, BICEPS TENODESIS - LEFT;  Surgeon: Matteo Tan MD;  Location: [facility];  Service: Orthopedics;  Laterality: Left;       Social History     Socioeconomic History    Marital status:    Tobacco Use    Smoking status: Never    Smokeless tobacco: Never   Vaping Use    Vaping status: Never Used   Substance and Sexual Activity    Alcohol use: Not Currently     Alcohol/week: 1.0 standard drink of alcohol     Types: 1 Glasses of wine per week     Comment: once monthly typically    Drug use: Never    Sexual activity: Defer       family history includes Arthritis in his mother; Cancer in his father; Heart disease in his father and mother; Kidney disease in his father; Kidney failure in his father; Stomach cancer in his mother; Stroke in his father.    No Known Allergies    Medication:    Current Facility-Administered Medications:     sodium chloride 0.9 % flush 10 mL, 10 mL, Intravenous, PRN, Sundeep Rueda MD    Current Outpatient Medications:     acetaminophen (TYLENOL) 500 MG tablet, Take 1 tablet by mouth Every 6 (Six) Hours As Needed for Mild Pain or Headache., Disp: , Rfl:     amiodarone (PACERONE) 200 MG tablet, Take 1 tablet by mouth Daily., Disp: , Rfl:     apixaban (ELIQUIS) 2.5 MG tablet tablet, Take 1 tablet by mouth 2 (Two) Times a Day. PT TO HOLD 48 HRS PRIOR TO PROCEDURE PER RAFAEL KC, MERCEDEZ., Disp: , Rfl:     atorvastatin (LIPITOR) 20 MG tablet, Take 1 tablet by mouth Every Night., Disp: , Rfl:     finasteride (PROSCAR) 5 MG tablet, Take 1 tablet by mouth Daily., Disp: , Rfl:     fluticasone (FLONASE) 50 MCG/ACT nasal spray, 2 sprays into the nostril(s) as directed by provider Daily., Disp: , Rfl:     folic acid (FOLVITE) 1 MG tablet, Take 1 tablet by mouth Daily., Disp: , Rfl:     HYDROcodone-acetaminophen (NORCO) 7.5-325 MG per tablet, Take 1  "tablet by mouth Every Night., Disp: 15 tablet, Rfl: 0    melatonin 5 MG tablet tablet, Take 1 tablet by mouth Every Night., Disp: , Rfl:     midodrine (PROAMATINE) 5 MG tablet, Take 1 tablet by mouth 3 (Three) Times a Day Before Meals., Disp: , Rfl:     mirtazapine (REMERON) 7.5 MG tablet, Take 1 tablet by mouth Every Night., Disp: , Rfl:     pantoprazole (PROTONIX) 40 MG EC tablet, Take 1 tablet by mouth Daily., Disp: , Rfl:     sennosides-docusate (PERICOLACE) 8.6-50 MG per tablet, Take 2 tablets by mouth 2 (Two) Times a Day., Disp: , Rfl:     sertraline (ZOLOFT) 50 MG tablet, Take 1 tablet by mouth Daily., Disp: , Rfl:     Teriparatide, Recombinant, (FORTEO) 620 MCG/2.48ML injection, Inject 20 mcg under the skin into the appropriate area as directed Daily., Disp: , Rfl:     (Not in a hospital admission)      Review of Systems:  Full review of systems reviewed and negative otherwise for acute complaints    Physical Exam:   Vital Signs   Blood pressure 114/74, pulse 64, temperature 98 °F (36.7 °C), temperature source Oral, resp. rate 20, height 167.6 cm (66\"), weight 52.6 kg (116 lb), SpO2 94%.     GENERAL: Awake and alert, in no acute distress.   HEENT: Normocephalic, atraumatic.   NECK: Supple   HEART: RRR; No murmur, rubs, gallops.   LUNGS: Normal respiratory effort. Nonlabored.   ABDOMEN: Soft, nontender, nondistended.   EXT:  No cyanosis, clubbing or edema.  :  No [place]ara   SKIN: Warm and dry without rash  NEURO: . No focal neurological deficits. Strength equal bilateral  PSYCHIATRIC: Cooperative with PE  Access: R AVF swollen/bruised (+thrill/Bruit)     Laboratory Data  Results from last 7 days   Lab Units 08/05/24  1019   HEMOGLOBIN g/dL 12.1*   HEMATOCRIT % 36.5*     Results from last 7 days   Lab Units 08/05/24  1019   SODIUM mmol/L 137   POTASSIUM mmol/L 5.7*   CHLORIDE mmol/L 93*   CO2 mmol/L 32.0*   BUN mg/dL 41*   CREATININE mg/dL 6.04*   CALCIUM mg/dL 9.7     Results from last 7 days   Lab Units " 08/05/24  1019   GLUCOSE mg/dL 125*             Estimated Creatinine Clearance: 6.2 mL/min (A) (by C-G formula based on SCr of 6.04 mg/dL (H)).    Radiology:  Duplex HD access:   A right sided brachial-basilic autogenous arteriovenous (AV) fistula is present.Right brachial-basilic AVF appears patent with velocities and volume flows noted below.     Impression:       ESRD:  On MWF [facility]. [facility]. RUE AV fistula.   -Bruised AV fistula.  Vascular consulted.     Bruised /swollen AV fistula:   -Vascular consulted.     Anemia: Hemoglobin at goal.      Volume status :  Stable         Recs  No need for hemodialysis today.   S/p hyperkalemia cocktail already in ER.   Will do hemodialysis tomorrow if okay with vascular to use the fistula.   Renal Diet   Resume home meds      Thank you for the consult, will follow with you closely    Simone Jacobson MD  8/5/2024  14:59 EDT

## 2024-08-05 NOTE — CONSULTS
Vascular Surgery Consult Note    Chief complaint right arm swelling    Reason for consultation: Right upper extremity hematoma  Consult requested by:Dr Barrera      HPI: This is a 89-year-old male with dementia and history of end-stage renal disease who currently undergoes hemodialysis on Monday Wednesday Friday via a right upper extremity what appears to be transposed brachiobasilic fistula.  Patient has history of atrial fibrillation for which she has been on Eliquis.  Patient is somewhat of a poor historian.  His wife states that he had dialysis access on Friday and per dialysis nurses it was difficult.  She removed his dressing on Saturday and states that things looked reasonably well until she noticed significant ecchymosis and eventually a hematoma forming around the access.  He presented here and was evaluated.  Arterial duplex suggests presence of posterior access hematoma    Review of Systems  General ROS: no chills, fatigue, fever or malaise  Psychological ROS: no anxiety and depression  Ophthalmic ROS: no blurry vision, decreased vision or loss of vision  ENT ROS: no headaches or vertigo  Allergy and Immunology ROS: no nasal congestion  Hematological and Lymphatic ROS: no bleeding problems, fatigue, jaundice, swollen lymph nodes or weight loss  Endocrine ROS: no temperature intolerance or unexpected weight changes  Respiratory ROS: no cough, shortness of breath, or wheezing  Cardiovascular ROS: no chest pain or dyspnea on exertion  Gastrointestinal ROS: no abdominal pain, change in bowel habits, or black or bloody stools  Genito-Urinary ROS: End Stage renal disease on hemodialysis  Musculoskeletal ROS: As above  Neurological ROS: no TIA or stroke symptoms  Dermatological ROS: no  rash    History  Past Medical History:   Diagnosis Date    A-fib     Abnormal ECG     occasional extra heartbeat    Anemia, acute on chronic 9/30/2023    Arthritis     Atrial fibrillation     BPH (benign prostatic hyperplasia)      Cervical compression fracture 06/05/2023    currently wearing a c collar    Diverticulosis     Diverticulum of esophagus     puree diet and speech therapy    Elevated cholesterol     ESRD on hemodialysis     GERD (gastroesophageal reflux disease)     Heart failure     Hemodialysis patient     M,W,F    Hyperlipidemia     Hypertension     Low back pain 2022    NERY (obstructive sleep apnea)     NOT USING CPAP    Recurrent falls     UTI (urinary tract infection)      Past Surgical History:   Procedure Laterality Date    CATARACT EXTRACTION      COLONOSCOPY      DIALYSIS FISTULA CREATION      ENDOSCOPY      INGUINAL HERNIA REPAIR      PROSTATE BIOPSY      THROAT SURGERY      diverticulum in throat    TONSILLECTOMY AND ADENOIDECTOMY      TOTAL SHOULDER ARTHROPLASTY W/ DISTAL CLAVICLE EXCISION Left 9/25/2023    Procedure: HEMIARTHROPLASTY, BICEPS TENODESIS - LEFT;  Surgeon: Matteo Tan MD;  Location: [facility];  Service: Orthopedics;  Laterality: Left;     Family History   Problem Relation Age of Onset    Stomach cancer Mother     Heart disease Mother     Arthritis Mother     Heart disease Father     Stroke Father     Kidney failure Father     Cancer Father     Kidney disease Father      Social History     Tobacco Use    Smoking status: Never    Smokeless tobacco: Never   Vaping Use    Vaping status: Never Used   Substance Use Topics    Alcohol use: Not Currently     Alcohol/week: 1.0 standard drink of alcohol     Types: 1 Glasses of wine per week     Comment: once monthly typically    Drug use: Never     Medications Prior to Admission   Medication Sig Dispense Refill Last Dose    acetaminophen (TYLENOL) 500 MG tablet Take 1 tablet by mouth Every 6 (Six) Hours As Needed for Mild Pain or Headache.   Past Week    amiodarone (PACERONE) 100 MG tablet Take 1 tablet by mouth Daily.   8/4/2024    apixaban (ELIQUIS) 2.5 MG tablet tablet Take 1 tablet by mouth 2 (Two) Times a Day. PT TO HOLD 48 HRS PRIOR TO PROCEDURE  PER MERCEDEZ VARGHESE.   8/4/2024    atorvastatin (LIPITOR) 20 MG tablet Take 1 tablet by mouth Every Night.   8/4/2024    B Complex-C-Folic Acid (Jenny-Serg) tablet Take 1 tablet by mouth Daily.   8/4/2024    fluticasone (FLONASE) 50 MCG/ACT nasal spray 2 sprays into the nostril(s) as directed by provider Daily As Needed for Rhinitis or Allergies.   Past Week    HYDROcodone-acetaminophen (NORCO) 7.5-325 MG per tablet Take 1 tablet by mouth Every Night. (Patient taking differently: Take 1 tablet by mouth Every 8 (Eight) Hours As Needed for Mild Pain, Moderate Pain or Severe Pain.) 15 tablet 0 Past Week    losartan (COZAAR) 25 MG tablet Take 0.5 tablets by mouth As Needed. If BP is above 150   Past Month    melatonin 5 MG tablet tablet Take 1 tablet by mouth Every Night.   8/4/2024    midodrine (PROAMATINE) 5 MG tablet Take 1 tablet by mouth 3 (Three) Times a Day Before Meals. (Patient taking differently: Take 1 tablet by mouth 3 (Three) Times a Day As Needed.)   Past Week    pantoprazole (PROTONIX) 40 MG EC tablet Take 1 tablet by mouth Daily.   8/4/2024    sennosides-docusate (PERICOLACE) 8.6-50 MG per tablet Take 2 tablets by mouth 2 (Two) Times a Day.   8/4/2024    sertraline (ZOLOFT) 100 MG tablet Take 1 tablet by mouth Daily.   8/4/2024    tamsulosin (FLOMAX) 0.4 MG capsule 24 hr capsule Take 1 capsule by mouth Daily.   8/4/2024    Teriparatide, Recombinant, (FORTEO) 620 MCG/2.48ML injection Inject 20 mcg under the skin into the appropriate area as directed Daily.   8/4/2024    finasteride (PROSCAR) 5 MG tablet Take 1 tablet by mouth Daily. (Patient not taking: Reported on 8/5/2024)   Not Taking    folic acid (FOLVITE) 1 MG tablet Take 1 tablet by mouth Daily. (Patient not taking: Reported on 8/5/2024)   Not Taking    mirtazapine (REMERON) 7.5 MG tablet Take 1 tablet by mouth Every Night. (Patient not taking: Reported on 8/5/2024)   Not Taking     Allergies:  Patient has no known allergies.    Vital Signs  Temp:   [98 °F (36.7 °C)] 98 °F (36.7 °C)  Heart Rate:  [60-74] 65  Resp:  [20] 20  BP: (114-172)/(56-87) 172/62    Physical Exam:      General Appearance:    Alert, cooperative, in no acute distress   Head:    Normocephalic, without obvious abnormality, atraumatic   Eyes:               Lids and lashes normal, conjunctivae and sclerae normal, no icterus, no pallor, corneas clear, PERRL   Ears:    Ears appear intact with no abnormalities noted   Throat:   No oral lesions, no thrush,oral mucosa moist   Neck:   No adenopathy, supple, trachea midline,no carotid bruit, no JVD       Lungs:     Clear to auscultation,respirations regular, even and               unlabored    Heart:  Appears to be in regular rhythm. normal rate, normal S1 and S2, no         murmur,    Abdomen:     Normal bowel sounds, no masses, no organomegaly, soft     non-tender, non-distended, no guarding, no rebound                tenderness   Extremities: Right upper extremity arteriovenous access with excellent palpable thrill extensive ecchymosis and hematoma that appears to be deep to the fistula   Pulses:   Pulses palpable and equal bilaterally   Skin:   No bleeding, bruising or rash   Lymph nodes:   No palpable adenopathy   Neurologic:   Cranial nerves 2 - 12 grossly intact, sensation intact     Results Review:    I reviewed the patient's new clinical results.    Assessment and Plan:  Elderly man with right upper extremity brachiobasilic transposed fistula which has been functioning and working well for quite some time.  My suspicion is that the patient had through and through access and given that he is on anticoagulation eventually developed a enlarging hematoma with surrounding bleeding.  There is no active pseudoaneurysm or active hemorrhage noted on the duplex.  I think the best course of action would be for patient to get a tunneled dialysis catheter to avoid accessing this fistula until it heals as this is a really nice vein and would be ideal to  be salvaged for as long as he lives.  Please involve our interventional radiology colleagues to place a tunneled dialysis catheter tomorrow.  I would recommend holding anticoagulation for another 48 hours.  I applied gentle compression to the upper extremity.  Patient can follow-up with me in 2 weeks with an arterial venous duplex scan in the office to release him back to use.  If he is able to use the fistula successfully at that time we will remove the catheter in the office.      I discussed the patients findings and my recommendations with patient.       Maikel Hughes MD  08/05/24  17:52 EDT

## 2024-08-05 NOTE — ED NOTES
Enrike Tomas    Nursing Report ED to Floor:  Mental status: AO4  Ambulatory status: WC  Oxygen Therapy:  None  Cardiac Rhythm: NSR   Admitted from: home  Safety Concerns:  fall risk/ limb alert  Social Issues: none  ED Room #:  12    ED Nurse Phone Extension - [phone] or may call [phone].      HPI:   Chief Complaint   Patient presents with    Arm Pain     Dialysis fistula swollen        Past Medical History:  Past Medical History:   Diagnosis Date    A-fib     Abnormal ECG     occasional extra heartbeat    Anemia, acute on chronic 9/30/2023    Arthritis     Atrial fibrillation     BPH (benign prostatic hyperplasia)     Cervical compression fracture 06/05/2023    currently wearing a c collar    Diverticulosis     Diverticulum of esophagus     puree diet and speech therapy    Elevated cholesterol     ESRD on hemodialysis     GERD (gastroesophageal reflux disease)     Heart failure     Hemodialysis patient     M,W,F    Hyperlipidemia     Hypertension     Low back pain 2022    NERY (obstructive sleep apnea)     NOT USING CPAP    Recurrent falls     UTI (urinary tract infection)         Past Surgical History:  Past Surgical History:   Procedure Laterality Date    CATARACT EXTRACTION      COLONOSCOPY      DIALYSIS FISTULA CREATION      ENDOSCOPY      INGUINAL HERNIA REPAIR      PROSTATE BIOPSY      THROAT SURGERY      diverticulum in throat    TONSILLECTOMY AND ADENOIDECTOMY      TOTAL SHOULDER ARTHROPLASTY W/ DISTAL CLAVICLE EXCISION Left 9/25/2023    Procedure: HEMIARTHROPLASTY, BICEPS TENODESIS - LEFT;  Surgeon: Matteo Tan MD;  Location: [facility];  Service: Orthopedics;  Laterality: Left;        Admitting Doctor:   Dale Barrera MD    Consulting Provider(s):  Consults       No orders found from 7/7/2024 to 8/6/2024.             Admitting Diagnosis:   The primary encounter diagnosis was Hyperkalemia. Diagnoses of End stage renal disease, Shunt malfunction, initial encounter, and Hematoma were also pertinent to  this visit.    Most Recent Vitals:   Vitals:    08/05/24 1100 08/05/24 1202 08/05/24 1215 08/05/24 1332   BP: 128/64 138/87  114/74   BP Location:       Patient Position:       Pulse: 60 64 65 64   Resp:       Temp:       TempSrc:       SpO2: 95% 98% 96% 94%   Weight:       Height:           Active LDAs/IV Access:   Lines, Drains & Airways       Active LDAs       Name Placement date Placement time Site Days    Peripheral IV 08/05/24 0933 Left;Lower Forearm 08/05/24  0933  Forearm  less than 1                    Labs (abnormal labs have a star):   Labs Reviewed   BASIC METABOLIC PANEL - Abnormal; Notable for the following components:       Result Value    Glucose 125 (*)     BUN 41 (*)     Creatinine 6.04 (*)     Potassium 5.7 (*)     Chloride 93 (*)     CO2 32.0 (*)     BUN/Creatinine Ratio 6.8 (*)     eGFR 8.3 (*)     All other components within normal limits    Narrative:     GFR Normal >60  Chronic Kidney Disease <60  Kidney Failure <15    The GFR formula is only valid for adults with stable renal function between ages 18 and 70.   CBC WITH AUTO DIFFERENTIAL - Abnormal; Notable for the following components:    RBC 3.50 (*)     Hemoglobin 12.1 (*)     Hematocrit 36.5 (*)     .3 (*)     MCH 34.6 (*)     Lymphocyte % 18.0 (*)     All other components within normal limits   POCT GLUCOSE FINGERSTICK - Abnormal; Notable for the following components:    Glucose 57 (*)     All other components within normal limits   POCT GLUCOSE FINGERSTICK - Normal   POCT GLUCOSE FINGERSTICK - Normal   RAINBOW DRAW    Narrative:     The following orders were created for panel order Romeo Draw.  Procedure                               Abnormality         Status                     ---------                               -----------         ------                     Green Top (Gel)[[id number]]                                  Final result               Lavender Top[[id number]]                                     Final result                Gold Top - SST[[id number]]                                   Final result               Ruvalcaba Top[[id number]]                                         Final result               Light Blue Top[[id number]]                                   Final result                 Please view results for these tests on the individual orders.   POCT GLUCOSE FINGERSTICK   POCT GLUCOSE FINGERSTICK   POCT GLUCOSE FINGERSTICK   POCT GLUCOSE FINGERSTICK   GREEN TOP   LAVENDER TOP   GOLD TOP - SST   GRAY TOP   LIGHT BLUE TOP   CBC AND DIFFERENTIAL    Narrative:     The following orders were created for panel order CBC & Differential.  Procedure                               Abnormality         Status                     ---------                               -----------         ------                     CBC Auto Differential[[id number]]        Abnormal            Final result                 Please view results for these tests on the individual orders.       Meds Given in ED:   Medications   sodium chloride 0.9 % flush 10 mL (has no administration in time range)   sodium zirconium cyclosilicate (LOKELMA) packet 10 g (10 g Oral Given 8/5/24 1222)   sodium bicarbonate injection 8.4% 25 mEq (25 mEq Intravenous Given 8/5/24 1115)   insulin regular (humuLIN R,novoLIN R) injection 5 Units (5 Units Intravenous Given 8/5/24 1117)   dextrose (D50W) (25 g/50 mL) IV injection 25 g (25 g Intravenous Given 8/5/24 1118)           Last NIH score:                                                          Dysphagia screening results:        Hedrick Coma Scale:  No data recorded     CIWA:        Restraint Type:            Isolation Status:  No active isolations

## 2024-08-06 ENCOUNTER — APPOINTMENT (OUTPATIENT)
Dept: INTERVENTIONAL RADIOLOGY/VASCULAR | Facility: HOSPITAL | Age: 89
End: 2024-08-06
Payer: MEDICARE

## 2024-08-06 ENCOUNTER — APPOINTMENT (OUTPATIENT)
Dept: NEPHROLOGY | Facility: HOSPITAL | Age: 89
End: 2024-08-06
Payer: MEDICARE

## 2024-08-06 LAB
ANION GAP SERPL CALCULATED.3IONS-SCNC: 15 MMOL/L (ref 5–15)
BUN SERPL-MCNC: 52 MG/DL (ref 8–23)
BUN/CREAT SERPL: 7.7 (ref 7–25)
CALCIUM SPEC-SCNC: 9.3 MG/DL (ref 8.6–10.5)
CHLORIDE SERPL-SCNC: 93 MMOL/L (ref 98–107)
CO2 SERPL-SCNC: 28 MMOL/L (ref 22–29)
CREAT SERPL-MCNC: 6.77 MG/DL (ref 0.76–1.27)
EGFRCR SERPLBLD CKD-EPI 2021: 7.2 ML/MIN/1.73
GLUCOSE SERPL-MCNC: 77 MG/DL (ref 65–99)
HCT VFR BLD AUTO: 34.6 % (ref 37.5–51)
HCT VFR BLD AUTO: 35.2 % (ref 37.5–51)
HGB BLD-MCNC: 12.1 G/DL (ref 13–17.7)
HGB BLD-MCNC: 12.1 G/DL (ref 13–17.7)
INR PPP: 1.21 (ref 0.89–1.12)
POTASSIUM SERPL-SCNC: 5 MMOL/L (ref 3.5–5.2)
PROTHROMBIN TIME: 15.4 SECONDS (ref 12.2–14.5)
SODIUM SERPL-SCNC: 136 MMOL/L (ref 136–145)

## 2024-08-06 PROCEDURE — P9047 ALBUMIN (HUMAN), 25%, 50ML: HCPCS

## 2024-08-06 PROCEDURE — C1750 CATH, HEMODIALYSIS,LONG-TERM: HCPCS

## 2024-08-06 PROCEDURE — 25010000002 CEFAZOLIN PER 500 MG: Performed by: RADIOLOGY

## 2024-08-06 PROCEDURE — 02HV33Z INSERTION OF INFUSION DEVICE INTO SUPERIOR VENA CAVA, PERCUTANEOUS APPROACH: ICD-10-PCS | Performed by: RADIOLOGY

## 2024-08-06 PROCEDURE — 99232 SBSQ HOSP IP/OBS MODERATE 35: CPT | Performed by: HOSPITALIST

## 2024-08-06 PROCEDURE — 25010000002 HEPARIN (PORCINE) PER 1000 UNITS: Performed by: INTERNAL MEDICINE

## 2024-08-06 PROCEDURE — B518ZZA FLUOROSCOPY OF SUPERIOR VENA CAVA, GUIDANCE: ICD-10-PCS | Performed by: RADIOLOGY

## 2024-08-06 PROCEDURE — 25010000002 HEPARIN (PORCINE) PER 1000 UNITS

## 2024-08-06 PROCEDURE — 99153 MOD SED SAME PHYS/QHP EA: CPT

## 2024-08-06 PROCEDURE — 99152 MOD SED SAME PHYS/QHP 5/>YRS: CPT

## 2024-08-06 PROCEDURE — 85018 HEMOGLOBIN: CPT | Performed by: HOSPITALIST

## 2024-08-06 PROCEDURE — C1894 INTRO/SHEATH, NON-LASER: HCPCS

## 2024-08-06 PROCEDURE — 25010000002 ALBUMIN HUMAN 25% PER 50 ML

## 2024-08-06 PROCEDURE — 0JH63XZ INSERTION OF TUNNELED VASCULAR ACCESS DEVICE INTO CHEST SUBCUTANEOUS TISSUE AND FASCIA, PERCUTANEOUS APPROACH: ICD-10-PCS | Performed by: RADIOLOGY

## 2024-08-06 PROCEDURE — 85014 HEMATOCRIT: CPT | Performed by: HOSPITALIST

## 2024-08-06 PROCEDURE — 77001 FLUOROGUIDE FOR VEIN DEVICE: CPT

## 2024-08-06 PROCEDURE — C1769 GUIDE WIRE: HCPCS

## 2024-08-06 PROCEDURE — 85610 PROTHROMBIN TIME: CPT | Performed by: RADIOLOGY

## 2024-08-06 PROCEDURE — 25010000002 FENTANYL CITRATE (PF) 50 MCG/ML SOLUTION: Performed by: RADIOLOGY

## 2024-08-06 PROCEDURE — 76937 US GUIDE VASCULAR ACCESS: CPT

## 2024-08-06 PROCEDURE — 36558 INSERT TUNNELED CV CATH: CPT

## 2024-08-06 PROCEDURE — 5A1D70Z PERFORMANCE OF URINARY FILTRATION, INTERMITTENT, LESS THAN 6 HOURS PER DAY: ICD-10-PCS | Performed by: STUDENT IN AN ORGANIZED HEALTH CARE EDUCATION/TRAINING PROGRAM

## 2024-08-06 PROCEDURE — 25010000002 MIDAZOLAM PER 1 MG: Performed by: RADIOLOGY

## 2024-08-06 PROCEDURE — 80048 BASIC METABOLIC PNL TOTAL CA: CPT | Performed by: HOSPITALIST

## 2024-08-06 RX ORDER — MIDAZOLAM HYDROCHLORIDE 1 MG/ML
INJECTION INTRAMUSCULAR; INTRAVENOUS
Status: DISPENSED
Start: 2024-08-06 | End: 2024-08-06

## 2024-08-06 RX ORDER — LIDOCAINE HYDROCHLORIDE 10 MG/ML
INJECTION, SOLUTION EPIDURAL; INFILTRATION; INTRACAUDAL; PERINEURAL
Status: DISPENSED
Start: 2024-08-06 | End: 2024-08-06

## 2024-08-06 RX ORDER — MIDAZOLAM HYDROCHLORIDE 1 MG/ML
INJECTION INTRAMUSCULAR; INTRAVENOUS AS NEEDED
Status: COMPLETED | OUTPATIENT
Start: 2024-08-06 | End: 2024-08-06

## 2024-08-06 RX ORDER — FENTANYL CITRATE 50 UG/ML
INJECTION, SOLUTION INTRAMUSCULAR; INTRAVENOUS
Status: DISPENSED
Start: 2024-08-06 | End: 2024-08-06

## 2024-08-06 RX ORDER — CEFAZOLIN SODIUM 1 G/3ML
INJECTION, POWDER, FOR SOLUTION INTRAMUSCULAR; INTRAVENOUS AS NEEDED
Status: COMPLETED | OUTPATIENT
Start: 2024-08-06 | End: 2024-08-06

## 2024-08-06 RX ORDER — CEFAZOLIN SODIUM 1 G/3ML
INJECTION, POWDER, FOR SOLUTION INTRAMUSCULAR; INTRAVENOUS
Status: DISPENSED
Start: 2024-08-06 | End: 2024-08-06

## 2024-08-06 RX ORDER — FENTANYL CITRATE 50 UG/ML
INJECTION, SOLUTION INTRAMUSCULAR; INTRAVENOUS AS NEEDED
Status: COMPLETED | OUTPATIENT
Start: 2024-08-06 | End: 2024-08-06

## 2024-08-06 RX ORDER — ALBUMIN (HUMAN) 12.5 G/50ML
SOLUTION INTRAVENOUS
Status: DISCONTINUED
Start: 2024-08-06 | End: 2024-08-08 | Stop reason: HOSPADM

## 2024-08-06 RX ORDER — TAMSULOSIN HYDROCHLORIDE 0.4 MG/1
0.4 CAPSULE ORAL DAILY
Status: DISCONTINUED | OUTPATIENT
Start: 2024-08-06 | End: 2024-08-08 | Stop reason: HOSPADM

## 2024-08-06 RX ORDER — LIDOCAINE HCL/EPINEPHRINE/PF 2%-1:200K
VIAL (ML) INJECTION
Status: COMPLETED
Start: 2024-08-06 | End: 2024-08-06

## 2024-08-06 RX ORDER — ALBUMIN (HUMAN) 12.5 G/50ML
SOLUTION INTRAVENOUS
Status: COMPLETED
Start: 2024-08-06 | End: 2024-08-06

## 2024-08-06 RX ORDER — HEPARIN SODIUM (PORCINE) LOCK FLUSH IV SOLN 100 UNIT/ML 100 UNIT/ML
SOLUTION INTRAVENOUS
Status: DISCONTINUED
Start: 2024-08-06 | End: 2024-08-06 | Stop reason: WASHOUT

## 2024-08-06 RX ORDER — HEPARIN SODIUM 1000 [USP'U]/ML
2000 INJECTION, SOLUTION INTRAVENOUS; SUBCUTANEOUS AS NEEDED
Status: DISCONTINUED | OUTPATIENT
Start: 2024-08-06 | End: 2024-08-08 | Stop reason: HOSPADM

## 2024-08-06 RX ORDER — HEPARIN SODIUM 1000 [USP'U]/ML
INJECTION, SOLUTION INTRAVENOUS; SUBCUTANEOUS
Status: COMPLETED
Start: 2024-08-06 | End: 2024-08-06

## 2024-08-06 RX ADMIN — FENTANYL CITRATE 50 MCG: 50 INJECTION, SOLUTION INTRAMUSCULAR; INTRAVENOUS at 10:31

## 2024-08-06 RX ADMIN — TAMSULOSIN HYDROCHLORIDE 0.4 MG: 0.4 CAPSULE ORAL at 21:21

## 2024-08-06 RX ADMIN — SENNOSIDES AND DOCUSATE SODIUM 2 TABLET: 50; 8.6 TABLET ORAL at 21:22

## 2024-08-06 RX ADMIN — HEPARIN SODIUM 2000 UNITS: 1000 INJECTION INTRAVENOUS; SUBCUTANEOUS at 17:57

## 2024-08-06 RX ADMIN — FOLIC ACID 1 MG: 1 TABLET ORAL at 09:18

## 2024-08-06 RX ADMIN — ALBUMIN (HUMAN) 25 G: 0.25 INJECTION, SOLUTION INTRAVENOUS at 16:10

## 2024-08-06 RX ADMIN — LIDOCAINE HYDROCHLORIDE AND EPINEPHRINE 10 ML: 20; 5 INJECTION, SOLUTION EPIDURAL; INFILTRATION; INTRACAUDAL; PERINEURAL at 11:14

## 2024-08-06 RX ADMIN — Medication 5 MG: at 21:20

## 2024-08-06 RX ADMIN — AMIODARONE HYDROCHLORIDE 100 MG: 200 TABLET ORAL at 09:18

## 2024-08-06 RX ADMIN — SENNOSIDES AND DOCUSATE SODIUM 2 TABLET: 50; 8.6 TABLET ORAL at 09:18

## 2024-08-06 RX ADMIN — PANTOPRAZOLE SODIUM 40 MG: 40 TABLET, DELAYED RELEASE ORAL at 09:18

## 2024-08-06 RX ADMIN — MIDAZOLAM HYDROCHLORIDE 1 MG: 1 INJECTION, SOLUTION INTRAMUSCULAR; INTRAVENOUS at 10:31

## 2024-08-06 RX ADMIN — ATORVASTATIN CALCIUM 20 MG: 40 TABLET, FILM COATED ORAL at 21:21

## 2024-08-06 RX ADMIN — SERTRALINE 50 MG: 50 TABLET, FILM COATED ORAL at 09:18

## 2024-08-06 RX ADMIN — CEFAZOLIN SODIUM 1 G: 1 INJECTION, POWDER, FOR SOLUTION INTRAMUSCULAR; INTRAVENOUS at 10:27

## 2024-08-06 RX ADMIN — MIRTAZAPINE 7.5 MG: 15 TABLET, FILM COATED ORAL at 21:22

## 2024-08-06 RX ADMIN — HEPARIN SODIUM 4500 UNITS: 1000 INJECTION INTRAVENOUS; SUBCUTANEOUS at 11:15

## 2024-08-06 RX ADMIN — Medication 10 ML: at 09:19

## 2024-08-06 RX ADMIN — Medication 10 ML: at 21:25

## 2024-08-06 NOTE — PROGRESS NOTES
"          Clinical Nutrition Assessment     Patient Name: Enrike Tomas  YOB: 1935  MRN: [id number]  Date of Encounter: 08/06/24 14:44 EDT  Admission date: 8/5/2024  Reason for Visit: BMI    Assessment   Nutrition Assessment   Admission Diagnosis:  ESRD (end stage renal disease) [N18.6]  Hyperkalemia [E87.5]    Problem List:    ESRD (end stage renal disease)    (HFpEF) heart failure with preserved ejection fraction    Benign prostatic hyperplasia    Depression    Major depressive disorder, single episode, moderate    Mixed hyperlipidemia    Paroxysmal atrial fibrillation    Hyperkalemia      PMH:   He  has a past medical history of A-fib, Abnormal ECG, Anemia, acute on chronic (9/30/2023), Arthritis, Atrial fibrillation, BPH (benign prostatic hyperplasia), Cervical compression fracture (06/05/2023), Diverticulosis, Diverticulum of esophagus, Elevated cholesterol, ESRD on hemodialysis, GERD (gastroesophageal reflux disease), Heart failure, Hemodialysis patient, Hyperlipidemia, Hypertension, Low back pain (2022), NERY (obstructive sleep apnea), Recurrent falls, and UTI (urinary tract infection).    PSH:  He  has a past surgical history that includes Tonsillectomy and adenoidectomy; Cataract extraction; Dialysis fistula creation; Inguinal hernia repair; Prostate biopsy; Throat surgery; Colonoscopy; Esophagogastroduodenoscopy; and Total shoulder arthroplasty w/ distal clavicle excision (Left, 9/25/2023).    Applicable Nutrition History:   Pt on hemodialysis M/W/F  6/6/23 Severe malnutrition    Anthropometrics     Height: Height: 167.6 cm (66\")  Last Filed Weight: Weight: 52.6 kg (116 lb) (08/05/24 0854)  Method: Weight Method: Stated  BMI: BMI (Calculated): 18.7    UBW:  112# per EMR  Weight change: unchanged; wt stable x 1 year     Weight       Weight (kg) Weight (lbs) Weight Method Visit Report   8/14/2023 50.803 kg  112 lb   --    9/10/2023 53.071 kg  117 lb  Stated     9/12/2023 54.2 kg  119 lb 7.8 oz  " Standing scale     9/28/2023 54.199 kg  119 lb 7.8 oz  Estimated     1/8/2024 53.524 kg  118 lb  Stated     4/20/2024 53.071 kg  117 lb  Stated     8/5/2024 52.617 kg  116 lb  Stated       Nutrition Focused Physical Exam    Date:  8/6       Unable to perform due to Pt unable to participate at time of visit     Subjective   Reported/Observed/Food/Nutrition Related History:     Patient unavailable at time of visit x2, attempted to call family, no answer.    Current Nutrition Prescription   PO: Diet: Regular/House; Fluid Consistency: Thin (IDDSI 0)  Oral Nutrition Supplement: N/A  Intake: N/A Pt NPO prior to assessment    Assessment & Plan   Nutrition Diagnosis   Date:  8/6            Updated:    Problem No nutrition diagnosis at this time; pending further data    Etiology    Signs/Symptoms    Status: New        Goal:   Nutrition to support treatment and Establish PO      Nutrition Intervention      Follow treatment progress, Care plan reviewed    Will perform NFPE when feasible.    Monitoring/Evaluation:   Per protocol, I&O, PO intake, Weight, Symptoms, POC/GOC    Zeina Sawyer RD eligible  Time Spent: 25 min

## 2024-08-06 NOTE — PLAN OF CARE
Goal Outcome Evaluation:Scheduled HD completed. Goal reached. Albumin and UF decreased was utilized to support hypotension episodes. Blood returned to pt. Called report to AGUSTÍN Espinoza      Problem: Device-Related Complication Risk (Hemodialysis)  Goal: Safe, Effective Therapy Delivery  Outcome: Ongoing, Progressing     Problem: Hemodynamic Instability (Hemodialysis)  Goal: Effective Tissue Perfusion  Outcome: Ongoing, Progressing     Problem: Infection (Hemodialysis)  Goal: Absence of Infection Signs and Symptoms  Outcome: Ongoing, Progressing

## 2024-08-06 NOTE — NURSING NOTE
15.5 Ecuadorean Image guided tunneled dialysis catheter placed to right internal jugular by Dr. Mike MD and MERCEDEZ Patel. Patient tolerated well. Sedation time of 45 minutes. Patient was given 50 mcg Fentanyl & 1 mg Versed. Pressure held at puncture site and dressed with an antimicrobial dressing. Report called to AGUSTÍN Plascencia in Dialysis and AGUSTÍN Espinoza on 5F.

## 2024-08-06 NOTE — PLAN OF CARE
Goal Outcome Evaluation:      Patient had tunneled HD catheter placed today. VSS upon return to unit. Remained on oxygen while sedation wore off. Patient went for dialysis today.

## 2024-08-06 NOTE — CASE MANAGEMENT/SOCIAL WORK
Discharge Planning Assessment  [facility]     Patient Name: Enrike Tomas  MRN: [id number]  Today's Date: 8/6/2024    Admit Date: 8/5/2024    Plan: Home at DC   Discharge Needs Assessment       Row Name 08/06/24 1156       Living Environment    People in Home spouse    Current Living Arrangements home    Living Arrangement Comments Has a stair lift. Requires assist with ADLs. Has a private caregiver 5 days a week. Has private PT at home 2 times a week.       Discharge Needs Assessment    Equipment Currently Used at Home wheelchair;walker, rolling;lift device    Equipment Needed After Discharge none    Discharge Coordination/Progress Walks with a walker with assist at baseline. Goes to outpt HD at [facility] at 1000. Family transports.                   Discharge Plan       Row Name 08/06/24 1159       Plan    Plan Home at PR    Patient/Family in Agreement with Plan yes    Plan Comments I spoke with the pt and his spouse in the room. Spouse states they have everything they need at home. Spouse denies any DC needs at this time.    Final Discharge Disposition Code 01 - home or self-care      Row Name 08/06/24 0902       Plan    Final Discharge Disposition Code 01 - home or self-care                  Continued Care and Services - Admitted Since 8/5/2024    No active coordination exists for this encounter.       Expected Discharge Date and Time       Expected Discharge Date Expected Discharge Time    Aug 6, 2024            Demographic Summary    No documentation.                  Functional Status       Row Name 08/06/24 1156       Functional Status    Usual Activity Tolerance moderate       Functional Status, IADL    Medications assistive person    Meal Preparation assistive person    Housekeeping assistive person    Laundry assistive person    Shopping assistive person                   Psychosocial    No documentation.                  Abuse/Neglect    No documentation.                  Legal    No  documentation.                  Substance Abuse    No documentation.                  Patient Forms    No documentation.                     Isa Odell RN

## 2024-08-06 NOTE — PLAN OF CARE
Problem: Skin Injury Risk Increased  Goal: Skin Health and Integrity  Outcome: Ongoing, Progressing  Intervention: Optimize Skin Protection  Recent Flowsheet Documentation  Taken 8/6/2024 0400 by Agusto Alcala RN  Pressure Reduction Techniques:   frequent weight shift encouraged   heels elevated off bed   pressure points protected  Head of Bed (HOB) Positioning: HOB elevated  Pressure Reduction Devices:   pressure-redistributing mattress utilized   positioning supports utilized  Skin Protection:   adhesive use limited   incontinence pads utilized  Taken 8/6/2024 0200 by Agusto Alcala RN  Pressure Reduction Techniques:   frequent weight shift encouraged   heels elevated off bed   pressure points protected  Head of Bed (HOB) Positioning: HOB elevated  Pressure Reduction Devices:   pressure-redistributing mattress utilized   positioning supports utilized  Skin Protection:   adhesive use limited   incontinence pads utilized  Taken 8/6/2024 0000 by Agusto Alcala RN  Pressure Reduction Techniques:   frequent weight shift encouraged   heels elevated off bed   pressure points protected  Head of Bed (HOB) Positioning: [facility] elevated  Pressure Reduction Devices:   pressure-redistributing mattress utilized   positioning supports utilized  Skin Protection:   adhesive use limited   incontinence pads utilized  Taken 8/5/2024 2230 by Agusto Alcala RN  Pressure Reduction Techniques:   frequent weight shift encouraged   heels elevated off bed   pressure points protected  Head of Bed (HOB) Positioning: [facility] elevated  Pressure Reduction Devices:   pressure-redistributing mattress utilized   positioning supports utilized  Skin Protection:   adhesive use limited   incontinence pads utilized  Taken 8/5/2024 2045 by Agusto Alcala RN  Pressure Reduction Techniques:   frequent weight shift encouraged   heels elevated off bed   pressure points protected  Head of Bed (HOB) Positioning: [facility] elevated  Pressure Reduction Devices:    pressure-redistributing mattress utilized   positioning supports utilized  Skin Protection:   adhesive use limited   incontinence pads utilized     Problem: Adult Inpatient Plan of Care  Goal: Plan of Care Review  Outcome: Ongoing, Progressing  Flowsheets (Taken 8/6/2024 0458)  Progress: no change  Plan of Care Reviewed With: patient  Goal: Patient-Specific Goal (Individualized)  Outcome: Ongoing, Progressing  Goal: Absence of Hospital-Acquired Illness or Injury  Outcome: Ongoing, Progressing  Intervention: Identify and Manage Fall Risk  Recent Flowsheet Documentation  Taken 8/6/2024 0400 by Agusto Alcala RN  Safety Promotion/Fall Prevention:   activity supervised   clutter free environment maintained   fall prevention program maintained   nonskid shoes/slippers when out of bed   safety round/check completed  Taken 8/6/2024 0200 by Agusto Alcala RN  Safety Promotion/Fall Prevention:   activity supervised   clutter free environment maintained   fall prevention program maintained   nonskid shoes/slippers when out of bed   safety round/check completed  Taken 8/6/2024 0000 by Agusto Alcala RN  Safety Promotion/Fall Prevention:   activity supervised   clutter free environment maintained   fall prevention program maintained   nonskid shoes/slippers when out of bed   safety round/check completed  Taken 8/5/2024 2230 by Agusto Alcala RN  Safety Promotion/Fall Prevention:   activity supervised   clutter free environment maintained   fall prevention program maintained   nonskid shoes/slippers when out of bed   safety round/check completed  Taken 8/5/2024 2045 by Agusto Alcala RN  Safety Promotion/Fall Prevention:   activity supervised   clutter free environment maintained   fall prevention program maintained   nonskid shoes/slippers when out of bed   safety round/check completed  Intervention: Prevent Skin Injury  Recent Flowsheet Documentation  Taken 8/6/2024 0400 by Agusto Alcala RN  Body Position: position changed  independently  Skin Protection:   adhesive use limited   incontinence pads utilized  Taken 8/6/2024 0200 by Agusto Alcala RN  Body Position: position changed independently  Skin Protection:   adhesive use limited   incontinence pads utilized  Taken 8/6/2024 0000 by Agusto Alcala RN  Body Position: position changed independently  Skin Protection:   adhesive use limited   incontinence pads utilized  Taken 8/5/2024 2230 by Agusto Alcala RN  Body Position: position changed independently  Skin Protection:   adhesive use limited   incontinence pads utilized  Taken 8/5/2024 2045 by Agusto Alcala RN  Body Position: position changed independently  Skin Protection:   adhesive use limited   incontinence pads utilized  Intervention: Prevent and Manage VTE (Venous Thromboembolism) Risk  Recent Flowsheet Documentation  Taken 8/6/2024 0400 by Agusto Alcala RN  Activity Management: activity encouraged  Taken 8/6/2024 0200 by Agusto Alcala RN  Activity Management: activity encouraged  Taken 8/6/2024 0000 by Agusto Alcala RN  Activity Management: activity encouraged  Taken 8/5/2024 2230 by Agusto Alcala RN  Activity Management: activity encouraged  Taken 8/5/2024 2045 by Agusto Alcala RN  Activity Management: activity encouraged  Intervention: Prevent Infection  Recent Flowsheet Documentation  Taken 8/6/2024 0400 by Agusto Alcala RN  Infection Prevention:   hand hygiene promoted   rest/sleep promoted  Taken 8/6/2024 0200 by Agusto Alcala RN  Infection Prevention:   hand hygiene promoted   rest/sleep promoted  Taken 8/6/2024 0000 by Agusto Alcala RN  Infection Prevention:   hand hygiene promoted   rest/sleep promoted  Taken 8/5/2024 2230 by Agusto Alcala RN  Infection Prevention:   hand hygiene promoted   rest/sleep promoted  Taken 8/5/2024 2045 by Agusto Alcala RN  Infection Prevention:   hand hygiene promoted   rest/sleep promoted  Goal: Optimal Comfort and Wellbeing  Outcome: Ongoing, Progressing  Intervention:  Provide Person-Centered Care  Recent Flowsheet Documentation  Taken 8/5/2024 2045 by Agusto Alcala, RN  Trust Relationship/Rapport:   care explained   questions answered  Goal: Readiness for Transition of Care  Outcome: Ongoing, Progressing   Goal Outcome Evaluation:  Plan of Care Reviewed With: patient        Progress: no change

## 2024-08-06 NOTE — PROGRESS NOTES
[facility] Medicine Services  PROGRESS NOTE    Patient Name: Enrike Tomas  : 1935  MRN: [id number]    Date of Admission: 2024  Primary Care Physician: Blair Dhaliwal MD    Subjective   Subjective     CC: End-stage renal disease need dialysis    HPI:  No acute events over the night.  Vascular notes reviewed.  Plan to have IR placed a tunneled hemodialysis line.  Hemoglobin stable.      Objective   Objective     Vital Signs:   Temp:  [97.7 °F (36.5 °C)-98 °F (36.7 °C)] 97.9 °F (36.6 °C)  Heart Rate:  [60-79] 77  Resp:  [18] 18  BP: (114-173)/(62-89) 170/74     Physical Exam:  Vitals and nursing note reviewed.   Constitutional:       Appearance: Normal appearance.   HENT:      Head: Normocephalic.      Mouth/Throat:      Mouth: Mucous membranes are moist.   Cardiovascular:      Rate and Rhythm: Normal rate.   Pulmonary:      Effort: Pulmonary effort is normal.   Abdominal:      General: Abdomen is flat.   Neurological:      General: No focal deficit present.      Mental Status: He is alert.   Psychiatric:         Mood and Affect: Mood normal.         Results Reviewed:  LAB RESULTS:      Lab 24  0004 24  1637 24  1019   WBC  --   --  4.72   HEMOGLOBIN 12.1* 12.1* 12.1*   HEMATOCRIT 34.6* 36.7* 36.5*   PLATELETS  --   --  173   NEUTROS ABS  --   --  3.18   IMMATURE GRANS (ABS)  --   --  0.02   LYMPHS ABS  --   --  0.85   MONOS ABS  --   --  0.42   EOS ABS  --   --  0.20   MCV  --   --  104.3*         Lab 24  0802 24  1019   SODIUM 136 137   POTASSIUM 5.0 5.7*   CHLORIDE 93* 93*   CO2 28.0 32.0*   ANION GAP 15.0 12.0   BUN 52* 41*   CREATININE 6.77* 6.04*   EGFR 7.2* 8.3*   GLUCOSE 77 125*   CALCIUM 9.3 9.7                         Brief Urine Lab Results  (Last result in the past 365 days)        Color   Clarity   Blood   Leuk Est   Nitrite   Protein   CREAT   Urine HCG        23 1457 Yellow   Clear     Negative                        Microbiology Results Abnormal       None            Duplex Hemodialysis Access CAR    Result Date: 8/5/2024    A right sided brachial-basilic autogenous arteriovenous (AV) fistula is present.Right brachial-basilic AVF appears patent with velocities and volume flows noted below.     XR Chest 1 View    Result Date: 8/5/2024  XR CHEST 1 VW Date of Exam: 8/5/2024 9:56 AM EDT Indication: volume overload Comparison: 4/20/2024 Findings: Cardiomediastinal silhouette is enlarged with previously noted right anterior fat-containing mediastinal hernia. No airspace disease, pneumothorax, nor pleural effusion. No acute osseous abnormality identified.     Impression: Impression: 1.No evidence of CHF/volume overload. Electronically Signed: Sundeep Workman MD  8/5/2024 10:16 AM EDT  Workstation ID: [id number]         Current medications:  Scheduled Meds:amiodarone, 100 mg, Oral, Daily  atorvastatin, 20 mg, Oral, Nightly  folic acid, 1 mg, Oral, Daily  melatonin, 5 mg, Oral, Nightly  midodrine, 5 mg, Oral, TID AC  mirtazapine, 7.5 mg, Oral, Nightly  pantoprazole, 40 mg, Oral, Daily  sennosides-docusate, 2 tablet, Oral, BID  sertraline, 50 mg, Oral, Daily  sodium chloride, 10 mL, Intravenous, Q12H      Continuous Infusions:   PRN Meds:.  acetaminophen **OR** acetaminophen **OR** acetaminophen    senna-docusate sodium **AND** polyethylene glycol **AND** bisacodyl **AND** bisacodyl    HYDROcodone-acetaminophen    ondansetron    sodium chloride    sodium chloride    sodium chloride    Assessment & Plan   Assessment & Plan     Active Hospital Problems    Diagnosis  POA    **ESRD (end stage renal disease) [N18.6]  Yes    Hyperkalemia [E87.5]  Yes    Paroxysmal atrial fibrillation [I48.0]  Yes    Major depressive disorder, single episode, moderate [F32.1]  Yes    Depression [F32.A]  Yes    (HFpEF) heart failure with preserved ejection fraction [I50.30]  Yes    Benign prostatic hyperplasia [N40.0]  Yes    Mixed hyperlipidemia [E78.2]  Yes       Resolved Hospital Problems   No resolved problems to display.        Brief Hospital Course to date:  Enrike Tomas is a 89 y.o. male with a past medical history of end-stage renal disease on hemodialysis Monday Wednesday Friday.  Hypertension.  Paroxysmal A-fib.  And hyperlipidemia who was presented to our ED with right arm swelling around the fistula.          ## Right arm(AV fistula) swelling/hematoma  ## End-stage renal disease on hemodialysis Monday Wednesday Friday:  ## Mild hyperkalemia  - In the ED patient was hemodynamically stable.   - Potassium mildly high at 5.7.    -Vascular surgery  consulted by the ED provider.    -  Doppler for further. right sided brachial-basilic autogenous arteriovenous (AV) fistula is   -Patient has enlarging hematoma and bruises around the fistula.  -Vascular recommend IR to place a tunneled hemodialysis line and to hold off he on using fistula for the next 2 weeks until further eval as an outpatient.  -IR consultation ordered.  Continue to hold home Eliquis for 48 hours..    - Nephrology consulted.  Resume hemodialysis once dialysis line by IR.  -Pain control     ## Paroxysmal A-fib;  -Resume amiodarone.  -Hold Eliquis     ## Hyperlipidemia:  -Continue statin     ## Depression:  -Continue home Zoloft     ## BPH:  -Continue home meds    Expected Discharge Location and Transportation: Likely home on 08/07  Expected Discharge   Expected Discharge Date: 8/6/2024; Expected Discharge Time:      VTE Prophylaxis:  Mechanical VTE prophylaxis orders are present.         AM-PAC 6 Clicks Score (PT): 19 (08/05/24 2045)    CODE STATUS:   There are no questions and answers to display.       Dale Barrera MD  08/06/24

## 2024-08-07 ENCOUNTER — APPOINTMENT (OUTPATIENT)
Dept: GENERAL RADIOLOGY | Facility: HOSPITAL | Age: 89
End: 2024-08-07
Payer: MEDICARE

## 2024-08-07 ENCOUNTER — APPOINTMENT (OUTPATIENT)
Dept: NEPHROLOGY | Facility: HOSPITAL | Age: 89
End: 2024-08-07
Payer: MEDICARE

## 2024-08-07 LAB
ALBUMIN SERPL-MCNC: 4.3 G/DL (ref 3.5–5.2)
ALP SERPL-CCNC: 91 U/L (ref 39–117)
ALT SERPL W P-5'-P-CCNC: 12 U/L (ref 1–41)
ANION GAP SERPL CALCULATED.3IONS-SCNC: 14 MMOL/L (ref 5–15)
AST SERPL-CCNC: 21 U/L (ref 1–40)
BASOPHILS # BLD AUTO: 0.05 10*3/MM3 (ref 0–0.2)
BASOPHILS NFR BLD AUTO: 0.7 % (ref 0–1.5)
BILIRUB CONJ SERPL-MCNC: 0.2 MG/DL (ref 0–0.3)
BILIRUB INDIRECT SERPL-MCNC: 0.5 MG/DL
BILIRUB SERPL-MCNC: 0.7 MG/DL (ref 0–1.2)
BUN SERPL-MCNC: 25 MG/DL (ref 8–23)
BUN/CREAT SERPL: 5.8 (ref 7–25)
CALCIUM SPEC-SCNC: 9.7 MG/DL (ref 8.6–10.5)
CHLORIDE SERPL-SCNC: 96 MMOL/L (ref 98–107)
CO2 SERPL-SCNC: 25 MMOL/L (ref 22–29)
CREAT SERPL-MCNC: 4.33 MG/DL (ref 0.76–1.27)
DEPRECATED RDW RBC AUTO: 51.8 FL (ref 37–54)
EGFRCR SERPLBLD CKD-EPI 2021: 12.4 ML/MIN/1.73
EOSINOPHIL # BLD AUTO: 0.3 10*3/MM3 (ref 0–0.4)
EOSINOPHIL NFR BLD AUTO: 4 % (ref 0.3–6.2)
ERYTHROCYTE [DISTWIDTH] IN BLOOD BY AUTOMATED COUNT: 13.6 % (ref 12.3–15.4)
GLUCOSE BLDC GLUCOMTR-MCNC: 74 MG/DL (ref 70–130)
GLUCOSE SERPL-MCNC: 81 MG/DL (ref 65–99)
HAV IGM SERPL QL IA: NORMAL
HBV CORE IGM SERPL QL IA: NORMAL
HBV SURFACE AG SERPL QL IA: NORMAL
HCT VFR BLD AUTO: 35.2 % (ref 37.5–51)
HCT VFR BLD AUTO: 35.8 % (ref 37.5–51)
HCT VFR BLD AUTO: 36.4 % (ref 37.5–51)
HCV AB SER QL: NORMAL
HGB BLD-MCNC: 11.3 G/DL (ref 13–17.7)
HGB BLD-MCNC: 11.6 G/DL (ref 13–17.7)
HGB BLD-MCNC: 12.4 G/DL (ref 13–17.7)
IMM GRANULOCYTES # BLD AUTO: 0.02 10*3/MM3 (ref 0–0.05)
IMM GRANULOCYTES NFR BLD AUTO: 0.3 % (ref 0–0.5)
LYMPHOCYTES # BLD AUTO: 0.75 10*3/MM3 (ref 0.7–3.1)
LYMPHOCYTES NFR BLD AUTO: 10 % (ref 19.6–45.3)
MCH RBC QN AUTO: 35.2 PG (ref 26.6–33)
MCHC RBC AUTO-ENTMCNC: 34.1 G/DL (ref 31.5–35.7)
MCV RBC AUTO: 103.4 FL (ref 79–97)
MONOCYTES # BLD AUTO: 0.5 10*3/MM3 (ref 0.1–0.9)
MONOCYTES NFR BLD AUTO: 6.6 % (ref 5–12)
NEUTROPHILS NFR BLD AUTO: 5.9 10*3/MM3 (ref 1.7–7)
NEUTROPHILS NFR BLD AUTO: 78.4 % (ref 42.7–76)
NRBC BLD AUTO-RTO: 0 /100 WBC (ref 0–0.2)
PLATELET # BLD AUTO: 163 10*3/MM3 (ref 140–450)
PMV BLD AUTO: 8.6 FL (ref 6–12)
POTASSIUM SERPL-SCNC: 4.5 MMOL/L (ref 3.5–5.2)
PROT SERPL-MCNC: 7.4 G/DL (ref 6–8.5)
RBC # BLD AUTO: 3.52 10*6/MM3 (ref 4.14–5.8)
SODIUM SERPL-SCNC: 135 MMOL/L (ref 136–145)
WBC NRBC COR # BLD AUTO: 7.52 10*3/MM3 (ref 3.4–10.8)

## 2024-08-07 PROCEDURE — 97535 SELF CARE MNGMENT TRAINING: CPT

## 2024-08-07 PROCEDURE — 97530 THERAPEUTIC ACTIVITIES: CPT

## 2024-08-07 PROCEDURE — 97162 PT EVAL MOD COMPLEX 30 MIN: CPT

## 2024-08-07 PROCEDURE — 99232 SBSQ HOSP IP/OBS MODERATE 35: CPT | Performed by: FAMILY MEDICINE

## 2024-08-07 PROCEDURE — 97166 OT EVAL MOD COMPLEX 45 MIN: CPT

## 2024-08-07 PROCEDURE — 85014 HEMATOCRIT: CPT | Performed by: HOSPITALIST

## 2024-08-07 PROCEDURE — 82948 REAGENT STRIP/BLOOD GLUCOSE: CPT

## 2024-08-07 PROCEDURE — P9047 ALBUMIN (HUMAN), 25%, 50ML: HCPCS

## 2024-08-07 PROCEDURE — 25010000002 HEPARIN (PORCINE) PER 1000 UNITS: Performed by: INTERNAL MEDICINE

## 2024-08-07 PROCEDURE — 71045 X-RAY EXAM CHEST 1 VIEW: CPT

## 2024-08-07 PROCEDURE — 85025 COMPLETE CBC W/AUTO DIFF WBC: CPT | Performed by: HOSPITALIST

## 2024-08-07 PROCEDURE — 80074 ACUTE HEPATITIS PANEL: CPT | Performed by: INTERNAL MEDICINE

## 2024-08-07 PROCEDURE — 80076 HEPATIC FUNCTION PANEL: CPT | Performed by: INTERNAL MEDICINE

## 2024-08-07 PROCEDURE — 25010000002 ALBUMIN HUMAN 25% PER 50 ML

## 2024-08-07 PROCEDURE — 80048 BASIC METABOLIC PNL TOTAL CA: CPT | Performed by: HOSPITALIST

## 2024-08-07 PROCEDURE — 85018 HEMOGLOBIN: CPT | Performed by: HOSPITALIST

## 2024-08-07 RX ORDER — ALBUMIN (HUMAN) 12.5 G/50ML
SOLUTION INTRAVENOUS
Status: COMPLETED
Start: 2024-08-07 | End: 2024-08-07

## 2024-08-07 RX ORDER — ALBUMIN (HUMAN) 12.5 G/50ML
12.5 SOLUTION INTRAVENOUS AS NEEDED
Status: DISCONTINUED | OUTPATIENT
Start: 2024-08-07 | End: 2024-08-08 | Stop reason: HOSPADM

## 2024-08-07 RX ADMIN — ACETAMINOPHEN 650 MG: 325 TABLET ORAL at 20:54

## 2024-08-07 RX ADMIN — MIDODRINE HYDROCHLORIDE 5 MG: 5 TABLET ORAL at 17:51

## 2024-08-07 RX ADMIN — ATORVASTATIN CALCIUM 20 MG: 40 TABLET, FILM COATED ORAL at 20:54

## 2024-08-07 RX ADMIN — SERTRALINE 50 MG: 50 TABLET, FILM COATED ORAL at 11:52

## 2024-08-07 RX ADMIN — FOLIC ACID 1 MG: 1 TABLET ORAL at 11:52

## 2024-08-07 RX ADMIN — MIRTAZAPINE 7.5 MG: 15 TABLET, FILM COATED ORAL at 20:54

## 2024-08-07 RX ADMIN — AMIODARONE HYDROCHLORIDE 100 MG: 200 TABLET ORAL at 11:52

## 2024-08-07 RX ADMIN — ALBUMIN (HUMAN) 25 G: 12.5 SOLUTION INTRAVENOUS at 14:37

## 2024-08-07 RX ADMIN — HEPARIN SODIUM 2000 UNITS: 1000 INJECTION INTRAVENOUS; SUBCUTANEOUS at 15:10

## 2024-08-07 RX ADMIN — ALBUMIN (HUMAN) 25 G: 0.25 INJECTION, SOLUTION INTRAVENOUS at 14:37

## 2024-08-07 RX ADMIN — Medication 5 MG: at 20:54

## 2024-08-07 RX ADMIN — PANTOPRAZOLE SODIUM 40 MG: 40 TABLET, DELAYED RELEASE ORAL at 11:52

## 2024-08-07 RX ADMIN — SENNOSIDES AND DOCUSATE SODIUM 2 TABLET: 50; 8.6 TABLET ORAL at 11:52

## 2024-08-07 RX ADMIN — TAMSULOSIN HYDROCHLORIDE 0.4 MG: 0.4 CAPSULE ORAL at 11:52

## 2024-08-07 RX ADMIN — Medication 10 ML: at 20:55

## 2024-08-07 RX ADMIN — SENNOSIDES AND DOCUSATE SODIUM 2 TABLET: 50; 8.6 TABLET ORAL at 20:54

## 2024-08-07 NOTE — PROGRESS NOTES
[facility] Medicine Services  PROGRESS NOTE    Patient Name: Enrike Tomas  : 1935  MRN: [id number]    Date of Admission: 2024  Primary Care Physician: Blair Dhaliwal MD    Subjective   Subjective     CC:  Postprocedure confusion with hallucinations    HPI:  Patient is an 89-year-old who underwent tunneled dialysis catheter placement in the right subclavian on 2024.  Postprocedure he had severe confusion with hallucinations.  He describes being in a vivid dream and being [place] time.  He is also describing the scenery of [place] as if he were there.  His wife is at bedside and states this happened to him previously with anesthesia.  He was unable to tolerate dialysis last night due to hypotension      Objective   Objective     Vital Signs:   Temp:  [97.4 °F (36.3 °C)-98.1 °F (36.7 °C)] 97.5 °F (36.4 °C)  Heart Rate:  [67-78] 72  Resp:  [15-18] 18  BP: ()/(51-87) 120/85  Flow (L/min):  [3] 3     Physical Exam:  Constitutional: No acute distress, drowsy but arousable  HENT: NCAT, mucous membranes moist  Respiratory: Clear to auscultation bilaterally, respiratory effort normal   Cardiovascular: RRR, no murmurs, rubs, or gallops  Gastrointestinal: Positive bowel sounds, soft, nontender, nondistended  Musculoskeletal: No bilateral ankle edema  Psychiatric: Appears to be having visual hallucinations of scenery and [place] time, he is pleasantly redirected  Neurologic: Disoriented and unable to answer orientation questions, generally weak, Cranial Nerves grossly intact to confrontation, speech clear  Skin: No rashes      Results Reviewed:  LAB RESULTS:      Lab 24  1143 24  0416 24  0808 24  0004 24  1637 24  1019   WBC  --  7.52  --   --   --  4.72   HEMOGLOBIN 11.6* 12.4* 12.1* 12.1* 12.1* 12.1*   HEMATOCRIT 35.2* 36.4* 35.2* 34.6* 36.7* 36.5*   PLATELETS  --  163  --   --   --  173   NEUTROS ABS  --  5.90  --   --   --   3.18   IMMATURE GRANS (ABS)  --  0.02  --   --   --  0.02   LYMPHS ABS  --  0.75  --   --   --  0.85   MONOS ABS  --  0.50  --   --   --  0.42   EOS ABS  --  0.30  --   --   --  0.20   MCV  --  103.4*  --   --   --  104.3*   PROTIME  --   --  15.4*  --   --   --          Lab 08/07/24  0416 08/06/24  0802 08/05/24  1019   SODIUM 135* 136 137   POTASSIUM 4.5 5.0 5.7*   CHLORIDE 96* 93* 93*   CO2 25.0 28.0 32.0*   ANION GAP 14.0 15.0 12.0   BUN 25* 52* 41*   CREATININE 4.33* 6.77* 6.04*   EGFR 12.4* 7.2* 8.3*   GLUCOSE 81 77 125*   CALCIUM 9.7 9.3 9.7         Lab 08/07/24  0416   TOTAL PROTEIN 7.4   ALBUMIN 4.3   ALT (SGPT) 12   AST (SGOT) 21   BILIRUBIN 0.7   INDIRECT BILIRUBIN 0.5   BILIRUBIN DIRECT 0.2   ALK PHOS 91         Lab 08/06/24  0808   PROTIME 15.4*   INR 1.21*                 Brief Urine Lab Results       None            Microbiology Results Abnormal       None            IR Insert Tunneled CV Catheter Without Port 5 Plus    Result Date: 8/6/2024  DATE OF EXAM: 8/6/2024 10:31 AM EDT PROCEDURE: IR INS TUNNELED CV CATHETER WO PORT 5 PLUS INDICATIONS: Urgent HD COMPARISON: No Comparisons Available FLUOROSCOPIC TIME: 2 minutes 54 seconds PHYSICIAN MONITORED CONSCIOUS SEDATION TIME: 45 minutes TECHNIQUE: A detailed explanation of the procedure, including the risks, benefits, and alternatives was provided. A preprocedure timeout was performed. The interventional radiology nurse mild the patient all times and provided conscious sedation. The patient was placed supine on the bed and the right neck was marked and prepped and draped using maximal sterile barrier technique. Limited evaluation of the right neck demonstrates an occluded right IJ with a patent right external jugular vein. Next under ultrasound guidance access was obtained to the right external jugular vein with a micropuncture kit. A guidewire was manipulated centrally. Next an appropriate exit site was selected and the area was anesthetized with  lidocaine and a small skin incision was made. The catheter was then tunneled from the exit site to the access site and the tract was dilated over the guidewire to accommodate a peel-away sheath. As the wire and inner dilator of the peel-away sheath were removed and I was placing the catheter, and moderate sized air embolism was performed. The patient was placed with the right side up and close monitoring was performed. Additionally, the patient was administered 100% oxygen via a nonrebreather. The patient's blood pressure did drop with systolic in the 80s, but the patient never became tachycardic, and never was hemodynamically unstable. Fluoroscopy demonstrated that the catheter was slightly short, terminating in the proximal SVC. The decision was made to exchange the catheter for a longer catheter. A Glidewire advantage was advanced through the existing PermCath. The small time between placing the wire and we administering the clamp on the catheter, the patient developed another air embolism. Again, the patient was placed in the right side up position and 100% oxygen was administered. Eventually I attempted to remove the catheter over the Glidewire, but because the clamp was locked, it pulled the Glidewire back and I lost access. Attention was returned to the right neck and under ultrasound guidance access was obtained to the right external jugular vein with a micropuncture kit. A guidewire was manipulated centrally. The new catheter was tunneled from the exit site to the access site. A peel-away sheath was placed and the new catheter was placed through the peel-away sheath and peel-away sheath and wire were removed. Fluoroscopy confirmed catheter position in satisfactory position in the low SVC. Both ports aspirated and flushed  normally and were locked with heparinized saline. The patient tolerated the procedure well without any further complication. The patient was neurovascularly intact and hemodynamically  stable with a blood pressure of 140/90 at the time the patient was returned to his room. Additionally the patient's oxygen saturation did not get below 95. FINDINGS: See above     Impression: 1. Successful placement of right external jugular vein PermCath. Catheter is ready for use. 2. The procedure was complicated by air embolism at 2 separate points during the procedure. Supportive measures were given including 100% oxygen and placing the patient right side up. The patient did have transit decrease in blood pressure but remained with systolic above 90 and never lost consciousness and is remained neurologically intact throughout the procedure. Electronically Signed: Luis Mckeon MD  8/6/2024 1:28 PM EDT  Workstation ID: [id number]         Current medications:  Scheduled Meds:albumin human, , ,   amiodarone, 100 mg, Oral, Daily  atorvastatin, 20 mg, Oral, Nightly  folic acid, 1 mg, Oral, Daily  melatonin, 5 mg, Oral, Nightly  midodrine, 5 mg, Oral, TID AC  mirtazapine, 7.5 mg, Oral, Nightly  pantoprazole, 40 mg, Oral, Daily  sennosides-docusate, 2 tablet, Oral, BID  sertraline, 50 mg, Oral, Daily  sodium chloride, 10 mL, Intravenous, Q12H  tamsulosin, 0.4 mg, Oral, Daily      Continuous Infusions:   PRN Meds:.  acetaminophen **OR** acetaminophen **OR** acetaminophen    albumin human    albumin human    senna-docusate sodium **AND** polyethylene glycol **AND** bisacodyl **AND** bisacodyl    heparin (porcine)    HYDROcodone-acetaminophen    ondansetron    sodium chloride    sodium chloride    sodium chloride    Assessment & Plan   Assessment & Plan     Active Hospital Problems    Diagnosis  POA    **ESRD (end stage renal disease) [N18.6]  Yes    Hyperkalemia [E87.5]  Yes    Paroxysmal atrial fibrillation [I48.0]  Yes    Major depressive disorder, single episode, moderate [F32.1]  Yes    Depression [F32.A]  Yes    (HFpEF) heart failure with preserved ejection fraction [I50.30]  Yes    Benign prostatic hyperplasia  [N40.0]  Yes    Mixed hyperlipidemia [E78.2]  Yes      Resolved Hospital Problems   No resolved problems to display.        Brief Hospital Course to date:  Enrike Tomas is a 89 y.o. male with past medical history of end-stage renal disease on hemodialysis schedule Monday Wednesday Friday, hypertension, paroxysmal A-fib and hyperlipidemia who presented with malfunctioning fistula.  He underwent placement of a tunneled hemodialysis catheter in the right subclavian on 8/6/2024.  Postprocedure he had confusion with hallucinations thought secondary to anesthesia.    Malfunctioning right arm AV fistula  Status post tunneled catheter placement on 8/6/2024 in the right subclavian  End-stage renal disease  Hyperkalemia  -Vascular surgery was consulted, evaluated by Dr. Austin  -IR guided placement of tunneled catheter on 8/6/2024  -Continue to hold Eliquis x 48 hours  -Continue dialysis per nephrology who follows    Post procedure altered mental status with hallucinations  -Likely related to anesthesia during the procedure  -Expect to improve after dialysis    Paroxysmal A-fib  -Continue amiodarone  -Holding Eliquis x 48 hours post procedure    Hyperlipidemia  -Continue statin    Depression  -Continue Zoloft    BPH  Continue home meds    Expected Discharge Location and Transportation: Home  Expected Discharge   Expected Discharge Date: 8/8/2024; Expected Discharge Time:      VTE Prophylaxis:  Pharmacologic & mechanical VTE prophylaxis orders are present.         AM-PAC 6 Clicks Score (PT): 12 (08/07/24 0939)    CODE STATUS:   Code Status and Medical Interventions: No CPR (Do Not Attempt to Resuscitate); Full Support   Ordered at: 08/06/24 1302     Level Of Support Discussed With:    Patient     Code Status (Patient has no pulse and is not breathing):    No CPR (Do Not Attempt to Resuscitate)     Medical Interventions (Patient has pulse or is breathing):    Full Support       Zoe Hurtado MD  08/07/24

## 2024-08-07 NOTE — PROGRESS NOTES
" LOS: 1 day   Patient Care Team:  Blair Dhaliwal MD as PCP - General (Internal Medicine)  Blair Packer MD as Consulting Physician (Cardiology)  Michelle Aponte MD (Nephrology)    Chief Complaint: ESRD    Subjective     Underwent TDC placement today. Developed transient hypotension afterwards. Later seen in dialysis unit. Intra-dialytic hypotension noted again. UF goal reduced to 1.3 liter. Patient is pleasantly confused.     Subjective    History taken from: patient    Objective     Vital Sign Min/Max for last 24 hours  Temp  Min: 97.7 °F (36.5 °C)  Max: 98.1 °F (36.7 °C)   BP  Min: 66/49  Max: 201/72   Pulse  Min: 67  Max: 85   Resp  Min: 10  Max: 19   SpO2  Min: 91 %  Max: 100 %   Flow (L/min)  Min: 2  Max: 15   No data recorded     Flowsheet Rows      Flowsheet Row First Filed Value   Admission Height 167.6 cm (66\") Documented at 08/05/2024 0854   Admission Weight 52.6 kg (116 lb) Documented at 08/05/2024 0854            No intake/output data recorded.  I/O last 3 completed shifts:  In: -   Out: 1040 [Urine:40]    Objective:  General Appearance:  Comfortable.    Vital signs: (most recent): Blood pressure (!) 201/72, pulse 77, temperature 98 °F (36.7 °C), temperature source Oral, resp. rate 16, height 167.6 cm (66\"), weight 52.6 kg (116 lb), SpO2 93%.  Vital signs are normal.    HEENT: Normal HEENT exam.    Lungs:  Normal effort and normal respiratory rate.  Breath sounds clear to auscultation.    Heart: Normal rate.  Regular rhythm.  S1 normal and S2 normal.    Abdomen: Abdomen is soft and non-distended.  There are no signs of ascites.  Bowel sounds are normal.     Extremities: Normal range of motion.  There is no dependent edema.    Pulses: Distal pulses are intact.    Neurological: Patient is alert and oriented to person, place and time.  Normal strength.    Pupils:  Pupils are equal, round, and reactive to light.    Skin:  Warm.                Results Review:     I reviewed the patient's new " "clinical results.    WBC WBC   Date Value Ref Range Status   08/05/2024 4.72 3.40 - 10.80 10*3/mm3 Final      HGB Hemoglobin   Date Value Ref Range Status   08/06/2024 12.1 (L) 13.0 - 17.7 g/dL Final   08/06/2024 12.1 (L) 13.0 - 17.7 g/dL Final   08/05/2024 12.1 (L) 13.0 - 17.7 g/dL Final   08/05/2024 12.1 (L) 13.0 - 17.7 g/dL Final      HCT Hematocrit   Date Value Ref Range Status   08/06/2024 35.2 (L) 37.5 - 51.0 % Final   08/06/2024 34.6 (L) 37.5 - 51.0 % Final   08/05/2024 36.7 (L) 37.5 - 51.0 % Final   08/05/2024 36.5 (L) 37.5 - 51.0 % Final      Platlets No results found for: \"LABPLAT\"   MCV MCV   Date Value Ref Range Status   08/05/2024 104.3 (H) 79.0 - 97.0 fL Final          Sodium Sodium   Date Value Ref Range Status   08/06/2024 136 136 - 145 mmol/L Final   08/05/2024 137 136 - 145 mmol/L Final      Potassium Potassium   Date Value Ref Range Status   08/06/2024 5.0 3.5 - 5.2 mmol/L Final     Comment:     Slight hemolysis detected by analyzer. Result may be falsely elevated.   08/05/2024 5.7 (H) 3.5 - 5.2 mmol/L Final     Comment:     Slight hemolysis detected by analyzer. Result may be falsely elevated.      Chloride Chloride   Date Value Ref Range Status   08/06/2024 93 (L) 98 - 107 mmol/L Final   08/05/2024 93 (L) 98 - 107 mmol/L Final      CO2 CO2   Date Value Ref Range Status   08/06/2024 28.0 22.0 - 29.0 mmol/L Final   08/05/2024 32.0 (H) 22.0 - 29.0 mmol/L Final      BUN BUN   Date Value Ref Range Status   08/06/2024 52 (H) 8 - 23 mg/dL Final   08/05/2024 41 (H) 8 - 23 mg/dL Final      Creatinine Creatinine   Date Value Ref Range Status   08/06/2024 6.77 (H) 0.76 - 1.27 mg/dL Final   08/05/2024 6.04 (H) 0.76 - 1.27 mg/dL Final      Calcium Calcium   Date Value Ref Range Status   08/06/2024 9.3 8.6 - 10.5 mg/dL Final   08/05/2024 9.7 8.6 - 10.5 mg/dL Final      PO4 No results found for: \"CAPO4\"   Albumin No results found for: \"ALBUMIN\"   Magnesium No results found for: \"MG\"   Uric Acid No results " "found for: \"URICACID\"     Medication Review: Yes    Assessment & Plan       ESRD (end stage renal disease)    (HFpEF) heart failure with preserved ejection fraction    Benign prostatic hyperplasia    Depression    Major depressive disorder, single episode, moderate    Mixed hyperlipidemia    Paroxysmal atrial fibrillation    Hyperkalemia      Assessment & Plan    ESRD:  On MWF saray. [facility]. RUE AV fistula.   -Bruised AV fistula.  Vascular consulted.      Bruised /swollen AV fistula:   -AV fistula functional. Likely infiltrated on last attempt. Now has TDC right IJ     Anemia: Hemoglobin at goal.      Volume status :  Stable         [facility]   Now has TDC due to infiltration of AV access.      Thank you for the consult, will follow with you closely    Pancho Carpenter MD  08/06/24  21:12 EDT            "

## 2024-08-07 NOTE — SIGNIFICANT NOTE
Responded to patient bed alarm. Entered room and found patient on knees kneeled against bed with elbows on bed. Patient unable to explain circumstances. No injuries noted. Vitals stable. Patient assisted back to bed. Bed alarm placed again. Non slip socks in place. Moved from room 16 to 19 to be closer to nurses station. Dr. Guerra updated and no new orders. Charge nurse updated at time of fall.

## 2024-08-07 NOTE — PROGRESS NOTES
"          Clinical Nutrition Assessment     Patient Name: Enrike Tomas  YOB: 1935  MRN: [id number]  Date of Encounter: 08/07/24 13:26 EDT  Admission date: 8/5/2024  Reason for Visit: Follow-up protocol    Assessment   Nutrition Assessment   Admission Diagnosis:  ESRD (end stage renal disease) [N18.6]  Hyperkalemia [E87.5]    Problem List:    ESRD (end stage renal disease)    (HFpEF) heart failure with preserved ejection fraction    Benign prostatic hyperplasia    Depression    Major depressive disorder, single episode, moderate    Mixed hyperlipidemia    Paroxysmal atrial fibrillation    Hyperkalemia      PMH:   He  has a past medical history of A-fib, Abnormal ECG, Anemia, acute on chronic (9/30/2023), Arthritis, Atrial fibrillation, BPH (benign prostatic hyperplasia), Cervical compression fracture (06/05/2023), Diverticulosis, Diverticulum of esophagus, Elevated cholesterol, ESRD on hemodialysis, GERD (gastroesophageal reflux disease), Heart failure, Hemodialysis patient, Hyperlipidemia, Hypertension, Low back pain (2022), NERY (obstructive sleep apnea), Recurrent falls, and UTI (urinary tract infection).    PSH:  He  has a past surgical history that includes Tonsillectomy and adenoidectomy; Cataract extraction; Dialysis fistula creation; Inguinal hernia repair; Prostate biopsy; Throat surgery; Colonoscopy; Esophagogastroduodenoscopy; and Total shoulder arthroplasty w/ distal clavicle excision (Left, 9/25/2023).    Applicable Nutrition History:   Pt on hemodialysis M/W/F  6/6/23 Severe malnutrition    Anthropometrics     Height: Height: 167.6 cm (66\")  Last Filed Weight: Weight: 52.6 kg (116 lb) (08/05/24 0854)  Method: Weight Method: Stated  BMI: BMI (Calculated): 18.7    UBW:  112# per EMR  Weight change: unchanged; wt stable x 1 year     Weight       Weight (kg) Weight (lbs) Weight Method Visit Report   8/14/2023 50.803 kg  112 lb   --    9/10/2023 53.071 kg  117 lb  Stated     9/12/2023 54.2 kg  " 119 lb 7.8 oz  Standing scale     9/28/2023 54.199 kg  119 lb 7.8 oz  Estimated     1/8/2024 53.524 kg  118 lb  Stated     4/20/2024 53.071 kg  117 lb  Stated     8/5/2024 52.617 kg  116 lb  Stated       Wife reported pt weighing 135-140# <2 years ago.    Nutrition Focused Physical Exam    Date:  8/7       Patient meets criteria for malnutrition diagnosis, see MSA note.     Subjective   Reported/Observed/Food/Nutrition Related History:     8/7) Patient awake some periods during visit, wife at bedside answered assessment questions. Wife reported that patient does not have much of an appetite, states this has gone on for approximately 8 months. She states patient will eat a few bites of a meal and not want anymore. She said this morning he ate a little bit more, several bites of eggs and matt. She states patient does have mild difficulty swallowing pills so he takes those with applesauce, however reports no swallowing difficulty affecting his ability to eat. Wife states she brought in Nepro supplement for patient, accepting of pt receiving ONS while admitted.    8/6) Patient unavailable at time of visit x2, attempted to call family, no answer.    Current Nutrition Prescription   PO: Diet: Regular/House; Fluid Consistency: Thin (IDDSI 0)  Oral Nutrition Supplement: N/A  Intake: Insufficient data No intakes documented    Assessment & Plan   Nutrition Diagnosis   Date:  8/6 Updated:8/7  Problem Malnutrition, chronic severe   Etiology Clinical condition w/ decreased appetitie   Signs/Symptoms <75% of EEN x >/= 1 month, severe muscle wasting, and severe subcutaneous fat loss   Status: Active      Goal:   Nutrition to support treatment and Establish PO      Nutrition Intervention      Follow treatment progress, Care plan reviewed, Advise alternate selection, Advised available snacks, Interview for preferences, Menu provided, Menu adjusted, Encourage intake, Supplement provided    -Encourage PO intakes  -Adjusted diet to  renal diet  -Trial [facility] Renal TID w/ breakfast and lunch    Monitoring/Evaluation:   Per protocol, I&O, PO intake, Weight, Symptoms, POC/GOC    Zeina Sawyer RD eligible  Time Spent: 30 min

## 2024-08-07 NOTE — THERAPY EVALUATION
Patient Name: Enrike Tomas  : 1935    MRN: [id number]                              Today's Date: 2024       Admit Date: 2024    Visit Dx:     ICD-10-CM ICD-9-CM   1. Hyperkalemia  E87.5 276.7   2. End stage renal disease  N18.6 585.6   3. Shunt malfunction, initial encounter  T85.618A 996.59   4. Hematoma  T14.8XXA 924.9     Patient Active Problem List   Diagnosis    (HFpEF) heart failure with preserved ejection fraction    Anemia of renal disease    At high risk for falls    Benign prostatic hyperplasia    Closed fracture of left acetabulum    Compression fracture of thoracic vertebra    Deficiency of other specified B group vitamins    Depression    ESRD (end stage renal disease)    Essential hypertension    GERD (gastroesophageal reflux disease)    Hepatic cyst    Long term (current) use of anticoagulants    Major depressive disorder, single episode, moderate    Mixed hyperlipidemia    Morgagni hernia    Occlusion of right vertebral artery    NERY (obstructive sleep apnea)    Fall    Severe malnutrition    Sustained SVT    Paroxysmal atrial fibrillation    Chronic heart failure with preserved ejection fraction (HFpEF)    S/P shoulder hemiarthroplasty, left    Anemia, acute on chronic    Postoperative confusion    Hyperkalemia     Past Medical History:   Diagnosis Date    A-fib     Abnormal ECG     occasional extra heartbeat    Anemia, acute on chronic 2023    Arthritis     Atrial fibrillation     BPH (benign prostatic hyperplasia)     Cervical compression fracture 2023    currently wearing a c collar    Diverticulosis     Diverticulum of esophagus     puree diet and speech therapy    Elevated cholesterol     ESRD on hemodialysis     GERD (gastroesophageal reflux disease)     Heart failure     Hemodialysis patient     M,W,F    Hyperlipidemia     Hypertension     Low back pain     NERY (obstructive sleep apnea)     NOT USING CPAP    Recurrent falls     UTI (urinary tract infection)       Past Surgical History:   Procedure Laterality Date    CATARACT EXTRACTION      COLONOSCOPY      DIALYSIS FISTULA CREATION      ENDOSCOPY      INGUINAL HERNIA REPAIR      PROSTATE BIOPSY      THROAT SURGERY      diverticulum in throat    TONSILLECTOMY AND ADENOIDECTOMY      TOTAL SHOULDER ARTHROPLASTY W/ DISTAL CLAVICLE EXCISION Left 9/25/2023    Procedure: HEMIARTHROPLASTY, BICEPS TENODESIS - LEFT;  Surgeon: Matteo Tan MD;  Location: [facility];  Service: Orthopedics;  Laterality: Left;      General Information       Row Name 08/07/24 0820          OT Time and Intention    Document Type evaluation  -     Mode of Treatment occupational therapy  -[employer] Name 08/07/24 0820          General Information    Patient Profile Reviewed yes  -SW     Prior Level of Function independent:;feeding;min assist:;all household mobility;transfer;grooming;max assist:;dressing;bathing;dependent:;driving  -     Existing Precautions/Restrictions fall  -     Barriers to Rehab medically complex;previous functional deficit;cognitive status  -[employer] Name 08/07/24 0820          Occupational Profile    Environmental Supports and Barriers (Occupational Profile) Supportive family, paid caregiver 5 days a week, PT 2 days a week, has a chair lift, walk in shower, w/c, and rw.  -[employer] Name 08/07/24 0820          Living Environment    People in Home spouse  -[employer] Name 08/07/24 0820          Home Main Entrance    Number of Stairs, Main Entrance none  -[employer] Name 08/07/24 0820          Stairs Within Home, Primary    Stairs, Within Home, Primary chair lift at home.  -     Number of Stairs, Within Home, Primary other (see comments)  -[employer] Name 08/07/24 0820          Cognition    Orientation Status (Cognition) oriented x 3  -[employer] Name 08/07/24 0820          Safety Issues, Functional Mobility    Safety Issues Affecting Function (Mobility) insight into deficits/self-awareness;safety  precaution awareness;safety precautions follow-through/compliance;sequencing abilities  -     Impairments Affecting Function (Mobility) balance;cognition;endurance/activity tolerance;range of motion (ROM);strength  -     Cognitive Impairments, Mobility Safety/Performance insight into deficits/self-awareness;problem-solving/reasoning;safety precaution awareness;safety precaution follow-through;sequencing abilities  -               User Key  (r) = Recorded By, (t) = Taken By, (c) = Cosigned By      Initials Name Provider Type     Isa Ann OT Occupational Therapist                     Mobility/ADL's       Row Name 08/07/24 0820          Bed Mobility    Bed Mobility supine-sit;sit-supine  -     Supine-Sit Ola (Bed Mobility) moderate assist (50% patient effort);2 person assist;verbal cues;nonverbal cues (demo/gesture)  -     Sit-Supine Ola (Bed Mobility) moderate assist (50% patient effort);verbal cues;nonverbal cues (demo/gesture)  -     Assistive Device (Bed Mobility) bed rails;head of bed elevated  -       Row Name 08/07/24 0820          Transfers    Transfers sit-stand transfer  -       Row Name 08/07/24 0820          Sit-Stand Transfer    Sit-Stand Ola (Transfers) moderate assist (50% patient effort);nonverbal cues (demo/gesture);verbal cues;2 person assist  -     Comment, (Sit-Stand Transfer) UE support  -       Row Name 08/07/24 0820          Functional Mobility    Functional Mobility- Ind. Level moderate assist (50% patient effort);2 person assist required  -     Functional Mobility- Device other (see comments)  UE support  -     Functional Mobility-Distance (Feet) 3  -     Functional Mobility- Comment side stepped to head of bed.  -       Row Name 08/07/24 0820          Activities of Daily Living    BADL Assessment/Intervention grooming  -       Row Name 08/07/24 0820          Grooming Assessment/Training    Ola Level (Grooming) grooming  skills;wash face, hands;set up  -     Position (Grooming) edge of bed sitting  -               User Key  (r) = Recorded By, (t) = Taken By, (c) = Cosigned By      Initials Name Provider Type    Isa Alan OT Occupational Therapist                   Obj/Interventions       Row Name 08/07/24 0820          Sensory Assessment (Somatosensory)    Sensory Assessment (Somatosensory) UE sensation intact  -SW       Row Name 08/07/24 0820          Vision Assessment/Intervention    Visual Impairment/Limitations WFL;corrective lenses full-time  -SW       Row Name 08/07/24 0820          Range of Motion Comprehensive    General Range of Motion upper extremity range of motion deficits identified  -     Comment, General Range of Motion BUEs limited with shoulder flexion lindsey AROM with limitations noted with PROM  -SW       Row Name 08/07/24 0820          Strength Comprehensive (MMT)    General Manual Muscle Testing (MMT) Assessment upper extremity strength deficits identified  -SW       Row Name 08/07/24 0820          Balance    Balance Assessment sitting static balance;sitting dynamic balance;sit to stand dynamic balance;standing static balance;standing dynamic balance  -     Static Sitting Balance minimal assist;verbal cues  -     Dynamic Sitting Balance moderate assist;verbal cues  -     Position, Sitting Balance supported  -     Sit to Stand Dynamic Balance moderate assist;2-person assist  -     Static Standing Balance moderate assist  -     Dynamic Standing Balance moderate assist;2-person assist  -     Position/Device Used, Standing Balance supported  -     Balance Interventions sitting;standing;sit to stand;supported;dynamic;static;minimal challenge;occupation based/functional task  -               User Key  (r) = Recorded By, (t) = Taken By, (c) = Cosigned By      Initials Name Provider Type    Isa Alan OT Occupational Therapist                   Goals/Plan       Row Name 08/07/24 0820           Transfer Goal 1 (OT)    Activity/Assistive Device (Transfer Goal 1, OT) toilet  -SW     Floyd Level/Cues Needed (Transfer Goal 1, OT) minimum assist (75% or more patient effort)  -SW     Time Frame (Transfer Goal 1, OT) long term goal (LTG);by discharge  -SW     Progress/Outcome (Transfer Goal 1, OT) goal ongoing  -       Row Name 08/07/24 0820          Self-Feeding Goal 1 (OT)    Activity/Device (Self-Feeding Goal 1, OT) self-feeding skills, all  -SW     Floyd Level/Cues Needed (Self-Feeding Goal 1, OT) set-up required  -SW     Time Frame (Self-Feeding Goal 1, OT) short term goal (STG);by discharge  -SW     Progress/Outcomes (Self-Feeding Goal 1, OT) goal ongoing  -       Row Name 08/07/24 0820          Therapy Assessment/Plan (OT)    Planned Therapy Interventions (OT) activity tolerance training;adaptive equipment training;BADL retraining;functional balance retraining;patient/caregiver education/training;transfer/mobility retraining;strengthening exercise  -               User Key  (r) = Recorded By, (t) = Taken By, (c) = Cosigned By      Initials Name Provider Type    Isa Alan OT Occupational Therapist                   Clinical Impression       Row Name 08/07/24 0820          Pain Assessment    Pretreatment Pain Rating 0/10 - no pain  -     Posttreatment Pain Rating 0/10 - no pain  -SW       Row Name 08/07/24 0820          Plan of Care Review    Plan of Care Reviewed With patient;caregiver  -     Outcome Evaluation OT eval complete. Pt presents with weakness, decline in act maurilio and mob as well as adl indep. Pt mod assist of 2 for bed mob/sts/taking steps to head of bed. Caregiver reports pt's inability to feed self this am, setup for washing face. Recommend iPOT and HHOT at d/c.  -       Row Name 08/07/24 0820          Therapy Assessment/Plan (OT)    Rehab Potential (OT) good, to achieve stated therapy goals  -     Criteria for Skilled Therapeutic Interventions Met (OT)  yes;meets criteria;skilled treatment is necessary  -     Therapy Frequency (OT) daily  -SW     Predicted Duration of Therapy Intervention (OT) 10 days  -       Row Name 08/07/24 0820          Therapy Plan Review/Discharge Plan (OT)    Anticipated Discharge Disposition (OT) home with assist;home with home health  -       Row Name 08/07/24 0820          Vital Signs    Pre Systolic BP Rehab 123  -SW     Pre Treatment Diastolic BP 67  -SW     O2 Delivery Pre Treatment room air  -SW     O2 Delivery Intra Treatment room air  -SW     O2 Delivery Post Treatment room air  -SW     Pre Patient Position Supine  -SW     Intra Patient Position Standing  -SW     Post Patient Position Supine  -SW       Row Name 08/07/24 0820          Positioning and Restraints    Pre-Treatment Position in bed  -SW     Post Treatment Position bed  -SW     In Bed notified nsg;supine;fowlers;call light within reach;encouraged to call for assist;exit alarm on;side rails up x3;with family/caregiver  -               User Key  (r) = Recorded By, (t) = Taken By, (c) = Cosigned By      Initials Name Provider Type    Isa Alan, BETSY Occupational Therapist                   Outcome Measures       Row Name 08/07/24 0942          How much help from another is currently needed...    Putting on and taking off regular lower body clothing? 1  -SW     Bathing (including washing, rinsing, and drying) 2  -SW     Toileting (which includes using toilet bed pan or urinal) 1  -SW     Putting on and taking off regular upper body clothing 2  -SW     Taking care of personal grooming (such as brushing teeth) 3  -SW     Eating meals 3  -SW     AM-PAC 6 Clicks Score (OT) 12  -SW       [facility] Name 08/07/24 0939          How much help from another person do you currently need...    Turning from your back to your side while in flat bed without using bedrails? 3  -LH     Moving from lying on back to sitting on the side of a flat bed without bedrails? 2  -LH     Moving to  and from a bed to a chair (including a wheelchair)? 2  -LH     Standing up from a chair using your arms (e.g., wheelchair, bedside chair)? 2  -LH     Climbing 3-5 steps with a railing? 1  -LH     To walk in hospital room? 2  -     AM-PAC 6 Clicks Score (PT) 12  -     Highest Level of Mobility Goal 4 --> Transfer to chair/commode  -       Row Name 08/07/24 0942 08/07/24 0939       Functional Assessment    Outcome Measure Options AM-PAC 6 Clicks Daily Activity (OT)  - AM-PAC 6 Clicks Basic Mobility (PT)  -              User Key  (r) = Recorded By, (t) = Taken By, (c) = Cosigned By      Initials Name Provider Type    Isa Alan OT Occupational Therapist     Nuris Lopes, PT Physical Therapist                    Occupational Therapy Education       Title: PT OT SLP Therapies (In Progress)       Topic: Occupational Therapy (In Progress)       Point: ADL training (Done)       Description:   Instruct learner(s) on proper safety adaptation and remediation techniques during self care or transfers.   Instruct in proper use of assistive devices.                  Learning Progress Summary             Patient Acceptance, E, VU,NR by GENO at 8/7/2024 0943   Caregiver Acceptance, E, VU,NR by GENO at 8/7/2024 0943                         Point: Home exercise program (Not Started)       Description:   Instruct learner(s) on appropriate technique for monitoring, assisting and/or progressing therapeutic exercises/activities.                  Learner Progress:  Not documented in this visit.              Point: Precautions (Done)       Description:   Instruct learner(s) on prescribed precautions during self-care and functional transfers.                  Learning Progress Summary             Patient Acceptance, E, VU,NR by GENO at 8/7/2024 0943   Caregiver Acceptance, E, VU,NR by GENO at 8/7/2024 0943                         Point: Body mechanics (Not Started)       Description:   Instruct learner(s) on proper positioning  and spine alignment during self-care, functional mobility activities and/or exercises.                  Learner Progress:  Not documented in this visit.                              User Key       Initials Effective Dates Name Provider Type Discipline     06/16/21 -  Isa Ann OT Occupational Therapist OT                  OT Recommendation and Plan  Planned Therapy Interventions (OT): activity tolerance training, adaptive equipment training, BADL retraining, functional balance retraining, patient/caregiver education/training, transfer/mobility retraining, strengthening exercise  Therapy Frequency (OT): daily  Plan of Care Review  Plan of Care Reviewed With: patient, caregiver  Outcome Evaluation: OT eval complete. Pt presents with weakness, decline in act maurilio and mob as well as adl indep. Pt mod assist of 2 for bed mob/sts/taking steps to head of bed. Caregiver reports pt's inability to feed self this am, setup for washing face. Recommend iPOT and HHOT at d/c.     Time Calculation:   Evaluation Complexity (OT)  Review Occupational Profile/Medical/Therapy History Complexity: brief/low complexity  Assessment, Occupational Performance/Identification of Deficit Complexity: 5 or more performance deficits  Clinical Decision Making Complexity (OT): detailed assessment/moderate complexity  Overall Complexity of Evaluation (OT): moderate complexity     Time Calculation- OT       Row Name 08/07/24 0820             Time Calculation- OT    OT Start Time 0820  -SW      OT Received On 08/07/24  -SW      OT Goal Re-Cert Due Date 08/17/24  -SW         Timed Charges    32836 - OT Self Care/Mgmt Minutes 15  -SW         Untimed Charges    OT Eval/Re-eval Minutes 40  -SW         Total Minutes    Timed Charges Total Minutes 15  -SW      Untimed Charges Total Minutes 40  -SW       Total Minutes 55  -SW                User Key  (r) = Recorded By, (t) = Taken By, (c) = Cosigned By      Initials Name Provider Type     Isa Ann OT  Occupational Therapist                  Therapy Charges for Today       Code Description Service Date Service Provider Modifiers Qty    99228169964  OT SELF CARE/MGMT/TRAIN EA 15 MIN 8/7/2024 Isa Ann OT GO 1    [id number]  OT EVAL MOD COMPLEXITY 3 8/7/2024 Isa Ann OT GO 1                 Isa Ann OT  8/7/2024

## 2024-08-07 NOTE — PROGRESS NOTES
Malnutrition Severity Assessment    Patient Name:  Enrike Tomas  YOB: 1935  MRN: [id number]  Admit Date:  8/5/2024    Patient meets criteria for : Severe Malnutrition (Chronic severe malnutrition; <75% PO intakes >/= 1 month, severe muscle wasting and severe fat loss)      Malnutrition Severity Assessment  Malnutrition Type: Chronic Disease - Related Malnutrition  Malnutrition Type (Last 8 Hours)       Malnutrition Severity Assessment       Row Name 08/07/24 1346       Malnutrition Severity Assessment    Malnutrition Type Chronic Disease - Related Malnutrition      Row Name 08/07/24 1346       Insufficient Energy Intake     Insufficient Energy Intake Findings Severe    Insufficient Energy Intake  <75% of est. energy requirement for > or equal to 1 month      Row Name 08/07/24 1346       Muscle Loss    Loss of Muscle Mass Findings Severe    [place] Region Moderate - slight depression    Clavicle Bone Region Severe - protruding prominent bone    Acromion Bone Region Severe - squared shoulders, bones, and acromion process protrusion prominent    Dorsal Hand Region Moderate - slight depression    Patellar Region Severe - prominent bone, square looking, very little muscle definition    Anterior Thigh Region Severe - line/depression along thigh, obviously thin    Posterior Calf Region Moderate - some roundness, slight firmness      Row Name 08/07/24 1346       Fat Loss    Subcutaneous Fat Loss Findings Severe    Orbital Region  Severe - pronounced hollowness/depression, dark circles, loose saggy skin    Upper Arm Region Severe - mostly skin, very little space between folds, fingers touch      Row Name 08/07/24 1346       Criteria Met (Must meet criteria for severity in at least 2 of these categories: M Wasting, Fat Loss, Fluid, Secondary Signs, Wt. Status, Intake)    Patient meets criteria for  Severe Malnutrition  Chronic severe malnutrition; <75% PO intakes >/= 1 month, severe muscle wasting and severe  fat loss                    Electronically signed by:  Zeina Sawyer RD eligible  08/07/24 13:51 EDT

## 2024-08-07 NOTE — PLAN OF CARE
Problem: Skin Injury Risk Increased  Goal: Skin Health and Integrity  Outcome: Ongoing, Progressing  Intervention: Optimize Skin Protection  Recent Flowsheet Documentation  Taken 8/7/2024 0630 by Agusto Alcala RN  Pressure Reduction Techniques:   frequent weight shift encouraged   heels elevated off bed   pressure points protected  Head of Bed (HOB) Positioning: HOB elevated  Pressure Reduction Devices:   pressure-redistributing mattress utilized   positioning supports utilized  Skin Protection:   adhesive use limited   incontinence pads utilized  Taken 8/7/2024 0400 by Agusto Alcala RN  Pressure Reduction Techniques:   frequent weight shift encouraged   heels elevated off bed   pressure points protected  Head of Bed (HOB) Positioning: HOB elevated  Pressure Reduction Devices:   pressure-redistributing mattress utilized   positioning supports utilized  Skin Protection:   adhesive use limited   incontinence pads utilized  Taken 8/7/2024 0200 by Agusto Alcala RN  Pressure Reduction Techniques:   frequent weight shift encouraged   heels elevated off bed   pressure points protected  Head of Bed (HOB) Positioning: [facility] elevated  Pressure Reduction Devices:   pressure-redistributing mattress utilized   positioning supports utilized  Skin Protection:   adhesive use limited   incontinence pads utilized  Taken 8/7/2024 0000 by Agusto Alcala RN  Pressure Reduction Techniques:   frequent weight shift encouraged   heels elevated off bed   pressure points protected  Head of Bed (HOB) Positioning: [facility] elevated  Pressure Reduction Devices:   pressure-redistributing mattress utilized   positioning supports utilized  Skin Protection:   adhesive use limited   incontinence pads utilized  Taken 8/6/2024 2200 by Agusto Alcala RN  Pressure Reduction Techniques:   frequent weight shift encouraged   heels elevated off bed   pressure points protected  Head of Bed (HOB) Positioning: HOB elevated  Pressure Reduction Devices:    pressure-redistributing mattress utilized   positioning supports utilized  Skin Protection:   adhesive use limited   incontinence pads utilized  Taken 8/6/2024 2000 by Agusto Alcala RN  Pressure Reduction Techniques:   frequent weight shift encouraged   heels elevated off bed   pressure points protected  Head of Bed (HOB) Positioning: HOB elevated  Pressure Reduction Devices:   pressure-redistributing mattress utilized   positioning supports utilized  Skin Protection:   adhesive use limited   incontinence pads utilized     Problem: Adult Inpatient Plan of Care  Goal: Plan of Care Review  Outcome: Ongoing, Progressing  Flowsheets (Taken 8/7/2024 0637)  Progress: no change  Plan of Care Reviewed With: patient  Goal: Patient-Specific Goal (Individualized)  Outcome: Ongoing, Progressing  Goal: Absence of Hospital-Acquired Illness or Injury  Outcome: Ongoing, Progressing  Intervention: Identify and Manage Fall Risk  Recent Flowsheet Documentation  Taken 8/7/2024 0630 by Agusto Alcala RN  Safety Promotion/Fall Prevention:   activity supervised   clutter free environment maintained   fall prevention program maintained   nonskid shoes/slippers when out of bed   safety round/check completed  Taken 8/7/2024 0400 by Agusto Alcala RN  Safety Promotion/Fall Prevention:   activity supervised   clutter free environment maintained   fall prevention program maintained   nonskid shoes/slippers when out of bed   safety round/check completed  Taken 8/7/2024 0200 by Agusto Alcala RN  Safety Promotion/Fall Prevention:   activity supervised   clutter free environment maintained   fall prevention program maintained   nonskid shoes/slippers when out of bed   safety round/check completed  Taken 8/7/2024 0000 by Agusto Alcala RN  Safety Promotion/Fall Prevention:   activity supervised   clutter free environment maintained   fall prevention program maintained   nonskid shoes/slippers when out of bed   safety round/check completed  Taken  8/6/2024 2200 by Agusto Alcala RN  Safety Promotion/Fall Prevention:   activity supervised   clutter free environment maintained   fall prevention program maintained   nonskid shoes/slippers when out of bed   safety round/check completed  Taken 8/6/2024 2000 by Agusto Alcala RN  Safety Promotion/Fall Prevention:   activity supervised   clutter free environment maintained   fall prevention program maintained   nonskid shoes/slippers when out of bed   safety round/check completed  Intervention: Prevent Skin Injury  Recent Flowsheet Documentation  Taken 8/7/2024 0630 by Agusto Alcala RN  Body Position: position changed independently  Skin Protection:   adhesive use limited   incontinence pads utilized  Taken 8/7/2024 0400 by Agusto Alcala RN  Body Position: position changed independently  Skin Protection:   adhesive use limited   incontinence pads utilized  Taken 8/7/2024 0200 by Agusto Alcala RN  Body Position: position changed independently  Skin Protection:   adhesive use limited   incontinence pads utilized  Taken 8/7/2024 0000 by Agusto Alcala RN  Body Position: position changed independently  Skin Protection:   adhesive use limited   incontinence pads utilized  Taken 8/6/2024 2200 by Agusto Alcala RN  Body Position: position changed independently  Skin Protection:   adhesive use limited   incontinence pads utilized  Taken 8/6/2024 2000 by Agusto Alcala RN  Body Position: position changed independently  Skin Protection:   adhesive use limited   incontinence pads utilized  Intervention: Prevent and Manage VTE (Venous Thromboembolism) Risk  Recent Flowsheet Documentation  Taken 8/7/2024 0630 by Agusto Alcala RN  Activity Management: activity encouraged  Taken 8/7/2024 0400 by Agusto Alcala RN  Activity Management: activity encouraged  Taken 8/7/2024 0200 by Agusto Alcala RN  Activity Management: activity encouraged  Taken 8/7/2024 0000 by Agusto Alcala RN  Activity Management: activity encouraged  Taken  8/6/2024 2200 by Agusto Alcala RN  Activity Management: activity encouraged  Taken 8/6/2024 2000 by Agusto Alcala RN  Activity Management: activity encouraged  Intervention: Prevent Infection  Recent Flowsheet Documentation  Taken 8/7/2024 0630 by Agusto Alcala RN  Infection Prevention:   hand hygiene promoted   rest/sleep promoted  Taken 8/7/2024 0400 by Agusto Alcala RN  Infection Prevention:   hand hygiene promoted   rest/sleep promoted  Taken 8/7/2024 0200 by Agusto Alcala RN  Infection Prevention:   hand hygiene promoted   rest/sleep promoted  Taken 8/7/2024 0000 by Agusto Alcala RN  Infection Prevention:   hand hygiene promoted   rest/sleep promoted  Taken 8/6/2024 2200 by Agusto Alcala RN  Infection Prevention:   hand hygiene promoted   rest/sleep promoted  Taken 8/6/2024 2000 by Agusto Alcala RN  Infection Prevention:   hand hygiene promoted   rest/sleep promoted  Goal: Optimal Comfort and Wellbeing  Outcome: Ongoing, Progressing  Intervention: Provide Person-Centered Care  Recent Flowsheet Documentation  Taken 8/6/2024 2000 by Agusto Alcala RN  Trust Relationship/Rapport:   care explained   questions answered  Goal: Readiness for Transition of Care  Outcome: Ongoing, Progressing     Problem: Fall Injury Risk  Goal: Absence of Fall and Fall-Related Injury  Outcome: Ongoing, Progressing  Intervention: Identify and Manage Contributors  Recent Flowsheet Documentation  Taken 8/7/2024 0630 by Agusto Alcala RN  Medication Review/Management: medications reviewed  Taken 8/7/2024 0400 by Agusto Alcala RN  Medication Review/Management: medications reviewed  Taken 8/7/2024 0200 by Agusto Alcala RN  Medication Review/Management: medications reviewed  Taken 8/7/2024 0000 by Agusto Alcala RN  Medication Review/Management: medications reviewed  Taken 8/6/2024 2200 by Agusto Alcala RN  Medication Review/Management: medications reviewed  Taken 8/6/2024 2000 by Agusto Alcala RN  Medication Review/Management:  medications reviewed  Intervention: Promote Injury-Free Environment  Recent Flowsheet Documentation  Taken 8/7/2024 0630 by Agusto Alcala RN  Safety Promotion/Fall Prevention:   activity supervised   clutter free environment maintained   fall prevention program maintained   nonskid shoes/slippers when out of bed   safety round/check completed  Taken 8/7/2024 0400 by Agusto Alcala RN  Safety Promotion/Fall Prevention:   activity supervised   clutter free environment maintained   fall prevention program maintained   nonskid shoes/slippers when out of bed   safety round/check completed  Taken 8/7/2024 0200 by Agusto Alcala RN  Safety Promotion/Fall Prevention:   activity supervised   clutter free environment maintained   fall prevention program maintained   nonskid shoes/slippers when out of bed   safety round/check completed  Taken 8/7/2024 0000 by Agusto Alcala RN  Safety Promotion/Fall Prevention:   activity supervised   clutter free environment maintained   fall prevention program maintained   nonskid shoes/slippers when out of bed   safety round/check completed  Taken 8/6/2024 2200 by Agusto Alcala RN  Safety Promotion/Fall Prevention:   activity supervised   clutter free environment maintained   fall prevention program maintained   nonskid shoes/slippers when out of bed   safety round/check completed  Taken 8/6/2024 2000 by Agusto Alcala RN  Safety Promotion/Fall Prevention:   activity supervised   clutter free environment maintained   fall prevention program maintained   nonskid shoes/slippers when out of bed   safety round/check completed     Problem: Device-Related Complication Risk (Hemodialysis)  Goal: Safe, Effective Therapy Delivery  Outcome: Ongoing, Progressing  Intervention: Optimize Device Care and Function  Recent Flowsheet Documentation  Taken 8/7/2024 0630 by Agusto Alcala RN  Medication Review/Management: medications reviewed  Taken 8/7/2024 0400 by Agusto Alcala RN  Medication  Review/Management: medications reviewed  Taken 8/7/2024 0200 by Agusto Alcala RN  Medication Review/Management: medications reviewed  Taken 8/7/2024 0000 by Agusto Alcala RN  Medication Review/Management: medications reviewed  Taken 8/6/2024 2200 by Agusto Alcala RN  Medication Review/Management: medications reviewed  Taken 8/6/2024 2000 by Agusto Alcala RN  Medication Review/Management: medications reviewed     Problem: Hemodynamic Instability (Hemodialysis)  Goal: Effective Tissue Perfusion  Outcome: Ongoing, Progressing     Problem: Infection (Hemodialysis)  Goal: Absence of Infection Signs and Symptoms  Outcome: Ongoing, Progressing   Goal Outcome Evaluation:  Plan of Care Reviewed With: patient        Progress: no change

## 2024-08-07 NOTE — PLAN OF CARE
Goal Outcome Evaluation:  Plan of Care Reviewed With: patient, caregiver           Outcome Evaluation: OT eval complete. Pt presents with weakness, decline in act maurilio and mob as well as adl indep. Pt mod assist of 2 for bed mob/sts/taking steps to head of bed. Caregiver reports pt's inability to feed self this am, setup for washing face. Recommend iPOT and HHOT at d/c.      Anticipated Discharge Disposition (OT): home with assist, home with home health

## 2024-08-07 NOTE — PLAN OF CARE
Problem: Skin Injury Risk Increased  Goal: Skin Health and Integrity  Outcome: Ongoing, Progressing  Intervention: Optimize Skin Protection  Recent Flowsheet Documentation  Taken 8/7/2024 1800 by Evelina High RN  Pressure Reduction Techniques:   frequent weight shift encouraged   weight shift assistance provided  Head of Bed (HOB) Positioning: [facility] elevated  Pressure Reduction Devices: pressure-redistributing mattress utilized  Skin Protection:   adhesive use limited   incontinence pads utilized   skin-to-skin areas padded  Taken 8/7/2024 1600 by Evelina High RN  Pressure Reduction Techniques: frequent weight shift encouraged  Head of Bed (HOB) Positioning: [facility] elevated  Pressure Reduction Devices: pressure-redistributing mattress utilized  Skin Protection:   adhesive use limited   incontinence pads utilized   skin-to-skin areas padded  Taken 8/7/2024 1200 by Evelina High RN  Pressure Reduction Techniques:   frequent weight shift encouraged   weight shift assistance provided  Head of Bed ([facility]) Positioning: [facility] elevated  Pressure Reduction Devices: pressure-redistributing mattress utilized  Skin Protection:   adhesive use limited   incontinence pads utilized   skin-to-skin areas padded  Taken 8/7/2024 1000 by Evelina High RN  Pressure Reduction Techniques:   frequent weight shift encouraged   weight shift assistance provided  Head of Bed ([facility]) Positioning: [facility] elevated  Pressure Reduction Devices: pressure-redistributing mattress utilized  Skin Protection:   adhesive use limited   incontinence pads utilized   skin-to-skin areas padded  Taken 8/7/2024 0800 by Evelina High RN  Pressure Reduction Techniques:   frequent weight shift encouraged   weight shift assistance provided  Head of Bed ([facility]) Positioning: [facility] elevated  Pressure Reduction Devices: pressure-redistributing mattress utilized  Skin Protection:   adhesive use limited   incontinence pads utilized   skin-to-skin areas padded     Problem: Adult  Inpatient Plan of Care  Goal: Plan of Care Review  Outcome: Ongoing, Progressing  Goal: Patient-Specific Goal (Individualized)  Outcome: Ongoing, Progressing  Goal: Absence of Hospital-Acquired Illness or Injury  Outcome: Ongoing, Progressing  Intervention: Identify and Manage Fall Risk  Recent Flowsheet Documentation  Taken 8/7/2024 1800 by Evelina High RN  Safety Promotion/Fall Prevention:   activity supervised   assistive device/personal items within reach   clutter free environment maintained   fall prevention program maintained   nonskid shoes/slippers when out of bed   safety round/check completed  Taken 8/7/2024 1600 by Evelina High RN  Safety Promotion/Fall Prevention:   assistive device/personal items within reach   activity supervised   clutter free environment maintained   fall prevention program maintained   nonskid shoes/slippers when out of bed   safety round/check completed  Taken 8/7/2024 1400 by Evelina High RN  Safety Promotion/Fall Prevention: patient off unit  Taken 8/7/2024 1200 by Evelina High RN  Safety Promotion/Fall Prevention:   activity supervised   assistive device/personal items within reach   clutter free environment maintained   fall prevention program maintained   nonskid shoes/slippers when out of bed   safety round/check completed  Taken 8/7/2024 1000 by Evelina High RN  Safety Promotion/Fall Prevention:   activity supervised   assistive device/personal items within reach   clutter free environment maintained   fall prevention program maintained   nonskid shoes/slippers when out of bed   safety round/check completed  Taken 8/7/2024 0800 by Evelina High RN  Safety Promotion/Fall Prevention:   activity supervised   assistive device/personal items within reach   clutter free environment maintained   fall prevention program maintained   nonskid shoes/slippers when out of bed   safety round/check completed  Intervention: Prevent Skin Injury  Recent Flowsheet Documentation  Taken  8/7/2024 1800 by Evelina High RN  Body Position: position changed independently  Skin Protection:   adhesive use limited   incontinence pads utilized   skin-to-skin areas padded  Taken 8/7/2024 1600 by Evelina High RN  Body Position: position changed independently  Skin Protection:   adhesive use limited   incontinence pads utilized   skin-to-skin areas padded  Taken 8/7/2024 1200 by Evelina High RN  Body Position: position changed independently  Skin Protection:   adhesive use limited   incontinence pads utilized   skin-to-skin areas padded  Taken 8/7/2024 1000 by Evelina High RN  Body Position: position changed independently  Skin Protection:   adhesive use limited   incontinence pads utilized   skin-to-skin areas padded  Taken 8/7/2024 0800 by Evelina High RN  Body Position: position changed independently  Skin Protection:   adhesive use limited   incontinence pads utilized   skin-to-skin areas padded  Intervention: Prevent and Manage VTE (Venous Thromboembolism) Risk  Recent Flowsheet Documentation  Taken 8/7/2024 1800 by Evelina High RN  Activity Management: activity minimized  Taken 8/7/2024 1600 by Evelina High RN  Activity Management: activity minimized  Taken 8/7/2024 1200 by Evelina High RN  Activity Management: activity minimized  Taken 8/7/2024 1000 by Evelina High RN  Activity Management: activity minimized  Taken 8/7/2024 0800 by Evelina High RN  Activity Management: activity minimized  Range of Motion: active ROM (range of motion) encouraged  Intervention: Prevent Infection  Recent Flowsheet Documentation  Taken 8/7/2024 1800 by Evelina High RN  Infection Prevention:   environmental surveillance performed   equipment surfaces disinfected   hand hygiene promoted   personal protective equipment utilized   rest/sleep promoted   single patient room provided   visitors restricted/screened  Taken 8/7/2024 1600 by Evelina High RN  Infection Prevention:   environmental surveillance performed    equipment surfaces disinfected   hand hygiene promoted   personal protective equipment utilized   rest/sleep promoted   single patient room provided   visitors restricted/screened  Taken 8/7/2024 1200 by Evelina High RN  Infection Prevention:   environmental surveillance performed   hand hygiene promoted   equipment surfaces disinfected   personal protective equipment utilized   rest/sleep promoted   visitors restricted/screened   single patient room provided  Taken 8/7/2024 1000 by Evelina High RN  Infection Prevention:   environmental surveillance performed   equipment surfaces disinfected   hand hygiene promoted   rest/sleep promoted   personal protective equipment utilized   single patient room provided   visitors restricted/screened  Taken 8/7/2024 0800 by Evelina High RN  Infection Prevention:   environmental surveillance performed   equipment surfaces disinfected   hand hygiene promoted   personal protective equipment utilized   single patient room provided   rest/sleep promoted   visitors restricted/screened  Goal: Optimal Comfort and Wellbeing  Outcome: Ongoing, Progressing  Intervention: Provide Person-Centered Care  Recent Flowsheet Documentation  Taken 8/7/2024 0800 by Evelina High RN  Trust Relationship/Rapport:   care explained   choices provided   emotional support provided   empathic listening provided   questions answered   questions encouraged   reassurance provided   thoughts/feelings acknowledged  Goal: Readiness for Transition of Care  Outcome: Ongoing, Progressing     Problem: Fall Injury Risk  Goal: Absence of Fall and Fall-Related Injury  Outcome: Ongoing, Progressing  Intervention: Identify and Manage Contributors  Recent Flowsheet Documentation  Taken 8/7/2024 1800 by Evelina High RN  Medication Review/Management: medications reviewed  Taken 8/7/2024 1600 by Evelina High RN  Medication Review/Management: medications reviewed  Taken 8/7/2024 1200 by Evelina High, AGUSTÍN  Medication  Review/Management: medications reviewed  Taken 8/7/2024 1000 by Evelina High RN  Medication Review/Management: medications reviewed  Taken 8/7/2024 0800 by Evelina High RN  Medication Review/Management: medications reviewed  Intervention: Promote Injury-Free Environment  Recent Flowsheet Documentation  Taken 8/7/2024 1800 by Evelina High RN  Safety Promotion/Fall Prevention:   activity supervised   assistive device/personal items within reach   clutter free environment maintained   fall prevention program maintained   nonskid shoes/slippers when out of bed   safety round/check completed  Taken 8/7/2024 1600 by Evelina High RN  Safety Promotion/Fall Prevention:   assistive device/personal items within reach   activity supervised   clutter free environment maintained   fall prevention program maintained   nonskid shoes/slippers when out of bed   safety round/check completed  Taken 8/7/2024 1400 by Evelina High RN  Safety Promotion/Fall Prevention: patient off unit  Taken 8/7/2024 1200 by Evelina High RN  Safety Promotion/Fall Prevention:   activity supervised   assistive device/personal items within reach   clutter free environment maintained   fall prevention program maintained   nonskid shoes/slippers when out of bed   safety round/check completed  Taken 8/7/2024 1000 by Evelina High RN  Safety Promotion/Fall Prevention:   activity supervised   assistive device/personal items within reach   clutter free environment maintained   fall prevention program maintained   nonskid shoes/slippers when out of bed   safety round/check completed  Taken 8/7/2024 0800 by Evelina High RN  Safety Promotion/Fall Prevention:   activity supervised   assistive device/personal items within reach   clutter free environment maintained   fall prevention program maintained   nonskid shoes/slippers when out of bed   safety round/check completed     Problem: Device-Related Complication Risk (Hemodialysis)  Goal: Safe, Effective Therapy  Delivery  Outcome: Ongoing, Progressing  Intervention: Optimize Device Care and Function  Recent Flowsheet Documentation  Taken 8/7/2024 1800 by Evelina High RN  Medication Review/Management: medications reviewed  Taken 8/7/2024 1600 by Evelina High RN  Medication Review/Management: medications reviewed  Taken 8/7/2024 1200 by Evelina High RN  Medication Review/Management: medications reviewed  Taken 8/7/2024 1000 by Evelina High RN  Medication Review/Management: medications reviewed  Taken 8/7/2024 0800 by Evelina High RN  Medication Review/Management: medications reviewed     Problem: Hemodynamic Instability (Hemodialysis)  Goal: Effective Tissue Perfusion  Outcome: Ongoing, Progressing     Problem: Infection (Hemodialysis)  Goal: Absence of Infection Signs and Symptoms  Outcome: Ongoing, Progressing   Goal Outcome Evaluation:  Went to dialysis today. 1.1L of fluid removed per dialysis nurse. BP hypotensive during dialysis. Patient back to room at 1600. Confused at baseline. VSS. RA. Non tele. Call bell within reach. Wife at bedside.

## 2024-08-07 NOTE — PLAN OF CARE
Goal Outcome Evaluation:        Problem: Device-Related Complication Risk (Hemodialysis)  Goal: Safe, Effective Therapy Delivery  Outcome: Ongoing, Progressing  Intervention: Optimize Device Care and Function  Recent Flowsheet Documentation  Taken 8/7/2024 1515 by Alma Delia Zelaya, RN  Medication Review/Management:   medications reviewed   high-risk medications identified  Taken 8/7/2024 1247 by Alma Delia Zelaya, RN  Medication Review/Management:   medications reviewed   high-risk medications identified     Problem: Hemodynamic Instability (Hemodialysis)  Goal: Effective Tissue Perfusion  Outcome: Ongoing, Progressing     Problem: Infection (Hemodialysis)  Goal: Absence of Infection Signs and Symptoms  Outcome: Ongoing, Progressing            HD complete, hypotensive episode during tx, albumin 25g given and minimum UFR utilized. Patient asymptomatic with hypotension.  Hypotension resolved. Blood reinfused, report given to primary RN Evelina.       Fluid removal:1.1L

## 2024-08-07 NOTE — THERAPY EVALUATION
Patient Name: Enrike Tomas  : 1935    MRN: [id number]                              Today's Date: 2024       Admit Date: 2024    Visit Dx:     ICD-10-CM ICD-9-CM   1. Hyperkalemia  E87.5 276.7   2. End stage renal disease  N18.6 585.6   3. Shunt malfunction, initial encounter  T85.618A 996.59   4. Hematoma  T14.8XXA 924.9     Patient Active Problem List   Diagnosis    (HFpEF) heart failure with preserved ejection fraction    Anemia of renal disease    At high risk for falls    Benign prostatic hyperplasia    Closed fracture of left acetabulum    Compression fracture of thoracic vertebra    Deficiency of other specified B group vitamins    Depression    ESRD (end stage renal disease)    Essential hypertension    GERD (gastroesophageal reflux disease)    Hepatic cyst    Long term (current) use of anticoagulants    Major depressive disorder, single episode, moderate    Mixed hyperlipidemia    Morgagni hernia    Occlusion of right vertebral artery    NERY (obstructive sleep apnea)    Fall    Severe malnutrition    Sustained SVT    Paroxysmal atrial fibrillation    Chronic heart failure with preserved ejection fraction (HFpEF)    S/P shoulder hemiarthroplasty, left    Anemia, acute on chronic    Postoperative confusion    Hyperkalemia     Past Medical History:   Diagnosis Date    A-fib     Abnormal ECG     occasional extra heartbeat    Anemia, acute on chronic 2023    Arthritis     Atrial fibrillation     BPH (benign prostatic hyperplasia)     Cervical compression fracture 2023    currently wearing a c collar    Diverticulosis     Diverticulum of esophagus     puree diet and speech therapy    Elevated cholesterol     ESRD on hemodialysis     GERD (gastroesophageal reflux disease)     Heart failure     Hemodialysis patient     M,W,F    Hyperlipidemia     Hypertension     Low back pain     NERY (obstructive sleep apnea)     NOT USING CPAP    Recurrent falls     UTI (urinary tract infection)       Past Surgical History:   Procedure Laterality Date    CATARACT EXTRACTION      COLONOSCOPY      DIALYSIS FISTULA CREATION      ENDOSCOPY      INGUINAL HERNIA REPAIR      PROSTATE BIOPSY      THROAT SURGERY      diverticulum in throat    TONSILLECTOMY AND ADENOIDECTOMY      TOTAL SHOULDER ARTHROPLASTY W/ DISTAL CLAVICLE EXCISION Left 9/25/2023    Procedure: HEMIARTHROPLASTY, BICEPS TENODESIS - LEFT;  Surgeon: Matteo Tan MD;  Location: [facility];  Service: Orthopedics;  Laterality: Left;      General Information       [facility] Name 08/07/24 0922          Physical Therapy Time and Intention    Document Type evaluation  -     Mode of Treatment physical therapy  -       Row Name 08/07/24 0922          General Information    Patient Profile Reviewed yes  -     Prior Level of Function min assist:;all household mobility;transfer;bed mobility  Caregiver in room assists w/ PLOF. t/f to WC w/ assist x1. Receives PT 2x/week and works on walking short distances w/ FWW.  -     Existing Precautions/Restrictions fall;other (see comments)  R UE AV fistula  -     Barriers to Rehab medically complex;previous functional deficit;cognitive status  -[facility] Name 08/07/24 0922          Living Environment    People in Home spouse;child(vickie), adult;other (see comments)  caregiver 5 days/week  -       Row Name 08/07/24 0922          Home Main Entrance    Number of Stairs, Main Entrance none  -[facility] Name 08/07/24 0922          Stairs Within Home, Primary    Stairs, Within Home, Primary uses stair lift  -[facility] Name 08/07/24 0922          Cognition    Orientation Status (Cognition) oriented x 3;other (see comments)  able to answer orientation questions after sitting at EOB  -       Row Name 08/07/24 0922          Safety Issues, Functional Mobility    Safety Issues Affecting Function (Mobility) awareness of need for assistance;insight into deficits/self-awareness;safety precaution awareness;safety  precautions follow-through/compliance;sequencing abilities;judgment;problem-solving  -     Impairments Affecting Function (Mobility) balance;cognition;endurance/activity tolerance;range of motion (ROM);strength  -     Cognitive Impairments, Mobility Safety/Performance awareness, need for assistance;insight into deficits/self-awareness;problem-solving/reasoning;safety precaution awareness;safety precaution follow-through;judgment;sequencing abilities  -     Comment, Safety Issues/Impairments (Mobility) Pt initially lethargic w/ decreased command following. Improved w/ sitting at EOB and pt became more conversant and oriented  -               User Key  (r) = Recorded By, (t) = Taken By, (c) = Cosigned By      Initials Name Provider Type     Nuris Lopes, PT Physical Therapist                   Mobility       Row Name 08/07/24 0927          Bed Mobility    Bed Mobility supine-sit;sit-supine  -     Supine-Sit Holden (Bed Mobility) moderate assist (50% patient effort);2 person assist;verbal cues;nonverbal cues (demo/gesture)  -     Sit-Supine Holden (Bed Mobility) moderate assist (50% patient effort);verbal cues;nonverbal cues (demo/gesture)  -     Assistive Device (Bed Mobility) bed rails;head of bed elevated;draw sheet  -     Comment, (Bed Mobility) VC/TCs for hand placement and sequencing. Pt able to assist w/ advancing BLEs to EOB, however required assist to upright trunk and to scoot to EOB  -       Row Name 08/07/24 0927          Transfers    Comment, (Transfers) VCs for hand placement and sequencing  -       Row Name 08/07/24 0927          Bed-Chair Transfer    Comment, (Bed-Chair Transfer) pt declined [facility]IC  -       Row Name 08/07/24 0927          Sit-Stand Transfer    Sit-Stand Holden (Transfers) moderate assist (50% patient effort);2 person assist;verbal cues;nonverbal cues (demo/gesture)  -     Assistive Device (Sit-Stand Transfers) other (see comments)  BUE support   -     Comment, (Sit-Stand Transfer) STS from EOB. Posterior lean w/ increased knee and hip flexion noted in standing. Minimal correction noted w/ cues  -       Row Name 08/07/24 0927          Gait/Stairs (Locomotion)    Berger Level (Gait) moderate assist (50% patient effort);2 person assist;verbal cues  -     Assistive Device (Gait) other (see comments)  BUE support  -     Distance in Feet (Gait) 3  -     Deviations/Abnormal Patterns (Gait) bilateral deviations;base of support, narrow;amberly decreased;gait speed decreased;stride length decreased;weight shifting decreased  -     Bilateral Gait Deviations forward flexed posture  -     Comment, (Gait/Stairs) Pt took 3 steps to HOB and demo posterior lean w/ increased knee and hip flexion. VCs for upright posture and sequencing. Pt's feet started to slide forward, so had pt sit at EOB. Pt may benefit from use of FWW in subsequent sessions. Distance limited by weakness and fatigue  -               User Key  (r) = Recorded By, (t) = Taken By, (c) = Cosigned By      Initials Name Provider Type     Nuris Lopes PT Physical Therapist                   Obj/Interventions       Row Name 08/07/24 0932          Range of Motion Comprehensive    General Range of Motion bilateral lower extremity ROM WFL  -[facility] Name 08/07/24 0932          Strength Comprehensive (MMT)    General Manual Muscle Testing (MMT) Assessment lower extremity strength deficits identified  -     Comment, General Manual Muscle Testing (MMT) Assessment Functionally, BLEs grossly 3+/5  -       Row Name 08/07/24 0932          Balance    Balance Assessment sitting static balance;sitting dynamic balance;sit to stand dynamic balance;standing static balance;standing dynamic balance  -     Static Sitting Balance minimal assist;verbal cues  -     Dynamic Sitting Balance moderate assist;verbal cues  -     Position, Sitting Balance supported;sitting edge of bed  -     Sit  to Stand Dynamic Balance moderate assist;2-person assist;verbal cues  -     Static Standing Balance moderate assist;2-person assist;verbal cues  -     Dynamic Standing Balance moderate assist;2-person assist;verbal cues  -     Position/Device Used, Standing Balance supported  -     Comment, Balance Strong posterior lean noted initially in sitting that improved w/ cues  -[facility] Name 08/07/24 0932          Sensory Assessment (Somatosensory)    Sensory Assessment (Somatosensory) LE sensation intact  -               User Key  (r) = Recorded By, (t) = Taken By, (c) = Cosigned By      Initials Name Provider Type     Nuris Lopes, PT Physical Therapist                   Goals/Plan       [facility] Name 08/07/24 0939          Bed Mobility Goal 1 (PT)    Activity/Assistive Device (Bed Mobility Goal 1, PT) bed mobility activities, all  -     [place]bury Level/Cues Needed (Bed Mobility Goal 1, PT) minimum assist (75% or more patient effort)  -     Time Frame (Bed Mobility Goal 1, PT) short term goal (STG);5 days  -[facility] Name 08/07/24 0939          Transfer Goal 1 (PT)    Activity/Assistive Device (Transfer Goal 1, PT) sit-to-stand/stand-to-sit;bed-to-chair/chair-to-bed  -     [place] Level/Cues Needed (Transfer Goal 1, PT) minimum assist (75% or more patient effort)  -     Time Frame (Transfer Goal 1, PT) long term goal (LTG);10 days  -[facility] Name 08/07/24 0939          Gait Training Goal 1 (PT)    Activity/Assistive Device (Gait Training Goal 1, PT) gait (walking locomotion);assistive device use  -     [place] Level (Gait Training Goal 1, PT) minimum assist (75% or more patient effort)  -     Distance (Gait Training Goal 1, PT) 20 ft  -     Time Frame (Gait Training Goal 1, PT) long term goal (LTG);10 days  -[facility] Name 08/07/24 0939          Therapy Assessment/Plan (PT)    Planned Therapy Interventions (PT) balance training;bed mobility training;gait training;home  exercise program;neuromuscular re-education;patient/family education;postural re-education;ROM (range of motion);strengthening;stretching;transfer training  -               User Key  (r) = Recorded By, (t) = Taken By, (c) = Cosigned By      Initials Name Provider Type     Nuris Lopes, PT Physical Therapist                   Clinical Impression       [facility] Name 08/07/24 0934          Pain    Pretreatment Pain Rating 0/10 - no pain  -     Posttreatment Pain Rating 0/10 - no pain  -LH       Row Name 08/07/24 0934          Plan of Care Review    Plan of Care Reviewed With patient;caregiver  -     Outcome Evaluation PT eval completed. Pt presents slightly below baseline function d/t generalized weakness, balance and postural deficits, decreased activity tolerance, and confusion. Pt performed STS and completed 3 side steps to HOB w/ BUE support, modA x2. Pt would benefit from skilled IP PT. Recommend home w/ 24/7 assist and HHPT at d/c.  -[facility] Name 08/07/24 0934          Therapy Assessment/Plan (PT)    Patient/Family Therapy Goals Statement (PT) pt and caregiver both state goal is for pt to return home  -     Rehab Potential (PT) good, to achieve stated therapy goals  -     Criteria for Skilled Interventions Met (PT) yes;meets criteria;skilled treatment is necessary  -     Therapy Frequency (PT) daily  -     Predicted Duration of Therapy Intervention (PT) 10 days  -[facility] Name 08/07/24 0934          Positioning and Restraints    Pre-Treatment Position in bed  -     Post Treatment Position bed  -     In Bed notified nsg;supine;fowlers;call light within reach;encouraged to call for assist;exit alarm on;with family/caregiver  -               User Key  (r) = Recorded By, (t) = Taken By, (c) = Cosigned By      Initials Name Provider Type     Nuris Lopes, PT Physical Therapist                   Outcome Measures       [facility] Name 08/07/24 0939          How much help from another person do  you currently need...    Turning from your back to your side while in flat bed without using bedrails? 3  -LH     Moving from lying on back to sitting on the side of a flat bed without bedrails? 2  -LH     Moving to and from a bed to a chair (including a wheelchair)? 2  -LH     Standing up from a chair using your arms (e.g., wheelchair, bedside chair)? 2  -LH     Climbing 3-5 steps with a railing? 1  -LH     To walk in hospital room? 2  -     AM-PAC 6 Clicks Score (PT) 12  -     Highest Level of Mobility Goal 4 --> Transfer to chair/commode  -       Row Name 08/07/24 0939          Functional Assessment    Outcome Measure Options AM-PAC 6 Clicks Basic Mobility (PT)  -               User Key  (r) = Recorded By, (t) = Taken By, (c) = Cosigned By      Initials Name Provider Type     Nuris Lopes PT Physical Therapist                                 Physical Therapy Education       Title: PT OT SLP Therapies (In Progress)       Topic: Physical Therapy (In Progress)       Point: Mobility training (In Progress)       Learning Progress Summary             Patient Acceptance, E, NR by  at 8/7/2024 0940   Caregiver Acceptance, E, NR by  at 8/7/2024 0940                         Point: Home exercise program (Not Started)       Learner Progress:  Not documented in this visit.              Point: Body mechanics (In Progress)       Learning Progress Summary             Patient Acceptance, E, NR by  at 8/7/2024 0940   Caregiver Acceptance, E, NR by  at 8/7/2024 0940                         Point: Precautions (In Progress)       Learning Progress Summary             Patient Acceptance, E, NR by  at 8/7/2024 0940   Caregiver Acceptance, E, NR by  at 8/7/2024 0940                                         User Key       Initials Effective Dates Name Provider Type [facility] 09/21/23 -  Nuris Lopes PT Physical Therapist PT                  PT Recommendation and Plan  Planned Therapy Interventions  (PT): balance training, bed mobility training, gait training, home exercise program, neuromuscular re-education, patient/family education, postural re-education, ROM (range of motion), strengthening, stretching, transfer training  Plan of Care Reviewed With: patient, caregiver  Outcome Evaluation: PT eval completed. Pt presents slightly below baseline function d/t generalized weakness, balance and postural deficits, decreased activity tolerance, and confusion. Pt performed STS and completed 3 side steps to HOB w/ BUE support, modA x2. Pt would benefit from skilled IP PT. Recommend home w/ 24/7 assist and HHPT at d/c.     Time Calculation:   PT Evaluation Complexity  History, PT Evaluation Complexity: 3 or more personal factors and/or comorbidities  Examination of Body Systems (PT Eval Complexity): total of 3 or more elements  Clinical Presentation (PT Evaluation Complexity): evolving  Clinical Decision Making (PT Evaluation Complexity): moderate complexity  Overall Complexity (PT Evaluation Complexity): moderate complexity     PT Charges       Row Name 08/07/24 0941             Time Calculation    Start Time 0825  -      PT Received On 08/07/24  -      PT Goal Re-Cert Due Date 08/17/24  -         Timed Charges    90558 - PT Therapeutic Activity Minutes 8  -LH         Untimed Charges    PT Eval/Re-eval Minutes 48  -LH         Total Minutes    Timed Charges Total Minutes 8  -LH      Untimed Charges Total Minutes 48  -LH       Total Minutes 56  -LH                User Key  (r) = Recorded By, (t) = Taken By, (c) = Cosigned By      Initials Name Provider Type     Nuris Lopes, PT Physical Therapist                  Therapy Charges for Today       Code Description Service Date Service Provider Modifiers Qty    75602905921 HC PT THERAPEUTIC ACT EA 15 MIN 8/7/2024 Nuris Lopes, PT GP 1    52833751458 HC PT EVAL MOD COMPLEXITY 4 8/7/2024 Nuris Lopes, PT GP 1            PT G-Codes  Outcome Measure Options:  AM-PAC 6 Clicks Basic Mobility (PT)  AM-PAC 6 Clicks Score (PT): 12  PT Discharge Summary  Anticipated Discharge Disposition (PT): home with 24/7 care, home with home health    Nuris Lopes, PT  8/7/2024

## 2024-08-07 NOTE — PLAN OF CARE
Goal Outcome Evaluation:  Plan of Care Reviewed With: patient, caregiver           Outcome Evaluation: PT eval completed. Pt presents slightly below baseline function d/t generalized weakness, balance and postural deficits, decreased activity tolerance, and confusion. Pt performed STS and completed 3 side steps to HOB w/ BUE support, modA x2. Pt would benefit from skilled IP PT. Recommend home w/ 24/7 assist and HHPT at d/c.      Anticipated Discharge Disposition (PT): home with 24/7 care, home with home health

## 2024-08-08 ENCOUNTER — READMISSION MANAGEMENT (OUTPATIENT)
Dept: CALL CENTER | Facility: HOSPITAL | Age: 89
End: 2024-08-08
Payer: MEDICARE

## 2024-08-08 VITALS
SYSTOLIC BLOOD PRESSURE: 102 MMHG | RESPIRATION RATE: 15 BRPM | HEIGHT: 66 IN | OXYGEN SATURATION: 95 % | TEMPERATURE: 97.9 F | DIASTOLIC BLOOD PRESSURE: 56 MMHG | WEIGHT: 116 LBS | HEART RATE: 83 BPM | BODY MASS INDEX: 18.64 KG/M2

## 2024-08-08 PROBLEM — I50.22 CHRONIC SYSTOLIC HEART FAILURE: Status: ACTIVE | Noted: 2024-08-08

## 2024-08-08 LAB
HCT VFR BLD AUTO: 32.9 % (ref 37.5–51)
HCT VFR BLD AUTO: 33.2 % (ref 37.5–51)
HGB BLD-MCNC: 11 G/DL (ref 13–17.7)
HGB BLD-MCNC: 11 G/DL (ref 13–17.7)

## 2024-08-08 PROCEDURE — 99239 HOSP IP/OBS DSCHRG MGMT >30: CPT | Performed by: FAMILY MEDICINE

## 2024-08-08 PROCEDURE — 85018 HEMOGLOBIN: CPT | Performed by: HOSPITALIST

## 2024-08-08 PROCEDURE — 85014 HEMATOCRIT: CPT | Performed by: HOSPITALIST

## 2024-08-08 RX ADMIN — Medication 10 ML: at 09:41

## 2024-08-08 RX ADMIN — PANTOPRAZOLE SODIUM 40 MG: 40 TABLET, DELAYED RELEASE ORAL at 09:37

## 2024-08-08 RX ADMIN — SERTRALINE 50 MG: 50 TABLET, FILM COATED ORAL at 09:37

## 2024-08-08 RX ADMIN — SENNOSIDES AND DOCUSATE SODIUM 2 TABLET: 50; 8.6 TABLET ORAL at 09:37

## 2024-08-08 RX ADMIN — AMIODARONE HYDROCHLORIDE 100 MG: 200 TABLET ORAL at 09:37

## 2024-08-08 RX ADMIN — TAMSULOSIN HYDROCHLORIDE 0.4 MG: 0.4 CAPSULE ORAL at 09:37

## 2024-08-08 RX ADMIN — FOLIC ACID 1 MG: 1 TABLET ORAL at 09:37

## 2024-08-08 RX ADMIN — MIDODRINE HYDROCHLORIDE 5 MG: 5 TABLET ORAL at 12:38

## 2024-08-08 RX ADMIN — HYDROCODONE BITARTRATE AND ACETAMINOPHEN 1 TABLET: 7.5; 325 TABLET ORAL at 13:04

## 2024-08-08 NOTE — PROGRESS NOTES
" LOS: 3 days   Patient Care Team:  Blair Dhaliwal MD as PCP - General (Internal Medicine)  Blair Packer MD as Consulting Physician (Cardiology)  Michelle Aponte MD (Nephrology)    Chief Complaint: ESRD    Subjective     Stable for discharge. Plan to continue HD as outpatient.     Subjective    History taken from: patient    Objective     Vital Sign Min/Max for last 24 hours  Temp  Min: 96.6 °F (35.9 °C)  Max: 98 °F (36.7 °C)   BP  Min: 102/56  Max: 172/68   Pulse  Min: 69  Max: 83   Resp  Min: 15  Max: 18   SpO2  Min: 90 %  Max: 100 %   No data recorded   No data recorded     Flowsheet Rows      Flowsheet Row First Filed Value   Admission Height 167.6 cm (66\") Documented at 08/05/2024 0854   Admission Weight 52.6 kg (116 lb) Documented at 08/05/2024 0854            No intake/output data recorded.  I/O last 3 completed shifts:  In: 300 [Other:300]  Out: 1400     Objective:  General Appearance:  Comfortable.    Vital signs: (most recent): Blood pressure 102/56, pulse 83, temperature 97.9 °F (36.6 °C), temperature source Oral, resp. rate 15, height 167.6 cm (66\"), weight 52.6 kg (116 lb), SpO2 95%.  Vital signs are normal.    HEENT: Normal HEENT exam.    Lungs:  Normal effort and normal respiratory rate.  Breath sounds clear to auscultation.    Heart: Normal rate.  Regular rhythm.  S1 normal and S2 normal.    Abdomen: Abdomen is soft and non-distended.  There are no signs of ascites.  Bowel sounds are normal.     Extremities: Normal range of motion.  There is no dependent edema.    Pulses: Distal pulses are intact.    Neurological: Patient is alert and oriented to person, place and time.  Normal strength.    Pupils:  Pupils are equal, round, and reactive to light.    Skin:  Warm.                Results Review:     I reviewed the patient's new clinical results.    WBC WBC   Date Value Ref Range Status   08/07/2024 7.52 3.40 - 10.80 10*3/mm3 Final      HGB Hemoglobin   Date Value Ref Range Status " "  08/08/2024 11.0 (L) 13.0 - 17.7 g/dL Final   08/08/2024 11.0 (L) 13.0 - 17.7 g/dL Final   08/07/2024 11.3 (L) 13.0 - 17.7 g/dL Final   08/07/2024 11.6 (L) 13.0 - 17.7 g/dL Final   08/07/2024 12.4 (L) 13.0 - 17.7 g/dL Final   08/06/2024 12.1 (L) 13.0 - 17.7 g/dL Final   08/06/2024 12.1 (L) 13.0 - 17.7 g/dL Final   08/05/2024 12.1 (L) 13.0 - 17.7 g/dL Final      HCT Hematocrit   Date Value Ref Range Status   08/08/2024 32.9 (L) 37.5 - 51.0 % Final   08/08/2024 33.2 (L) 37.5 - 51.0 % Final   08/07/2024 35.8 (L) 37.5 - 51.0 % Final   08/07/2024 35.2 (L) 37.5 - 51.0 % Final   08/07/2024 36.4 (L) 37.5 - 51.0 % Final   08/06/2024 35.2 (L) 37.5 - 51.0 % Final   08/06/2024 34.6 (L) 37.5 - 51.0 % Final   08/05/2024 36.7 (L) 37.5 - 51.0 % Final      Platlets No results found for: \"LABPLAT\"   MCV MCV   Date Value Ref Range Status   08/07/2024 103.4 (H) 79.0 - 97.0 fL Final          Sodium Sodium   Date Value Ref Range Status   08/07/2024 135 (L) 136 - 145 mmol/L Final   08/06/2024 136 136 - 145 mmol/L Final      Potassium Potassium   Date Value Ref Range Status   08/07/2024 4.5 3.5 - 5.2 mmol/L Final     Comment:     Slight hemolysis detected by analyzer. Result may be falsely elevated.   08/06/2024 5.0 3.5 - 5.2 mmol/L Final     Comment:     Slight hemolysis detected by analyzer. Result may be falsely elevated.      Chloride Chloride   Date Value Ref Range Status   08/07/2024 96 (L) 98 - 107 mmol/L Final   08/06/2024 93 (L) 98 - 107 mmol/L Final      CO2 CO2   Date Value Ref Range Status   08/07/2024 25.0 22.0 - 29.0 mmol/L Final   08/06/2024 28.0 22.0 - 29.0 mmol/L Final      BUN BUN   Date Value Ref Range Status   08/07/2024 25 (H) 8 - 23 mg/dL Final   08/06/2024 52 (H) 8 - 23 mg/dL Final      Creatinine Creatinine   Date Value Ref Range Status   08/07/2024 4.33 (H) 0.76 - 1.27 mg/dL Final   08/06/2024 6.77 (H) 0.76 - 1.27 mg/dL Final      Calcium Calcium   Date Value Ref Range Status   08/07/2024 9.7 8.6 - 10.5 mg/dL " "Final   08/06/2024 9.3 8.6 - 10.5 mg/dL Final      PO4 No results found for: \"CAPO4\"   Albumin Albumin   Date Value Ref Range Status   08/07/2024 4.3 3.5 - 5.2 g/dL Final      Magnesium No results found for: \"MG\"   Uric Acid No results found for: \"URICACID\"     Medication Review: Yes    Assessment & Plan       ESRD (end stage renal disease)    (HFpEF) heart failure with preserved ejection fraction    Benign prostatic hyperplasia    Depression    Major depressive disorder, single episode, moderate    Mixed hyperlipidemia    Paroxysmal atrial fibrillation    Hyperkalemia    Chronic systolic heart failure      Assessment & Plan    ESRD:  On MWF saray. [facility]. RUE AV fistula.   -Bruised AV fistula.  Vascular consulted.      Bruised /swollen AV fistula:   -AV fistula functional. Likely infiltrated on last attempt. Now has TDC right IJ     Anemia: Hemoglobin at goal.      Volume status :  Stable         Recs  HD per [facility]   Now has TDC due to infiltration of AV access.   Discussed with [facility] unit updated them about plan.   Stable for discharge             Pancho Carpenter MD  08/08/24  15:58 EDT            "

## 2024-08-08 NOTE — PLAN OF CARE
Problem: Skin Injury Risk Increased  Goal: Skin Health and Integrity  Outcome: Ongoing, Progressing  Intervention: Optimize Skin Protection  Recent Flowsheet Documentation  Taken 8/8/2024 0400 by Senait oRsado RN  Pressure Reduction Techniques: frequent weight shift encouraged  Head of Bed (HOB) Positioning: [facility] elevated  Pressure Reduction Devices:   pressure-redistributing mattress utilized   positioning supports utilized  Skin Protection:   adhesive use limited   incontinence pads utilized  Taken 8/8/2024 0200 by Senait Rosado RN  Pressure Reduction Techniques: frequent weight shift encouraged  Head of Bed (HOB) Positioning: HOB elevated  Pressure Reduction Devices:   pressure-redistributing mattress utilized   positioning supports utilized  Skin Protection:   adhesive use limited   incontinence pads utilized  Taken 8/7/2024 2330 by Senait Rosado RN  Pressure Reduction Techniques: frequent weight shift encouraged  Head of Bed (HOB) Positioning: [facility] elevated  Pressure Reduction Devices:   pressure-redistributing mattress utilized   positioning supports utilized  Skin Protection:   adhesive use limited   incontinence pads utilized  Taken 8/7/2024 2100 by Senait Rosado RN  Pressure Reduction Techniques: frequent weight shift encouraged  Head of Bed (HOB) Positioning: (Simultaneous filing. User may be unaware of other data.) [facility] elevated  Pressure Reduction Devices:   pressure-redistributing mattress utilized   positioning supports utilized  Skin Protection:   adhesive use limited   incontinence pads utilized     Problem: Adult Inpatient Plan of Care  Goal: Plan of Care Review  Outcome: Ongoing, Progressing  Goal: Patient-Specific Goal (Individualized)  Outcome: Ongoing, Progressing  Goal: Absence of Hospital-Acquired Illness or Injury  Outcome: Ongoing, Progressing  Intervention: Identify and Manage Fall Risk  Recent Flowsheet Documentation  Taken 8/8/2024 0400 by Senait Rosado RN  Safety Promotion/Fall  Prevention:   nonskid shoes/slippers when out of bed   safety round/check completed  Taken 8/8/2024 0200 by Senait Rosado RN  Safety Promotion/Fall Prevention:   nonskid shoes/slippers when out of bed   safety round/check completed  Taken 8/7/2024 2330 by Senait Rosado RN  Safety Promotion/Fall Prevention:   nonskid shoes/slippers when out of bed   safety round/check completed  Taken 8/7/2024 2100 by Senait Rosado RN  Safety Promotion/Fall Prevention: (Simultaneous filing. User may be unaware of other data.)   nonskid shoes/slippers when out of bed   safety round/check completed  Intervention: Prevent Skin Injury  Recent Flowsheet Documentation  Taken 8/8/2024 0400 by Senait Rosado RN  Body Position: position changed independently  Skin Protection:   adhesive use limited   incontinence pads utilized  Taken 8/8/2024 0200 by Senait Rosado RN  Body Position: position changed independently  Skin Protection:   adhesive use limited   incontinence pads utilized  Taken 8/7/2024 2330 by Senait Rosado RN  Body Position: position changed independently  Skin Protection:   adhesive use limited   incontinence pads utilized  Taken 8/7/2024 2100 by Senait Rosado RN  Skin Protection:   adhesive use limited   incontinence pads utilized  Intervention: Prevent and Manage VTE (Venous Thromboembolism) Risk  Recent Flowsheet Documentation  Taken 8/8/2024 0400 by Senait Rosado RN  Activity Management: activity minimized  Taken 8/8/2024 0200 by Senait Rosado RN  Activity Management: activity minimized  Taken 8/7/2024 2330 by Senait Rosado RN  Activity Management: activity minimized  Goal: Optimal Comfort and Wellbeing  Outcome: Ongoing, Progressing  Intervention: Monitor Pain and Promote Comfort  Recent Flowsheet Documentation  Taken 8/7/2024 2100 by Senait Rosado RN  Pain Management Interventions: see MAR  Intervention: Provide Person-Centered Care  Recent Flowsheet Documentation  Taken 8/7/2024 2100 by Senait Rosado RN  Trust  Relationship/Rapport:   care explained   choices provided   questions answered   questions encouraged  Goal: Readiness for Transition of Care  Outcome: Ongoing, Progressing     Problem: Fall Injury Risk  Goal: Absence of Fall and Fall-Related Injury  Outcome: Ongoing, Progressing  Intervention: Promote Injury-Free Environment  Recent Flowsheet Documentation  Taken 8/8/2024 0400 by Senait Rosado, AGUSTÍN  Safety Promotion/Fall Prevention:   nonskid shoes/slippers when out of bed   safety round/check completed  Taken 8/8/2024 0200 by Senait Rosado RN  Safety Promotion/Fall Prevention:   nonskid shoes/slippers when out of bed   safety round/check completed  Taken 8/7/2024 2330 by Senait Rosado RN  Safety Promotion/Fall Prevention:   nonskid shoes/slippers when out of bed   safety round/check completed  Taken 8/7/2024 2100 by Senait Rosado, AGUSTÍN  Safety Promotion/Fall Prevention: (Simultaneous filing. User may be unaware of other data.)   nonskid shoes/slippers when out of bed   safety round/check completed     Problem: Device-Related Complication Risk (Hemodialysis)  Goal: Safe, Effective Therapy Delivery  Outcome: Ongoing, Progressing     Problem: Hemodynamic Instability (Hemodialysis)  Goal: Effective Tissue Perfusion  Outcome: Ongoing, Progressing     Problem: Infection (Hemodialysis)  Goal: Absence of Infection Signs and Symptoms  Outcome: Ongoing, Progressing   Goal Outcome Evaluation:

## 2024-08-08 NOTE — DISCHARGE SUMMARY
[facility] Medicine Services  DISCHARGE SUMMARY    Patient Name: Enrike Tomas  : 1935  MRN: [id number]    Date of Admission: 2024  8:57 AM  Date of Discharge:  24    Primary Care Physician: Blair Dhaliwal MD    Consults       Date and Time Order Name Status Description    2024  2:47 PM Inpatient Vascular Surgery Consult Completed             Hospital Course     Presenting Problem: confusion    Active Hospital Problems    Diagnosis  POA    **ESRD (end stage renal disease) [N18.6]  Yes    Chronic systolic heart failure [I50.22]  Yes    Hyperkalemia [E87.5]  Yes    Paroxysmal atrial fibrillation [I48.0]  Yes    Major depressive disorder, single episode, moderate [F32.1]  Yes    Depression [F32.A]  Yes    (HFpEF) heart failure with preserved ejection fraction [I50.30]  Yes    Benign prostatic hyperplasia [N40.0]  Yes    Mixed hyperlipidemia [E78.2]  Yes      Resolved Hospital Problems   No resolved problems to display.          Hospital Course:  Enrike Tomas is a 89 y.o. male with past medical history of end-stage renal disease on hemodialysis schedule , hypertension, paroxysmal A-fib and hyperlipidemia who presented with malfunctioning fistula.  He underwent placement of a tunneled hemodialysis catheter in the right subclavian on 2024.  Postprocedure he had confusion with hallucinations thought secondary to anesthesia.     Malfunctioning right arm AV fistula  Status post tunneled catheter placement on 2024 in the right subclavian  End-stage renal disease  Hyperkalemia  -Vascular surgery was consulted, evaluated by Dr. Austin  -IR guided placement of tunneled catheter on 2024  -Continue to hold Eliquis x 48 hours  -Continue dialysis per nephrology who follows, resume normal schedule  at discharge     Post procedure altered mental status with hallucinations, resolved  -Likely related to anesthesia during  the procedure  -Expect to improve after dialysis and he is close to normal day of discharge per his wife     Paroxysmal A-fib  -Continue amiodarone  -Holding Eliquis x 48 hours post procedure     Hyperlipidemia  -Continue statin     Depression  -Continue Zoloft     BPH  Continue home meds         Discharge Follow Up Recommendations for outpatient labs/diagnostics:  Follow-up for dialysis Monday Wednesday Friday as scheduled  Follow-up with vascular surgery regarding malfunctioning fistula in 1 to 2 weeks    Day of Discharge     HPI:   Patient is doing much better less confused and is oriented to place and year today.  His wife thinks he is close to baseline.  No further reports of hallucinations    Review of Systems  No fever chills chest pain abdominal pain nausea vomiting or diarrhea    Vital Signs:   Temp:  [96.6 °F (35.9 °C)-98 °F (36.7 °C)] 97.9 °F (36.6 °C)  Heart Rate:  [66-83] 83  Resp:  [15-19] 15  BP: ()/(40-75) 102/56      Physical Exam:  Constitutional: No acute distress, drowsy but arousable  HENT: NCAT, mucous membranes moist  Respiratory: Clear to auscultation bilaterally, respiratory effort normal   Cardiovascular: RRR, no murmurs, rubs, or gallops  Gastrointestinal: Positive bowel sounds, soft, nontender, nondistended  Musculoskeletal: No bilateral ankle edema  Psychiatric: Calm, not anxious and not hallucinating  Neurologic: Ranted to person place and year, generally weak, Cranial Nerves grossly intact to confrontation, speech clear  Skin: No rashes    Pertinent  and/or Most Recent Results     LAB RESULTS:      Lab 08/08/24  0755 08/08/24  0008 08/07/24  1553 08/07/24  1143 08/07/24  0416 08/06/24  0808 08/05/24  1637 08/05/24  1019   WBC  --   --   --   --  7.52  --   --  4.72   HEMOGLOBIN 11.0* 11.0* 11.3* 11.6* 12.4* 12.1*   < > 12.1*   HEMATOCRIT 32.9* 33.2* 35.8* 35.2* 36.4* 35.2*   < > 36.5*   PLATELETS  --   --   --   --  163  --   --  173   NEUTROS ABS  --   --   --   --  5.90  --    --  3.18   IMMATURE GRANS (ABS)  --   --   --   --  0.02  --   --  0.02   LYMPHS ABS  --   --   --   --  0.75  --   --  0.85   MONOS ABS  --   --   --   --  0.50  --   --  0.42   EOS ABS  --   --   --   --  0.30  --   --  0.20   MCV  --   --   --   --  103.4*  --   --  104.3*   PROTIME  --   --   --   --   --  15.4*  --   --     < > = values in this interval not displayed.         Lab 08/07/24  0416 08/06/24  0802 08/05/24  1019   SODIUM 135* 136 137   POTASSIUM 4.5 5.0 5.7*   CHLORIDE 96* 93* 93*   CO2 25.0 28.0 32.0*   ANION GAP 14.0 15.0 12.0   BUN 25* 52* 41*   CREATININE 4.33* 6.77* 6.04*   EGFR 12.4* 7.2* 8.3*   GLUCOSE 81 77 125*   CALCIUM 9.7 9.3 9.7         Lab 08/07/24  0416   TOTAL PROTEIN 7.4   ALBUMIN 4.3   ALT (SGPT) 12   AST (SGOT) 21   BILIRUBIN 0.7   INDIRECT BILIRUBIN 0.5   BILIRUBIN DIRECT 0.2   ALK PHOS 91         Lab 08/06/24  0808   PROTIME 15.4*   INR 1.21*                 Brief Urine Lab Results       None          Microbiology Results (last 10 days)       ** No results found for the last 240 hours. **            XR Chest 1 View    Result Date: 8/7/2024  XR CHEST 1 VW Date of Exam: 8/7/2024 10:59 AM EDT Indication: cough Comparison: 8/5/2024 Findings: Placement of a dual-lumen right IJ central venous catheter with the tips overlying the distal SVC and cavoatrial junction. There is stable cardiomegaly with prominent epicardial fat at the medial right lung base. There is no pneumothorax. Tortuous aortic  arch vessels in the right paratracheal region unchanged. No new consolidation or pleural effusion. Left humeral prosthesis.     Impression: 1. Placement of a right IJ central venous catheter with tips overlying the distal SVC and cavoatrial junction. 2. No pneumothorax. 3. Stable chronic findings above. Electronically Signed: Hiram Emerson MD  8/7/2024 2:24 PM EDT  Workstation ID: [id number]    IR Insert Tunneled CV Catheter Without Port 5 Plus    Result Date: 8/6/2024  DATE OF EXAM: 8/6/2024  10:31 AM EDT PROCEDURE: IR INS TUNNELED CV CATHETER WO PORT 5 PLUS INDICATIONS: Urgent HD COMPARISON: No Comparisons Available FLUOROSCOPIC TIME: 2 minutes 54 seconds PHYSICIAN MONITORED CONSCIOUS SEDATION TIME: 45 minutes TECHNIQUE: A detailed explanation of the procedure, including the risks, benefits, and alternatives was provided. A preprocedure timeout was performed. The interventional radiology nurse mild the patient all times and provided conscious sedation. The patient was placed supine on the bed and the right neck was marked and prepped and draped using maximal sterile barrier technique. Limited evaluation of the right neck demonstrates an occluded right IJ with a patent right external jugular vein. Next under ultrasound guidance access was obtained to the right external jugular vein with a micropuncture kit. A guidewire was manipulated centrally. Next an appropriate exit site was selected and the area was anesthetized with lidocaine and a small skin incision was made. The catheter was then tunneled from the exit site to the access site and the tract was dilated over the guidewire to accommodate a peel-away sheath. As the wire and inner dilator of the peel-away sheath were removed and I was placing the catheter, and moderate sized air embolism was performed. The patient was placed with the right side up and close monitoring was performed. Additionally, the patient was administered 100% oxygen via a nonrebreather. The patient's blood pressure did drop with systolic in the 80s, but the patient never became tachycardic, and never was hemodynamically unstable. Fluoroscopy demonstrated that the catheter was slightly short, terminating in the proximal SVC. The decision was made to exchange the catheter for a longer catheter. A Glidewire advantage was advanced through the existing PermCath. The small time between placing the wire and we administering the clamp on the catheter, the patient developed another air  embolism. Again, the patient was placed in the right side up position and 100% oxygen was administered. Eventually I attempted to remove the catheter over the Glidewire, but because the clamp was locked, it pulled the Glidewire back and I lost access. Attention was returned to the right neck and under ultrasound guidance access was obtained to the right external jugular vein with a micropuncture kit. A guidewire was manipulated centrally. The new catheter was tunneled from the exit site to the access site. A peel-away sheath was placed and the new catheter was placed through the peel-away sheath and peel-away sheath and wire were removed. Fluoroscopy confirmed catheter position in satisfactory position in the low SVC. Both ports aspirated and flushed  normally and were locked with heparinized saline. The patient tolerated the procedure well without any further complication. The patient was neurovascularly intact and hemodynamically stable with a blood pressure of 140/90 at the time the patient was returned to his room. Additionally the patient's oxygen saturation did not get below 95. FINDINGS: See above     1. Successful placement of right external jugular vein PermCath. Catheter is ready for use. 2. The procedure was complicated by air embolism at 2 separate points during the procedure. Supportive measures were given including 100% oxygen and placing the patient right side up. The patient did have transit decrease in blood pressure but remained with systolic above 90 and never lost consciousness and is remained neurologically intact throughout the procedure. Electronically Signed: Luis Mckeon MD  8/6/2024 1:28 PM EDT  Workstation ID: [id number]    Duplex Hemodialysis Access CAR    Result Date: 8/5/2024    A right sided brachial-basilic autogenous arteriovenous (AV) fistula is present.Right brachial-basilic AVF appears patent with velocities and volume flows noted below.     XR Chest 1 View    Result Date:  8/5/2024  XR CHEST 1 VW Date of Exam: 8/5/2024 9:56 AM EDT Indication: volume overload Comparison: 4/20/2024 Findings: Cardiomediastinal silhouette is enlarged with previously noted right anterior fat-containing mediastinal hernia. No airspace disease, pneumothorax, nor pleural effusion. No acute osseous abnormality identified.     Impression: 1.No evidence of CHF/volume overload. Electronically Signed: Sundeep Workman MD  8/5/2024 10:16 AM EDT  Workstation ID: [id number]     Results for orders placed during the hospital encounter of 08/05/24    Duplex Hemodialysis Access CAR    Interpretation Summary    A right sided brachial-basilic autogenous arteriovenous (AV) fistula is present.Right brachial-basilic AVF appears patent with velocities and volume flows noted below.      Results for orders placed during the hospital encounter of 08/05/24    Duplex Hemodialysis Access CAR    Interpretation Summary    A right sided brachial-basilic autogenous arteriovenous (AV) fistula is present.Right brachial-basilic AVF appears patent with velocities and volume flows noted below.          Plan for Follow-up of Pending Labs/Results: No pending labs    Discharge Details        Discharge Medications        Continue These Medications        Instructions Start Date   acetaminophen 500 MG tablet  Commonly known as: TYLENOL   500 mg, Oral, Every 6 Hours PRN      amiodarone 100 MG tablet  Commonly known as: PACERONE   100 mg, Oral, Daily      apixaban 2.5 MG tablet tablet  Commonly known as: ELIQUIS   2.5 mg, Oral, 2 Times Daily, PT TO HOLD 48 HRS PRIOR TO PROCEDURE PER MERCEDEZ VARGHESE.      atorvastatin 20 MG tablet  Commonly known as: LIPITOR   20 mg, Oral, Nightly      fluticasone 50 MCG/ACT nasal spray  Commonly known as: FLONASE   2 sprays, Nasal, Daily PRN      HYDROcodone-acetaminophen 7.5-325 MG per tablet  Commonly known as: NORCO   1 tablet, Oral, Nightly      losartan 25 MG tablet  Commonly known as: COZAAR   12.5 mg, Oral, As  Needed, If BP is above 150      melatonin 5 MG tablet tablet   5 mg, Oral, Nightly      midodrine 5 MG tablet  Commonly known as: PROAMATINE   5 mg, Oral, 3 Times Daily Before Meals      pantoprazole 40 MG EC tablet  Commonly known as: PROTONIX   40 mg, Oral, Daily      Jenny-Serg tablet   1 tablet, Oral, Daily      sennosides-docusate 8.6-50 MG per tablet  Commonly known as: PERICOLACE   2 tablets, Oral, 2 Times Daily      sertraline 100 MG tablet  Commonly known as: ZOLOFT   100 mg, Oral, Daily      tamsulosin 0.4 MG capsule 24 hr capsule  Commonly known as: FLOMAX   1 capsule, Oral, Daily      Teriparatide (Recombinant) 620 MCG/2.48ML injection  Commonly known as: FORTEO   Inject 20 mcg under the skin into the appropriate area as directed Daily.             ASK your doctor about these medications        Instructions Start Date   finasteride 5 MG tablet  Commonly known as: PROSCAR   Take 1 tablet by mouth Daily.      folic acid 1 MG tablet  Commonly known as: FOLVITE   1 mg, Oral, Daily               No Known Allergies      Discharge Disposition:  Home or Self Care    Diet:  Hospital:  Diet Order   Procedures    Diet: Renal; Low Sodium (2-3g); Fluid Consistency: Thin (IDDSI 0)       Diet Instructions       Diet: Renal Diets; Low Sodium (2-3g); Regular (IDDSI 7); Thin (IDDSI 0)      Discharge Diet: Renal Diets    Renal Diet: Low Sodium (2-3g)    Texture: Regular (IDDSI 7)    Fluid Consistency: Thin (IDDSI 0)             Activity:  Activity Instructions       Activity as Tolerated              Restrictions or Other Recommendations:  No other restrictions       CODE STATUS:    Code Status and Medical Interventions: No CPR (Do Not Attempt to Resuscitate); Full Support   Ordered at: 08/06/24 1302     Level Of Support Discussed With:    Patient     Code Status (Patient has no pulse and is not breathing):    No CPR (Do Not Attempt to Resuscitate)     Medical Interventions (Patient has pulse or is breathing):    Full  Support       No future appointments.    Additional Instructions for the Follow-ups that You Need to Schedule       Discharge Follow-up with Specified Provider: Vascular surgery Dr. Gudino to check malfunctioning fistula; 2 Weeks   As directed      To: Vascular surgery Dr. Gudino to check malfunctioning fistula   Follow Up: 2 Weeks        Discharge Follow-up with Specified Provider: dialysis MWF; 1 Day   As directed      To: dialysis MWF   Follow Up: 1 Day                      Zoe Hurtado MD  08/08/24      Time Spent on Discharge:  I spent  32  minutes on this discharge activity which included: face-to-face encounter with the patient, reviewing the data in the system, coordination of the care with the nursing staff as well as consultants, documentation, and entering orders.

## 2024-08-09 NOTE — OUTREACH NOTE
Prep Survey      Flowsheet Row Responses   [facility] patient discharged from? [facility]   Is LACE score < 7 ? No   Eligibility Readm Mgmt   Discharge diagnosis ESRD   Does the patient have one of the following disease processes/diagnoses(primary or secondary)? Other   Does the patient have Home health ordered? No   Is there a DME ordered? No   Prep survey completed? Yes            JASKARAN AUSTIN - Registered Nurse

## 2024-08-13 ENCOUNTER — READMISSION MANAGEMENT (OUTPATIENT)
Dept: CALL CENTER | Facility: HOSPITAL | Age: 89
End: 2024-08-13
Payer: MEDICARE

## 2024-08-13 NOTE — OUTREACH NOTE
Medical Week 1 Survey      Flowsheet Row Responses   [facility] patient discharged from? [place]   Does the patient have one of the following disease processes/diagnoses(primary or secondary)? Other   Week 1 attempt successful? Yes   Call start time 0813   Call end time 0816   Discharge diagnosis ESRD   Person spoke with today (if not patient) and relationship Wife   Is the patient taking all medications as directed (includes completed medication regime)? Yes   Does the patient have a primary care provider?  Yes   Does the patient have an appointment with their PCP within 7 days of discharge? Yes   Comments Has hospital f/u with [facility] and has appt with vascular.   Psychosocial issues? No   Did the patient receive a copy of their discharge instructions? Yes   Nursing interventions Reviewed instructions with patient   What is the patient's perception of their health status since discharge? Improving   Is the patient/caregiver able to teach back signs and symptoms related to disease process for when to call PCP? Yes   Is the patient/caregiver able to teach back signs and symptoms related to disease process for when to call 911? Yes   Is the patient/caregiver able to teach back the hierarchy of who to call/visit for symptoms/problems? PCP, Specialist, Home health nurse, Urgent Care, ED, 911 Yes   If the patient is a current smoker, are they able to teach back resources for cessation? Not a smoker   Additional teach back comments Wife states when he first got home he wasn't doing well.  Very weak and shaking but he has improved since then.  He is going to dialysis Mon, Wed, and Fri and she states he is doing will and hasn't been told different by the dialysis center.   Week 1 call completed? Yes   Graduated Yes   Graduated/Revoked comments Denies questions or needs at this time.   Call end time 0816            Violeta MATOS - Licensed Nurse

## 2024-08-26 ENCOUNTER — APPOINTMENT (OUTPATIENT)
Dept: CARDIOLOGY | Facility: HOSPITAL | Age: 89
End: 2024-08-26
Payer: MEDICARE

## 2024-08-26 ENCOUNTER — HOSPITAL ENCOUNTER (OUTPATIENT)
Facility: HOSPITAL | Age: 89
Setting detail: OBSERVATION
Discharge: HOME | End: 2024-08-27
Attending: EMERGENCY MEDICINE | Admitting: INTERNAL MEDICINE
Payer: MEDICARE

## 2024-08-26 ENCOUNTER — APPOINTMENT (OUTPATIENT)
Dept: NEPHROLOGY | Facility: HOSPITAL | Age: 89
End: 2024-08-26
Payer: MEDICARE

## 2024-08-26 DIAGNOSIS — Z99.2 STAGE 5 CHRONIC KIDNEY DISEASE ON CHRONIC DIALYSIS: Primary | ICD-10-CM

## 2024-08-26 DIAGNOSIS — T82.9XXA COMPLICATION OF AV DIALYSIS FISTULA, INITIAL ENCOUNTER: ICD-10-CM

## 2024-08-26 DIAGNOSIS — N18.6 STAGE 5 CHRONIC KIDNEY DISEASE ON CHRONIC DIALYSIS: Primary | ICD-10-CM

## 2024-08-26 LAB
ALBUMIN SERPL-MCNC: 3.9 G/DL (ref 3.5–5.2)
ALBUMIN/GLOB SERPL: 1.4 G/DL
ALP SERPL-CCNC: 86 U/L (ref 39–117)
ALT SERPL W P-5'-P-CCNC: 11 U/L (ref 1–41)
ANION GAP SERPL CALCULATED.3IONS-SCNC: 15 MMOL/L (ref 5–15)
AST SERPL-CCNC: 18 U/L (ref 1–40)
BASOPHILS # BLD AUTO: 0.05 10*3/MM3 (ref 0–0.2)
BASOPHILS NFR BLD AUTO: 1 % (ref 0–1.5)
BH CV VAS DIALYSIS ARTERIAL ANASTOMOSIS EDV: 234 CM/SEC
BH CV VAS DIALYSIS ARTERIAL ANASTOMOSIS PSV: 629 CM/SEC
BH CV VAS DIALYSIS CONDUIT DIST DEPTH: 0.7 CM
BH CV VAS DIALYSIS CONDUIT DIST DIAMETER: 0.8 CM
BH CV VAS DIALYSIS CONDUIT DIST EDV: 247 CM/SEC
BH CV VAS DIALYSIS CONDUIT DIST FLOW VOL: 3734 ML/MIN
BH CV VAS DIALYSIS CONDUIT DIST PSV: 560 CM/SEC
BH CV VAS DIALYSIS CONDUIT MID DEPTH: 0.3 CM
BH CV VAS DIALYSIS CONDUIT MID DIAMETER: 2.5 CM
BH CV VAS DIALYSIS CONDUIT MID EDV: 18 CM/SEC
BH CV VAS DIALYSIS CONDUIT MID FLOW VOL: 1090 ML/MIN
BH CV VAS DIALYSIS CONDUIT MID PSV: 40 CM/SEC
BH CV VAS DIALYSIS CONDUIT PROX DEPTH: 0.3 CM
BH CV VAS DIALYSIS CONDUIT PROX DIAMETER: 1.1 CM
BH CV VAS DIALYSIS CONDUIT PROX EDV: 27 CM/SEC
BH CV VAS DIALYSIS CONDUIT PROX FLOW VOL: 1139 ML/MIN
BH CV VAS DIALYSIS CONDUIT PROX PSV: 58 CM/SEC
BH CV VAS DIALYSIS PRE-INFLOW BRACHIAL EDV: 94 CM/SEC
BH CV VAS DIALYSIS PRE-INFLOW BRACHIAL FLOW VOL: 1653 ML/MIN
BH CV VAS DIALYSIS PRE-INFLOW BRACHIAL PSV: 194 CM/SEC
BH CV VAS DIALYSIS VENOUS OUTFLOW AXILLARY PSV: 50 CM/SEC
BH CV VAS DIALYSIS VENOUS OUTFLOW SUBCLAVIAN PSV: 59 CM/SEC
BH CV VAS PRELIMINARY FINDINGS SCRIPTING: 1
BILIRUB SERPL-MCNC: 0.4 MG/DL (ref 0–1.2)
BUN SERPL-MCNC: 47 MG/DL (ref 8–23)
BUN/CREAT SERPL: 8 (ref 7–25)
CALCIUM SPEC-SCNC: 9.4 MG/DL (ref 8.6–10.5)
CHLORIDE SERPL-SCNC: 95 MMOL/L (ref 98–107)
CO2 SERPL-SCNC: 26 MMOL/L (ref 22–29)
CREAT SERPL-MCNC: 5.91 MG/DL (ref 0.76–1.27)
DEPRECATED RDW RBC AUTO: 56.5 FL (ref 37–54)
EGFRCR SERPLBLD CKD-EPI 2021: 8.5 ML/MIN/1.73
EOSINOPHIL # BLD AUTO: 0.2 10*3/MM3 (ref 0–0.4)
EOSINOPHIL NFR BLD AUTO: 3.9 % (ref 0.3–6.2)
ERYTHROCYTE [DISTWIDTH] IN BLOOD BY AUTOMATED COUNT: 14.3 % (ref 12.3–15.4)
GLOBULIN UR ELPH-MCNC: 2.8 GM/DL
GLUCOSE SERPL-MCNC: 122 MG/DL (ref 65–99)
HCT VFR BLD AUTO: 34.3 % (ref 37.5–51)
HGB BLD-MCNC: 11.2 G/DL (ref 13–17.7)
IMM GRANULOCYTES # BLD AUTO: 0.03 10*3/MM3 (ref 0–0.05)
IMM GRANULOCYTES NFR BLD AUTO: 0.6 % (ref 0–0.5)
LYMPHOCYTES # BLD AUTO: 0.94 10*3/MM3 (ref 0.7–3.1)
LYMPHOCYTES NFR BLD AUTO: 18.5 % (ref 19.6–45.3)
MCH RBC QN AUTO: 35.2 PG (ref 26.6–33)
MCHC RBC AUTO-ENTMCNC: 32.7 G/DL (ref 31.5–35.7)
MCV RBC AUTO: 107.9 FL (ref 79–97)
MONOCYTES # BLD AUTO: 0.47 10*3/MM3 (ref 0.1–0.9)
MONOCYTES NFR BLD AUTO: 9.2 % (ref 5–12)
NEUTROPHILS NFR BLD AUTO: 3.4 10*3/MM3 (ref 1.7–7)
NEUTROPHILS NFR BLD AUTO: 66.8 % (ref 42.7–76)
NRBC BLD AUTO-RTO: 0 /100 WBC (ref 0–0.2)
PLATELET # BLD AUTO: 189 10*3/MM3 (ref 140–450)
PMV BLD AUTO: 8.5 FL (ref 6–12)
POTASSIUM SERPL-SCNC: 4.5 MMOL/L (ref 3.5–5.2)
PROT SERPL-MCNC: 6.7 G/DL (ref 6–8.5)
RBC # BLD AUTO: 3.18 10*6/MM3 (ref 4.14–5.8)
SODIUM SERPL-SCNC: 136 MMOL/L (ref 136–145)
WBC NRBC COR # BLD AUTO: 5.09 10*3/MM3 (ref 3.4–10.8)

## 2024-08-26 PROCEDURE — 99222 1ST HOSP IP/OBS MODERATE 55: CPT | Performed by: INTERNAL MEDICINE

## 2024-08-26 PROCEDURE — 85025 COMPLETE CBC W/AUTO DIFF WBC: CPT | Performed by: EMERGENCY MEDICINE

## 2024-08-26 PROCEDURE — G0378 HOSPITAL OBSERVATION PER HR: HCPCS

## 2024-08-26 PROCEDURE — G0257 UNSCHED DIALYSIS ESRD PT HOS: HCPCS

## 2024-08-26 PROCEDURE — 93990 DOPPLER FLOW TESTING: CPT | Performed by: INTERNAL MEDICINE

## 2024-08-26 PROCEDURE — 99285 EMERGENCY DEPT VISIT HI MDM: CPT

## 2024-08-26 PROCEDURE — 36415 COLL VENOUS BLD VENIPUNCTURE: CPT

## 2024-08-26 PROCEDURE — 80053 COMPREHEN METABOLIC PANEL: CPT | Performed by: EMERGENCY MEDICINE

## 2024-08-26 PROCEDURE — 93990 DOPPLER FLOW TESTING: CPT

## 2024-08-26 RX ORDER — AMIODARONE HYDROCHLORIDE 200 MG/1
100 TABLET ORAL DAILY
Status: DISCONTINUED | OUTPATIENT
Start: 2024-08-26 | End: 2024-08-27 | Stop reason: HOSPADM

## 2024-08-26 RX ORDER — ATORVASTATIN CALCIUM 20 MG/1
20 TABLET, FILM COATED ORAL NIGHTLY
Status: DISCONTINUED | OUTPATIENT
Start: 2024-08-26 | End: 2024-08-27 | Stop reason: HOSPADM

## 2024-08-26 RX ORDER — BISACODYL 5 MG/1
5 TABLET, DELAYED RELEASE ORAL DAILY PRN
Status: DISCONTINUED | OUTPATIENT
Start: 2024-08-26 | End: 2024-08-27 | Stop reason: HOSPADM

## 2024-08-26 RX ORDER — SODIUM CHLORIDE 0.9 % (FLUSH) 0.9 %
10 SYRINGE (ML) INJECTION AS NEEDED
Status: DISCONTINUED | OUTPATIENT
Start: 2024-08-26 | End: 2024-08-27 | Stop reason: HOSPADM

## 2024-08-26 RX ORDER — ACETAMINOPHEN 500 MG
500 TABLET ORAL EVERY 6 HOURS PRN
Status: DISCONTINUED | OUTPATIENT
Start: 2024-08-26 | End: 2024-08-27 | Stop reason: HOSPADM

## 2024-08-26 RX ORDER — SODIUM CHLORIDE 0.9 % (FLUSH) 0.9 %
10 SYRINGE (ML) INJECTION EVERY 12 HOURS SCHEDULED
Status: DISCONTINUED | OUTPATIENT
Start: 2024-08-26 | End: 2024-08-27 | Stop reason: HOSPADM

## 2024-08-26 RX ORDER — PANTOPRAZOLE SODIUM 40 MG/1
40 TABLET, DELAYED RELEASE ORAL DAILY
Status: DISCONTINUED | OUTPATIENT
Start: 2024-08-26 | End: 2024-08-27 | Stop reason: HOSPADM

## 2024-08-26 RX ORDER — AMOXICILLIN 250 MG
2 CAPSULE ORAL 2 TIMES DAILY PRN
Status: DISCONTINUED | OUTPATIENT
Start: 2024-08-26 | End: 2024-08-27 | Stop reason: HOSPADM

## 2024-08-26 RX ORDER — HEPARIN SODIUM 1000 [USP'U]/ML
2000 INJECTION, SOLUTION INTRAVENOUS; SUBCUTANEOUS AS NEEDED
Status: DISCONTINUED | OUTPATIENT
Start: 2024-08-26 | End: 2024-08-27 | Stop reason: HOSPADM

## 2024-08-26 RX ORDER — SODIUM CHLORIDE 9 MG/ML
40 INJECTION, SOLUTION INTRAVENOUS AS NEEDED
Status: DISCONTINUED | OUTPATIENT
Start: 2024-08-26 | End: 2024-08-27 | Stop reason: HOSPADM

## 2024-08-26 RX ORDER — ONDANSETRON 2 MG/ML
4 INJECTION INTRAMUSCULAR; INTRAVENOUS EVERY 6 HOURS PRN
Status: DISCONTINUED | OUTPATIENT
Start: 2024-08-26 | End: 2024-08-27 | Stop reason: HOSPADM

## 2024-08-26 RX ORDER — POLYETHYLENE GLYCOL 3350 17 G/17G
17 POWDER, FOR SOLUTION ORAL DAILY PRN
Status: DISCONTINUED | OUTPATIENT
Start: 2024-08-26 | End: 2024-08-27 | Stop reason: HOSPADM

## 2024-08-26 RX ORDER — TAMSULOSIN HYDROCHLORIDE 0.4 MG/1
0.4 CAPSULE ORAL DAILY
Status: DISCONTINUED | OUTPATIENT
Start: 2024-08-26 | End: 2024-08-27 | Stop reason: HOSPADM

## 2024-08-26 RX ORDER — ALBUMIN (HUMAN) 12.5 G/50ML
25 SOLUTION INTRAVENOUS AS NEEDED
Status: DISCONTINUED | OUTPATIENT
Start: 2024-08-26 | End: 2024-08-27 | Stop reason: HOSPADM

## 2024-08-26 RX ORDER — MIDODRINE HYDROCHLORIDE 5 MG/1
5 TABLET ORAL 3 TIMES DAILY PRN
Status: DISCONTINUED | OUTPATIENT
Start: 2024-08-26 | End: 2024-08-27 | Stop reason: HOSPADM

## 2024-08-26 RX ORDER — SERTRALINE HYDROCHLORIDE 100 MG/1
100 TABLET, FILM COATED ORAL DAILY
Status: DISCONTINUED | OUTPATIENT
Start: 2024-08-26 | End: 2024-08-27 | Stop reason: HOSPADM

## 2024-08-26 RX ORDER — BISACODYL 10 MG
10 SUPPOSITORY, RECTAL RECTAL DAILY PRN
Status: DISCONTINUED | OUTPATIENT
Start: 2024-08-26 | End: 2024-08-27 | Stop reason: HOSPADM

## 2024-08-26 RX ORDER — HYDROCODONE BITARTRATE AND ACETAMINOPHEN 7.5; 325 MG/1; MG/1
1 TABLET ORAL EVERY 8 HOURS PRN
Status: DISCONTINUED | OUTPATIENT
Start: 2024-08-26 | End: 2024-08-27 | Stop reason: HOSPADM

## 2024-08-26 RX ADMIN — SERTRALINE 100 MG: 100 TABLET, FILM COATED ORAL at 20:20

## 2024-08-26 RX ADMIN — PANTOPRAZOLE SODIUM 40 MG: 40 TABLET, DELAYED RELEASE ORAL at 20:21

## 2024-08-26 RX ADMIN — TAMSULOSIN HYDROCHLORIDE 0.4 MG: 0.4 CAPSULE ORAL at 20:21

## 2024-08-26 RX ADMIN — AMIODARONE HYDROCHLORIDE 100 MG: 200 TABLET ORAL at 20:21

## 2024-08-26 RX ADMIN — ATORVASTATIN CALCIUM 20 MG: 20 TABLET, FILM COATED ORAL at 20:21

## 2024-08-26 NOTE — H&P
[facility] Medicine Services  HISTORY AND PHYSICAL    Patient Name: Enrike Tomas  : 1935  MRN: [id number]  Primary Care Physician: Blair Dhaliwal MD  Date of admission: 2024      Subjective   Subjective     Chief Complaint:  Missed HD, nonfunctioning fistula    HPI:  Enrike Tomas is a 89 y.o. male with PMHx significant for ESRD on HD M/W/F, HTN, PAF, HLD he presented recently to [facility] with malfunctioning R arm fistula and underwent tunneled catheter placement on 24. He was scheduled to f/u outpatient w/vascular surgery however presented to the ED today with worsening pain/swelling of his RUE fistula. He did miss HD today. He is otherwise dong well and has no complaints.      Personal History     Past Medical History:   Diagnosis Date    A-fib     Abnormal ECG     occasional extra heartbeat    Anemia, acute on chronic 2023    Arthritis     Atrial fibrillation     BPH (benign prostatic hyperplasia)     Cervical compression fracture 2023    currently wearing a c collar    Diverticulosis     Diverticulum of esophagus     puree diet and speech therapy    Elevated cholesterol     ESRD on hemodialysis     GERD (gastroesophageal reflux disease)     Heart failure     Hemodialysis patient     M,W,F    Hyperlipidemia     Hypertension     Low back pain     NERY (obstructive sleep apnea)     NOT USING CPAP    Recurrent falls     UTI (urinary tract infection)            Past Surgical History:   Procedure Laterality Date    CATARACT EXTRACTION      COLONOSCOPY      DIALYSIS FISTULA CREATION      ENDOSCOPY      INGUINAL HERNIA REPAIR      PROSTATE BIOPSY      THROAT SURGERY      diverticulum in throat    TONSILLECTOMY AND ADENOIDECTOMY      TOTAL SHOULDER ARTHROPLASTY W/ DISTAL CLAVICLE EXCISION Left 2023    Procedure: HEMIARTHROPLASTY, BICEPS TENODESIS - LEFT;  Surgeon: Matteo Tan MD;  [facility];  Service: Orthopedics;  Laterality: Left;        Family History: family history includes Arthritis in his mother; Cancer in his father; Heart disease in his father and mother; Kidney disease in his father; Kidney failure in his father; Stomach cancer in his mother; Stroke in his father.     Social History:  reports that he has never smoked. He has never used smokeless tobacco. He reports that he does not currently use alcohol after a past usage of about 1.0 standard drink of alcohol per week. He reports that he does not use drugs.  Social History     Social History Narrative    Not on file       Medications:  Available home medication information reviewed.  HYDROcodone-acetaminophen, Jenny-Serg, Teriparatide (Recombinant), acetaminophen, amiodarone, apixaban, atorvastatin, finasteride, fluticasone, folic acid, losartan, melatonin, midodrine, pantoprazole, sennosides-docusate, sertraline, and tamsulosin    No Known Allergies    Objective   Objective     Vital Signs:   Temp:  [98 °F (36.7 °C)] 98 °F (36.7 °C)  Heart Rate:  [59-67] 62  Resp:  [16] 16  BP: (105-127)/(48-60) 127/60       Physical Exam   Constitutional: Awake, alert  Eyes: PERRLA, sclerae anicteric, no conjunctival injection  HENT: NCAT, mucous membranes moist  Neck: Supple, no thyromegaly, no lymphadenopathy, trachea midline  Respiratory: Clear to auscultation bilaterally, nonlabored respirations   Cardiovascular: RRR, no murmurs, rubs, or gallops, palpable pedal pulses bilaterally  Gastrointestinal: Positive bowel sounds, soft, nontender, nondistended  Musculoskeletal: No bilateral ankle edema, no clubbing or cyanosis to extremities  Psychiatric: Appropriate affect, cooperative  Neurologic: Oriented x 3, strength symmetric in all extremities, Cranial Nerves grossly intact to confrontation, speech clear  Skin: No rashes    Right upper ext fistula with swelling and erythema    Result Review:  I have personally reviewed the results from the time of this admission to 8/26/2024 14:33 EDT and agree  with these findings:  [x]  Laboratory list / accordion  [x]  Microbiology  [x]  Radiology  [x]  EKG/Telemetry   [x]  Cardiology/Vascular   [x]  Pathology  [x]  Old records  []  Other:  Most notable findings include:       LAB RESULTS:      Lab 08/26/24  1113   WBC 5.09   HEMOGLOBIN 11.2*   HEMATOCRIT 34.3*   PLATELETS 189   NEUTROS ABS 3.40   IMMATURE GRANS (ABS) 0.03   LYMPHS ABS 0.94   MONOS ABS 0.47   EOS ABS 0.20   .9*         Lab 08/26/24  1113   SODIUM 136   POTASSIUM 4.5   CHLORIDE 95*   CO2 26.0   ANION GAP 15.0   BUN 47*   CREATININE 5.91*   EGFR 8.5*   GLUCOSE 122*   CALCIUM 9.4         Lab 08/26/24  1113   TOTAL PROTEIN 6.7   ALBUMIN 3.9   GLOBULIN 2.8   ALT (SGPT) 11   AST (SGOT) 18   BILIRUBIN 0.4   ALK PHOS 86                     UA          9/12/2023    11:51   Urinalysis   Squamous Epithelial Cells, UA 0-2       RBC, UA <1       Bacteria, UA Negative          Details          This result is from an external source.               Microbiology Results (last 10 days)       ** No results found for the last 240 hours. **            No radiology results from the last 24 hrs        Assessment & Plan   Assessment & Plan       ESRD needing dialysis    Depression    ESRD (end stage renal disease)    NERY (obstructive sleep apnea)    Paroxysmal atrial fibrillation    Chronic heart failure with preserved ejection fraction (HFpEF)      Enrike Tomas is a 89 y.o. male with PMHx significant for ESRD on HD M/W/F, HTN, PAF, HLD he presented recently to [facility] with malfunctioning R arm fistula and underwent tunneled catheter placement on 8/6/24. He was scheduled to f/u outpatient w/vascular surgery however presented to the ED today with worsening pain/swelling of his RUE fistula.    Malfunctioning right arm AV fistula s/p Tunneled Catheter 8/6/2024 in the right subclavian  ESRD on HD M/W/F  -followed by Dr. Austin, duplex of RUE fistula today reviewed, felt most consistent with hematoma without pseudoaneurysm,  final read pending. Per ED physician, plan was for observation and compression, no surgical intervention  - Nephrology consulted, plan to admit for HD today, as missed session this AM      Paroxysmal A-fib  -Continue amiodarone  -Holding Eliquis for now in case intervention required on HD fistula     Hyperlipidemia  -Continue statin     HTN:  - continue home regimen, on PRN midodrine as well    BPH:  - continue flomax    Chronic Pain:  - continue home Norco    VTE Prophylaxis:  Mechanical & pharmacologic VTE prophylaxis orders are signed & held.     CODE STATUS:    Code Status and Medical Interventions: No CPR (Do Not Attempt to Resuscitate); Limited Support; No intubation (DNI)   Ordered at: 08/26/24 1419     Medical Intervention Limits:    No intubation (DNI)     Level Of Support Discussed With:    Patient     Code Status (Patient has no pulse and is not breathing):    No CPR (Do Not Attempt to Resuscitate)     Medical Interventions (Patient has pulse or is breathing):    Limited Support       Expected Discharge   Expected discharge date/ time has not been documented. Anticipate discharge first thing tomorrow morning.    Noreen Peacock,   08/26/24

## 2024-08-26 NOTE — ED NOTES
Enrike Tomas    Nursing Report ED to Floor:  Mental status: A&O x 4  Ambulatory status: with assistance  Oxygen Therapy:  RA  Cardiac Rhythm: NSR  Admitted from: home  Safety Concerns:  fall risk  Social Issues: n/a  ED Room #:  19    ED Nurse Phone Extension - [phone] or may call [phone].      HPI:   Chief Complaint   Patient presents with    Vascular Access Problem       Past Medical History:  Past Medical History:   Diagnosis Date    A-fib     Abnormal ECG     occasional extra heartbeat    Anemia, acute on chronic 9/30/2023    Arthritis     Atrial fibrillation     BPH (benign prostatic hyperplasia)     Cervical compression fracture 06/05/2023    currently wearing a c collar    Diverticulosis     Diverticulum of esophagus     puree diet and speech therapy    Elevated cholesterol     ESRD on hemodialysis     GERD (gastroesophageal reflux disease)     Heart failure     Hemodialysis patient     M,W,F    Hyperlipidemia     Hypertension     Low back pain 2022    NERY (obstructive sleep apnea)     NOT USING CPAP    Recurrent falls     UTI (urinary tract infection)         Past Surgical History:  Past Surgical History:   Procedure Laterality Date    CATARACT EXTRACTION      COLONOSCOPY      DIALYSIS FISTULA CREATION      ENDOSCOPY      INGUINAL HERNIA REPAIR      PROSTATE BIOPSY      THROAT SURGERY      diverticulum in throat    TONSILLECTOMY AND ADENOIDECTOMY      TOTAL SHOULDER ARTHROPLASTY W/ DISTAL CLAVICLE EXCISION Left 9/25/2023    Procedure: HEMIARTHROPLASTY, BICEPS TENODESIS - LEFT;  Surgeon: Matteo Tan MD;  Location: [facility];  Service: Orthopedics;  Laterality: Left;        Admitting Doctor:   Noreen Peacock DO    Consulting Provider(s):  Consults       Date and Time Order Name Status Description    8/5/2024  2:47 PM Inpatient Vascular Surgery Consult Completed              Admitting Diagnosis:   The primary encounter diagnosis was Stage 5 chronic kidney disease on chronic dialysis. A diagnosis of  Complication of AV dialysis fistula, initial encounter was also pertinent to this visit.    Most Recent Vitals:   Vitals:    08/26/24 0949 08/26/24 1000 08/26/24 1030 08/26/24 1100   BP:  111/51 125/58 127/60   BP Location:       Patient Position:       Pulse: 63 60 59 62   Resp:       Temp:       TempSrc:       SpO2: 93% 98% 94% 93%   Weight:       Height:           Active LDAs/IV Access:   Lines, Drains & Airways       Active LDAs       Name Placement date Placement time Site Days    Hemodialysis Cath Double 08/06/24  1107  Internal Jugular  20                    Labs (abnormal labs have a star):   Labs Reviewed   COMPREHENSIVE METABOLIC PANEL - Abnormal; Notable for the following components:       Result Value    Glucose 122 (*)     BUN 47 (*)     Creatinine 5.91 (*)     Chloride 95 (*)     eGFR 8.5 (*)     All other components within normal limits    Narrative:     GFR Normal >60  Chronic Kidney Disease <60  Kidney Failure <15    The GFR formula is only valid for adults with stable renal function between ages 18 and 70.   CBC WITH AUTO DIFFERENTIAL - Abnormal; Notable for the following components:    RBC 3.18 (*)     Hemoglobin 11.2 (*)     Hematocrit 34.3 (*)     .9 (*)     MCH 35.2 (*)     RDW-SD 56.5 (*)     Lymphocyte % 18.5 (*)     Immature Grans % 0.6 (*)     All other components within normal limits   CBC AND DIFFERENTIAL    Narrative:     The following orders were created for panel order CBC & Differential.  Procedure                               Abnormality         Status                     ---------                               -----------         ------                     CBC Auto Differential[[id number]]        Abnormal            Final result                 Please view results for these tests on the individual orders.       Meds Given in ED:   Medications   sodium chloride 0.9 % flush 10 mL (has no administration in time range)           Last NIH score:                                                           Dysphagia screening results:  Patient Factors Component (Dysphagia:Stroke or Rule-out)  Best Eye Response: 4-->(E4) spontaneous (08/26/24 0947)  Best Motor Response: 6-->(M6) obeys commands (08/26/24 0947)  Best Verbal Response: 5-->(V5) oriented (08/26/24 0947)  Janak Coma Scale Score: 15 (08/26/24 0947)     Janak Coma Scale:  No data recorded     CIWA:        Restraint Type:            Isolation Status:  No active isolations

## 2024-08-26 NOTE — CONSULTS
[facility]  Renal Consult Note    Enrike Tomas  1935  [id number]    Date of Admit:  8/26/2024    Date of Consult: 8/26/2024    Requesting Provider: Dr. Brennan    Evaluating Physician: Andrei Jean MD    Chief Complaint: Swelling of AV access    Reason for Consultation: ESRD management    History of present illness:    Patient is a 89 y.o.  Yr old male with a history of ESRD on HD MWF at [facility].  Patient has been having issues with his dialysis access.  Developed increased swelling of access today.  Came to the ER for evaluation.  Appears access is stable, however patient unable to get dialysis today.  Next dialysis would not be available until Wednesday as outpatient.  Patient admitted for dialysis.      Past Medical History  Past Medical History:   Diagnosis Date    A-fib     Abnormal ECG     occasional extra heartbeat    Anemia, acute on chronic 9/30/2023    Arthritis     Atrial fibrillation     BPH (benign prostatic hyperplasia)     Cervical compression fracture 06/05/2023    currently wearing a c collar    Diverticulosis     Diverticulum of esophagus     puree diet and speech therapy    Elevated cholesterol     ESRD on hemodialysis     GERD (gastroesophageal reflux disease)     Heart failure     Hemodialysis patient     M,W,F    Hyperlipidemia     Hypertension     Low back pain 2022    NERY (obstructive sleep apnea)     NOT USING CPAP    Recurrent falls     UTI (urinary tract infection)        Past Surgical History:   Procedure Laterality Date    CATARACT EXTRACTION      COLONOSCOPY      DIALYSIS FISTULA CREATION      ENDOSCOPY      INGUINAL HERNIA REPAIR      PROSTATE BIOPSY      THROAT SURGERY      diverticulum in throat    TONSILLECTOMY AND ADENOIDECTOMY      TOTAL SHOULDER ARTHROPLASTY W/ DISTAL CLAVICLE EXCISION Left 9/25/2023    Procedure: HEMIARTHROPLASTY, BICEPS TENODESIS - LEFT;  Surgeon: Matteo Tan MD;  Location: [facility];  Service: Orthopedics;   "Laterality: Left;       Allergies:  No Known Allergies    Medication:   See electronic record    Soc Hx:   Social History     Socioeconomic History    Marital status:    Tobacco Use    Smoking status: Never    Smokeless tobacco: Never   Vaping Use    Vaping status: Never Used   Substance and Sexual Activity    Alcohol use: Not Currently     Alcohol/week: 1.0 standard drink of alcohol     Types: 1 Glasses of wine per week     Comment: once monthly typically    Drug use: Never    Sexual activity: Defer       Fam Hx:  No congenital renal disease      Review of Systems:  Full review of systems reviewed and are as above  In HPI or per admitting H&P,otherwise negative for acute complaints    Physical Exam:   Vital Signs   Blood pressure 127/60, pulse 62, temperature 98 °F (36.7 °C), temperature source Oral, resp. rate 16, height 167.6 cm (66\"), weight 52.6 kg (116 lb), SpO2 93%.  No intake/output data recorded.    GENERAL: WD elderly WM NAD  NEURO: Awake and alert, oriented.  + Dementia  PSYCHIATRIC: NMA. Cooperative with PE  EYE: PE, no icterus, no conjunctivitis  ENT: ommm, dentition intact,  Hearing intact  NECK:  No JVD discernable,  Trachea midline  CV: No edema, RRR  LUNGS:  Quiet,  Nonlabored resp.  Symmetrical expansion  ABDOMEN: Nondistended, soft nontender.  : No Ferrara, no palp bladder  SKIN: Warm and dry without rash  RIJ TDC  RUE aVF with ecchymosis and hematoma.  + Thrill    Laboratory Data  Results from last 7 days   Lab Units 08/26/24  1113   HEMOGLOBIN g/dL 11.2*   HEMATOCRIT % 34.3*     Results from last 7 days   Lab Units 08/26/24  1113   SODIUM mmol/L 136   POTASSIUM mmol/L 4.5   CHLORIDE mmol/L 95*   CO2 mmol/L 26.0   BUN mg/dL 47*   CREATININE mg/dL 5.91*   CALCIUM mg/dL 9.4   ALBUMIN g/dL 3.9         Results from last 7 days   Lab Units 08/26/24  1113   ALK PHOS U/L 86   BILIRUBIN mg/dL 0.4   ALT (SGPT) U/L 11   AST (SGOT) U/L 18         Estimated Creatinine Clearance: 6.3 mL/min (A) (by C-G " formula based on SCr of 5.91 mg/dL (H)).  Lab Results   Component Value Date    PROTEINUR 100 (A) 08/07/2023       A/P:      ESRD: On HD MWF with [facility].  Last dialysis 8/23/2024.  Patient missed dialysis today due to ER visit.  Unable to get outpatient dialysis until 8/28/2024.  Will dialyze as inpatient today.    HTN: Blood pressure stable.  Watch with HD.  Support with albumin as needed.    Electrolytes/acid-base: Stable.  Adjust with HD.    Volume: Stable.  UF with HD as tolerated.    Anemia: Continue JENNYFER.    Malfunction of HD access: Patient has a tunneled dialysis catheter placed last admission.  Patient with RUE aVF with increased edema.  Has been of evaluated by vascular as outpatient in the past.  Duplex today stable.  Vascular reviewed.  No indication for intervention at this time.     Thank you consulting us on Enrike Tomas who is of high risk and complexity.  We will follow along closely    Andrei Jean MD  8/26/2024  14:07 EDT

## 2024-08-26 NOTE — PLAN OF CARE
Problem: Device-Related Complication Risk (Hemodialysis)  Goal: Safe, Effective Therapy Delivery  Outcome: Ongoing, Progressing  Intervention: Optimize Device Care and Function  Recent Flowsheet Documentation  Taken 8/26/2024 1445 by Andrei Pulido RN  Medication Review/Management:   medications reviewed   high-risk medications identified     Problem: Hemodynamic Instability (Hemodialysis)  Goal: Effective Tissue Perfusion  Outcome: Ongoing, Progressing     Problem: Infection (Hemodialysis)  Goal: Absence of Infection Signs and Symptoms  Outcome: Ongoing, Progressing   Goal Outcome Evaluation:            HD completed. UF of 1000 goal reached. Tolerated well. VS stable and wdl during tx. Blood returned. Report to AGUSTÍN Perera

## 2024-08-26 NOTE — CASE MANAGEMENT/SOCIAL WORK
Discharge Planning Assessment  [facility]     Patient Name: Enrike Tomas  MRN: [id number]  Today's Date: 8/26/2024    Admit Date: 8/26/2024    Plan: IDP   Discharge Needs Assessment       Row Name 08/26/24 1748       Living Environment    People in Home spouse    Name(s) of People in Home Cleo Tomas-spouse    Current Living Arrangements home    Potentially Unsafe Housing Conditions unable to assess    In the past 12 months has the electric, gas, oil, or water company threatened to shut off services in your home? No    Primary Care Provided by spouse/significant other;child(vickie);other (see comments)  caregiver    Provides Primary Care For no one, unable/limited ability to care for self    Family Caregiver if Needed spouse    Family Caregiver Names Cleo Tomas-spouse    Quality of Family Relationships unable to assess       Resource/Environmental Concerns    Transportation Concerns none       Transportation Needs    In the past 12 months, has lack of transportation kept you from medical appointments or from getting medications? no    In the past 12 months, has lack of transportation kept you from meetings, work, or from getting things needed for daily living? No       Food Insecurity    Within the past 12 months, you worried that your food would run out before you got the money to buy more. Never true    Within the past 12 months, the food you bought just didn't last and you didn't have money to get more. Never true       Transition Planning    Patient/Family Anticipates Transition to home with family    Transportation Anticipated other (see comments)  may need assistance with transportation       Discharge Needs Assessment    Readmission Within the Last 30 Days unable to assess    Equipment Currently Used at Home lift device;wheelchair;walker, rolling                   Discharge Plan       Row Name 08/26/24 7085       Plan    Plan IDP    Plan Comments Pt. was recently discharged on 8/8/24. Pt. lives with  spouse Cleo T[place] in [facility]. Pt.'s PCP is Blair Dhaliwal. Pt.'s pharmacy is [facility]. Pt.'s insurance is [employer] Medicare Replacement. Pt. needs assistance at baseline. It is noted that pt. has a private caregiver 5 days per week. Pt. has a chair lift, wheelchair, and rolling walker. Pt. does HD on MWF at [facility]. Pt. may need assistance with transportation when medically ready to d/c. Pt. has an advanced directive and ACP documentation on file. CM will continue to follow pt. throughout her stay.    Final Discharge Disposition Code 30 - still a patient                  Continued Care and Services - Admitted Since 8/26/2024    No active coordination exists for this encounter.       Selected Continued Care - Prior Encounters Includes continued care and service providers with selected services from prior encounters from 5/28/2024 to 8/26/2024      Discharged on 8/8/2024 Admission date: 8/5/2024 - Discharge disposition: Home or Self Care      Dialysis/Infusion       Service Provider Selected Services Address Phone Fax Patient Preferred    [facility] [address] [place] [address], [address] [address] [phone] -- --                             Demographic Summary       Row Name 08/26/24 1746       General Information    Admission Type observation    Arrived From home    Referral Source admission list;emergency department    Reason for Consult discharge planning    Preferred Language English                   Functional Status       Row Name 08/26/24 1746       Physical Activity    On average, how many days per week do you engage in moderate to strenuous exercise (like a brisk walk)? 0 days    On average, how many minutes do you engage in exercise at this level? 0 min    Number of minutes of exercise per week 0       Functional Status, IADL    Medications assistive person    Meal Preparation assistive person    Housekeeping assistive  person    Laundry assistive person    Shopping assistive person       Mental Status Summary    Recent Changes in Mental Status/Cognitive Functioning unable to assess       Employment/    Employment Status retired                   Psychosocial       Row Name 08/26/24 1742       Mental Health    Little Interest or Pleasure in Doing Things 0-->not at all    Feeling Down, Depressed or Hopeless 0-->not at all       Stress    Do you feel stress - tense, restless, nervous, or anxious, or unable to sleep at night because your mind is troubled all the time - these days? Not at all                   Abuse/Neglect    No documentation.                  Legal       Row Name 08/26/24 4151       Financial Resource Strain    How hard is it for you to pay for the very basics like food, housing, medical care, and heating? Not very                   Substance Abuse    No documentation.                  Patient Forms    No documentation.                     JOSEFINA Rodrigez

## 2024-08-26 NOTE — Clinical Note
Level of Care: Telemetry [5]   Diagnosis: ESRD needing dialysis [203522]   Admitting Physician: BRIAN GASTON [[id number]]   Attending Physician: BRIAN GASTON [[id number]]   Bed Request Comments: non-CDU

## 2024-08-26 NOTE — PROGRESS NOTES
"   LOS: 0 days    Patient Care Team:  Blair Dhaliwal MD as PCP - General (Internal Medicine)  Blair Packer MD as Consulting Physician (Cardiology)  Michelle Aponte MD (Nephrology)    Reason For Visit:    Subjective     On HD    Objective     Vital Signs:  Blood pressure 106/60, pulse 63, temperature 98.2 °F (36.8 °C), temperature source Oral, resp. rate 16, height 167.6 cm (66\"), weight 52.6 kg (116 lb), SpO2 98%.    Flowsheet Rows      Flowsheet Row First Filed Value   Admission Height 167.6 cm (66\") Documented at 08/26/2024 0913   Admission Weight 52.6 kg (116 lb) Documented at 08/26/2024 0913            No intake/output data recorded.    Physical Exam:     Seen on HD    Labs:    Results from last 7 days   Lab Units 08/26/24  1113   WBC 10*3/mm3 5.09   HEMOGLOBIN g/dL 11.2*   PLATELETS 10*3/mm3 189     Results from last 7 days   Lab Units 08/26/24  1113   SODIUM mmol/L 136   POTASSIUM mmol/L 4.5   CHLORIDE mmol/L 95*   CO2 mmol/L 26.0   BUN mg/dL 47*   CREATININE mg/dL 5.91*   CALCIUM mg/dL 9.4   ALBUMIN g/dL 3.9     Results from last 7 days   Lab Units 08/26/24  1113   GLUCOSE mg/dL 122*     Results from last 7 days   Lab Units 08/26/24  1113   ALK PHOS U/L 86   BILIRUBIN mg/dL 0.4   ALT (SGPT) U/L 11   AST (SGOT) U/L 18               A/P:        Seen on dialysis.  Tolerating well.          Andrei Jean MD  08/26/24  15:32 EDT          "

## 2024-08-26 NOTE — CONSULTS
Vascular Surgery Consult Note    Chief complaint fistula concern    Reason for consultation: fistula concern  Consult requested by: Dr. Brennan    HPI: This is a 89-year-old male with dementia whom I saw about 3 weeks ago for evaluation of right upper extremity access issues.  Patient had outpatient dialysis access at that time which caused a through and through injury and a posterior wall hematoma of a upper extremity fistula.  He was supposed to follow-up in office but I have not personally seen him in my clinic since then.  He is here today with fistula concern.  Patient is a very poor historian and has dementia and cannot really provide me with much of a history.  I called his wife who did not  the phone and also called his daughter who said that supposedly there has been a change and the weight used to be soft area underneath the fistula has become hard.  She cannot give me a timeline or any other good details.  Patient is currently undergoing hemodialysis via right chest tunneled dialysis catheter and denies having any additional trauma or any more access attempt in his fistula.    Review of Systems  General ROS: no chills, fatigue, fever or malaise  Psychological ROS: no anxiety and depression  Ophthalmic ROS: no blurry vision, decreased vision or loss of vision  ENT ROS: no headaches or vertigo  Allergy and Immunology ROS: no nasal congestion  Hematological and Lymphatic ROS: no bleeding problems, fatigue, jaundice, swollen lymph nodes or weight loss  Endocrine ROS: no temperature intolerance or unexpected weight changes  Respiratory ROS: no cough, shortness of breath, or wheezing  Cardiovascular ROS: no chest pain or dyspnea on exertion  Gastrointestinal ROS: no abdominal pain, change in bowel habits, or black or bloody stools  Genito-Urinary ROS: no dysuria, trouble voiding, or hematuria  Musculoskeletal ROS: negative  Neurological ROS: no TIA or stroke symptoms  Dermatological ROS: no   rash    History  Past Medical History:   Diagnosis Date    A-fib     Abnormal ECG     occasional extra heartbeat    Anemia, acute on chronic 9/30/2023    Arthritis     Atrial fibrillation     BPH (benign prostatic hyperplasia)     Cervical compression fracture 06/05/2023    currently wearing a c collar    Diverticulosis     Diverticulum of esophagus     puree diet and speech therapy    Elevated cholesterol     ESRD on hemodialysis     GERD (gastroesophageal reflux disease)     Heart failure     Hemodialysis patient     M,W,F    Hyperlipidemia     Hypertension     Low back pain 2022    NERY (obstructive sleep apnea)     NOT USING CPAP    Recurrent falls     UTI (urinary tract infection)      Past Surgical History:   Procedure Laterality Date    CATARACT EXTRACTION      COLONOSCOPY      DIALYSIS FISTULA CREATION      ENDOSCOPY      INGUINAL HERNIA REPAIR      PROSTATE BIOPSY      THROAT SURGERY      diverticulum in throat    TONSILLECTOMY AND ADENOIDECTOMY      TOTAL SHOULDER ARTHROPLASTY W/ DISTAL CLAVICLE EXCISION Left 9/25/2023    Procedure: HEMIARTHROPLASTY, BICEPS TENODESIS - LEFT;  Surgeon: Matteo Tan MD;  Location: [facility];  Service: Orthopedics;  Laterality: Left;     Family History   Problem Relation Age of Onset    Stomach cancer Mother     Heart disease Mother     Arthritis Mother     Heart disease Father     Stroke Father     Kidney failure Father     Cancer Father     Kidney disease Father      Social History     Tobacco Use    Smoking status: Never    Smokeless tobacco: Never   Vaping Use    Vaping status: Never Used   Substance Use Topics    Alcohol use: Not Currently     Alcohol/week: 1.0 standard drink of alcohol     Types: 1 Glasses of wine per week     Comment: once monthly typically    Drug use: Never     (Not in a hospital admission)    Allergies:  Patient has no known allergies.    Vital Signs  Temp:  [98 °F (36.7 °C)-98.2 °F (36.8 °C)] 98.2 °F (36.8 °C)  Heart Rate:  [59-67]  62  Resp:  [16] 16  BP: (105-132)/(48-68) 105/59    Physical Exam:      General Appearance:    Alert, cooperative, in no acute distress   Head:      Normocephalic, without obvious abnormality, atraumatic   Eyes:                 Lids and lashes normal, conjunctivae and sclerae normal, no icterus, no pallor, corneas clear, PERRL   Ears:      Ears appear intact with no abnormalities noted   Throat:   No oral lesions, no thrush,oral mucosa moist           Lungs:     Clear to auscultation,respirations regular, even and               unlabored      Heart:    Regular rhythm and normal rate, normal S1 and S2, no         murmur     Abdomen:     Normal bowel sounds, no masses, no organomegaly, soft     non-tender, non-distended, no guarding, no rebound                tenderness     Extremities: Right upper extremity arteriovenous fistula with excellent thrill.  There is a hematoma deep to the fistula.  This is now solidified.  This is not tender to palpation.  There is mild edema of the left upper extremity.  There are no ulcerations of the skin.  There is some skin thinning noted over the fistula.     Pulses: Weakly palpable right radial pulse     Skin:   No bleeding, bruising or rash     Lymph nodes:   No palpable adenopathy     Neurologic:   Cranial nerves 2 - 12 grossly intact, sensation intact     Results Review:    I reviewed the patient's new clinical results.    Assessment and Plan: Elderly man with an arteriovenous fistula which had a misadventure after access on outpatient basis and led to a hematoma formation deep to the fistula.  Arterial study reviewed today.  His dialysis fistula is functional however there is a posterior hematoma there is no pseudoaneurysm or active hemorrhage.  From my perspective I did not recommend accessing this fistula and recommend gentle compression over the area.  Patient can go ahead and utilize his dialysis catheter.  He needs to follow-up with me next week on Thursday in my office  for reevaluation.  No need for hematoma evacuation or any additional aggressive surgical treatment of this as this area should improve over time.    I discussed the patients findings and my recommendations with patient.       Maikel Hughes MD  08/26/24  15:42 EDT

## 2024-08-27 ENCOUNTER — READMISSION MANAGEMENT (OUTPATIENT)
Dept: CALL CENTER | Facility: HOSPITAL | Age: 89
End: 2024-08-27
Payer: MEDICARE

## 2024-08-27 VITALS
HEIGHT: 66 IN | SYSTOLIC BLOOD PRESSURE: 132 MMHG | BODY MASS INDEX: 18.64 KG/M2 | RESPIRATION RATE: 18 BRPM | DIASTOLIC BLOOD PRESSURE: 91 MMHG | HEART RATE: 69 BPM | OXYGEN SATURATION: 90 % | WEIGHT: 116 LBS | TEMPERATURE: 97.9 F

## 2024-08-27 PROCEDURE — 99239 HOSP IP/OBS DSCHRG MGMT >30: CPT | Performed by: INTERNAL MEDICINE

## 2024-08-27 PROCEDURE — G0378 HOSPITAL OBSERVATION PER HR: HCPCS

## 2024-08-27 RX ADMIN — AMIODARONE HYDROCHLORIDE 100 MG: 200 TABLET ORAL at 08:47

## 2024-08-27 RX ADMIN — SERTRALINE 100 MG: 100 TABLET, FILM COATED ORAL at 08:48

## 2024-08-27 RX ADMIN — Medication 10 ML: at 08:48

## 2024-08-27 RX ADMIN — PANTOPRAZOLE SODIUM 40 MG: 40 TABLET, DELAYED RELEASE ORAL at 08:47

## 2024-08-27 RX ADMIN — TAMSULOSIN HYDROCHLORIDE 0.4 MG: 0.4 CAPSULE ORAL at 08:48

## 2024-08-27 NOTE — CASE MANAGEMENT/SOCIAL WORK
Case Management Discharge Note      Final Note: Plan is home with spouse. Spouse will transport. No discharge needs identified.         Selected Continued Care - Admitted Since 8/26/2024       Destination    No services have been selected for the patient.                Durable Medical Equipment    No services have been selected for the patient.                Dialysis/Infusion    No services have been selected for the patient.                Home Medical Care    No services have been selected for the patient.                Therapy    No services have been selected for the patient.                Community Resources    No services have been selected for the patient.                Community & DME    No services have been selected for the patient.                    Selected Continued Care - Prior Encounters Includes continued care and service providers with selected services from prior encounters from 5/28/2024 to 8/27/2024      Discharged on 8/8/2024 Admission date: 8/5/2024 - Discharge disposition: Home or Self Care      Dialysis/Infusion       Service Provider Selected Services Address Phone Fax Patient Preferred    [facility] [address] [place] [address], [address] [id number] [phone] -- --                               Final Discharge Disposition Code: 01 - home or self-care

## 2024-08-27 NOTE — OUTREACH NOTE
Prep Survey      Flowsheet Row Responses   [facility] facility patient discharged from? [place]   Is LACE score < 7 ? No   Eligibility Readm Mgmt   Discharge diagnosis ESRD needing dialysis   Does the patient have one of the following disease processes/diagnoses(primary or secondary)? Other   Prep survey completed? Yes            Nancy KRISHNAMURTHY - Registered Nurse

## 2024-08-27 NOTE — DISCHARGE SUMMARY
[facility] Medicine Services  DISCHARGE SUMMARY    Patient Name: Enrike Tomas  : 1935  MRN: [id number]    Date of Admission: 2024  9:16 AM  Date of Discharge:  2024  Primary Care Physician: Blair Dhaliwal MD    Consults       Date and Time Order Name Status Description    2024  2:47 PM Inpatient Vascular Surgery Consult Completed             Hospital Course     Presenting Problem: Problem with HD fistula    Active Hospital Problems    Diagnosis  POA    **ESRD needing dialysis [N18.6, Z99.2]  Yes    Chronic heart failure with preserved ejection fraction (HFpEF) [I50.32]  Yes    Paroxysmal atrial fibrillation [I48.0]  Yes    Depression [F32.A]  Yes    NERY (obstructive sleep apnea) [G47.33]  Yes    ESRD (end stage renal disease) [N18.6]  Yes      Resolved Hospital Problems   No resolved problems to display.          Hospital Course:  Enrike Tomas is a 89 y.o. male with ESRD on HD M,W,F, HTN, paroxysmal atrial fibrillation on Eliquis, HLD who recently presented to the ED due to difficulty with right arm fistula and had tunneled HD catheter placed 24.  He was scheduled to follow up with vascular surgery outpatient but returned to the ED  due to worsening pain/swelling of that arm and missed HD.  He was admitted for vascular surgery evaluation and HD.  He was seen by Dr. Hughes and fistula was evaluated.  He has a posterior hematoma but no pseudoaneurysm or active hemorrhage.  He recommended not accessing the fistula and use dialysis catheter instead.  Follow up with him in the office next week.  Given possible worsening, we will hold his Eliquis at discharge until he follows up at this appointment.  Discussed with wife to ask if OK to resume eliquis after clinic follow up.      Discharge Follow Up Recommendations for outpatient labs/diagnostics:   F/U Vascular clinic next week  Keep scheduled nephrology appointment    Day of Discharge     HPI:    Patient feels well, eager to go home.    Vital Signs:   Temp:  [97.5 °F (36.4 °C)-98.2 °F (36.8 °C)] 97.9 °F (36.6 °C)  Heart Rate:  [59-69] 69  Resp:  [16-18] 18  BP: ()/(48-94) 132/91      Physical Exam:  Constitutional: No acute distress, awake, alert, sitting up in bed  HENT: NCAT, mucous membranes moist  Respiratory: Clear to auscultation bilaterally, respiratory effort normal   Cardiovascular: RRR, no murmurs, rubs, or gallops  Musculoskeletal: No bilateral ankle edema  Psychiatric: Appropriate affect, cooperative, mildly confused  Neurologic: Speech is clear and fluent, hearing impaired  Skin: No rashes on exposed surfaces    Pertinent  and/or Most Recent Results     LAB RESULTS:      Lab 08/26/24  1113   WBC 5.09   HEMOGLOBIN 11.2*   HEMATOCRIT 34.3*   PLATELETS 189   NEUTROS ABS 3.40   IMMATURE GRANS (ABS) 0.03   LYMPHS ABS 0.94   MONOS ABS 0.47   EOS ABS 0.20   .9*         Lab 08/26/24  1113   SODIUM 136   POTASSIUM 4.5   CHLORIDE 95*   CO2 26.0   ANION GAP 15.0   BUN 47*   CREATININE 5.91*   EGFR 8.5*   GLUCOSE 122*   CALCIUM 9.4         Lab 08/26/24  1113   TOTAL PROTEIN 6.7   ALBUMIN 3.9   GLOBULIN 2.8   ALT (SGPT) 11   AST (SGOT) 18   BILIRUBIN 0.4   ALK PHOS 86                     Brief Urine Lab Results       None          Microbiology Results (last 10 days)       ** No results found for the last 240 hours. 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    Distal aVF elevated velocities, but normal flow volumes          Plan for Follow-up of Pending Labs/Results: None pending    Discharge Details        Discharge Medications        Changes to Medications        Instructions Start Date   apixaban 2.5 MG tablet tablet  Commonly known as: ELIQUIS  What changed: additional instructions   2.5 mg, Oral, 2 Times Daily, Hold until you follow up with Dr. LANDAVERDE next week             Continue These Medications        Instructions Start Date   acetaminophen 500 MG tablet  Commonly known as: TYLENOL   500 mg, Oral, Every 6 Hours PRN, OTC      amiodarone 100 MG tablet  Commonly known as: PACERONE   100 mg, Oral, Daily      atorvastatin 20 MG tablet  Commonly known as: LIPITOR   20 mg, Oral, Nightly      fluticasone 50 MCG/ACT nasal spray  Commonly known as: FLONASE   2 sprays, Nasal, Daily PRN, OTC      HYDROcodone-acetaminophen 7.5-325 MG per tablet  Commonly known as: NORCO   1 tablet, Oral, Nightly      losartan 25 MG tablet  Commonly known as: COZAAR   12.5 mg, Oral, As Needed, If BP is above 150      melatonin 5 MG tablet tablet   5 mg, Oral, Nightly PRN, OTC      midodrine 5 MG tablet  Commonly known as: PROAMATINE   5 mg, Oral, 3 Times Daily Before Meals      pantoprazole 40 MG EC tablet  Commonly known as: PROTONIX   40 mg, Oral, Daily      Jenny-Serg tablet   1 tablet, Oral, Daily, OTC      sertraline 100 MG tablet  Commonly known as: ZOLOFT   100 mg, Oral, Daily      tamsulosin 0.4 MG capsule 24 hr capsule  Commonly known as: FLOMAX   1 capsule, Oral, Daily      Teriparatide (Recombinant) 620 MCG/2.48ML injection  Commonly known as: FORTEO   Inject 20 mcg under the skin into the appropriate area as directed Daily.               No Known Allergies      Discharge Disposition:  Home or Self Care    Diet:  Hospital:  Diet Order   Procedures    Diet: Renal; Low Sodium  (2-3g), Low Potassium, Low Phosphorus; Fluid Consistency: Thin (IDDSI 0)            CODE STATUS:    Code Status and Medical Interventions: No CPR (Do Not Attempt to Resuscitate); Limited Support; No intubation (DNI)   Ordered at: 08/26/24 1419     Medical Intervention Limits:    No intubation (DNI)     Level Of Support Discussed With:    Patient     Code Status (Patient has no pulse and is not breathing):    No CPR (Do Not Attempt to Resuscitate)     Medical Interventions (Patient has pulse or is breathing):    Limited Support         Valerie Morales MD  08/27/24      Time Spent on Discharge:  I spent  31  minutes on this discharge activity which included: face-to-face encounter with the patient, reviewing the data in the system, coordination of the care with the nursing staff as well as consultants, documentation, and entering orders.

## 2024-08-29 NOTE — ED PROVIDER NOTES
Dr. Deng Graves Subjective   History of Present Illness  89-year-old male with a known history of chronic dialysis dependent renal failure who presents with a complaint of swelling to his right AV fistula site.  The patient recently required admission for similar 3 weeks ago.  This was deemed to be a hematoma at that time.  The swelling had improved but between last night and this morning the swelling became more significant which prompted the current presentation.  There is no redness or increased warmth.  There is no pain reported.  No fever or systemic symptoms of infection.  No abdominal pain or change in bowel or urinary function.  No other acute complaints.      Review of Systems   Constitutional:  Negative for chills, fatigue and fever.   HENT:  Negative for congestion, ear pain, postnasal drip, sinus pressure and sore throat.    Eyes:  Negative for pain, redness and visual disturbance.   Respiratory:  Negative for cough, chest tightness and shortness of breath.    Cardiovascular:  Negative for chest pain, palpitations and leg swelling.        Swelling of right upper extremity AV fistula   Gastrointestinal:  Negative for abdominal pain, anal bleeding, blood in stool, diarrhea, nausea and vomiting.   Endocrine: Negative for polydipsia and polyuria.   Genitourinary:  Negative for difficulty urinating, dysuria, frequency and urgency.   Musculoskeletal:  Negative for arthralgias, back pain and neck pain.   Skin:  Negative for pallor and rash.   Allergic/Immunologic: Negative for environmental allergies and immunocompromised state.   Neurological:  Negative for dizziness, weakness and headaches.   Hematological:  Negative for adenopathy.   Psychiatric/Behavioral:  Negative for confusion, self-injury and suicidal ideas. The patient is not nervous/anxious.    All other systems reviewed and are negative.      Past Medical History:   Diagnosis Date    A-fib     Abnormal ECG     occasional extra heartbeat    Anemia, acute on chronic  9/30/2023    Arthritis     Atrial fibrillation     BPH (benign prostatic hyperplasia)     Cervical compression fracture 06/05/2023    currently wearing a c collar    Diverticulosis     Diverticulum of esophagus     puree diet and speech therapy    Elevated cholesterol     ESRD on hemodialysis     GERD (gastroesophageal reflux disease)     Heart failure     Hemodialysis patient     M,W,F    Hyperlipidemia     Hypertension     Low back pain 2022    NERY (obstructive sleep apnea)     NOT USING CPAP    Recurrent falls     UTI (urinary tract infection)        No Known Allergies    Past Surgical History:   Procedure Laterality Date    CATARACT EXTRACTION      COLONOSCOPY      DIALYSIS FISTULA CREATION      ENDOSCOPY      INGUINAL HERNIA REPAIR      PROSTATE BIOPSY      THROAT SURGERY      diverticulum in throat    TONSILLECTOMY AND ADENOIDECTOMY      TOTAL SHOULDER ARTHROPLASTY W/ DISTAL CLAVICLE EXCISION Left 9/25/2023    Procedure: HEMIARTHROPLASTY, BICEPS TENODESIS - LEFT;  Surgeon: Matteo Tan MD;  Location: [facility];  Service: Orthopedics;  Laterality: Left;       Family History   Problem Relation Age of Onset    Stomach cancer Mother     Heart disease Mother     Arthritis Mother     Heart disease Father     Stroke Father     Kidney failure Father     Cancer Father     Kidney disease Father        Social History     Socioeconomic History    Marital status:    Tobacco Use    Smoking status: Never    Smokeless tobacco: Never   Vaping Use    Vaping status: Never Used   Substance and Sexual Activity    Alcohol use: Not Currently     Alcohol/week: 1.0 standard drink of alcohol     Types: 1 Glasses of wine per week     Comment: once monthly typically    Drug use: Never    Sexual activity: Defer           Objective   Physical Exam  Vitals and nursing note reviewed.   Constitutional:       General: He is not in acute distress.     Appearance: Normal appearance. He is well-developed. He is not toxic-appearing  or diaphoretic.   HENT:      Head: Normocephalic and atraumatic.      Right Ear: External ear normal.      Left Ear: External ear normal.      Nose: Nose normal.   Eyes:      General: Lids are normal.      Pupils: Pupils are equal, round, and reactive to light.   Neck:      Trachea: No tracheal deviation.   Cardiovascular:      Rate and Rhythm: Normal rate and regular rhythm.      Pulses: No decreased pulses.      Heart sounds: Normal heart sounds. No murmur heard.     No friction rub. No gallop.      Comments: Swelling associated with right upper extremity AV fistula, no redness, no increased warmth, no reported pain.   Pulmonary:      Effort: Pulmonary effort is normal. No respiratory distress.      Breath sounds: Normal breath sounds. No decreased breath sounds, wheezing, rhonchi or rales.   Abdominal:      General: Bowel sounds are normal.      Palpations: Abdomen is soft.      Tenderness: There is no abdominal tenderness. There is no guarding or rebound.   Musculoskeletal:         General: No deformity. Normal range of motion.      Cervical back: Normal range of motion and neck supple.   Lymphadenopathy:      Cervical: No cervical adenopathy.   Skin:     General: Skin is warm and dry.      Findings: No rash.   Neurological:      Mental Status: He is alert and oriented to person, place, and time.      Cranial Nerves: No cranial nerve deficit.      Sensory: No sensory deficit.   Psychiatric:         Speech: Speech normal.         Behavior: Behavior normal.         Thought Content: Thought content normal.         Judgment: Judgment normal.         Procedures           ED Course  ED Course as of 08/29/24 1620   Mon Aug 26, 2024   1410 The patient reports acutely increased swelling to the right AV fistula since yesterday which prompted the current presentation.  The patient gets dialysis on a Monday Wednesday Friday basis.  The patient therefore missed dialysis today.  The AV fistula does look more swollen today  than it did on previous pictures when he was most recently here in the ER.  I have performed a duplex of the AV fistula and Dr. Hughes of vascular surgery has reviewed the study and feels like it most likely represents a hematoma without a pseudoaneurysm.  He is just recommending observation and compression.  No surgical intervention.  I discussed the fact that the patient had missed dialysis with Dr. Jean who recommends admission for inpatient dialysis. [NS]      ED Course User Index  [NS] Dao Brennan MD                                             Medical Decision Making  Differential diagnosis includes acute on chronic renal insufficiency, AV fistula hematoma, AV fistula pseudoaneurysm, AV fistula cellulitis, abscess, other unspecified etiology.    The patient's arm does not appear consistent with cellulitis or an abscess.  Underlying hematoma is suspected.    Ultrasound was obtained of the right upper extremity and has been evaluated by the vascular surgeon.  Dr. Hughes does not recommend any intervention at this given time, and recommends discharge in regards to right upper extremity AV fistual swelling.     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  These results were not conveyed to me despite the radiology report.  I did discuss the patient missing dialysis with the on-call nephrologist, Dr. Jean.  Dr. Jean recommends admission for dialysis.    I discussed the patient with the hospitalist, who will admit.    Problems Addressed:  Complication of AV dialysis fistula, initial encounter: complicated acute illness or injury with systemic symptoms  Stage 5 chronic kidney disease on chronic dialysis: complicated acute illness or injury with systemic symptoms    Amount and/or Complexity of Data Reviewed  External Data Reviewed: labs and radiology.  Labs: ordered. Decision-making details documented in ED  Course.  Radiology: ordered and independent interpretation performed. Decision-making details documented in ED Course.    Risk  Decision regarding hospitalization.        Final diagnoses:   Stage 5 chronic kidney disease on chronic dialysis   Complication of AV dialysis fistula, initial encounter       ED Disposition  ED Disposition       ED Disposition   Decision to Admit    Condition   --    Comment   Level of Care: Telemetry [5]   Diagnosis: ESRD needing dialysis [432947]   Admitting Physician: BRIAN GASTON [[id number]]   Attending Physician: BRIAN GASTON [[id number]]   Bed Request Comments: non-CDU                 Maikel Hughes MD  [address]   [facility] [address]  [phone]    Go on 9/5/2024  @ 1:45 PM , no imaging    Gill, April Emilia, APRN  [address]  [facility] [address]  [phone]    Go on 9/4/2024  @ 1:20 PM         Medication List        Changed      apixaban 2.5 MG tablet tablet  Commonly known as: ELIQUIS  Take 1 tablet by mouth 2 (Two) Times a Day. Hold until you follow up with Dr. LANDAVERDE next week  What changed: additional instructions               Where to Get Your Medications        Information about where to get these medications is not yet available    Ask your nurse or doctor about these medications  apixaban 2.5 MG tablet tablet            Dao Brennan MD  08/29/24 1803

## 2024-08-30 ENCOUNTER — READMISSION MANAGEMENT (OUTPATIENT)
Dept: CALL CENTER | Facility: HOSPITAL | Age: 89
End: 2024-08-30
Payer: MEDICARE

## 2024-08-30 NOTE — OUTREACH NOTE
Medical Week 1 Survey      Flowsheet Row Responses   [facility] patient discharged from? [facility]   Does the patient have one of the following disease processes/diagnoses(primary or secondary)? Other   Week 1 attempt successful? Yes   Call start time 1550   Call end time 1555   Discharge diagnosis ESRD needing dialysis   Person spoke with today (if not patient) and relationship Wife   Meds reviewed with patient/caregiver? Yes   Is the patient having any side effects they believe may be caused by any medication additions or changes? No   Does the patient have all medications ordered at discharge? N/A   Is the patient taking all medications as directed (includes completed medication regime)? Yes   Comments regarding appointments [facility] for dialysis--MWF   Does the patient have a primary care provider?  Yes   Does the patient have an appointment with their PCP within 7 days of discharge? Yes   Comments regarding PCP PCP--Dr. Blair Dhaliwal   Has the patient kept scheduled appointments due by today? Yes   Has home health visited the patient within 72 hours of discharge? N/A   Psychosocial issues? No   Psychosocial comments Patient has a caregiver 5 days a week in the home.   Comments Wife states patient is doing very well. Dialysis is still through the catheter in his chest, not using the fistula.   Did the patient receive a copy of their discharge instructions? Yes   Nursing interventions Reviewed instructions with patient   What is the patient's perception of their health status since discharge? Improving   Is the patient/caregiver able to teach back signs and symptoms related to disease process for when to call PCP? Yes   Is the patient/caregiver able to teach back signs and symptoms related to disease process for when to call 911? Yes   Is the patient/caregiver able to teach back the hierarchy of who to call/visit for symptoms/problems? PCP, Specialist, Home health nurse, Urgent Care, ED, 911 Yes   If the patient  is a current smoker, are they able to teach back resources for cessation? Not a smoker   Week 1 call completed? Yes   Graduated Yes   Would this patient benefit from a Referral to [facility] Social Work? No   Is the patient interested in additional calls from an ambulatory ? No   Graduated/Revoked comments Wife appreciative of the call. No concerns or needs.   Call end time [id number]            Merly CONNELL - Registered Nurse

## 2024-10-21 ENCOUNTER — LAB (OUTPATIENT)
Dept: LAB | Facility: HOSPITAL | Age: 89
End: 2024-10-21
Payer: MEDICARE

## 2024-10-21 ENCOUNTER — TRANSCRIBE ORDERS (OUTPATIENT)
Dept: LAB | Facility: HOSPITAL | Age: 89
End: 2024-10-21
Payer: MEDICARE

## 2024-10-21 DIAGNOSIS — Z79.01 LONG TERM (CURRENT) USE OF ANTICOAGULANTS: ICD-10-CM

## 2024-10-21 DIAGNOSIS — N28.9 RENAL DISEASE: ICD-10-CM

## 2024-10-21 DIAGNOSIS — I15.9 SECONDARY HYPERTENSION: ICD-10-CM

## 2024-10-21 DIAGNOSIS — Z91.81 PERSONAL HISTORY OF FALL: ICD-10-CM

## 2024-10-21 DIAGNOSIS — Z91.81 PERSONAL HISTORY OF FALL: Primary | ICD-10-CM

## 2024-10-21 DIAGNOSIS — F03.90 DEMENTIA, UNSPECIFIED DEMENTIA SEVERITY, UNSPECIFIED DEMENTIA TYPE, UNSPECIFIED WHETHER BEHAVIORAL, PSYCHOTIC, OR MOOD DISTURBANCE OR ANXIETY: ICD-10-CM

## 2024-10-21 LAB
BASOPHILS # BLD AUTO: 0.04 10*3/MM3 (ref 0–0.2)
BASOPHILS NFR BLD AUTO: 1.2 % (ref 0–1.5)
DEPRECATED RDW RBC AUTO: 49 FL (ref 37–54)
EOSINOPHIL # BLD AUTO: 0.16 10*3/MM3 (ref 0–0.4)
EOSINOPHIL NFR BLD AUTO: 4.7 % (ref 0.3–6.2)
ERYTHROCYTE [DISTWIDTH] IN BLOOD BY AUTOMATED COUNT: 13 % (ref 12.3–15.4)
HCT VFR BLD AUTO: 33.1 % (ref 37.5–51)
HGB BLD-MCNC: 11 G/DL (ref 13–17.7)
IMM GRANULOCYTES # BLD AUTO: 0.02 10*3/MM3 (ref 0–0.05)
IMM GRANULOCYTES NFR BLD AUTO: 0.6 % (ref 0–0.5)
LYMPHOCYTES # BLD AUTO: 0.73 10*3/MM3 (ref 0.7–3.1)
LYMPHOCYTES NFR BLD AUTO: 21.2 % (ref 19.6–45.3)
MCH RBC QN AUTO: 34.7 PG (ref 26.6–33)
MCHC RBC AUTO-ENTMCNC: 33.2 G/DL (ref 31.5–35.7)
MCV RBC AUTO: 104.4 FL (ref 79–97)
MONOCYTES # BLD AUTO: 0.44 10*3/MM3 (ref 0.1–0.9)
MONOCYTES NFR BLD AUTO: 12.8 % (ref 5–12)
NEUTROPHILS NFR BLD AUTO: 2.05 10*3/MM3 (ref 1.7–7)
NEUTROPHILS NFR BLD AUTO: 59.5 % (ref 42.7–76)
NRBC BLD AUTO-RTO: 0 /100 WBC (ref 0–0.2)
PLATELET # BLD AUTO: 195 10*3/MM3 (ref 140–450)
PMV BLD AUTO: 8.6 FL (ref 6–12)
RBC # BLD AUTO: 3.17 10*6/MM3 (ref 4.14–5.8)
WBC NRBC COR # BLD AUTO: 3.44 10*3/MM3 (ref 3.4–10.8)

## 2024-10-21 PROCEDURE — 36415 COLL VENOUS BLD VENIPUNCTURE: CPT

## 2024-10-21 PROCEDURE — 85025 COMPLETE CBC W/AUTO DIFF WBC: CPT

## 2024-10-21 PROCEDURE — 80048 BASIC METABOLIC PNL TOTAL CA: CPT

## 2024-10-22 LAB
ANION GAP SERPL CALCULATED.3IONS-SCNC: 15 MMOL/L (ref 5–15)
BUN SERPL-MCNC: 22 MG/DL (ref 8–23)
BUN/CREAT SERPL: 6.5 (ref 7–25)
CALCIUM SPEC-SCNC: 9.5 MG/DL (ref 8.6–10.5)
CHLORIDE SERPL-SCNC: 92 MMOL/L (ref 98–107)
CO2 SERPL-SCNC: 28 MMOL/L (ref 22–29)
CREAT SERPL-MCNC: 3.41 MG/DL (ref 0.76–1.27)
EGFRCR SERPLBLD CKD-EPI 2021: 16.5 ML/MIN/1.73
GLUCOSE SERPL-MCNC: 96 MG/DL (ref 65–99)
POTASSIUM SERPL-SCNC: 3.5 MMOL/L (ref 3.5–5.2)
SODIUM SERPL-SCNC: 135 MMOL/L (ref 136–145)

## 2025-02-26 ENCOUNTER — APPOINTMENT (OUTPATIENT)
Dept: CT IMAGING | Facility: HOSPITAL | Age: OVER 89
End: 2025-02-26
Payer: MEDICARE

## 2025-02-26 ENCOUNTER — HOSPITAL ENCOUNTER (EMERGENCY)
Facility: HOSPITAL | Age: OVER 89
Discharge: HOME | End: 2025-02-27
Attending: EMERGENCY MEDICINE
Payer: MEDICARE

## 2025-02-26 DIAGNOSIS — N18.9 CHRONIC KIDNEY DISEASE, UNSPECIFIED CKD STAGE: ICD-10-CM

## 2025-02-26 DIAGNOSIS — Z79.01 ANTICOAGULATED: ICD-10-CM

## 2025-02-26 DIAGNOSIS — S09.90XA INJURY OF HEAD, INITIAL ENCOUNTER: ICD-10-CM
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
## 2025-02-26 DIAGNOSIS — S01.01XA SCALP LACERATION, INITIAL ENCOUNTER: ICD-10-CM

## 2025-02-26 LAB — GLUCOSE BLDC GLUCOMTR-MCNC: 91 MG/DL (ref 70–130)

## 2025-02-26 PROCEDURE — 99284 EMERGENCY DEPT VISIT MOD MDM: CPT

## 2025-02-26 PROCEDURE — 70450 CT HEAD/BRAIN W/O DYE: CPT

## 2025-02-26 PROCEDURE — 82948 REAGENT STRIP/BLOOD GLUCOSE: CPT

## 2025-02-26 RX ORDER — ACETAMINOPHEN 325 MG/1
650 TABLET ORAL ONCE
Status: COMPLETED | OUTPATIENT
Start: 2025-02-26 | End: 2025-02-27

## 2025-02-27 VITALS
WEIGHT: 117 LBS | SYSTOLIC BLOOD PRESSURE: 96 MMHG | OXYGEN SATURATION: 94 % | HEART RATE: 67 BPM | TEMPERATURE: 97.7 F | RESPIRATION RATE: 19 BRPM | BODY MASS INDEX: 18.8 KG/M2 | HEIGHT: 66 IN | DIASTOLIC BLOOD PRESSURE: 60 MMHG

## 2025-02-27 LAB
ALBUMIN SERPL-MCNC: 4 G/DL (ref 3.5–5.2)
ALBUMIN/GLOB SERPL: 1.3 G/DL
ALP SERPL-CCNC: 118 U/L (ref 39–117)
ALT SERPL W P-5'-P-CCNC: 16 U/L (ref 1–41)
ANION GAP SERPL CALCULATED.3IONS-SCNC: 14 MMOL/L (ref 5–15)
AST SERPL-CCNC: 23 U/L (ref 1–40)
BASOPHILS # BLD AUTO: 0.06 10*3/MM3 (ref 0–0.2)
BASOPHILS NFR BLD AUTO: 0.9 % (ref 0–1.5)
BILIRUB SERPL-MCNC: 0.2 MG/DL (ref 0–1.2)
BUN SERPL-MCNC: 18 MG/DL (ref 8–23)
BUN/CREAT SERPL: 5.2 (ref 7–25)
CALCIUM SPEC-SCNC: 9.6 MG/DL (ref 8.6–10.5)
CHLORIDE SERPL-SCNC: 94 MMOL/L (ref 98–107)
CO2 SERPL-SCNC: 28 MMOL/L (ref 22–29)
CREAT SERPL-MCNC: 3.46 MG/DL (ref 0.76–1.27)
DEPRECATED RDW RBC AUTO: 51.9 FL (ref 37–54)
EGFRCR SERPLBLD CKD-EPI 2021: 16.2 ML/MIN/1.73
EOSINOPHIL # BLD AUTO: 0.17 10*3/MM3 (ref 0–0.4)
EOSINOPHIL NFR BLD AUTO: 2.6 % (ref 0.3–6.2)
ERYTHROCYTE [DISTWIDTH] IN BLOOD BY AUTOMATED COUNT: 13.5 % (ref 12.3–15.4)
GLOBULIN UR ELPH-MCNC: 3 GM/DL
GLUCOSE SERPL-MCNC: 85 MG/DL (ref 65–99)
HCT VFR BLD AUTO: 37 % (ref 37.5–51)
HGB BLD-MCNC: 12.4 G/DL (ref 13–17.7)
IMM GRANULOCYTES # BLD AUTO: 0.02 10*3/MM3 (ref 0–0.05)
IMM GRANULOCYTES NFR BLD AUTO: 0.3 % (ref 0–0.5)
LYMPHOCYTES # BLD AUTO: 1.04 10*3/MM3 (ref 0.7–3.1)
LYMPHOCYTES NFR BLD AUTO: 16.1 % (ref 19.6–45.3)
MCH RBC QN AUTO: 34.4 PG (ref 26.6–33)
MCHC RBC AUTO-ENTMCNC: 33.5 G/DL (ref 31.5–35.7)
MCV RBC AUTO: 102.8 FL (ref 79–97)
MONOCYTES # BLD AUTO: 0.61 10*3/MM3 (ref 0.1–0.9)
MONOCYTES NFR BLD AUTO: 9.5 % (ref 5–12)
NEUTROPHILS NFR BLD AUTO: 4.54 10*3/MM3 (ref 1.7–7)
NEUTROPHILS NFR BLD AUTO: 70.6 % (ref 42.7–76)
NRBC BLD AUTO-RTO: 0 /100 WBC (ref 0–0.2)
PLATELET # BLD AUTO: 205 10*3/MM3 (ref 140–450)
PMV BLD AUTO: 8.5 FL (ref 6–12)
POTASSIUM SERPL-SCNC: 4.3 MMOL/L (ref 3.5–5.2)
PROT SERPL-MCNC: 7 G/DL (ref 6–8.5)
RBC # BLD AUTO: 3.6 10*6/MM3 (ref 4.14–5.8)
SODIUM SERPL-SCNC: 136 MMOL/L (ref 136–145)
WBC NRBC COR # BLD AUTO: 6.44 10*3/MM3 (ref 3.4–10.8)

## 2025-02-27 PROCEDURE — 36415 COLL VENOUS BLD VENIPUNCTURE: CPT

## 2025-02-27 PROCEDURE — 85025 COMPLETE CBC W/AUTO DIFF WBC: CPT | Performed by: EMERGENCY MEDICINE

## 2025-02-27 PROCEDURE — 80053 COMPREHEN METABOLIC PANEL: CPT | Performed by: EMERGENCY MEDICINE

## 2025-02-27 RX ADMIN — ACETAMINOPHEN 650 MG: 325 TABLET ORAL at 01:06

## 2025-02-27 NOTE — ED PROVIDER NOTES
Subjective   History of Present Illness  Pleasant 89-year-old male presents following a fall at home.  He states that he has a bad knee that often gives out on him.  He stood up from the toilet.  Denies lightheadedness or near syncopal symptoms but states that when he stood up his knee gave out, he fell forward and hit the top of his head on the wall causing the laceration and bleeding.  Denies LOC.  States that he does have some pain rated as a 2 out of 10 on top of his head at the location of the injury.  He is anticoagulated with Eliquis.  Bleeding is currently controlled.  Denies neck pain, back pain, upper or lower extremity pain or any other injury related to the fall this evening.  Denies recent illness or other acute complaints.        Review of Systems   All other systems reviewed and are negative.      Past Medical History:   Diagnosis Date    A-fib     Abnormal ECG     occasional extra heartbeat    Anemia, acute on chronic 9/30/2023    Arthritis     Atrial fibrillation     BPH (benign prostatic hyperplasia)     Cervical compression fracture 06/05/2023    currently wearing a c collar    Diverticulosis     Diverticulum of esophagus     puree diet and speech therapy    Elevated cholesterol     ESRD on hemodialysis     GERD (gastroesophageal reflux disease)     Heart failure     Hemodialysis patient     M,W,F    Hyperlipidemia     Hypertension     Low back pain 2022    NERY (obstructive sleep apnea)     NOT USING CPAP    Recurrent falls     UTI (urinary tract infection)        Allergies   Allergen Reactions    Pollen Extract Other (See Comments)     sneezing    Mirtazapine Unknown (See Comments)       Past Surgical History:   Procedure Laterality Date    CATARACT EXTRACTION      COLONOSCOPY      DIALYSIS FISTULA CREATION      ENDOSCOPY      INGUINAL HERNIA REPAIR      PROSTATE BIOPSY      THROAT SURGERY      diverticulum in throat    TONSILLECTOMY AND ADENOIDECTOMY      TOTAL SHOULDER ARTHROPLASTY W/ DISTAL  CLAVICLE EXCISION Left 9/25/2023    Procedure: HEMIARTHROPLASTY, BICEPS TENODESIS - LEFT;  Surgeon: Matteo Tan MD;  Location: [facility];  Service: Orthopedics;  Laterality: Left;       Family History   Problem Relation Age of Onset    Stomach cancer Mother     Heart disease Mother     Arthritis Mother     Heart disease Father     Stroke Father     Kidney failure Father     Cancer Father     Kidney disease Father        Social History     Socioeconomic History    Marital status:    Tobacco Use    Smoking status: Never    Smokeless tobacco: Never   Vaping Use    Vaping status: Never Used   Substance and Sexual Activity    Alcohol use: Not Currently     Alcohol/week: 1.0 standard drink of alcohol     Types: 1 Glasses of wine per week     Comment: once monthly typically    Drug use: Never    Sexual activity: Defer           Objective   Physical Exam  Vitals and nursing note reviewed.   Constitutional:       General: He is not in acute distress.  HENT:      Head: Normocephalic.        Comments: Abrasion/small laceration superior aspect of the scalp.  Bleeding controlled.  Tenderness to palpation.  No significant hematoma appreciated.  Eyes:      Conjunctiva/sclera: Conjunctivae normal.      Pupils: Pupils are equal, round, and reactive to light.   Neck:      Thyroid: No thyromegaly.   Cardiovascular:      Rate and Rhythm: Normal rate and regular rhythm.      Heart sounds: Normal heart sounds. No murmur heard.     No friction rub. No gallop.   Pulmonary:      Effort: Pulmonary effort is normal. No respiratory distress.      Breath sounds: Normal breath sounds.   Abdominal:      General: Bowel sounds are normal.      Palpations: Abdomen is soft.      Tenderness: There is no abdominal tenderness.   Musculoskeletal:         General: Normal range of motion.      Cervical back: Normal range of motion and neck supple.   Lymphadenopathy:      Cervical: No cervical adenopathy.   Skin:     General: Skin is warm  and dry.   Neurological:      Mental Status: He is alert and oriented to person, place, and time.   Psychiatric:         Behavior: Behavior normal.         Laceration Repair    Date/Time: 2/26/2025 11:45 PM    Performed by: Lucas Miller DO  Authorized by: Lucas Miller DO    Consent:     Consent obtained:  Verbal    Consent given by:  Patient    Risks, benefits, and alternatives were discussed: yes      Risks discussed:  Infection, pain, retained foreign body, need for additional repair, poor cosmetic result and nerve damage    Alternatives discussed:  No treatment, delayed treatment and observation  Universal protocol:     Procedure explained and questions answered to patient or proxy's satisfaction: yes      Relevant documents present and verified: yes      Test results available: yes      Imaging studies available: yes      Required blood products, implants, devices, and special equipment available: yes      Site/side marked: yes      Immediately prior to procedure, a time out was called: yes      Patient identity confirmed:  Verbally with patient, arm band, provided demographic data and hospital-assigned identification number  Anesthesia:     Anesthesia method:  Local infiltration    Local anesthetic:  Lidocaine 1% WITH epi  Laceration details:     Location:  Scalp    Scalp location:  Crown    Length (cm):  2    Depth (mm):  2  Pre-procedure details:     Preparation:  Patient was prepped and draped in usual sterile fashion and imaging obtained to evaluate for foreign bodies  Exploration:     Hemostasis achieved with:  Direct pressure    Imaging obtained: x-ray      Imaging obtained comment:  CT    Contaminated: no    Treatment:     Area cleansed with:  Povidone-iodine    Amount of cleaning:  Extensive    Irrigation solution:  Sterile saline    Irrigation method:  Syringe    Visualized foreign bodies/material removed: no      Debridement:  None  Skin repair:     Repair method:  Staples    Number of staples:   2  Approximation:     Approximation:  Close  Repair type:     Repair type:  Simple  Post-procedure details:     Dressing:  Sterile dressing    Procedure completion:  Tolerated well, no immediate complications             ED Course       Latest Reference Range & Units 02/27/25 01:03   Sodium 136 - 145 mmol/L 136   Potassium 3.5 - 5.2 mmol/L 4.3   Chloride 98 - 107 mmol/L 94 (L)   CO2 22.0 - 29.0 mmol/L 28.0   Anion Gap 5.0 - 15.0 mmol/L 14.0   BUN 8 - 23 mg/dL 18   Creatinine 0.76 - 1.27 mg/dL 3.46 (H)   BUN/Creatinine Ratio 7.0 - 25.0  5.2 (L)   eGFR >60.0 mL/min/1.73 16.2 (L)   Glucose 65 - 99 mg/dL 85   Calcium 8.6 - 10.5 mg/dL 9.6   Alkaline Phosphatase 39 - 117 U/L 118 (H)   Total Protein 6.0 - 8.5 g/dL 7.0   Albumin 3.5 - 5.2 g/dL 4.0   Globulin gm/dL 3.0   A/G Ratio g/dL 1.3   AST (SGOT) 1 - 40 U/L 23   ALT (SGPT) 1 - 41 U/L 16   Total Bilirubin 0.0 - 1.2 mg/dL 0.2   WBC 3.40 - 10.80 10*3/mm3 6.44   RBC 4.14 - 5.80 10*6/mm3 3.60 (L)   Hemoglobin 13.0 - 17.7 g/dL 12.4 (L)   Hematocrit 37.5 - 51.0 % 37.0 (L)   Platelets 140 - 450 10*3/mm3 205   RDW 12.3 - 15.4 % 13.5   MCV 79.0 - 97.0 fL 102.8 (H)   MCH 26.6 - 33.0 pg 34.4 (H)   MCHC 31.5 - 35.7 g/dL 33.5   MPV 6.0 - 12.0 fL 8.5   RDW-SD 37.0 - 54.0 fl 51.9   Neutrophil Rel % 42.7 - 76.0 % 70.6   Lymphocyte Rel % 19.6 - 45.3 % 16.1 (L)   Monocyte Rel % 5.0 - 12.0 % 9.5   Eosinophil Rel % 0.3 - 6.2 % 2.6   Basophil Rel % 0.0 - 1.5 % 0.9   Immature Granulocyte Rel % 0.0 - 0.5 % 0.3   Neutrophils Absolute 1.70 - 7.00 10*3/mm3 4.54   Lymphocytes Absolute 0.70 - 3.10 10*3/mm3 1.04   Monocytes Absolute 0.10 - 0.90 10*3/mm3 0.61   Eosinophils Absolute 0.00 - 0.40 10*3/mm3 0.17   Basophils Absolute 0.00 - 0.20 10*3/mm3 0.06   Immature Grans, Absolute 0.00 - 0.05 10*3/mm3 0.02   nRBC 0.0 - 0.2 /100 WBC 0.0   (L): Data is abnormally low  (H): Data is abnormally high      CT Head Without Contrast   Final Result   Impression:   1.No acute intracranial abnormality.   2.Mild  chronic small vessel ischemic change.                  Electronically Signed: Oscar Albert MD     2/26/2025 11:48 PM EST     Workstation ID: [id number]        Vitals:    02/27/25 0129 02/27/25 0159 02/27/25 0229 02/27/25 0259   BP: 105/57 109/57 103/55 96/60   Pulse: 67 71 75 67   Resp:       Temp:       TempSrc:       SpO2: 97% 93% 97% 94%   Weight:       Height:         Medications   acetaminophen (TYLENOL) tablet 650 mg (650 mg Oral Given 2/27/25 0106)     ECG/EMG Results (last 24 hours)       ** No results found for the last 24 hours. **          No orders to display                                                      Medical Decision Making  Problems Addressed:  Anticoagulated: complicated acute illness or injury  Chronic kidney disease, unspecified CKD stage: complicated acute illness or injury  Fall, initial encounter: complicated acute illness or injury  Injury of head, initial encounter: complicated acute illness or injury  Scalp laceration, initial encounter: complicated acute illness or injury    Amount and/or Complexity of Data Reviewed  Labs: ordered.  Radiology: ordered.    Risk  OTC drugs.        Final diagnoses:   Fall, initial encounter   Injury of head, initial encounter   Scalp laceration, initial encounter   Anticoagulated   Chronic kidney disease, unspecified CKD stage       ED Disposition  ED Disposition       ED Disposition   Discharge    Condition   Stable    Comment   --           DISCHARGE    Patient discharged in stable condition.    Reviewed implications of results, diagnosis, meds, responsibility to follow up, warning signs and symptoms of possible worsening, potential complications and reasons to return to ER.    Patient/Family voiced understanding of above instructions.    Discussed plan for discharge, as there is no emergent indication for admission.  Pt/family is agreeable and understands need for follow up and possible repeat testing.  Pt/family is aware that discharge does not  mean that nothing is wrong but that it indicates no emergency is currently present that requires admission and they must continue care with follow-up as given below or with a physician of their choice.     FOLLOW-UP  Blair Dhaliwal MD  [address] [place] [address]  [phone]    Schedule an appointment as soon as possible for a visit       [facility] EMERGENCY DEPARTMENT  [address] [address] [phone]  Schedule an appointment as soon as possible for a visit            Medication List        Changed      HYDROcodone-acetaminophen 7.5-325 MG per tablet  Commonly known as: NORCO  Take 1 tablet by mouth Every Night.  What changed:   when to take this  reasons to take this     midodrine 5 MG tablet  Commonly known as: PROAMATINE  Take 1 tablet by mouth 3 (Three) Times a Day Before Meals.  What changed:   when to take this  reasons to take this

## 2025-02-27 NOTE — DISCHARGE INSTRUCTIONS
Keep the wound dry for the next 24 hours.  After that she may shower but be sure to only pat dry the laceration.  Return to the emergency department in 7 to 10 days for wound check and staple removal.  Monitor your symptoms and have a low threshold to return to the emergency department if symptoms persist, worsen, or other concerns arise.

## 2025-03-06 ENCOUNTER — HOSPITAL ENCOUNTER (EMERGENCY)
Facility: HOSPITAL | Age: OVER 89
Discharge: HOME | End: 2025-03-06
Payer: MEDICARE

## 2025-03-06 VITALS
RESPIRATION RATE: 18 BRPM | BODY MASS INDEX: 20.25 KG/M2 | OXYGEN SATURATION: 99 % | DIASTOLIC BLOOD PRESSURE: 74 MMHG | TEMPERATURE: 98.1 F | SYSTOLIC BLOOD PRESSURE: 148 MMHG | HEART RATE: 66 BPM | WEIGHT: 126 LBS | HEIGHT: 66 IN

## 2025-03-06 PROCEDURE — 99202 OFFICE O/P NEW SF 15 MIN: CPT

## 2025-03-21 ENCOUNTER — APPOINTMENT (OUTPATIENT)
Dept: CT IMAGING | Facility: HOSPITAL | Age: OVER 89
End: 2025-03-21
Payer: MEDICARE

## 2025-03-21 ENCOUNTER — APPOINTMENT (OUTPATIENT)
Dept: MRI IMAGING | Facility: HOSPITAL | Age: OVER 89
End: 2025-03-21
Payer: MEDICARE

## 2025-03-21 ENCOUNTER — HOSPITAL ENCOUNTER (INPATIENT)
Facility: HOSPITAL | Age: OVER 89
LOS: 3 days | Discharge: HOME | End: 2025-03-24
Attending: STUDENT IN AN ORGANIZED HEALTH CARE EDUCATION/TRAINING PROGRAM | Admitting: INTERNAL MEDICINE
Payer: MEDICARE

## 2025-03-21 ENCOUNTER — APPOINTMENT (OUTPATIENT)
Dept: GENERAL RADIOLOGY | Facility: HOSPITAL | Age: OVER 89
End: 2025-03-21
Payer: MEDICARE

## 2025-03-21 DIAGNOSIS — Z96.612 S/P SHOULDER HEMIARTHROPLASTY, LEFT: ICD-10-CM

## 2025-03-21 DIAGNOSIS — I50.32 CHRONIC HEART FAILURE WITH PRESERVED EJECTION FRACTION (HFPEF): ICD-10-CM

## 2025-03-21 DIAGNOSIS — N18.6 ESRD (END STAGE RENAL DISEASE): ICD-10-CM

## 2025-03-21 DIAGNOSIS — H53.2 DIPLOPIA: ICD-10-CM

## 2025-03-21 DIAGNOSIS — I95.89 CHRONIC HYPOTENSION: ICD-10-CM

## 2025-03-21 DIAGNOSIS — E43 SEVERE MALNUTRITION: ICD-10-CM

## 2025-03-21 DIAGNOSIS — R47.81 SLURRED SPEECH: Primary | ICD-10-CM

## 2025-03-21 PROBLEM — R47.1 DYSARTHRIA: Status: ACTIVE | Noted: 2025-03-21

## 2025-03-21 LAB
ALBUMIN SERPL-MCNC: 4.5 G/DL (ref 3.5–5.2)
ALBUMIN/GLOB SERPL: 1.5 G/DL
ALP SERPL-CCNC: 131 U/L (ref 39–117)
ALT SERPL W P-5'-P-CCNC: 16 U/L (ref 1–41)
ANION GAP SERPL CALCULATED.3IONS-SCNC: 18 MMOL/L (ref 5–15)
APAP SERPL-MCNC: <5 MCG/ML (ref 0–30)
APTT PPP: 24.4 SECONDS (ref 22–39)
APTT PPP: 30.6 SECONDS (ref 60–90)
AST SERPL-CCNC: 22 U/L (ref 1–40)
B PARAPERT DNA SPEC QL NAA+PROBE: NOT DETECTED
B PERT DNA SPEC QL NAA+PROBE: NOT DETECTED
BASOPHILS # BLD AUTO: 0.05 10*3/MM3 (ref 0–0.2)
BASOPHILS NFR BLD AUTO: 0.9 % (ref 0–1.5)
BILIRUB SERPL-MCNC: 0.4 MG/DL (ref 0–1.2)
BUN SERPL-MCNC: 14 MG/DL (ref 8–23)
BUN/CREAT SERPL: 3.9 (ref 7–25)
C PNEUM DNA NPH QL NAA+NON-PROBE: NOT DETECTED
CALCIUM SPEC-SCNC: 9.7 MG/DL (ref 8.6–10.5)
CHLORIDE SERPL-SCNC: 94 MMOL/L (ref 98–107)
CO2 SERPL-SCNC: 27 MMOL/L (ref 22–29)
CREAT SERPL-MCNC: 3.62 MG/DL (ref 0.76–1.27)
CRP SERPL-MCNC: 0.71 MG/DL (ref 0–0.5)
D-LACTATE SERPL-SCNC: 1.6 MMOL/L (ref 0.5–2)
D-LACTATE SERPL-SCNC: 2.4 MMOL/L (ref 0.5–2)
DEPRECATED RDW RBC AUTO: 52.6 FL (ref 37–54)
EGFRCR SERPLBLD CKD-EPI 2021: 15.4 ML/MIN/1.73
EOSINOPHIL # BLD AUTO: 0.22 10*3/MM3 (ref 0–0.4)
EOSINOPHIL NFR BLD AUTO: 4 % (ref 0.3–6.2)
ERYTHROCYTE [DISTWIDTH] IN BLOOD BY AUTOMATED COUNT: 13.8 % (ref 12.3–15.4)
ETHANOL BLD-MCNC: <10 MG/DL (ref 0–10)
FLUAV SUBTYP SPEC NAA+PROBE: NOT DETECTED
FLUBV RNA ISLT QL NAA+PROBE: NOT DETECTED
GEN 5 1HR TROPONIN T REFLEX: 207 NG/L
GLOBULIN UR ELPH-MCNC: 3.1 GM/DL
GLUCOSE SERPL-MCNC: 100 MG/DL (ref 65–99)
HADV DNA SPEC NAA+PROBE: NOT DETECTED
HCOV 229E RNA SPEC QL NAA+PROBE: NOT DETECTED
HCOV HKU1 RNA SPEC QL NAA+PROBE: NOT DETECTED
HCOV NL63 RNA SPEC QL NAA+PROBE: NOT DETECTED
HCOV OC43 RNA SPEC QL NAA+PROBE: NOT DETECTED
HCT VFR BLD AUTO: 38.1 % (ref 37.5–51)
HGB BLD-MCNC: 13 G/DL (ref 13–17.7)
HMPV RNA NPH QL NAA+NON-PROBE: NOT DETECTED
HOLD SPECIMEN: NORMAL
HPIV1 RNA ISLT QL NAA+PROBE: NOT DETECTED
HPIV2 RNA SPEC QL NAA+PROBE: NOT DETECTED
HPIV3 RNA NPH QL NAA+PROBE: NOT DETECTED
HPIV4 P GENE NPH QL NAA+PROBE: NOT DETECTED
IMM GRANULOCYTES # BLD AUTO: 0.02 10*3/MM3 (ref 0–0.05)
IMM GRANULOCYTES NFR BLD AUTO: 0.4 % (ref 0–0.5)
INR PPP: 1.14 (ref 0.89–1.12)
INR PPP: 1.2 (ref 0.89–1.12)
LIPASE SERPL-CCNC: 41 U/L (ref 13–60)
LYMPHOCYTES # BLD AUTO: 1.18 10*3/MM3 (ref 0.7–3.1)
LYMPHOCYTES NFR BLD AUTO: 21.6 % (ref 19.6–45.3)
M PNEUMO IGG SER IA-ACNC: NOT DETECTED
MAGNESIUM SERPL-MCNC: 2 MG/DL (ref 1.6–2.4)
MCH RBC QN AUTO: 35.1 PG (ref 26.6–33)
MCHC RBC AUTO-ENTMCNC: 34.1 G/DL (ref 31.5–35.7)
MCV RBC AUTO: 103 FL (ref 79–97)
MONOCYTES # BLD AUTO: 0.53 10*3/MM3 (ref 0.1–0.9)
MONOCYTES NFR BLD AUTO: 9.7 % (ref 5–12)
NEUTROPHILS NFR BLD AUTO: 3.47 10*3/MM3 (ref 1.7–7)
NEUTROPHILS NFR BLD AUTO: 63.4 % (ref 42.7–76)
NRBC BLD AUTO-RTO: 0 /100 WBC (ref 0–0.2)
NT-PROBNP SERPL-MCNC: 5538 PG/ML (ref 0–1800)
PHOSPHATE SERPL-MCNC: 3.2 MG/DL (ref 2.5–4.5)
PLATELET # BLD AUTO: 216 10*3/MM3 (ref 140–450)
PMV BLD AUTO: 9 FL (ref 6–12)
POTASSIUM SERPL-SCNC: 4.5 MMOL/L (ref 3.5–5.2)
PROCALCITONIN SERPL-MCNC: 0.24 NG/ML (ref 0–0.25)
PROT SERPL-MCNC: 7.6 G/DL (ref 6–8.5)
PROTHROMBIN TIME: 14.7 SECONDS (ref 12.2–14.5)
PROTHROMBIN TIME: 15.3 SECONDS (ref 12.2–14.5)
RBC # BLD AUTO: 3.7 10*6/MM3 (ref 4.14–5.8)
RHINOVIRUS RNA SPEC NAA+PROBE: NOT DETECTED
RSV RNA NPH QL NAA+NON-PROBE: NOT DETECTED
SALICYLATES SERPL-MCNC: <0.3 MG/DL
SARS-COV-2 RNA NPH QL NAA+NON-PROBE: NOT DETECTED
SODIUM SERPL-SCNC: 139 MMOL/L (ref 136–145)
T4 FREE SERPL-MCNC: 1.16 NG/DL (ref 0.92–1.68)
TROPONIN T % DELTA: -10
TROPONIN T NUMERIC DELTA: -22 NG/L
TROPONIN T SERPL HS-MCNC: 229 NG/L
TSH SERPL DL<=0.05 MIU/L-ACNC: 16.77 UIU/ML (ref 0.27–4.2)
UFH PPP CHRO-ACNC: 1.05 IU/ML (ref 0.3–0.7)
UFH PPP CHRO-ACNC: >1.1 IU/ML (ref 0.3–0.7)
WBC NRBC COR # BLD AUTO: 5.47 10*3/MM3 (ref 3.4–10.8)
WHOLE BLOOD HOLD COAG: NORMAL
WHOLE BLOOD HOLD SPECIMEN: NORMAL

## 2025-03-21 PROCEDURE — 83605 ASSAY OF LACTIC ACID: CPT | Performed by: STUDENT IN AN ORGANIZED HEALTH CARE EDUCATION/TRAINING PROGRAM

## 2025-03-21 PROCEDURE — 86140 C-REACTIVE PROTEIN: CPT | Performed by: STUDENT IN AN ORGANIZED HEALTH CARE EDUCATION/TRAINING PROGRAM

## 2025-03-21 PROCEDURE — 83735 ASSAY OF MAGNESIUM: CPT | Performed by: STUDENT IN AN ORGANIZED HEALTH CARE EDUCATION/TRAINING PROGRAM

## 2025-03-21 PROCEDURE — 82077 ASSAY SPEC XCP UR&BREATH IA: CPT | Performed by: STUDENT IN AN ORGANIZED HEALTH CARE EDUCATION/TRAINING PROGRAM

## 2025-03-21 PROCEDURE — 85730 THROMBOPLASTIN TIME PARTIAL: CPT

## 2025-03-21 PROCEDURE — 80053 COMPREHEN METABOLIC PANEL: CPT | Performed by: STUDENT IN AN ORGANIZED HEALTH CARE EDUCATION/TRAINING PROGRAM

## 2025-03-21 PROCEDURE — 99285 EMERGENCY DEPT VISIT HI MDM: CPT

## 2025-03-21 PROCEDURE — 85520 HEPARIN ASSAY: CPT

## 2025-03-21 PROCEDURE — 85610 PROTHROMBIN TIME: CPT

## 2025-03-21 PROCEDURE — 93005 ELECTROCARDIOGRAM TRACING: CPT | Performed by: STUDENT IN AN ORGANIZED HEALTH CARE EDUCATION/TRAINING PROGRAM

## 2025-03-21 PROCEDURE — 93010 ELECTROCARDIOGRAM REPORT: CPT | Performed by: INTERNAL MEDICINE

## 2025-03-21 PROCEDURE — 85730 THROMBOPLASTIN TIME PARTIAL: CPT | Performed by: STUDENT IN AN ORGANIZED HEALTH CARE EDUCATION/TRAINING PROGRAM

## 2025-03-21 PROCEDURE — 70547 MR ANGIOGRAPHY NECK W/O DYE: CPT

## 2025-03-21 PROCEDURE — 71045 X-RAY EXAM CHEST 1 VIEW: CPT

## 2025-03-21 PROCEDURE — 84100 ASSAY OF PHOSPHORUS: CPT | Performed by: STUDENT IN AN ORGANIZED HEALTH CARE EDUCATION/TRAINING PROGRAM

## 2025-03-21 PROCEDURE — 84439 ASSAY OF FREE THYROXINE: CPT | Performed by: STUDENT IN AN ORGANIZED HEALTH CARE EDUCATION/TRAINING PROGRAM

## 2025-03-21 PROCEDURE — 84443 ASSAY THYROID STIM HORMONE: CPT | Performed by: STUDENT IN AN ORGANIZED HEALTH CARE EDUCATION/TRAINING PROGRAM

## 2025-03-21 PROCEDURE — 70450 CT HEAD/BRAIN W/O DYE: CPT

## 2025-03-21 PROCEDURE — 85610 PROTHROMBIN TIME: CPT | Performed by: STUDENT IN AN ORGANIZED HEALTH CARE EDUCATION/TRAINING PROGRAM

## 2025-03-21 PROCEDURE — 84145 PROCALCITONIN (PCT): CPT | Performed by: STUDENT IN AN ORGANIZED HEALTH CARE EDUCATION/TRAINING PROGRAM

## 2025-03-21 PROCEDURE — 80143 DRUG ASSAY ACETAMINOPHEN: CPT | Performed by: STUDENT IN AN ORGANIZED HEALTH CARE EDUCATION/TRAINING PROGRAM

## 2025-03-21 PROCEDURE — 99222 1ST HOSP IP/OBS MODERATE 55: CPT | Performed by: NURSE PRACTITIONER

## 2025-03-21 PROCEDURE — 70551 MRI BRAIN STEM W/O DYE: CPT

## 2025-03-21 PROCEDURE — 83880 ASSAY OF NATRIURETIC PEPTIDE: CPT | Performed by: STUDENT IN AN ORGANIZED HEALTH CARE EDUCATION/TRAINING PROGRAM

## 2025-03-21 PROCEDURE — 85025 COMPLETE CBC W/AUTO DIFF WBC: CPT | Performed by: STUDENT IN AN ORGANIZED HEALTH CARE EDUCATION/TRAINING PROGRAM

## 2025-03-21 PROCEDURE — 80179 DRUG ASSAY SALICYLATE: CPT | Performed by: STUDENT IN AN ORGANIZED HEALTH CARE EDUCATION/TRAINING PROGRAM

## 2025-03-21 PROCEDURE — 36415 COLL VENOUS BLD VENIPUNCTURE: CPT

## 2025-03-21 PROCEDURE — 87040 BLOOD CULTURE FOR BACTERIA: CPT | Performed by: STUDENT IN AN ORGANIZED HEALTH CARE EDUCATION/TRAINING PROGRAM

## 2025-03-21 PROCEDURE — 70544 MR ANGIOGRAPHY HEAD W/O DYE: CPT

## 2025-03-21 PROCEDURE — 25810000003 SODIUM CHLORIDE 0.9 % SOLUTION: Performed by: STUDENT IN AN ORGANIZED HEALTH CARE EDUCATION/TRAINING PROGRAM

## 2025-03-21 PROCEDURE — 0202U NFCT DS 22 TRGT SARS-COV-2: CPT | Performed by: STUDENT IN AN ORGANIZED HEALTH CARE EDUCATION/TRAINING PROGRAM

## 2025-03-21 PROCEDURE — 84484 ASSAY OF TROPONIN QUANT: CPT | Performed by: STUDENT IN AN ORGANIZED HEALTH CARE EDUCATION/TRAINING PROGRAM

## 2025-03-21 PROCEDURE — 99222 1ST HOSP IP/OBS MODERATE 55: CPT | Performed by: FAMILY MEDICINE

## 2025-03-21 PROCEDURE — 83690 ASSAY OF LIPASE: CPT | Performed by: STUDENT IN AN ORGANIZED HEALTH CARE EDUCATION/TRAINING PROGRAM

## 2025-03-21 RX ORDER — POLYETHYLENE GLYCOL 3350 17 G/17G
17 POWDER, FOR SOLUTION ORAL DAILY PRN
Status: DISCONTINUED | OUTPATIENT
Start: 2025-03-21 | End: 2025-03-24 | Stop reason: HOSPADM

## 2025-03-21 RX ORDER — SODIUM CHLORIDE 9 MG/ML
40 INJECTION, SOLUTION INTRAVENOUS AS NEEDED
Status: DISCONTINUED | OUTPATIENT
Start: 2025-03-21 | End: 2025-03-24 | Stop reason: HOSPADM

## 2025-03-21 RX ORDER — ATORVASTATIN CALCIUM 20 MG/1
20 TABLET, FILM COATED ORAL NIGHTLY
Status: DISCONTINUED | OUTPATIENT
Start: 2025-03-21 | End: 2025-03-22

## 2025-03-21 RX ORDER — ONDANSETRON 2 MG/ML
4 INJECTION INTRAMUSCULAR; INTRAVENOUS EVERY 6 HOURS PRN
Status: DISCONTINUED | OUTPATIENT
Start: 2025-03-21 | End: 2025-03-22

## 2025-03-21 RX ORDER — SODIUM CHLORIDE 0.9 % (FLUSH) 0.9 %
10 SYRINGE (ML) INJECTION AS NEEDED
Status: DISCONTINUED | OUTPATIENT
Start: 2025-03-21 | End: 2025-03-24

## 2025-03-21 RX ORDER — HEPARIN SODIUM 10000 [USP'U]/100ML
12 INJECTION, SOLUTION INTRAVENOUS
Status: DISCONTINUED | OUTPATIENT
Start: 2025-03-21 | End: 2025-03-21

## 2025-03-21 RX ORDER — SODIUM CHLORIDE 0.9 % (FLUSH) 0.9 %
10 SYRINGE (ML) INJECTION EVERY 12 HOURS SCHEDULED
Status: DISCONTINUED | OUTPATIENT
Start: 2025-03-21 | End: 2025-03-24 | Stop reason: HOSPADM

## 2025-03-21 RX ORDER — BISACODYL 10 MG
10 SUPPOSITORY, RECTAL RECTAL DAILY PRN
Status: DISCONTINUED | OUTPATIENT
Start: 2025-03-21 | End: 2025-03-24 | Stop reason: HOSPADM

## 2025-03-21 RX ORDER — BISACODYL 5 MG/1
5 TABLET, DELAYED RELEASE ORAL DAILY PRN
Status: DISCONTINUED | OUTPATIENT
Start: 2025-03-21 | End: 2025-03-24 | Stop reason: HOSPADM

## 2025-03-21 RX ORDER — SODIUM CHLORIDE 0.9 % (FLUSH) 0.9 %
10 SYRINGE (ML) INJECTION AS NEEDED
Status: DISCONTINUED | OUTPATIENT
Start: 2025-03-21 | End: 2025-03-24 | Stop reason: HOSPADM

## 2025-03-21 RX ORDER — AMOXICILLIN 250 MG
2 CAPSULE ORAL 2 TIMES DAILY PRN
Status: DISCONTINUED | OUTPATIENT
Start: 2025-03-21 | End: 2025-03-24 | Stop reason: HOSPADM

## 2025-03-21 RX ORDER — MIDODRINE HYDROCHLORIDE 5 MG/1
5 TABLET ORAL ONCE
Status: COMPLETED | OUTPATIENT
Start: 2025-03-21 | End: 2025-03-21

## 2025-03-21 RX ORDER — NITROGLYCERIN 0.4 MG/1
0.4 TABLET SUBLINGUAL
Status: DISCONTINUED | OUTPATIENT
Start: 2025-03-21 | End: 2025-03-22

## 2025-03-21 RX ADMIN — MIDODRINE HYDROCHLORIDE 5 MG: 5 TABLET ORAL at 18:59

## 2025-03-21 RX ADMIN — SODIUM CHLORIDE 500 ML: 9 INJECTION, SOLUTION INTRAVENOUS at 18:59

## 2025-03-21 NOTE — PROGRESS NOTES
Pharmacy to Dose Heparin Infusion Note    Enrike Tomas is a 89 y.o. male receiving heparin infusion.     Therapy for (VTE/Cardiac): cardiac (non-treatment failure)  Patient Weight: 54.4 kg  Initial Bolus (Y/N): No  Any Bolus (Y/N): No     Signs or Symptoms of Bleeding: no    Cardiac or Other (Not VTE)   Initial Bolus: 60 units/kg (Max 4,000 units)  Initial rate: 12 units/kg/hr (Max 1,000 units/hr)   Anti-Xa Bolus   Dose Infusion Hold   Time Infusion Rate Change (units/kg/hr) Repeat  Anti-Xa    < 0.11 50 units/kg  (4000 units Max) None Increase by  3 units/kg/hr 6 hours   0.11- 0.19 25 units/kg  (2000 units Max) None Increase by  2 units/kg/hr 6 hours   0.2 - 0.29 0 None Increase by  1 units/kg/hr 6 hours   0.3 - 0.5 0 None No Change 6 hours (after 2 consecutive levels in range check qAM)   0.51 - 0.6 0 None Decrease by  1 units/kg/hr 6 hours   0.61 - 0.8 0 30 minutes Decrease by  2 units/kg/hr 6 hours   0.81 - 1 0 60 minutes Decrease by  3 units/kg/hr 6 hours   >1 0 Hold  After Anti-Xa less than 0.5 decrease previous rate by  4 units/kg/hr  Every 2 hours until Anti-Xa  less than 0.5 then when infusion restarts in 6 hours     Results from last 7 days   Lab Units 03/21/25 1756   INR  1.14*   HEMOGLOBIN g/dL 13.0   HEMATOCRIT % 38.1   PLATELETS 10*3/mm3 216       Date   Time   Anti-Xa Current Rate (units/kg/hr) Bolus   (units) Rate Change   (units/kg/hr) New Rate (units/kg/hr) Repeat  Anti-Xa Comments /  Pump Check    3/21 1756 > 1.1 -- No bolus ever Held  held 0000 Holding heparin infusion until anti-Xa < 1. DW stroke provider and RN.                                                                                                                                                                                                                                  Blair Rausch, PharmD  3/21/2025  18:27 EDT

## 2025-03-21 NOTE — CONSULTS
Stroke Consult Note    Patient Name: Enrike Tomas   MRN: [id number]  Age: 89 y.o.  Sex: male  : 1935    Primary Care Physician: Blair Dhaliwal MD  Referring Physician: Dr. Raygoza    TIME STROKE TEAM CALLED: 1727 EST     TIME PATIENT SEEN: 1730 EST    Handedness: Right  Race:     Chief Complaint/Reason for Consultation: Dysarthria    HPI: Enrike Tomas is an 89-year-old, , right-handed male with known diagnosis of A-fib (on Eliquis), HTN, HLD, ESRD on HD MWF, memory impairment (follows at [facility]) and NERY who presents with new onset dysarthria.  Last known well was around 12:00 when his wife dropped him off at dialysis.  She stated that when she picked him up around 1500 she noted that his speech sounded significantly more slurred/garbled from his baseline.  She tells me that he is a retired , is normally very articulate.  Of note his blood pressure was reportedly in the 70s systolic at home, in the ED he was 99/45.  Patient has a previous right shoulder injury and has very limited mobility of his right upper extremity.  He tells me that about 6 to 8 months ago he developed diplopia, he wonders if he actually had a small stroke but has never had a workup.  Patient denies any difficulty with swallowing but his wife says he has had problems swallowing pills for quite some time; he has had surgery on his throat for diverticulum.  He sleeps with a collar and a neck pillow to help support his head.    Last Known Normal Date/Time: 12:00 EST     Review of Systems   Constitutional:  Negative for fever.   HENT:  Negative for trouble swallowing.    Eyes:  Positive for visual disturbance.        Diplopia   Respiratory:  Negative for cough and shortness of breath.    Cardiovascular:  Negative for chest pain and palpitations.   Gastrointestinal:  Negative for nausea and vomiting.   Musculoskeletal: Negative.    Skin: Negative.    Neurological:  Positive for dizziness  and speech difficulty. Negative for facial asymmetry, weakness, numbness and headaches.   Psychiatric/Behavioral: Negative.        Past Medical History:   Diagnosis Date    A-fib     Abnormal ECG     occasional extra heartbeat    Anemia, acute on chronic 9/30/2023    Arthritis     Atrial fibrillation     BPH (benign prostatic hyperplasia)     Cervical compression fracture 06/05/2023    currently wearing a c collar    Diverticulosis     Diverticulum of esophagus     puree diet and speech therapy    Elevated cholesterol     ESRD on hemodialysis     GERD (gastroesophageal reflux disease)     Heart failure     Hemodialysis patient     M,W,F    Hyperlipidemia     Hypertension     Low back pain 2022    NERY (obstructive sleep apnea)     NOT USING CPAP    Recurrent falls     UTI (urinary tract infection)      Past Surgical History:   Procedure Laterality Date    CATARACT EXTRACTION      COLONOSCOPY      DIALYSIS FISTULA CREATION      ENDOSCOPY      INGUINAL HERNIA REPAIR      PROSTATE BIOPSY      THROAT SURGERY      diverticulum in throat    TONSILLECTOMY AND ADENOIDECTOMY      TOTAL SHOULDER ARTHROPLASTY W/ DISTAL CLAVICLE EXCISION Left 9/25/2023    Procedure: HEMIARTHROPLASTY, BICEPS TENODESIS - LEFT;  Surgeon: Matteo Tan MD;  Location: [facility];  Service: Orthopedics;  Laterality: Left;     Family History   Problem Relation Age of Onset    Stomach cancer Mother     Heart disease Mother     Arthritis Mother     Heart disease Father     Stroke Father     Kidney failure Father     Cancer Father     Kidney disease Father      Social History     Socioeconomic History    Marital status:    Tobacco Use    Smoking status: Never    Smokeless tobacco: Never   Vaping Use    Vaping status: Never Used   Substance and Sexual Activity    Alcohol use: Not Currently     Alcohol/week: 1.0 standard drink of alcohol     Types: 1 Glasses of wine per week     Comment: once monthly typically    Drug use: Never    Sexual  activity: Defer     Allergies   Allergen Reactions    Pollen Extract Other (See Comments)     sneezing    Mirtazapine Unknown (See Comments)     Prior to Admission medications    Medication Sig Start Date End Date Taking? Authorizing Provider   acetaminophen (TYLENOL) 500 MG tablet Take 1 tablet by mouth Every 6 (Six) Hours As Needed for Mild Pain, Headache or Fever. OTC    Patrice Dao MD   amiodarone (PACERONE) 100 MG tablet Take 1 tablet by mouth Daily.    Patrice Dao MD   apixaban (ELIQUIS) 2.5 MG tablet tablet Take 1 tablet by mouth 2 (Two) Times a Day. Hold until you follow up with Dr. LANDAVERDE next week 8/27/24   Valerie Morales MD   atorvastatin (LIPITOR) 20 MG tablet Take 1 tablet by mouth Every Night.    Patrice Dao MD   B Complex-C-Folic Acid (Jenny-Serg) tablet Take 1 tablet by mouth Daily. OTC    Patrice Dao MD   fluticasone (FLONASE) 50 MCG/ACT nasal spray 2 sprays into the nostril(s) as directed by provider Daily As Needed for Rhinitis or Allergies. OTC    ProviderPatrice MD   HYDROcodone-acetaminophen (NORCO) 7.5-325 MG per tablet Take 1 tablet by mouth Every Night.  Patient taking differently: Take 1 tablet by mouth Every 8 (Eight) Hours As Needed for Mild Pain, Moderate Pain or Severe Pain. 10/5/23   Gian Vazquez MD   losartan (COZAAR) 25 MG tablet Take 0.5 tablets by mouth As Needed. If BP is above 150    Patrice Dao MD   melatonin 5 MG tablet tablet Take 1 tablet by mouth At Night As Needed (Sleep). OTC    ProviderPatrice MD   midodrine (PROAMATINE) 5 MG tablet Take 1 tablet by mouth 3 (Three) Times a Day Before Meals.  Patient taking differently: Take 1 tablet by mouth 3 (Three) Times a Day As Needed. 6/17/23   Bernabe Blum DO   pantoprazole (PROTONIX) 40 MG EC tablet Take 1 tablet by mouth Daily. 4/4/23   Patrice Dao MD   sertraline (ZOLOFT) 100 MG tablet Take 1 tablet by mouth Daily. 4/25/23   Patrice Dao MD    tamsulosin (FLOMAX) 0.4 MG capsule 24 hr capsule Take 1 capsule by mouth Daily.    Patrice Dao MD   Teriparatide, Recombinant, (FORTEO) 620 MCG/2.48ML injection Inject 20 mcg under the skin into the appropriate area as directed Daily. 4/11/23   Patrice Dao MD         Temp:  [99.1 °F (37.3 °C)] 99.1 °F (37.3 °C)  Heart Rate:  [69] 69  Resp:  [16] 16  BP: (99)/(45) 99/45  Neurological Exam  Mental Status  Alert. Oriented only to person and place. Tells me it is February. Moderate dysarthria present. Language is fluent with no aphasia. Attention and concentration are normal.    Cranial Nerves  CN II: Visual fields full to confrontation.  CN III, IV, VI: Extraocular movements intact bilaterally. Normal lids and orbits bilaterally. Pupils equal round and reactive to light bilaterally.  CN V: Facial sensation is normal.  CN VII: Full and symmetric facial movement.  CN XII: Tongue midline without atrophy or fasciculations.    Motor  Decreased muscle bulk throughout. Fasciculations present: Normal muscle tone. No abnormal involuntary movements. Strength is 5/5 in all four extremities except as noted.  RUE 3/5.    Sensory  Light touch is normal in upper and lower extremities.     Coordination  Right: Unable to complete due to mobility.Left: Finger-to-nose normal.    Gait   Abnormal gait.  Not tested.      Physical Exam  Vitals reviewed.   Constitutional:       Comments: Frail   HENT:      Head: Normocephalic and atraumatic.   Eyes:      General: Lids are normal.      Extraocular Movements: Extraocular movements intact.      Pupils: Pupils are equal, round, and reactive to light.   Cardiovascular:      Rate and Rhythm: Normal rate.   Pulmonary:      Effort: Pulmonary effort is normal. No respiratory distress.   Musculoskeletal:         General: No swelling. Normal range of motion.      Cervical back: Normal range of motion.      Comments: Old AV fistula in the left arm,   Skin:     General: Skin is warm  and dry.   Neurological:      Mental Status: He is alert. He is disoriented.      Cranial Nerves: Cranial nerve deficit and dysarthria present.      Sensory: No sensory deficit.      Motor: Weakness present.      Gait: Gait abnormal.   Psychiatric:         Mood and Affect: Mood normal.         Behavior: Behavior normal.         Acute Stroke Data    Thrombolytic Inclusion / Exclusion Criteria    Time: 18:12 EDT  Person Administering Scale: MERCEDEZ Guerrier      YES NO INCLUSION CRITERIA CLASS I   [] [x]   Suspected diagnosis of acute ischemic stroke with measureable neurological deficit.  Low NIHSS with disabling stroke symptoms.   [] [x]   Onset of stroke symptoms < 3 hours before beginning treatment >/ 18 years old  Stroke symptom onset = time patient was last seen well or without symptoms (LKW)   [] [x]   Onset of symptoms between 3-4.5 hours: >/= 80 years old (safe Class IIa) with history of   both diabetes and prior CVA  (reasonable Class IIb) AND NIHSS </= 25  *If not eligible for IV Thrombolytic consider neuro intervention for LKW within 24 hours     YES NO EXCLUSION CRITERIA (CONTRAINDICATIONS) CLASS III EVIDENCE HARM   [] []   Blood pressure >185/110 medically refractory to IV medications   [] []   Active bleeding at a non-compressible site   [] []   Active intracranial hemorrhage (ICH)   [] []   Symptoms suggestive of subarachnoid hemorrhage (SAH)   [] []   GI bleed within 21 days   [] []   Ischemic stroke within 3 months   [] []   Severe head trauma within 3 months    [] []   Intracranial or intraspinal surgery within 3 months   [] []   Current GI malignancy   [] []   Intracranial neoplasm   [] []   Infective endocarditis   [] []   Aortic arch dissection   [] []   Active coagulopathy with  INR >1.7, platelets <100,000, PTT > 40 sec, PT > 15 sec   *For warfarin, administration can begin before blood tests resulted. Discontinue for above values.    [] []   Treatment dose* of LMWH (Lovenox) in last 24  hours  *prophylactic dosages are not a contraindication   [] []   Concurrent use of antiplatelet agents' glycoprotein inhibitors IIb/IIIa (Integrilin, etc.)   [] []   Thrombin or factor Xa inhibitors (Eliquis, Xarelto, Arixtra) taken in last 48 hours     YES NO CLASS II: AIS WITH THE FOLLOWING CONDITIONS -   TREATMENT RISKS SHOULD BE WEIGHED AGAINST POSSIBLE BENEFITS.    [] []   Major trauma in last 14 days, recent major surgery in last 14 days, intracranial arterial dissection, giant unruptured and unsecured intracranial aneurysm, pericarditis     [] []   The risks and benefits have been discussed with the patient or family related to the administration of IV thrombolytic therapy for stroke symptoms.   [] []   I have discussed and reviewed the patient's case and imaging with the attending prior to IV thrombolytic therapy.   TIME  Time IV thrombolytic administered       Hospital Meds:  Scheduled- apixaban, 2.5 mg, Oral, Q12H  midodrine, 5 mg, Oral, Once  sodium chloride, 500 mL, Intravenous, Once      Infusions-     PRNs-   sodium chloride    Functional Status Prior to Current Stroke/[place] Score:     MODIFIED MANDO SCALE (to be assessed for each patient having history of stroke) []Stroke history but not assessed  []0: No symptoms at all  []1: No significant disability despite symptoms  []2: Slight disability  [x]3: Moderate disability  []4: Moderately severe disability  []5: Severe disability  []6: Death         NIH Stroke Scale  Time: 18:12 EDT  Person Administering Scale: MERCEDEZ Guerrier    1a  Level of consciousness: 0=alert; keenly responsive   1b. LOC questions:  1=Answers one question correctly   1c. LOC commands: 0=Performs both tasks correctly   2.  Best Gaze: 0=normal   3.  Visual: 0=No visual loss   4. Facial Palsy: 0=Normal symmetric movement   5a.  Motor left arm: 0=No drift, limb holds 90 (or 45) degrees for full 10 seconds   5b.  Motor right arm: 2=Some effort against gravity, limb cannot get  to or maintain (if cured) 90 (or 45) degrees, drifts down to bed, but has some effort against gravity   6a. motor left le=No drift, limb holds 90 (or 45) degrees for full 10 seconds   6b  Motor right le=No drift, limb holds 90 (or 45) degrees for full 10 seconds   7. Limb Ataxia: 0=Absent   8.  Sensory: 0=Normal; no sensory loss   9. Best Language:  0=No aphasia, normal   10. Dysarthria: 1=Mild to moderate, patient slurs at least some words and at worst, can be understood with some difficulty   11. Extinction and Inattention: 0=No abnormality    Total:   4       Results Reviewed:  I have personally reviewed current lab, radiology, and data and agree with results.       WBC   Date Value Ref Range Status   2025 5.47 3.40 - 10.80 10*3/mm3 Final     RBC   Date Value Ref Range Status   2025 3.70 (L) 4.14 - 5.80 10*6/mm3 Final     Hemoglobin   Date Value Ref Range Status   2025 13.0 13.0 - 17.7 g/dL Final     Hematocrit   Date Value Ref Range Status   2025 38.1 37.5 - 51.0 % Final     MCV   Date Value Ref Range Status   2025 103.0 (H) 79.0 - 97.0 fL Final     MCH   Date Value Ref Range Status   2025 35.1 (H) 26.6 - 33.0 pg Final     MCHC   Date Value Ref Range Status   2025 34.1 31.5 - 35.7 g/dL Final     RDW   Date Value Ref Range Status   2025 13.8 12.3 - 15.4 % Final     RDW-SD   Date Value Ref Range Status   2025 52.6 37.0 - 54.0 fl Final     MPV   Date Value Ref Range Status   2025 9.0 6.0 - 12.0 fL Final     Platelets   Date Value Ref Range Status   2025 216 140 - 450 10*3/mm3 Final     Neutrophil %   Date Value Ref Range Status   2025 63.4 42.7 - 76.0 % Final     Lymphocyte %   Date Value Ref Range Status   2025 21.6 19.6 - 45.3 % Final     Monocyte %   Date Value Ref Range Status   2025 9.7 5.0 - 12.0 % Final     Eosinophil %   Date Value Ref Range Status   2025 4.0 0.3 - 6.2 % Final     Basophil %   Date Value Ref  Range Status   03/21/2025 0.9 0.0 - 1.5 % Final     Immature Grans %   Date Value Ref Range Status   03/21/2025 0.4 0.0 - 0.5 % Final     Neutrophils, Absolute   Date Value Ref Range Status   03/21/2025 3.47 1.70 - 7.00 10*3/mm3 Final     Lymphocytes, Absolute   Date Value Ref Range Status   03/21/2025 1.18 0.70 - 3.10 10*3/mm3 Final     Monocytes, Absolute   Date Value Ref Range Status   03/21/2025 0.53 0.10 - 0.90 10*3/mm3 Final     Eosinophils, Absolute   Date Value Ref Range Status   03/21/2025 0.22 0.00 - 0.40 10*3/mm3 Final     Basophils, Absolute   Date Value Ref Range Status   03/21/2025 0.05 0.00 - 0.20 10*3/mm3 Final     Immature Grans, Absolute   Date Value Ref Range Status   03/21/2025 0.02 0.00 - 0.05 10*3/mm3 Final     nRBC   Date Value Ref Range Status   03/21/2025 0.0 0.0 - 0.2 /100 WBC Final     Lab Results   Component Value Date    GLUCOSE 85 02/27/2025    BUN 18 02/27/2025    CREATININE 3.46 (H) 02/27/2025     02/27/2025    K 4.3 02/27/2025    CL 94 (L) 02/27/2025    CALCIUM 9.6 02/27/2025    PROTEINTOT 7.0 02/27/2025    ALBUMIN 4.0 02/27/2025    ALT 16 02/27/2025    AST 23 02/27/2025    ALKPHOS 118 (H) 02/27/2025    BILITOT 0.2 02/27/2025    GLOB 3.0 02/27/2025    AGRATIO 1.3 02/27/2025    BCR 5.2 (L) 02/27/2025    ANIONGAP 14.0 02/27/2025    EGFR 16.2 (L) 02/27/2025     CT Head Without Contrast Stroke Protocol  Result Date: 3/21/2025  Impression: Stable head CT scan. No evidence of acute intracranial disease. Electronically Signed: Darian Linares MD  3/21/2025 5:42 PM EDT  Workstation ID: [id number]      Assessment/Plan:  89-year-old, , right-handed male with known diagnosis of A-fib (on Eliquis), HTN, HLD, ESRD on HD MWF, memory impairment (follows at [facility]) and NERY who presents with new onset dysarthria.  Last known well was around 12:00 when his wife dropped him off at dialysis.  She stated that when she picked him up around 15:00 she noted that his speech sounded  significantly more slurred/garbled from his baseline.  Of note his blood pressure was reportedly in the 70s systolic at home, in the ED he was 99/45.  Patient has a previous right shoulder injury and has very limited mobility of his right upper extremity.  He tells me that about 6 to 8 months ago he developed diplopia, he wonders if he actually had a small stroke but has never had a workup.  Patient denies any difficulty with swallowing but his wife says he has had problems swallowing pills for quite some time; he has had surgery on his throat for diverticulum.  He sleeps with a collar and a neck pillow to help support his head.    CT head is negative for acute abnormality, no evidence of acute infarct or hemorrhage.  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  A-fib  -Okay to continue Eliquis 2.5 twice daily    3.  Essential hypertension,   -Presented with hypotension, 99/45  -Hold antihypertensives  -Allow autoregulation of blood pressure    4.  Hyperlipidemia  -Lipid panel in AM  -Atorvastatin 20mg nightly (home med)        Plan of care was discussed with patient, wife and Dr. Raygoza.  Stroke neurology will follow-up on MRI and make further recommendations, if negative patient will need to be seen by general neurology tomorrow.  Please call with any questions or concerns.  Thank you for this consult.             Mireya Casper, MERCEDEZ  March 21, 2025  18:12 EDT

## 2025-03-22 ENCOUNTER — APPOINTMENT (OUTPATIENT)
Dept: GENERAL RADIOLOGY | Facility: HOSPITAL | Age: OVER 89
End: 2025-03-22
Payer: MEDICARE

## 2025-03-22 ENCOUNTER — APPOINTMENT (OUTPATIENT)
Dept: CARDIOLOGY | Facility: HOSPITAL | Age: OVER 89
End: 2025-03-22
Payer: MEDICARE

## 2025-03-22 LAB
ALBUMIN SERPL-MCNC: 4.1 G/DL (ref 3.5–5.2)
ALBUMIN/GLOB SERPL: 1.3 G/DL
ALP SERPL-CCNC: 126 U/L (ref 39–117)
ALT SERPL W P-5'-P-CCNC: 13 U/L (ref 1–41)
AMPHET+METHAMPHET UR QL: NEGATIVE
AMPHETAMINES UR QL: NEGATIVE
ANION GAP SERPL CALCULATED.3IONS-SCNC: 17 MMOL/L (ref 5–15)
AORTIC DIMENSIONLESS INDEX: 0.49 (DI)
AST SERPL-CCNC: 17 U/L (ref 1–40)
AV MEAN PRESS GRAD SYS DOP V1V2: 6 MMHG
AV VMAX SYS DOP: 166 CM/SEC
BACTERIA UR QL AUTO: ABNORMAL /HPF
BARBITURATES UR QL SCN: NEGATIVE
BASOPHILS # BLD AUTO: 0.08 10*3/MM3 (ref 0–0.2)
BASOPHILS NFR BLD AUTO: 1.3 % (ref 0–1.5)
BENZODIAZ UR QL SCN: NEGATIVE
BH CV ECHO MEAS - AI P1/2T: 637.3 MSEC
BH CV ECHO MEAS - AO MAX PG: 11 MMHG
BH CV ECHO MEAS - AO ROOT AREA (BSA CORRECTED): 2.3 CM2
BH CV ECHO MEAS - AO ROOT DIAM: 3.7 CM
BH CV ECHO MEAS - AO V2 VTI: 31.2 CM
BH CV ECHO MEAS - AVA(I,D): 1.54 CM2
BH CV ECHO MEAS - EDV(CUBED): 140.6 ML
BH CV ECHO MEAS - EDV(MOD-SP2): 65.7 ML
BH CV ECHO MEAS - EDV(MOD-SP4): 82.7 ML
BH CV ECHO MEAS - EF(MOD-SP2): 47.8 %
BH CV ECHO MEAS - EF(MOD-SP4): 62.4 %
BH CV ECHO MEAS - ESV(CUBED): 32.8 ML
BH CV ECHO MEAS - ESV(MOD-SP2): 34.3 ML
BH CV ECHO MEAS - ESV(MOD-SP4): 31.1 ML
BH CV ECHO MEAS - FS: 38.5 %
BH CV ECHO MEAS - IVS/LVPW: 0.9 CM
BH CV ECHO MEAS - IVSD: 0.9 CM
BH CV ECHO MEAS - LA DIMENSION: 3.2 CM
BH CV ECHO MEAS - LAT PEAK E' VEL: 8.3 CM/SEC
BH CV ECHO MEAS - LV DIASTOLIC VOL/BSA (35-75): 50.9 CM2
BH CV ECHO MEAS - LV MASS(C)D: 181.4 GRAMS
BH CV ECHO MEAS - LV MAX PG: 2.6 MMHG
BH CV ECHO MEAS - LV MEAN PG: 1 MMHG
BH CV ECHO MEAS - LV SYSTOLIC VOL/BSA (12-30): 19.2 CM2
BH CV ECHO MEAS - LV V1 MAX: 79.9 CM/SEC
BH CV ECHO MEAS - LV V1 VTI: 15.3 CM
BH CV ECHO MEAS - LVIDD: 5.2 CM
BH CV ECHO MEAS - LVIDS: 3.2 CM
BH CV ECHO MEAS - LVOT AREA: 3.1 CM2
BH CV ECHO MEAS - LVOT DIAM: 2 CM
BH CV ECHO MEAS - LVPWD: 1 CM
BH CV ECHO MEAS - MED PEAK E' VEL: 6.7 CM/SEC
BH CV ECHO MEAS - MV A MAX VEL: 91.2 CM/SEC
BH CV ECHO MEAS - MV DEC SLOPE: 278.5 CM/SEC2
BH CV ECHO MEAS - MV DEC TIME: 0.23 SEC
BH CV ECHO MEAS - MV E MAX VEL: 76 CM/SEC
BH CV ECHO MEAS - MV E/A: 0.83
BH CV ECHO MEAS - MV MAX PG: 3.5 MMHG
BH CV ECHO MEAS - MV MEAN PG: 2 MMHG
BH CV ECHO MEAS - MV P1/2T: 89.4 MSEC
BH CV ECHO MEAS - MV V2 VTI: 24.1 CM
BH CV ECHO MEAS - MVA(P1/2T): 2.46 CM2
BH CV ECHO MEAS - MVA(VTI): 1.99 CM2
BH CV ECHO MEAS - PA ACC TIME: 0.15 SEC
BH CV ECHO MEAS - RAP SYSTOLE: 8 MMHG
BH CV ECHO MEAS - RVSP: 33 MMHG
BH CV ECHO MEAS - SV(LVOT): 48.1 ML
BH CV ECHO MEAS - SV(MOD-SP2): 31.4 ML
BH CV ECHO MEAS - SV(MOD-SP4): 51.6 ML
BH CV ECHO MEAS - SVI(LVOT): 29.6 ML/M2
BH CV ECHO MEAS - SVI(MOD-SP2): 19.3 ML/M2
BH CV ECHO MEAS - SVI(MOD-SP4): 31.8 ML/M2
BH CV ECHO MEAS - TAPSE (>1.6): 1.88 CM
BH CV ECHO MEAS - TR MAX PG: 24.8 MMHG
BH CV ECHO MEAS - TR MAX VEL: 249 CM/SEC
BH CV ECHO MEASUREMENTS AVERAGE E/E' RATIO: 10.13
BH CV XLRA - RV BASE: 4.4 CM
BH CV XLRA - RV LENGTH: 6.2 CM
BH CV XLRA - RV MID: 2.6 CM
BH CV XLRA - TDI S': 13.5 CM/SEC
BILIRUB SERPL-MCNC: 0.4 MG/DL (ref 0–1.2)
BILIRUB UR QL STRIP: NEGATIVE
BUN SERPL-MCNC: 20 MG/DL (ref 8–23)
BUN/CREAT SERPL: 5.5 (ref 7–25)
BUPRENORPHINE SERPL-MCNC: NEGATIVE NG/ML
CALCIUM SPEC-SCNC: 9.7 MG/DL (ref 8.6–10.5)
CANNABINOIDS SERPL QL: NEGATIVE
CHLORIDE SERPL-SCNC: 95 MMOL/L (ref 98–107)
CHOLEST SERPL-MCNC: 164 MG/DL (ref 0–200)
CLARITY UR: CLEAR
CO2 SERPL-SCNC: 28 MMOL/L (ref 22–29)
COCAINE UR QL: NEGATIVE
COLOR UR: YELLOW
CREAT SERPL-MCNC: 3.65 MG/DL (ref 0.76–1.27)
DEPRECATED RDW RBC AUTO: 52.6 FL (ref 37–54)
EGFRCR SERPLBLD CKD-EPI 2021: 15.2 ML/MIN/1.73
EOSINOPHIL # BLD AUTO: 0.29 10*3/MM3 (ref 0–0.4)
EOSINOPHIL NFR BLD AUTO: 4.9 % (ref 0.3–6.2)
ERYTHROCYTE [DISTWIDTH] IN BLOOD BY AUTOMATED COUNT: 13.6 % (ref 12.3–15.4)
FENTANYL UR-MCNC: NEGATIVE NG/ML
GLOBULIN UR ELPH-MCNC: 3.1 GM/DL
GLUCOSE BLDC GLUCOMTR-MCNC: 77 MG/DL (ref 70–130)
GLUCOSE BLDC GLUCOMTR-MCNC: 83 MG/DL (ref 70–130)
GLUCOSE BLDC GLUCOMTR-MCNC: 99 MG/DL (ref 70–130)
GLUCOSE SERPL-MCNC: 75 MG/DL (ref 65–99)
GLUCOSE UR STRIP-MCNC: NEGATIVE MG/DL
HBA1C MFR BLD: 4.8 % (ref 4.8–5.6)
HCT VFR BLD AUTO: 39.3 % (ref 37.5–51)
HDLC SERPL-MCNC: 81 MG/DL (ref 40–60)
HGB BLD-MCNC: 12.8 G/DL (ref 13–17.7)
HGB UR QL STRIP.AUTO: NEGATIVE
HYALINE CASTS UR QL AUTO: ABNORMAL /LPF
IMM GRANULOCYTES # BLD AUTO: 0.07 10*3/MM3 (ref 0–0.05)
IMM GRANULOCYTES NFR BLD AUTO: 1.2 % (ref 0–0.5)
IVRT: 88 MS
KETONES UR QL STRIP: NEGATIVE
LDLC SERPL CALC-MCNC: 63 MG/DL (ref 0–100)
LDLC/HDLC SERPL: 0.74 {RATIO}
LEUKOCYTE ESTERASE UR QL STRIP.AUTO: ABNORMAL
LV EF 2D ECHO EST: 62 %
LV EF BIPLANE MOD: 54.6 %
LYMPHOCYTES # BLD AUTO: 1.72 10*3/MM3 (ref 0.7–3.1)
LYMPHOCYTES NFR BLD AUTO: 28.9 % (ref 19.6–45.3)
MCH RBC QN AUTO: 33.8 PG (ref 26.6–33)
MCHC RBC AUTO-ENTMCNC: 32.6 G/DL (ref 31.5–35.7)
MCV RBC AUTO: 103.7 FL (ref 79–97)
METHADONE UR QL SCN: NEGATIVE
MONOCYTES # BLD AUTO: 0.6 10*3/MM3 (ref 0.1–0.9)
MONOCYTES NFR BLD AUTO: 10.1 % (ref 5–12)
NEUTROPHILS NFR BLD AUTO: 3.19 10*3/MM3 (ref 1.7–7)
NEUTROPHILS NFR BLD AUTO: 53.6 % (ref 42.7–76)
NITRITE UR QL STRIP: NEGATIVE
NRBC BLD AUTO-RTO: 0 /100 WBC (ref 0–0.2)
OPIATES UR QL: POSITIVE
OXYCODONE UR QL SCN: NEGATIVE
PCP UR QL SCN: NEGATIVE
PH UR STRIP.AUTO: >=9 [PH] (ref 5–8)
PLATELET # BLD AUTO: 207 10*3/MM3 (ref 140–450)
PMV BLD AUTO: 8.8 FL (ref 6–12)
POTASSIUM SERPL-SCNC: 4.2 MMOL/L (ref 3.5–5.2)
PROT SERPL-MCNC: 7.2 G/DL (ref 6–8.5)
PROT UR QL STRIP: ABNORMAL
QT INTERVAL: 466 MS
QTC INTERVAL: 488 MS
RBC # BLD AUTO: 3.79 10*6/MM3 (ref 4.14–5.8)
RBC # UR STRIP: ABNORMAL /HPF
REF LAB TEST METHOD: ABNORMAL
SODIUM SERPL-SCNC: 140 MMOL/L (ref 136–145)
SP GR UR STRIP: 1.01 (ref 1–1.03)
SQUAMOUS #/AREA URNS HPF: ABNORMAL /HPF
TRICYCLICS UR QL SCN: NEGATIVE
TRIGL SERPL-MCNC: 114 MG/DL (ref 0–150)
UFH PPP CHRO-ACNC: 0.71 IU/ML (ref 0.3–0.7)
UROBILINOGEN UR QL STRIP: ABNORMAL
VLDLC SERPL-MCNC: 20 MG/DL (ref 5–40)
WBC # UR STRIP: ABNORMAL /HPF
WBC NRBC COR # BLD AUTO: 5.95 10*3/MM3 (ref 3.4–10.8)

## 2025-03-22 PROCEDURE — 82948 REAGENT STRIP/BLOOD GLUCOSE: CPT

## 2025-03-22 PROCEDURE — 25010000002 SULFUR HEXAFLUORIDE MICROSPH 60.7-25 MG RECONSTITUTED SUSPENSION: Performed by: FAMILY MEDICINE

## 2025-03-22 PROCEDURE — 80053 COMPREHEN METABOLIC PANEL: CPT | Performed by: FAMILY MEDICINE

## 2025-03-22 PROCEDURE — 85025 COMPLETE CBC W/AUTO DIFF WBC: CPT | Performed by: FAMILY MEDICINE

## 2025-03-22 PROCEDURE — 81001 URINALYSIS AUTO W/SCOPE: CPT | Performed by: FAMILY MEDICINE

## 2025-03-22 PROCEDURE — 83036 HEMOGLOBIN GLYCOSYLATED A1C: CPT | Performed by: FAMILY MEDICINE

## 2025-03-22 PROCEDURE — 92523 SPEECH SOUND LANG COMPREHEN: CPT

## 2025-03-22 PROCEDURE — 80307 DRUG TEST PRSMV CHEM ANLYZR: CPT | Performed by: FAMILY MEDICINE

## 2025-03-22 PROCEDURE — 97162 PT EVAL MOD COMPLEX 30 MIN: CPT

## 2025-03-22 PROCEDURE — 80061 LIPID PANEL: CPT | Performed by: FAMILY MEDICINE

## 2025-03-22 PROCEDURE — 97166 OT EVAL MOD COMPLEX 45 MIN: CPT

## 2025-03-22 PROCEDURE — 97530 THERAPEUTIC ACTIVITIES: CPT

## 2025-03-22 PROCEDURE — 71045 X-RAY EXAM CHEST 1 VIEW: CPT

## 2025-03-22 PROCEDURE — 99233 SBSQ HOSP IP/OBS HIGH 50: CPT | Performed by: STUDENT IN AN ORGANIZED HEALTH CARE EDUCATION/TRAINING PROGRAM

## 2025-03-22 PROCEDURE — 85520 HEPARIN ASSAY: CPT

## 2025-03-22 PROCEDURE — 99232 SBSQ HOSP IP/OBS MODERATE 35: CPT | Performed by: STUDENT IN AN ORGANIZED HEALTH CARE EDUCATION/TRAINING PROGRAM

## 2025-03-22 PROCEDURE — 93306 TTE W/DOPPLER COMPLETE: CPT

## 2025-03-22 PROCEDURE — 93306 TTE W/DOPPLER COMPLETE: CPT | Performed by: INTERNAL MEDICINE

## 2025-03-22 RX ORDER — AMIODARONE HYDROCHLORIDE 200 MG/1
100 TABLET ORAL DAILY
Status: DISCONTINUED | OUTPATIENT
Start: 2025-03-22 | End: 2025-03-24 | Stop reason: HOSPADM

## 2025-03-22 RX ORDER — MEMANTINE HYDROCHLORIDE 10 MG/1
5 TABLET ORAL EVERY 12 HOURS SCHEDULED
Status: DISCONTINUED | OUTPATIENT
Start: 2025-03-22 | End: 2025-03-24 | Stop reason: HOSPADM

## 2025-03-22 RX ORDER — ATORVASTATIN CALCIUM 20 MG/1
20 TABLET, FILM COATED ORAL NIGHTLY
Status: DISCONTINUED | OUTPATIENT
Start: 2025-03-23 | End: 2025-03-24 | Stop reason: HOSPADM

## 2025-03-22 RX ORDER — TAMSULOSIN HYDROCHLORIDE 0.4 MG/1
0.4 CAPSULE ORAL DAILY
Status: DISCONTINUED | OUTPATIENT
Start: 2025-03-22 | End: 2025-03-24 | Stop reason: HOSPADM

## 2025-03-22 RX ORDER — ACETAMINOPHEN 325 MG/1
650 TABLET ORAL EVERY 6 HOURS PRN
Status: DISCONTINUED | OUTPATIENT
Start: 2025-03-22 | End: 2025-03-24 | Stop reason: HOSPADM

## 2025-03-22 RX ORDER — HEPARIN SODIUM 10000 [USP'U]/100ML
9 INJECTION, SOLUTION INTRAVENOUS
Status: DISCONTINUED | OUTPATIENT
Start: 2025-03-22 | End: 2025-03-22 | Stop reason: ALTCHOICE

## 2025-03-22 RX ORDER — QUETIAPINE FUMARATE 25 MG/1
12.5 TABLET, FILM COATED ORAL DAILY
Status: DISCONTINUED | OUTPATIENT
Start: 2025-03-22 | End: 2025-03-23

## 2025-03-22 RX ORDER — PANTOPRAZOLE SODIUM 40 MG/1
40 TABLET, DELAYED RELEASE ORAL DAILY
Status: DISCONTINUED | OUTPATIENT
Start: 2025-03-22 | End: 2025-03-24 | Stop reason: HOSPADM

## 2025-03-22 RX ORDER — LEVOTHYROXINE SODIUM 137 UG/1
137 TABLET ORAL
Status: DISCONTINUED | OUTPATIENT
Start: 2025-03-22 | End: 2025-03-24 | Stop reason: HOSPADM

## 2025-03-22 RX ORDER — LOSARTAN POTASSIUM 25 MG/1
12.5 TABLET ORAL
Status: DISCONTINUED | OUTPATIENT
Start: 2025-03-22 | End: 2025-03-22

## 2025-03-22 RX ORDER — HYDROCODONE BITARTRATE AND ACETAMINOPHEN 7.5; 325 MG/1; MG/1
1 TABLET ORAL EVERY 8 HOURS PRN
Refills: 0 | Status: DISCONTINUED | OUTPATIENT
Start: 2025-03-22 | End: 2025-03-24 | Stop reason: HOSPADM

## 2025-03-22 RX ORDER — HYDROCODONE BITARTRATE AND ACETAMINOPHEN 7.5; 325 MG/1; MG/1
1 TABLET ORAL EVERY 8 HOURS PRN
Refills: 0 | Status: DISCONTINUED | OUTPATIENT
Start: 2025-03-22 | End: 2025-03-22

## 2025-03-22 RX ORDER — QUETIAPINE FUMARATE 25 MG/1
25 TABLET, FILM COATED ORAL DAILY
Status: DISCONTINUED | OUTPATIENT
Start: 2025-03-22 | End: 2025-03-22

## 2025-03-22 RX ORDER — MIDODRINE HYDROCHLORIDE 5 MG/1
5 TABLET ORAL 3 TIMES DAILY PRN
Status: DISCONTINUED | OUTPATIENT
Start: 2025-03-22 | End: 2025-03-24 | Stop reason: HOSPADM

## 2025-03-22 RX ORDER — MIDODRINE HYDROCHLORIDE 5 MG/1
5 TABLET ORAL DAILY
Status: DISCONTINUED | OUTPATIENT
Start: 2025-03-23 | End: 2025-03-24 | Stop reason: HOSPADM

## 2025-03-22 RX ORDER — SERTRALINE HYDROCHLORIDE 100 MG/1
100 TABLET, FILM COATED ORAL DAILY
Status: DISCONTINUED | OUTPATIENT
Start: 2025-03-22 | End: 2025-03-24 | Stop reason: HOSPADM

## 2025-03-22 RX ADMIN — LEVOTHYROXINE SODIUM 137 MCG: 0.14 TABLET ORAL at 10:09

## 2025-03-22 RX ADMIN — Medication 10 ML: at 21:23

## 2025-03-22 RX ADMIN — ATORVASTATIN CALCIUM 20 MG: 40 TABLET, FILM COATED ORAL at 00:32

## 2025-03-22 RX ADMIN — APIXABAN 2.5 MG: 2.5 TABLET, FILM COATED ORAL at 21:29

## 2025-03-22 RX ADMIN — Medication 10 ML: at 10:09

## 2025-03-22 RX ADMIN — AMIODARONE HYDROCHLORIDE 100 MG: 200 TABLET ORAL at 10:06

## 2025-03-22 RX ADMIN — APIXABAN 2.5 MG: 2.5 TABLET, FILM COATED ORAL at 10:07

## 2025-03-22 RX ADMIN — SERTRALINE HYDROCHLORIDE 100 MG: 100 TABLET ORAL at 10:07

## 2025-03-22 RX ADMIN — SULFUR HEXAFLUORIDE 3 ML: KIT at 11:37

## 2025-03-22 RX ADMIN — QUETIAPINE FUMARATE 12.5 MG: 25 TABLET ORAL at 17:38

## 2025-03-22 RX ADMIN — MEMANTINE HYDROCHLORIDE 5 MG: 10 TABLET, FILM COATED ORAL at 10:07

## 2025-03-22 RX ADMIN — HYDROCODONE BITARTRATE AND ACETAMINOPHEN 1 TABLET: 7.5; 325 TABLET ORAL at 10:13

## 2025-03-22 RX ADMIN — PANTOPRAZOLE SODIUM 40 MG: 40 TABLET, DELAYED RELEASE ORAL at 10:06

## 2025-03-22 RX ADMIN — Medication 10 ML: at 00:32

## 2025-03-22 RX ADMIN — Medication 5 MG: at 21:29

## 2025-03-22 RX ADMIN — MEMANTINE HYDROCHLORIDE 5 MG: 10 TABLET, FILM COATED ORAL at 21:29

## 2025-03-22 NOTE — H&P
[facility]   HISTORY AND PHYSICAL    Patient Name: Enrike Tomas  : 1935  MRN: [id number]  Primary Care Physician:  Blair Dhaliwal MD  Date of admission: 3/21/2025    Subjective   Subjective     Chief Complaint: word slurring     History of Present Illness  Enrike Toams is an 89-year-old, , right-handed male with known diagnosis of A-fib (on Eliquis), HTN, HLD, ESRD on HD MWF, memory impairment (follows at [facility]) and NERY who presents with new onset dysarthria.  Last known well was around 12:00 when his wife dropped him off at dialysis.  She stated that when she picked him up around 1500 she noted that his speech sounded significantly more slurred/garbled from his baseline.  She tells me that he is a retired , is normally very articulate.  Of note his blood pressure was reportedly in the 70s systolic at home, in the ED he was 99/45.  Patient has a previous right shoulder injury and has very limited mobility of his right upper extremity.  He tells me that about 6 to 8 months ago he developed diplopia, he wonders if he actually had a small stroke but has never had a workup.  Wife also states that patient has not been urinating as often.  Patient denies any fevers, chills, diaphoresis, chest pain, shortness of breath, nausea, vomiting, constipation or diarrhea.  In the ED bladder scan was performed and showed no urine in the bladder patient states he had not urinated in approximately 10 to 12 hours.  Stroke team was consulted in the ED.  In the ED he was given midodrine and 500 mL bolus of normal saline.  He was admitted for further evaluation and management.  Hypotension  Symptoms include weakness.    Pertinent negative symptoms include no abdominal pain, no chest pain, no chills, no cough, no diaphoresis, no fatigue, no headaches, no nausea, no numbness and no sore throat.       Review of Systems   Constitutional:  Negative for activity change, appetite  change, chills, diaphoresis and fatigue.   HENT:  Negative for drooling, facial swelling, sneezing and sore throat.    Respiratory:  Negative for cough and shortness of breath.    Cardiovascular:  Negative for chest pain.   Gastrointestinal:  Negative for abdominal pain, constipation, diarrhea and nausea.   Endocrine: Negative for polydipsia and polyphagia.   Musculoskeletal:  Negative for joint swelling.   Neurological:  Positive for speech difficulty and weakness. Negative for dizziness, numbness and headaches.   Psychiatric/Behavioral:  Negative for agitation.         Personal History     Past Medical History:   Diagnosis Date    A-fib     Abnormal ECG     occasional extra heartbeat    Anemia, acute on chronic 9/30/2023    Arthritis     Atrial fibrillation     BPH (benign prostatic hyperplasia)     Cervical compression fracture 06/05/2023    currently wearing a c collar    Diverticulosis     Diverticulum of esophagus     puree diet and speech therapy    Elevated cholesterol     ESRD on hemodialysis     GERD (gastroesophageal reflux disease)     Heart failure     Hemodialysis patient     M,W,F    Hyperlipidemia     Hypertension     Low back pain 2022    NERY (obstructive sleep apnea)     NOT USING CPAP    Recurrent falls     UTI (urinary tract infection)        Past Surgical History:   Procedure Laterality Date    CATARACT EXTRACTION      COLONOSCOPY      DIALYSIS FISTULA CREATION      ENDOSCOPY      INGUINAL HERNIA REPAIR      PROSTATE BIOPSY      THROAT SURGERY      diverticulum in throat    TONSILLECTOMY AND ADENOIDECTOMY      TOTAL SHOULDER ARTHROPLASTY W/ DISTAL CLAVICLE EXCISION Left 9/25/2023    Procedure: HEMIARTHROPLASTY, BICEPS TENODESIS - LEFT;  Surgeon: Matteo Tan MD;  Location: [facility];  Service: Orthopedics;  Laterality: Left;       Family History: family history includes Arthritis in his mother; Cancer in his father; Heart disease in his father and mother; Kidney disease in his father;  Kidney failure in his father; Stomach cancer in his mother; Stroke in his father. Otherwise pertinent FHx was reviewed and not pertinent to current issue.    Social History:  reports that he has never smoked. He has never used smokeless tobacco. He reports that he does not currently use alcohol after a past usage of about 1.0 standard drink of alcohol per week. He reports that he does not use drugs.    Home Medications:  HYDROcodone-acetaminophen, Jenny-Serg, Teriparatide, acetaminophen, amiodarone, apixaban, atorvastatin, fluticasone, losartan, melatonin, midodrine, pantoprazole, sertraline, and tamsulosin    Allergies:  Allergies   Allergen Reactions    Pollen Extract Other (See Comments)     sneezing    Mirtazapine Unknown (See Comments)       Objective    Objective     Vitals:   Temp:  [99.1 °F (37.3 °C)] 99.1 °F (37.3 °C)  Heart Rate:  [64-69] 64  Resp:  [16] 16  BP: ()/(45-87) 136/87    Physical Exam  Constitutional:       General: He is not in acute distress.     Appearance: He is not ill-appearing.   HENT:      Head: Normocephalic and atraumatic.      Nose: No rhinorrhea.      Mouth/Throat:      Mouth: Mucous membranes are moist.      Pharynx: Oropharynx is clear.   Eyes:      Extraocular Movements: Extraocular movements intact.      Pupils: Pupils are equal, round, and reactive to light.   Cardiovascular:      Rate and Rhythm: Normal rate and regular rhythm.      Heart sounds: No murmur heard.  Pulmonary:      Effort: Pulmonary effort is normal. No respiratory distress.      Breath sounds: Normal breath sounds. No wheezing.   Chest:      Chest wall: No tenderness.   Abdominal:      General: Abdomen is flat. Bowel sounds are normal. There is no distension.      Palpations: Abdomen is soft.      Tenderness: There is no abdominal tenderness. There is no guarding.   Musculoskeletal:         General: No swelling or tenderness.      Cervical back: Normal range of motion.   Skin:     General: Skin is warm and  dry.   Neurological:      General: No focal deficit present.      Mental Status: He is alert and oriented to person, place, and time.   Psychiatric:         Mood and Affect: Mood normal.         Result Review    Result Review:  I have personally reviewed the results from the time of this admission to 3/21/2025 20:35 EDT and agree with these findings:  []  Laboratory list / accordion  []  Microbiology  []  Radiology  []  EKG/Telemetry   []  Cardiology/Vascular   []  Pathology  []  Old records  []  Other:  Most notable findings include:       Assessment & Plan   Assessment / Plan     Brief Patient Summary:  Enrike Tomas is a 89 y.o. male who presented to the ED with complaints of dysarthria.    Active Hospital Problems:  There are no active hospital problems to display for this patient.    Plan:   Acute CVA/TIA:  CT head showed Stable head CT scan. No evidence of acute intracranial disease.   Admit to IP/neuro  N.p.o. for now until swallow eval passed, then cardiac diet   Bedside swallow exam  Frequent neuro checks, fall precautions  Continue with aspirin 325 mg daily  Order MRI brain and showed : No evidence of hemorrhage, mass effect or midline shift. No evidence of recent or acute ischemia. Mild periventricular and subcortical FLAIR signal changes likely related to chronic microvascular ischemic change.   Consulted PT/OT for eval and treatment  Consulted neurologist, recommendations welcome  Patient evaluated by teleneuro  Daily CBC and CMP, will continue to monitor  Will continue to monitor vitals    Hypotension:   Consider holding finasteride/tamsulosin  Patient received midodrine in the ED  Will continue to monitor vitals    ESRD not on HD:  Cr.4.33 Baseline around 3.5  Patient is possibly anuric   Avoid nephrotoxic medications  Consider consulting nephrology if symptoms worsen  Daily CMP, will continue to monitor    Elevated troponin:  Likely secondary to ESRD   Pt denies any chest pain  Patient started on  heparin in the ED   Considering consulting cardiology if worsens     DVT prophylaxis: Heparin   GI prophylaxis: Zofran, Protonix  CODE STATUS: DNR/DNI      VTE Prophylaxis:  Mechanical VTE prophylaxis orders are signed & held. Pharmacologic VTE prophylaxis orders are present.        CODE STATUS:       Admission Status:  I believe this patient meets DNR/DNI status.    Beny Olmos MD

## 2025-03-22 NOTE — PLAN OF CARE
Problem: Adult Inpatient Plan of Care  Goal: Plan of Care Review  Outcome: Progressing  Flowsheets (Taken 3/22/2025 1124)  Progress: improving  Plan of Care Reviewed With: spouse   Goal Outcome Evaluation:  Plan of Care Reviewed With: spouse        Progress: improving       Anticipated Discharge Disposition (SLP): home    SLP Diagnosis: functional speech/language skills, functional cognitive-linguistic skills (very mild dysarthria, improving per wife) (03/22/25 1100)                 SLP evaluation completed. Will sign-off communication - dysarthria resolving. Please see note for further details and recommendations.

## 2025-03-22 NOTE — PROGRESS NOTES
Stroke Progress Note       Chief Complaint: Slurred speech and confusion    Subjective    Subjective     Subjective:  The patient is lying down in the bed in NAD.  The wife was at the bedside. The patient stated that he is doing okay this morning and stated that his speech is back to normal.  Stated that his right shoulder is hurting him which is causing some limitation moving his right upper extremity.  Denies having any new stroke or strokelike symptoms.  Explained to the patient his wife the imaging findings and what could possibly explain the patient's symptoms.  All patient's questions and concerns were answered.    No other acute complains at this time    Review of Systems   Musculoskeletal: Right shoulder pain     Objective      Temp:  [97.3 °F (36.3 °C)-99.1 °F (37.3 °C)] 97.3 °F (36.3 °C)  Heart Rate:  [] 77  Resp:  [16] 16  BP: ()/() 124/82    Objective    GEN: lying in bed; in NAD  HENT: normocephalic, non-erythematous oropharynx    NEURO:  Mental Status: A&O x 2, interactive, able to follow commands  Speech: Intact Articulation  CN 2-12:  II - PERRLA  III, IV, VI - EOMI  V - Facial sensation intact  VII -no gross facial asymmetry  VIII -some hearing difficulties  XII - Tongue protrudes midline    Motor: Patient can move all 4 extremities against gravity. Stated some limitation of the right upper extremity due to the pain in the right shoulder.  He is also feeling that his left thigh is weaker than his right side  Sensory: intact light touch throughout  Reflexes: negative Chan's sign BL  Coordination: no ataxia with finger-to-nose testing  Gait/Station: deferred     Results Review:    I reviewed the patient's new clinical results.    WBC   Date Value Ref Range Status   03/22/2025 5.95 3.40 - 10.80 10*3/mm3 Final     RBC   Date Value Ref Range Status   03/22/2025 3.79 (L) 4.14 - 5.80 10*6/mm3 Final     Hemoglobin   Date Value Ref Range Status   03/22/2025 12.8 (L) 13.0 - 17.7 g/dL  Final     Hematocrit   Date Value Ref Range Status   03/22/2025 39.3 37.5 - 51.0 % Final     MCV   Date Value Ref Range Status   03/22/2025 103.7 (H) 79.0 - 97.0 fL Final     MCH   Date Value Ref Range Status   03/22/2025 33.8 (H) 26.6 - 33.0 pg Final     MCHC   Date Value Ref Range Status   03/22/2025 32.6 31.5 - 35.7 g/dL Final     RDW   Date Value Ref Range Status   03/22/2025 13.6 12.3 - 15.4 % Final     RDW-SD   Date Value Ref Range Status   03/22/2025 52.6 37.0 - 54.0 fl Final     MPV   Date Value Ref Range Status   03/22/2025 8.8 6.0 - 12.0 fL Final     Platelets   Date Value Ref Range Status   03/22/2025 207 140 - 450 10*3/mm3 Final     Neutrophil %   Date Value Ref Range Status   03/22/2025 53.6 42.7 - 76.0 % Final     Lymphocyte %   Date Value Ref Range Status   03/22/2025 28.9 19.6 - 45.3 % Final     Monocyte %   Date Value Ref Range Status   03/22/2025 10.1 5.0 - 12.0 % Final     Eosinophil %   Date Value Ref Range Status   03/22/2025 4.9 0.3 - 6.2 % Final     Basophil %   Date Value Ref Range Status   03/22/2025 1.3 0.0 - 1.5 % Final     Immature Grans %   Date Value Ref Range Status   03/22/2025 1.2 (H) 0.0 - 0.5 % Final     Neutrophils, Absolute   Date Value Ref Range Status   03/22/2025 3.19 1.70 - 7.00 10*3/mm3 Final     Lymphocytes, Absolute   Date Value Ref Range Status   03/22/2025 1.72 0.70 - 3.10 10*3/mm3 Final     Monocytes, Absolute   Date Value Ref Range Status   03/22/2025 0.60 0.10 - 0.90 10*3/mm3 Final     Eosinophils, Absolute   Date Value Ref Range Status   03/22/2025 0.29 0.00 - 0.40 10*3/mm3 Final     Basophils, Absolute   Date Value Ref Range Status   03/22/2025 0.08 0.00 - 0.20 10*3/mm3 Final     Immature Grans, Absolute   Date Value Ref Range Status   03/22/2025 0.07 (H) 0.00 - 0.05 10*3/mm3 Final     nRBC   Date Value Ref Range Status   03/22/2025 0.0 0.0 - 0.2 /100 WBC Final       Lab Results   Component Value Date    GLUCOSE 75 03/22/2025    BUN 20 03/22/2025    CREATININE 3.65  (H) 03/22/2025     03/22/2025    K 4.2 03/22/2025    CL 95 (L) 03/22/2025    CALCIUM 9.7 03/22/2025    PROTEINTOT 7.2 03/22/2025    ALBUMIN 4.1 03/22/2025    ALT 13 03/22/2025    AST 17 03/22/2025    ALKPHOS 126 (H) 03/22/2025    BILITOT 0.4 03/22/2025    GLOB 3.1 03/22/2025    AGRATIO 1.3 03/22/2025    BCR 5.5 (L) 03/22/2025    ANIONGAP 17.0 (H) 03/22/2025    EGFR 15.2 (L) 03/22/2025     MRI Angiogram Neck Without Contrast  Result Date: 3/22/2025  Impression: No evidence of hemodynamically significant stenosis, AVM or aneurysm. Electronically Signed: Amada Guevara MD  3/22/2025 12:24 AM EDT  Workstation ID: [id number]    MRI Angiogram Head Without Contrast  Result Date: 3/21/2025  Impression: No evidence of hemodynamically significant stenosis, AVM or aneurysm. Electronically Signed: Amada Guevara MD  3/21/2025 11:49 PM EDT  Workstation ID: [id number]    MRI Brain Without Contrast  Result Date: 3/21/2025  Impression: 1.No evidence of hemorrhage, mass effect or midline shift. No evidence of recent or acute ischemia. 2.Mild periventricular and subcortical FLAIR signal changes likely related to chronic microvascular ischemic change. Electronically Signed: Amada Guevara MD  3/21/2025 11:28 PM EDT  Workstation ID: [id number]    XR Chest 1 View  Result Date: 3/21/2025  Impression: 1.Enlarged cardiac silhouette. 2.No acute pulmonary abnormality is identified. Electronically Signed: Julisa Cm  3/21/2025 6:31 PM EDT  Workstation ID: [id number]    CT Head Without Contrast Stroke Protocol  Result Date: 3/21/2025  Impression: Stable head CT scan. No evidence of acute intracranial disease. Electronically Signed: Darian Linares MD  3/21/2025 5:42 PM EDT  Workstation ID: [id number]    Results for orders placed during the hospital encounter of 03/21/25    Adult Transthoracic Echo Complete W/ Cont if Necessary Per Protocol (With Agitated Saline)    Interpretation Summary    Left ventricular systolic function is normal. Estimated left  ventricular EF = 62%    Left ventricular diastolic function is consistent with (grade I) impaired relaxation.    Aortic valve sclerosis with mild aortic regurgitation present.    Estimated right ventricular systolic pressure from tricuspid regurgitation is normal (<35 mmHg).    -CTH wo on 3/21/2025 images were personally reviewed and showed no acute ischemic or hemorrhagic stroke  -MRA of the head and neck images from 3/21/2025 were personally reviewed and showed no flow limiting stenosis or LVO  -MRI brain images from 3/21/2025 were personally reviewed and showed no ischemic or hemorrhagic stroke  -Transthoracic echocardiogram from 3/22/2025 report was personally reviewed and showed left ventricular ejection fraction of 62%, no left atrial dilation   -A1c from 3/22/2025 was 4.8%  -LDL from 3/22/2025 was 63      Assessment/Plan   89-year-old, , right-handed male with known diagnosis of A-fib (on Eliquis), HTN, HLD, ESRD on HD MWF, memory impairment (follows at [facility]) and NERY who presents with new onset dysarthria.  Last known well was around 12:00 when his wife dropped him off at dialysis.  She stated that when she picked him up around 15:00 she noted that his speech sounded significantly more slurred/garbled from his baseline.  Of note his blood pressure was reportedly in the 70s systolic at home, in the ED he was 99/45.  Patient has a previous right shoulder injury and has very limited mobility of his right upper extremity.  He tells me that about 6 to 8 months ago he developed diplopia, he wonders if he actually had a small stroke but has never had a workup.  Patient denies any difficulty with swallowing but his wife says he has had problems swallowing pills for quite some time; he has had surgery on his throat for diverticulum.  He sleeps with a collar and a neck pillow to help support his head.     CT head is negative for acute abnormality, no evidence of acute infarct or hemorrhage.   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  *Unable to obtain CTA/CTP due to IV access, will order MRI and MRAs     Antiplatelet PTA: none  Anticoagulant PTA: eliquis 2.5 mg            #Dysarthria  #Acute encephalopathy  #Hallucination improved with dreams in the setting of mild cognitive impairment  -Etiology of patient's symptoms likely acute encephalopathy that is multifactorial in nature in the setting of kidney failure and medication side effect  -CTH wo on 3/21/2025 images were personally reviewed and showed no acute ischemic or hemorrhagic stroke  -MRA of the head and neck images from 3/21/2025 were personally reviewed and showed no flow limiting stenosis or LVO  -MRI brain images from 3/21/2025 were personally reviewed and showed no ischemic or hemorrhagic stroke  -Transthoracic echocardiogram from 3/22/2025 report was personally reviewed and showed left ventricular ejection fraction of 62%, no left atrial dilation   -A1c from 3/22/2025 was 4.8%  -LDL from 3/22/2025 was 63    Recommendations  -Continue Eliquis 2.5 mg twice daily for secondary stroke prevention.  -Continue atorvastatin 20 nightly for secondary stroke prevention.  Target LDL level of less than 70.  On target during this admission at 63  -Target blood pressure goals of normotension  -Recommend low-dose Seroquel for hallucinations and vivid dreams.  Check with pharmacist regarding dosing patient on most recent EKG  -Activity as tolerated, fall risk precautions  -PT/OT/SLP evaluation  -Follow-up as an outpatient with the memory clinic     #A-fib  -Okay to continue Eliquis 2.5 twice daily     #Essential hypertension,   -Presented with hypotension, 99/45  -Hold antihypertensives  -Allow normotensive blood pressure     #Hyperlipidemia  -Atorvastatin 20mg nightly (home med)      Patient education: call 911 or present to emergency department with any stroke symptom, including unilateral face, arm, or leg weakness, numbness, or  paresthesias, unilateral facial droop, speech deficits, dizziness with nausea, vomiting, nystagmus, and incoordination, visual deficits, or severe onset headache.    Stroke will sign off. Please call for any further questions or concerns  =================================  Erlin Lopez MD, Msc, PhD  Vascular Neurologist  [facility]

## 2025-03-22 NOTE — PLAN OF CARE
Goal Outcome Evaluation:  Plan of Care Reviewed With: patient, spouse           Outcome Evaluation: Pt presents with generalized weakness, decreased activity tolerance, and balance deficits limiting BADL and fxnl mobility indp. Pt with mod instability through transfers and mobility. Rec. IPOT to address deficits and support return to baseline. Rec. IRF upon d/c.    Anticipated Discharge Disposition (OT): inpatient rehabilitation facility

## 2025-03-22 NOTE — THERAPY EVALUATION
Patient Name: Enrike Tomas  : 1935    MRN: [id number]                              Today's Date: 3/22/2025       Admit Date: 3/21/2025    Visit Dx:     ICD-10-CM ICD-9-CM   1. Slurred speech  R47.81 784.59   2. Chronic hypotension  I95.89 458.1   3. ESRD (end stage renal disease)  N18.6 585.6   4. Chronic heart failure with preserved ejection fraction (HFpEF)  I50.32 428.9   5. Severe malnutrition  E43 261   6. Diplopia  H53.2 368.2     Patient Active Problem List   Diagnosis    (HFpEF) heart failure with preserved ejection fraction    Anemia of renal disease    At high risk for falls    Benign prostatic hyperplasia    Closed fracture of left acetabulum    Compression fracture of thoracic vertebra    Deficiency of other specified B group vitamins    Depression    ESRD (end stage renal disease)    Essential hypertension    GERD (gastroesophageal reflux disease)    Hepatic cyst    Long term (current) use of anticoagulants    Major depressive disorder, single episode, moderate    Mixed hyperlipidemia    Morgagni hernia    Occlusion of right vertebral artery    NERY (obstructive sleep apnea)    Fall    Severe malnutrition    Sustained SVT    Paroxysmal atrial fibrillation    Chronic heart failure with preserved ejection fraction (HFpEF)    S/P shoulder hemiarthroplasty, left    Anemia, acute on chronic    Postoperative confusion    Hyperkalemia    Chronic systolic heart failure    ESRD needing dialysis    Dysarthria     Past Medical History:   Diagnosis Date    A-fib     Abnormal ECG     occasional extra heartbeat    Anemia, acute on chronic 2023    Arthritis     Atrial fibrillation     BPH (benign prostatic hyperplasia)     Cervical compression fracture 2023    currently wearing a c collar    Diverticulosis     Diverticulum of esophagus     puree diet and speech therapy    Elevated cholesterol     ESRD on hemodialysis     GERD (gastroesophageal reflux disease)     Heart failure     Hemodialysis  patient     M,W,F    Hyperlipidemia     Hypertension     Low back pain 2022    NERY (obstructive sleep apnea)     NOT USING CPAP    Recurrent falls     UTI (urinary tract infection)      Past Surgical History:   Procedure Laterality Date    CATARACT EXTRACTION      COLONOSCOPY      DIALYSIS FISTULA CREATION      ENDOSCOPY      INGUINAL HERNIA REPAIR      PROSTATE BIOPSY      THROAT SURGERY      diverticulum in throat    TONSILLECTOMY AND ADENOIDECTOMY      TOTAL SHOULDER ARTHROPLASTY W/ DISTAL CLAVICLE EXCISION Left 9/25/2023    Procedure: HEMIARTHROPLASTY, BICEPS TENODESIS - LEFT;  Surgeon: Matteo Tan MD;  Location: [facility];  Service: Orthopedics;  Laterality: Left;      General Information       Row Name 03/22/25 1600          OT Time and Intention    Document Type evaluation  -AF     Mode of Treatment occupational therapy  -AF     Patient Effort good  -AF       Row Name 03/22/25 1600          General Information    Patient Profile Reviewed yes  -AF     Prior Level of Function min assist:;all household mobility;community mobility;gait;transfer;w/c or scooter;bed mobility;mod assist:;ADL's;dependent:;home management;cooking;cleaning;driving;using stairs  wife reports 24/7 assistance, has caregiver 5 days/wk for 6 hours each day. Caregiver or wife drives pt to dialysis 3x/wk. Has shower chair and utilizes standard walker for ambulation at baseline. Uses transport w/c for longer distance and community.  -AF     Existing Precautions/Restrictions fall;other (see comments)  memory impairement, cognition, hx of CVA, axatic movements, diplopia  -AF     Barriers to Rehab medically complex;previous functional deficit;cognitive status;visual deficit  -AF       Row Name 03/22/25 1600          Occupational Profile    Environmental Supports and Barriers (Occupational Profile) wife reports someone is with him for all mobility and transfers.  -AF       Row Name 03/22/25 1600          Living Environment    Current  Living Arrangements home  -AF     People in Home spouse  -AF       Row Name 03/22/25 1600          Home Main Entrance    Number of Stairs, Main Entrance none;other (see comments)  ramp  -AF       Row Name 03/22/25 1600          Stairs Within Home, Primary    Number of Stairs, Within Home, Primary none;other (see comments)  stair lift  -AF       Row Name 03/22/25 1600          Cognition    Orientation Status (Cognition) oriented x 3  -AF       Row Name 03/22/25 1600          Safety Issues/Impairments Affecting Functional Mobility    Safety Issues Affecting Function (Mobility) ability to follow commands;awareness of need for assistance;insight into deficits/self-awareness;problem-solving;safety precaution awareness;positioning of assistive device;safety precautions follow-through/compliance;sequencing abilities  -AF     Impairments Affecting Function (Mobility) balance;cognition;coordination;endurance/activity tolerance;postural/trunk control;strength;range of motion (ROM)  -AF     Cognitive Impairments, Mobility Safety/Performance awareness, need for assistance;insight into deficits/self-awareness;judgment;problem-solving/reasoning;safety precaution awareness;safety precaution follow-through;sequencing abilities  -AF               User Key  (r) = Recorded By, (t) = Taken By, (c) = Cosigned By      Initials Name Provider Type    AF Fabiana Rodríguez OT Occupational Therapist                     Mobility/ADL's       Row Name 03/22/25 1604          Bed Mobility    Comment, (Bed Mobility) Pt sitting EOB with wife upon entry.  -AF       Row Name 03/22/25 1604          Transfers    Transfers sit-stand transfer  -AF       Row Name 03/22/25 1604          Sit-Stand Transfer    Sit-Stand [place] (Transfers) contact guard;verbal cues;nonverbal cues (demo/gesture)  -AF     Assistive Device (Sit-Stand Transfers) walker, front-wheeled  -AF     Comment, (Sit-Stand Transfer) VCs for hand placement and sequencing. Initial posterior  LOB with standing.  -AF       Row Name 03/22/25 1604          Functional Mobility    Functional Mobility- Comment defer to PT.  -AF       Row Name 03/22/25 1604          Activities of Daily Living    BADL Assessment/Intervention feeding  -AF       Row Name 03/22/25 1604          Self-Feeding Assessment/Training    [place] Level (Feeding) feeding skills;supervision  -AF     Position (Feeding) edge of bed sitting  -AF     Comment, (Feeding) completing feeding with wife upon entry sitting EOB.  -AF               User Key  (r) = Recorded By, (t) = Taken By, (c) = Cosigned By      Initials Name Provider Type    AF Fabiana Rodríguez OT Occupational Therapist                   Obj/Interventions       Row Name 03/22/25 1606          Sensory Assessment (Somatosensory)    Sensory Assessment (Somatosensory) UE sensation intact  -AF       [facility] Name 03/22/25 1606          Vision Assessment/Intervention    Visual Impairment/Limitations WFL;diplopia  -AF       [facility] Name 03/22/25 1606          Range of Motion Comprehensive    General Range of Motion upper extremity range of motion deficits identified  -AF     Comment, General Range of Motion Limited ROM in RUE d/t previous injury/arthritis. LUE WFL.  -AF       Row Name 03/22/25 1606          Strength Comprehensive (MMT)    General Manual Muscle Testing (MMT) Assessment upper extremity strength deficits identified  -AF     Comment, General Manual Muscle Testing (MMT) Assessment No testing completed on RUE d/t pain and limited ROM. LUE grossly 3/5  -AF       Row Name 03/22/25 1606          Balance    Balance Assessment sitting static balance;sitting dynamic balance;sit to stand dynamic balance;standing static balance;standing dynamic balance  -AF     Static Sitting Balance supervision  -AF     Dynamic Sitting Balance supervision  -AF     Position, Sitting Balance unsupported;sitting edge of bed;sitting in chair  -AF     Static Standing Balance contact guard  -AF     Dynamic Standing  Balance minimal assist  -AF     Position/Device Used, Standing Balance supported;walker, front-wheeled  -AF     Balance Interventions sitting;standing;sit to stand;supported;static;dynamic  -AF     Comment, Balance Posterior LOB with initial stand from EOB. During ambulation pt had posterior and L LOB throughout. Mod instability with ataxic movements with mobility and transitions.  -AF               User Key  (r) = Recorded By, (t) = Taken By, (c) = Cosigned By      Initials Name Provider Type    AF Fabiana Rodríguez OT Occupational Therapist                   Goals/Plan       Row Name 03/22/25 1611          Transfer Goal 1 (OT)    Activity/Assistive Device (Transfer Goal 1, OT) sit-to-stand/stand-to-sit;bed-to-chair/chair-to-bed;commode  -AF     [place] Level/Cues Needed (Transfer Goal 1, OT) minimum assist (75% or more patient effort)  -AF     Time Frame (Transfer Goal 1, OT) long term goal (LTG);by discharge  -AF       Row Name 03/22/25 1611          Dressing Goal 1 (OT)    Activity/Device (Dressing Goal 1, OT) upper body dressing;lower body dressing  -AF     [place]/Cues Needed (Dressing Goal 1, OT) moderate assist (50-74% patient effort)  -AF     Time Frame (Dressing Goal 1, OT) long term goal (LTG);by discharge  -AF       Row Name 03/22/25 1611          Toileting Goal 1 (OT)    Activity/Device (Toileting Goal 1, OT) adjust/manage clothing;perform perineal hygiene;commode  -AF     [place] Level/Cues Needed (Toileting Goal 1, OT) moderate assist (50-74% patient effort)  -AF     Time Frame (Toileting Goal 1, OT) by discharge;long term goal (LTG)  -AF       Row Name 03/22/25 1611          Grooming Goal 1 (OT)    Activity/Device (Grooming Goal 1, OT) hair care;oral care;wash face, hands  -AF     [place] (Grooming Goal 1, OT) set-up required  -AF     Time Frame (Grooming Goal 1, OT) short term goal (STG);5 days  -AF       Row Name 03/22/25 1611          Therapy Assessment/Plan (OT)    Planned  Therapy Interventions (OT) activity tolerance training;adaptive equipment training;BADL retraining;functional balance retraining;occupation/activity based interventions;passive ROM/stretching;patient/caregiver education/training;ROM/therapeutic exercise;strengthening exercise;transfer/mobility retraining  -AF               User Key  (r) = Recorded By, (t) = Taken By, (c) = Cosigned By      Initials Name Provider Type    AF Fabiana Rodríguez OT Occupational Therapist                   Clinical Impression       Row Name 03/22/25 1608          Pain Assessment    Pretreatment Pain Rating 0/10 - no pain  -AF     Posttreatment Pain Rating 0/10 - no pain  -AF     Pre/Posttreatment Pain Comment reported pain only when moving RUE.  -AF       Row Name 03/22/25 1608          Plan of Care Review    Plan of Care Reviewed With patient;spouse  -AF     Outcome Evaluation Pt presents with generalized weakness, decreased activity tolerance, and balance deficits limiting BADL and fxnl mobility indp. Pt with mod instability through transfers and mobility. Rec. IPOT to address deficits and support return to baseline. Rec. IRF upon d/c.  -AF       Row Name 03/22/25 1606          Therapy Assessment/Plan (OT)    Patient/Family Therapy Goal Statement (OT) return to PLOF  -AF     Rehab Potential (OT) good  -AF     Criteria for Skilled Therapeutic Interventions Met (OT) yes;meets criteria;skilled treatment is necessary  -AF     Therapy Frequency (OT) daily  -AF     Predicted Duration of Therapy Intervention (OT) 10 days  -AF       Row Name 03/22/25 1608          Therapy Plan Review/Discharge Plan (OT)    Anticipated Discharge Disposition (OT) inpatient rehabilitation facility  -AF       Row Name 03/22/25 1608          Vital Signs    Pre Patient Position Sitting  -AF     Intra Patient Position Standing  -AF     Post Patient Position Sitting  -AF       Row Name 03/22/25 1608          Positioning and Restraints    Pre-Treatment Position in bed  -AF      Post Treatment Position chair  -AF     In Chair notified nsg;reclined;sitting;call light within reach;encouraged to call for assist;exit alarm on;with family/caregiver  -AF               User Key  (r) = Recorded By, (t) = Taken By, (c) = Cosigned By      Initials Name Provider Type    Fabiana Sanchez OT Occupational Therapist                   Outcome Measures       Row Name 03/22/25 1612          How much help from another is currently needed...    Putting on and taking off regular lower body clothing? 2  -AF     Bathing (including washing, rinsing, and drying) 2  -AF     Toileting (which includes using toilet bed pan or urinal) 2  -AF     Putting on and taking off regular upper body clothing 3  -AF     Taking care of personal grooming (such as brushing teeth) 3  -AF     Eating meals 4  -AF     AM-PAC 6 Clicks Score (OT) 16  -AF       Row Name 03/22/25 0800          How much help from another person do you currently need...    Turning from your back to your side while in flat bed without using bedrails? 2  -PG     Moving from lying on back to sitting on the side of a flat bed without bedrails? 2  -PG     Moving to and from a bed to a chair (including a wheelchair)? 2  -PG     Standing up from a chair using your arms (e.g., wheelchair, bedside chair)? 2  -PG     Climbing 3-5 steps with a railing? 2  -PG     To walk in hospital room? 2  -PG     AM-PAC 6 Clicks Score (PT) 12  -PG       Row Name 03/22/25 1612          Functional Assessment    Outcome Measure Options AM-PAC 6 Clicks Daily Activity (OT)  -AF               User Key  (r) = Recorded By, (t) = Taken By, (c) = Cosigned By      Initials Name Provider Type    Fabiana Sanchez OT Occupational Therapist    PG Rickey Hussein RN Registered Nurse                    Occupational Therapy Education       Title: PT OT SLP Therapies (In Progress)       Topic: Occupational Therapy (In Progress)       Point: ADL training (Done)       Learning Progress Summary             Patient Acceptance, E,TB, VU,NR by AF at 3/22/2025 1613   Significant Other Acceptance, E,TB, VU,NR by AF at 3/22/2025 1613                      Point: Body mechanics (Done)       Learning Progress Summary            Patient Acceptance, E,TB, VU,NR by AF at 3/22/2025 1613   Significant Other Acceptance, E,TB, VU,NR by AF at 3/22/2025 1613                                      User Key       Initials Effective Dates Name Provider Type Discipline     08/15/23 -  Fabiana Rodríguez OT Occupational Therapist OT                  OT Recommendation and Plan  Planned Therapy Interventions (OT): activity tolerance training, adaptive equipment training, BADL retraining, functional balance retraining, occupation/activity based interventions, passive ROM/stretching, patient/caregiver education/training, ROM/therapeutic exercise, strengthening exercise, transfer/mobility retraining  Therapy Frequency (OT): daily  Plan of Care Review  Plan of Care Reviewed With: patient, spouse  Outcome Evaluation: Pt presents with generalized weakness, decreased activity tolerance, and balance deficits limiting BADL and fxnl mobility indp. Pt with mod instability through transfers and mobility. Rec. IPOT to address deficits and support return to baseline. Rec. IRF upon d/c.     Time Calculation:   Evaluation Complexity (OT)  Review Occupational Profile/Medical/Therapy History Complexity: expanded/moderate complexity  Assessment, Occupational Performance/Identification of Deficit Complexity: 3-5 performance deficits  Clinical Decision Making Complexity (OT): detailed assessment/moderate complexity  Overall Complexity of Evaluation (OT): moderate complexity     Time Calculation- OT       Row Name 03/22/25 1613             Time Calculation- OT    OT Start Time 1340  -AF      OT Received On 03/22/25  -AF      OT Goal Re-Cert Due Date 04/01/25  -AF         Timed Charges    70160 - OT Therapeutic Activity Minutes 15  -AF         Untimed Charges     OT Eval/Re-eval Minutes 53  -AF         Total Minutes    Timed Charges Total Minutes 15  -AF      Untimed Charges Total Minutes 53  -AF       Total Minutes 68  -AF                User Key  (r) = Recorded By, (t) = Taken By, (c) = Cosigned By      Initials Name Provider Type    AF Fabiana Rodríguez OT Occupational Therapist                  Therapy Charges for Today       Code Description Service Date Service Provider Modifiers Qty    [id number]  OT EVAL MOD COMPLEXITY 4 3/22/2025 Fabiana Rodríguez OT GO 1    [id number]  OT THERAPEUTIC ACT EA 15 MIN 3/22/2025 Fabiana Rodríguez OT GO 1                 Fabiana Rodríguez OT  3/22/2025

## 2025-03-22 NOTE — PROGRESS NOTES
[facility] Medicine Services  PROGRESS NOTE    Patient Name: Enrike Tomas  : 1935  MRN: [id number]    Date of Admission: 3/21/2025  Primary Care Physician: Blair Dhaliwal MD    Subjective   Subjective     CC:  Dysarthria    HPI:  The patient's wife is at bedside.  Patient had very poor sleep overnight.  He had incredibly vivid dreams last night, which is not unusual for him.  He frequently talks in his sleep and sometimes sleepwalks.  His NIH was 3.  No chest pain.  Periodic desaturations to the low 80s on room air.  Seen with stroke neurology attending bedside.  Patient frequently talking over other people in the room and telling stories from 30 years ago as if it were occurring last night or in the past week.    Patient's wife was found recording physician in the room.  I asked her to stop and to delete the recording.    Objective   Objective     Vital Signs:   Temp:  [97.5 °F (36.4 °C)-99.1 °F (37.3 °C)] 97.5 °F (36.4 °C)  Heart Rate:  [64-70] 69  Resp:  [16] 16  BP: ()/() 125/103     Physical Exam:  Constitutional: No acute distress, awake, alert  HENT: NCAT, mucous membranes moist  Respiratory: Diminished in the bilateral bases, respiratory effort normal   Cardiovascular: RRR  Gastrointestinal: Positive bowel sounds, soft, nontender, nondistended  Musculoskeletal: No bilateral ankle edema  Psychiatric: Somewhat cooperative, tangential speech, difficult to redirect  Neurologic: Oriented to self, [facility], , follows all commands, strength symmetric in all extremities, Cranial Nerves grossly intact to confrontation, speech clear  Skin: No rashes    Results Reviewed:  LAB RESULTS:      Lab 25  0427 25  2145 25  1819 25  1756   WBC 5.95  --   --   --  5.47   HEMOGLOBIN 12.8*  --   --   --  13.0   HEMATOCRIT 39.3  --   --   --  38.1   PLATELETS 207  --   --   --  216   NEUTROS ABS 3.19  --   --   --  3.47    IMMATURE GRANS (ABS) 0.07*  --   --   --  0.02   LYMPHS ABS 1.72  --   --   --  1.18   MONOS ABS 0.60  --   --   --  0.53   EOS ABS 0.29  --   --   --  0.22   .7*  --   --   --  103.0*   CRP  --   --   --  0.71*  --    PROCALCITONIN  --   --   --  0.24  --    LACTATE  --   --  1.6  --  2.4*   PROTIME  --   --  15.3*  --  14.7*   APTT  --   --  30.6*  --  24.4   HEPARIN ANTI-XA 0.71*  --  1.05*  --  >1.10*   HSTROP T  --  207*  --  229*  --          Lab 03/22/25 0427 03/21/25 1819 03/20/25  1628   SODIUM 140 139  --    POTASSIUM 4.2 4.5  --    CHLORIDE 95* 94*  --    CO2 28.0 27.0  --    ANION GAP 17.0* 18.0*  --    BUN 20 14  --    CREATININE 3.65* 3.62*  --    EGFR 15.2* 15.4*  --    GLUCOSE 75 100*  --    CALCIUM 9.7 9.7  --    IONIZED CALCIUM  --   --  4.9   MAGNESIUM  --  2.0  --    PHOSPHORUS  --  3.2  --    HEMOGLOBIN A1C 4.80  --   --    TSH  --  16.770*  --          Lab 03/22/25 0427 03/21/25 1819   TOTAL PROTEIN 7.2 7.6   ALBUMIN 4.1 4.5   GLOBULIN 3.1 3.1   ALT (SGPT) 13 16   AST (SGOT) 17 22   BILIRUBIN 0.4 0.4   ALK PHOS 126* 131*   LIPASE  --  41         Lab 03/21/25 2145 03/21/25 2032 03/21/25 1819 03/21/25  1756   PROBNP  --   --  5,538.0*  --    HSTROP T 207*  --  229*  --    PROTIME  --  15.3*  --  14.7*   INR  --  1.20*  --  1.14*         Lab 03/22/25 0427   CHOLESTEROL 164   LDL CHOL 63   HDL CHOL 81*   TRIGLYCERIDES 114             Brief Urine Lab Results  (Last result in the past 365 days)        Color   Clarity   Blood   Leuk Est   Nitrite   Protein   CREAT   Urine HCG        03/22/25 0424 Yellow   Clear   Negative   Trace   Negative   100 mg/dL (2+)                   Microbiology Results Abnormal       None            MRI Angiogram Neck Without Contrast  Result Date: 3/22/2025  MRI ANGIOGRAM NECK WO CONTRAST Date of Exam: 3/21/2025 11:39 PM EDT Indication: dysarthria.  Comparison: None available. Technique:  Routine 3-D time-of-flight gradient echo imaging was obtained of the  neck without contrast administration. Findings: No evidence of hemodynamically significant stenosis, AVM or aneurysm. No large vessel occlusion identified. No definite dissection. Limited evaluation due to motion artifact. Right vertebral artery appears diminutive.     Impression: Impression: No evidence of hemodynamically significant stenosis, AVM or aneurysm. Electronically Signed: Amada Guevara MD  3/22/2025 12:24 AM EDT  Workstation ID: [id number]    MRI Angiogram Head Without Contrast  Result Date: 3/21/2025  MRI ANGIOGRAM HEAD WO CONTRAST Date of Exam: 3/21/2025 11:20 PM EDT Indication: dyarthria.  Comparison: 3/21/2025. Technique:  Routine 3-D time-of-flight gradient echo imaging was obtained of the head without contrast administration. Findings: No evidence of hemodynamically significant stenosis, AVM or aneurysm. Slightly limited evaluation due to motion artifact. No large vessel occlusion identified.     Impression: Impression: No evidence of hemodynamically significant stenosis, AVM or aneurysm. Electronically Signed: Amada Guevara MD  3/21/2025 11:49 PM EDT  Workstation ID: [id number]    MRI Brain Without Contrast  Result Date: 3/21/2025  MRI BRAIN WO CONTRAST Date of Exam: 3/21/2025 11:15 PM EDT Indication: dysarthria.  Comparison: 3/21/2025. Technique:  Routine multiplanar/multisequence sequence images of the brain were obtained without contrast administration. Findings: There is no diffusion restriction to suggest acute infarct. There is no evidence of acute or chronic intracranial hemorrhage. No mass effect or midline shift. Mild periventricular and subcortical FLAIR signal changes are present. No abnormal extra-axial collections. The major vascular flow voids appear intact. The basal ganglia, brainstem and cerebellum appear within normal limits. Calvarial and superficial soft tissue signal is within normal limits. Orbits appear unremarkable. The paranasal sinuses and  the mastoid air cells appear well  aerated. Midline structures are intact.     Impression: Impression: 1.No evidence of hemorrhage, mass effect or midline shift. No evidence of recent or acute ischemia. 2.Mild periventricular and subcortical FLAIR signal changes likely related to chronic microvascular ischemic change. Electronically Signed: Amada Guevara MD  3/21/2025 11:28 PM EDT  Workstation ID: [id number]    XR Chest 1 View  Result Date: 3/21/2025  XR CHEST 1 VW Date of Exam: 3/21/2025 6:14 PM EDT Indication: Acute Stroke Protocol (onset < 12 hrs) Comparison: [facility] chest x-ray 8/7/2024, 9/26/2023; CT chest 9/10/2023 Findings: Cardiac silhouette remains enlarged. Prominent epicardial fat at the medial right lower chest is stable. Lungs are adequately expanded and appear clear. No pneumothorax or large pleural effusion is seen.     Impression: Impression: 1.Enlarged cardiac silhouette. 2.No acute pulmonary abnormality is identified. Electronically Signed: Julisa Cm  3/21/2025 6:31 PM EDT  Workstation ID: [id number]    CT Head Without Contrast Stroke Protocol  Result Date: 3/21/2025  CT HEAD WO CONTRAST STROKE PROTOCOL Date of Exam: 3/21/2025 5:30 PM EDT Indication: Neuro deficit, acute, stroke suspected Neuro Deficit, acute, Stroke suspected. Comparison: 2/26/2025 head CT scan Technique: Axial CT images were obtained of the head without contrast administration.  Reconstructed coronal images were also obtained. Automated exposure control and iterative construction methods were used. Scan Time: 1731 Results discussed with Dr. Raygoza at 1734, 3/21/2025. Findings: Previous exam report indicated no acute intracranial abnormality. Today's study shows the calvarium to appear intact. Included paranasal sinuses and mastoids appear clear. No gross abnormalities are seen in the orbits. Bilateral ocular lens replacements are again noted. There is age-appropriate generalized cerebral atrophy. Punctate low-attenuation change in the central shan slightly to the left  of the midline image 16/2 is consistent with an old lacunar infarct, unchanged from prior study. No hemorrhage, contusion, or edema is seen. There is no evidence of mass, mass effect, acute infarct, hydrocephalus, or abnormal extra-axial collection.     Impression: Impression: Stable head CT scan. No evidence of acute intracranial disease. Electronically Signed: Darian Linares MD  3/21/2025 5:42 PM EDT  Workstation ID: [id number]          Current medications:  Scheduled Meds:amiodarone, 100 mg, Oral, Daily  apixaban, 2.5 mg, Oral, BID  [START ON 3/23/2025] atorvastatin, 20 mg, Oral, Nightly  [Held by provider] losartan, 12.5 mg, Oral, Q24H  sertraline, 100 mg, Oral, Daily  sodium chloride, 10 mL, Intravenous, Q12H  sodium chloride, 10 mL, Intravenous, Q12H  [Held by provider] tamsulosin, 0.4 mg, Oral, Daily      Continuous Infusions:   PRN Meds:.  senna-docusate sodium **AND** polyethylene glycol **AND** bisacodyl **AND** bisacodyl    Calcium Replacement - Follow Nurse / BPA Driven Protocol    HYDROcodone-acetaminophen    Magnesium Standard Dose Replacement - Follow Nurse / BPA Driven Protocol    melatonin    midodrine    nitroglycerin    ondansetron    Potassium Replacement - Follow Nurse / BPA Driven Protocol    sodium chloride    sodium chloride    sodium chloride    sodium chloride    sodium chloride    Assessment & Plan   Assessment & Plan     Active Hospital Problems    Diagnosis  POA    **Dysarthria [R47.1]  Yes      Resolved Hospital Problems   No resolved problems to display.        Brief Hospital Course to date:  Enrike Tomas is a 89 y.o. male with history of ESRD on HD MWF, HTN, HLD, A fib (eliquis), cognitive impairment, NERY, who presented for dysarthria. MRA head/neck negative. MRI brain negative for stroke.     Dysarthria, possible TIA  HLD  -Stroke service following  -PT/OT  -Continue statin  -ECHO with LV EF 62%, grade 1 diastolic dysfunction, mild aortic regurgitation  -A1C 4.8%, LDL 63    Possible mild  cognitive impairment  Vivid dreams  -Discussed with stroke neurology attending, he recommended low-dose Seroquel to be dosed at 6 PM, discussed increased risk of death with this medication with patient and family and they were accepting of risks  -AM EKG pending  -Monitor overnight and if doing better, then discharge home tomorrow  -Continue Namenda  -Follow-up as scheduled with [facility] on aging    ESRD on HD Monday/Wednesday/Friday  -Currently euvolemic without significant electrolyte derangements  -If still in house through the weekend, then will consult nephrology  -Renal diet    Intermittent hypoxia  -Viral respiratory panel negative; chest x-ray pending    Hypothyroidism-Synthroid dosing just increased outpatient, he has not started the higher dose; TSH 16 on admission, continue Synthroid and recheck with PCP in 4 to 6 weeks  A fib - continue eliquis per neuro; continue amiodarone  HTN, but with hypotension on presentation - holding anti-hypertensives  Mood disorder-continue Zoloft  Chronic pain-continue as needed Norco, instructed family that they would need to bring in the buprenorphine patch as we do not have these      Expected Discharge Location and Transportation: Pending PT/OT  Expected Discharge hopefully home tomorrow  Expected discharge date/ time has not been documented.     VTE Prophylaxis:  Pharmacologic & mechanical VTE prophylaxis orders are present.         AM-PAC 6 Clicks Score (PT): 12 (03/22/25 6692)    CODE STATUS:   Code Status and Medical Interventions: No CPR (Do Not Attempt to Resuscitate); Limited Support; No intubation (DNI)   Ordered at: 03/22/25 0242     Code Status (Patient has no pulse and is not breathing):    No CPR (Do Not Attempt to Resuscitate)     Medical Interventions (Patient has pulse or is breathing):    Limited Support     Medical Intervention Limits:    No intubation (DNI)     Level Of Support Discussed With:    Patient       Karen Aguirre MD  03/22/25

## 2025-03-22 NOTE — PLAN OF CARE
Goal Outcome Evaluation:      V/S stable. He is A+O to himself. Spo2 maintained at RA. Pt currently on chair with call light on reach.

## 2025-03-22 NOTE — THERAPY EVALUATION
Acute Care - Speech Language Pathology Initial Evaluation  [facility]     Patient Name: Enrike Tomas  : 1935  MRN: [id number]  Today's Date: 3/22/2025               Admit Date: 3/21/2025     Visit Dx:    ICD-10-CM ICD-9-CM   1. Slurred speech  R47.81 784.59   2. Chronic hypotension  I95.89 458.1   3. ESRD (end stage renal disease)  N18.6 585.6   4. Chronic heart failure with preserved ejection fraction (HFpEF)  I50.32 428.9   5. Severe malnutrition  E43 261   6. Diplopia  H53.2 368.2     Patient Active Problem List   Diagnosis    (HFpEF) heart failure with preserved ejection fraction    Anemia of renal disease    At high risk for falls    Benign prostatic hyperplasia    Closed fracture of left acetabulum    Compression fracture of thoracic vertebra    Deficiency of other specified B group vitamins    Depression    ESRD (end stage renal disease)    Essential hypertension    GERD (gastroesophageal reflux disease)    Hepatic cyst    Long term (current) use of anticoagulants    Major depressive disorder, single episode, moderate    Mixed hyperlipidemia    Morgagni hernia    Occlusion of right vertebral artery    NERY (obstructive sleep apnea)    Fall    Severe malnutrition    Sustained SVT    Paroxysmal atrial fibrillation    Chronic heart failure with preserved ejection fraction (HFpEF)    S/P shoulder hemiarthroplasty, left    Anemia, acute on chronic    Postoperative confusion    Hyperkalemia    Chronic systolic heart failure    ESRD needing dialysis    Dysarthria     Past Medical History:   Diagnosis Date    A-fib     Abnormal ECG     occasional extra heartbeat    Anemia, acute on chronic 2023    Arthritis     Atrial fibrillation     BPH (benign prostatic hyperplasia)     Cervical compression fracture 2023    currently wearing a c collar    Diverticulosis     Diverticulum of esophagus     puree diet and speech therapy    Elevated cholesterol     ESRD on hemodialysis     GERD (gastroesophageal  reflux disease)     Heart failure     Hemodialysis patient     M,W,F    Hyperlipidemia     Hypertension     Low back pain 2022    NERY (obstructive sleep apnea)     NOT USING CPAP    Recurrent falls     UTI (urinary tract infection)      Past Surgical History:   Procedure Laterality Date    CATARACT EXTRACTION      COLONOSCOPY      DIALYSIS FISTULA CREATION      ENDOSCOPY      INGUINAL HERNIA REPAIR      PROSTATE BIOPSY      THROAT SURGERY      diverticulum in throat    TONSILLECTOMY AND ADENOIDECTOMY      TOTAL SHOULDER ARTHROPLASTY W/ DISTAL CLAVICLE EXCISION Left 9/25/2023    Procedure: HEMIARTHROPLASTY, BICEPS TENODESIS - LEFT;  Surgeon: Matteo Tan MD;  Location: [facility];  Service: Orthopedics;  Laterality: Left;       SLP Recommendation and Plan  SLP Diagnosis: functional speech/language skills, functional cognitive-linguistic skills (very mild dysarthria, improving per wife) (03/22/25 1100)              SLC Criteria for Skilled Therapy Interventions Met: no problems identified which require skilled intervention (03/22/25 1100)  Anticipated Discharge Disposition (SLP): home (03/22/25 1100)        Therapy Frequency (SLP SLC): evaluation only (03/22/25 1100)                                Progress: improving (03/22/25 1124)      SLP EVALUATION (Last 72 Hours)       SLP SLC Evaluation       Row Name 03/22/25 1100                   Communication Assessment/Intervention    Document Type evaluation  -AW        Subjective Information no complaints  -AW        Patient Observations alert;cooperative  -AW        Patient/Family/Caregiver Comments/Observations wife at bedside  -AW        Patient Effort good  -AW        Symptoms Noted During/After Treatment none  -AW           General Information    Patient Profile Reviewed yes  -AW        Pertinent History Of Current Problem admit with slurring, stroke w/u thus far has been unremarkable  -AW        Precautions/Limitations, Vision WFL with corrective lenses  -AW         Precautions/Limitations, Hearing WFL  -AW        Prior Level of Function-Communication WFL  -AW        Plans/Goals Discussed with patient;spouse/S.O.  -AW        Barriers to Rehab none identified  -AW        Patient's Goals for Discharge return to home  -AW        Family Goals for Discharge family did not state  -AW           Pain    Pretreatment Pain Rating 0/10 - no pain  -AW        Posttreatment Pain Rating 0/10 - no pain  -AW           Comprehension Assessment/Intervention    Comprehension Assessment/Intervention Auditory Comprehension  -AW           Auditory Comprehension Assessment/Intervention    Auditory Comprehension (Communication) WFL  -AW        Able to Identify Objects/Pictures (Communication) body part;familiar objects;WFL  -AW        Answers Questions (Communication) yes/no;wh questions;personal;simple;WFL  -AW        Able to Follow Commands (Communication) 2-step;WFL  -AW        Narrative Discourse conversational level;WFL  -AW           Expression Assessment/Intervention    Expression Assessment/Intervention verbal expression  -AW           Verbal Expression Assessment/Intervention    Verbal Expression WFL  -AW        Automatic Speech (Communication) WFL;response to greeting  -AW        Spontaneous/Functional Words simple;complex;WFL  -AW        Sentence Formulation complex;WFL  -AW        Conversational Discourse/Fluency WFL  -AW        Verbal Expression, Comment Pt exh no issues with lengthy conversation and story telling  -AW           Oral Motor Structure and Function    Oral Motor Structure and Function WFL  -AW           Oral Musculature and Cranial Nerve Assessment    Oral Motor General Assessment WFL;other (see comments)  -AW        Velar Impairment, Detail, Cranial Nerves X, XI (Vagus and Accessory) other (see comments)  normal  -AW           Motor Speech Assessment/Intervention    Motor Speech Function mild impairment  -AW        Characteristics Consistent with Dysarthria decreased  articulation;hyponasality  -AW        Conversational Speech (Communication) WFL  -AW        Motor Speech, Comment pt with very mild imprecise articulation at times, significant improvement from yesterday per wife. Pt denies any issues. They do notice that voice is slightly softer and hyponasal, pt attributes this to dry mouth and states he is essentially at baseline.  -AW           Cognitive Assessment Intervention- SLP    Cognitive Function (Cognition) WFL  -AW        Orientation Status (Cognition) awareness of basic personal information;place;person;time;situation;WFL  -AW        Memory (Cognitive) simple;WFL  -AW        Attention (Cognitive) sustained;WFL  -AW        Thought Organization (Cognitive) WFL  -AW        Reasoning (Cognitive) WFL  -AW           SLP Evaluation Clinical Impressions    SLP Diagnosis functional speech/language skills;functional cognitive-linguistic skills  very mild dysarthria, improving per wife  -AW        Rehab Potential/Prognosis excellent  -AW        SLC Criteria for Skilled Therapy Interventions Met no problems identified which require skilled intervention  -AW        Functional Impact no impact on function  -AW           Recommendations    Therapy Frequency (SLP SLC) evaluation only  -AW        Anticipated Discharge Disposition (SLP) home  -AW                  User Key  (r) = Recorded By, (t) = Taken By, (c) = Cosigned By      Initials Name Effective Dates    AW Caro Scott, MS CCC-SLP 02/03/23 -                        EDUCATION  The patient has been educated in the following areas:     Communication Impairment.                        Time Calculation:      Time Calculation- SLP       Row Name 03/22/25 1125             Time Calculation- SLP    SLP Start Time 1000  -AW      SLP Stop Time 1025  -      SLP Time Calculation (min) 25 min  -AW      SLP Received On 03/22/25  -                User Key  (r) = Recorded By, (t) = Taken By, (c) = Cosigned By      Initials Name Provider  Type    AW Caro Scott, MS CCC-SLP Speech and Language Pathologist                    Therapy Charges for Today       Code Description Service Date Service Provider Modifiers Qty    [id number] [facility] EVAL SPEECH AND PROD W LANG  2 3/22/2025 Caro Scott, MS CCC-SLP GN 1                       Caro Scott MS CCC-SLP  3/22/2025

## 2025-03-22 NOTE — ED NOTES
Enrike Tomas    Nursing Report ED to Floor:  Mental status: A&Ox3  Ambulatory status: x2 assist  Oxygen Therapy:  RA  Cardiac Rhythm: NSR  Admitted from: ED  Safety Concerns:  fall  Precautions: none  Social Issues: none  ED Room #:  16    ED Nurse Phone Extension - [phone] or may call [phone].      HPI:   Chief Complaint   Patient presents with    Hypotension       Past Medical History:  Past Medical History:   Diagnosis Date    A-fib     Abnormal ECG     occasional extra heartbeat    Anemia, acute on chronic 9/30/2023    Arthritis     Atrial fibrillation     BPH (benign prostatic hyperplasia)     Cervical compression fracture 06/05/2023    currently wearing a c collar    Diverticulosis     Diverticulum of esophagus     puree diet and speech therapy    Elevated cholesterol     ESRD on hemodialysis     GERD (gastroesophageal reflux disease)     Heart failure     Hemodialysis patient     M,W,F    Hyperlipidemia     Hypertension     Low back pain 2022    NERY (obstructive sleep apnea)     NOT USING CPAP    Recurrent falls     UTI (urinary tract infection)         Past Surgical History:  Past Surgical History:   Procedure Laterality Date    CATARACT EXTRACTION      COLONOSCOPY      DIALYSIS FISTULA CREATION      ENDOSCOPY      INGUINAL HERNIA REPAIR      PROSTATE BIOPSY      THROAT SURGERY      diverticulum in throat    TONSILLECTOMY AND ADENOIDECTOMY      TOTAL SHOULDER ARTHROPLASTY W/ DISTAL CLAVICLE EXCISION Left 9/25/2023    Procedure: HEMIARTHROPLASTY, BICEPS TENODESIS - LEFT;  Surgeon: Matteo Tan MD;  Location: [facility];  Service: Orthopedics;  Laterality: Left;        Admitting Doctor:   Beny Olmos MD    Consulting Provider(s):  Consults       Date and Time Order Name Status Description    3/21/2025  5:25 PM Inpatient Neurology Consult Stroke Completed              Admitting Diagnosis:   The primary encounter diagnosis was Slurred speech. Diagnoses of Chronic hypotension, ESRD (end stage renal  "disease), Chronic heart failure with preserved ejection fraction (HFpEF), Severe malnutrition, and Diplopia were also pertinent to this visit.    Most Recent Vitals:   Vitals:    03/21/25 1708 03/21/25 1859 03/21/25 1914 03/21/25 2243   BP: 99/45 111/77 136/87 149/65   BP Location: Left arm      Patient Position: Sitting      Pulse: 69 66 64    Resp: 16      Temp: 99.1 °F (37.3 °C)      TempSrc: Oral      SpO2: 93%  93%    Weight: 54.4 kg (120 lb)      Height: 162.6 cm (64\")          Active LDAs/IV Access:   Lines, Drains & Airways       Active LDAs       Name Placement date Placement time Site Days    Peripheral IV 03/21/25 1855 Anterior;Left Forearm 03/21/25 1855  Forearm  less than 1    Hemodialysis Cath Double 08/06/24  1107  Internal Jugular  227                    Labs (abnormal labs have a star):   Labs Reviewed   PROTIME-INR - Abnormal; Notable for the following components:       Result Value    Protime 14.7 (*)     INR 1.14 (*)     All other components within normal limits   CBC WITH AUTO DIFFERENTIAL - Abnormal; Notable for the following components:    RBC 3.70 (*)     .0 (*)     MCH 35.1 (*)     All other components within normal limits   COMPREHENSIVE METABOLIC PANEL - Abnormal; Notable for the following components:    Glucose 100 (*)     Creatinine 3.62 (*)     Chloride 94 (*)     Alkaline Phosphatase 131 (*)     BUN/Creatinine Ratio 3.9 (*)     Anion Gap 18.0 (*)     eGFR 15.4 (*)     All other components within normal limits    Narrative:     GFR Categories in Chronic Kidney Disease (CKD)      GFR Category          GFR (mL/min/1.73)    Interpretation  G1                     90 or greater         Normal or high (1)  G2                      60-89                Mild decrease (1)  G3a                   45-59                Mild to moderate decrease  G3b                   30-44                Moderate to severe decrease  G4                    15-29                Severe decrease  G5                "     14 or less           Kidney failure          (1)In the absence of evidence of kidney disease, neither GFR category G1 or G2 fulfill the criteria for CKD.    eGFR calculation 2021 CKD-EPI creatinine equation, which does not include race as a factor   TROPONIN - Abnormal; Notable for the following components:    HS Troponin T 229 (*)     All other components within normal limits    Narrative:     High Sensitive Troponin T Reference Range:  <14.0 ng/L- Negative Female for AMI  <22.0 ng/L- Negative Male for AMI  >=14 - Abnormal Female indicating possible myocardial injury.  >=22 - Abnormal Male indicating possible myocardial injury.   Clinicians would have to utilize clinical acumen, EKG, Troponin, and serial changes to determine if it is an Acute Myocardial Infarction or myocardial injury due to an underlying chronic condition.        BNP (IN-HOUSE) - Abnormal; Notable for the following components:    proBNP 5,538.0 (*)     All other components within normal limits    Narrative:     This assay is used as an aid in the diagnosis of individuals suspected of having heart failure. It can be used as an aid in the diagnosis of acute decompensated heart failure (ADHF) in patients presenting with signs and symptoms of ADHF to the emergency department (ED). In addition, NT-proBNP of <300 pg/mL indicates ADHF is not likely.    Age Range Result Interpretation  NT-proBNP Concentration (pg/mL:      <50             Positive            >450                   Gray                 300-450                    Negative             <300    50-75           Positive            >900                  Gray                300-900                  Negative            <300      >75             Positive            >1800                  Gray                300-1800                  Negative            <300   LACTIC ACID, PLASMA - Abnormal; Notable for the following components:    Lactate 2.4 (*)     All other components within normal limits    C-REACTIVE PROTEIN - Abnormal; Notable for the following components:    C-Reactive Protein 0.71 (*)     All other components within normal limits   TSH RFX ON ABNORMAL TO FREE T4 - Abnormal; Notable for the following components:    TSH 16.770 (*)     All other components within normal limits   HEPARIN ANTI XA - Abnormal; Notable for the following components:    Heparin Anti-Xa (UFH) >1.10 (*)     All other components within normal limits   PROTIME-INR - Abnormal; Notable for the following components:    Protime 15.3 (*)     INR 1.20 (*)     All other components within normal limits   APTT - Abnormal; Notable for the following components:    PTT 30.6 (*)     All other components within normal limits    Narrative:     PTT = The equivalent PTT values for the therapeutic range of heparin levels at 0.3 to 0.5 U/ml are 60 to 70 seconds.   HEPARIN ANTI XA - Abnormal; Notable for the following components:    Heparin Anti-Xa (UFH) 1.05 (*)     All other components within normal limits   HIGH SENSITIVITIY TROPONIN T 1HR - Abnormal; Notable for the following components:    HS Troponin T 207 (*)     All other components within normal limits    Narrative:     High Sensitive Troponin T Reference Range:  <14.0 ng/L- Negative Female for AMI  <22.0 ng/L- Negative Male for AMI  >=14 - Abnormal Female indicating possible myocardial injury.  >=22 - Abnormal Male indicating possible myocardial injury.   Clinicians would have to utilize clinical acumen, EKG, Troponin, and serial changes to determine if it is an Acute Myocardial Infarction or myocardial injury due to an underlying chronic condition.        RESPIRATORY PANEL PCR W/ COVID-19 (SARS-COV-2), NP SWAB IN [facility], 2 HR TAT - Normal    Narrative:     In the setting of a positive respiratory panel with a viral infection PLUS a negative procalcitonin without other underlying concern for bacterial infection, consider observing off antibiotics or discontinuation of antibiotics and  "continue supportive care. If the respiratory panel is positive for atypical bacterial infection (Bordetella pertussis, Chlamydophila pneumoniae, or Mycoplasma pneumoniae), consider antibiotic de-escalation to target atypical bacterial infection.   APTT - Normal    Narrative:     PTT = The equivalent PTT values for the therapeutic range of heparin levels at 0.3 to 0.5 U/ml are 60 to 70 seconds.   LIPASE - Normal   PROCALCITONIN - Normal    Narrative:     As a Marker for Sepsis (Non-Neonates):    1. <0.5 ng/mL represents a low risk of severe sepsis and/or septic shock.  2. >2 ng/mL represents a high risk of severe sepsis and/or septic shock.    As a Marker for Lower Respiratory Tract Infections that require antibiotic therapy:    PCT on Admission    Antibiotic Therapy       6-12 Hrs later    >0.5                Strongly Recommended  >0.25 - <0.5        Recommended   0.1 - 0.25          Discouraged              Remeasure/reassess PCT  <0.1                Strongly Discouraged     Remeasure/reassess PCT    As 28 day mortality risk marker: \"Change in Procalcitonin Result\" (>80% or <=80%) if Day 0 (or Day 1) and Day 4 values are available. Refer to http://www.[employer]-pct-calculator.com    Change in PCT <=80%  A decrease of PCT levels below or equal to 80% defines a positive change in PCT test result representing a higher risk for 28-day all-cause mortality of patients diagnosed with severe sepsis for septic shock.    Change in PCT >80%  A decrease of PCT levels of more than 80% defines a negative change in PCT result representing a lower risk for 28-day all-cause mortality of patients diagnosed with severe sepsis or septic shock.      ACETAMINOPHEN LEVEL - Normal   ETHANOL - Normal    Narrative:     Elevated lactic acid concentration and lactate dehydrogenase(LD) activity may falsely elevate enzymatically determined ethanol levels. Not for legal purposes.    SALICYLATE LEVEL - Normal   MAGNESIUM - Normal   PHOSPHORUS - " Normal   LACTIC ACID, REFLEX - Normal   T4, FREE - Normal   COVID PRE-OP / PRE-PROCEDURE SCREENING ORDER (NO ISOLATION)    Narrative:     The following orders were created for panel order COVID PRE-OP / PRE-PROCEDURE SCREENING ORDER (NO ISOLATION) - Swab, Nasopharynx.  Procedure                               Abnormality         Status                     ---------                               -----------         ------                     Respiratory Panel PCR w/...[[id number]]  Normal              Final result                 Please view results for these tests on the individual orders.   BLOOD CULTURE   BLOOD CULTURE   RAINBOW DRAW    Narrative:     The following orders were created for panel order [place] Draw.  Procedure                               Abnormality         Status                     ---------                               -----------         ------                     Green Top (Gel)[[id number]]                                  Final result               Lavender Top[[id number]]                                     Final result               Gold Top - SST[[id number]]                                   Final result               Ruval[place]aba Top[[id number]]                                         Final result               Light Blue Top[[id number]]                                   Final result                 Please view results for these tests on the individual orders.   URINALYSIS W/ MICROSCOPIC IF INDICATED (NO CULTURE)   URINE DRUG SCREEN   HEPARIN ANTI XA   COMPREHENSIVE METABOLIC PANEL   CBC WITH AUTO DIFFERENTIAL   HEMOGLOBIN A1C   LIPID PANEL   POCT CHEM 8   POCT GLUCOSE FINGERSTICK   POCT GLUCOSE FINGERSTICK   POCT GLUCOSE FINGERSTICK   POCT GLUCOSE FINGERSTICK   POCT GLUCOSE FINGERSTICK   CBC AND DIFFERENTIAL    Narrative:     The following orders were created for panel order CBC & Differential.  Procedure                               Abnormality         Status                     ---------                                -----------         ------                     CBC Auto Differential[[id number]]        Abnormal            Final result                 Please view results for these tests on the individual orders.   GREEN TOP   LAVENDER TOP   GOLD TOP - SST   GRAY TOP   LIGHT BLUE TOP   CBC AND DIFFERENTIAL    Narrative:     The following orders were created for panel order CBC & Differential.  Procedure                               Abnormality         Status                     ---------                               -----------         ------                     CBC Auto Differential[[id number]]                                                         Please view results for these tests on the individual orders.       Meds Given in ED:   Medications   sodium chloride 0.9 % flush 10 mL (has no administration in time range)   sodium chloride 0.9 % flush 10 mL (has no administration in time range)   sodium chloride 0.9 % flush 10 mL (has no administration in time range)   sodium chloride 0.9 % infusion 40 mL (has no administration in time range)   atorvastatin (LIPITOR) tablet 20 mg (has no administration in time range)   Pharmacy to Dose Heparin (has no administration in time range)   sodium chloride 0.9 % flush 10 mL (has no administration in time range)   sodium chloride 0.9 % flush 10 mL (has no administration in time range)   sodium chloride 0.9 % infusion 40 mL (has no administration in time range)   sennosides-docusate (PERICOLACE) 8.6-50 MG per tablet 2 tablet (has no administration in time range)     And   polyethylene glycol (MIRALAX) packet 17 g (has no administration in time range)     And   bisacodyl (DULCOLAX) EC tablet 5 mg (has no administration in time range)     And   bisacodyl (DULCOLAX) suppository 10 mg (has no administration in time range)   Potassium Replacement - Follow Nurse / BPA Driven Protocol (has no administration in time range)   Magnesium Standard Dose Replacement - Follow Nurse /  BPA Driven Protocol (has no administration in time range)   Calcium Replacement - Follow Nurse / BPA Driven Protocol (has no administration in time range)   ondansetron (ZOFRAN) injection 4 mg (has no administration in time range)   melatonin tablet 2.5 mg (has no administration in time range)   nitroglycerin (NITROSTAT) SL tablet 0.4 mg (has no administration in time range)   ! Heparin Infusion on hold; Heparin Infusion to begin when Anti-Xa level < 1 IU/mL (has no administration in time range)   sodium chloride 0.9 % bolus 500 mL (0 mL Intravenous Stopped 3/21/25 2109)   midodrine (PROAMATINE) tablet 5 mg (5 mg Oral Given 3/21/25 1859)     Pharmacy to Dose Heparin,          Last NIH score:  Interval: baseline  1a. Level of Consciousness: 0-->Alert, keenly responsive  1b. LOC Questions: 1-->Answers one question correctly  1c. LOC Commands: 0-->Performs both tasks correctly  2. Best Gaze: 0-->Normal  3. Visual: 0-->No visual loss  4. Facial Palsy: 0-->Normal symmetrical movements  5a. Motor Arm, Left: 0-->No drift, limb holds 90 (or 45) degrees for full 10 secs  5b. Motor Arm, Right: 2-->Some effort against gravity, limb cannot get to or maintain (if cued) 90 (or 45) degrees, drifts down to bed, but has some effort against gravity  6a. Motor Leg, Left: 0-->No drift, leg holds 30 degree position for full 5 secs  6b. Motor Leg, Right: 0-->No drift, leg holds 30 degree position for full 5 secs  7. Limb Ataxia: 0-->Absent  8. Sensory: 0-->Normal, no sensory loss  9. Best Language: 0-->No aphasia, normal  10. Dysarthria: 1-->Mild-to-moderate dysarthria, patient slurs at least some words and, at worst, can be understood with some difficulty  11. Extinction and Inattention (formerly Neglect): 0-->No abnormality    Total (NIH Stroke Scale): 4     Dysphagia screening results:  Patient Factors Component (Dysphagia:Stroke or Rule-out)  Best Eye Response: 4-->(E4) spontaneous (03/21/25 1858)  Best Motor Response: 6-->(M6) obeys  commands (03/21/25 1858)  Best Verbal Response: 5-->(V5) oriented (03/21/25 1858)  Janak Coma Scale Score: 15 (03/21/25 1858)  Is there Facial Asymmetry/Weakness?: No (03/21/25 1858)  Is there Tongue Asymmetry/Weakness?: No (03/21/25 1858)  Is there Palatal Asymmetry/Weakness?: No (03/21/25 1858)  Patient Assessment Result: Pass - Proceed to Water Test (03/21/25 1858)     Janak Coma Scale:  No data recorded     CIWA:        Restraint Type:            Isolation Status:  No active isolations

## 2025-03-22 NOTE — ED PROVIDER NOTES
EMERGENCY DEPARTMENT ENCOUNTER    Pt Name: Enrike Tomas  MRN: [id number]  Pt :   1935  Room Number:    Date of encounter:  3/21/2025  PCP: Blair Dhaliwal MD  ED Provider: Yanick Raygoza MD    Historian: Patient, spouse      HPI:  Chief Complaint: Slurred speech, labile blood pressure        Context: Enrike Tomas is a 89-year-old man who presents because of labile blood pressure and acute slurred speech since 3 PM.  He is end-stage renal on dialysis his wife reports that at 3 PM after she picked him up from dialysis she noted that his speech was slurred and deeper she says she has noticed mild speech changes over the last couple weeks but it was significantly more distinct at 3 PM.  She checked his blood pressure at home and says it was both high and low.  They do not know if they pull any more fluid at dialysis today.  They deny any other complaints at this time.       PAST MEDICAL HISTORY  Past Medical History:   Diagnosis Date    A-fib     Abnormal ECG     occasional extra heartbeat    Anemia, acute on chronic 2023    Arthritis     Atrial fibrillation     BPH (benign prostatic hyperplasia)     Cervical compression fracture 2023    currently wearing a c collar    Diverticulosis     Diverticulum of esophagus     puree diet and speech therapy    Elevated cholesterol     ESRD on hemodialysis     GERD (gastroesophageal reflux disease)     Heart failure     Hemodialysis patient     M,W,F    Hyperlipidemia     Hypertension     Low back pain     NERY (obstructive sleep apnea)     NOT USING CPAP    Recurrent falls     UTI (urinary tract infection)          PAST SURGICAL HISTORY  Past Surgical History:   Procedure Laterality Date    CATARACT EXTRACTION      COLONOSCOPY      DIALYSIS FISTULA CREATION      ENDOSCOPY      INGUINAL HERNIA REPAIR      PROSTATE BIOPSY      THROAT SURGERY      diverticulum in throat    TONSILLECTOMY AND ADENOIDECTOMY      TOTAL SHOULDER ARTHROPLASTY W/  DISTAL CLAVICLE EXCISION Left 9/25/2023    Procedure: HEMIARTHROPLASTY, BICEPS TENODESIS - LEFT;  Surgeon: Matteo Tan MD;  Location: [facility];  Service: Orthopedics;  Laterality: Left;         FAMILY HISTORY  Family History   Problem Relation Age of Onset    Stomach cancer Mother     Heart disease Mother     Arthritis Mother     Heart disease Father     Stroke Father     Kidney failure Father     Cancer Father     Kidney disease Father          SOCIAL HISTORY  Social History     Socioeconomic History    Marital status:    Tobacco Use    Smoking status: Never    Smokeless tobacco: Never   Vaping Use    Vaping status: Never Used   Substance and Sexual Activity    Alcohol use: Not Currently     Alcohol/week: 1.0 standard drink of alcohol     Types: 1 Glasses of wine per week     Comment: once monthly typically    Drug use: Never    Sexual activity: Defer         ALLERGIES  Pollen extract and Mirtazapine        REVIEW OF SYSTEMS  Review of Systems       All systems reviewed and negative except for those discussed in HPI.       PHYSICAL EXAM    I have reviewed the triage vital signs and nursing notes.    ED Triage Vitals [03/21/25 1708]   Temp Heart Rate Resp BP SpO2   99.1 °F (37.3 °C) 69 16 99/45 93 %      Temp src Heart Rate Source Patient Position BP Location FiO2 (%)   Oral Monitor Sitting Left arm --       Physical Exam  GENERAL:   Appears chronically ill  HENT: Nares patent.  Mildly dry mucous membranes face is symmetric  EYES: No scleral icterus.  He was equal and reactive  CV: Regular rhythm, regular rate.  RESPIRATORY: Normal effort.  No audible wheezes, rales or rhonchi.  ABDOMEN: Soft, nontender  MUSCULOSKELETAL: No deformities.   NEURO: Alert, slurred speech, moves all extremities with generalized weakness but no appreciated focal weakness, follows commands.  SKIN: Warm, dry, no rash visualized.      LAB RESULTS  Recent Results (from the past 24 hours)   Protime-INR    Collection Time:  03/21/25  5:56 PM    Specimen: Blood   Result Value Ref Range    Protime 14.7 (H) 12.2 - 14.5 Seconds    INR 1.14 (H) 0.89 - 1.12   aPTT    Collection Time: 03/21/25  5:56 PM    Specimen: Blood   Result Value Ref Range    PTT 24.4 22.0 - 39.0 seconds   Lavender Top    Collection Time: 03/21/25  5:56 PM   Result Value Ref Range    Extra Tube hold for add-on    Gold Top - SST    Collection Time: 03/21/25  5:56 PM   Result Value Ref Range    Extra Tube Hold for add-ons.    Gray Top    Collection Time: 03/21/25  5:56 PM   Result Value Ref Range    Extra Tube Hold for add-ons.    Light Blue Top    Collection Time: 03/21/25  5:56 PM   Result Value Ref Range    Extra Tube Hold for add-ons.    CBC Auto Differential    Collection Time: 03/21/25  5:56 PM    Specimen: Blood   Result Value Ref Range    WBC 5.47 3.40 - 10.80 10*3/mm3    RBC 3.70 (L) 4.14 - 5.80 10*6/mm3    Hemoglobin 13.0 13.0 - 17.7 g/dL    Hematocrit 38.1 37.5 - 51.0 %    .0 (H) 79.0 - 97.0 fL    MCH 35.1 (H) 26.6 - 33.0 pg    MCHC 34.1 31.5 - 35.7 g/dL    RDW 13.8 12.3 - 15.4 %    RDW-SD 52.6 37.0 - 54.0 fl    MPV 9.0 6.0 - 12.0 fL    Platelets 216 140 - 450 10*3/mm3    Neutrophil % 63.4 42.7 - 76.0 %    Lymphocyte % 21.6 19.6 - 45.3 %    Monocyte % 9.7 5.0 - 12.0 %    Eosinophil % 4.0 0.3 - 6.2 %    Basophil % 0.9 0.0 - 1.5 %    Immature Grans % 0.4 0.0 - 0.5 %    Neutrophils, Absolute 3.47 1.70 - 7.00 10*3/mm3    Lymphocytes, Absolute 1.18 0.70 - 3.10 10*3/mm3    Monocytes, Absolute 0.53 0.10 - 0.90 10*3/mm3    Eosinophils, Absolute 0.22 0.00 - 0.40 10*3/mm3    Basophils, Absolute 0.05 0.00 - 0.20 10*3/mm3    Immature Grans, Absolute 0.02 0.00 - 0.05 10*3/mm3    nRBC 0.0 0.0 - 0.2 /100 WBC   Lactic Acid, Plasma    Collection Time: 03/21/25  5:56 PM    Specimen: Blood   Result Value Ref Range    Lactate 2.4 (C) 0.5 - 2.0 mmol/L   Heparin Anti-Xa    Collection Time: 03/21/25  5:56 PM    Specimen: Blood   Result Value Ref Range    Heparin Anti-Xa (UFH)  >1.10 (C) 0.30 - 0.70 IU/ml   Green Top (Gel)    Collection Time: 03/21/25  6:19 PM   Result Value Ref Range    Extra Tube Hold for add-ons.    Comprehensive Metabolic Panel    Collection Time: 03/21/25  6:19 PM    Specimen: Blood   Result Value Ref Range    Glucose 100 (H) 65 - 99 mg/dL    BUN 14 8 - 23 mg/dL    Creatinine 3.62 (H) 0.76 - 1.27 mg/dL    Sodium 139 136 - 145 mmol/L    Potassium 4.5 3.5 - 5.2 mmol/L    Chloride 94 (L) 98 - 107 mmol/L    CO2 27.0 22.0 - 29.0 mmol/L    Calcium 9.7 8.6 - 10.5 mg/dL    Total Protein 7.6 6.0 - 8.5 g/dL    Albumin 4.5 3.5 - 5.2 g/dL    ALT (SGPT) 16 1 - 41 U/L    AST (SGOT) 22 1 - 40 U/L    Alkaline Phosphatase 131 (H) 39 - 117 U/L    Total Bilirubin 0.4 0.0 - 1.2 mg/dL    Globulin 3.1 gm/dL    A/G Ratio 1.5 g/dL    BUN/Creatinine Ratio 3.9 (L) 7.0 - 25.0    Anion Gap 18.0 (H) 5.0 - 15.0 mmol/L    eGFR 15.4 (L) >60.0 mL/min/1.73   Lipase    Collection Time: 03/21/25  6:19 PM    Specimen: Blood   Result Value Ref Range    Lipase 41 13 - 60 U/L   High Sensitivity Troponin T    Collection Time: 03/21/25  6:19 PM    Specimen: Blood   Result Value Ref Range    HS Troponin T 229 (C) <22 ng/L   BNP    Collection Time: 03/21/25  6:19 PM    Specimen: Blood   Result Value Ref Range    proBNP 5,538.0 (H) 0.0 - 1,800.0 pg/mL   Procalcitonin    Collection Time: 03/21/25  6:19 PM    Specimen: Blood   Result Value Ref Range    Procalcitonin 0.24 0.00 - 0.25 ng/mL   C-reactive Protein    Collection Time: 03/21/25  6:19 PM    Specimen: Blood   Result Value Ref Range    C-Reactive Protein 0.71 (H) 0.00 - 0.50 mg/dL   Acetaminophen Level    Collection Time: 03/21/25  6:19 PM    Specimen: Blood   Result Value Ref Range    Acetaminophen <5.0 0.0 - 30.0 mcg/mL   Ethanol    Collection Time: 03/21/25  6:19 PM    Specimen: Blood   Result Value Ref Range    Ethanol <10 0 - 10 mg/dL   Salicylate Level    Collection Time: 03/21/25  6:19 PM    Specimen: Blood   Result Value Ref Range    Salicylate <0.3  <=30.0 mg/dL   Magnesium    Collection Time: 03/21/25  6:19 PM    Specimen: Blood   Result Value Ref Range    Magnesium 2.0 1.6 - 2.4 mg/dL   Phosphorus    Collection Time: 03/21/25  6:19 PM    Specimen: Blood   Result Value Ref Range    Phosphorus 3.2 2.5 - 4.5 mg/dL   TSH Rfx On Abnormal To Free T4    Collection Time: 03/21/25  6:19 PM    Specimen: Blood   Result Value Ref Range    TSH 16.770 (H) 0.270 - 4.200 uIU/mL   T4, Free    Collection Time: 03/21/25  6:19 PM    Specimen: Blood   Result Value Ref Range    Free T4 1.16 0.92 - 1.68 ng/dL   ECG 12 Lead ED Triage Standing Order; Acute Stroke (Onset <24 hrs)    Collection Time: 03/21/25  6:37 PM   Result Value Ref Range    QT Interval 466 ms    QTC Interval 488 ms   Respiratory Panel PCR w/COVID-19(SARS-CoV-2) KEVIN/LYDIA/JONATHAN/PAD/COR/JLUIS In-House, NP Swab in UTM/VTM, 2 HR TAT - Swab, Nasopharynx    Collection Time: 03/21/25  7:03 PM    Specimen: Nasopharynx; Swab   Result Value Ref Range    ADENOVIRUS, PCR Not Detected Not Detected    Coronavirus 229E Not Detected Not Detected    Coronavirus HKU1 Not Detected Not Detected    Coronavirus NL63 Not Detected Not Detected    Coronavirus OC43 Not Detected Not Detected    COVID19 Not Detected Not Detected - Ref. Range    Human Metapneumovirus Not Detected Not Detected    Human Rhinovirus/Enterovirus Not Detected Not Detected    Influenza A PCR Not Detected Not Detected    Influenza B PCR Not Detected Not Detected    Parainfluenza Virus 1 Not Detected Not Detected    Parainfluenza Virus 2 Not Detected Not Detected    Parainfluenza Virus 3 Not Detected Not Detected    Parainfluenza Virus 4 Not Detected Not Detected    RSV, PCR Not Detected Not Detected    Bordetella pertussis pcr Not Detected Not Detected    Bordetella parapertussis PCR Not Detected Not Detected    Chlamydophila pneumoniae PCR Not Detected Not Detected    Mycoplasma pneumo by PCR Not Detected Not Detected       If labs were ordered, I independently reviewed  the results and considered them in treating the patient.        RADIOLOGY  XR Chest 1 View  Result Date: 3/21/2025  XR CHEST 1 VW Date of Exam: 3/21/2025 6:14 PM EDT Indication: Acute Stroke Protocol (onset < 12 hrs) Comparison: [facility] chest x-ray 8/7/2024, 9/26/2023; CT chest 9/10/2023 Findings: Cardiac silhouette remains enlarged. Prominent epicardial fat at the medial right lower chest is stable. Lungs are adequately expanded and appear clear. No pneumothorax or large pleural effusion is seen.     Impression: 1.Enlarged cardiac silhouette. 2.No acute pulmonary abnormality is identified. Electronically Signed: Julisa Cm  3/21/2025 6:31 PM EDT  Workstation ID: [id number]    CT Head Without Contrast Stroke Protocol  Result Date: 3/21/2025  CT HEAD WO CONTRAST STROKE PROTOCOL Date of Exam: 3/21/2025 5:30 PM EDT Indication: Neuro deficit, acute, stroke suspected Neuro Deficit, acute, Stroke suspected. Comparison: 2/26/2025 head CT scan Technique: Axial CT images were obtained of the head without contrast administration.  Reconstructed coronal images were also obtained. Automated exposure control and iterative construction methods were used. Scan Time: 1731 Results discussed with Dr. Raygoza at 1734, 3/21/2025. Findings: Previous exam report indicated no acute intracranial abnormality. Today's study shows the calvarium to appear intact. Included paranasal sinuses and mastoids appear clear. No gross abnormalities are seen in the orbits. Bilateral ocular lens replacements are again noted. There is age-appropriate generalized cerebral atrophy. Punctate low-attenuation change in the central shan slightly to the left of the midline image 16/2 is consistent with an old lacunar infarct, unchanged from prior study. No hemorrhage, contusion, or edema is seen. There is no evidence of mass, mass effect, acute infarct, hydrocephalus, or abnormal extra-axial collection.     Impression: Stable head CT scan. No evidence of acute  intracranial disease. Electronically Signed: Darian Linares MD  3/21/2025 5:42 PM EDT  Workstation ID: [id number]      I ordered and independently reviewed the above noted radiographic studies.      I viewed images of Noncon head CT which showed no acute pathology per my independent interpretation.  Chest x-ray showing cardiomegaly.    See radiologist's dictation for official interpretation.        PROCEDURES    Procedures    ECG 12 Lead ED Triage Standing Order; Acute Stroke (Onset <24 hrs)   Preliminary Result   Test Reason : stroke ro   Blood Pressure :   */*   mmHG   Vent. Rate :  66 BPM     Atrial Rate :  66 BPM      P-R Int : 232 ms          QRS Dur : 126 ms       QT Int : 466 ms       P-R-T Axes :  36 -33  51 degrees     QTcB Int : 488 ms      Sinus rhythm with 1st degree AV block   Left axis deviation   Nonspecific intraventricular block   Abnormal ECG   When compared with ECG of 20-Apr-2024 13:13,   Sinus rhythm has replaced Junctional rhythm   ST no longer depressed in Inferior leads   ST no longer depressed in Anterolateral leads   T wave inversion no longer evident in Anterolateral leads      Referred By: er md           Confirmed By:           MEDICATIONS GIVEN IN ER    Medications   sodium chloride 0.9 % flush 10 mL (has no administration in time range)   heparin 00643 units/250 mL (100 units/mL) in 0.45 % NaCl infusion ( Intravenous Not Given 3/21/25 1942)   Pharmacy to Dose Heparin (has no administration in time range)   sodium chloride 0.9 % bolus 500 mL (500 mL Intravenous New Bag 3/21/25 1859)   midodrine (PROAMATINE) tablet 5 mg (5 mg Oral Given 3/21/25 1859)         MEDICAL DECISION MAKING, PROGRESS, and CONSULTS    All labs, if obtained, have been independently reviewed by me.  All radiology studies, if obtained, have been reviewed by me and the radiologist dictating the report.  All EKGs, if obtained, have been independently viewed and interpreted by me/my attending physician.      Discussion below  represents my analysis of pertinent findings related to patient's condition, differential diagnosis, treatment plan and final disposition.                                          Differential diagnosis:    Ischemic stroke, hemorrhagic stroke, hypoperfusion due to hypotension, sepsis, anemia, electrolyte abnormality      Additional sources:    - Discussed/ obtained information from independent historians: Spouse    - External (non-ED) record review: ECP notes with Dr. Dhaliwal showing history of:  ESRD on hemodialysis   -HFpEF  -paroxysmal A fib  - GERD  -Hx vertebral compression fx,   -BPH,  -Hx hypotension,-  -depression  - osteoporosis     - Chronic or social conditions impacting care: End-stage renal, heart failure, paroxysmal atrial fibrillation        Orders placed during this visit:  Orders Placed This Encounter   Procedures    COVID PRE-OP / PRE-PROCEDURE SCREENING ORDER (NO ISOLATION) - Swab, Nasopharynx    Blood Culture - Blood,    Blood Culture - Blood,    Respiratory Panel PCR w/COVID-19(SARS-CoV-2) KEVIN/LYDIA/JONATHAN/PAD/COR/JLUIS In-House, NP Swab in UTM/VTM, 2 HR TAT - Swab, Nasopharynx    CT Head Without Contrast Stroke Protocol    XR Chest 1 View    MRI Brain Without Contrast    MRI Angiogram Head Without Contrast    MRI Angiogram Neck Without Contrast    [employer] Draw    Protime-INR    aPTT    CBC Auto Differential    Comprehensive Metabolic Panel    Urinalysis With Microscopic If Indicated (No Culture) - Urine, Clean Catch    Lipase    High Sensitivity Troponin T    BNP    Lactic Acid, Plasma    Procalcitonin    C-reactive Protein    Acetaminophen Level    Ethanol    Urine Drug Screen - Urine, Clean Catch    Salicylate Level    Magnesium    Phosphorus    TSH Rfx On Abnormal To Free T4    Heparin Anti-Xa    Protime-INR    aPTT    STAT Lactic Acid, Reflex    Heparin Anti-Xa    T4, Free    High Sensitivity Troponin T 1Hr    NPO Diet NPO Type: Strict NPO    Initiate Code Stroke    Perform NIH Stroke Scale     Measure Actual Weight    Head of Bed 30 Degrees or Less    Undress and Gown    Continuous Pulse Oximetry    Vital Signs    Neuro Checks    Notify Provider SBP <80 or >200    Notify Provider for SBP > 140 if Hemorrhagic Stroke    No Hypotonic Fluids    Nursing Dysphagia Screening (Complete Prior to Giving anything PO)    RN to Place Order SLP Consult (IF swallow screen failed) - Eval & Treat Choosing Reason of RN Dysphagia Screen Failed    Notify Provider    Nursing Dysphagia Screening (Complete Prior to Giving Anything By Mouth)    RN to Place Order SLP Consult - Eval & Treat Choosing Reason of RN Dysphagia Screen Failed    Nurse to Call MD or Nutrition Services for Diet if Patient Passes Dysphagia Screen    Activity As Tolerated    Notify Provider Platelet Count Less Than 01196    Stop Infusion & Notify Provider if Bleeding Occurs    Inpatient Neurology Consult Stroke    Oxygen Therapy- Nasal Cannula; Titrate 1-6 LPM Per SpO2; 90 - 95%    POC CHEM 8    ECG 12 Lead ED Triage Standing Order; Acute Stroke (Onset <24 hrs)    Insert Large-Bore Peripheral IV - Right AC Preferred    CBC & Differential    Green Top (Gel)    Lavender Top    Gold Top - SST    Gray Top    Light Blue Top    CBC & Differential         Additional orders considered but not ordered:      ED Course:    Consultants: Stroke neurology, hospital medicine    ED Course as of 03/21/25 2013   Fri Mar 21, 2025   1728 This is an 89-year-old man who presents because of labile blood pressure and acute slurred speech since 3 PM.  He is end-stage renal on dialysis his wife reports that at 3 PM after she picked him up from dialysis she noted that his speech was slurred and deeper she says she has noticed mild speech changes over the last couple weeks but it was significantly more distinct at 3 PM.  She checked his blood pressure at home and says it was both high and low.  They do not know if they pull any more fluid at dialysis today.  They deny any other  complaints at this time. [CC]   1729 Patient arrived awake and alert he has generalized weakness and does have significantly slurred speech.  Initial pressure of 99/45 could certainly be contributing to symptoms.  It does look like he is on Eliquis because of the acute onset of 3 PM he was made a stroke alert taken directly to CT scanner.  I have ordered his home dose of midodrine and a small 500 cc bolus.  Stroke neurology has been consulted.  Obtaining full infectious and altered mental status workup. [CC]   1803 Head CT personally reviewed while in the scanner with the patient as a stroke alert do not appreciate any acute infarctions or other abnormalities.   [CC]   1803 Stroke team has deferred CTAs and CT perfusion's and is ordering MRI and MRAs.  They are recommending hospitalization for further workup. [CC]   2012 CBC reassuring and nonactionable  Mildly elevated lactate at 2.4 unsurprisingly high-sensitivity troponin is significantly elevated to 29 I think this is likely related to his end-stage renal he denies chest pain at this time proBNP of 5000 is improved from prior values metabolic panel showing creatinine 3.6 consistent with his end-stage renal the rest of his labs generally reassuring and nonactionable.  After the IV fluids and his home dose of midodrine blood pressure is improved he is currently 136/87 and on my reevaluation I think his had a slight improvement in his slurred speech but it is still there and significant.  I have consulted hospitalist for admission and stroke team is still following. [CC]      ED Course User Index  [CC] Yanick Raygoza MD              Shared Decision Making:  After my consideration of clinical presentation and any laboratory/radiology studies obtained, I discussed the findings with the patient/patient representative who is in agreement with the treatment plan and the final disposition.   Risks and benefits of discharge and/or observation/admission were  discussed.       AS OF 20:13 EDT VITALS:    BP - 136/87  HR - 64  TEMP - 99.1 °F (37.3 °C) (Oral)  O2 SATS - 93%                  DIAGNOSIS  Final diagnoses:   Slurred speech   Chronic hypotension   ESRD (end stage renal disease)   Chronic heart failure with preserved ejection fraction (HFpEF)   Severe malnutrition   Diplopia         DISPOSITION  Admit      Please note that portions of this document were completed with voice recognition software.        Yanick Raygoza MD  03/21/25 2016

## 2025-03-23 LAB
ANION GAP SERPL CALCULATED.3IONS-SCNC: 16 MMOL/L (ref 5–15)
BUN SERPL-MCNC: 35 MG/DL (ref 8–23)
BUN/CREAT SERPL: 5.5 (ref 7–25)
CALCIUM SPEC-SCNC: 9.1 MG/DL (ref 8.6–10.5)
CHLORIDE SERPL-SCNC: 95 MMOL/L (ref 98–107)
CO2 SERPL-SCNC: 28 MMOL/L (ref 22–29)
CREAT SERPL-MCNC: 6.31 MG/DL (ref 0.76–1.27)
EGFRCR SERPLBLD CKD-EPI 2021: 7.9 ML/MIN/1.73
GLUCOSE SERPL-MCNC: 77 MG/DL (ref 65–99)
POTASSIUM SERPL-SCNC: 5 MMOL/L (ref 3.5–5.2)
SODIUM SERPL-SCNC: 139 MMOL/L (ref 136–145)

## 2025-03-23 PROCEDURE — 93005 ELECTROCARDIOGRAM TRACING: CPT | Performed by: STUDENT IN AN ORGANIZED HEALTH CARE EDUCATION/TRAINING PROGRAM

## 2025-03-23 PROCEDURE — 99232 SBSQ HOSP IP/OBS MODERATE 35: CPT | Performed by: STUDENT IN AN ORGANIZED HEALTH CARE EDUCATION/TRAINING PROGRAM

## 2025-03-23 PROCEDURE — 93010 ELECTROCARDIOGRAM REPORT: CPT | Performed by: INTERNAL MEDICINE

## 2025-03-23 PROCEDURE — 80048 BASIC METABOLIC PNL TOTAL CA: CPT | Performed by: STUDENT IN AN ORGANIZED HEALTH CARE EDUCATION/TRAINING PROGRAM

## 2025-03-23 RX ADMIN — APIXABAN 2.5 MG: 2.5 TABLET, FILM COATED ORAL at 09:58

## 2025-03-23 RX ADMIN — SERTRALINE HYDROCHLORIDE 100 MG: 100 TABLET ORAL at 10:00

## 2025-03-23 RX ADMIN — TAMSULOSIN HYDROCHLORIDE 0.4 MG: 0.4 CAPSULE ORAL at 09:59

## 2025-03-23 RX ADMIN — Medication 10 ML: at 10:00

## 2025-03-23 RX ADMIN — MEMANTINE HYDROCHLORIDE 5 MG: 10 TABLET, FILM COATED ORAL at 09:59

## 2025-03-23 RX ADMIN — ATORVASTATIN CALCIUM 20 MG: 20 TABLET, FILM COATED ORAL at 20:24

## 2025-03-23 RX ADMIN — PANTOPRAZOLE SODIUM 40 MG: 40 TABLET, DELAYED RELEASE ORAL at 09:58

## 2025-03-23 RX ADMIN — Medication 10 ML: at 20:24

## 2025-03-23 RX ADMIN — MIDODRINE HYDROCHLORIDE 5 MG: 5 TABLET ORAL at 09:59

## 2025-03-23 RX ADMIN — MEMANTINE HYDROCHLORIDE 5 MG: 10 TABLET, FILM COATED ORAL at 20:23

## 2025-03-23 RX ADMIN — LEVOTHYROXINE SODIUM 137 MCG: 0.14 TABLET ORAL at 05:52

## 2025-03-23 RX ADMIN — APIXABAN 2.5 MG: 2.5 TABLET, FILM COATED ORAL at 20:24

## 2025-03-23 RX ADMIN — AMIODARONE HYDROCHLORIDE 100 MG: 200 TABLET ORAL at 09:58

## 2025-03-23 NOTE — PLAN OF CARE
Goal Outcome Evaluation:  Plan of Care Reviewed With: patient, spouse           Outcome Evaluation: PT initial Eval completed.  Pt presents with generalized weakness, cognitive impairments, balance/coordination deficits, decreased functional endurance, and decreased indep and safety with functional mobility compared to baseline.  Ambulated 10ft with modAx2 and FWW; exhibited 2 LOB.  Pt at increased risk for falls.  Pt will benefit from skilled IP PT to improve indep and safety with mobility and promote return to PLOF.  Once medically appropriate, rec IPR upon d/c for best fxl outcome.    Anticipated Discharge Disposition (PT): inpatient rehabilitation facility

## 2025-03-23 NOTE — CASE MANAGEMENT/SOCIAL WORK
Discharge Planning Assessment  [facility]     Patient Name: Enrike Tomas  MRN: [id number]  Today's Date: 3/23/2025    Admit Date: 3/21/2025    Plan: Home   Discharge Needs Assessment       Row Name 03/23/25 1421       Living Environment    People in Home spouse    Name(s) of People in Home Cleo Tomas-spouse [phone]    Current Living Arrangements home    Primary Care Provided by spouse/significant other;other (see comments)  has a caregiver 5 days/week, 6 hrs a day    Provides Primary Care For no one, unable/limited ability to care for self    Family Caregiver if Needed spouse;other (see comments)  caregiver 5 days/wk, 6 hrs/day    Quality of Family Relationships helpful;involved;supportive    Able to Return to Prior Arrangements yes       Transition Planning    Patient/Family Anticipates Transition to home with family    Patient/Family Anticipated Services at Transition     Transportation Anticipated family or friend will provide       Discharge Needs Assessment    Equipment Currently Used at Home wheelchair;walker, rolling;oxygen                   Discharge Plan       Row Name 03/23/25 1427       Plan    Plan Home    Patient/Family in Agreement with Plan yes    Plan Comments Met with patient and his wife in his room, to initiate discharge summary. Mr. Tomas is confused, wife answered initial questions. He lives with his wife in [facility]. Reported he has [employer] Medicare Replacement insurance. He has prescription coverage and uses [facility] on [address] to get his prescriptions  filled. His PCP is Blair Dhaliwal MD. Prior to admission, he required assistance with ADL's. He has a wheelchair, rolling walker and oxygen (not used in 2 years), He is not current with home health. He does have a caregiver 5 day/s week, 6 hours/day.He has dialysis at [facility] on [address] on M-W-F at 1000. The plan for Mr. Tomas is for him to go home at discharge. Family will transport. Await therapy  recommendations to determine proper discharge placement or plan. CM will continue to fllow for medical readiness and will assist with any discharge needs as indicated.    Final Discharge Disposition Code 01 - home or self-care                       Demographic Summary       Row Name 03/23/25 1419       General Information    Admission Type inpatient    Arrived From emergency department    Referral Source admission list    Reason for Consult discharge planning    Preferred Language English       Contact Information    Permission Granted to Share Info With     Contact Information Obtained for                    Functional Status       Row Name 03/23/25 1420       Functional Status    Usual Activity Tolerance moderate    Current Activity Tolerance moderate       Functional Status, IADL    Medications assistive person    Meal Preparation assistive person    Housekeeping assistive person    Laundry assistive person    Shopping assistive person                   Psychosocial    No documentation.                  Abuse/Neglect    No documentation.                  Legal    No documentation.                  Substance Abuse    No documentation.                  Patient Forms    No documentation.                     Hui Trujillo RN

## 2025-03-23 NOTE — THERAPY EVALUATION
Patient Name: Enrike Tomas  : 1935    MRN: [id number]                              Today's Date: 3/22/2025       Admit Date: 3/21/2025    Visit Dx:     ICD-10-CM ICD-9-CM   1. Slurred speech  R47.81 784.59   2. Chronic hypotension  I95.89 458.1   3. ESRD (end stage renal disease)  N18.6 585.6   4. Chronic heart failure with preserved ejection fraction (HFpEF)  I50.32 428.9   5. Severe malnutrition  E43 261   6. Diplopia  H53.2 368.2     Patient Active Problem List   Diagnosis    (HFpEF) heart failure with preserved ejection fraction    Anemia of renal disease    At high risk for falls    Benign prostatic hyperplasia    Closed fracture of left acetabulum    Compression fracture of thoracic vertebra    Deficiency of other specified B group vitamins    Depression    ESRD (end stage renal disease)    Essential hypertension    GERD (gastroesophageal reflux disease)    Hepatic cyst    Long term (current) use of anticoagulants    Major depressive disorder, single episode, moderate    Mixed hyperlipidemia    Morgagni hernia    Occlusion of right vertebral artery    NERY (obstructive sleep apnea)    Fall    Severe malnutrition    Sustained SVT    Paroxysmal atrial fibrillation    Chronic heart failure with preserved ejection fraction (HFpEF)    S/P shoulder hemiarthroplasty, left    Anemia, acute on chronic    Postoperative confusion    Hyperkalemia    Chronic systolic heart failure    ESRD needing dialysis    Dysarthria     Past Medical History:   Diagnosis Date    A-fib     Abnormal ECG     occasional extra heartbeat    Anemia, acute on chronic 2023    Arthritis     Atrial fibrillation     BPH (benign prostatic hyperplasia)     Cervical compression fracture 2023    currently wearing a c collar    Diverticulosis     Diverticulum of esophagus     puree diet and speech therapy    Elevated cholesterol     ESRD on hemodialysis     GERD (gastroesophageal reflux disease)     Heart failure     Hemodialysis  patient     M,W,F    Hyperlipidemia     Hypertension     Low back pain 2022    NERY (obstructive sleep apnea)     NOT USING CPAP    Recurrent falls     UTI (urinary tract infection)      Past Surgical History:   Procedure Laterality Date    CATARACT EXTRACTION      COLONOSCOPY      DIALYSIS FISTULA CREATION      ENDOSCOPY      INGUINAL HERNIA REPAIR      PROSTATE BIOPSY      THROAT SURGERY      diverticulum in throat    TONSILLECTOMY AND ADENOIDECTOMY      TOTAL SHOULDER ARTHROPLASTY W/ DISTAL CLAVICLE EXCISION Left 9/25/2023    Procedure: HEMIARTHROPLASTY, BICEPS TENODESIS - LEFT;  Surgeon: Matteo Tan MD;  Location: [facility];  Service: Orthopedics;  Laterality: Left;      General Information       Row Name 03/22/25 1943          Physical Therapy Time and Intention    Document Type evaluation  -BA     Mode of Treatment physical therapy  -BA       Row Name 03/22/25 1943          General Information    Patient Profile Reviewed yes  -BA     Prior Level of Function independent:;min assist:;all household mobility;community mobility;gait;transfer;w/c or scooter;bed mobility;mod assist:;ADL's;dependent:;home management;driving;using stairs  Pt limited historian. Primarily uses FWW for ambulation and uses transport chair for longer community distance mobility. Per wife, someone is next to pt when mobilizing for safety. Hx falls, with last fall ~3 wks ago.  -BA     Existing Precautions/Restrictions fall;other (see comments)  memory impairment and double vision per medical chart; confusion; ataxia; R shoulder pain  -BA     Barriers to Rehab medically complex;previous functional deficit;cognitive status;visual deficit  -BA       Row Name 03/22/25 1943          Living Environment    Current Living Arrangements home  -BA     People in Home spouse;other (see comments)  Lives with his wife.  Reported has caregiver 5 days/wk for 6 hr/day.  Pt's wife also reported their dtr lives next door and comes over to assist as  needed.  -       Row Name 03/22/25 1943          Home Main Entrance    Number of Stairs, Main Entrance none;other (see comments)  has ramp  -       Row Name 03/22/25 1943          Stairs Within Home, Primary    Stairs, Within Home, Primary Has 1 flight to upstairs with stair lift.  Pt's bedroom is located upstairs.  -     Number of Stairs, Within Home, Primary other (see comments)  -       Row Name 03/22/25 1943          Cognition    Orientation Status (Cognition) oriented x 3;disoriented to;situation;other (see comments)  Appeared to exhibit some intermittent situational and conversational confusion throughout session.  -       Row Name 03/22/25 1943          Safety Issues/Impairments Affecting Functional Mobility    Safety Issues Affecting Function (Mobility) awareness of need for assistance;insight into deficits/self-awareness;judgment;positioning of assistive device;problem-solving;safety precaution awareness;safety precautions follow-through/compliance;sequencing abilities;at risk behavior observed  -     Impairments Affecting Function (Mobility) balance;cognition;coordination;endurance/activity tolerance;motor control;motor planning;pain;postural/trunk control;range of motion (ROM);strength;visual/perceptual  -     Cognitive Impairments, Mobility Safety/Performance attention;awareness, need for assistance;insight into deficits/self-awareness;judgment;problem-solving/reasoning;safety precaution awareness;safety precaution follow-through;sequencing abilities  -     Comment, Safety Issues/Impairments (Mobility) Exhibited conversational confusion throughout session.  Easily distracted and required several VCs for redirection and focus to task at hand.  Decreased safety awareness.  -               User Key  (r) = Recorded By, (t) = Taken By, (c) = Cosigned By      Initials Name Provider Type    Estella Holland, PT Physical Therapist                   Mobility       Row Name 03/22/25 1957           Bed Mobility    Comment, (Bed Mobility) Upon arrival, received sitting EOB with pt's wife right next to him.  -       Row Name 03/22/25 1957          Sit-Stand Transfer    Sit-Stand Hale (Transfers) minimum assist (75% patient effort);2 person assist;verbal cues;nonverbal cues (demo/gesture)  -     Assistive Device (Sit-Stand Transfers) walker, front-wheeled  -     Comment, (Sit-Stand Transfer) STS from EOB.  Exhibited initial posterior lean with mild LOB.  VCs/TCs for optimal pre-positioning, sequencing, hand placement, and upright posture.  -BA       Row Name 03/22/25 1957          Gait/Stairs (Locomotion)    [place]ale Level (Gait) moderate assist (50% patient effort);2 person assist;verbal cues;nonverbal cues (demo/gesture)  -     Assistive Device (Gait) walker, front-wheeled  -     Distance in Feet (Gait) 10  -BA     Deviations/Abnormal Patterns (Gait) bilateral deviations;ataxic;base of support, narrow;amberly decreased;gait speed decreased;stride length decreased  -     Bilateral Gait Deviations forward flexed posture;heel strike decreased  -     Comment, (Gait/Stairs) Demo'd step through gait pattern.  Ataxic gait with decreased BLE coordination and more narrow HARRISON with near scissor steps at times.  Exhibited 2 LOB, 1 posteriorly and 1 to L side.  Will intermittently take R hand off FWW d/t reported R shoulder pain.  Ran into object on L side.  VCs/TCs for walker management/steering, environmental scanning/navigation, to keep R hand on FWW, and for improved safety awareness.  Gait distance limited by fatigue.  -               User Key  (r) = Recorded By, (t) = Taken By, (c) = Cosigned By      Initials Name Provider Type    Estella Holland PT Physical Therapist                   Obj/Interventions       Row Name 03/22/25 2006          Range of Motion Comprehensive    General Range of Motion lower extremity range of motion deficits identified  -     Comment, General  Range of Motion AROM B hip and knee WFL.  Decreased AROM/AAROM B ankle DF to ~neutral.  -       Row Name 03/22/25 2006          Strength Comprehensive (MMT)    General Manual Muscle Testing (MMT) Assessment lower extremity strength deficits identified  -     Comment, General Manual Muscle Testing (MMT) Assessment Grossly B hip 4-/5, B knee and ankle 4/5.  -       Row Name 03/22/25 2006          Motor Skills    Motor Skills coordination;functional endurance  -     Coordination gross motor deficit;bilateral;lower extremity;heel to shin;minimal impairment;moderate impairment  -     Functional Endurance Decreased functional endurance.  Easily fatigues with activity.  -       Row Name 03/22/25 2006          Balance    Balance Assessment sitting static balance;sitting dynamic balance;standing static balance;standing dynamic balance  -     Static Sitting Balance standby assist  -     Dynamic Sitting Balance contact guard  -     Position, Sitting Balance unsupported;sitting edge of bed;sitting in chair  -     Static Standing Balance minimal assist;2-person assist;verbal cues  -     Dynamic Standing Balance moderate assist;2-person assist;verbal cues  -     Position/Device Used, Standing Balance supported;walker, front-wheeled  -     Comment, Balance Increased unsteadiness with standing and ambulation activity with FWW.  Exhibited 1 mild posterior LOB upon initially standing and 2 LOB during ambulation, 1 posteriorly and 1 to L side.  Pt at increased risk for falls.  -       Row Name 03/22/25 2006          Sensory Assessment (Somatosensory)    Left LE Sensory Assessment light touch awareness;impaired;other (see comments)  Reported N/T on L knee cap.  -               User Key  (r) = Recorded By, (t) = Taken By, (c) = Cosigned By      Initials Name Provider Type     Estella Harper, PT Physical Therapist                   Goals/Plan       Row Name 03/22/25 2018          Bed Mobility Goal 1  (PT)    Activity/Assistive Device (Bed Mobility Goal 1, PT) sit to supine/supine to sit  -BA     Slope Level/Cues Needed (Bed Mobility Goal 1, PT) contact guard required  -BA     Time Frame (Bed Mobility Goal 1, PT) short term goal (STG);5 days  -BA       Row Name 03/22/25 2018          Transfer Goal 1 (PT)    Activity/Assistive Device (Transfer Goal 1, PT) sit-to-stand/stand-to-sit;bed-to-chair/chair-to-bed;walker, rolling  -BA     Slope Level/Cues Needed (Transfer Goal 1, PT) contact guard required  -BA     Time Frame (Transfer Goal 1, PT) long term goal (LTG);10 days  -BA       Row Name 03/22/25 2018          Gait Training Goal 1 (PT)    Activity/Assistive Device (Gait Training Goal 1, PT) gait (walking locomotion);assistive device use;walker, rolling;decrease fall risk;diminish gait deviation;improve balance and speed;increase endurance/gait distance  -BA     Slope Level (Gait Training Goal 1, PT) contact guard required  -BA     Distance (Gait Training Goal 1, PT) 75ft  -BA     Time Frame (Gait Training Goal 1, PT) long term goal (LTG);10 days  -       Row Name 03/22/25 2018          Therapy Assessment/Plan (PT)    Planned Therapy Interventions (PT) balance training;bed mobility training;gait training;home exercise program;motor coordination training;neuromuscular re-education;patient/family education;postural re-education;ROM (range of motion);strengthening;stretching;transfer training  -BA               User Key  (r) = Recorded By, (t) = Taken By, (c) = Cosigned By      Initials Name Provider Type    Estella Holland, PT Physical Therapist                   Clinical Impression       Row Name 03/22/25 2013          Pain    Pain Location shoulder  -BA     Pain Side/Orientation right  -BA     Pain Management Interventions activity modification encouraged;exercise or physical activity utilized;positioning techniques utilized;nursing notified  -BA     Response to Pain Interventions  activity participation with tolerable pain  -     Additional Documentation Pain Scale: FACES Pre/Post-Treatment (Group)  -       Row Name 03/22/25 2013          Pain Scale: FACES Pre/Post-Treatment    Pain: FACES Scale, Pretreatment 2-->hurts little bit  -     Posttreatment Pain Rating 2-->hurts little bit  -     Pre/Posttreatment Pain Comment Reports increased pain in R shoulder when moving RUE.  Exhibits facial grimmacing.  -       Row Name 03/22/25 2013          Plan of Care Review    Plan of Care Reviewed With patient;spouse  -     Outcome Evaluation PT initial Eval completed.  Pt presents with generalized weakness, cognitive impairments, balance/coordination deficits, decreased functional endurance, and decreased indep and safety with functional mobility compared to baseline.  Ambulated 10ft with modAx2 and FWW; exhibited 2 LOB.  Pt at increased risk for falls.  Pt will benefit from skilled IP PT to improve indep and safety with mobility and promote return to PLOF.  Once medically appropriate, rec IPR upon d/c for best fxl outcome.  -       Row Name 03/22/25 2013          Therapy Assessment/Plan (PT)    Patient/Family Therapy Goals Statement (PT) to return home and to PLOF  -     Rehab Potential (PT) fair  -     Criteria for Skilled Interventions Met (PT) yes;meets criteria;skilled treatment is necessary  -     Therapy Frequency (PT) daily  -     Predicted Duration of Therapy Intervention (PT) 10 days  -       Row Name 03/22/25 2013          Vital Signs    Pre Systolic BP Rehab 124  -BA     Pre Treatment Diastolic BP 82  -BA     Post Systolic BP Rehab 134  -BA     Post Treatment Diastolic BP 89  -BA     Pretreatment Heart Rate (beats/min) 77  -BA     Posttreatment Heart Rate (beats/min) 71  -BA     Pre SpO2 (%) 97  -BA     O2 Delivery Pre Treatment room air  -BA     O2 Delivery Intra Treatment room air  -BA     Post SpO2 (%) 94  -BA     O2 Delivery Post Treatment room air  -BA     Pre  Patient Position Sitting  -BA     Post Patient Position Sitting  -BA       Row Name 03/22/25 2013          Positioning and Restraints    Pre-Treatment Position in bed  -BA     Post Treatment Position chair  -BA     In Chair notified nsg;reclined;call light within reach;encouraged to call for assist;exit alarm on;with family/caregiver;waffle cushion;legs elevated  -BA               User Key  (r) = Recorded By, (t) = Taken By, (c) = Cosigned By      Initials Name Provider Type    Estella Holland, PT Physical Therapist                   Outcome Measures       Row Name 03/22/25 2020          How much help from another person do you currently need...    Turning from your back to your side while in flat bed without using bedrails? 3  -BA     Moving from lying on back to sitting on the side of a flat bed without bedrails? 2  -BA     Moving to and from a bed to a chair (including a wheelchair)? 2  -BA     Standing up from a chair using your arms (e.g., wheelchair, bedside chair)? 3  -BA     Climbing 3-5 steps with a railing? 2  -BA     To walk in hospital room? 2  -BA     AM-PAC 6 Clicks Score (PT) 14  -BA     Highest Level of Mobility Goal 4 --> Transfer to chair/commode  -       Row Name 03/22/25 2020          Modified [place] Scale    Pre-Stroke Modified [place] Scale 6 - Unable to determine (UTD) from the medical record documentation  -BA     Modified [place] Scale 4 - Moderately severe disability.  Unable to walk without assistance, and unable to attend to own bodily needs without assistance.  -       Row Name 03/22/25 2020 03/22/25 1612       Functional Assessment    Outcome Measure Options AM-PAC 6 Clicks Basic Mobility (PT);Modified [place]  -BA AM-PAC 6 Clicks Daily Activity (OT)  -AF              User Key  (r) = Recorded By, (t) = Taken By, (c) = Cosigned By      Initials Name Provider Type    Estella Holland, PT Physical Therapist    Fabiana Sanchez OT Occupational Therapist                                  Physical Therapy Education       Title: PT OT SLP Therapies (In Progress)       Topic: Physical Therapy (In Progress)       Point: Mobility training (Done)       Learning Progress Summary            Patient Acceptance, E, VU,NR by VERONICA at 3/22/2025 2020                      Point: Home exercise program (Not Started)       Learner Progress:  Not documented in this visit.              Point: Body mechanics (Done)       Learning Progress Summary            Patient Acceptance, E, VU,NR by VERONICA at 3/22/2025 2020                      Point: Precautions (Done)       Learning Progress Summary            Patient Acceptance, E, VU,NR by VERONICA at 3/22/2025 2020                                      User Key       Initials Effective Dates Name Provider Type Discipline    VERONICA 09/21/21 -  Estella Harper, PT Physical Therapist PT                  PT Recommendation and Plan  Planned Therapy Interventions (PT): balance training, bed mobility training, gait training, home exercise program, motor coordination training, neuromuscular re-education, patient/family education, postural re-education, ROM (range of motion), strengthening, stretching, transfer training  Outcome Evaluation: PT initial Eval completed.  Pt presents with generalized weakness, cognitive impairments, balance/coordination deficits, decreased functional endurance, and decreased indep and safety with functional mobility compared to baseline.  Ambulated 10ft with modAx2 and FWW; exhibited 2 LOB.  Pt at increased risk for falls.  Pt will benefit from skilled IP PT to improve indep and safety with mobility and promote return to PLOF.  Once medically appropriate, rec IPR upon d/c for best fxl outcome.     Time Calculation:   PT Evaluation Complexity  History, PT Evaluation Complexity: 3 or more personal factors and/or comorbidities  Examination of Body Systems (PT Eval Complexity): total of 3 or more elements  Clinical Presentation (PT Evaluation Complexity):  evolving  Clinical Decision Making (PT Evaluation Complexity): moderate complexity  Overall Complexity (PT Evaluation Complexity): moderate complexity     PT Charges       Row Name 03/22/25 2021             Time Calculation    Start Time 1347  -BA      PT Received On 03/22/25  -BA      PT Goal Re-Cert Due Date 04/01/25  -BA         Time Calculation- PT    Total Timed Code Minutes- PT 14 minute(s)  -BA         Timed Charges    98337 - PT Therapeutic Activity Minutes 14  -BA         Untimed Charges    PT Eval/Re-eval Minutes 65  -BA         Total Minutes    Timed Charges Total Minutes 14  -BA      Untimed Charges Total Minutes 65  -BA       Total Minutes 79  -BA                User Key  (r) = Recorded By, (t) = Taken By, (c) = Cosigned By      Initials Name Provider Type    Estella Holland, PT Physical Therapist                  Therapy Charges for Today       Code Description Service Date Service Provider Modifiers Qty    33582294054 HC PT THERAPEUTIC ACT EA 15 MIN 3/22/2025 Estella Harper, PT GP 1    82790008767 HC PT EVAL MOD COMPLEXITY 4 3/22/2025 Estella Harper, PT GP 1            PT G-Codes  Outcome Measure Options: AM-PAC 6 Clicks Basic Mobility (PT), Modified [place]  AM-PAC 6 Clicks Score (PT): 14  AM-PAC 6 Clicks Score (OT): 16  Modified [place] Scale: 4 - Moderately severe disability.  Unable to walk without assistance, and unable to attend to own bodily needs without assistance.  PT Discharge Summary  Anticipated Discharge Disposition (PT): inpatient rehabilitation facility    Estella Harper PT  3/22/2025

## 2025-03-23 NOTE — PROGRESS NOTES
[facility]  PROGRESS NOTE    Patient Name: Enrike Tomas  : 1935  MRN: [id number]    Date of Admission: 3/21/2025  Primary Care Physician: Blair Dhaliwal MD    Subjective   Subjective     CC:  Dysarthria    HPI:  Patient's wife is at bedside.  Patient slept very well overnight and is still sleeping throughout the morning today.  He did not eat breakfast or lunch.  Denied any dysuria.  Denied any urinary frequency.  Stated he did not have any vivid dreams last night.    Objective   Objective     Vital Signs:   Temp:  [97 °F (36.1 °C)-98 °F (36.7 °C)] 97 °F (36.1 °C)  Heart Rate:  [] 77  Resp:  [16-20] 20  BP: (116-161)/(59-98) 151/80  Flow (L/min) (Oxygen Therapy):  [2] 2     Physical Exam:  Constitutional: No acute distress, sleepy  HENT: NCAT, mucous membranes moist  Respiratory: Diminished in the bilateral bases, respiratory effort normal   Cardiovascular: RRR  Gastrointestinal: Normoactive bowel sounds, soft, nontender, nondistended  Musculoskeletal: No bilateral ankle edema  Psychiatric: Somewhat cooperative  Neurologic: Oriented to self, follows all commands, speech slightly dysarthric  Skin: No rashes    Results Reviewed:  LAB RESULTS:      Lab 25  0427 25  2145 25  2032 25  1819 25  1756   WBC 5.95  --   --   --  5.47   HEMOGLOBIN 12.8*  --   --   --  13.0   HEMATOCRIT 39.3  --   --   --  38.1   PLATELETS 207  --   --   --  216   NEUTROS ABS 3.19  --   --   --  3.47   IMMATURE GRANS (ABS) 0.07*  --   --   --  0.02   LYMPHS ABS 1.72  --   --   --  1.18   MONOS ABS 0.60  --   --   --  0.53   EOS ABS 0.29  --   --   --  0.22   .7*  --   --   --  103.0*   CRP  --   --   --  0.71*  --    PROCALCITONIN  --   --   --  0.24  --    LACTATE  --   --  1.6  --  2.4*   PROTIME  --   --  15.3*  --  14.7*   APTT  --   --  30.6*  --  24.4   HEPARIN ANTI-XA 0.71*  --  1.05*  --  >1.10*   HSTROP T  --  207*  --  229*  --           Lab 03/22/25 0427 03/21/25 1819 03/20/25  1628   SODIUM 140 139  --    POTASSIUM 4.2 4.5  --    CHLORIDE 95* 94*  --    CO2 28.0 27.0  --    ANION GAP 17.0* 18.0*  --    BUN 20 14  --    CREATININE 3.65* 3.62*  --    EGFR 15.2* 15.4*  --    GLUCOSE 75 100*  --    CALCIUM 9.7 9.7  --    IONIZED CALCIUM  --   --  4.9   MAGNESIUM  --  2.0  --    PHOSPHORUS  --  3.2  --    HEMOGLOBIN A1C 4.80  --   --    TSH  --  16.770*  --          Lab 03/22/25 0427 03/21/25 1819   TOTAL PROTEIN 7.2 7.6   ALBUMIN 4.1 4.5   GLOBULIN 3.1 3.1   ALT (SGPT) 13 16   AST (SGOT) 17 22   BILIRUBIN 0.4 0.4   ALK PHOS 126* 131*   LIPASE  --  41         Lab 03/21/25  2145 03/21/25 2032 03/21/25  1819 03/21/25  1756   PROBNP  --   --  5,538.0*  --    HSTROP T 207*  --  229*  --    PROTIME  --  15.3*  --  14.7*   INR  --  1.20*  --  1.14*         Lab 03/22/25 0427   CHOLESTEROL 164   LDL CHOL 63   HDL CHOL 81*   TRIGLYCERIDES 114             Brief Urine Lab Results  (Last result in the past 365 days)        Color   Clarity   Blood   Leuk Est   Nitrite   Protein   CREAT   Urine HCG        03/22/25 0424 Yellow   Clear   Negative   Trace   Negative   100 mg/dL (2+)                   Microbiology Results Abnormal       None            XR Chest 1 View  Result Date: 3/22/2025  XR CHEST 1 VW Date of Exam: 3/22/2025 3:07 PM EDT Indication: hypoxia Comparison: Chest x-ray 3/21/2025 Findings: Stable cardiomediastinal silhouette. Redemonstration of circumscribed round opacity overlying the right lower heart border may reflect epicardial fat or pericardial cyst. The lungs are grossly clear. Similar thin left apical pleural calcifications. No focal consolidation. No pleural effusion. No pneumothorax. Left total shoulder arthroplasty is redemonstrated. There is chronic remodeling of the right glenohumeral joint.     Impression: Impression: No acute cardiopulmonary findings. Electronically Signed: Young Canales MD  3/22/2025 4:50 PM EDT  Workstation ID:  [id number]    MRI Angiogram Neck Without Contrast  Result Date: 3/22/2025  MRI ANGIOGRAM NECK WO CONTRAST Date of Exam: 3/21/2025 11:39 PM EDT Indication: dysarthria.  Comparison: None available. Technique:  Routine 3-D time-of-flight gradient echo imaging was obtained of the neck without contrast administration. Findings: No evidence of hemodynamically significant stenosis, AVM or aneurysm. No large vessel occlusion identified. No definite dissection. Limited evaluation due to motion artifact. Right vertebral artery appears diminutive.     Impression: Impression: No evidence of hemodynamically significant stenosis, AVM or aneurysm. Electronically Signed: Amada Guevara MD  3/22/2025 12:24 AM EDT  Workstation ID: [id number]    MRI Angiogram Head Without Contrast  Result Date: 3/21/2025  MRI ANGIOGRAM HEAD WO CONTRAST Date of Exam: 3/21/2025 11:20 PM EDT Indication: dyarthria.  Comparison: 3/21/2025. Technique:  Routine 3-D time-of-flight gradient echo imaging was obtained of the head without contrast administration. Findings: No evidence of hemodynamically significant stenosis, AVM or aneurysm. Slightly limited evaluation due to motion artifact. No large vessel occlusion identified.     Impression: Impression: No evidence of hemodynamically significant stenosis, AVM or aneurysm. Electronically Signed: Amada Guevara MD  3/21/2025 11:49 PM EDT  Workstation ID: [id number]    MRI Brain Without Contrast  Result Date: 3/21/2025  MRI BRAIN WO CONTRAST Date of Exam: 3/21/2025 11:15 PM EDT Indication: dysarthria.  Comparison: 3/21/2025. Technique:  Routine multiplanar/multisequence sequence images of the brain were obtained without contrast administration. Findings: There is no diffusion restriction to suggest acute infarct. There is no evidence of acute or chronic intracranial hemorrhage. No mass effect or midline shift. Mild periventricular and subcortical FLAIR signal changes are present. No abnormal extra-axial collections. The  major vascular flow voids appear intact. The basal ganglia, brainstem and cerebellum appear within normal limits. Calvarial and superficial soft tissue signal is within normal limits. Orbits appear unremarkable. The paranasal sinuses and  the mastoid air cells appear well aerated. Midline structures are intact.     Impression: Impression: 1.No evidence of hemorrhage, mass effect or midline shift. No evidence of recent or acute ischemia. 2.Mild periventricular and subcortical FLAIR signal changes likely related to chronic microvascular ischemic change. Electronically Signed: Amada Guevara MD  3/21/2025 11:28 PM EDT  Workstation ID: [id number]    XR Chest 1 View  Result Date: 3/21/2025  XR CHEST 1 VW Date of Exam: 3/21/2025 6:14 PM EDT Indication: Acute Stroke Protocol (onset < 12 hrs) Comparison: AP chest x-ray 8/7/2024, 9/26/2023; CT chest 9/10/2023 Findings: Cardiac silhouette remains enlarged. Prominent epicardial fat at the medial right lower chest is stable. Lungs are adequately expanded and appear clear. No pneumothorax or large pleural effusion is seen.     Impression: Impression: 1.Enlarged cardiac silhouette. 2.No acute pulmonary abnormality is identified. Electronically Signed: Julisa Cm  3/21/2025 6:31 PM EDT  Workstation ID: [id number]    CT Head Without Contrast Stroke Protocol  Result Date: 3/21/2025  CT HEAD WO CONTRAST STROKE PROTOCOL Date of Exam: 3/21/2025 5:30 PM EDT Indication: Neuro deficit, acute, stroke suspected Neuro Deficit, acute, Stroke suspected. Comparison: 2/26/2025 head CT scan Technique: Axial CT images were obtained of the head without contrast administration.  Reconstructed coronal images were also obtained. Automated exposure control and iterative construction methods were used. Scan Time: 1731 Results discussed with Dr. Raygoza at 1734, 3/21/2025. Findings: Previous exam report indicated no acute intracranial abnormality. Today's study shows the calvarium to appear intact. Included  paranasal sinuses and mastoids appear clear. No gross abnormalities are seen in the orbits. Bilateral ocular lens replacements are again noted. There is age-appropriate generalized cerebral atrophy. Punctate low-attenuation change in the central shan slightly to the left of the midline image 16/2 is consistent with an old lacunar infarct, unchanged from prior study. No hemorrhage, contusion, or edema is seen. There is no evidence of mass, mass effect, acute infarct, hydrocephalus, or abnormal extra-axial collection.     Impression: Impression: Stable head CT scan. No evidence of acute intracranial disease. Electronically Signed: Darian Linares MD  3/21/2025 5:42 PM EDT  Workstation ID: [id number]      Results for orders placed during the hospital encounter of 03/21/25    Adult Transthoracic Echo Complete W/ Cont if Necessary Per Protocol (With Agitated Saline)    Interpretation Summary    Left ventricular systolic function is normal. Estimated left ventricular EF = 62%    Left ventricular diastolic function is consistent with (grade I) impaired relaxation.    Aortic valve sclerosis with mild aortic regurgitation present.    Estimated right ventricular systolic pressure from tricuspid regurgitation is normal (<35 mmHg).      Current medications:  Scheduled Meds:amiodarone, 100 mg, Oral, Daily  apixaban, 2.5 mg, Oral, BID  atorvastatin, 20 mg, Oral, Nightly  levothyroxine, 137 mcg, Oral, Q AM  memantine, 5 mg, Oral, Q12H  midodrine, 5 mg, Oral, Daily  pantoprazole, 40 mg, Oral, Daily  QUEtiapine, 12.5 mg, Oral, Daily  sertraline, 100 mg, Oral, Daily  sodium chloride, 10 mL, Intravenous, Q12H  sodium chloride, 10 mL, Intravenous, Q12H  tamsulosin, 0.4 mg, Oral, Daily      Continuous Infusions:Pharmacy Consult,       PRN Meds:.  acetaminophen    senna-docusate sodium **AND** polyethylene glycol **AND** bisacodyl **AND** bisacodyl    HYDROcodone-acetaminophen    Pharmacy Consult    melatonin    midodrine    sodium chloride     sodium chloride    sodium chloride    sodium chloride    sodium chloride    Assessment & Plan   Assessment & Plan     Active Hospital Problems    Diagnosis  POA    **Dysarthria [R47.1]  Yes      Resolved Hospital Problems   No resolved problems to display.        Brief Hospital Course to date:  Enrike Tomas is a 89 y.o. male with history of ESRD on HD MWF, HTN, HLD, A fib (eliquis), cognitive impairment, NERY, who presented for dysarthria. MRA head/neck negative. MRI brain negative for stroke.     Dysarthria, possible TIA  Memory impairment  HLD  -Stroke service followed  -PT/OT rec IRF  -Continue statin  -ECHO with LV EF 62%, grade 1 diastolic dysfunction, mild aortic regurgitation  -A1C 4.8%, LDL 63    Possible mild cognitive impairment  Vivid dreams  -Did not tolerate Seroquel 12.5 mg last night, resulted in significant sleepiness today.  Also QTc prolonged on EKG this morning.  Stopped Seroquel.  -Monitor overnight and if doing better, then discharge home tomorrow  -Continue Namenda  -Follow-up as scheduled with [facility] on aging    ESRD on HD Monday/Wednesday/Friday  -Currently euvolemic without significant electrolyte derangements  -Renal consulted for dialysis  -Renal diet    Intermittent hypoxia  -Viral respiratory panel negative; chest x-ray negative    Hypothyroidism-Synthroid dosing just increased outpatient, he has not started the higher dose; TSH 16 on admission, continue Synthroid and recheck with PCP in 4 to 6 weeks  A fib - continue eliquis per neuro; continue amiodarone  HTN, but with hypotension on presentation - holding anti-hypertensives  Mood disorder-continue Zoloft  Chronic pain-continue as needed Norco, instructed family that they would need to bring in the buprenorphine patch as we do not have these      Expected Discharge Location and Transportation: Rehab recommended, patient's wife wants to take him home instead  Expected Discharge hopefully home tomorrow  Expected discharge  date/ time has not been documented.     VTE Prophylaxis:  Pharmacologic & mechanical VTE prophylaxis orders are present.         AM-PAC 6 Clicks Score (PT): 14 (03/22/25 2020)    CODE STATUS:   Code Status and Medical Interventions: No CPR (Do Not Attempt to Resuscitate); Limited Support; No intubation (DNI)   Ordered at: 03/22/25 0242     Code Status (Patient has no pulse and is not breathing):    No CPR (Do Not Attempt to Resuscitate)     Medical Interventions (Patient has pulse or is breathing):    Limited Support     Medical Intervention Limits:    No intubation (DNI)     Level Of Support Discussed With:    Patient       Karen Aguirre MD  03/23/25

## 2025-03-23 NOTE — PLAN OF CARE
Pt was pleasant. VSS. 2L NC. Started the day a little groggy but arousable. Around lunchtime patient was above baseline with speech being less garbled. Call light within reach.

## 2025-03-24 ENCOUNTER — READMISSION MANAGEMENT (OUTPATIENT)
Dept: CALL CENTER | Facility: HOSPITAL | Age: OVER 89
End: 2025-03-24
Payer: MEDICARE

## 2025-03-24 ENCOUNTER — APPOINTMENT (OUTPATIENT)
Dept: NEPHROLOGY | Facility: HOSPITAL | Age: OVER 89
End: 2025-03-24
Payer: MEDICARE

## 2025-03-24 VITALS
HEIGHT: 64 IN | OXYGEN SATURATION: 92 % | WEIGHT: 129 LBS | SYSTOLIC BLOOD PRESSURE: 107 MMHG | HEART RATE: 62 BPM | TEMPERATURE: 97.8 F | BODY MASS INDEX: 22.02 KG/M2 | DIASTOLIC BLOOD PRESSURE: 62 MMHG | RESPIRATION RATE: 18 BRPM

## 2025-03-24 PROBLEM — R47.1 DYSARTHRIA: Status: RESOLVED | Noted: 2025-03-21 | Resolved: 2025-03-24

## 2025-03-24 LAB
ANION GAP SERPL CALCULATED.3IONS-SCNC: 20 MMOL/L (ref 5–15)
BUN SERPL-MCNC: 51 MG/DL (ref 8–23)
BUN/CREAT SERPL: 7.2 (ref 7–25)
CALCIUM SPEC-SCNC: 9.6 MG/DL (ref 8.6–10.5)
CHLORIDE SERPL-SCNC: 94 MMOL/L (ref 98–107)
CO2 SERPL-SCNC: 27 MMOL/L (ref 22–29)
CREAT SERPL-MCNC: 7.07 MG/DL (ref 0.76–1.27)
DEPRECATED RDW RBC AUTO: 50.3 FL (ref 37–54)
EGFRCR SERPLBLD CKD-EPI 2021: 6.9 ML/MIN/1.73
ERYTHROCYTE [DISTWIDTH] IN BLOOD BY AUTOMATED COUNT: 13.3 % (ref 12.3–15.4)
GLUCOSE SERPL-MCNC: 160 MG/DL (ref 65–99)
HCT VFR BLD AUTO: 37.4 % (ref 37.5–51)
HGB BLD-MCNC: 12.6 G/DL (ref 13–17.7)
MCH RBC QN AUTO: 34.6 PG (ref 26.6–33)
MCHC RBC AUTO-ENTMCNC: 33.7 G/DL (ref 31.5–35.7)
MCV RBC AUTO: 102.7 FL (ref 79–97)
PLATELET # BLD AUTO: 213 10*3/MM3 (ref 140–450)
PMV BLD AUTO: 8.7 FL (ref 6–12)
POTASSIUM SERPL-SCNC: 4.9 MMOL/L (ref 3.5–5.2)
QT INTERVAL: 444 MS
QTC INTERVAL: 499 MS
RBC # BLD AUTO: 3.64 10*6/MM3 (ref 4.14–5.8)
SODIUM SERPL-SCNC: 141 MMOL/L (ref 136–145)
WBC NRBC COR # BLD AUTO: 6.13 10*3/MM3 (ref 3.4–10.8)

## 2025-03-24 PROCEDURE — 99239 HOSP IP/OBS DSCHRG MGMT >30: CPT | Performed by: INTERNAL MEDICINE

## 2025-03-24 PROCEDURE — 80048 BASIC METABOLIC PNL TOTAL CA: CPT | Performed by: STUDENT IN AN ORGANIZED HEALTH CARE EDUCATION/TRAINING PROGRAM

## 2025-03-24 PROCEDURE — 85027 COMPLETE CBC AUTOMATED: CPT | Performed by: STUDENT IN AN ORGANIZED HEALTH CARE EDUCATION/TRAINING PROGRAM

## 2025-03-24 RX ORDER — MIDODRINE HYDROCHLORIDE 5 MG/1
5 TABLET ORAL 3 TIMES DAILY PRN
Start: 2025-03-24

## 2025-03-24 RX ORDER — ALBUMIN (HUMAN) 12.5 G/50ML
12.5 SOLUTION INTRAVENOUS AS NEEDED
Status: CANCELLED | OUTPATIENT
Start: 2025-03-24 | End: 2025-03-25

## 2025-03-24 RX ORDER — HYDROCODONE BITARTRATE AND ACETAMINOPHEN 7.5; 325 MG/1; MG/1
1 TABLET ORAL EVERY 8 HOURS PRN
Start: 2025-03-24

## 2025-03-24 RX ADMIN — POLYVINYL ALCOHOL, POVIDONE 2 DROP: 14; 6 SOLUTION/ DROPS OPHTHALMIC at 16:42

## 2025-03-24 RX ADMIN — LEVOTHYROXINE SODIUM 137 MCG: 0.14 TABLET ORAL at 05:36

## 2025-03-24 RX ADMIN — APIXABAN 2.5 MG: 2.5 TABLET, FILM COATED ORAL at 16:45

## 2025-03-24 RX ADMIN — MEMANTINE HYDROCHLORIDE 5 MG: 10 TABLET, FILM COATED ORAL at 16:45

## 2025-03-24 RX ADMIN — SERTRALINE HYDROCHLORIDE 100 MG: 100 TABLET ORAL at 16:45

## 2025-03-24 RX ADMIN — MIDODRINE HYDROCHLORIDE 5 MG: 5 TABLET ORAL at 16:44

## 2025-03-24 RX ADMIN — ACETAMINOPHEN 650 MG: 325 TABLET, FILM COATED ORAL at 17:41

## 2025-03-24 RX ADMIN — TAMSULOSIN HYDROCHLORIDE 0.4 MG: 0.4 CAPSULE ORAL at 16:45

## 2025-03-24 RX ADMIN — ACETAMINOPHEN 650 MG: 325 TABLET, FILM COATED ORAL at 11:04

## 2025-03-24 RX ADMIN — PANTOPRAZOLE SODIUM 40 MG: 40 TABLET, DELAYED RELEASE ORAL at 16:45

## 2025-03-24 RX ADMIN — AMIODARONE HYDROCHLORIDE 100 MG: 200 TABLET ORAL at 16:45

## 2025-03-24 NOTE — CONSULTS
Referring Provider: Karen Aguirre MD   Reason for Consultation: ESRD    Subjective     Chief complaint Slurred speech and hypotension after dialysis     History of present illness:  This t is an 89-year-old  male, with a complex medical history including atrial fibrillation (AF) managed with Eliquis, hypertension (HTN), hyperlipidemia (HLD), end-stage renal disease (ESRD) on hemodialysis (HD) three times a week (MWF), memory impairment (managed at the [facility]), and obstructive sleep apnea (NERY). He presents with new-onset dysarthria.    The last known well time was around 12:00 PM, when his wife dropped him off at dialysis. However, she noted significant worsening of his speech around 3:00 PM when she picked him up. His speech was described as significantly more slurred and garbled compared to his baseline.     In the ED, it was measured at 99/45 mmHg, indicative of hypotension. He denies any fevers, chills, diaphoresis, chest pain, shortness of breath, nausea, vomiting, constipation, or diarrhea.   he stroke team was consulted for a possible neurological event. The patient was administered midodrine to address hypotension and given a 500 mL bolus of normal saline. Following initial stabilization, the patient was admitted for further evaluation and management. Nephrology service has been consulted for ESRD management     History  Past Medical History:   Diagnosis Date    A-fib     Abnormal ECG     occasional extra heartbeat    Anemia, acute on chronic 9/30/2023    Arthritis     Atrial fibrillation     BPH (benign prostatic hyperplasia)     Cervical compression fracture 06/05/2023    currently wearing a c collar    Diverticulosis     Diverticulum of esophagus     puree diet and speech therapy    Elevated cholesterol     ESRD on hemodialysis     GERD (gastroesophageal reflux disease)     Heart failure     Hemodialysis patient     M,W,F    Hyperlipidemia     Hypertension     Low back pain  2022    NERY (obstructive sleep apnea)     NOT USING CPAP    Recurrent falls     UTI (urinary tract infection)    ,   Past Surgical History:   Procedure Laterality Date    CATARACT EXTRACTION      COLONOSCOPY      DIALYSIS FISTULA CREATION      ENDOSCOPY      INGUINAL HERNIA REPAIR      PROSTATE BIOPSY      THROAT SURGERY      diverticulum in throat    TONSILLECTOMY AND ADENOIDECTOMY      TOTAL SHOULDER ARTHROPLASTY W/ DISTAL CLAVICLE EXCISION Left 9/25/2023    Procedure: HEMIARTHROPLASTY, BICEPS TENODESIS - LEFT;  Surgeon: Matteo Tan MD;  [facility];  Service: Orthopedics;  Laterality: Left;   ,   Family History   Problem Relation Age of Onset    Stomach cancer Mother     Heart disease Mother     Arthritis Mother     Heart disease Father     Stroke Father     Kidney failure Father     Cancer Father     Kidney disease Father    ,   Social History     Socioeconomic History    Marital status:    Tobacco Use    Smoking status: Never    Smokeless tobacco: Never   Vaping Use    Vaping status: Never Used   Substance and Sexual Activity    Alcohol use: Not Currently     Alcohol/week: 1.0 standard drink of alcohol     Types: 1 Glasses of wine per week     Comment: once monthly typically    Drug use: Never    Sexual activity: Defer     E-cigarette/Vaping    E-cigarette/Vaping Use Never User     Passive Exposure No     Counseling Given No      E-cigarette/Vaping Substances    Nicotine No     THC No     CBD No     Flavoring No      E-cigarette/Vaping Devices    Disposable No     Pre-filled or Refillable Cartridge No     Refillable Tank No     Pre-filled Pod No          ,   Medications Prior to Admission   Medication Sig Dispense Refill Last Dose/Taking    atorvastatin (LIPITOR) 20 MG tablet Take 1 tablet by mouth Every Night.   3/21/2025 Morning    HYDROcodone-acetaminophen (NORCO) 7.5-325 MG per tablet Take 1 tablet by mouth Every Night. (Patient taking differently: Take 1 tablet by mouth Every 8  (Eight) Hours As Needed for Mild Pain, Moderate Pain or Severe Pain.) 15 tablet 0 3/21/2025 Noon    losartan (COZAAR) 25 MG tablet Take 0.5 tablets by mouth As Needed. If BP is above 150   3/21/2025 Morning    midodrine (PROAMATINE) 5 MG tablet Take 1 tablet by mouth 3 (Three) Times a Day Before Meals. (Patient taking differently: Take 1 tablet by mouth 3 (Three) Times a Day As Needed.)   3/21/2025    pantoprazole (PROTONIX) 40 MG EC tablet Take 1 tablet by mouth Daily.   3/21/2025 Morning    sertraline (ZOLOFT) 100 MG tablet Take 1 tablet by mouth Daily.   3/21/2025 Morning    tamsulosin (FLOMAX) 0.4 MG capsule 24 hr capsule Take 1 capsule by mouth Daily.   3/20/2025 Evening    acetaminophen (TYLENOL) 500 MG tablet Take 1 tablet by mouth Every 6 (Six) Hours As Needed for Mild Pain, Headache or Fever. OTC   Unknown    amiodarone (PACERONE) 100 MG tablet Take 1 tablet by mouth Daily.   3/20/2025 Evening    apixaban (ELIQUIS) 2.5 MG tablet tablet Take 1 tablet by mouth 2 (Two) Times a Day. Hold until you follow up with Dr. LANDAVERDE next week   3/20/2025 Evening    B Complex-C-Folic Acid (Jenny-Serg) tablet Take 1 tablet by mouth Daily. OTC   Unknown    fluticasone (FLONASE) 50 MCG/ACT nasal spray Administer 2 sprays into the nostril(s) as directed by provider Daily As Needed for Rhinitis or Allergies. OTC   Unknown    melatonin 5 MG tablet tablet Take 1 tablet by mouth At Night As Needed (Sleep). OTC   Unknown    Teriparatide, Recombinant, (FORTEO) 620 MCG/2.48ML injection Inject 20 mcg under the skin into the appropriate area as directed Daily.   Unknown   , Scheduled Meds:  amiodarone, 100 mg, Oral, Daily  apixaban, 2.5 mg, Oral, BID  atorvastatin, 20 mg, Oral, Nightly  levothyroxine, 137 mcg, Oral, Q AM  memantine, 5 mg, Oral, Q12H  midodrine, 5 mg, Oral, Daily  pantoprazole, 40 mg, Oral, Daily  sertraline, 100 mg, Oral, Daily  sodium chloride, 10 mL, Intravenous, Q12H  sodium chloride, 10 mL, Intravenous,  Q12H  tamsulosin, 0.4 mg, Oral, Daily   , Continuous Infusions:  Pharmacy Consult,    , PRN Meds:    acetaminophen    senna-docusate sodium **AND** polyethylene glycol **AND** bisacodyl **AND** bisacodyl    HYDROcodone-acetaminophen    Pharmacy Consult    melatonin    midodrine    Polyvinyl Alcohol-Povidone PF    sodium chloride    sodium chloride    sodium chloride, and Allergies:  Pollen extract and Mirtazapine    Review of Systems  Pertinent items are noted in HPI    Objective     Vital Signs  Temp:  [97.6 °F (36.4 °C)-98.3 °F (36.8 °C)] 98 °F (36.7 °C)  Heart Rate:  [53-78] 73  Resp:  [18-20] 20  BP: ()/(54-86) 125/69    No intake/output data recorded.  I/O last 3 completed shifts:  In: 240 [P.O.:240]  Out: -     Physical Exam:  General Appearance:    Alert, cooperative, in no acute distress   Head:    Normocephalic, without obvious abnormality, atraumatic   Eyes:            Conjunctivae and sclerae normal, no   icterus, no pallor, corneas clear, PERRLA           Neck:   Supple, trachea midline, no thyromegaly,  no JVD       Lungs:     Clear to auscultation,respirations regular, even and               unlabored    Heart:    Regular rhythm and normal rate, normal S1 and S2, no       murmur, no gallop, no rub, no click       Abdomen:     Normal bowel sounds,soft, non-tender, non-distended, no guarding, no rebound tenderness       Extremities:   Moves all extremities well, no edema, no cyanosis, no         redness   Pulses:   Pulses palpable and equal bilaterally           Neurologic:   Cranial nerves 2 - 12 grossly intact, no focal deficit         Results Review:   I reviewed the patient's new clinical results.    WBC WBC   Date Value Ref Range Status   03/22/2025 5.95 3.40 - 10.80 10*3/mm3 Final   03/21/2025 5.47 3.40 - 10.80 10*3/mm3 Final      HGB Hemoglobin   Date Value Ref Range Status   03/22/2025 12.8 (L) 13.0 - 17.7 g/dL Final   03/21/2025 13.0 13.0 - 17.7 g/dL Final      HCT Hematocrit   Date Value  "Ref Range Status   03/22/2025 39.3 37.5 - 51.0 % Final   03/21/2025 38.1 37.5 - 51.0 % Final      Platlets No results found for: \"LABPLAT\"   MCV MCV   Date Value Ref Range Status   03/22/2025 103.7 (H) 79.0 - 97.0 fL Final   03/21/2025 103.0 (H) 79.0 - 97.0 fL Final          Sodium Sodium   Date Value Ref Range Status   03/23/2025 139 136 - 145 mmol/L Final   03/22/2025 140 136 - 145 mmol/L Final   03/21/2025 139 136 - 145 mmol/L Final      Potassium Potassium   Date Value Ref Range Status   03/23/2025 5.0 3.5 - 5.2 mmol/L Final   03/22/2025 4.2 3.5 - 5.2 mmol/L Final     Comment:     Slight hemolysis detected by analyzer. Result may be falsely elevated.   03/21/2025 4.5 3.5 - 5.2 mmol/L Final     Comment:     Specimen hemolyzed.  Result may be falsely elevated.      Chloride Chloride   Date Value Ref Range Status   03/23/2025 95 (L) 98 - 107 mmol/L Final   03/22/2025 95 (L) 98 - 107 mmol/L Final   03/21/2025 94 (L) 98 - 107 mmol/L Final      CO2 CO2   Date Value Ref Range Status   03/23/2025 28.0 22.0 - 29.0 mmol/L Final   03/22/2025 28.0 22.0 - 29.0 mmol/L Final   03/21/2025 27.0 22.0 - 29.0 mmol/L Final      BUN BUN   Date Value Ref Range Status   03/23/2025 35 (H) 8 - 23 mg/dL Final   03/22/2025 20 8 - 23 mg/dL Final   03/21/2025 14 8 - 23 mg/dL Final      Creatinine Creatinine   Date Value Ref Range Status   03/23/2025 6.31 (H) 0.76 - 1.27 mg/dL Final   03/22/2025 3.65 (H) 0.76 - 1.27 mg/dL Final   03/21/2025 3.62 (H) 0.76 - 1.27 mg/dL Final      Calcium Calcium   Date Value Ref Range Status   03/23/2025 9.1 8.6 - 10.5 mg/dL Final   03/22/2025 9.7 8.6 - 10.5 mg/dL Final   03/21/2025 9.7 8.6 - 10.5 mg/dL Final      PO4 No results found for: \"CAPO4\"   Albumin Albumin   Date Value Ref Range Status   03/22/2025 4.1 3.5 - 5.2 g/dL Final   03/21/2025 4.5 3.5 - 5.2 g/dL Final      Magnesium Magnesium   Date Value Ref Range Status   03/21/2025 2.0 1.6 - 2.4 mg/dL Final      Uric Acid No results found for: \"URICACID\" "     Results from last 7 days   Lab Units 03/23/25  1354 03/22/25  0427 03/21/25  1819 03/21/25  1756   SODIUM mmol/L 139 140 139  --    POTASSIUM mmol/L 5.0 4.2 4.5  --    CHLORIDE mmol/L 95* 95* 94*  --    CO2 mmol/L 28.0 28.0 27.0  --    BUN mg/dL 35* 20 14  --    CREATININE mg/dL 6.31* 3.65* 3.62*  --    CALCIUM mg/dL 9.1 9.7 9.7  --    ALBUMIN g/dL  --  4.1 4.5  --    WBC 10*3/mm3  --  5.95  --  5.47   HEMOGLOBIN g/dL  --  12.8*  --  13.0   PLATELETS 10*3/mm3  --  207  --  216   GLUCOSE mg/dL 77 75 100*  --    PHOSPHORUS mg/dL  --   --  3.2  --              amiodarone, 100 mg, Oral, Daily  apixaban, 2.5 mg, Oral, BID  atorvastatin, 20 mg, Oral, Nightly  levothyroxine, 137 mcg, Oral, Q AM  memantine, 5 mg, Oral, Q12H  midodrine, 5 mg, Oral, Daily  pantoprazole, 40 mg, Oral, Daily  sertraline, 100 mg, Oral, Daily  sodium chloride, 10 mL, Intravenous, Q12H  sodium chloride, 10 mL, Intravenous, Q12H  tamsulosin, 0.4 mg, Oral, Daily      Pharmacy Consult,         Assessment & Plan       Dysarthria      1- ESRD -MWF Follows with Dr Keller  2-HTN   3- Anemia of chronic disease   4- Hypotension     Plan:  - HD today, UF as tolerated.   - Renal diet   - JENNYFER with HD   - Electrolytes will be corrected with HD       I discussed the patients findings and my recommendations with patient and nursing staff    Cristy Coyne MD  03/24/25

## 2025-03-24 NOTE — PLAN OF CARE
Problem: Hemodialysis  Goal: Safe, Effective Therapy Delivery  Outcome: Progressing  Goal: Effective Tissue Perfusion  Outcome: Progressing  Goal: Absence of Infection Signs and Symptoms  Outcome: Progressing   Goal Outcome Evaluation:            HD completed. UF 2010, goal reached. Tolerated tx well. VS stable and wdl during tx. Access functioned well. Blood returned to pt. Report to AGUSTÍN Ortiz

## 2025-03-24 NOTE — CASE MANAGEMENT/SOCIAL WORK
Continued Stay Note  [facility]     Patient Name: Enrike Tomas  MRN: [id number]  Today's Date: 3/24/2025    Admit Date: 3/21/2025    Plan: Home   Discharge Plan       Row Name 03/24/25 1139       Plan    Plan Home    Patient/Family in Agreement with Plan yes    Plan Comments Discussed in MDR. Plan for Mr. Tomas is home  with spouse & caregivers at discharge. CM verified his dialysis with [facility] on [facility]. He receives dialysis on M-W-F at 1000. No further discharge needs noted. Family will transport.    Final Discharge Disposition Code 01 - home or self-care                   Discharge Codes    No documentation.                 Expected Discharge Date and Time       Expected Discharge Date Expected Discharge Time    Mar 24, 2025               Hui Trujillo RN

## 2025-03-24 NOTE — DISCHARGE SUMMARY
[facility]  DISCHARGE SUMMARY    Patient Name: Enrike Tomas  : 1935  MRN: [id number]    Date of Admission: 3/21/2025  5:27 PM  Date of Discharge:  3/24/2025  Primary Care Physician: Blair Dhaliwal MD    Consults       Date and Time Order Name Status Description    3/23/2025  1:12 PM Inpatient Nephrology Consult      3/21/2025  5:25 PM Inpatient Neurology Consult Stroke Completed             Hospital Course     Presenting Problem: dysarthria    Active Hospital Problems   No active problems to display.      Resolved Hospital Problems    Diagnosis Date Resolved POA    **Dysarthria [R47.1] 2025 Yes          Hospital Course:  Enrike Tomas is a 89 y.o. male with history of ESRD on HD MWF, HTN, HLD, A fib (eliquis), cognitive impairment, NERY, who presented for dysarthria. MRA head/neck negative. MRI brain negative for stroke.      Dysarthria  Memory impairment  HLD  -Stroke service followed - feels due to medication effect in setting of his ESRD. Patient underwent HD and returned to baseline.   -PT/OT rec IRF however patient ambulated independently with nursing to restroom and to chair this morning and wanted to go home.     Possible mild cognitive impairment  Vivid dreams  -Did not tolerate Seroquel 12.5 mg during stay - resulted in significant sleepiness so stopped.  -Continue Namenda  -Follow-up as scheduled with [facility] on aging     ESRD on HD Monday/Wednesday/Friday  -Currently euvolemic without significant electrolyte derangements. Underwent HD prior to d/c.    Discharge Follow Up Recommendations for outpatient labs/diagnostics:   PCP 1 week    Day of Discharge     HPI: Up in chair. Ambulated to restroom with nursing. Wants to go home.    Review of Systems  Gen- No fevers, chills  CV- No chest pain, palpitations  Resp- No cough, dyspnea  GI- No N/V/D, abd pain     Vital Signs:   Temp:  [97.6 °F (36.4 °C)-98.3 °F (36.8 °C)] 97.9 °F (36.6  °C)  Heart Rate:  [53-78] 72  Resp:  [18-20] 18  BP: ()/(54-86) 123/77  Flow (L/min) (Oxygen Therapy):  [1.5-2] 2      Physical Exam:  Constitutional: No acute distress, awake, alert  HENT: NCAT, mucous membranes moist  Respiratory: Clear to auscultation bilaterally, respiratory effort normal   Cardiovascular: RRR, no murmurs, rubs, or gallops  Gastrointestinal: Positive bowel sounds, soft, nontender, nondistended  Musculoskeletal: No bilateral ankle edema  Psychiatric: Appropriate affect, cooperative  Neurologic: Oriented x 3, strength symmetric in all extremities, Cranial Nerves grossly intact to confrontation, speech clear  Skin: No rashes     Pertinent  and/or Most Recent Results     LAB RESULTS:      Lab 03/22/25  0427 03/21/25 2032 03/21/25 1819 03/21/25  1756   WBC 5.95  --   --  5.47   HEMOGLOBIN 12.8*  --   --  13.0   HEMATOCRIT 39.3  --   --  38.1   PLATELETS 207  --   --  216   NEUTROS ABS 3.19  --   --  3.47   IMMATURE GRANS (ABS) 0.07*  --   --  0.02   LYMPHS ABS 1.72  --   --  1.18   MONOS ABS 0.60  --   --  0.53   EOS ABS 0.29  --   --  0.22   .7*  --   --  103.0*   CRP  --   --  0.71*  --    PROCALCITONIN  --   --  0.24  --    LACTATE  --  1.6  --  2.4*   PROTIME  --  15.3*  --  14.7*   APTT  --  30.6*  --  24.4   HEPARIN ANTI-XA 0.71* 1.05*  --  >1.10*         Lab 03/23/25  1354 03/22/25  0427 03/21/25 1819 03/20/25  1628   SODIUM 139 140 139  --    POTASSIUM 5.0 4.2 4.5  --    CHLORIDE 95* 95* 94*  --    CO2 28.0 28.0 27.0  --    ANION GAP 16.0* 17.0* 18.0*  --    BUN 35* 20 14  --    CREATININE 6.31* 3.65* 3.62*  --    EGFR 7.9* 15.2* 15.4*  --    GLUCOSE 77 75 100*  --    CALCIUM 9.1 9.7 9.7  --    IONIZED CALCIUM  --   --   --  4.9   MAGNESIUM  --   --  2.0  --    PHOSPHORUS  --   --  3.2  --    HEMOGLOBIN A1C  --  4.80  --   --    TSH  --   --  16.770*  --          Lab 03/22/25  0427 03/21/25  1819   TOTAL PROTEIN 7.2 7.6   ALBUMIN 4.1 4.5   GLOBULIN 3.1 3.1   ALT (SGPT) 13 16    AST (SGOT) 17 22   BILIRUBIN 0.4 0.4   ALK PHOS 126* 131*   LIPASE  --  41         Lab 03/21/25  2145 03/21/25  2032 03/21/25  1819 03/21/25  1756   PROBNP  --   --  5,538.0*  --    HSTROP T 207*  --  229*  --    PROTIME  --  15.3*  --  14.7*   INR  --  1.20*  --  1.14*         Lab 03/22/25  0427   CHOLESTEROL 164   LDL CHOL 63   HDL CHOL 81*   TRIGLYCERIDES 114             Brief Urine Lab Results  (Last result in the past 365 days)        Color   Clarity   Blood   Leuk Est   Nitrite   Protein   CREAT   Urine HCG        03/22/25 0424 Yellow   Clear   Negative   Trace   Negative   100 mg/dL (2+)                 Microbiology Results (last 10 days)       Procedure Component Value - Date/Time    Blood Culture - Blood, Arm, Left [[id number]]  (Normal) Collected: 03/21/25 2030    Lab Status: Preliminary result Specimen: Blood from Arm, Left Updated: 03/23/25 2100     Blood Culture No growth at 2 days    Blood Culture - Blood, Wrist, Left [[id number]]  (Normal) Collected: 03/21/25 2015    Lab Status: Preliminary result Specimen: Blood from Wrist, Left Updated: 03/23/25 2100     Blood Culture No growth at 2 days    COVID PRE-OP / PRE-PROCEDURE SCREENING ORDER (NO ISOLATION) - Swab, Nasopharynx [[id number]]  (Normal) Collected: 03/21/25 1903    Lab Status: Final result Specimen: Swab from Nasopharynx Updated: 03/21/25 2000    Narrative:      The following orders were created for panel order COVID PRE-OP / PRE-PROCEDURE SCREENING ORDER (NO ISOLATION) - Swab, Nasopharynx.  Procedure                               Abnormality         Status                     ---------                               -----------         ------                     Respiratory Panel PCR w/...[[id number]]  Normal              Final result                 Please view results for these tests on the individual orders.    Respiratory Panel PCR w/COVID-19(SARS-CoV-2) KEVIN/LYDIA/JONATHAN/PAD/COR/JLUIS In-House, NP Swab in UTM/VTM, 2 HR TAT - Swab, Nasopharynx  [[id number]]  (Normal) Collected: 03/21/25 1903    Lab Status: Final result Specimen: Swab from Nasopharynx Updated: 03/21/25 2000     ADENOVIRUS, PCR Not Detected     Coronavirus 229E Not Detected     Coronavirus HKU1 Not Detected     Coronavirus NL63 Not Detected     Coronavirus OC43 Not Detected     COVID19 Not Detected     Human Metapneumovirus Not Detected     Human Rhinovirus/Enterovirus Not Detected     Influenza A PCR Not Detected     Influenza B PCR Not Detected     Parainfluenza Virus 1 Not Detected     Parainfluenza Virus 2 Not Detected     Parainfluenza Virus 3 Not Detected     Parainfluenza Virus 4 Not Detected     RSV, PCR Not Detected     Bordetella pertussis pcr Not Detected     Bordetella parapertussis PCR Not Detected     Chlamydophila pneumoniae PCR Not Detected     Mycoplasma pneumo by PCR Not Detected    Narrative:      In the setting of a positive respiratory panel with a viral infection PLUS a negative procalcitonin without other underlying concern for bacterial infection, consider observing off antibiotics or discontinuation of antibiotics and continue supportive care. If the respiratory panel is positive for atypical bacterial infection (Bordetella pertussis, Chlamydophila pneumoniae, or Mycoplasma pneumoniae), consider antibiotic de-escalation to target atypical bacterial infection.            XR Chest 1 View  Result Date: 3/22/2025  XR CHEST 1 VW Date of Exam: 3/22/2025 3:07 PM EDT Indication: hypoxia Comparison: Chest x-ray 3/21/2025 Findings: Stable cardiomediastinal silhouette. Redemonstration of circumscribed round opacity overlying the right lower heart border may reflect epicardial fat or pericardial cyst. The lungs are grossly clear. Similar thin left apical pleural calcifications. No focal consolidation. No pleural effusion. No pneumothorax. Left total shoulder arthroplasty is redemonstrated. There is chronic remodeling of the right glenohumeral joint.     Impression: No acute  cardiopulmonary findings. Electronically Signed: Young Canales MD  3/22/2025 4:50 PM EDT  Workstation ID: [id number]    MRI Angiogram Neck Without Contrast  Result Date: 3/22/2025  MRI ANGIOGRAM NECK WO CONTRAST Date of Exam: 3/21/2025 11:39 PM EDT Indication: dysarthria.  Comparison: None available. Technique:  Routine 3-D time-of-flight gradient echo imaging was obtained of the neck without contrast administration. Findings: No evidence of hemodynamically significant stenosis, AVM or aneurysm. No large vessel occlusion identified. No definite dissection. Limited evaluation due to motion artifact. Right vertebral artery appears diminutive.     Impression: No evidence of hemodynamically significant stenosis, AVM or aneurysm. Electronically Signed: Amada Guevara MD  3/22/2025 12:24 AM EDT  Workstation ID: [id number]    MRI Angiogram Head Without Contrast  Result Date: 3/21/2025  MRI ANGIOGRAM HEAD WO CONTRAST Date of Exam: 3/21/2025 11:20 PM EDT Indication: dyarthria.  Comparison: 3/21/2025. Technique:  Routine 3-D time-of-flight gradient echo imaging was obtained of the head without contrast administration. Findings: No evidence of hemodynamically significant stenosis, AVM or aneurysm. Slightly limited evaluation due to motion artifact. No large vessel occlusion identified.     Impression: No evidence of hemodynamically significant stenosis, AVM or aneurysm. Electronically Signed: Amada Guevara MD  3/21/2025 11:49 PM EDT  Workstation ID: [id number]    MRI Brain Without Contrast  Result Date: 3/21/2025  MRI BRAIN WO CONTRAST Date of Exam: 3/21/2025 11:15 PM EDT Indication: dysarthria.  Comparison: 3/21/2025. Technique:  Routine multiplanar/multisequence sequence images of the brain were obtained without contrast administration. Findings: There is no diffusion restriction to suggest acute infarct. There is no evidence of acute or chronic intracranial hemorrhage. No mass effect or midline shift. Mild periventricular and  subcortical FLAIR signal changes are present. No abnormal extra-axial collections. The major vascular flow voids appear intact. The basal ganglia, brainstem and cerebellum appear within normal limits. Calvarial and superficial soft tissue signal is within normal limits. Orbits appear unremarkable. The paranasal sinuses and  the mastoid air cells appear well aerated. Midline structures are intact.     Impression: 1.No evidence of hemorrhage, mass effect or midline shift. No evidence of recent or acute ischemia. 2.Mild periventricular and subcortical FLAIR signal changes likely related to chronic microvascular ischemic change. Electronically Signed: Amada Guevara MD  3/21/2025 11:28 PM EDT  Workstation ID: [id number]    XR Chest 1 View  Result Date: 3/21/2025  XR CHEST 1 VW Date of Exam: 3/21/2025 6:14 PM EDT Indication: Acute Stroke Protocol (onset < 12 hrs) Comparison: [facility] chest x-ray 8/7/2024, 9/26/2023; CT chest 9/10/2023 Findings: Cardiac silhouette remains enlarged. Prominent epicardial fat at the medial right lower chest is stable. Lungs are adequately expanded and appear clear. No pneumothorax or large pleural effusion is seen.     Impression: 1.Enlarged cardiac silhouette. 2.No acute pulmonary abnormality is identified. Electronically Signed: Julisa Cm  3/21/2025 6:31 PM EDT  Workstation ID: [id number]    CT Head Without Contrast Stroke Protocol  Result Date: 3/21/2025  CT HEAD WO CONTRAST STROKE PROTOCOL Date of Exam: 3/21/2025 5:30 PM EDT Indication: Neuro deficit, acute, stroke suspected Neuro Deficit, acute, Stroke suspected. Comparison: 2/26/2025 head CT scan Technique: Axial CT images were obtained of the head without contrast administration.  Reconstructed coronal images were also obtained. Automated exposure control and iterative construction methods were used. Scan Time: 1731 Results discussed with Dr. Raygoza at 1734, 3/21/2025. Findings: Previous exam report indicated no acute intracranial  abnormality. Today's study shows the calvarium to appear intact. Included paranasal sinuses and mastoids appear clear. No gross abnormalities are seen in the orbits. Bilateral ocular lens replacements are again noted. There is age-appropriate generalized cerebral atrophy. Punctate low-attenuation change in the central shan slightly to the left of the midline image 16/2 is consistent with an old lacunar infarct, unchanged from prior study. No hemorrhage, contusion, or edema is seen. There is no evidence of mass, mass effect, acute infarct, hydrocephalus, or abnormal extra-axial collection.     Impression: Stable head CT scan. No evidence of acute intracranial disease. 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    Distal aVF elevated velocities, but normal flow volumes      Results for orders placed during the hospital encounter of 03/21/25    Adult Transthoracic Echo Complete W/ Cont if Necessary Per Protocol (With Agitated Saline)    Interpretation Summary    Left ventricular systolic function is normal. Estimated left ventricular EF = 62%    Left ventricular diastolic function is consistent with (grade I) impaired relaxation.    Aortic valve sclerosis with mild aortic regurgitation present.    Estimated right ventricular systolic pressure from tricuspid regurgitation is normal (<35 mmHg).      Plan for Follow-up of Pending Labs/Results:  Pending Labs       Order Current Status    Blood Culture -  Blood, Arm, Left Preliminary result    Blood Culture - Blood, Wrist, Left Preliminary result          Discharge Details        Discharge Medications        Changes to Medications        Instructions Start Date   midodrine 5 MG tablet  Commonly known as: PROAMATINE  What changed:   when to take this  reasons to take this   5 mg, Oral, 3 Times Daily PRN             Continue These Medications        Instructions Start Date   acetaminophen 500 MG tablet  Commonly known as: TYLENOL   500 mg, Oral, Every 6 Hours PRN, OTC      amiodarone 100 MG tablet  Commonly known as: PACERONE   100 mg, Oral, Daily      apixaban 2.5 MG tablet tablet  Commonly known as: ELIQUIS   2.5 mg, Oral, 2 Times Daily, Hold until you follow up with Dr. LANDAVERDE next week      atorvastatin 20 MG tablet  Commonly known as: LIPITOR   20 mg, Nightly      fluticasone 50 MCG/ACT nasal spray  Commonly known as: FLONASE   2 sprays, Nasal, Daily PRN, OTC      HYDROcodone-acetaminophen 7.5-325 MG per tablet  Commonly known as: NORCO   1 tablet, Oral, Every 8 Hours PRN      losartan 25 MG tablet  Commonly known as: COZAAR   12.5 mg, As Needed      melatonin 5 MG tablet tablet   5 mg, Oral, Nightly PRN, OTC      pantoprazole 40 MG EC tablet  Commonly known as: PROTONIX   40 mg, Daily      Jenny-Serg tablet   1 tablet, Oral, Daily, OTC      sertraline 100 MG tablet  Commonly known as: ZOLOFT   100 mg, Daily      tamsulosin 0.4 MG capsule 24 hr capsule  Commonly known as: FLOMAX   1 capsule, Daily      Teriparatide 620 MCG/2.48ML injection  Commonly known as: FORTEO   Inject 20 mcg under the skin into the appropriate area as directed Daily.               Allergies   Allergen Reactions    Pollen Extract Other (See Comments)     sneezing    Mirtazapine Unknown (See Comments)         Discharge Disposition:  Home or Self Care    Diet:  Hospital:  Diet Order   Procedures    Diet: Cardiac, Renal; Healthy Heart (2-3 Na+); Low Sodium (2-3g), Low Potassium, Low Phosphorus;  Texture: Regular (IDDSI 7); Fluid Consistency: Thin (IDDSI 0)            Activity:      Restrictions or Other Recommendations:         CODE STATUS:    Code Status and Medical Interventions: No CPR (Do Not Attempt to Resuscitate); Limited Support; No intubation (DNI)   Ordered at: 03/22/25 0242     Code Status (Patient has no pulse and is not breathing):    No CPR (Do Not Attempt to Resuscitate)     Medical Interventions (Patient has pulse or is breathing):    Limited Support     Medical Intervention Limits:    No intubation (DNI)     Level Of Support Discussed With:    Patient       Future Appointments   Date Time Provider Department Center   3/24/2025 12:30 PM DIALYSIS STATION 3 [facility] SONI Johnson II, DO  03/24/25      Time Spent on Discharge:  I spent  33  minutes on this discharge activity which included: face-to-face encounter with the patient, reviewing the data in the system, coordination of the care with the nursing staff as well as consultants, documentation, and entering orders.

## 2025-03-25 NOTE — OUTREACH NOTE
Prep Survey      Flowsheet Row Responses   [facility] patient discharged from? [place]   Is LACE score < 7 ? No   Eligibility Readm Mgmt   Discharge diagnosis Dysarthria   Does the patient have one of the following disease processes/diagnoses(primary or secondary)? Other   Does the patient have Home health ordered? No   Is there a DME ordered? No   Comments regarding appointments Dialysis MWF   Prep survey completed? Yes            MARGRET RAMAN - Registered Nurse

## 2025-03-26 ENCOUNTER — APPOINTMENT (OUTPATIENT)
Dept: GENERAL RADIOLOGY | Facility: HOSPITAL | Age: OVER 89
End: 2025-03-26
Payer: MEDICARE

## 2025-03-26 ENCOUNTER — HOSPITAL ENCOUNTER (EMERGENCY)
Facility: HOSPITAL | Age: OVER 89
Discharge: HOME | End: 2025-03-27
Attending: EMERGENCY MEDICINE
Payer: MEDICARE

## 2025-03-26 DIAGNOSIS — I95.9 HYPOTENSION, UNSPECIFIED HYPOTENSION TYPE: Primary | ICD-10-CM

## 2025-03-26 DIAGNOSIS — G89.29 CHRONIC RIGHT SHOULDER PAIN: ICD-10-CM

## 2025-03-26 DIAGNOSIS — N18.6 ESRD ON HEMODIALYSIS: ICD-10-CM

## 2025-03-26 DIAGNOSIS — Z99.2 ESRD ON HEMODIALYSIS: ICD-10-CM

## 2025-03-26 DIAGNOSIS — M25.511 CHRONIC RIGHT SHOULDER PAIN: ICD-10-CM

## 2025-03-26 LAB
ALBUMIN SERPL-MCNC: 4.1 G/DL (ref 3.5–5.2)
ALBUMIN/GLOB SERPL: 1.2 G/DL
ALP SERPL-CCNC: 126 U/L (ref 39–117)
ALT SERPL W P-5'-P-CCNC: 13 U/L (ref 1–41)
ANION GAP SERPL CALCULATED.3IONS-SCNC: 18 MMOL/L (ref 5–15)
AST SERPL-CCNC: 16 U/L (ref 1–40)
BACTERIA SPEC AEROBE CULT: NORMAL
BACTERIA SPEC AEROBE CULT: NORMAL
BASOPHILS # BLD AUTO: 0.06 10*3/MM3 (ref 0–0.2)
BASOPHILS NFR BLD AUTO: 0.6 % (ref 0–1.5)
BILIRUB SERPL-MCNC: 0.3 MG/DL (ref 0–1.2)
BUN SERPL-MCNC: 16 MG/DL (ref 8–23)
BUN/CREAT SERPL: 4.7 (ref 7–25)
CALCIUM SPEC-SCNC: 9.5 MG/DL (ref 8.6–10.5)
CHLORIDE SERPL-SCNC: 94 MMOL/L (ref 98–107)
CO2 SERPL-SCNC: 24 MMOL/L (ref 22–29)
CREAT SERPL-MCNC: 3.42 MG/DL (ref 0.76–1.27)
DEPRECATED RDW RBC AUTO: 52.1 FL (ref 37–54)
EGFRCR SERPLBLD CKD-EPI 2021: 16.4 ML/MIN/1.73
EOSINOPHIL # BLD AUTO: 0.24 10*3/MM3 (ref 0–0.4)
EOSINOPHIL NFR BLD AUTO: 2.5 % (ref 0.3–6.2)
ERYTHROCYTE [DISTWIDTH] IN BLOOD BY AUTOMATED COUNT: 13.6 % (ref 12.3–15.4)
GLOBULIN UR ELPH-MCNC: 3.3 GM/DL
GLUCOSE BLDC GLUCOMTR-MCNC: 103 MG/DL (ref 70–130)
GLUCOSE SERPL-MCNC: 97 MG/DL (ref 65–99)
HCT VFR BLD AUTO: 38.2 % (ref 37.5–51)
HGB BLD-MCNC: 12.5 G/DL (ref 13–17.7)
HOLD SPECIMEN: NORMAL
IMM GRANULOCYTES # BLD AUTO: 0.03 10*3/MM3 (ref 0–0.05)
IMM GRANULOCYTES NFR BLD AUTO: 0.3 % (ref 0–0.5)
LYMPHOCYTES # BLD AUTO: 1.01 10*3/MM3 (ref 0.7–3.1)
LYMPHOCYTES NFR BLD AUTO: 10.3 % (ref 19.6–45.3)
MAGNESIUM SERPL-MCNC: 1.8 MG/DL (ref 1.6–2.4)
MCH RBC QN AUTO: 34.3 PG (ref 26.6–33)
MCHC RBC AUTO-ENTMCNC: 32.7 G/DL (ref 31.5–35.7)
MCV RBC AUTO: 104.9 FL (ref 79–97)
MONOCYTES # BLD AUTO: 0.81 10*3/MM3 (ref 0.1–0.9)
MONOCYTES NFR BLD AUTO: 8.3 % (ref 5–12)
NEUTROPHILS NFR BLD AUTO: 7.63 10*3/MM3 (ref 1.7–7)
NEUTROPHILS NFR BLD AUTO: 78 % (ref 42.7–76)
NRBC BLD AUTO-RTO: 0 /100 WBC (ref 0–0.2)
PLATELET # BLD AUTO: 209 10*3/MM3 (ref 140–450)
PMV BLD AUTO: 8.8 FL (ref 6–12)
POTASSIUM SERPL-SCNC: 4 MMOL/L (ref 3.5–5.2)
PROT SERPL-MCNC: 7.4 G/DL (ref 6–8.5)
RBC # BLD AUTO: 3.64 10*6/MM3 (ref 4.14–5.8)
SODIUM SERPL-SCNC: 136 MMOL/L (ref 136–145)
TROPONIN T SERPL HS-MCNC: 173 NG/L
WBC NRBC COR # BLD AUTO: 9.78 10*3/MM3 (ref 3.4–10.8)
WHOLE BLOOD HOLD COAG: NORMAL
WHOLE BLOOD HOLD SPECIMEN: NORMAL

## 2025-03-26 PROCEDURE — 93005 ELECTROCARDIOGRAM TRACING: CPT

## 2025-03-26 PROCEDURE — 71045 X-RAY EXAM CHEST 1 VIEW: CPT

## 2025-03-26 PROCEDURE — 82948 REAGENT STRIP/BLOOD GLUCOSE: CPT

## 2025-03-26 PROCEDURE — 99284 EMERGENCY DEPT VISIT MOD MDM: CPT

## 2025-03-26 PROCEDURE — 84484 ASSAY OF TROPONIN QUANT: CPT | Performed by: EMERGENCY MEDICINE

## 2025-03-26 PROCEDURE — 83735 ASSAY OF MAGNESIUM: CPT | Performed by: EMERGENCY MEDICINE

## 2025-03-26 PROCEDURE — 80053 COMPREHEN METABOLIC PANEL: CPT | Performed by: EMERGENCY MEDICINE

## 2025-03-26 PROCEDURE — 85025 COMPLETE CBC W/AUTO DIFF WBC: CPT | Performed by: EMERGENCY MEDICINE

## 2025-03-26 PROCEDURE — 93005 ELECTROCARDIOGRAM TRACING: CPT | Performed by: EMERGENCY MEDICINE

## 2025-03-26 RX ORDER — SODIUM CHLORIDE 0.9 % (FLUSH) 0.9 %
10 SYRINGE (ML) INJECTION AS NEEDED
Status: DISCONTINUED | OUTPATIENT
Start: 2025-03-26 | End: 2025-03-27 | Stop reason: HOSPADM

## 2025-03-27 VITALS
OXYGEN SATURATION: 96 % | HEART RATE: 71 BPM | RESPIRATION RATE: 16 BRPM | HEIGHT: 64 IN | BODY MASS INDEX: 19.97 KG/M2 | SYSTOLIC BLOOD PRESSURE: 131 MMHG | TEMPERATURE: 98.4 F | WEIGHT: 117 LBS | DIASTOLIC BLOOD PRESSURE: 57 MMHG

## 2025-03-27 LAB
GEN 5 1HR TROPONIN T REFLEX: 163 NG/L
QT INTERVAL: 428 MS
QT INTERVAL: 434 MS
QTC INTERVAL: 458 MS
QTC INTERVAL: 484 MS
TROPONIN T % DELTA: -6
TROPONIN T NUMERIC DELTA: -10 NG/L

## 2025-03-27 PROCEDURE — 84484 ASSAY OF TROPONIN QUANT: CPT | Performed by: EMERGENCY MEDICINE

## 2025-03-27 PROCEDURE — 36415 COLL VENOUS BLD VENIPUNCTURE: CPT

## 2025-03-27 PROCEDURE — 93005 ELECTROCARDIOGRAM TRACING: CPT | Performed by: EMERGENCY MEDICINE

## 2025-03-27 NOTE — ED PROVIDER NOTES
"Subjective   History of Present Illness  89 year old male with history of HTN, HLD, paroxysmal atrial fibrillation (Eliquis), cognitive impairment, NERY, and ESRD on HD MWF with RUE access (still makes urine) presents to the emergency department brought by EMS accompanied by his wife for evaluation of generalized weakness with associated low blood pressure. He had complete HD treatment today, and BP was at his baseline of about 100 mmHg systolic after HD per EMS report. After he got home, he had generalized weakness and his wife noted his blood pressure to be as low as 50 mmHg systolic. He was incoherent, and his wife removed his \"pain patch\" (review of records looks like this is buprenorphine 5mcg/hr patch weekly). He remained weak and EMS was called. He denies recent chest pain. He took a whole Norco 7.5 mg/325mg at approximately 1530 for right shoulder pain which is chronic. He had been taking a half a tab prior to that, every 4 hours as needed. He had still had pain, and his wife called his health care provider and was advised to increase to a whole tablet. EMS found the patient to be hypotensive at 81/55, and administered LR IV prior to arrival with improvement in his blood pressure. He reports good oral intake recently. Blood glucose for EMS prior to arrival was 103. He recently had an injection in his right shoulder for the chronic pain.       Review of Systems   Constitutional:  Negative for diaphoresis and fever.   HENT:  Negative for facial swelling and nosebleeds.    Eyes:  Negative for photophobia and discharge.   Respiratory:  Negative for stridor.    Cardiovascular:  Negative for chest pain and leg swelling.   Gastrointestinal:  Negative for abdominal pain, diarrhea and vomiting.   Genitourinary:  Negative for dysuria and hematuria.   Neurological:  Positive for weakness (generalized).       Past Medical History:   Diagnosis Date    A-fib     Abnormal ECG     occasional extra heartbeat    Anemia, acute " on chronic 9/30/2023    Arthritis     Atrial fibrillation     BPH (benign prostatic hyperplasia)     Cervical compression fracture 06/05/2023    currently wearing a c collar    Diverticulosis     Diverticulum of esophagus     puree diet and speech therapy    Elevated cholesterol     ESRD on hemodialysis     GERD (gastroesophageal reflux disease)     Heart failure     Hemodialysis patient     M,W,F    Hyperlipidemia     Hypertension     Low back pain 2022    NERY (obstructive sleep apnea)     NOT USING CPAP    Recurrent falls     UTI (urinary tract infection)        Allergies   Allergen Reactions    Pollen Extract Other (See Comments)     sneezing    Mirtazapine Unknown (See Comments)       Past Surgical History:   Procedure Laterality Date    CATARACT EXTRACTION      COLONOSCOPY      DIALYSIS FISTULA CREATION      ENDOSCOPY      INGUINAL HERNIA REPAIR      PROSTATE BIOPSY      THROAT SURGERY      diverticulum in throat    TONSILLECTOMY AND ADENOIDECTOMY      TOTAL SHOULDER ARTHROPLASTY W/ DISTAL CLAVICLE EXCISION Left 9/25/2023    Procedure: HEMIARTHROPLASTY, BICEPS TENODESIS - LEFT;  Surgeon: Matteo Tan MD;  Location: [facility];  Service: Orthopedics;  Laterality: Left;       Family History   Problem Relation Age of Onset    Stomach cancer Mother     Heart disease Mother     Arthritis Mother     Heart disease Father     Stroke Father     Kidney failure Father     Cancer Father     Kidney disease Father        Social History     Socioeconomic History    Marital status:    Tobacco Use    Smoking status: Never    Smokeless tobacco: Never   Vaping Use    Vaping status: Never Used   Substance and Sexual Activity    Alcohol use: Not Currently     Alcohol/week: 1.0 standard drink of alcohol     Types: 1 Glasses of wine per week     Comment: once monthly typically    Drug use: Never    Sexual activity: Defer           Objective   Physical Exam  Vitals and nursing note reviewed.   Constitutional:        General: He is not in acute distress.     Appearance: He is not diaphoretic.      Comments: BMI 22. Poor historian.   HENT:      Mouth/Throat:      Mouth: Mucous membranes are moist.   Eyes:      General: No scleral icterus.     Comments: Pupils equal and constricted bilaterally. No photophobia, discharge, or nystagmus.    Cardiovascular:      Rate and Rhythm: Normal rate and regular rhythm.      Heart sounds: No murmur heard.     No friction rub.      Comments: S1, S2  Pulmonary:      Effort: Pulmonary effort is normal. No respiratory distress.      Breath sounds: Normal breath sounds. No stridor. No wheezing, rhonchi or rales.   Abdominal:      General: There is no distension.      Palpations: Abdomen is soft.      Tenderness: There is no abdominal tenderness. There is no guarding.   Musculoskeletal:         General: No swelling.      Cervical back: Neck supple. No rigidity.      Comments: No calf tenderness bilaterally. Right arm has bandage on and palpable thrill. No significant peripheral edema.   Skin:     General: Skin is warm and dry.      Coloration: Skin is not jaundiced or pale.   Neurological:      Mental Status: He is alert.      Comments: Normal speech, no dysarthria. No facial droop. Moves all extremities.                     ED Course  ED Course as of 03/27/25 0245   u Mar 27, 2025   0004 BP: 128/57 [LD]   0004 Heart Rate: 73 [LD]   0004 HS Troponin T(!!): 173  Lower than prior values, denies chest pain. [LD]   0005 Potassium: 4.0 [LD]   0005 WBC: 9.78 [LD]   0005 Hemoglobin(!): 12.5 [LD]   0005 Platelets: 209 [LD]   0005 Magnesium: 1.8 [LD]   0006 Patient refusing straight cath but states he feels like he needs to urinate. Awaiting second troponin and UA. BP improved.  [LD]   0145 HS Troponin T(!!): 163 [LD]   0145 Troponin T Numeric Delta: -10 [LD]   0145 Troponin T % Delta: -6 [LD]   0145 BP: 110/59 [LD]   0234 BP: 117/40 [LD]   0234 131/57 BP. Results and plan discussed with the patient and his  wife at the bedside. All questions addressed.  [LD]      ED Course User Index  [LD] Flora Cantu MD                                         Patient was unable to provide a urine specimen during his ED course.      TRISTEN reviewed by Flora Cantu MD       Medical Decision Making  Differential diagnosis includes side effect of medication/Norco/buprenorphine, dehydration/hypovolemia, sepsis, anemia, and others.     Problems Addressed:  Chronic right shoulder pain: complicated acute illness or injury  ESRD on hemodialysis: complicated acute illness or injury  Hypotension, unspecified hypotension type: complicated acute illness or injury    Amount and/or Complexity of Data Reviewed  Independent Historian: spouse and EMS     Details: At bedside  External Data Reviewed: labs and notes.     Details: 3/24/25 discharge summary reviewed. Inpatient rehab facility recommended by PT/OT but patient declined and preferred discharge home. Prior troponin values reviewed, were higher previously compared to today's values.  Labs: ordered. Decision-making details documented in ED Course.  Radiology: ordered. Decision-making details documented in ED Course.  ECG/medicine tests: ordered and independent interpretation performed. Decision-making details documented in ED Course.     Details: EKG at 2144 shows sinus rhythm with first degree AV block at a rate of 75 beats per minute, LAFB, nonspecific ST/T wave changes. Repeat EKG at 2358 shows sinus rhythm with first degree AV block at a rate of 69 beats per minute, LAFB. No acute ischemic changes.    Risk  Prescription drug management.      Recent Results (from the past 24 hours)   POC Glucose Once    Collection Time: 03/26/25  9:38 PM    Specimen: Blood   Result Value Ref Range    Glucose 103 70 - 130 mg/dL   ECG 12 Lead Other; weakness    Collection Time: 03/26/25  9:44 PM   Result Value Ref Range    QT Interval 434 ms    QTC Interval 484 ms   Comprehensive Metabolic Panel     Collection Time: 03/26/25 11:09 PM    Specimen: Blood   Result Value Ref Range    Glucose 97 65 - 99 mg/dL    BUN 16 8 - 23 mg/dL    Creatinine 3.42 (H) 0.76 - 1.27 mg/dL    Sodium 136 136 - 145 mmol/L    Potassium 4.0 3.5 - 5.2 mmol/L    Chloride 94 (L) 98 - 107 mmol/L    CO2 24.0 22.0 - 29.0 mmol/L    Calcium 9.5 8.6 - 10.5 mg/dL    Total Protein 7.4 6.0 - 8.5 g/dL    Albumin 4.1 3.5 - 5.2 g/dL    ALT (SGPT) 13 1 - 41 U/L    AST (SGOT) 16 1 - 40 U/L    Alkaline Phosphatase 126 (H) 39 - 117 U/L    Total Bilirubin 0.3 0.0 - 1.2 mg/dL    Globulin 3.3 gm/dL    A/G Ratio 1.2 g/dL    BUN/Creatinine Ratio 4.7 (L) 7.0 - 25.0    Anion Gap 18.0 (H) 5.0 - 15.0 mmol/L    eGFR 16.4 (L) >60.0 mL/min/1.73   High Sensitivity Troponin T    Collection Time: 03/26/25 11:09 PM    Specimen: Blood   Result Value Ref Range    HS Troponin T 173 (C) <22 ng/L   Magnesium    Collection Time: 03/26/25 11:09 PM    Specimen: Blood   Result Value Ref Range    Magnesium 1.8 1.6 - 2.4 mg/dL   Green Top (Gel)    Collection Time: 03/26/25 11:09 PM   Result Value Ref Range    Extra Tube Hold for add-ons.    Lavender Top    Collection Time: 03/26/25 11:09 PM   Result Value Ref Range    Extra Tube hold for add-on    Gold Top - SST    Collection Time: 03/26/25 11:09 PM   Result Value Ref Range    Extra Tube Hold for add-ons.    Gray Top    Collection Time: 03/26/25 11:09 PM   Result Value Ref Range    Extra Tube Hold for add-ons.    Light Blue Top    Collection Time: 03/26/25 11:09 PM   Result Value Ref Range    Extra Tube Hold for add-ons.    CBC Auto Differential    Collection Time: 03/26/25 11:09 PM    Specimen: Blood   Result Value Ref Range    WBC 9.78 3.40 - 10.80 10*3/mm3    RBC 3.64 (L) 4.14 - 5.80 10*6/mm3    Hemoglobin 12.5 (L) 13.0 - 17.7 g/dL    Hematocrit 38.2 37.5 - 51.0 %    .9 (H) 79.0 - 97.0 fL    MCH 34.3 (H) 26.6 - 33.0 pg    MCHC 32.7 31.5 - 35.7 g/dL    RDW 13.6 12.3 - 15.4 %    RDW-SD 52.1 37.0 - 54.0 fl    MPV 8.8 6.0 -  12.0 fL    Platelets 209 140 - 450 10*3/mm3    Neutrophil % 78.0 (H) 42.7 - 76.0 %    Lymphocyte % 10.3 (L) 19.6 - 45.3 %    Monocyte % 8.3 5.0 - 12.0 %    Eosinophil % 2.5 0.3 - 6.2 %    Basophil % 0.6 0.0 - 1.5 %    Immature Grans % 0.3 0.0 - 0.5 %    Neutrophils, Absolute 7.63 (H) 1.70 - 7.00 10*3/mm3    Lymphocytes, Absolute 1.01 0.70 - 3.10 10*3/mm3    Monocytes, Absolute 0.81 0.10 - 0.90 10*3/mm3    Eosinophils, Absolute 0.24 0.00 - 0.40 10*3/mm3    Basophils, Absolute 0.06 0.00 - 0.20 10*3/mm3    Immature Grans, Absolute 0.03 0.00 - 0.05 10*3/mm3    nRBC 0.0 0.0 - 0.2 /100 WBC   ECG 12 Lead Other; weakness    Collection Time: 03/26/25 11:58 PM   Result Value Ref Range    QT Interval 428 ms    QTC Interval 458 ms   High Sensitivity Troponin T 1Hr    Collection Time: 03/27/25 12:28 AM    Specimen: Blood   Result Value Ref Range    HS Troponin T 163 (C) <22 ng/L    Troponin T Numeric Delta -10 ng/L    Troponin T % Delta -6 Abnormal if >/= 20%     Note: In addition to lab results from this visit, the labs listed above may include labs taken at another facility or during a different encounter within the last 24 hours. Please correlate lab times with ED admission and discharge times for further clarification of the services performed during this visit.    XR Chest 1 View   Final Result   Impression:   No acute cardiopulmonary process.         Electronically Signed: Blair Cedeno MD     3/26/2025 10:47 PM EDT     Workstation ID: [id number]        Vitals:    03/27/25 0109 03/27/25 0114 03/27/25 0209 03/27/25 0230   BP: 110/59 110/59 117/40 131/57   BP Location:  Right arm     Pulse: 67 67 67 71   Resp:  14 16 16   Temp:       TempSrc:       SpO2: 93% 96% 92% 96%   Weight:       Height:         Medications   sodium chloride 0.9 % flush 10 mL (has no administration in time range)     ECG/EMG Results (last 24 hours)       Procedure Component Value Units Date/Time    ECG 12 Lead Other; weakness [[id number]] Collected:  03/26/25 2144     Updated: 03/26/25 2144     QT Interval 434 ms      QTC Interval 484 ms     Narrative:      Test Reason : Other~  Blood Pressure :   */*   mmHG  Vent. Rate :  75 BPM     Atrial Rate :  75 BPM     P-R Int : 224 ms          QRS Dur : 120 ms      QT Int : 434 ms       P-R-T Axes :  83 -47  38 degrees    QTcB Int : 484 ms    Sinus rhythm with 1st degree AV block  Left anterior fascicular block  Anterior infarct , age undetermined  Abnormal ECG  When compared with ECG of 23-Mar-2025 05:22,  No significant change was found    Referred By: EDMD           Confirmed By:           ECG 12 Lead Other; weakness   Preliminary Result   Test Reason : Other~   Blood Pressure :   */*   mmHG   Vent. Rate :  69 BPM     Atrial Rate :  69 BPM      P-R Int : 226 ms          QRS Dur : 120 ms       QT Int : 428 ms       P-R-T Axes :  62 -53  85 degrees     QTcB Int : 458 ms      Sinus rhythm with 1st degree AV block   Left anterior fascicular block   Anterior infarct (cited on or before 26-Mar-2025)   Abnormal ECG   When compared with ECG of 26-Mar-2025 21:44, (Unconfirmed)   No significant change was found      Referred By: EDMD           Confirmed By:       ECG 12 Lead Other; weakness   Preliminary Result   Test Reason : Other~   Blood Pressure :   */*   mmHG   Vent. Rate :  75 BPM     Atrial Rate :  75 BPM      P-R Int : 224 ms          QRS Dur : 120 ms       QT Int : 434 ms       P-R-T Axes :  83 -47  38 degrees     QTcB Int : 484 ms      Sinus rhythm with 1st degree AV block   Left anterior fascicular block   Anterior infarct , age undetermined   Abnormal ECG   When compared with ECG of 23-Mar-2025 05:22,   No significant change was found      Referred By: EDMD           Confirmed By:               Final diagnoses:   Hypotension, unspecified hypotension type   ESRD on hemodialysis   Chronic right shoulder pain       ED Disposition  ED Disposition       ED Disposition   Discharge    Condition   Stable    Comment   --                Blair Dhaliwal MD  [address] [facility] [address]  [phone]    Schedule an appointment as soon as possible for a visit in 3 days  Primary care provider         Medication List      No changes were made to your prescriptions during this visit.            Flora Cantu MD  03/31/25 2303

## 2025-03-27 NOTE — DISCHARGE INSTRUCTIONS
Cut the Norco in half and only give half a tab q4 hours as needed for pain. Apply the next patch of buprenorphine. We do not carry that patch here unfortunately, and I am unable to legally prescribe it in [place]. You will run out of your supply of that early, so contact your prescriber to explain what happened. If this happens again, you can try to give the Narcan that you have already.

## 2025-04-04 ENCOUNTER — READMISSION MANAGEMENT (OUTPATIENT)
Dept: CALL CENTER | Facility: HOSPITAL | Age: OVER 89
End: 2025-04-04
Payer: MEDICARE

## 2025-04-04 NOTE — OUTREACH NOTE
Medical Week 2 Survey      Flowsheet Row Responses   [facility] patient discharged from? [facility]   Does the patient have one of the following disease processes/diagnoses(primary or secondary)? Other   Week 2 attempt successful? Yes   Call start time 1103   Discharge diagnosis Dysarthria   Revoke Decline to participate  [Spouse stated patient doing well, everything is fine and hung up phone.]   Call end time 1104   Is patient permission given to speak with other caregiver? Yes   List who call center can speak with Spouse   Person spoke with today (if not patient) and relationship Spouse   Is the patient taking all medications as directed (includes completed medication regime)? Yes   Call end time 1104            Sharon KEENE - Registered Nurse

## 2025-06-24 NOTE — PAT
Notified Shelbi at Dr Tan's office that patient has a right upper extremity dialysis access which is the same side as his planned total shoulder replacement.  Shelbi to make Dr Tan aware in case precautions need be taken or nephrology needs to be contacted for any recommendations.  While on the phone with Shelbi, she stated that patient needs dialysis the day of surgery as well.     Patient cleared by cardiology Dr briceno on 6/24/25 as moderate risk and may hold Eliquis x 2 days prior to surgery.

## 2025-06-25 ENCOUNTER — PRE-ADMISSION TESTING (OUTPATIENT)
Dept: PREADMISSION TESTING | Facility: HOSPITAL | Age: OVER 89
End: 2025-06-25
Payer: MEDICARE

## 2025-06-25 ENCOUNTER — HOSPITAL ENCOUNTER (OUTPATIENT)
Dept: GENERAL RADIOLOGY | Facility: HOSPITAL | Age: OVER 89
Discharge: HOME | End: 2025-06-25
Payer: MEDICARE

## 2025-06-25 VITALS — BODY MASS INDEX: 21.14 KG/M2 | HEIGHT: 65 IN | WEIGHT: 126.88 LBS

## 2025-06-25 LAB
ANION GAP SERPL CALCULATED.3IONS-SCNC: 16.3 MMOL/L (ref 5–15)
BUN SERPL-MCNC: 9.4 MG/DL (ref 8–23)
BUN/CREAT SERPL: 2.8 (ref 7–25)
CALCIUM SPEC-SCNC: 9 MG/DL (ref 8.2–9.6)
CHLORIDE SERPL-SCNC: 97 MMOL/L (ref 98–107)
CO2 SERPL-SCNC: 26.7 MMOL/L (ref 22–29)
CREAT SERPL-MCNC: 3.3 MG/DL (ref 0.76–1.27)
DEPRECATED RDW RBC AUTO: 54.4 FL (ref 37–54)
EGFRCR SERPLBLD CKD-EPI 2021: 17.1 ML/MIN/1.73
ERYTHROCYTE [DISTWIDTH] IN BLOOD BY AUTOMATED COUNT: 14.1 % (ref 12.3–15.4)
GLUCOSE SERPL-MCNC: 101 MG/DL (ref 65–99)
HBA1C MFR BLD: 5.16 % (ref 4.8–5.6)
HCT VFR BLD AUTO: 37.8 % (ref 37.5–51)
HGB BLD-MCNC: 12.4 G/DL (ref 13–17.7)
MCH RBC QN AUTO: 34.2 PG (ref 26.6–33)
MCHC RBC AUTO-ENTMCNC: 32.8 G/DL (ref 31.5–35.7)
MCV RBC AUTO: 104.1 FL (ref 79–97)
PLATELET # BLD AUTO: 188 10*3/MM3 (ref 140–450)
PMV BLD AUTO: 8.6 FL (ref 6–12)
POTASSIUM SERPL-SCNC: 3.8 MMOL/L (ref 3.5–5.2)
RBC # BLD AUTO: 3.63 10*6/MM3 (ref 4.14–5.8)
SODIUM SERPL-SCNC: 140 MMOL/L (ref 136–145)
WBC NRBC COR # BLD AUTO: 7.1 10*3/MM3 (ref 3.4–10.8)

## 2025-06-25 PROCEDURE — 85027 COMPLETE CBC AUTOMATED: CPT

## 2025-06-25 PROCEDURE — 36415 COLL VENOUS BLD VENIPUNCTURE: CPT

## 2025-06-25 PROCEDURE — 80048 BASIC METABOLIC PNL TOTAL CA: CPT

## 2025-06-25 PROCEDURE — 83036 HEMOGLOBIN GLYCOSYLATED A1C: CPT

## 2025-06-25 PROCEDURE — 71046 X-RAY EXAM CHEST 2 VIEWS: CPT

## 2025-06-25 RX ORDER — AMOXICILLIN 250 MG
1 CAPSULE ORAL DAILY
COMMUNITY

## 2025-06-25 RX ORDER — LEVOTHYROXINE SODIUM 137 UG/1
137 TABLET ORAL
COMMUNITY
Start: 2025-03-18

## 2025-06-25 RX ORDER — MEMANTINE HYDROCHLORIDE 10 MG/1
10 TABLET ORAL 2 TIMES DAILY
COMMUNITY

## 2025-06-25 RX ORDER — CLONAZEPAM 0.5 MG/1
0.25 TABLET ORAL
COMMUNITY
End: 2025-07-04 | Stop reason: HOSPADM

## 2025-06-25 RX ORDER — DONEPEZIL HYDROCHLORIDE 5 MG/1
5 TABLET, FILM COATED ORAL DAILY
COMMUNITY
Start: 2025-05-22 | End: 2026-05-22

## 2025-06-25 RX ORDER — BUPRENORPHINE 5 UG/H
1 PATCH TRANSDERMAL WEEKLY
COMMUNITY

## 2025-06-25 NOTE — PAT
An arrival time for procedure was not provided during PAT visit. If patient had any questions or concerns about their arrival time, they were instructed to contact their surgeon/physician.  Additionally, if the patient referred to an arrival time that was acquired from their my chart account, patient was encouraged to verify that time with their surgeon/physician. Arrival times are NOT provided in Pre Admission Testing Department.    Per Anesthesia Request, patient instructed not to take their ACE/ARB medications on the AM of surgery. (Pt takes losartan PRN)     Patient denies any current skin issues.     Patient to apply Chlorhexadine wipes  to surgical area (as instructed) the night before procedure and the AM of procedure. Wipes provided.    Patient instructed to drink 20 ounces of Gatorade or Gatorlyte (if diabetic) and it needs to be completed 1 hour (for Main OR patients) or 2 hours (scheduled  section & [facility] patients) before given arrival time for procedure (NO RED Gatorade and NO Gatorade Zero).    Patient verbalized understanding.    Patient viewed general PAT education video as instructed in their preoperative information received from their surgeon.  Patient stated the general PAT education video was viewed in its entirety and survey completed.  Copies of PAT general education handouts (Incentive Spirometry, Meds to Beds Program, Patient Belongings, Pre-op skin preparation instructions, Blood Glucose testing, Visitor policy, Surgery FAQ, Code H) distributed to patient if not printed. Education related to the PAT pass and skin preparation for surgery (if applicable) completed in PAT as a reinforcement to PAT education video. Patient instructed to return PAT pass provided today as well as completed skin preparation sheet (if applicable) on the day of procedure.     Additionally if patient had not viewed video yet but intended to view it at home or in our waiting area, then referred them to the  handout with QR code/link provided during PAT visit.  Encouraged patient/family to read [facility] general education handouts thoroughly and notify PAT staff with any questions or concerns. Patient verbalized understanding of all information and priority content.    Patient directed to Radiology Department for CXR after Pre Admission Testing Appointment.     Holding chart in [facility] to clarify info from Dr. Tan's office.  Per pt's wife, someone from the office told her pt would be admitted Sunday night to have dialysis early Monday morning prior to surgery.

## 2025-06-26 NOTE — PAT
Patient's wife was told that patient would be admitted on 6/29/25 in order for the patient to receive dialysis before surgery.    Left voicemail message for Sully at Dr Tan's office to clarify this.  No response.    Chart forwarded to pre op.  Patient's wife instructed that when someone calls on Friday to provide the arrival time for surgery.  Ask them what the plan is for Sunday.

## 2025-06-28 ENCOUNTER — ANESTHESIA EVENT (OUTPATIENT)
Dept: PERIOP | Facility: HOSPITAL | Age: OVER 89
End: 2025-06-28
Payer: MEDICARE

## 2025-06-28 RX ORDER — FAMOTIDINE 10 MG/ML
20 INJECTION, SOLUTION INTRAVENOUS ONCE
Status: CANCELLED | OUTPATIENT
Start: 2025-06-28 | End: 2025-06-28

## 2025-06-28 RX ORDER — SODIUM CHLORIDE 0.9 % (FLUSH) 0.9 %
10 SYRINGE (ML) INJECTION EVERY 12 HOURS SCHEDULED
Status: CANCELLED | OUTPATIENT
Start: 2025-06-28

## 2025-06-28 RX ORDER — SODIUM CHLORIDE 0.9 % (FLUSH) 0.9 %
10 SYRINGE (ML) INJECTION AS NEEDED
Status: CANCELLED | OUTPATIENT
Start: 2025-06-28

## 2025-06-30 ENCOUNTER — APPOINTMENT (OUTPATIENT)
Dept: NEPHROLOGY | Facility: HOSPITAL | Age: OVER 89
End: 2025-06-30
Payer: MEDICARE

## 2025-06-30 ENCOUNTER — APPOINTMENT (OUTPATIENT)
Dept: GENERAL RADIOLOGY | Facility: HOSPITAL | Age: OVER 89
End: 2025-06-30
Payer: MEDICARE

## 2025-06-30 ENCOUNTER — ANESTHESIA EVENT CONVERTED (OUTPATIENT)
Dept: ANESTHESIOLOGY | Facility: HOSPITAL | Age: OVER 89
End: 2025-06-30
Payer: MEDICARE

## 2025-06-30 ENCOUNTER — ANESTHESIA (OUTPATIENT)
Dept: PERIOP | Facility: HOSPITAL | Age: OVER 89
End: 2025-06-30
Payer: MEDICARE

## 2025-06-30 ENCOUNTER — HOSPITAL ENCOUNTER (OUTPATIENT)
Facility: HOSPITAL | Age: OVER 89
Discharge: NURSING, REHAB OR LONG-TERM CARE | End: 2025-07-04
Attending: ORTHOPAEDIC SURGERY | Admitting: ORTHOPAEDIC SURGERY
Payer: MEDICARE

## 2025-06-30 DIAGNOSIS — R13.12 OROPHARYNGEAL DYSPHAGIA: Primary | ICD-10-CM

## 2025-06-30 PROBLEM — Z96.611 S/P SHOULDER HEMIARTHROPLASTY, RIGHT: Status: ACTIVE | Noted: 2025-06-30

## 2025-06-30 LAB
ALBUMIN SERPL-MCNC: 3.8 G/DL (ref 3.5–5.2)
ALBUMIN/GLOB SERPL: 1.3 G/DL
ALP SERPL-CCNC: 97 U/L (ref 39–117)
ALT SERPL W P-5'-P-CCNC: 11 U/L (ref 1–41)
ANION GAP SERPL CALCULATED.3IONS-SCNC: 15.4 MMOL/L (ref 5–15)
AST SERPL-CCNC: 19 U/L (ref 1–40)
BASE EXCESS BLDA CALC-SCNC: 6 MMOL/L (ref -5–5)
BILIRUB SERPL-MCNC: 0.3 MG/DL (ref 0–1.2)
BUN SERPL-MCNC: 30.5 MG/DL (ref 8–23)
BUN/CREAT SERPL: 4.5 (ref 7–25)
CA-I BLDA-SCNC: 1.2 MMOL/L (ref 1.2–1.32)
CALCIUM SPEC-SCNC: 9.8 MG/DL (ref 8.2–9.6)
CHLORIDE SERPL-SCNC: 97 MMOL/L (ref 98–107)
CO2 BLDA-SCNC: 32 MMOL/L (ref 24–29)
CO2 SERPL-SCNC: 27.6 MMOL/L (ref 22–29)
CREAT SERPL-MCNC: 6.74 MG/DL (ref 0.76–1.27)
DEPRECATED RDW RBC AUTO: 53 FL (ref 37–54)
EGFRCR SERPLBLD CKD-EPI 2021: 7.2 ML/MIN/1.73
ERYTHROCYTE [DISTWIDTH] IN BLOOD BY AUTOMATED COUNT: 13.9 % (ref 12.3–15.4)
GLOBULIN UR ELPH-MCNC: 2.9 GM/DL
GLUCOSE BLDC GLUCOMTR-MCNC: 76 MG/DL (ref 70–130)
GLUCOSE SERPL-MCNC: 73 MG/DL (ref 65–99)
HAV IGM SERPL QL IA: NORMAL
HBV CORE IGM SERPL QL IA: NORMAL
HBV SURFACE AG SERPL QL IA: NORMAL
HCO3 BLDA-SCNC: 30.6 MMOL/L (ref 22–26)
HCT VFR BLD AUTO: 37.2 % (ref 37.5–51)
HCT VFR BLDA CALC: 37 % (ref 38–51)
HCV AB SER QL: NORMAL
HGB BLD-MCNC: 12 G/DL (ref 13–17.7)
HGB BLDA-MCNC: 12.6 G/DL (ref 12–17)
MCH RBC QN AUTO: 33.2 PG (ref 26.6–33)
MCHC RBC AUTO-ENTMCNC: 32.3 G/DL (ref 31.5–35.7)
MCV RBC AUTO: 103 FL (ref 79–97)
PCO2 BLDA: 50.5 MM HG (ref 35–45)
PH BLDA: 7.39 PH UNITS (ref 7.35–7.6)
PLATELET # BLD AUTO: 192 10*3/MM3 (ref 140–450)
PMV BLD AUTO: 8.4 FL (ref 6–12)
PO2 BLDA: 34 MMHG (ref 80–105)
POTASSIUM BLDA-SCNC: 4 MMOL/L (ref 3.5–4.9)
POTASSIUM SERPL-SCNC: 4.2 MMOL/L (ref 3.5–5.2)
PROT SERPL-MCNC: 6.7 G/DL (ref 6–8.5)
RBC # BLD AUTO: 3.61 10*6/MM3 (ref 4.14–5.8)
SAO2 % BLDA: 64 % (ref 95–98)
SODIUM BLD-SCNC: 141 MMOL/L (ref 138–146)
SODIUM SERPL-SCNC: 140 MMOL/L (ref 136–145)
WBC NRBC COR # BLD AUTO: 6.51 10*3/MM3 (ref 3.4–10.8)

## 2025-06-30 PROCEDURE — 25810000003 SODIUM CHLORIDE 0.9 % SOLUTION 250 ML FLEX CONT: Performed by: ORTHOPAEDIC SURGERY

## 2025-06-30 PROCEDURE — 63710000001 MEMANTINE 10 MG TABLET: Performed by: NURSE PRACTITIONER

## 2025-06-30 PROCEDURE — 73030 X-RAY EXAM OF SHOULDER: CPT

## 2025-06-30 PROCEDURE — 25010000002 ALBUMIN HUMAN 25% PER 50 ML: Performed by: INTERNAL MEDICINE

## 2025-06-30 PROCEDURE — A9270 NON-COVERED ITEM OR SERVICE: HCPCS | Performed by: NURSE PRACTITIONER

## 2025-06-30 PROCEDURE — C1713 ANCHOR/SCREW BN/BN,TIS/BN: HCPCS | Performed by: ORTHOPAEDIC SURGERY

## 2025-06-30 PROCEDURE — 25010000002 VANCOMYCIN 750 MG RECONSTITUTED SOLUTION 1 EACH VIAL: Performed by: ORTHOPAEDIC SURGERY

## 2025-06-30 PROCEDURE — 82803 BLOOD GASES ANY COMBINATION: CPT

## 2025-06-30 PROCEDURE — G0257 UNSCHED DIALYSIS ESRD PT HOS: HCPCS

## 2025-06-30 PROCEDURE — 63710000001 ATORVASTATIN 20 MG TABLET: Performed by: NURSE PRACTITIONER

## 2025-06-30 PROCEDURE — 63710000001 DONEPEZIL 5 MG TABLET: Performed by: NURSE PRACTITIONER

## 2025-06-30 PROCEDURE — 84132 ASSAY OF SERUM POTASSIUM: CPT

## 2025-06-30 PROCEDURE — 85027 COMPLETE CBC AUTOMATED: CPT | Performed by: ANESTHESIOLOGY

## 2025-06-30 PROCEDURE — 63710000001 TAMSULOSIN 0.4 MG CAPSULE: Performed by: NURSE PRACTITIONER

## 2025-06-30 PROCEDURE — 63710000001 SENNOSIDES-DOCUSATE 8.6-50 MG TABLET: Performed by: NURSE PRACTITIONER

## 2025-06-30 PROCEDURE — 25010000002 DEXAMETHASONE SODIUM PHOSPHATE 10 MG/ML SOLUTION: Performed by: NURSE ANESTHETIST, CERTIFIED REGISTERED

## 2025-06-30 PROCEDURE — P9047 ALBUMIN (HUMAN), 25%, 50ML: HCPCS

## 2025-06-30 PROCEDURE — 82947 ASSAY GLUCOSE BLOOD QUANT: CPT

## 2025-06-30 PROCEDURE — 25010000002 ROPIVACAINE HCL-NACL 0.2-0.9 % SOLUTION: Performed by: NURSE ANESTHETIST, CERTIFIED REGISTERED

## 2025-06-30 PROCEDURE — 25010000002 PROPOFOL 10 MG/ML EMULSION

## 2025-06-30 PROCEDURE — C1776 JOINT DEVICE (IMPLANTABLE): HCPCS | Performed by: ORTHOPAEDIC SURGERY

## 2025-06-30 PROCEDURE — 25810000003 LACTATED RINGERS PER 1000 ML: Performed by: ANESTHESIOLOGY

## 2025-06-30 PROCEDURE — 80053 COMPREHEN METABOLIC PANEL: CPT | Performed by: ANESTHESIOLOGY

## 2025-06-30 PROCEDURE — 25010000002 LIDOCAINE PF 1% 1 % SOLUTION: Performed by: ANESTHESIOLOGY

## 2025-06-30 PROCEDURE — 25010000002 SUGAMMADEX 200 MG/2ML SOLUTION

## 2025-06-30 PROCEDURE — 80074 ACUTE HEPATITIS PANEL: CPT | Performed by: INTERNAL MEDICINE

## 2025-06-30 PROCEDURE — 84295 ASSAY OF SERUM SODIUM: CPT

## 2025-06-30 PROCEDURE — 25010000002 CEFAZOLIN PER 500 MG: Performed by: ORTHOPAEDIC SURGERY

## 2025-06-30 PROCEDURE — 25010000002 LIDOCAINE PF 1% 1 % SOLUTION

## 2025-06-30 PROCEDURE — P9047 ALBUMIN (HUMAN), 25%, 50ML: HCPCS | Performed by: INTERNAL MEDICINE

## 2025-06-30 PROCEDURE — 25010000002 BUPIVACAINE (PF) 0.25 % SOLUTION: Performed by: NURSE ANESTHETIST, CERTIFIED REGISTERED

## 2025-06-30 PROCEDURE — C1755 CATHETER, INTRASPINAL: HCPCS | Performed by: ORTHOPAEDIC SURGERY

## 2025-06-30 PROCEDURE — 25010000002 ALBUMIN HUMAN 25% PER 50 ML

## 2025-06-30 PROCEDURE — 85014 HEMATOCRIT: CPT

## 2025-06-30 PROCEDURE — L3670 SO ACRO/CLAV CAN WEB PRE OTS: HCPCS | Performed by: ORTHOPAEDIC SURGERY

## 2025-06-30 PROCEDURE — 63710000001 SERTRALINE 100 MG TABLET: Performed by: NURSE PRACTITIONER

## 2025-06-30 PROCEDURE — 82330 ASSAY OF CALCIUM: CPT

## 2025-06-30 PROCEDURE — 25010000002 PHENYLEPHRINE HCL-NACL 1000-0.9 MCG/10ML-% SOLUTION PREFILLED SYRINGE

## 2025-06-30 DEVICE — PLT HUM EQUINOXE REPLICAT OFFST 4.5: Type: IMPLANTABLE DEVICE | Site: SHOULDER | Status: FUNCTIONAL

## 2025-06-30 DEVICE — SUT FW #2 W/TPR NDL 1/2 CIR 38IN 97CM 26.5MM BLU: Type: IMPLANTABLE DEVICE | Site: SHOULDER | Status: FUNCTIONAL

## 2025-06-30 DEVICE — STEM HUM PRI EQUINOXE PRESSFIT 9MM SHT: Type: IMPLANTABLE DEVICE | Site: SHOULDER | Status: FUNCTIONAL

## 2025-06-30 DEVICE — SCRW EQUINOXE TORQ DEFINE KT SQ: Type: IMPLANTABLE DEVICE | Site: SHOULDER | Status: FUNCTIONAL

## 2025-06-30 DEVICE — DEV CONTRL TISS STRATAFIX SPIRAL MNCRYL UD 3/0 PLS 60CM: Type: IMPLANTABLE DEVICE | Site: SHOULDER | Status: FUNCTIONAL

## 2025-06-30 DEVICE — IMPLANTABLE DEVICE: Type: IMPLANTABLE DEVICE | Site: SHOULDER | Status: FUNCTIONAL

## 2025-06-30 RX ORDER — SERTRALINE HYDROCHLORIDE 100 MG/1
100 TABLET, FILM COATED ORAL DAILY
Status: DISCONTINUED | OUTPATIENT
Start: 2025-06-30 | End: 2025-07-04 | Stop reason: HOSPADM

## 2025-06-30 RX ORDER — LIDOCAINE HYDROCHLORIDE 10 MG/ML
INJECTION, SOLUTION EPIDURAL; INFILTRATION; INTRACAUDAL; PERINEURAL AS NEEDED
Status: DISCONTINUED | OUTPATIENT
Start: 2025-06-30 | End: 2025-06-30 | Stop reason: SURG

## 2025-06-30 RX ORDER — PHENYLEPHRINE HCL IN 0.9% NACL 1 MG/10 ML
SYRINGE (ML) INTRAVENOUS AS NEEDED
Status: DISCONTINUED | OUTPATIENT
Start: 2025-06-30 | End: 2025-06-30 | Stop reason: SURG

## 2025-06-30 RX ORDER — AMOXICILLIN 250 MG
1 CAPSULE ORAL NIGHTLY
Status: DISCONTINUED | OUTPATIENT
Start: 2025-06-30 | End: 2025-07-04 | Stop reason: HOSPADM

## 2025-06-30 RX ORDER — ACETAMINOPHEN 500 MG
1000 TABLET ORAL ONCE
Status: COMPLETED | OUTPATIENT
Start: 2025-06-30 | End: 2025-06-30

## 2025-06-30 RX ORDER — SODIUM CHLORIDE 9 MG/ML
9 INJECTION, SOLUTION INTRAVENOUS CONTINUOUS
Status: ACTIVE | OUTPATIENT
Start: 2025-06-30 | End: 2025-07-01

## 2025-06-30 RX ORDER — OXYCODONE HYDROCHLORIDE 5 MG/1
5 TABLET ORAL EVERY 4 HOURS PRN
Status: DISCONTINUED | OUTPATIENT
Start: 2025-06-30 | End: 2025-07-04 | Stop reason: HOSPADM

## 2025-06-30 RX ORDER — TAMSULOSIN HYDROCHLORIDE 0.4 MG/1
0.4 CAPSULE ORAL DAILY
Status: DISCONTINUED | OUTPATIENT
Start: 2025-06-30 | End: 2025-07-04 | Stop reason: HOSPADM

## 2025-06-30 RX ORDER — LIDOCAINE HYDROCHLORIDE 10 MG/ML
0.5 INJECTION, SOLUTION EPIDURAL; INFILTRATION; INTRACAUDAL; PERINEURAL ONCE AS NEEDED
Status: COMPLETED | OUTPATIENT
Start: 2025-06-30 | End: 2025-06-30

## 2025-06-30 RX ORDER — ACETAMINOPHEN 325 MG/1
650 TABLET ORAL EVERY 4 HOURS PRN
Status: DISCONTINUED | OUTPATIENT
Start: 2025-06-30 | End: 2025-07-04 | Stop reason: HOSPADM

## 2025-06-30 RX ORDER — MAGNESIUM HYDROXIDE 1200 MG/15ML
LIQUID ORAL AS NEEDED
Status: DISCONTINUED | OUTPATIENT
Start: 2025-06-30 | End: 2025-06-30 | Stop reason: HOSPADM

## 2025-06-30 RX ORDER — TRANEXAMIC ACID 10 MG/ML
1000 INJECTION, SOLUTION INTRAVENOUS ONCE
Status: COMPLETED | OUTPATIENT
Start: 2025-06-30 | End: 2025-06-30

## 2025-06-30 RX ORDER — FAMOTIDINE 20 MG/1
20 TABLET, FILM COATED ORAL ONCE
Status: COMPLETED | OUTPATIENT
Start: 2025-06-30 | End: 2025-06-30

## 2025-06-30 RX ORDER — SODIUM CHLORIDE, SODIUM LACTATE, POTASSIUM CHLORIDE, CALCIUM CHLORIDE 600; 310; 30; 20 MG/100ML; MG/100ML; MG/100ML; MG/100ML
9 INJECTION, SOLUTION INTRAVENOUS CONTINUOUS
Status: ACTIVE | OUTPATIENT
Start: 2025-07-01 | End: 2025-07-01

## 2025-06-30 RX ORDER — ROPIVACAINE HYDROCHLORIDE 2 MG/ML
INJECTION, SOLUTION EPIDURAL; INFILTRATION; PERINEURAL CONTINUOUS
Status: DISCONTINUED | OUTPATIENT
Start: 2025-06-30 | End: 2025-07-04 | Stop reason: HOSPADM

## 2025-06-30 RX ORDER — HYDROMORPHONE HYDROCHLORIDE 1 MG/ML
0.5 INJECTION, SOLUTION INTRAMUSCULAR; INTRAVENOUS; SUBCUTANEOUS
Status: DISCONTINUED | OUTPATIENT
Start: 2025-06-30 | End: 2025-06-30 | Stop reason: HOSPADM

## 2025-06-30 RX ORDER — ALBUMIN (HUMAN) 12.5 G/50ML
SOLUTION INTRAVENOUS
Status: COMPLETED
Start: 2025-06-30 | End: 2025-06-30

## 2025-06-30 RX ORDER — MEMANTINE HYDROCHLORIDE 10 MG/1
10 TABLET ORAL EVERY 12 HOURS SCHEDULED
Status: DISCONTINUED | OUTPATIENT
Start: 2025-06-30 | End: 2025-07-04 | Stop reason: HOSPADM

## 2025-06-30 RX ORDER — BUPIVACAINE HYDROCHLORIDE 2.5 MG/ML
INJECTION, SOLUTION EPIDURAL; INFILTRATION; INTRACAUDAL; PERINEURAL
Status: COMPLETED | OUTPATIENT
Start: 2025-06-30 | End: 2025-06-30

## 2025-06-30 RX ORDER — ALBUMIN (HUMAN) 12.5 G/50ML
12.5 SOLUTION INTRAVENOUS AS NEEDED
Status: DISCONTINUED | OUTPATIENT
Start: 2025-06-30 | End: 2025-07-04 | Stop reason: HOSPADM

## 2025-06-30 RX ORDER — FENTANYL CITRATE 50 UG/ML
50 INJECTION, SOLUTION INTRAMUSCULAR; INTRAVENOUS
Status: DISCONTINUED | OUTPATIENT
Start: 2025-06-30 | End: 2025-06-30 | Stop reason: HOSPADM

## 2025-06-30 RX ORDER — ONDANSETRON 2 MG/ML
4 INJECTION INTRAMUSCULAR; INTRAVENOUS ONCE AS NEEDED
Status: DISCONTINUED | OUTPATIENT
Start: 2025-06-30 | End: 2025-06-30 | Stop reason: HOSPADM

## 2025-06-30 RX ORDER — PROPOFOL 10 MG/ML
VIAL (ML) INTRAVENOUS AS NEEDED
Status: DISCONTINUED | OUTPATIENT
Start: 2025-06-30 | End: 2025-06-30 | Stop reason: SURG

## 2025-06-30 RX ORDER — ROCURONIUM BROMIDE 10 MG/ML
INJECTION, SOLUTION INTRAVENOUS AS NEEDED
Status: DISCONTINUED | OUTPATIENT
Start: 2025-06-30 | End: 2025-06-30 | Stop reason: SURG

## 2025-06-30 RX ORDER — PANTOPRAZOLE SODIUM 40 MG/1
40 TABLET, DELAYED RELEASE ORAL
Status: DISCONTINUED | OUTPATIENT
Start: 2025-07-01 | End: 2025-07-04 | Stop reason: HOSPADM

## 2025-06-30 RX ORDER — AMIODARONE HYDROCHLORIDE 200 MG/1
100 TABLET ORAL
Status: DISCONTINUED | OUTPATIENT
Start: 2025-07-01 | End: 2025-07-04 | Stop reason: HOSPADM

## 2025-06-30 RX ORDER — MIDAZOLAM HYDROCHLORIDE 1 MG/ML
0.5 INJECTION, SOLUTION INTRAMUSCULAR; INTRAVENOUS
Status: DISCONTINUED | OUTPATIENT
Start: 2025-06-30 | End: 2025-06-30 | Stop reason: HOSPADM

## 2025-06-30 RX ORDER — HYDROMORPHONE HYDROCHLORIDE 1 MG/ML
0.5 INJECTION, SOLUTION INTRAMUSCULAR; INTRAVENOUS; SUBCUTANEOUS
Status: DISCONTINUED | OUTPATIENT
Start: 2025-06-30 | End: 2025-07-04 | Stop reason: HOSPADM

## 2025-06-30 RX ORDER — NALOXONE HCL 0.4 MG/ML
0.1 VIAL (ML) INJECTION
Status: DISCONTINUED | OUTPATIENT
Start: 2025-06-30 | End: 2025-07-04 | Stop reason: HOSPADM

## 2025-06-30 RX ORDER — TRANEXAMIC ACID 10 MG/ML
1000 INJECTION, SOLUTION INTRAVENOUS ONCE
Status: DISCONTINUED | OUTPATIENT
Start: 2025-06-30 | End: 2025-06-30 | Stop reason: HOSPADM

## 2025-06-30 RX ORDER — DONEPEZIL HYDROCHLORIDE 5 MG/1
5 TABLET, FILM COATED ORAL NIGHTLY
Status: DISCONTINUED | OUTPATIENT
Start: 2025-06-30 | End: 2025-07-04 | Stop reason: HOSPADM

## 2025-06-30 RX ORDER — DEXAMETHASONE SODIUM PHOSPHATE 10 MG/ML
INJECTION, SOLUTION INTRAMUSCULAR; INTRAVENOUS
Status: COMPLETED | OUTPATIENT
Start: 2025-06-30 | End: 2025-06-30

## 2025-06-30 RX ORDER — ACETAMINOPHEN 650 MG/1
650 SUPPOSITORY RECTAL EVERY 4 HOURS PRN
Status: DISCONTINUED | OUTPATIENT
Start: 2025-06-30 | End: 2025-07-04 | Stop reason: HOSPADM

## 2025-06-30 RX ORDER — ATORVASTATIN CALCIUM 20 MG/1
20 TABLET, FILM COATED ORAL NIGHTLY
Status: DISCONTINUED | OUTPATIENT
Start: 2025-06-30 | End: 2025-07-04 | Stop reason: HOSPADM

## 2025-06-30 RX ORDER — EPHEDRINE SULFATE 50 MG/ML
INJECTION, SOLUTION INTRAVENOUS AS NEEDED
Status: DISCONTINUED | OUTPATIENT
Start: 2025-06-30 | End: 2025-06-30 | Stop reason: SURG

## 2025-06-30 RX ORDER — LOSARTAN POTASSIUM 25 MG/1
25 TABLET ORAL DAILY PRN
Status: DISCONTINUED | OUTPATIENT
Start: 2025-06-30 | End: 2025-07-04 | Stop reason: HOSPADM

## 2025-06-30 RX ADMIN — MEMANTINE HYDROCHLORIDE 10 MG: 10 TABLET, FILM COATED ORAL at 22:00

## 2025-06-30 RX ADMIN — Medication 100 MCG: at 10:17

## 2025-06-30 RX ADMIN — ATORVASTATIN CALCIUM 20 MG: 20 TABLET, FILM COATED ORAL at 22:00

## 2025-06-30 RX ADMIN — VANCOMYCIN HYDROCHLORIDE 750 MG: 750 INJECTION, POWDER, LYOPHILIZED, FOR SOLUTION INTRAVENOUS at 22:01

## 2025-06-30 RX ADMIN — SUGAMMADEX 300 MG: 100 INJECTION, SOLUTION INTRAVENOUS at 11:27

## 2025-06-30 RX ADMIN — EPHEDRINE SULFATE 10 MG: 50 INJECTION INTRAVENOUS at 11:15

## 2025-06-30 RX ADMIN — Medication 100 MCG: at 10:29

## 2025-06-30 RX ADMIN — DEXAMETHASONE SODIUM PHOSPHATE 2 MG: 10 INJECTION INTRAMUSCULAR; INTRAVENOUS at 09:45

## 2025-06-30 RX ADMIN — Medication 100 MCG: at 10:26

## 2025-06-30 RX ADMIN — VANCOMYCIN HYDROCHLORIDE 750 MG: 750 INJECTION, POWDER, LYOPHILIZED, FOR SOLUTION INTRAVENOUS at 10:25

## 2025-06-30 RX ADMIN — TRANEXAMIC ACID 1000 MG: 10 INJECTION, SOLUTION INTRAVENOUS at 10:23

## 2025-06-30 RX ADMIN — SODIUM CHLORIDE 2000 MG: 900 INJECTION INTRAVENOUS at 10:05

## 2025-06-30 RX ADMIN — BUPIVACAINE HYDROCHLORIDE 10 ML: 2.5 INJECTION, SOLUTION EPIDURAL; INFILTRATION; INTRACAUDAL; PERINEURAL at 09:38

## 2025-06-30 RX ADMIN — SENNOSIDES AND DOCUSATE SODIUM 1 TABLET: 50; 8.6 TABLET ORAL at 22:00

## 2025-06-30 RX ADMIN — Medication 100 MCG: at 11:15

## 2025-06-30 RX ADMIN — PROPOFOL 100 MG: 10 INJECTION, EMULSION INTRAVENOUS at 10:17

## 2025-06-30 RX ADMIN — ALBUMIN (HUMAN) 25 G: 0.25 INJECTION, SOLUTION INTRAVENOUS at 13:46

## 2025-06-30 RX ADMIN — ROCURONIUM BROMIDE 50 MG: 10 INJECTION, SOLUTION INTRAVENOUS at 10:18

## 2025-06-30 RX ADMIN — LIDOCAINE HYDROCHLORIDE 0.5 ML: 10 INJECTION, SOLUTION EPIDURAL; INFILTRATION; INTRACAUDAL; PERINEURAL at 08:25

## 2025-06-30 RX ADMIN — EPHEDRINE SULFATE 10 MG: 50 INJECTION INTRAVENOUS at 10:30

## 2025-06-30 RX ADMIN — ALBUMIN (HUMAN) 25 G: 0.25 INJECTION, SOLUTION INTRAVENOUS at 14:37

## 2025-06-30 RX ADMIN — FAMOTIDINE 20 MG: 20 TABLET, FILM COATED ORAL at 09:19

## 2025-06-30 RX ADMIN — DONEPEZIL HYDROCHLORIDE 5 MG: 5 TABLET ORAL at 22:00

## 2025-06-30 RX ADMIN — BUPIVACAINE HYDROCHLORIDE 30 ML: 2.5 INJECTION, SOLUTION EPIDURAL; INFILTRATION; INTRACAUDAL; PERINEURAL at 09:45

## 2025-06-30 RX ADMIN — SODIUM CHLORIDE 2000 MG: 900 INJECTION INTRAVENOUS at 10:22

## 2025-06-30 RX ADMIN — SERTRALINE 100 MG: 100 TABLET, FILM COATED ORAL at 18:10

## 2025-06-30 RX ADMIN — LIDOCAINE HYDROCHLORIDE 50 MG: 10 INJECTION, SOLUTION EPIDURAL; INFILTRATION; INTRACAUDAL; PERINEURAL at 10:17

## 2025-06-30 RX ADMIN — EPHEDRINE SULFATE 10 MG: 50 INJECTION INTRAVENOUS at 11:12

## 2025-06-30 RX ADMIN — EPHEDRINE SULFATE 10 MG: 50 INJECTION INTRAVENOUS at 11:06

## 2025-06-30 RX ADMIN — TAMSULOSIN HYDROCHLORIDE 0.4 MG: 0.4 CAPSULE ORAL at 18:10

## 2025-06-30 RX ADMIN — SODIUM CHLORIDE, POTASSIUM CHLORIDE, SODIUM LACTATE AND CALCIUM CHLORIDE 9 ML/HR: 600; 310; 30; 20 INJECTION, SOLUTION INTRAVENOUS at 08:29

## 2025-06-30 RX ADMIN — TRANEXAMIC ACID 1000 MG: 10 INJECTION, SOLUTION INTRAVENOUS at 11:13

## 2025-06-30 RX ADMIN — ACETAMINOPHEN 1000 MG: 500 TABLET, FILM COATED ORAL at 09:17

## 2025-06-30 RX ADMIN — Medication 1000 MG: at 11:42

## 2025-06-30 RX ADMIN — Medication 100 MCG: at 11:12

## 2025-06-30 NOTE — H&P
"  Pre-Op H&P  Enrike Tomas  [id number]  1935      Chief complaint: Right shoulder pain      Subjective:  Patient is a 89 y.o.male presents for scheduled surgery by Dr. Tan. He anticipates a RIGHT SHOULDER HEMIARTHROPLASTY, BICEPS TENODESIS  today. He reports a long history of right shoulder pain and limited ROM. He denies numbness, tingling or difficulty with  right hand. He tried cortisone injection with short-term pain relief.      Review of Systems:  Constitutional-- No fever, chills or sweats. No fatigue.  CV-- No chest pain, palpitation or syncope. +HTN, HLD, afib  Resp-- No SOB, cough, hemoptysis  Skin--No rashes or lesions      Allergies:   Allergies   Allergen Reactions    Mirtazapine Other (See Comments)     \"Did not work well\"    Pollen Extract Other (See Comments)     sneezing         Home Meds:  Medications Prior to Admission   Medication Sig Dispense Refill Last Dose/Taking    acetaminophen (TYLENOL) 500 MG tablet Take 1 tablet by mouth Every 6 (Six) Hours As Needed for Mild Pain, Headache or Fever. OTC       amiodarone (PACERONE) 100 MG tablet Take 1 tablet by mouth Daily.       apixaban (ELIQUIS) 2.5 MG tablet tablet Take 1 tablet by mouth 2 (Two) Times a Day. Hold until you follow up with Dr. LANDAVERDE next week       atorvastatin (LIPITOR) 20 MG tablet Take 1 tablet by mouth Every Night.       B Complex-C-Folic Acid (Jenny-Serg) tablet Take 1 tablet by mouth Daily. OTC       Buprenorphine (BUTRANS) 5 MCG/HR patch weekly transdermal patch Place 1 patch on the skin as directed by provider 1 (One) Time Per Week.       clonazePAM (KlonoPIN) 0.5 MG tablet Take 0.5 tablets by mouth every night at bedtime.       Diclofenac Sodium (VOLTAREN) 1 % gel gel Apply 4 g topically to the appropriate area as directed 2 (Two) Times a Day.       donepezil (ARICEPT) 5 MG tablet Take 1 tablet by mouth Daily.       fluticasone (FLONASE) 50 MCG/ACT nasal spray Administer 2 sprays into the nostril(s) as directed by " provider Daily As Needed for Rhinitis or Allergies. OTC       HYDROcodone-acetaminophen (NORCO) 7.5-325 MG per tablet Take 1 tablet by mouth Every 8 (Eight) Hours As Needed for Mild Pain, Moderate Pain or Severe Pain.       levothyroxine (SYNTHROID, LEVOTHROID) 137 MCG tablet Take 1 tablet by mouth Every Morning.       losartan (COZAAR) 25 MG tablet Take 0.5 tablets by mouth As Needed. If BP is above 150       melatonin 5 MG tablet tablet Take 1 tablet by mouth At Night As Needed (Sleep). OTC       memantine (NAMENDA) 10 MG tablet Take 1 tablet by mouth 2 (Two) Times a Day.       midodrine (PROAMATINE) 5 MG tablet Take 1 tablet by mouth 3 (Three) Times a Day As Needed (low bp).       pantoprazole (PROTONIX) 40 MG EC tablet Take 1 tablet by mouth Daily.       sennosides-docusate (PERICOLACE) 8.6-50 MG per tablet Take 1 tablet by mouth Daily.       sertraline (ZOLOFT) 100 MG tablet Take 1 tablet by mouth Daily.       tamsulosin (FLOMAX) 0.4 MG capsule 24 hr capsule Take 1 capsule by mouth Daily.       Teriparatide, Recombinant, (FORTEO) 620 MCG/2.48ML injection Inject 20 mcg under the skin into the appropriate area as directed Daily.            PMH:   Past Medical History:   Diagnosis Date    A-fib     Abnormal ECG     occasional extra heartbeat    Anemia, acute on chronic 09/30/2023    Anesthesia complication     hallucinations upon waking, violent upon waking per pt report    Arthritis     Atrial fibrillation     BPH (benign prostatic hyperplasia)     Cervical compression fracture 06/05/2023    Dementia     Diverticulosis     Diverticulum of esophagus     puree diet and speech therapy    Does use hearing aid     Elevated cholesterol     ESRD on hemodialysis     GERD (gastroesophageal reflux disease)     Heart failure     Hemodialysis patient     M,W,F    Hyperlipidemia     Hypertension     Low back pain 2022    NERY (obstructive sleep apnea)     NOT USING CPAP, dental appliance    Recurrent falls     UTI (urinary  "tract infection)      PSH:    Past Surgical History:   Procedure Laterality Date    CATARACT EXTRACTION      COLONOSCOPY      DIALYSIS FISTULA CREATION      x2    ENDOSCOPY      INGUINAL HERNIA REPAIR      PROSTATE BIOPSY      THROAT SURGERY      diverticulum in throat    TONSILLECTOMY AND ADENOIDECTOMY      TOTAL SHOULDER ARTHROPLASTY W/ DISTAL CLAVICLE EXCISION Left 09/25/2023    Procedure: HEMIARTHROPLASTY, BICEPS TENODESIS - LEFT;  Surgeon: Matteo Tan MD;  Location: [facility];  Service: Orthopedics;  Laterality: Left;       Immunization History:  Influenza: UTD  Pneumococcal: UTD  Tetanus: UTD    Social History:   Tobacco:   Social History     Tobacco Use   Smoking Status Never   Smokeless Tobacco Never      Alcohol:     Social History     Substance and Sexual Activity   Alcohol Use Not Currently    Alcohol/week: 1.0 standard drink of alcohol    Types: 1 Glasses of wine per week    Comment: once monthly typically         Physical Exam:/75 (BP Location: Left leg, Patient Position: Lying)   Pulse 67   Temp 98.2 °F (36.8 °C) (Temporal)   Resp 16   Ht 165.1 cm (65\")   Wt 57.2 kg (126 lb)   SpO2 94%   BMI 20.97 kg/m²       General Appearance:    Alert, cooperative, no distress, appears stated age   Head:    Normocephalic, without obvious abnormality, atraumatic   Lungs:     Clear to auscultation bilaterally, respirations unlabored    Heart:   Regular rate and rhythm, S1 and S2 normal    Abdomen:    Soft without tenderness   Extremities:   Extremities normal, atraumatic, no cyanosis or edema   Skin:   Skin color, texture, turgor normal, no rashes or lesions   Neurologic:   Grossly intact     Results Review:     LABS:  Lab Results   Component Value Date    WBC 7.10 06/25/2025    HGB 12.4 (L) 06/25/2025    HCT 37.8 06/25/2025    .1 (H) 06/25/2025     06/25/2025    NEUTROABS 7.63 (H) 03/26/2025    GLUCOSE 101 (H) 06/25/2025    BUN 9.4 06/25/2025    CREATININE 3.30 (H) 06/25/2025    "  06/25/2025    K 3.8 06/25/2025    CL 97 (L) 06/25/2025    CO2 26.7 06/25/2025    MG 1.8 03/26/2025    PHOS 3.2 03/21/2025    CALCIUM 9.0 06/25/2025    ALBUMIN 4.1 03/26/2025    AST 16 03/26/2025    ALT 13 03/26/2025    BILITOT 0.3 03/26/2025       RADIOLOGY:  Imaging Results (Last 72 Hours)       ** No results found for the last 72 hours. **            I reviewed the patient's new clinical results.    Cancer Staging (if applicable)  Cancer Patient: __ yes __no __unknown; If yes, clinical stage T:__ N:__M:__, stage group or __N/A      Impression: Right shoulder pain      Plan: RIGHT SHOULDER HEMIARTHROPLASTY, BICEPS TENODESIS       MERCEDEZ Ryan   6/30/2025   08:02 EDT

## 2025-06-30 NOTE — ANESTHESIA PROCEDURE NOTES
Airway  Reason: elective    Date/Time: 6/30/2025 10:19 AM  Airway not difficult    General Information and Staff    Patient location during procedure: OR  CRNA/CAA: Blair White CRNA    Indications and Patient Condition  Indications for airway management: airway protection    Preoxygenated: yes  MILS not maintained throughout    Mask difficulty assessment: 1 - vent by mask    Final Airway Details    Final airway type: endotracheal airway      Successful airway: ETT  Cuffed: yes   Successful intubation technique: direct laryngoscopy  Endotracheal tube insertion site: oral  Blade: Ani  Blade size: 3  ETT size (mm): 7.5  Cormack-Lehane Classification: grade IIb - view of arytenoids or posterior of glottis only  Placement verified by: chest auscultation and capnometry   Cuff volume (mL): 10  Measured from: lips  ETT/EBT  to lips (cm): 22  Number of attempts at approach: 1  Assessment: lips, teeth, and gum same as pre-op and atraumatic intubation    Additional Comments  Negative epigastric sounds, Breath sound equal bilaterally with symmetric chest rise and fall

## 2025-06-30 NOTE — H&P
"Patient Name: Enrike Tomas  MRN: [id number]  : 1935  DOS: 2025    Attending: Matteo Tan MD    Primary Care Provider: Blair Dhaliwal MD      Chief complaint:  Right Shoulder pain    Subjective   Patient is a pleasant 89 y.o. male presented for scheduled surgery by   He underwent right shoulder arthroplasty with right biceps tenodesis under GA and a block, tolerated surgery well, was admitted for further management.    Seen postoperatively, patient is doing well, good pain control, no complaints of nausea, vomiting, or shortness of breath.    Reviewed with patient past medical history and home medications- limited due to dementia      Allergies:  Allergies   Allergen Reactions    Mirtazapine Other (See Comments)     \"Did not work well\"    Pollen Extract Other (See Comments)     sneezing       Meds:  Medications Prior to Admission   Medication Sig Dispense Refill Last Dose/Taking    amiodarone (PACERONE) 100 MG tablet Take 1 tablet by mouth Daily.   2025    atorvastatin (LIPITOR) 20 MG tablet Take 1 tablet by mouth Every Night.   2025    B Complex-C-Folic Acid (Jenny-Serg) tablet Take 1 tablet by mouth Daily. OTC   2025    clonazePAM (KlonoPIN) 0.5 MG tablet Take 0.5 tablets by mouth every night at bedtime.   2025    donepezil (ARICEPT) 5 MG tablet Take 1 tablet by mouth Daily.   2025    HYDROcodone-acetaminophen (NORCO) 7.5-325 MG per tablet Take 1 tablet by mouth Every 8 (Eight) Hours As Needed for Mild Pain, Moderate Pain or Severe Pain.   2025    levothyroxine (SYNTHROID, LEVOTHROID) 137 MCG tablet Take 1 tablet by mouth Every Morning.   2025    losartan (COZAAR) 25 MG tablet Take 0.5 tablets by mouth As Needed. If BP is above 150   2025    memantine (NAMENDA) 10 MG tablet Take 1 tablet by mouth 2 (Two) Times a Day.   2025    midodrine (PROAMATINE) 5 MG tablet Take 1 tablet by mouth 3 (Three) Times a Day As Needed (low bp).   " 6/29/2025    pantoprazole (PROTONIX) 40 MG EC tablet Take 1 tablet by mouth Daily.   6/29/2025    sennosides-docusate (PERICOLACE) 8.6-50 MG per tablet Take 1 tablet by mouth Daily.   6/29/2025    sertraline (ZOLOFT) 100 MG tablet Take 1 tablet by mouth Daily.   6/29/2025    tamsulosin (FLOMAX) 0.4 MG capsule 24 hr capsule Take 1 capsule by mouth Daily.   6/29/2025    acetaminophen (TYLENOL) 500 MG tablet Take 1 tablet by mouth Every 6 (Six) Hours As Needed for Mild Pain, Headache or Fever. OTC   Unknown    apixaban (ELIQUIS) 2.5 MG tablet tablet Take 1 tablet by mouth 2 (Two) Times a Day. Hold until you follow up with Dr. LANDAVERDE next week   6/27/2025    Buprenorphine (BUTRANS) 5 MCG/HR patch weekly transdermal patch Place 1 patch on the skin as directed by provider 1 (One) Time Per Week.   Unknown    Diclofenac Sodium (VOLTAREN) 1 % gel gel Apply 4 g topically to the appropriate area as directed 2 (Two) Times a Day.   Unknown    fluticasone (FLONASE) 50 MCG/ACT nasal spray Administer 2 sprays into the nostril(s) as directed by provider Daily As Needed for Rhinitis or Allergies. OTC   Unknown    melatonin 5 MG tablet tablet Take 1 tablet by mouth At Night As Needed (Sleep). OTC   Unknown         History:   Past Medical History:   Diagnosis Date    A-fib     Abnormal ECG     occasional extra heartbeat    Anemia, acute on chronic 09/30/2023    Anesthesia complication     hallucinations upon waking, violent upon waking per pt report    Arthritis     Atrial fibrillation     BPH (benign prostatic hyperplasia)     Cervical compression fracture 06/05/2023    Dementia     Diverticulosis     Diverticulum of esophagus     puree diet and speech therapy    Does use hearing aid     Elevated cholesterol     ESRD on hemodialysis     GERD (gastroesophageal reflux disease)     Heart failure     Hemodialysis patient     M,W,F    Hyperlipidemia     Hypertension     Low back pain 2022    NERY (obstructive sleep apnea)     NOT USING CPAP,  "dental appliance    Recurrent falls     UTI (urinary tract infection)      Past Surgical History:   Procedure Laterality Date    CATARACT EXTRACTION      COLONOSCOPY      DIALYSIS FISTULA CREATION      x2    ENDOSCOPY      INGUINAL HERNIA REPAIR      PROSTATE BIOPSY      THROAT SURGERY      diverticulum in throat    TONSILLECTOMY AND ADENOIDECTOMY      TOTAL SHOULDER ARTHROPLASTY W/ DISTAL CLAVICLE EXCISION Left 09/25/2023    Procedure: HEMIARTHROPLASTY, BICEPS TENODESIS - LEFT;  Surgeon: Matteo Tan MD;  Location: [facility];  Service: Orthopedics;  Laterality: Left;     Family History   Problem Relation Age of Onset    Stomach cancer Mother     Heart disease Mother     Arthritis Mother     Heart disease Father     Stroke Father     Kidney failure Father     Cancer Father     Kidney disease Father      Social History     Tobacco Use    Smoking status: Never    Smokeless tobacco: Never   Vaping Use    Vaping status: Never Used   Substance Use Topics    Alcohol use: Not Currently     Alcohol/week: 1.0 standard drink of alcohol     Types: 1 Glasses of wine per week     Comment: once monthly typically    Drug use: Never       Review of Systems  Pertinent items are noted in HPI    Vital Signs  /55 (BP Location: Left arm, Patient Position: Lying)   Pulse 56   Temp 97.4 °F (36.3 °C) (Oral)   Resp 18   Ht 165.1 cm (65\")   Wt 57.2 kg (126 lb)   SpO2 94%   BMI 20.97 kg/m²     Physical Exam:    General Appearance:    Alert, cooperative, in no acute distress   Head:    Normocephalic, without obvious abnormality, atraumatic   Eyes:            Lids and lashes normal, conjunctivae and sclerae normal, no   icterus, no pallor.   Ears:    Ears appear intact with no abnormalities noted   Throat:   No oral lesions, no thrush, oral mucosa moist   Neck:   No adenopathy, supple, trachea midline, no thyromegaly         Lungs:     Clear to auscultation,respirations regular, even and                   unlabored    " Heart:    Regular rhythm and normal rate, normal S1 and S2, no      murmur    Abdomen:     Normal bowel sounds, no masses, no organomegaly, soft        nontender, nondistended, no guarding, no rebound                 tenderness   Genitalia:    Deferred   Extremities:   RUE in a sling, CDI  dressing . Interscalene nerve block cath present.  Distal pulses, cap refill, movements of fingers, wrist, intact.     Pulses:   Pulses palpable and equal bilaterally   Skin:   No bleeding, bruising or rash   Neurologic:   Cranial nerves 2 - 12 grossly intact      I reviewed the patient's new clinical results.       Results from last 7 days   Lab Units 06/30/25  0902 06/30/25  0841 06/25/25  1547   WBC 10*3/mm3 6.51  --  7.10   HEMOGLOBIN g/dL 12.0*  --  12.4*   HEMOGLOBIN, POC g/dL  --  12.6  --    HEMATOCRIT % 37.2*  --  37.8   HEMATOCRIT POC %  --  37*  --    PLATELETS 10*3/mm3 192  --  188     Results from last 7 days   Lab Units 06/30/25  0836 06/25/25  1547   SODIUM mmol/L 140 140   POTASSIUM mmol/L 4.2 3.8   CHLORIDE mmol/L 97* 97*   CO2 mmol/L 27.6 26.7   BUN mg/dL 30.5* 9.4   CREATININE mg/dL 6.74* 3.30*   CALCIUM mg/dL 9.8* 9.0   BILIRUBIN mg/dL 0.3  --    ALK PHOS U/L 97  --    ALT (SGPT) U/L 11  --    AST (SGOT) U/L 19  --    GLUCOSE mg/dL 73 101*     Lab Results   Component Value Date    HGBA1C 5.16 06/25/2025           Assessment and Plan:       S/P shoulder hemiarthroplasty, right    (HFpEF) heart failure with preserved ejection fraction    Depression    ESRD (end stage renal disease)    Essential hypertension    GERD (gastroesophageal reflux disease)    Chronic systolic heart failure      Plan    1. PT/OT. NWB, RUE, ROM hand, wrist, elbow.  2. Pain control-prns, interscalene nerve block cath with ropivacaine infusion.   3. IS-encourage  4. DVT proph- Mech/ mobilize.  5. Bowel regimen  6. Resume home medications as appropriate  7. Monitor post-op labs  8. DC planning - ?rehab    -ESRD  On HD MWF. RUE fistula  Takes  midodrine on HD days  Consult NAL for HD management       -HTN with intermittent hypotension  Hx labile BP with episodes of hypotension  On low dose losartan for HFpEF but only takes PRN per chart review  Takes midodrine on HD days and intermittently throughout week (checks BP twice daily)    -Paroxysmal A fib, HFpEF   Chronic, stable. Follows with cardiology  - On amiodarone and Eliquis- resume Eliquis tomorrow       -GERD:  Resume PPI.  Formulary substitution when indicated.    -Hypothyroidism  Resume Synthroid     -Depression:  Resume home regimen including Sertraline    -BPH  Resume tamsulosin       -Major neurocognitive disorder, Memory loss  Follows with neurology, first seen 3/2025  Thought multifactorial 2/2 opioid use, ESRD, depression, possible vascular disease. Per neuro notes some c/f Alzheimer's  Resume Namenda and Aricept       Briseidaon disclaimer:  Part of this encounter note is an electronic transcription/translation of spoken language to printed text. The electronic translation of spoken language may permit erroneous, or at times, nonsensical words or phrases to be inadvertently transcribed; Although I have reviewed the note for such errors, some may still exist.    Shanice Reed, APRN  06/30/25  12:41 EDT

## 2025-06-30 NOTE — PLAN OF CARE
Goal Outcome Evaluation:  Plan of Care Reviewed With: patient           Outcome Evaluation: VSS. Alert oriented to self. No complaints of pain.

## 2025-06-30 NOTE — PLAN OF CARE
Goal Outcome Evaluation: Scheduled HD completed. Pt required 50g of albumin and UF placed in minimum for hypotension. Goal was not met. Blood reinfused back to pt. Called report to Anisa Mathews.  Pt returned to his room in stable condition.       Problem: Hemodialysis  Goal: Safe, Effective Therapy Delivery  Outcome: Progressing  Goal: Effective Tissue Perfusion  Outcome: Progressing  Goal: Absence of Infection Signs and Symptoms  Outcome: Progressing

## 2025-06-30 NOTE — ANESTHESIA PREPROCEDURE EVALUATION
Anesthesia Evaluation     Patient summary reviewed and Nursing notes reviewed   history of anesthetic complications (HALLUCINATIONS POSTOP):                Airway   Mallampati: II  TM distance: >3 FB  Neck ROM: full  No difficulty expected  Dental - normal exam     Pulmonary - normal exam   (+) ,sleep apnea  Cardiovascular - normal exam    (+) hypertension, dysrhythmias (SVT) Atrial Fib, hyperlipidemia      Neuro/Psych  (+) psychiatric history Depression, dementia  GI/Hepatic/Renal/Endo    (+) GERD, liver disease (HEPATIC CYST), renal disease- ESRD and dialysis    Musculoskeletal     Abdominal  - normal exam    Bowel sounds: normal.   Substance History - negative use     OB/GYN negative ob/gyn ROS         Other   arthritis,                   Anesthesia Plan    ASA 4     general     (INTERSCALENE CATHETER FOR POSTOP PAIN)  intravenous induction     Anesthetic plan, risks, benefits, and alternatives have been provided, discussed and informed consent has been obtained with: patient.      CODE STATUS:

## 2025-06-30 NOTE — ANESTHESIA PROCEDURE NOTES
Peripheral Block      Patient reassessed immediately prior to procedure    Patient location during procedure: OR  Reason for block: at surgeon's request and post-op pain management  Performed by  CRNA/CAA: Rio Blevins CRNA  Preanesthetic Checklist  Completed: patient identified, IV checked, site marked, risks and benefits discussed, surgical consent, monitors and equipment checked, pre-op evaluation and timeout performed  Prep:  Pt Position: supine  Sterile barriers:cap, gloves, mask and washed/disinfected hands  Prep: ChloraPrep  Patient monitoring: blood pressure monitoring, continuous pulse oximetry and EKG  Procedure  Performed under: general  Guidance:ultrasound guided and landmark technique  Images:still images obtained, printed/placed on chart    Laterality:right  Block Type:PECS I and PECS II  Injection Technique:single-shot  Needle Type:short-bevel  Needle Gauge:20 G  Resistance on Injection: none    Medications Used: dexamethasone sodium phosphate injection - Injection   2 mg - 6/30/2025 9:45:00 AM  bupivacaine PF (MARCAINE) 0.25 % injection - Injection   30 mL - 6/30/2025 9:45:00 AM      Medications  Preservative Free Saline:10ml  Comment:      Post Assessment  Injection Assessment: negative aspiration for heme, incremental injection and no paresthesia on injection  Patient Tolerance:comfortable throughout block  Complications:no  Additional Notes  Interpectoral-Pectoserratus Plane   A high-frequency linear transducer, with sterile cover, was placed medial to the coracoid process in the paramedian sagittal plane. The transducer was moved caudally to the 4th rib and rotated slightly to allow an in-plane needle trajectory from medial to lateral. Pectoralis Major Muscle (PMM), Pectoralis Minor Muscle (PmM), Thoracoacromial Artery, Ribs, and Pleura were identified under ultrasound. The insertion site was prepped in sterile fashion and then localized with 2-5 ml of 1% Lidocaine. Using ultrasound-guidance,  "a 20-gauge B-Brennan 4\" Ultraplex 360 non-stimulating echogenic needle was advanced in plane until the tip of the needle was in the fascial plane between the PMM and PmM, lateral to the Thoracoacromial Artery. 1-3ml of preservative free normal saline was used to hydro-dissect the fascial planes. After the fascial plane was verified, 10ml local anesthetic (LA) was injected for Interpectoral fascial plane block. The needle was continued along the same path to the level of the 4th rib below PmM.  Initially preservative free normal saline was used to confirm needle position and then 20 ml of LA was injected for Pectoserratus fascial plane block. Aspiration every 5 ml to prevent intravascular injection. Injection was completed with negative aspiration of blood and negative intravascular injection. Injection pressures were normal with minimal resistance.     Performed by: Rio Blevins CRNA          "

## 2025-06-30 NOTE — OP NOTE
DATE OF OPERATION: 06/30/25  PREOPERATIVE DIAGNOSIS: Right shoulder rotator cuff arthropathy.    POSTOPERATIVE DIAGNOSES:  1. Right shoulder rotator cuff arthropathy  2. Biceps tenosynovitis.    PROCEDURES PERFORMED:  1. Right shoulder hemiarthroplasty.    2. Right biceps tenodesis.    SURGEON: Matteo Tan MD  ASSISTANTS:  1. Juan Daniel Avalos DO, PGY 6 Sports Fellow    ANESTHESIA: General plus block.             ESTIMATED BLOOD LOSS:300mL.    COMPLICATIONS: None.    DISPOSITION: Recovery room in stable condition.    IMPLANTS: Exactech Equinoxe reverse total shoulder system, 9 mm preserve stem press-fit, 4.5mm replicator plate, 53 expanded humeral head   INDICATIONS: This is a 89-year-old male with right shoulder pain and limited function and motion secondary to rotator cuff arthropathy. They have failed conservative treatment and after a discussion of risks, benefits, and alternatives, wished to proceed with shoulder arthroplasty.  He has severe glenoid bone loss and therefore hemiarthroplasty was discussed and recommended.  DESCRIPTION OF PROCEDURE: On the day of surgery, the patient identified the right shoulder as the correct operative extremity. This was initialed by the surgeon with the patients's acknowledgment. The patient underwent placement of an interscalene block and was taken to the operating room and placed in the supine position. Upon induction of adequate anesthesia, the patient was brought up to the beach chair position and the shoulder and upper extremity were prepped and draped in the usual sterile fashion. Timeout confirmed the correct patient and operative extremity as well as that antibiotics were on board. A standard deltopectoral approach to the shoulder was carried out. It was carried sharply through the skin and subcutaneous tissue. Medial and lateral flaps were developed over the deltopectoral fascia. The cephalic vein was identified and mobilized laterally with the deltoid. The subdeltoid  and subpectoral spaces were mobilized and a blunt retractor was placed deep to this. The clavipectoral fascia was opened on the lateral edge of the conjoined tendon and the retractor was moved deep to this. The leading edge of the pectoralis was released exposing the long head of the biceps. This was tenosynovitic. It was tenodesed to the pectoralis and released proximal to this. The 3 sisters were identified and coagulated.  The bicipital groove was then followed up in the rotator interval released.  The humeral head was dislocated through the torn supraspinatus and infraspinatus and humeral head osteotomy was performed in approximately 20 degrees of retroversion.  An inferior capsular release was performed below the 3 sisters to expose any residual osteophytes and release the capsule but there were no further osteophytes. The canal was then entered, reamed, and broached.  The glenoid was exposed through the wound and showed severe wear consistent with the imaging findings.  The final stem impacted in in approximately 20° of retroversion.  The 4 5 replicative plate was then placed and trialing was carried out with the expanded head similar to his size and on his other side.  This was felt to be appropriate and the final stem was reimpacted with impaction grafting in the replicative plate locked in the final head impacted and with excellent purchase.  The shoulder was then reduced.  This allowed nearly full passive range of motion with no instability.  Stability was maintained through the CA arch as well as the maintained subscap tendon.  The joint was copiously irrigated with saline mixed with Betadine after the final implants were assembled and locked into place.  Passive range range of motion will be full elevation but external rotation will be limited to 30° in the perioperative period. The deltopectoral interval was approximated with 0 Vicryl, the subcutaneous tissue with 2-0 Vicryl, and the skin with 3-0  Stratafix and Dermabond. A sterile dressing was placed. Anesthesia was reversed and the patient was taken to the recovery room in stable condition. All instrument, needle, and sponge counts were correct.      Matteo Tan MD*

## 2025-06-30 NOTE — ANESTHESIA POSTPROCEDURE EVALUATION
Patient: Enrike Tomas    Procedure Summary       Date: 06/30/25 Room / Location:  [facility] /  [facility]    Anesthesia Start: 1010 Anesthesia Stop: 1140    Procedure: RIGHT SHOULDER HEMIARTHROPLASTY, BICEPS TENODESIS (Right: Shoulder) Diagnosis:       Rotator cuff arthropathy, right      Right bicipital tenosynovitis      (Rotator cuff arthropathy, right [[id number]])    Surgeons: Matteo Tan MD Provider: Igor Quiñonez MD    Anesthesia Type: general ASA Status: 4            Anesthesia Type: general    Vitals  Vitals Value Taken Time   /61 06/30/25 11:40   Temp 97.1 °F (36.2 °C) 06/30/25 11:40   Pulse 58 06/30/25 11:40   Resp 12 06/30/25 11:40   SpO2 98 % 06/30/25 11:40           Post Anesthesia Care and Evaluation    Patient location during evaluation: PACU  Patient participation: waiting for patient participation  Level of consciousness: sleepy but conscious  Pain management: adequate    Airway patency: patent  Anesthetic complications: No anesthetic complications  PONV Status: none  Cardiovascular status: hemodynamically stable and acceptable  Respiratory status: nonlabored ventilation, acceptable, nasal cannula and oral airway  Hydration status: acceptable

## 2025-06-30 NOTE — CONSULTS
[facility]  Renal Consult Note    Enrike Tomas  1935  [id number]    Date of Admit:  6/30/2025    Date of Consult: 6/30/2025    Requesting Provider: Matteo Tan MD    Evaluating Physician: Andrei Jean MD    Chief Complaint: Shoulder pain    Reason for Consultation: ESRD management    History of present illness:    Patient is a 89 y.o.  Yr old male with history of ESRD on HD MWF at [facility] with [facility].  Patient comes in today for scheduled shoulder surgery.  Nephrology consulted to assist with dialysis.  Patient is postop.  Denies any complaints at this time.      Past Medical History  Past Medical History:   Diagnosis Date    A-fib     Abnormal ECG     occasional extra heartbeat    Anemia, acute on chronic 09/30/2023    Anesthesia complication     hallucinations upon waking, violent upon waking per pt report    Arthritis     Atrial fibrillation     BPH (benign prostatic hyperplasia)     Cervical compression fracture 06/05/2023    Dementia     Diverticulosis     Diverticulum of esophagus     puree diet and speech therapy    Does use hearing aid     Elevated cholesterol     ESRD on hemodialysis     GERD (gastroesophageal reflux disease)     Heart failure     Hemodialysis patient     M,W,F    Hyperlipidemia     Hypertension     Low back pain 2022    NERY (obstructive sleep apnea)     NOT USING CPAP, dental appliance    Recurrent falls     UTI (urinary tract infection)        Past Surgical History:   Procedure Laterality Date    CATARACT EXTRACTION      COLONOSCOPY      DIALYSIS FISTULA CREATION      x2    ENDOSCOPY      INGUINAL HERNIA REPAIR      PROSTATE BIOPSY      THROAT SURGERY      diverticulum in throat    TONSILLECTOMY AND ADENOIDECTOMY      TOTAL SHOULDER ARTHROPLASTY W/ DISTAL CLAVICLE EXCISION Left 09/25/2023    Procedure: HEMIARTHROPLASTY, BICEPS TENODESIS - LEFT;  Surgeon: Matteo Tan MD;  Location: [facility];  Service: Orthopedics;   "Laterality: Left;       Allergies:  Allergies   Allergen Reactions    Mirtazapine Other (See Comments)     \"Did not work well\"    Pollen Extract Other (See Comments)     sneezing       Medication:   See electronic record    Soc Hx:   Social History     Socioeconomic History    Marital status:    Tobacco Use    Smoking status: Never    Smokeless tobacco: Never   Vaping Use    Vaping status: Never Used   Substance and Sexual Activity    Alcohol use: Not Currently     Alcohol/week: 1.0 standard drink of alcohol     Types: 1 Glasses of wine per week     Comment: once monthly typically    Drug use: Never    Sexual activity: Defer       Fam Hx:  No congenital renal disease      Review of Systems:  Full review of systems reviewed and are as above  In HPI or per admitting H&P,otherwise negative for acute complaints    Physical Exam:   Vital Signs   Blood pressure 91/68, pulse 60, temperature 97.1 °F (36.2 °C), temperature source Tympanic, resp. rate 18, height 165.1 cm (65\"), weight 57.2 kg (126 lb), SpO2 97%.  No intake/output data recorded.      GENERAL: WD elderly WM NAD  NEURO: Awake and alert,   + Dementia  PSYCHIATRIC: NMA. Cooperative with PE  EYE: PE, no icterus, no conjunctivitis  ENT: ommm, dentition intact,  Hearing intact  NECK:  No JVD discernable,  Trachea midline  CV: No edema, RRR  LUNGS:  Quiet,  Nonlabored resp.  Symmetrical expansion  ABDOMEN: Nondistended, soft nontender.  : No Ferrara, no palp bladder  SKIN: Warm and dry without rash  RUE aVF  + Thrill       Laboratory Data  Results from last 7 days   Lab Units 06/30/25  0902 06/25/25  1547   HEMOGLOBIN g/dL 12.0* 12.4*   HEMATOCRIT % 37.2* 37.8     Results from last 7 days   Lab Units 06/30/25  0836 06/25/25  1547   SODIUM mmol/L 140 140   POTASSIUM mmol/L 4.2 3.8   CHLORIDE mmol/L 97* 97*   CO2 mmol/L 27.6 26.7   BUN mg/dL 30.5* 9.4   CREATININE mg/dL 6.74* 3.30*   CALCIUM mg/dL 9.8* 9.0   ALBUMIN g/dL 3.8  --          Results from last 7 days "   Lab Units 06/30/25  0836   ALK PHOS U/L 97   BILIRUBIN mg/dL 0.3   ALT (SGPT) U/L 11   AST (SGOT) U/L 19         Estimated Creatinine Clearance: 5.9 mL/min (A) (by C-G formula based on SCr of 6.74 mg/dL (H)).  Lab Results   Component Value Date    PROTEINUR >=300 (A) 09/24/2024       A/P:        ESRD: On HD MWF with [facility] unit.  Patient here for scheduled shoulder surgery.  - Continue outpatient HD schedule    HTN: Blood pressure initially quite elevated on admission.  Low side postsurgery.  - With HD.  - Support with albumin as indicated.    Electrolytes: Sodium and potassium are stable.    Acid-base: Bicarb has been stable.    Anemia: Hemoglobin above goal of 10-11.    Transfuse as indicated for Hgb <7.  -No JENNYFER at this time.  Can restart once Hgb <11    Volume: Stable.    -UF with HD as tolerated..  .  Post shoulder surgery 6/30/2025.    Thank you consulting us on Enrike Tomas who is of high risk and complexity.  We will follow along closely    Andrei Jean MD  6/30/2025  12:15 EDT

## 2025-06-30 NOTE — PROGRESS NOTES
"   LOS: 0 days    Patient Care Team:  Blair Dhaliwal MD as PCP - General (Internal Medicine)  Blair Packer MD as Consulting Physician (Cardiology)  Michelle Aponte MD (Nephrology)    Reason For Visit:    Subjective     On HD    Objective     Vital Signs:  Blood pressure 116/48, pulse 70, temperature 97.4 °F (36.3 °C), temperature source Oral, resp. rate 16, height 165.1 cm (65\"), weight 57.2 kg (126 lb), SpO2 92%.    Flowsheet Rows      Flowsheet Row First Filed Value   Admission Height 165.1 cm (65\") Documented at 06/30/2025 0801   Admission Weight 57.2 kg (126 lb) Documented at 06/30/2025 0801            No intake/output data recorded.    Physical Exam:     Seen on HD    Labs:    Results from last 7 days   Lab Units 06/30/25  0902 06/30/25  0841 06/25/25  1547   WBC 10*3/mm3 6.51  --  7.10   HEMOGLOBIN g/dL 12.0*  --  12.4*   HEMOGLOBIN, POC g/dL  --  12.6  --    PLATELETS 10*3/mm3 192  --  188     Results from last 7 days   Lab Units 06/30/25  0836 06/25/25  1547   SODIUM mmol/L 140 140   POTASSIUM mmol/L 4.2 3.8   CHLORIDE mmol/L 97* 97*   CO2 mmol/L 27.6 26.7   BUN mg/dL 30.5* 9.4   CREATININE mg/dL 6.74* 3.30*   CALCIUM mg/dL 9.8* 9.0   ALBUMIN g/dL 3.8  --      Results from last 7 days   Lab Units 06/30/25  0836   GLUCOSE mg/dL 73     Results from last 7 days   Lab Units 06/30/25  0836   ALK PHOS U/L 97   BILIRUBIN mg/dL 0.3   ALT (SGPT) U/L 11   AST (SGOT) U/L 19     Results from last 7 days   Lab Units 06/30/25  0841   PH, ARTERIAL pH units 7.39           A/P:      Patient seen on dialysis.  Blood pressure low.  Give albumin.  UF as tolerated.            Andrei Jean MD  06/30/25  15:50 EDT            "

## 2025-06-30 NOTE — ANESTHESIA PROCEDURE NOTES
Peripheral Block      Patient reassessed immediately prior to procedure    Patient location during procedure: pre-op  Reason for block: at surgeon's request and post-op pain management  Performed by  CRNA/CAA: Rio Blevins CRNA  Preanesthetic Checklist  Completed: patient identified, IV checked, site marked, risks and benefits discussed, surgical consent, monitors and equipment checked, pre-op evaluation and timeout performed  Prep:  Sterile barriers:cap, gloves, mask and washed/disinfected hands  Prep: ChloraPrep  Patient monitoring: blood pressure monitoring, continuous pulse oximetry and EKG  Procedure    Sedation: yes  Performed under: local infiltration  Guidance:ultrasound guided    ULTRASOUND INTERPRETATION.  Using ultrasound guidance a 20 G gauge needle was placed in close proximity to the nerve, at which point, under ultrasound guidance anesthetic was injected in the area of the nerve and spread of the anesthesia was seen on ultrasound in close proximity thereto.  There were no abnormalities seen on ultrasound; a digital image was taken; and the patient tolerated the procedure with no complications. Images:still images obtained, printed/placed on chart    Laterality:right  Block Type:interscalene  Injection Technique:catheter  Needle Type:Tuohy and echogenic  Needle Gauge:18 G  Resistance on Injection: none  Catheter Size:20 G (20g)  Cath Depth at skin: 10 cm    Medications Used: bupivacaine PF (MARCAINE) 0.25 % injection - Injection   10 mL - 6/30/2025 9:38:00 AM      Post Assessment  Injection Assessment: negative aspiration for heme, no paresthesia on injection and incremental injection  Patient Tolerance:comfortable throughout block  Complications:no  Additional Notes  CATHETER  A high-frequency linear transducer, with sterile cover, was placed in the supraclavicular fossa to identify the subclavian artery and trunks and divisions of the brachial plexus. The transducer was then moved in a cephalad  "orientation with a slight rotation to continue visualization of the brachial plexus from the trunks and divisions, on to the C5-C7 roots. The insertion site was prepped and draped in sterile fashion. Skin and cutaneous tissue was infiltrated with 2-5 ml of 1% Lidocaine. Using ultrasound-guidance, an 18-gauge Contiplex Ultra 360 Touhy needle was advanced in plane from lateral to medial. Preservative-free normal saline was utilized for hydro-dissection of tissue, advancement of Touhy, and to confirm final needle placement at the fascial plane between the middle scalene muscle and sheath of the brachial plexus (C5-C7). A 20-gauge Contiplex Echo catheter was placed through the needle and advance out the tip of the Touhy 3-5 cm with the \"Rogers Flip\". The Touhy needle was then removed, and final catheter position verified lateral to the brachial plexus with local anesthetic (LA) and ultrasound visualization. The catheter was secured in the usual fashion with skin glue, benzoin, steri-strips, CHG tegaderm and label noting \"Nerve Block Catheter\". Jerk tape applied at yellow connector and catheter connection. All LA was injected in increments of 3-5 ml after catheter secured. Aspiration every 5 ml to prevent intravascular injection. Injection was completed with negative aspiration of blood and negative intravascular injection. Injection pressures were normal with minimal resistance.   Performed by: Rio Blevins CRNA          "

## 2025-07-01 PROCEDURE — 63710000001 SERTRALINE 100 MG TABLET: Performed by: NURSE PRACTITIONER

## 2025-07-01 PROCEDURE — 63710000001 AMIODARONE 200 MG TABLET: Performed by: NURSE PRACTITIONER

## 2025-07-01 PROCEDURE — A9270 NON-COVERED ITEM OR SERVICE: HCPCS | Performed by: NURSE PRACTITIONER

## 2025-07-01 PROCEDURE — 97110 THERAPEUTIC EXERCISES: CPT | Performed by: OCCUPATIONAL THERAPIST

## 2025-07-01 PROCEDURE — 63710000001 ATORVASTATIN 20 MG TABLET: Performed by: NURSE PRACTITIONER

## 2025-07-01 PROCEDURE — 63710000001 LEVOTHYROXINE 112 MCG TABLET: Performed by: NURSE PRACTITIONER

## 2025-07-01 PROCEDURE — 63710000001 DONEPEZIL 5 MG TABLET: Performed by: NURSE PRACTITIONER

## 2025-07-01 PROCEDURE — 63710000001 SENNOSIDES-DOCUSATE 8.6-50 MG TABLET: Performed by: NURSE PRACTITIONER

## 2025-07-01 PROCEDURE — 63710000001 MEMANTINE 10 MG TABLET: Performed by: NURSE PRACTITIONER

## 2025-07-01 PROCEDURE — 63710000001 ACETAMINOPHEN 325 MG TABLET: Performed by: ORTHOPAEDIC SURGERY

## 2025-07-01 PROCEDURE — 63710000001 OXYCODONE 5 MG TABLET: Performed by: ORTHOPAEDIC SURGERY

## 2025-07-01 PROCEDURE — A9270 NON-COVERED ITEM OR SERVICE: HCPCS | Performed by: ORTHOPAEDIC SURGERY

## 2025-07-01 PROCEDURE — 63710000001 TRAZODONE 50 MG TABLET: Performed by: NURSE PRACTITIONER

## 2025-07-01 PROCEDURE — 97535 SELF CARE MNGMENT TRAINING: CPT | Performed by: OCCUPATIONAL THERAPIST

## 2025-07-01 PROCEDURE — 63710000001 TAMSULOSIN 0.4 MG CAPSULE: Performed by: NURSE PRACTITIONER

## 2025-07-01 PROCEDURE — 97162 PT EVAL MOD COMPLEX 30 MIN: CPT

## 2025-07-01 PROCEDURE — 63710000001 APIXABAN 2.5 MG TABLET: Performed by: NURSE PRACTITIONER

## 2025-07-01 PROCEDURE — 97165 OT EVAL LOW COMPLEX 30 MIN: CPT | Performed by: OCCUPATIONAL THERAPIST

## 2025-07-01 PROCEDURE — 63710000001 LEVOTHYROXINE 25 MCG TABLET: Performed by: NURSE PRACTITIONER

## 2025-07-01 RX ORDER — TRAZODONE HYDROCHLORIDE 50 MG/1
50 TABLET ORAL NIGHTLY
Status: DISCONTINUED | OUTPATIENT
Start: 2025-07-01 | End: 2025-07-04 | Stop reason: HOSPADM

## 2025-07-01 RX ADMIN — TAMSULOSIN HYDROCHLORIDE 0.4 MG: 0.4 CAPSULE ORAL at 09:26

## 2025-07-01 RX ADMIN — OXYCODONE HYDROCHLORIDE 5 MG: 5 TABLET ORAL at 09:26

## 2025-07-01 RX ADMIN — ACETAMINOPHEN 650 MG: 325 TABLET ORAL at 20:05

## 2025-07-01 RX ADMIN — ATORVASTATIN CALCIUM 20 MG: 20 TABLET, FILM COATED ORAL at 20:05

## 2025-07-01 RX ADMIN — AMIODARONE HYDROCHLORIDE 100 MG: 200 TABLET ORAL at 09:27

## 2025-07-01 RX ADMIN — TRAZODONE HYDROCHLORIDE 50 MG: 50 TABLET ORAL at 20:05

## 2025-07-01 RX ADMIN — SENNOSIDES AND DOCUSATE SODIUM 1 TABLET: 50; 8.6 TABLET ORAL at 20:05

## 2025-07-01 RX ADMIN — SERTRALINE 100 MG: 100 TABLET, FILM COATED ORAL at 09:26

## 2025-07-01 RX ADMIN — APIXABAN 2.5 MG: 2.5 TABLET, FILM COATED ORAL at 20:05

## 2025-07-01 RX ADMIN — MEMANTINE HYDROCHLORIDE 10 MG: 10 TABLET, FILM COATED ORAL at 20:05

## 2025-07-01 RX ADMIN — MEMANTINE HYDROCHLORIDE 10 MG: 10 TABLET, FILM COATED ORAL at 09:26

## 2025-07-01 RX ADMIN — DONEPEZIL HYDROCHLORIDE 5 MG: 5 TABLET ORAL at 20:05

## 2025-07-01 RX ADMIN — LEVOTHYROXINE SODIUM 137 MCG: 0.03 TABLET ORAL at 05:21

## 2025-07-01 RX ADMIN — APIXABAN 2.5 MG: 2.5 TABLET, FILM COATED ORAL at 11:44

## 2025-07-01 NOTE — PROGRESS NOTES
"Orthopedic Daily Progress Note      CC: Confused. Oriented to self, not place or time. Out of sling, O2 off nose.    Pain controlled  General: no fevers, chills  Abdomen: no nausea, vomiting, or diarrhea    No other complaints    Physical Exam:  I have reviewed the vital signs.  Temp:  [97 °F (36.1 °C)-99.1 °F (37.3 °C)] 99.1 °F (37.3 °C)  Heart Rate:  [55-95] 95  Resp:  [12-20] 18  BP: ()/(45-75) 136/74    Objective:  Vital signs: (most recent): Blood pressure 136/74, pulse 95, temperature 99.1 °F (37.3 °C), temperature source Oral, resp. rate 18, height 165.1 cm (65\"), weight 57.2 kg (126 lb), SpO2 90%.              General Appearance:    Alert, cooperative, no distress  Extremities: No clubbing, cyanosis, or edema to lower extremities  Pulses:  2+ in distal surgical extremity  Skin: Dressing Clean/dry/intact      Results Review:    I have reviewed the labs, radiology results and diagnostic studies:    Results from last 7 days   Lab Units 06/30/25  0902   WBC 10*3/mm3 6.51   HEMOGLOBIN g/dL 12.0*   PLATELETS 10*3/mm3 192     Results from last 7 days   Lab Units 06/30/25  0836   SODIUM mmol/L 140   POTASSIUM mmol/L 4.2   CO2 mmol/L 27.6   CREATININE mg/dL 6.74*   GLUCOSE mg/dL 73       I have reviewed the medications.    Assessment/Problem List  POD# 1 S/p Right shoulder hemiarthroplasty    Plan  PT/OT  O2 replaced and sats normalized  Will likely need SNF/rehab placement at location that has capability for dialysis  Ok to go when medically ready and available, anytime from my standpoint  Ok to resume Eliquis today.        Matteo Tan MD  07/01/25  07:29 EDT            "

## 2025-07-01 NOTE — THERAPY EVALUATION
Patient Name: Enrike Tomas  : 1935    MRN: [id number]                              Today's Date: 2025       Admit Date: 2025    Visit Dx: No diagnosis found.  Patient Active Problem List   Diagnosis    (HFpEF) heart failure with preserved ejection fraction    Anemia of renal disease    At high risk for falls    Benign prostatic hyperplasia    Closed fracture of left acetabulum    Compression fracture of thoracic vertebra    Deficiency of other specified B group vitamins    Depression    ESRD (end stage renal disease)    Essential hypertension    GERD (gastroesophageal reflux disease)    Hepatic cyst    Long term (current) use of anticoagulants    Major depressive disorder, single episode, moderate    Mixed hyperlipidemia    Morgagni hernia    Occlusion of right vertebral artery    NERY (obstructive sleep apnea)    Fall    Severe malnutrition    Sustained SVT    Paroxysmal atrial fibrillation    Chronic heart failure with preserved ejection fraction (HFpEF)    S/P shoulder hemiarthroplasty, left    Anemia, acute on chronic    Postoperative confusion    Hyperkalemia    Chronic systolic heart failure    ESRD needing dialysis    S/P shoulder hemiarthroplasty, right     Past Medical History:   Diagnosis Date    A-fib     Abnormal ECG     occasional extra heartbeat    Anemia, acute on chronic 2023    Anesthesia complication     hallucinations upon waking, violent upon waking per pt report    Arthritis     Atrial fibrillation     BPH (benign prostatic hyperplasia)     Cervical compression fracture 2023    Dementia     Diverticulosis     Diverticulum of esophagus     puree diet and speech therapy    Does use hearing aid     Elevated cholesterol     ESRD on hemodialysis     GERD (gastroesophageal reflux disease)     Heart failure     Hemodialysis patient     M,W,F    Hyperlipidemia     Hypertension     Low back pain     NERY (obstructive sleep apnea)     NOT USING CPAP, dental appliance     Recurrent falls     UTI (urinary tract infection)      Past Surgical History:   Procedure Laterality Date    CATARACT EXTRACTION      COLONOSCOPY      DIALYSIS FISTULA CREATION      x2    ENDOSCOPY      INGUINAL HERNIA REPAIR      PROSTATE BIOPSY      THROAT SURGERY      diverticulum in throat    TONSILLECTOMY AND ADENOIDECTOMY      TOTAL SHOULDER ARTHROPLASTY W/ DISTAL CLAVICLE EXCISION Left 09/25/2023    Procedure: HEMIARTHROPLASTY, BICEPS TENODESIS - LEFT;  Surgeon: Matteo Tan MD;  Location: [facility];  Service: Orthopedics;  Laterality: Left;      General Information       Row Name 07/01/25 1148          OT Time and Intention    Document Type evaluation  -AR     Mode of Treatment occupational therapy;concurrent therapy  -AR       Row Name 07/01/25 1148          General Information    Patient Profile Reviewed yes  -AR     Prior Level of Function min assist:;all household mobility;gait;transfer;ADL's  used cane for household mobility, wc for community mobility  -AR     Existing Precautions/Restrictions fall;other (see comments);right;non-weight bearing;shoulder  interscalene nerve catheter, Donjoy II sling with brace, RUE fistula, IV in L foot, dementia  -AR     Barriers to Rehab medically complex;previous functional deficit;cognitive status  -AR       Row Name 07/01/25 1148          Living Environment    Current Living Arrangements home  -AR     People in Home spouse;child(vickie), adult;other (see comments)  caregiver  -AR       Row Name 07/01/25 1148          Home Main Entrance    Number of Stairs, Main Entrance --  ramp  -AR       Row Name 07/01/25 1148          Stairs Within Home, Primary    Stairs, Within Home, Primary stairlift to bedroom  -AR       Row Name 07/01/25 1148          Cognition    Orientation Status (Cognition) oriented to;person;disoriented to;place;situation;time  -AR       Row Name 07/01/25 1148          Safety Issues/Impairments Affecting Functional Mobility    Safety Issues  Affecting Function (Mobility) at risk behavior observed;awareness of need for assistance;impulsivity;insight into deficits/self-awareness;judgment;problem-solving;safety precaution awareness;safety precautions follow-through/compliance;sequencing abilities;unable to maintain weight-bearing restrictions  -AR     Impairments Affecting Function (Mobility) balance;cognition;endurance/activity tolerance;coordination;pain;strength;range of motion (ROM)  -AR     Cognitive Impairments, Mobility Safety/Performance attention;awareness, need for assistance;impulsivity;insight into deficits/self-awareness;judgment;problem-solving/reasoning;safety precaution awareness;safety precaution follow-through;sequencing abilities  -AR               User Key  (r) = Recorded By, (t) = Taken By, (c) = Cosigned By      Initials Name Provider Type    Debbie Bledsoe OT Occupational Therapist                     Mobility/ADL's       Row Name 07/01/25 1204          Bed Mobility    Bed Mobility scooting/bridging;supine-sit  -AR     Scooting/Bridging [place] (Bed Mobility) maximum assist (25% patient effort);2 person assist;verbal cues  -AR     Supine-Sit [place] (Bed Mobility) maximum assist (25% patient effort);2 person assist;verbal cues  -AR     Assistive Device (Bed Mobility) head of bed elevated;bed rails  -AR     Comment, (Bed Mobility) OT educated pt and caregiver on importance of maintaining NWB.  -AR       Row Name 07/01/25 1204          Transfers    Transfers sit-stand transfer;stand-sit transfer  -AR     Comment, (Transfers) Educated pt on importance of attending to interscalene nerve catheter to avoid dislodgement. Pt needed cues or hand placement and chair approach.  -AR       Row Name 07/01/25 1204          Sit-Stand Transfer    Sit-Stand [place] (Transfers) moderate assist (50% patient effort);2 person assist;verbal cues;nonverbal cues (demo/gesture)  -AR     Assistive Device (Sit-Stand Transfers) other (see  comments)  JOHN HHA  -AR       Row Name 07/01/25 1204          Stand-Sit Transfer    Stand-Sit [place] (Transfers) moderate assist (50% patient effort);2 person assist;verbal cues;nonverbal cues (demo/gesture)  -AR     Assistive Device (Stand-Sit Transfers) other (see comments)  -AR       Row Name 07/01/25 1204          Activities of Daily Living    BADL Assessment/Intervention upper body dressing;bathing;lower body dressing  -AR       Row Name 07/01/25 1204          Mobility    Extremity Weight-bearing Status right upper extremity  -AR     Right Upper Extremity (Weight-bearing Status) non weight-bearing (NWB)  -AR       Row Name 07/01/25 1204          Upper Body Dressing Assessment/Training    [place] Level (Upper Body Dressing) doff;don;dependent (less than 25% patient effort)  sling  -AR     Position (Upper Body Dressing) edge of bed sitting;supported sitting  -AR     Comment, (Upper Body Dressing) OT adjusted sling for proper fit.  -AR       Row Name 07/01/25 1204          Bathing Assessment/Intervention    Comment, (Bathing) Educated pt on shoulder precautions and axilla care to maintain, reviewed that nerve catheter cannot get wet.  -AR       Row Name 07/01/25 1204          Lower Body Dressing Assessment/Training    [place] Level (Lower Body Dressing) don;socks;dependent (less than 25% patient effort)  -AR     Position (Lower Body Dressing) supine  -AR               User Key  (r) = Recorded By, (t) = Taken By, (c) = Cosigned By      Initials Name Provider Type    Debbie Bledsoe, OT Occupational Therapist                   Obj/Interventions       Row Name 07/01/25 1208          Sensory Assessment (Somatosensory)    Sensory Assessment (Somatosensory) unable/difficult to assess  -AR       Row Name 07/01/25 1208          Vision Assessment/Intervention    Visual Impairment/Limitations unable/difficult to assess  -AR       Row Name 07/01/25 1208          Range of Motion Comprehensive     Comment, General Range of Motion LUE grossly WFL, R elbow/wrist/hand WFL  -AR       Row Name 07/01/25 1208          Strength Comprehensive (MMT)    Comment, General Manual Muscle Testing (MMT) Assessment LUE unable d/t cognitive status, RUE deferred  -AR       Row Name 07/01/25 1208          Shoulder (Therapeutic Exercise)    Shoulder (Therapeutic Exercise) PROM (passive range of motion);AROM (active range of motion)  -AR     Shoulder AROM (Therapeutic Exercise) bilateral;scapular retraction;sitting;10 repetitions  -AR     Shoulder PROM (Therapeutic Exercise) right;flexion;extension;internal rotation;external rotation;sitting;10 repetitions  -AR       Row Name 07/01/25 1208          Elbow/Forearm (Therapeutic Exercise)    Elbow/Forearm (Therapeutic Exercise) AAROM (active assistive range of motion)  -AR     Elbow/Forearm AAROM (Therapeutic Exercise) right;flexion;extension;supination;pronation;sitting;10 repetitions  -AR       Row Name 07/01/25 1208          Wrist (Therapeutic Exercise)    Wrist (Therapeutic Exercise) AAROM (active assistive range of motion)  -AR     Wrist AAROM (Therapeutic Exercise) right;extension;flexion;10 repetitions  -AR       Row Name 07/01/25 1208          Hand (Therapeutic Exercise)    Hand (Therapeutic Exercise) AROM (active range of motion)  -AR     Hand AROM/AAROM (Therapeutic Exercise) right;AROM (active range of motion);finger flexion;finger extension;10 repetitions  -AR       Row Name 07/01/25 1208          Motor Skills    Therapeutic Exercise shoulder;elbow/forearm;wrist;hand  Issued written HEP  -AR       Row Name 07/01/25 1208          Balance    Balance Assessment sitting static balance;sitting dynamic balance;standing static balance;standing dynamic balance  -AR     Static Sitting Balance contact guard  -AR     Dynamic Sitting Balance contact guard  -AR     Position, Sitting Balance unsupported;sitting edge of bed  -AR     Static Standing Balance minimal assist  -AR     Dynamic  Standing Balance moderate assist;2-person assist  -AR     Position/Device Used, Standing Balance supported  -AR               User Key  (r) = Recorded By, (t) = Taken By, (c) = Cosigned By      Initials Name Provider Type    Debbie Bledsoe, OT Occupational Therapist                   Goals/Plan       Row Name 07/01/25 1222          Transfer Goal 1 (OT)    Activity/Assistive Device (Transfer Goal 1, OT) sit-to-stand/stand-to-sit;commode, bedside without drop arms  -AR     [place] Level/Cues Needed (Transfer Goal 1, OT) minimum assist (75% or more patient effort)  -AR     Time Frame (Transfer Goal 1, OT) long term goal (LTG);10 days  -AR     Progress/Outcome (Transfer Goal 1, OT) goal ongoing  -AR       Row Name 07/01/25 1222          Dressing Goal 1 (OT)    Activity/Device (Dressing Goal 1, OT) upper body dressing  -AR     [place]/Cues Needed (Dressing Goal 1, OT) moderate assist (50-74% patient effort);verbal cues required  -AR     Time Frame (Dressing Goal 1, OT) short term goal (STG);5 days  -AR     Strategies/Barriers (Dressing Goal 1, OT) Pt and/or family will don/doff sling with supervision  -AR     Progress/Outcome (Dressing Goal 1, OT) goal ongoing  -AR       Row Name 07/01/25 1222          Self-Feeding Goal 1 (OT)    Activity/Device (Self-Feeding Goal 1, OT) self-feeding skills, all  AU PRN  -AR     [place] Level/Cues Needed (Self-Feeding Goal 1, OT) supervision required  -AR     Time Frame (Self-Feeding Goal 1, OT) short term goal (STG);5 days  -AR     Progress/Outcomes (Self-Feeding Goal 1, OT) goal ongoing  -AR       Row Name 07/01/25 1222          ROM Goal 1 (OT)    Time Frame (ROM Goal 1, OT) long term goal (LTG);10 days  -AR     Strategies/Barriers (ROM Goal 1, OT) Pt and family/caregiver will complete RUE HEP within physician parameters with supervison  -AR     Progress/Outcome (ROM Goal 1, OT) goal ongoing  -AR       Row Name 07/01/25 1222          Therapy Assessment/Plan (OT)     Planned Therapy Interventions (OT) adaptive equipment training;BADL retraining;edema control/reduction;IADL retraining;occupation/activity based interventions;orthotic fabrication/fitting/training;passive ROM/stretching;patient/caregiver education/training;ROM/therapeutic exercise;transfer/mobility retraining  -AR               User Key  (r) = Recorded By, (t) = Taken By, (c) = Cosigned By      Initials Name Provider Type    AR Debbie Espinosa, OT Occupational Therapist                   Clinical Impression       Row Name 07/01/25 1216          Pain Scale: FACES Pre/Post-Treatment    Pain: FACES Scale, Pretreatment 0-->no hurt  -AR     Posttreatment Pain Rating 0-->no hurt  -AR       Row Name 07/01/25 1216          Plan of Care Review    Plan of Care Reviewed With patient;caregiver  -AR     Outcome Evaluation OT educated pt on shoulder precautions, ADL retraining to maintain, sling management and HEP, will edcuated caregiver when pt is more appropriate to demo sling and HEP. He is limited with NWB/immobilization RUE (dominant) arm, confusion, poor safety awareness with poor attention to teaching, impaired balance and desaturaiton on RA to 88%. Pt is performing significantly below baseline, recommend SNF.  -AR       Row Name 07/01/25 1216          Therapy Assessment/Plan (OT)    Rehab Potential (OT) fair  -AR     Criteria for Skilled Therapeutic Interventions Met (OT) yes  -AR     Therapy Frequency (OT) daily  -AR       Row Name 07/01/25 1216          Therapy Plan Review/Discharge Plan (OT)    Anticipated Discharge Disposition (OT) skilled nursing facility  -AR       Row Name 07/01/25 1216          Vital Signs    Pre SpO2 (%) 91  -AR     O2 Delivery Pre Treatment supplemental O2  -AR     Intra SpO2 (%) 88  -AR     O2 Delivery Intra Treatment room air  -AR     Post SpO2 (%) 94  -AR     O2 Delivery Post Treatment supplemental O2  -AR     Pre Patient Position Supine  -AR     Intra Patient Position Sitting  -AR      Post Patient Position Sitting  -AR       Row Name 07/01/25 1216          Positioning and Restraints    Pre-Treatment Position in bed  -AR     Post Treatment Position chair  -AR     In Chair reclined;call light within reach;encouraged to call for assist;exit alarm on;with family/caregiver;waffle cushion;on mechanical lift sling;with brace;legs elevated  deferred SCD as only one pump attached to machine, deferred cold pack as pt falling asleep and did not want to wake him  -AR               User Key  (r) = Recorded By, (t) = Taken By, (c) = Cosigned By      Initials Name Provider Type    Debbie Bledsoe, OT Occupational Therapist                   Outcome Measures       Row Name 07/01/25 1224          How much help from another is currently needed...    Putting on and taking off regular lower body clothing? 1  -AR     Bathing (including washing, rinsing, and drying) 1  -AR     Toileting (which includes using toilet bed pan or urinal) 1  -AR     Putting on and taking off regular upper body clothing 1  -AR     Taking care of personal grooming (such as brushing teeth) 2  -AR     Eating meals 2  -AR     AM-PAC 6 Clicks Score (OT) 8  -AR       Row Name 07/01/25 0906          How much help from another person do you currently need...    Turning from your back to your side while in flat bed without using bedrails? 2  -AB     Moving from lying on back to sitting on the side of a flat bed without bedrails? 2  -AB     Moving to and from a bed to a chair (including a wheelchair)? 2  -AB     Standing up from a chair using your arms (e.g., wheelchair, bedside chair)? 2  -AB     Climbing 3-5 steps with a railing? 1  -AB     To walk in hospital room? 2  -AB     AM-PAC 6 Clicks Score (PT) 11  -AB     Highest Level of Mobility Goal Move to Chair/Commode-4  -AB       Row Name 07/01/25 1224 07/01/25 0906       Functional Assessment    Outcome Measure Options AM-PAC 6 Clicks Daily Activity (OT)  -AR AM-PAC 6 Clicks Basic  Mobility (PT)  -AB              User Key  (r) = Recorded By, (t) = Taken By, (c) = Cosigned By      Initials Name Provider Type    AR Debbie Espinosa, BETSY Occupational Therapist    Caro Draper PT Physical Therapist                    Occupational Therapy Education       Title: PT OT SLP Therapies (In Progress)       Topic: Occupational Therapy (Done)       Point: ADL training (Done)       Learning Progress Summary            Patient Eager, E,TB,D,H, NL,VU by AR at 7/1/2025 1225   Caregiver Eager, E,TB,D,H, NL,VU by AR at 7/1/2025 1225                      Point: Home exercise program (Done)       Learning Progress Summary            Patient Eager, E,TB,D,H, NL,VU by AR at 7/1/2025 1225   Caregiver Eager, E,TB,D,H, NL,VU by AR at 7/1/2025 1225                      Point: Precautions (Done)       Learning Progress Summary            Patient Eager, E,TB,D,H, NL,VU by AR at 7/1/2025 1225   Caregiver Eager, E,TB,D,H, NL,VU by AR at 7/1/2025 1225                      Point: Body mechanics (Done)       Learning Progress Summary            Patient Eager, E,TB,D,H, NL,VU by AR at 7/1/2025 1225   Caregiver Eager, E,TB,D,H, NL,VU by AR at 7/1/2025 1225                                      User Key       Initials Effective Dates Name Provider Type Discipline    AR 07/11/23 -  Debbie Espinosa OT Occupational Therapist OT                  OT Recommendation and Plan  Planned Therapy Interventions (OT): adaptive equipment training, BADL retraining, edema control/reduction, IADL retraining, occupation/activity based interventions, orthotic fabrication/fitting/training, passive ROM/stretching, patient/caregiver education/training, ROM/therapeutic exercise, transfer/mobility retraining  Therapy Frequency (OT): daily  Plan of Care Review  Plan of Care Reviewed With: patient, caregiver  Outcome Evaluation: OT educated pt on shoulder precautions, ADL retraining to maintain, sling management and HEP, will edcuated  caregiver when pt is more appropriate to demo sling and HEP. He is limited with NWB/immobilization RUE (dominant) arm, confusion, poor safety awareness with poor attention to teaching, impaired balance and desaturaiton on RA to 88%. Pt is performing significantly below baseline, recommend SNF.     Time Calculation:   Evaluation Complexity (OT)  Review Occupational Profile/Medical/Therapy History Complexity: brief/low complexity  Assessment, Occupational Performance/Identification of Deficit Complexity: 1-3 performance deficits  Clinical Decision Making Complexity (OT): problem focused assessment/low complexity  Overall Complexity of Evaluation (OT): low complexity     Time Calculation- OT       Row Name 07/01/25 1225             Time Calculation- OT    OT Start Time 0750  -AR      OT Received On 07/01/25  -AR      OT Goal Re-Cert Due Date 07/11/25  -AR         Timed Charges    66606 - OT Therapeutic Exercise Minutes 14  -AR      63372 - OT Self Care/Mgmt Minutes 12  -AR         Untimed Charges    OT Eval/Re-eval Minutes 47  -AR         Total Minutes    Timed Charges Total Minutes 26  -AR      Untimed Charges Total Minutes 47  -AR       Total Minutes 73  -AR                User Key  (r) = Recorded By, (t) = Taken By, (c) = Cosigned By      Initials Name Provider Type    AR Debbie Espinosa, OT Occupational Therapist                  Therapy Charges for Today       Code Description Service Date Service Provider Modifiers Qty    67210587360 HC OT SELF CARE/MGMT/TRAIN EA 15 MIN 7/1/2025 Debbie Espinosa, OT GO 1    58183137122 HC OT THER PROC EA 15 MIN 7/1/2025 Debbie Espinosa OT GO 1    02912527650 HC OT EVAL LOW COMPLEXITY 4 7/1/2025 Debbie Espinosa OT GO 1                 Debbie Espinosa OT  7/1/2025

## 2025-07-01 NOTE — CASE MANAGEMENT/SOCIAL WORK
Continued Stay Note  [facility]     Patient Name: Enrike Tomas  MRN: [id number]  Today's Date: 7/1/2025    Admit Date: 6/30/2025    Plan: rehab   Discharge Plan       Row Name 07/01/25 1346       Plan    Plan rehab    Patient/Family in Agreement with Plan yes    Plan Comments Met with patient and his wife at the bedside to initiate discharge planning. Patient lives with his wife in a house in [facility]. At baseline, patient is dependent with ADLs and uses a walker or wheelchair to assist with mobility. Patient also has a stair lift, chair lift, and ramp at home. Patient denies any home health servies or home oxygen use. Patient has private caregivers 5 days a week for 6 hours a day. Patient goes to dialysis at [facility] off [address] on MWF at 1000. Family transports. Patient has [employer] Medicare with prescription benefits and prefers to fill scripts at [facility] off [address]. Patient's goal is short term rehab at discharge. Family can transport. CM will continue to follow and assist with discharge planning as recommendations become available.    14:08 EDT  Referrals sent to [facility], [facility], [facility], [facility], and [facility].     Final Discharge Disposition Code 03 - skilled nursing facility (SNF)                   Discharge Codes    No documentation.                       Hi Corrales RN

## 2025-07-01 NOTE — DISCHARGE PLACEMENT REQUEST
"Enrike Tomas (89 y.o. Male)      Patient needs short term rehab before returning home with his wife and private caregivers. Patient goes to dialysis at [facility] off [address] on MWF at 1000. Family can transport.     [facility] [employer] [facility] [phone]       Date of Birth   1935    Social Security Number       Address   [address] [place] [address]    Home Phone   [phone]    MRN   [id number]       Moravian   Moravian    Marital Status                               Admission Date   2025    Admission Type   Elective    Admitting Provider   Matteo Tan MD    Attending Provider   Matteo Tan MD    Department, Room/Bed   [facility] 3G, S357/1       Discharge Date       Discharge Disposition       Discharge Destination                                 Attending Provider: Matteo Tan MD    Allergies: Mirtazapine, Pollen Extract    Isolation: None   Infection: None   Code Status: Prior    Ht: 165.1 cm (65\")   Wt: 57.2 kg (126 lb)    Admission Cmt: None   Principal Problem: S/P shoulder hemiarthroplasty, right [Z96.611]                   Active Insurance as of 2025       Primary Coverage       Payor Plan Insurance Group Employer/Plan Group    [employer] MEDICARE REPLACEMENT [employer]vantage [employer] PPO [id number]       Payor Plan Address Payor Plan Phone Number Payor Plan Fax Number Effective Dates    [address] [address]   2024 - None Entered    [facility] [id number]         Subscriber Name Subscriber Birth Date Member ID       ENRIKE TOMAS 1935 [id number]                     Emergency Contacts        (Rel.) Home Phone Work Phone Mobile Phone    LUAN TOMAS (Daughter) [phone] -- [phone]    TOMASRICHARD (Spouse) [phone] -- [phone]                 History & Physical        Shanice Reed APRN at 25 1240          Patient Name: Enrike Tomas  MRN: [id number]  : " "1935  DOS: 6/30/2025    Attending: Matteo Tan MD    Primary Care Provider: Blair Dhaliwal MD      Chief complaint:  Right Shoulder pain    Subjective  Patient is a pleasant 89 y.o. male presented for scheduled surgery by   He underwent right shoulder arthroplasty with right biceps tenodesis under GA and a block, tolerated surgery well, was admitted for further management.    Seen postoperatively, patient is doing well, good pain control, no complaints of nausea, vomiting, or shortness of breath.    Reviewed with patient past medical history and home medications- limited due to dementia      Allergies:  Allergies   Allergen Reactions    Mirtazapine Other (See Comments)     \"Did not work well\"    Pollen Extract Other (See Comments)     sneezing       Meds:  Medications Prior to Admission   Medication Sig Dispense Refill Last Dose/Taking    amiodarone (PACERONE) 100 MG tablet Take 1 tablet by mouth Daily.   6/29/2025    atorvastatin (LIPITOR) 20 MG tablet Take 1 tablet by mouth Every Night.   6/29/2025    B Complex-C-Folic Acid (Jenny-Serg) tablet Take 1 tablet by mouth Daily. OTC   6/29/2025    clonazePAM (KlonoPIN) 0.5 MG tablet Take 0.5 tablets by mouth every night at bedtime.   6/29/2025    donepezil (ARICEPT) 5 MG tablet Take 1 tablet by mouth Daily.   6/29/2025    HYDROcodone-acetaminophen (NORCO) 7.5-325 MG per tablet Take 1 tablet by mouth Every 8 (Eight) Hours As Needed for Mild Pain, Moderate Pain or Severe Pain.   6/29/2025    levothyroxine (SYNTHROID, LEVOTHROID) 137 MCG tablet Take 1 tablet by mouth Every Morning.   6/29/2025    losartan (COZAAR) 25 MG tablet Take 0.5 tablets by mouth As Needed. If BP is above 150   6/29/2025    memantine (NAMENDA) 10 MG tablet Take 1 tablet by mouth 2 (Two) Times a Day.   6/29/2025    midodrine (PROAMATINE) 5 MG tablet Take 1 tablet by mouth 3 (Three) Times a Day As Needed (low bp).   6/29/2025    pantoprazole (PROTONIX) 40 MG EC tablet Take 1 " tablet by mouth Daily.   6/29/2025    sennosides-docusate (PERICOLACE) 8.6-50 MG per tablet Take 1 tablet by mouth Daily.   6/29/2025    sertraline (ZOLOFT) 100 MG tablet Take 1 tablet by mouth Daily.   6/29/2025    tamsulosin (FLOMAX) 0.4 MG capsule 24 hr capsule Take 1 capsule by mouth Daily.   6/29/2025    acetaminophen (TYLENOL) 500 MG tablet Take 1 tablet by mouth Every 6 (Six) Hours As Needed for Mild Pain, Headache or Fever. OTC   Unknown    apixaban (ELIQUIS) 2.5 MG tablet tablet Take 1 tablet by mouth 2 (Two) Times a Day. Hold until you follow up with Dr. LANDAVERDE next week   6/27/2025    Buprenorphine (BUTRANS) 5 MCG/HR patch weekly transdermal patch Place 1 patch on the skin as directed by provider 1 (One) Time Per Week.   Unknown    Diclofenac Sodium (VOLTAREN) 1 % gel gel Apply 4 g topically to the appropriate area as directed 2 (Two) Times a Day.   Unknown    fluticasone (FLONASE) 50 MCG/ACT nasal spray Administer 2 sprays into the nostril(s) as directed by provider Daily As Needed for Rhinitis or Allergies. OTC   Unknown    melatonin 5 MG tablet tablet Take 1 tablet by mouth At Night As Needed (Sleep). OTC   Unknown         History:   Past Medical History:   Diagnosis Date    A-fib     Abnormal ECG     occasional extra heartbeat    Anemia, acute on chronic 09/30/2023    Anesthesia complication     hallucinations upon waking, violent upon waking per pt report    Arthritis     Atrial fibrillation     BPH (benign prostatic hyperplasia)     Cervical compression fracture 06/05/2023    Dementia     Diverticulosis     Diverticulum of esophagus     puree diet and speech therapy    Does use hearing aid     Elevated cholesterol     ESRD on hemodialysis     GERD (gastroesophageal reflux disease)     Heart failure     Hemodialysis patient     M,W,F    Hyperlipidemia     Hypertension     Low back pain 2022    NERY (obstructive sleep apnea)     NOT USING CPAP, dental appliance    Recurrent falls     UTI (urinary tract  "infection)      Past Surgical History:   Procedure Laterality Date    CATARACT EXTRACTION      COLONOSCOPY      DIALYSIS FISTULA CREATION      x2    ENDOSCOPY      INGUINAL HERNIA REPAIR      PROSTATE BIOPSY      THROAT SURGERY      diverticulum in throat    TONSILLECTOMY AND ADENOIDECTOMY      TOTAL SHOULDER ARTHROPLASTY W/ DISTAL CLAVICLE EXCISION Left 09/25/2023    Procedure: HEMIARTHROPLASTY, BICEPS TENODESIS - LEFT;  Surgeon: Matteo Tan MD;  Location: [facility];  Service: Orthopedics;  Laterality: Left;     Family History   Problem Relation Age of Onset    Stomach cancer Mother     Heart disease Mother     Arthritis Mother     Heart disease Father     Stroke Father     Kidney failure Father     Cancer Father     Kidney disease Father      Social History     Tobacco Use    Smoking status: Never    Smokeless tobacco: Never   Vaping Use    Vaping status: Never Used   Substance Use Topics    Alcohol use: Not Currently     Alcohol/week: 1.0 standard drink of alcohol     Types: 1 Glasses of wine per week     Comment: once monthly typically    Drug use: Never       Review of Systems  Pertinent items are noted in HPI    Vital Signs  /55 (BP Location: Left arm, Patient Position: Lying)   Pulse 56   Temp 97.4 °F (36.3 °C) (Oral)   Resp 18   Ht 165.1 cm (65\")   Wt 57.2 kg (126 lb)   SpO2 94%   BMI 20.97 kg/m²     Physical Exam:    General Appearance:    Alert, cooperative, in no acute distress   Head:    Normocephalic, without obvious abnormality, atraumatic   Eyes:            Lids and lashes normal, conjunctivae and sclerae normal, no   icterus, no pallor.   Ears:    Ears appear intact with no abnormalities noted   Throat:   No oral lesions, no thrush, oral mucosa moist   Neck:   No adenopathy, supple, trachea midline, no thyromegaly         Lungs:     Clear to auscultation,respirations regular, even and                   unlabored    Heart:    Regular rhythm and normal rate, normal S1 and S2, no  "     murmur    Abdomen:     Normal bowel sounds, no masses, no organomegaly, soft        nontender, nondistended, no guarding, no rebound                 tenderness   Genitalia:    Deferred   Extremities:   RUE in a sling, CDI  dressing . Interscalene nerve block cath present.  Distal pulses, cap refill, movements of fingers, wrist, intact.     Pulses:   Pulses palpable and equal bilaterally   Skin:   No bleeding, bruising or rash   Neurologic:   Cranial nerves 2 - 12 grossly intact      I reviewed the patient's new clinical results.       Results from last 7 days   Lab Units 06/30/25  0902 06/30/25  0841 06/25/25  1547   WBC 10*3/mm3 6.51  --  7.10   HEMOGLOBIN g/dL 12.0*  --  12.4*   HEMOGLOBIN, POC g/dL  --  12.6  --    HEMATOCRIT % 37.2*  --  37.8   HEMATOCRIT POC %  --  37*  --    PLATELETS 10*3/mm3 192  --  188     Results from last 7 days   Lab Units 06/30/25  0836 06/25/25  1547   SODIUM mmol/L 140 140   POTASSIUM mmol/L 4.2 3.8   CHLORIDE mmol/L 97* 97*   CO2 mmol/L 27.6 26.7   BUN mg/dL 30.5* 9.4   CREATININE mg/dL 6.74* 3.30*   CALCIUM mg/dL 9.8* 9.0   BILIRUBIN mg/dL 0.3  --    ALK PHOS U/L 97  --    ALT (SGPT) U/L 11  --    AST (SGOT) U/L 19  --    GLUCOSE mg/dL 73 101*     Lab Results   Component Value Date    HGBA1C 5.16 06/25/2025           Assessment and Plan:       S/P shoulder hemiarthroplasty, right    (HFpEF) heart failure with preserved ejection fraction    Depression    ESRD (end stage renal disease)    Essential hypertension    GERD (gastroesophageal reflux disease)    Chronic systolic heart failure      Plan    1. PT/OT. NWB, RUE, ROM hand, wrist, elbow.  2. Pain control-prns, interscalene nerve block cath with ropivacaine infusion.   3. IS-encourage  4. DVT proph- Mech/ mobilize.  5. Bowel regimen  6. Resume home medications as appropriate  7. Monitor post-op labs  8. DC planning - ?rehab    -ESRD  On HD MWF. RUE fistula  Takes midodrine on HD days  Consult NAL for HD management       -HTN  with intermittent hypotension  Hx labile BP with episodes of hypotension  On low dose losartan for HFpEF but only takes PRN per chart review  Takes midodrine on HD days and intermittently throughout week (checks BP twice daily)    -Paroxysmal A fib, HFpEF   Chronic, stable. Follows with cardiology  - On amiodarone and Eliquis- resume Eliquis tomorrow       -GERD:  Resume PPI.  Formulary substitution when indicated.    -Hypothyroidism  Resume Synthroid     -Depression:  Resume home regimen including Sertraline    -BPH  Resume tamsulosin       -Major neurocognitive disorder, Memory loss  Follows with neurology, first seen 3/2025  Thought multifactorial 2/2 opioid use, ESRD, depression, possible vascular disease. Per neuro notes some c/f Alzheimer's  Resume Namenda and Aricept       Dragon disclaimer:  Part of this encounter note is an electronic transcription/translation of spoken language to printed text. The electronic translation of spoken language may permit erroneous, or at times, nonsensical words or phrases to be inadvertently transcribed; Although I have reviewed the note for such errors, some may still exist.    MERCEDEZ Hendrickson  06/30/25  12:41 EDT           Electronically signed by Shanice Reed APRN at 06/30/25 1345       Debbie Resendez APRN at 06/30/25 0802       Attestation signed by Matteo Tan MD at 06/30/25 0908      I have reviewed this documentation and agree.                        Pre-Op H&P  Enrike Tomas  [id number]  1935      Chief complaint: Right shoulder pain      Subjective:  Patient is a 89 y.o.male presents for scheduled surgery by Dr. Tan. He anticipates a RIGHT SHOULDER HEMIARTHROPLASTY, BICEPS TENODESIS  today. He reports a long history of right shoulder pain and limited ROM. He denies numbness, tingling or difficulty with  right hand. He tried cortisone injection with short-term pain relief.      Review of Systems:  Constitutional-- No fever, chills  "or sweats. No fatigue.  CV-- No chest pain, palpitation or syncope. +HTN, HLD, afib  Resp-- No SOB, cough, hemoptysis  Skin--No rashes or lesions      Allergies:   Allergies   Allergen Reactions    Mirtazapine Other (See Comments)     \"Did not work well\"    Pollen Extract Other (See Comments)     sneezing         Home Meds:  Medications Prior to Admission   Medication Sig Dispense Refill Last Dose/Taking    acetaminophen (TYLENOL) 500 MG tablet Take 1 tablet by mouth Every 6 (Six) Hours As Needed for Mild Pain, Headache or Fever. OTC       amiodarone (PACERONE) 100 MG tablet Take 1 tablet by mouth Daily.       apixaban (ELIQUIS) 2.5 MG tablet tablet Take 1 tablet by mouth 2 (Two) Times a Day. Hold until you follow up with Dr. LANDAVERDE next week       atorvastatin (LIPITOR) 20 MG tablet Take 1 tablet by mouth Every Night.       B Complex-C-Folic Acid (Jenny-Serg) tablet Take 1 tablet by mouth Daily. OTC       Buprenorphine (BUTRANS) 5 MCG/HR patch weekly transdermal patch Place 1 patch on the skin as directed by provider 1 (One) Time Per Week.       clonazePAM (KlonoPIN) 0.5 MG tablet Take 0.5 tablets by mouth every night at bedtime.       Diclofenac Sodium (VOLTAREN) 1 % gel gel Apply 4 g topically to the appropriate area as directed 2 (Two) Times a Day.       donepezil (ARICEPT) 5 MG tablet Take 1 tablet by mouth Daily.       fluticasone (FLONASE) 50 MCG/ACT nasal spray Administer 2 sprays into the nostril(s) as directed by provider Daily As Needed for Rhinitis or Allergies. OTC       HYDROcodone-acetaminophen (NORCO) 7.5-325 MG per tablet Take 1 tablet by mouth Every 8 (Eight) Hours As Needed for Mild Pain, Moderate Pain or Severe Pain.       levothyroxine (SYNTHROID, LEVOTHROID) 137 MCG tablet Take 1 tablet by mouth Every Morning.       losartan (COZAAR) 25 MG tablet Take 0.5 tablets by mouth As Needed. If BP is above 150       melatonin 5 MG tablet tablet Take 1 tablet by mouth At Night As Needed (Sleep). OTC       " memantine (NAMENDA) 10 MG tablet Take 1 tablet by mouth 2 (Two) Times a Day.       midodrine (PROAMATINE) 5 MG tablet Take 1 tablet by mouth 3 (Three) Times a Day As Needed (low bp).       pantoprazole (PROTONIX) 40 MG EC tablet Take 1 tablet by mouth Daily.       sennosides-docusate (PERICOLACE) 8.6-50 MG per tablet Take 1 tablet by mouth Daily.       sertraline (ZOLOFT) 100 MG tablet Take 1 tablet by mouth Daily.       tamsulosin (FLOMAX) 0.4 MG capsule 24 hr capsule Take 1 capsule by mouth Daily.       Teriparatide, Recombinant, (FORTEO) 620 MCG/2.48ML injection Inject 20 mcg under the skin into the appropriate area as directed Daily.            PMH:   Past Medical History:   Diagnosis Date    A-fib     Abnormal ECG     occasional extra heartbeat    Anemia, acute on chronic 09/30/2023    Anesthesia complication     hallucinations upon waking, violent upon waking per pt report    Arthritis     Atrial fibrillation     BPH (benign prostatic hyperplasia)     Cervical compression fracture 06/05/2023    Dementia     Diverticulosis     Diverticulum of esophagus     puree diet and speech therapy    Does use hearing aid     Elevated cholesterol     ESRD on hemodialysis     GERD (gastroesophageal reflux disease)     Heart failure     Hemodialysis patient     M,W,F    Hyperlipidemia     Hypertension     Low back pain 2022    NERY (obstructive sleep apnea)     NOT USING CPAP, dental appliance    Recurrent falls     UTI (urinary tract infection)      PSH:    Past Surgical History:   Procedure Laterality Date    CATARACT EXTRACTION      COLONOSCOPY      DIALYSIS FISTULA CREATION      x2    ENDOSCOPY      INGUINAL HERNIA REPAIR      PROSTATE BIOPSY      THROAT SURGERY      diverticulum in throat    TONSILLECTOMY AND ADENOIDECTOMY      TOTAL SHOULDER ARTHROPLASTY W/ DISTAL CLAVICLE EXCISION Left 09/25/2023    Procedure: HEMIARTHROPLASTY, BICEPS TENODESIS - LEFT;  Surgeon: Matteo Tan MD;  Location: [facility] OR;  Service:  "Orthopedics;  Laterality: Left;       Immunization History:  Influenza: UTD  Pneumococcal: UTD  Tetanus: UTD    Social History:   Tobacco:   Social History     Tobacco Use   Smoking Status Never   Smokeless Tobacco Never      Alcohol:     Social History     Substance and Sexual Activity   Alcohol Use Not Currently    Alcohol/week: 1.0 standard drink of alcohol    Types: 1 Glasses of wine per week    Comment: once monthly typically         Physical Exam:/75 (BP Location: Left leg, Patient Position: Lying)   Pulse 67   Temp 98.2 °F (36.8 °C) (Temporal)   Resp 16   Ht 165.1 cm (65\")   Wt 57.2 kg (126 lb)   SpO2 94%   BMI 20.97 kg/m²       General Appearance:    Alert, cooperative, no distress, appears stated age   Head:    Normocephalic, without obvious abnormality, atraumatic   Lungs:     Clear to auscultation bilaterally, respirations unlabored    Heart:   Regular rate and rhythm, S1 and S2 normal    Abdomen:    Soft without tenderness   Extremities:   Extremities normal, atraumatic, no cyanosis or edema   Skin:   Skin color, texture, turgor normal, no rashes or lesions   Neurologic:   Grossly intact     Results Review:     LABS:  Lab Results   Component Value Date    WBC 7.10 06/25/2025    HGB 12.4 (L) 06/25/2025    HCT 37.8 06/25/2025    .1 (H) 06/25/2025     06/25/2025    NEUTROABS 7.63 (H) 03/26/2025    GLUCOSE 101 (H) 06/25/2025    BUN 9.4 06/25/2025    CREATININE 3.30 (H) 06/25/2025     06/25/2025    K 3.8 06/25/2025    CL 97 (L) 06/25/2025    CO2 26.7 06/25/2025    MG 1.8 03/26/2025    PHOS 3.2 03/21/2025    CALCIUM 9.0 06/25/2025    ALBUMIN 4.1 03/26/2025    AST 16 03/26/2025    ALT 13 03/26/2025    BILITOT 0.3 03/26/2025       RADIOLOGY:  Imaging Results (Last 72 Hours)       ** No results found for the last 72 hours. **            I reviewed the patient's new clinical results.    Cancer Staging (if applicable)  Cancer Patient: __ yes __no __unknown; If yes, clinical stage " T:__ N:__M:__, stage group or __N/A      Impression: Right shoulder pain      Plan: RIGHT SHOULDER HEMIARTHROPLASTY, BICEPS TENODESIS       MERCEDEZ Ryan   6/30/2025   08:02 EDT    Electronically signed by Matteo Tan MD at 06/30/25 0925       Current Facility-Administered Medications   Medication Dose Route Frequency Provider Last Rate Last Admin    acetaminophen (TYLENOL) tablet 650 mg  650 mg Oral Q4H PRN Matteo Tan MD        Or    acetaminophen (TYLENOL) suppository 650 mg  650 mg Rectal Q4H PRN Matteo Tan MD        albumin human 25 % IV SOLN 12.5 g  12.5 g Intravenous PRN Andrei Jean MD   25 g at 06/30/25 1437    amiodarone (PACERONE) tablet 100 mg  100 mg Oral Q24H Shanice Reed APRN   100 mg at 07/01/25 0927    apixaban (ELIQUIS) tablet 2.5 mg  2.5 mg Oral Q12H Shanice Reed APRN   2.5 mg at 07/01/25 1144    atorvastatin (LIPITOR) tablet 20 mg  20 mg Oral Nightly Shanice Reed APRN   20 mg at 06/30/25 2200    donepezil (ARICEPT) tablet 5 mg  5 mg Oral Nightly Shanice Reed APRN   5 mg at 06/30/25 2200    HYDROmorphone (DILAUDID) injection 0.5 mg  0.5 mg Intravenous Q2H PRN Matteo Tan MD        And    naloxone (NARCAN) injection 0.1 mg  0.1 mg Intravenous Q5 Min PRN Matteo Tan MD        levothyroxine (SYNTHROID, LEVOTHROID) tablet 137 mcg  137 mcg Oral Q AM Shanice Reed APRN   137 mcg at 07/01/25 0521    losartan (COZAAR) tablet 25 mg  25 mg Oral Daily PRN Shanice Reed APRN        memantine (NAMENDA) tablet 10 mg  10 mg Oral Q12H Shanice Reed APRN   10 mg at 07/01/25 0926    oxyCODONE (ROXICODONE) immediate release tablet 5 mg  5 mg Oral Q4H PRN Matteo Tan MD   5 mg at 07/01/25 0926    pantoprazole (PROTONIX) EC tablet 40 mg  40 mg Oral Q AM Shanice Reed APRN        ropivacaine (NAROPIN) 0.2 % infusion (INFUSYSTEM)   Peripheral Nerve Continuous Rio Blevins CRNA   1,000 mg at  "25 1142    sennosides-docusate (PERICOLACE) 8.6-50 MG per tablet 1 tablet  1 tablet Oral Nightly Shanice Reed APRN   1 tablet at 25 2200    sertraline (ZOLOFT) tablet 100 mg  100 mg Oral Daily Shanice Reed APRN   100 mg at 25 0926    tamsulosin (FLOMAX) 24 hr capsule 0.4 mg  0.4 mg Oral Daily Shanice Reed APRN   0.4 mg at 25 0926    traZODone (DESYREL) tablet 50 mg  50 mg Oral Nightly Shanice Reed APRN            Physician Progress Notes (most recent note)        Shanice Reed APRN at 25 0955          IM progress note      Enrike Tomas  [id number]  1935     LOS: 0 days     Attending: Matteo Tna MD    Primary Care Provider: Blair Dhaliwal MD      Chief Complaint/Reason for visit:  No chief complaint on file.      Subjective    Patient is disoriented. Caregiver at bedside reports he did not sleep well and confusion worsens when he hasn't slept. Patient seems to deny pain.     Spoke with wife later at bedside who is concerned about lack of sleep. She reports high dose Melatonin was ineffective. Klonopin helped him sleep but worsened confusion.     Objective        Visit Vitals  /78 (BP Location: Left arm, Patient Position: Lying)   Pulse 87   Temp 98.7 °F (37.1 °C) (Oral)   Resp 18   Ht 165.1 cm (65\")   Wt 57.2 kg (126 lb)   SpO2 (!) 89%   BMI 20.97 kg/m²     Temp (24hrs), Av.7 °F (36.5 °C), Min:97 °F (36.1 °C), Max:99.1 °F (37.3 °C)      Intake/Output:    Intake/Output Summary (Last 24 hours) at 2025 0956  Last data filed at 2025 0844  Gross per 24 hour   Intake 1150 ml   Output 740 ml   Net 410 ml        Physical Therapy:PT initial eval completed. Pt presents below his functional baseline with increased confusion, NWB/immobilization of RUE, impaired balance, and decreased endurance. Pt ambulated 18' with mod Ax2. Consistent cues required for attention to task. Further IPPT is warrented. PT rec d/c to SNF when medically " appropriate.     Anticipated Discharge Disposition (PT): skilled nursing facility    Physical Exam:     General Appearance:    Alert, cooperative, in no acute distress   Head:    Normocephalic, without obvious abnormality, atraumatic    Lungs:     Normal effort, symmetric chest rise,  clear to      auscultation bilaterally              Heart:    Regular rhythm and normal rate, normal S1 and S2    Abdomen:     Normal bowel sounds, no masses, no organomegaly, soft        nontender, nondistended, no guarding, no rebound                tenderness   Extremities:    RUE in a sling, CDI  dressing . Interscalene nerve block cath present.  Distal pulses, cap refill, movements of fingers, wrist, intact.  No clubbing, cyanosis or edema.  No deformities.    Pulses:   Pulses palpable and equal bilaterally   Skin:   No bleeding, bruising or rash          Results Review:     I reviewed the patient's new clinical results.   Results from last 7 days   Lab Units 06/30/25  0902 06/30/25  0841 06/25/25  1547   WBC 10*3/mm3 6.51  --  7.10   HEMOGLOBIN g/dL 12.0*  --  12.4*   HEMOGLOBIN, POC g/dL  --  12.6  --    HEMATOCRIT % 37.2*  --  37.8   HEMATOCRIT POC %  --  37*  --    PLATELETS 10*3/mm3 192  --  188     Results from last 7 days   Lab Units 06/30/25  0836 06/25/25  1547   SODIUM mmol/L 140 140   POTASSIUM mmol/L 4.2 3.8   CHLORIDE mmol/L 97* 97*   CO2 mmol/L 27.6 26.7   BUN mg/dL 30.5* 9.4   CREATININE mg/dL 6.74* 3.30*   CALCIUM mg/dL 9.8* 9.0   BILIRUBIN mg/dL 0.3  --    ALK PHOS U/L 97  --    ALT (SGPT) U/L 11  --    AST (SGOT) U/L 19  --    GLUCOSE mg/dL 73 101*     I reviewed the patient's new imaging including images and reports.    All medications reviewed.   amiodarone, 100 mg, Oral, Q24H  atorvastatin, 20 mg, Oral, Nightly  donepezil, 5 mg, Oral, Nightly  levothyroxine, 137 mcg, Oral, Q AM  memantine, 10 mg, Oral, Q12H  pantoprazole, 40 mg, Oral, Q AM  senna-docusate sodium, 1 tablet, Oral, Nightly  sertraline, 100 mg,  Oral, Daily  tamsulosin, 0.4 mg, Oral, Daily      acetaminophen, 650 mg, Q4H PRN   Or  acetaminophen, 650 mg, Q4H PRN  albumin human, 12.5 g, PRN  HYDROmorphone, 0.5 mg, Q2H PRN   And  naloxone, 0.1 mg, Q5 Min PRN  losartan, 25 mg, Daily PRN  oxyCODONE, 5 mg, Q4H PRN        Assessment & Plan       S/P shoulder hemiarthroplasty, right    (HFpEF) heart failure with preserved ejection fraction    Benign prostatic hyperplasia    Depression    ESRD (end stage renal disease)    Essential hypertension    GERD (gastroesophageal reflux disease)    Chronic systolic heart failure         Plan  1. PT/OT  2. Pain control-prns   3. IS-encouraged  4. DVT proph- Eliquis   5. Bowel regimen  6. Monitor post-op labs  7. Discharge planning- SNF recommended       -ESRD  On HD MWF. RUE fistula  Takes midodrine on HD days  NAL following for HD management         -HTN with intermittent hypotension  Hx labile BP with episodes of hypotension  On low dose losartan for HFpEF but only takes PRN per chart review  Takes midodrine on HD days and intermittently throughout week (checks BP twice daily)     -Paroxysmal A fib, HFpEF   Chronic, stable. Follows with cardiology  - On amiodarone and Eliquis- resume Eliquis          -GERD:  Resume PPI.  Formulary substitution when indicated.     -Hypothyroidism  Resume Synthroid     -Depression:  Resume home regimen including Sertraline     -BPH  Resume tamsulosin     -Major neurocognitive disorder, Memory loss  Follows with neurology, first seen 3/2025  Thought multifactorial 2/2 opioid use, ESRD, depression, possible vascular disease. Per neuro notes some c/f Alzheimer's  Resume Namenda and Aricept     Insomnia  - records reviewed, Melatonin ineffective, Seroquel led to excessive daytime sleepiness, Klonopin effective for sleep but worsened confusion   -Trial of Trazodone     MERCEDEZ Hendrickson  07/01/25  09:56 EDT     Electronically signed by Shanice Reed APRN at 07/01/25 1321           Consult Notes (most recent note)        Andrei Jean MD at 06/30/25 1215        Consult Orders    1. Inpatient Nephrology Consult [[id number]] ordered by Shanice Reed APRN at 06/30/25 0955                       [facility]  Renal Consult Note    Enrike CONNELL Thom  1935  [id number]    Date of Admit:  6/30/2025    Date of Consult: 6/30/2025    Requesting Provider: Matteo Tan MD    Evaluating Physician: Andrei Jean MD    Chief Complaint: Shoulder pain    Reason for Consultation: ESRD management    History of present illness:    Patient is a 89 y.o.  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  Patient comes in today for scheduled shoulder surgery.  Nephrology consulted to assist with dialysis.  Patient is postop.  Denies any complaints at this time.      Past Medical History  Past Medical History:   Diagnosis Date    A-fib     Abnormal ECG     occasional extra heartbeat    Anemia, acute on chronic 09/30/2023    Anesthesia complication     hallucinations upon waking, violent upon waking per pt report    Arthritis     Atrial fibrillation     BPH (benign prostatic hyperplasia)     Cervical compression fracture 06/05/2023    Dementia     Diverticulosis     Diverticulum of esophagus     puree diet and speech therapy    Does use hearing aid     Elevated cholesterol     ESRD on hemodialysis     GERD (gastroesophageal reflux disease)     Heart failure     Hemodialysis patient     M,W,F    Hyperlipidemia     Hypertension     Low back pain 2022    NERY (obstructive sleep apnea)     NOT USING CPAP, dental appliance    Recurrent falls     UTI (urinary tract infection)        Past Surgical History:   Procedure Laterality Date    CATARACT EXTRACTION      COLONOSCOPY      DIALYSIS FISTULA CREATION      x2    ENDOSCOPY      INGUINAL HERNIA REPAIR      PROSTATE BIOPSY      THROAT SURGERY      diverticulum in throat    TONSILLECTOMY AND  "ADENOIDECTOMY      TOTAL SHOULDER ARTHROPLASTY W/ DISTAL CLAVICLE EXCISION Left 09/25/2023    Procedure: HEMIARTHROPLASTY, BICEPS TENODESIS - LEFT;  Surgeon: Matteo Tan MD;  Location: [facility];  Service: Orthopedics;  Laterality: Left;       Allergies:  Allergies   Allergen Reactions    Mirtazapine Other (See Comments)     \"Did not work well\"    Pollen Extract Other (See Comments)     sneezing       Medication:   See electronic record    Soc Hx:   Social History     Socioeconomic History    Marital status:    Tobacco Use    Smoking status: Never    Smokeless tobacco: Never   Vaping Use    Vaping status: Never Used   Substance and Sexual Activity    Alcohol use: Not Currently     Alcohol/week: 1.0 standard drink of alcohol     Types: 1 Glasses of wine per week     Comment: once monthly typically    Drug use: Never    Sexual activity: Defer       Fam Hx:  No congenital renal disease      Review of Systems:  Full review of systems reviewed and are as above  In HPI or per admitting H&P,otherwise negative for acute complaints    Physical Exam:   Vital Signs   Blood pressure 91/68, pulse 60, temperature 97.1 °F (36.2 °C), temperature source Tympanic, resp. rate 18, height 165.1 cm (65\"), weight 57.2 kg (126 lb), SpO2 97%.  No intake/output data recorded.      GENERAL: WD elderly WM NAD  NEURO: Awake and alert,   + Dementia  PSYCHIATRIC: NMA. Cooperative with PE  EYE: PE, no icterus, no conjunctivitis  ENT: ommm, dentition intact,  Hearing intact  NECK:  No JVD discernable,  Trachea midline  CV: No edema, RRR  LUNGS:  Quiet,  Nonlabored resp.  Symmetrical expansion  ABDOMEN: Nondistended, soft nontender.  : No Ferrara, no palp bladder  SKIN: Warm and dry without rash  RUE aVF  + Thrill       Laboratory Data  Results from last 7 days   Lab Units 06/30/25  0902 06/25/25  1547   HEMOGLOBIN g/dL 12.0* 12.4*   HEMATOCRIT % 37.2* 37.8     Results from last 7 days   Lab Units 06/30/25  0836 06/25/25  1547 "   SODIUM mmol/L 140 140   POTASSIUM mmol/L 4.2 3.8   CHLORIDE mmol/L 97* 97*   CO2 mmol/L 27.6 26.7   BUN mg/dL 30.5* 9.4   CREATININE mg/dL 6.74* 3.30*   CALCIUM mg/dL 9.8* 9.0   ALBUMIN g/dL 3.8  --          Results from last 7 days   Lab Units 25  0836   ALK PHOS U/L 97   BILIRUBIN mg/dL 0.3   ALT (SGPT) U/L 11   AST (SGOT) U/L 19         Estimated Creatinine Clearance: 5.9 mL/min (A) (by C-G formula based on SCr of 6.74 mg/dL (H)).  Lab Results   Component Value Date    PROTEINUR >=300 (A) 2024       A/P:        ESRD: On HD MWF with [facility] unit.  Patient here for scheduled shoulder surgery.  - Continue outpatient HD schedule    HTN: Blood pressure initially quite elevated on admission.  Low side postsurgery.  - With HD.  - Support with albumin as indicated.    Electrolytes: Sodium and potassium are stable.    Acid-base: Bicarb has been stable.    Anemia: Hemoglobin above goal of 10-11.    Transfuse as indicated for Hgb <7.  -No JENNYFER at this time.  Can restart once Hgb <11    Volume: Stable.    -UF with HD as tolerated..  .  Post shoulder surgery 2025.    Thank you consulting us on Enrike Tomas who is of high risk and complexity.  We will follow along closely    Andrei Jean MD  2025  12:15 EDT                      Electronically signed by Andrei Jean MD at 25 2014          Physical Therapy Notes (most recent note)        Caro Youngblood, PT at 25 0758  Version 1 of 1         Patient Name: Enrike Tomas  : 1935    MRN: [id number]                              Today's Date: 2025       Admit Date: 2025    Visit Dx: No diagnosis found.  Patient Active Problem List   Diagnosis    (HFpEF) heart failure with preserved ejection fraction    Anemia of renal disease    At high risk for falls    Benign prostatic hyperplasia    Closed fracture of left acetabulum    Compression fracture of thoracic vertebra    Deficiency of other specified B group  vitamins    Depression    ESRD (end stage renal disease)    Essential hypertension    GERD (gastroesophageal reflux disease)    Hepatic cyst    Long term (current) use of anticoagulants    Major depressive disorder, single episode, moderate    Mixed hyperlipidemia    Morgagni hernia    Occlusion of right vertebral artery    NERY (obstructive sleep apnea)    Fall    Severe malnutrition    Sustained SVT    Paroxysmal atrial fibrillation    Chronic heart failure with preserved ejection fraction (HFpEF)    S/P shoulder hemiarthroplasty, left    Anemia, acute on chronic    Postoperative confusion    Hyperkalemia    Chronic systolic heart failure    ESRD needing dialysis    S/P shoulder hemiarthroplasty, right     Past Medical History:   Diagnosis Date    A-fib     Abnormal ECG     occasional extra heartbeat    Anemia, acute on chronic 09/30/2023    Anesthesia complication     hallucinations upon waking, violent upon waking per pt report    Arthritis     Atrial fibrillation     BPH (benign prostatic hyperplasia)     Cervical compression fracture 06/05/2023    Dementia     Diverticulosis     Diverticulum of esophagus     puree diet and speech therapy    Does use hearing aid     Elevated cholesterol     ESRD on hemodialysis     GERD (gastroesophageal reflux disease)     Heart failure     Hemodialysis patient     M,W,F    Hyperlipidemia     Hypertension     Low back pain 2022    NERY (obstructive sleep apnea)     NOT USING CPAP, dental appliance    Recurrent falls     UTI (urinary tract infection)      Past Surgical History:   Procedure Laterality Date    CATARACT EXTRACTION      COLONOSCOPY      DIALYSIS FISTULA CREATION      x2    ENDOSCOPY      INGUINAL HERNIA REPAIR      PROSTATE BIOPSY      THROAT SURGERY      diverticulum in throat    TONSILLECTOMY AND ADENOIDECTOMY      TOTAL SHOULDER ARTHROPLASTY W/ DISTAL CLAVICLE EXCISION Left 09/25/2023    Procedure: HEMIARTHROPLASTY, BICEPS TENODESIS - LEFT;  Surgeon: Matteo Tan  MD Murali;  Location: [facility];  Service: Orthopedics;  Laterality: Left;      General Information       Row Name 07/01/25 08          Physical Therapy Time and Intention    Document Type evaluation  -AB     Mode of Treatment physical therapy  -AB       Row Name 07/01/25 08          General Information    Patient Profile Reviewed yes  -AB     Prior Level of Function min assist:;all household mobility;gait;transfer;bed mobility;mod assist:;ADL's;dependent:;home management;driving  Caregiver present to provide hx. Pt requires assist w/ all mobility and ADL's. Using RW for household gait and w/c for community distances.  -AB     Existing Precautions/Restrictions fall;other (see comments);right;non-weight bearing;shoulder   RUE NWB in sling, IS nerve cath, dementia  -AB     Barriers to Rehab medically complex;previous functional deficit;cognitive status  -AB       Row Name 07/01/25 08          Living Environment    Current Living Arrangements home  -AB     People in Home spouse;child(vickie), adult;other (see comments)  caregiver  -AB       Row Name 07/01/25 08          Home Main Entrance    Number of Stairs, Main Entrance none;other (see comments)  ramp  -AB       Row Name 07/01/25 08          Stairs Within Home, Primary    Stairs, Within Home, Primary stair lift to bedroom  -AB       Row Name 07/01/25 08          Cognition    Orientation Status (Cognition) oriented to;person;disoriented to;place;situation;time  -AB       Row Name 07/01/25 08          Safety Issues/Impairments Affecting Functional Mobility    Safety Issues Affecting Function (Mobility) awareness of need for assistance;insight into deficits/self-awareness;safety precaution awareness;safety precautions follow-through/compliance;sequencing abilities;problem-solving;judgment;ability to follow commands;impulsivity  -AB     Impairments Affecting Function (Mobility) balance;cognition;endurance/activity tolerance;coordination;pain;strength;range  of motion (ROM)  -AB     Cognitive Impairments, Mobility Safety/Performance attention;awareness, need for assistance;insight into deficits/self-awareness;impulsivity;judgment;problem-solving/reasoning;sequencing abilities;safety precaution follow-through;safety precaution awareness  -AB     Comment, Safety Issues/Impairments (Mobility) Pt w/ increased confusion and was very verbose this session. Consistent cues to remain on task.  -AB               User Key  (r) = Recorded By, (t) = Taken By, (c) = Cosigned By      Initials Name Provider Type    AB Caro Youngblood, PT Physical Therapist                   Mobility       Row Name 07/01/25 0852          Bed Mobility    Bed Mobility supine-sit;scooting/bridging  -AB     Scooting/Bridging [place] (Bed Mobility) maximum assist (25% patient effort);2 person assist;verbal cues  -AB     Supine-Sit [place] (Bed Mobility) maximum assist (25% patient effort);2 person assist;verbal cues  -AB     Assistive Device (Bed Mobility) head of bed elevated  -AB     Comment, (Bed Mobility) Pt required assist at BLE and trunk to sit EOB. Max cues to maintain NWB of RUE.  -AB       Row Name 07/01/25 0852          Transfers    Comment, (Transfers) Cues for hand placement, sequencing, and attention to task. Pt was very verbose this session. Consistent cues to remain on task.  -AB       Row Name 07/01/25 0852          Sit-Stand Transfer    Sit-Stand [place] (Transfers) moderate assist (50% patient effort);2 person assist;verbal cues;nonverbal cues (demo/gesture)  -AB     Assistive Device (Sit-Stand Transfers) other (see comments)  UE support  -AB     Comment, (Sit-Stand Transfer) posterior lean  -AB       Row Name 07/01/25 0852          Gait/Stairs (Locomotion)    [place] Level (Gait) moderate assist (50% patient effort);2 person assist;verbal cues;nonverbal cues (demo/gesture)  -AB     Assistive Device (Gait) other (see comments)  UE support  -AB     Patient was able to  Ambulate yes  -AB     Distance in Feet (Gait) 18  -AB     Deviations/Abnormal Patterns (Gait) bilateral deviations;amberly decreased;gait speed decreased;stride length decreased  -AB     Bilateral Gait Deviations forward flexed posture;heel strike decreased  -AB     Hext Level (Stairs) not tested  -AB     Comment, (Gait/Stairs) Pt ambulated with short/shuffling steps and forward flexed posture. Mod A provided secondary to instability and posterior lean. Further activity limited by weakness and cognitive deficits.  -AB       Row Name 07/01/25 0852          Mobility    Extremity Weight-bearing Status right upper extremity  -AB     Right Upper Extremity (Weight-bearing Status) non weight-bearing (NWB)   -AB               User Key  (r) = Recorded By, (t) = Taken By, (c) = Cosigned By      Initials Name Provider Type    AB Caro Youngblood, PT Physical Therapist                   Obj/Interventions       Row Name 07/01/25 0900          Range of Motion Comprehensive    General Range of Motion bilateral lower extremity ROM WFL  -AB       Row Name 07/01/25 0900          Strength Comprehensive (MMT)    General Manual Muscle Testing (MMT) Assessment lower extremity strength deficits identified  -AB     Comment, General Manual Muscle Testing (MMT) Assessment BLE grossly 3/5  -AB       Row Name 07/01/25 0900          Balance    Balance Assessment sitting static balance;sitting dynamic balance;standing static balance;standing dynamic balance  -AB     Static Sitting Balance contact guard  -AB     Dynamic Sitting Balance contact guard  -AB     Position, Sitting Balance unsupported;sitting edge of bed  -AB     Static Standing Balance minimal assist  -AB     Dynamic Standing Balance moderate assist;2-person assist;verbal cues;non-verbal cues (demo/gesture)  -AB     Position/Device Used, Standing Balance supported  -AB     Balance Interventions sitting;standing;sit to stand;supported;dynamic;static;occupation based/functional  task  -AB     Comment, Balance unsteady w/o overt LOB.  -AB       Row Name 07/01/25 0900          Sensory Assessment (Somatosensory)    Sensory Assessment (Somatosensory) LE sensation intact  -AB               User Key  (r) = Recorded By, (t) = Taken By, (c) = Cosigned By      Initials Name Provider Type    AB Caro Youngblood, PT Physical Therapist                   Goals/Plan       Row Name 07/01/25 0905          Bed Mobility Goal 1 (PT)    Activity/Assistive Device (Bed Mobility Goal 1, PT) sit to supine;supine to sit  -AB     [place] Level/Cues Needed (Bed Mobility Goal 1, PT) minimum assist (75% or more patient effort)  -AB     Time Frame (Bed Mobility Goal 1, PT) short term goal (STG);5 days  -AB       Row Name 07/01/25 0905          Transfer Goal 1 (PT)    Activity/Assistive Device (Transfer Goal 1, PT) sit-to-stand/stand-to-sit;bed-to-chair/chair-to-bed  -AB     [place] Level/Cues Needed (Transfer Goal 1, PT) minimum assist (75% or more patient effort)  -AB     Time Frame (Transfer Goal 1, PT) long term goal (LTG);10 days  -AB       Row Name 07/01/25 0905          Gait Training Goal 1 (PT)    Activity/Assistive Device (Gait Training Goal 1, PT) gait (walking locomotion)  -AB     [place] Level (Gait Training Goal 1, PT) minimum assist (75% or more patient effort)  -AB     Distance (Gait Training Goal 1, PT) 100  -AB     Time Frame (Gait Training Goal 1, PT) long term goal (LTG);10 days  -AB       Row Name 07/01/25 0905          Therapy Assessment/Plan (PT)    Planned Therapy Interventions (PT) balance training;bed mobility training;gait training;home exercise program;patient/family education;postural re-education;transfer training;stretching;strengthening;ROM (range of motion)  -AB               User Key  (r) = Recorded By, (t) = Taken By, (c) = Cosigned By      Initials Name Provider Type    AB Caro Youngblood, PT Physical Therapist                   Clinical Impression       Row Name 07/01/25  0901          Pain    Additional Documentation Pain Scale: FACES Pre/Post-Treatment (Group)  -AB       Row Name 07/01/25 0901          Pain Scale: FACES Pre/Post-Treatment    Pain: FACES Scale, Pretreatment 0-->no hurt  -AB     Posttreatment Pain Rating 0-->no hurt  -AB       Row Name 07/01/25 0901          Plan of Care Review    Plan of Care Reviewed With patient;caregiver  -AB     Progress no change  -AB     Outcome Evaluation PT initial eval completed. Pt presents below his functional baseline with increased confusion, NWB/immobilization of RUE, impaired balance, and decreased endurance. Pt ambulated 18' with mod Ax2. Consistent cues required for attention to task. Further IPPT is warrented. PT rec d/c to SNF when medically appropriate.  -AB       Row Name 07/01/25 0901          Therapy Assessment/Plan (PT)    Rehab Potential (PT) good  -AB     Criteria for Skilled Interventions Met (PT) yes;meets criteria;skilled treatment is necessary  -AB     Therapy Frequency (PT) daily  -AB     Predicted Duration of Therapy Intervention (PT) 10 days  -AB       Row Name 07/01/25 0901          Vital Signs    Pre Systolic BP Rehab 114  -AB     Pre Treatment Diastolic BP 78  -AB     Pre SpO2 (%) 91  -AB     O2 Delivery Pre Treatment nasal cannula  -AB     Intra SpO2 (%) 89   -AB     O2 Delivery Intra Treatment room air  -AB     Post SpO2 (%) 90  -AB     O2 Delivery Post Treatment nasal cannula  -AB     Pre Patient Position Supine  -AB     Intra Patient Position Standing  -AB     Post Patient Position Sitting  -AB       Row Name 07/01/25 0901          Positioning and Restraints    Pre-Treatment Position in bed  -AB     Post Treatment Position chair  -AB     In Chair notified nsg;reclined;sitting;call light within reach;encouraged to call for assist;exit alarm on;legs elevated;waffle cushion;with OT  -AB               User Key  (r) = Recorded By, (t) = Taken By, (c) = Cosigned By      Initials Name Provider Type    AB Ford,  Caro MENDEZ, PT Physical Therapist                   Outcome Measures       Row Name 07/01/25 0906          How much help from another person do you currently need...    Turning from your back to your side while in flat bed without using bedrails? 2  -AB     Moving from lying on back to sitting on the side of a flat bed without bedrails? 2  -AB     Moving to and from a bed to a chair (including a wheelchair)? 2  -AB     Standing up from a chair using your arms (e.g., wheelchair, bedside chair)? 2  -AB     Climbing 3-5 steps with a railing? 1  -AB     To walk in hospital room? 2  -AB     AM-PAC 6 Clicks Score (PT) 11  -AB     Highest Level of Mobility Goal Move to Chair/Commode-4  -AB       Row Name 07/01/25 0906          Functional Assessment    Outcome Measure Options AM-PAC 6 Clicks Basic Mobility (PT)  -AB               User Key  (r) = Recorded By, (t) = Taken By, (c) = Cosigned By      Initials Name Provider Type    AB Caro Youngblood, PT Physical Therapist                                 Physical Therapy Education       Title: PT OT SLP Therapies (In Progress)       Topic: Physical Therapy (In Progress)       Point: Mobility training (Done)       Learning Progress Summary            Patient Acceptance, E,D, VU,NR by AB at 7/1/2025 0906                      Point: Home exercise program (Not Started)       Learner Progress:  Not documented in this visit.              Point: Body mechanics (Done)       Learning Progress Summary            Patient Acceptance, E,D, VU,NR by AB at 7/1/2025 0906                      Point: Precautions (Done)       Learning Progress Summary            Patient Acceptance, E,D, VU,NR by AB at 7/1/2025 0906                                      User Key       Initials Effective Dates Name Provider Type Discipline    AB 09/22/22 -  Caro Youngblood PT Physical Therapist PT                  PT Recommendation and Plan  Planned Therapy Interventions (PT): balance training, bed mobility training,  gait training, home exercise program, patient/family education, postural re-education, transfer training, stretching, strengthening, ROM (range of motion)  Progress: no change  Outcome Evaluation: PT initial eval completed. Pt presents below his functional baseline with increased confusion, NWB/immobilization of RUE, impaired balance, and decreased endurance. Pt ambulated 18' with mod Ax2. Consistent cues required for attention to task. Further IPPT is warrented. PT rec d/c to SNF when medically appropriate.     Time Calculation:   PT Evaluation Complexity  History, PT Evaluation Complexity: 1-2 personal factors and/or comorbidities  Examination of Body Systems (PT Eval Complexity): total of 3 or more elements  Clinical Presentation (PT Evaluation Complexity): evolving  Clinical Decision Making (PT Evaluation Complexity): moderate complexity  Overall Complexity (PT Evaluation Complexity): moderate complexity     PT Charges       Row Name 07/01/25 0906             Time Calculation    Start Time 0758  -AB      PT Received On 07/01/25  -AB      PT Goal Re-Cert Due Date 07/11/25  -AB         Untimed Charges    PT Eval/Re-eval Minutes 55  -AB         Total Minutes    Untimed Charges Total Minutes 55  -AB       Total Minutes 55  -AB                User Key  (r) = Recorded By, (t) = Taken By, (c) = Cosigned By      Initials Name Provider Type    AB Caro Youngblood, PT Physical Therapist                  Therapy Charges for Today       Code Description Service Date Service Provider Modifiers Qty    [id number]  PT EVAL MOD COMPLEXITY 4 7/1/2025 Caro Youngblood, PT GP 1            PT G-Codes  Outcome Measure Options: AM-PAC 6 Clicks Basic Mobility (PT)  AM-PAC 6 Clicks Score (PT): 11  PT Discharge Summary  Anticipated Discharge Disposition (PT): skilled nursing facility    Caro Youngblood PT  7/1/2025      Electronically signed by Caro Youngblood, PT at 07/01/25 0907          Occupational Therapy Notes (most recent note)         Debbie Espinosa, OT at 25 1226          Patient Name: Enrike Tomas  : 1935    MRN: [id number]                              Today's Date: 2025       Admit Date: 2025    Visit Dx: No diagnosis found.  Patient Active Problem List   Diagnosis    (HFpEF) heart failure with preserved ejection fraction    Anemia of renal disease    At high risk for falls    Benign prostatic hyperplasia    Closed fracture of left acetabulum    Compression fracture of thoracic vertebra    Deficiency of other specified B group vitamins    Depression    ESRD (end stage renal disease)    Essential hypertension    GERD (gastroesophageal reflux disease)    Hepatic cyst    Long term (current) use of anticoagulants    Major depressive disorder, single episode, moderate    Mixed hyperlipidemia    Morgagni hernia    Occlusion of right vertebral artery    NEYR (obstructive sleep apnea)    Fall    Severe malnutrition    Sustained SVT    Paroxysmal atrial fibrillation    Chronic heart failure with preserved ejection fraction (HFpEF)    S/P shoulder hemiarthroplasty, left    Anemia, acute on chronic    Postoperative confusion    Hyperkalemia    Chronic systolic heart failure    ESRD needing dialysis    S/P shoulder hemiarthroplasty, right     Past Medical History:   Diagnosis Date    A-fib     Abnormal ECG     occasional extra heartbeat    Anemia, acute on chronic 2023    Anesthesia complication     hallucinations upon waking, violent upon waking per pt report    Arthritis     Atrial fibrillation     BPH (benign prostatic hyperplasia)     Cervical compression fracture 2023    Dementia     Diverticulosis     Diverticulum of esophagus     puree diet and speech therapy    Does use hearing aid     Elevated cholesterol     ESRD on hemodialysis     GERD (gastroesophageal reflux disease)     Heart failure     Hemodialysis patient     M,W,F    Hyperlipidemia     Hypertension     Low back pain     NERY (obstructive  sleep apnea)     NOT USING CPAP, dental appliance    Recurrent falls     UTI (urinary tract infection)      Past Surgical History:   Procedure Laterality Date    CATARACT EXTRACTION      COLONOSCOPY      DIALYSIS FISTULA CREATION      x2    ENDOSCOPY      INGUINAL HERNIA REPAIR      PROSTATE BIOPSY      THROAT SURGERY      diverticulum in throat    TONSILLECTOMY AND ADENOIDECTOMY      TOTAL SHOULDER ARTHROPLASTY W/ DISTAL CLAVICLE EXCISION Left 09/25/2023    Procedure: HEMIARTHROPLASTY, BICEPS TENODESIS - LEFT;  Surgeon: Matteo Tan MD;  Location: [facility];  Service: Orthopedics;  Laterality: Left;      General Information       Row Name 07/01/25 1148          OT Time and Intention    Document Type evaluation  -AR     Mode of Treatment occupational therapy;concurrent therapy  -AR       Row Name 07/01/25 1148          General Information    Patient Profile Reviewed yes  -AR     Prior Level of Function min assist:;all household mobility;gait;transfer;ADL's  used cane for household mobility, wc for community mobility  -AR     Existing Precautions/Restrictions fall;other (see comments);right;non-weight bearing;shoulder  interscalene nerve catheter, Donjoy II sling with brace, RUE fistula, IV in L foot, dementia  -AR     Barriers to Rehab medically complex;previous functional deficit;cognitive status  -AR       Row Name 07/01/25 1148          Living Environment    Current Living Arrangements home  -AR     People in Home spouse;child(vickie), adult;other (see comments)  caregiver  -AR       Row Name 07/01/25 1148          Home Main Entrance    Number of Stairs, Main Entrance --  ramp  -AR       Row Name 07/01/25 1148          Stairs Within Home, Primary    Stairs, Within Home, Primary stairlift to bedroom  -AR       Row Name 07/01/25 1148          Cognition    Orientation Status (Cognition) oriented to;person;disoriented to;place;situation;time  -AR       Row Name 07/01/25 1148          Safety Issues/Impairments  Affecting Functional Mobility    Safety Issues Affecting Function (Mobility) at risk behavior observed;awareness of need for assistance;impulsivity;insight into deficits/self-awareness;judgment;problem-solving;safety precaution awareness;safety precautions follow-through/compliance;sequencing abilities;unable to maintain weight-bearing restrictions  -AR     Impairments Affecting Function (Mobility) balance;cognition;endurance/activity tolerance;coordination;pain;strength;range of motion (ROM)  -AR     Cognitive Impairments, Mobility Safety/Performance attention;awareness, need for assistance;impulsivity;insight into deficits/self-awareness;judgment;problem-solving/reasoning;safety precaution awareness;safety precaution follow-through;sequencing abilities  -AR               User Key  (r) = Recorded By, (t) = Taken By, (c) = Cosigned By      Initials Name Provider Type    Debbie Bledsoe OT Occupational Therapist                     Mobility/ADL's       Row Name 07/01/25 1204          Bed Mobility    Bed Mobility scooting/bridging;supine-sit  -AR     Scooting/Bridging [place] (Bed Mobility) maximum assist (25% patient effort);2 person assist;verbal cues  -AR     Supine-Sit [place] (Bed Mobility) maximum assist (25% patient effort);2 person assist;verbal cues  -AR     Assistive Device (Bed Mobility) head of bed elevated;bed rails  -AR     Comment, (Bed Mobility) OT educated pt and caregiver on importance of maintaining NWB.  -AR       Row Name 07/01/25 1204          Transfers    Transfers sit-stand transfer;stand-sit transfer  -AR     Comment, (Transfers) Educated pt on importance of attending to interscalene nerve catheter to avoid dislodgement. Pt needed cues or hand placement and chair approach.  -AR       Row Name 07/01/25 1204          Sit-Stand Transfer    Sit-Stand [place] (Transfers) moderate assist (50% patient effort);2 person assist;verbal cues;nonverbal cues (demo/gesture)  -AR      Assistive Device (Sit-Stand Transfers) other (see comments)  JOHN WEBER  -AR       Row Name 07/01/25 1204          Stand-Sit Transfer    Stand-Sit [place] (Transfers) moderate assist (50% patient effort);2 person assist;verbal cues;nonverbal cues (demo/gesture)  -AR     Assistive Device (Stand-Sit Transfers) other (see comments)  -AR       Row Name 07/01/25 1204          Activities of Daily Living    BADL Assessment/Intervention upper body dressing;bathing;lower body dressing  -AR       Row Name 07/01/25 1204          Mobility    Extremity Weight-bearing Status right upper extremity  -AR     Right Upper Extremity (Weight-bearing Status) non weight-bearing (NWB)  -AR       Row Name 07/01/25 1204          Upper Body Dressing Assessment/Training    [place] Level (Upper Body Dressing) doff;don;dependent (less than 25% patient effort)  sling  -AR     Position (Upper Body Dressing) edge of bed sitting;supported sitting  -AR     Comment, (Upper Body Dressing) OT adjusted sling for proper fit.  -AR       Row Name 07/01/25 1204          Bathing Assessment/Intervention    Comment, (Bathing) Educated pt on shoulder precautions and axilla care to maintain, reviewed that nerve catheter cannot get wet.  -AR       Row Name 07/01/25 1204          Lower Body Dressing Assessment/Training    [place] Level (Lower Body Dressing) don;socks;dependent (less than 25% patient effort)  -AR     Position (Lower Body Dressing) supine  -AR               User Key  (r) = Recorded By, (t) = Taken By, (c) = Cosigned By      Initials Name Provider Type    Debbie Bledsoe OT Occupational Therapist                   Obj/Interventions       Row Name 07/01/25 1208          Sensory Assessment (Somatosensory)    Sensory Assessment (Somatosensory) unable/difficult to assess  -AR       Row Name 07/01/25 1208          Vision Assessment/Intervention    Visual Impairment/Limitations unable/difficult to assess  -AR       Row Name 07/01/25 1208           Range of Motion Comprehensive    Comment, General Range of Motion LUE grossly WFL, R elbow/wrist/hand WFL  -AR       Row Name 07/01/25 1208          Strength Comprehensive (MMT)    Comment, General Manual Muscle Testing (MMT) Assessment SOUTHE unable d/t cognitive status, RUE deferred  -AR       Row Name 07/01/25 1208          Shoulder (Therapeutic Exercise)    Shoulder (Therapeutic Exercise) PROM (passive range of motion);AROM (active range of motion)  -AR     Shoulder AROM (Therapeutic Exercise) bilateral;scapular retraction;sitting;10 repetitions  -AR     Shoulder PROM (Therapeutic Exercise) right;flexion;extension;internal rotation;external rotation;sitting;10 repetitions  -AR       Row Name 07/01/25 1208          Elbow/Forearm (Therapeutic Exercise)    Elbow/Forearm (Therapeutic Exercise) AAROM (active assistive range of motion)  -AR     Elbow/Forearm AAROM (Therapeutic Exercise) right;flexion;extension;supination;pronation;sitting;10 repetitions  -AR       Row Name 07/01/25 1208          Wrist (Therapeutic Exercise)    Wrist (Therapeutic Exercise) AAROM (active assistive range of motion)  -AR     Wrist AAROM (Therapeutic Exercise) right;extension;flexion;10 repetitions  -AR       Row Name 07/01/25 1208          Hand (Therapeutic Exercise)    Hand (Therapeutic Exercise) AROM (active range of motion)  -AR     Hand AROM/AAROM (Therapeutic Exercise) right;AROM (active range of motion);finger flexion;finger extension;10 repetitions  -AR       Row Name 07/01/25 1208          Motor Skills    Therapeutic Exercise shoulder;elbow/forearm;wrist;hand  Issued written HEP  -AR       Row Name 07/01/25 1208          Balance    Balance Assessment sitting static balance;sitting dynamic balance;standing static balance;standing dynamic balance  -AR     Static Sitting Balance contact guard  -AR     Dynamic Sitting Balance contact guard  -AR     Position, Sitting Balance unsupported;sitting edge of bed  -AR     Static  Standing Balance minimal assist  -AR     Dynamic Standing Balance moderate assist;2-person assist  -AR     Position/Device Used, Standing Balance supported  -AR               User Key  (r) = Recorded By, (t) = Taken By, (c) = Cosigned By      Initials Name Provider Type    Debbie Bledsoe, OT Occupational Therapist                   Goals/Plan       Row Name 07/01/25 1222          Transfer Goal 1 (OT)    Activity/Assistive Device (Transfer Goal 1, OT) sit-to-stand/stand-to-sit;commode, bedside without drop arms  -AR     [place] Level/Cues Needed (Transfer Goal 1, OT) minimum assist (75% or more patient effort)  -AR     Time Frame (Transfer Goal 1, OT) long term goal (LTG);10 days  -AR     Progress/Outcome (Transfer Goal 1, OT) goal ongoing  -AR       Row Name 07/01/25 1222          Dressing Goal 1 (OT)    Activity/Device (Dressing Goal 1, OT) upper body dressing  -AR     [place]/Cues Needed (Dressing Goal 1, OT) moderate assist (50-74% patient effort);verbal cues required  -AR     Time Frame (Dressing Goal 1, OT) short term goal (STG);5 days  -AR     Strategies/Barriers (Dressing Goal 1, OT) Pt and/or family will don/doff sling with supervision  -AR     Progress/Outcome (Dressing Goal 1, OT) goal ongoing  -AR       Row Name 07/01/25 1222          Self-Feeding Goal 1 (OT)    Activity/Device (Self-Feeding Goal 1, OT) self-feeding skills, all  AU PRN  -AR     [place] Level/Cues Needed (Self-Feeding Goal 1, OT) supervision required  -AR     Time Frame (Self-Feeding Goal 1, OT) short term goal (STG);5 days  -AR     Progress/Outcomes (Self-Feeding Goal 1, OT) goal ongoing  -AR       Row Name 07/01/25 1222          ROM Goal 1 (OT)    Time Frame (ROM Goal 1, OT) long term goal (LTG);10 days  -AR     Strategies/Barriers (ROM Goal 1, OT) Pt and family/caregiver will complete RUE HEP within physician parameters with supervison  -AR     Progress/Outcome (ROM Goal 1, OT) goal ongoing  -AR       Row Name  07/01/25 1222          Therapy Assessment/Plan (OT)    Planned Therapy Interventions (OT) adaptive equipment training;BADL retraining;edema control/reduction;IADL retraining;occupation/activity based interventions;orthotic fabrication/fitting/training;passive ROM/stretching;patient/caregiver education/training;ROM/therapeutic exercise;transfer/mobility retraining  -AR               User Key  (r) = Recorded By, (t) = Taken By, (c) = Cosigned By      Initials Name Provider Type    Debbie Bledsoe, BETSY Occupational Therapist                   Clinical Impression       Row Name 07/01/25 1216          Pain Scale: FACES Pre/Post-Treatment    Pain: FACES Scale, Pretreatment 0-->no hurt  -AR     Posttreatment Pain Rating 0-->no hurt  -AR       Row Name 07/01/25 1216          Plan of Care Review    Plan of Care Reviewed With patient;caregiver  -AR     Outcome Evaluation OT educated pt on shoulder precautions, ADL retraining to maintain, sling management and HEP, will edcuated caregiver when pt is more appropriate to demo sling and HEP. He is limited with NWB/immobilization RUE (dominant) arm, confusion, poor safety awareness with poor attention to teaching, impaired balance and desaturaiton on RA to 88%. Pt is performing significantly below baseline, recommend SNF.  -AR       Row Name 07/01/25 1216          Therapy Assessment/Plan (OT)    Rehab Potential (OT) fair  -AR     Criteria for Skilled Therapeutic Interventions Met (OT) yes  -AR     Therapy Frequency (OT) daily  -AR       Row Name 07/01/25 1216          Therapy Plan Review/Discharge Plan (OT)    Anticipated Discharge Disposition (OT) skilled nursing facility  -AR       Row Name 07/01/25 1216          Vital Signs    Pre SpO2 (%) 91  -AR     O2 Delivery Pre Treatment supplemental O2  -AR     Intra SpO2 (%) 88  -AR     O2 Delivery Intra Treatment room air  -AR     Post SpO2 (%) 94  -AR     O2 Delivery Post Treatment supplemental O2  -AR     Pre Patient Position  Supine  -AR     Intra Patient Position Sitting  -AR     Post Patient Position Sitting  -AR       Row Name 07/01/25 1216          Positioning and Restraints    Pre-Treatment Position in bed  -AR     Post Treatment Position chair  -AR     In Chair reclined;call light within reach;encouraged to call for assist;exit alarm on;with family/caregiver;waffle cushion;on mechanical lift sling;with brace;legs elevated  deferred SCD as only one pump attached to machine, deferred cold pack as pt falling asleep and did not want to wake him  -AR               User Key  (r) = Recorded By, (t) = Taken By, (c) = Cosigned By      Initials Name Provider Type    Debbie Bledsoe, OT Occupational Therapist                   Outcome Measures       Row Name 07/01/25 1224          How much help from another is currently needed...    Putting on and taking off regular lower body clothing? 1  -AR     Bathing (including washing, rinsing, and drying) 1  -AR     Toileting (which includes using toilet bed pan or urinal) 1  -AR     Putting on and taking off regular upper body clothing 1  -AR     Taking care of personal grooming (such as brushing teeth) 2  -AR     Eating meals 2  -AR     AM-PAC 6 Clicks Score (OT) 8  -AR       Row Name 07/01/25 0906          How much help from another person do you currently need...    Turning from your back to your side while in flat bed without using bedrails? 2  -AB     Moving from lying on back to sitting on the side of a flat bed without bedrails? 2  -AB     Moving to and from a bed to a chair (including a wheelchair)? 2  -AB     Standing up from a chair using your arms (e.g., wheelchair, bedside chair)? 2  -AB     Climbing 3-5 steps with a railing? 1  -AB     To walk in hospital room? 2  -AB     AM-PAC 6 Clicks Score (PT) 11  -AB     Highest Level of Mobility Goal Move to Chair/Commode-4  -AB       Row Name 07/01/25 1224 07/01/25 0906       Functional Assessment    Outcome Measure Options AM-PAC 6 Clicks  Daily Activity (OT)  -AR AM-PAC 6 Clicks Basic Mobility (PT)  -AB              User Key  (r) = Recorded By, (t) = Taken By, (c) = Cosigned By      Initials Name Provider Type    AR Debbie Espinosa OT Occupational Therapist    Caro Draper, PT Physical Therapist                    Occupational Therapy Education       Title: PT OT SLP Therapies (In Progress)       Topic: Occupational Therapy (Done)       Point: ADL training (Done)       Learning Progress Summary            Patient Eager, E,TB,D,H, NL,VU by AR at 7/1/2025 1225   Caregiver Eager, E,TB,D,H, NL,VU by AR at 7/1/2025 1225                      Point: Home exercise program (Done)       Learning Progress Summary            Patient Eager, E,TB,D,H, NL,VU by AR at 7/1/2025 1225   Caregiver Eager, E,TB,D,H, NL,VU by AR at 7/1/2025 1225                      Point: Precautions (Done)       Learning Progress Summary            Patient Eager, E,TB,D,H, NL,VU by AR at 7/1/2025 1225   Caregiver Eager, E,TB,D,H, NL,VU by AR at 7/1/2025 1225                      Point: Body mechanics (Done)       Learning Progress Summary            Patient Eager, E,TB,D,H, NL,VU by AR at 7/1/2025 1225   Caregiver Eager, E,TB,D,H, NL,VU by AR at 7/1/2025 1225                                      User Key       Initials Effective Dates Name Provider Type Discipline    AR 07/11/23 -  Debbie Espinosa OT Occupational Therapist OT                  OT Recommendation and Plan  Planned Therapy Interventions (OT): adaptive equipment training, BADL retraining, edema control/reduction, IADL retraining, occupation/activity based interventions, orthotic fabrication/fitting/training, passive ROM/stretching, patient/caregiver education/training, ROM/therapeutic exercise, transfer/mobility retraining  Therapy Frequency (OT): daily  Plan of Care Review  Plan of Care Reviewed With: patient, caregiver  Outcome Evaluation: OT educated pt on shoulder precautions, ADL retraining to maintain,  sling management and HEP, will edcuated caregiver when pt is more appropriate to demo sling and HEP. He is limited with NWB/immobilization RUE (dominant) arm, confusion, poor safety awareness with poor attention to teaching, impaired balance and desaturaiton on RA to 88%. Pt is performing significantly below baseline, recommend SNF.     Time Calculation:   Evaluation Complexity (OT)  Review Occupational Profile/Medical/Therapy History Complexity: brief/low complexity  Assessment, Occupational Performance/Identification of Deficit Complexity: 1-3 performance deficits  Clinical Decision Making Complexity (OT): problem focused assessment/low complexity  Overall Complexity of Evaluation (OT): low complexity     Time Calculation- OT       Row Name 07/01/25 1225             Time Calculation- OT    OT Start Time 0750  -AR      OT Received On 07/01/25  -AR      OT Goal Re-Cert Due Date 07/11/25  -AR         Timed Charges    15120 - OT Therapeutic Exercise Minutes 14  -AR      24937 - OT Self Care/Mgmt Minutes 12  -AR         Untimed Charges    OT Eval/Re-eval Minutes 47  -AR         Total Minutes    Timed Charges Total Minutes 26  -AR      Untimed Charges Total Minutes 47  -AR       Total Minutes 73  -AR                User Key  (r) = Recorded By, (t) = Taken By, (c) = Cosigned By      Initials Name Provider Type    AR Debbie Espinosa OT Occupational Therapist                  Therapy Charges for Today       Code Description Service Date Service Provider Modifiers Qty    73123747478 HC OT SELF CARE/MGMT/TRAIN EA 15 MIN 7/1/2025 Debbie Espinosa OT GO 1    90481737607 HC OT THER PROC EA 15 MIN 7/1/2025 Debbie Espinosa OT GO 1    99995856510 HC OT EVAL LOW COMPLEXITY 4 7/1/2025 Debbie Espinosa OT GO 1                 Debbie Espinosa OT  7/1/2025    Electronically signed by Debbie Espinosa OT at 07/01/25 1226

## 2025-07-01 NOTE — PROGRESS NOTES
"   LOS: 0 days    Patient Care Team:  Blair Dhaliwal MD as PCP - General (Internal Medicine)  Blair Packer MD as Consulting Physician (Cardiology)  Michelle Aponte MD (Nephrology)    Reason For Visit:    Subjective     Plan for HD in AM.     Objective     Vital Signs:  Blood pressure 113/78, pulse 82, temperature 98.6 °F (37 °C), temperature source Oral, resp. rate 18, height 165.1 cm (65\"), weight 57.2 kg (126 lb), SpO2 96%.    Flowsheet Rows      Flowsheet Row First Filed Value   Admission Height 165.1 cm (65\") Documented at 06/30/2025 0801   Admission Weight 57.2 kg (126 lb) Documented at 06/30/2025 0801            06/30 0701 - 07/01 0700  In: 950 [I.V.:300]  Out: 740     Physical Exam:    GENERAL: WD elderly WM NAD  NEURO: Awake and alert,   + Dementia  PSYCHIATRIC: NMA. Cooperative with PE  EYE: PE, no icterus, no conjunctivitis  ENT: ommm, dentition intact,  Hearing intact  NECK:  No JVD discernable,  Trachea midline  CV: No edema, RRR  LUNGS:  Quiet,  Nonlabored resp.  Symmetrical expansion  ABDOMEN: Nondistended, soft nontender.  : No [place], no palp bladder  SKIN: Warm and dry without rash  RUE aVF  + Thrill       Labs:    Results from last 7 days   Lab Units 06/30/25  0902 06/30/25  0841 06/25/25  1547   WBC 10*3/mm3 6.51  --  7.10   HEMOGLOBIN g/dL 12.0*  --  12.4*   HEMOGLOBIN, POC g/dL  --  12.6  --    PLATELETS 10*3/mm3 192  --  188     Results from last 7 days   Lab Units 06/30/25  0836 06/25/25  1547   SODIUM mmol/L 140 140   POTASSIUM mmol/L 4.2 3.8   CHLORIDE mmol/L 97* 97*   CO2 mmol/L 27.6 26.7   BUN mg/dL 30.5* 9.4   CREATININE mg/dL 6.74* 3.30*   CALCIUM mg/dL 9.8* 9.0   ALBUMIN g/dL 3.8  --      Results from last 7 days   Lab Units 06/30/25  0836   GLUCOSE mg/dL 73     Results from last 7 days   Lab Units 06/30/25  0836   ALK PHOS U/L 97   BILIRUBIN mg/dL 0.3   ALT (SGPT) U/L 11   AST (SGOT) U/L 19     Results from last 7 days   Lab Units 06/30/25  0841   PH, ARTERIAL pH " units 7.39           A/P:      ESRD: On HD MWF with [facility] unit.  Patient here for scheduled shoulder surgery.  - Continue outpatient HD schedule     HTN: Blood pressure initially quite elevated on admission.  Low side postsurgery.  - With HD.  - Support with albumin as indicated.     Electrolytes: Sodium and potassium are stable.     Acid-base: Bicarb has been stable.     Anemia: Hemoglobin above goal of 10-11.    Transfuse as indicated for Hgb <7.  -No JENNYFER at this time.  Can restart once Hgb <11     Volume: Stable.    -UF with HD as tolerated..  .  Post shoulder surgery 6/30/2025.     Thank you consulting us on Enrike Tomas who is of high risk and complexity.  We will follow along closely          Pancho Carpenter MD  07/01/25  16:03 EDT

## 2025-07-01 NOTE — PROGRESS NOTES
"IM progress note      Enrike Tomas  [id number]  1935     LOS: 0 days     Attending: Matteo Tan MD    Primary Care Provider: Blair Dhaliwal MD      Chief Complaint/Reason for visit:  No chief complaint on file.      Subjective    Patient is disoriented. Caregiver at bedside reports he did not sleep well and confusion worsens when he hasn't slept. Patient seems to deny pain.     Spoke with wife later at bedside who is concerned about lack of sleep. She reports high dose Melatonin was ineffective. Klonopin helped him sleep but worsened confusion.     Objective        Visit Vitals  /78 (BP Location: Left arm, Patient Position: Lying)   Pulse 87   Temp 98.7 °F (37.1 °C) (Oral)   Resp 18   Ht 165.1 cm (65\")   Wt 57.2 kg (126 lb)   SpO2 (!) 89%   BMI 20.97 kg/m²     Temp (24hrs), Av.7 °F (36.5 °C), Min:97 °F (36.1 °C), Max:99.1 °F (37.3 °C)      Intake/Output:    Intake/Output Summary (Last 24 hours) at 2025 0956  Last data filed at 2025 0844  Gross per 24 hour   Intake 1150 ml   Output 740 ml   Net 410 ml        Physical Therapy:PT initial eval completed. Pt presents below his functional baseline with increased confusion, NWB/immobilization of RUE, impaired balance, and decreased endurance. Pt ambulated 18' with mod Ax2. Consistent cues required for attention to task. Further IPPT is warrented. PT rec d/c to SNF when medically appropriate.     Anticipated Discharge Disposition (PT): skilled nursing facility    Physical Exam:     General Appearance:    Alert, cooperative, in no acute distress   Head:    Normocephalic, without obvious abnormality, atraumatic    Lungs:     Normal effort, symmetric chest rise,  clear to      auscultation bilaterally              Heart:    Regular rhythm and normal rate, normal S1 and S2    Abdomen:     Normal bowel sounds, no masses, no organomegaly, soft        nontender, nondistended, no guarding, no rebound                tenderness   Extremities:    " RUE in a sling, CDI  dressing . Interscalene nerve block cath present.  Distal pulses, cap refill, movements of fingers, wrist, intact.  No clubbing, cyanosis or edema.  No deformities.    Pulses:   Pulses palpable and equal bilaterally   Skin:   No bleeding, bruising or rash          Results Review:     I reviewed the patient's new clinical results.   Results from last 7 days   Lab Units 06/30/25  0902 06/30/25  0841 06/25/25  1547   WBC 10*3/mm3 6.51  --  7.10   HEMOGLOBIN g/dL 12.0*  --  12.4*   HEMOGLOBIN, POC g/dL  --  12.6  --    HEMATOCRIT % 37.2*  --  37.8   HEMATOCRIT POC %  --  37*  --    PLATELETS 10*3/mm3 192  --  188     Results from last 7 days   Lab Units 06/30/25  0836 06/25/25  1547   SODIUM mmol/L 140 140   POTASSIUM mmol/L 4.2 3.8   CHLORIDE mmol/L 97* 97*   CO2 mmol/L 27.6 26.7   BUN mg/dL 30.5* 9.4   CREATININE mg/dL 6.74* 3.30*   CALCIUM mg/dL 9.8* 9.0   BILIRUBIN mg/dL 0.3  --    ALK PHOS U/L 97  --    ALT (SGPT) U/L 11  --    AST (SGOT) U/L 19  --    GLUCOSE mg/dL 73 101*     I reviewed the patient's new imaging including images and reports.    All medications reviewed.   amiodarone, 100 mg, Oral, Q24H  atorvastatin, 20 mg, Oral, Nightly  donepezil, 5 mg, Oral, Nightly  levothyroxine, 137 mcg, Oral, Q AM  memantine, 10 mg, Oral, Q12H  pantoprazole, 40 mg, Oral, Q AM  senna-docusate sodium, 1 tablet, Oral, Nightly  sertraline, 100 mg, Oral, Daily  tamsulosin, 0.4 mg, Oral, Daily      acetaminophen, 650 mg, Q4H PRN   Or  acetaminophen, 650 mg, Q4H PRN  albumin human, 12.5 g, PRN  HYDROmorphone, 0.5 mg, Q2H PRN   And  naloxone, 0.1 mg, Q5 Min PRN  losartan, 25 mg, Daily PRN  oxyCODONE, 5 mg, Q4H PRN        Assessment & Plan       S/P shoulder hemiarthroplasty, right    (HFpEF) heart failure with preserved ejection fraction    Benign prostatic hyperplasia    Depression    ESRD (end stage renal disease)    Essential hypertension    GERD (gastroesophageal reflux disease)    Chronic systolic heart  failure         Plan  1. PT/OT  2. Pain control-prns   3. IS-encouraged  4. DVT proph- Eliquis   5. Bowel regimen  6. Monitor post-op labs  7. Discharge planning- SNF recommended       -ESRD  On HD MWF. RUE fistula  Takes midodrine on HD days  NAL following for HD management         -HTN with intermittent hypotension  Hx labile BP with episodes of hypotension  On low dose losartan for HFpEF but only takes PRN per chart review  Takes midodrine on HD days and intermittently throughout week (checks BP twice daily)     -Paroxysmal A fib, HFpEF   Chronic, stable. Follows with cardiology  - On amiodarone and Eliquis- resume Eliquis          -GERD:  Resume PPI.  Formulary substitution when indicated.     -Hypothyroidism  Resume Synthroid     -Depression:  Resume home regimen including Sertraline     -BPH  Resume tamsulosin     -Major neurocognitive disorder, Memory loss  Follows with neurology, first seen 3/2025  Thought multifactorial 2/2 opioid use, ESRD, depression, possible vascular disease. Per neuro notes some c/f Alzheimer's  Resume Namenda and Aricept     Insomnia  - records reviewed, Melatonin ineffective, Seroquel led to excessive daytime sleepiness, Klonopin effective for sleep but worsened confusion   -Trial of Trazodone     Shanice Reed, MERCEDEZ  07/01/25  09:56 EDT

## 2025-07-01 NOTE — THERAPY EVALUATION
Patient Name: Enrike Tomas  : 1935    MRN: [id number]                              Today's Date: 2025       Admit Date: 2025    Visit Dx: No diagnosis found.  Patient Active Problem List   Diagnosis    (HFpEF) heart failure with preserved ejection fraction    Anemia of renal disease    At high risk for falls    Benign prostatic hyperplasia    Closed fracture of left acetabulum    Compression fracture of thoracic vertebra    Deficiency of other specified B group vitamins    Depression    ESRD (end stage renal disease)    Essential hypertension    GERD (gastroesophageal reflux disease)    Hepatic cyst    Long term (current) use of anticoagulants    Major depressive disorder, single episode, moderate    Mixed hyperlipidemia    Morgagni hernia    Occlusion of right vertebral artery    NERY (obstructive sleep apnea)    Fall    Severe malnutrition    Sustained SVT    Paroxysmal atrial fibrillation    Chronic heart failure with preserved ejection fraction (HFpEF)    S/P shoulder hemiarthroplasty, left    Anemia, acute on chronic    Postoperative confusion    Hyperkalemia    Chronic systolic heart failure    ESRD needing dialysis    S/P shoulder hemiarthroplasty, right     Past Medical History:   Diagnosis Date    A-fib     Abnormal ECG     occasional extra heartbeat    Anemia, acute on chronic 2023    Anesthesia complication     hallucinations upon waking, violent upon waking per pt report    Arthritis     Atrial fibrillation     BPH (benign prostatic hyperplasia)     Cervical compression fracture 2023    Dementia     Diverticulosis     Diverticulum of esophagus     puree diet and speech therapy    Does use hearing aid     Elevated cholesterol     ESRD on hemodialysis     GERD (gastroesophageal reflux disease)     Heart failure     Hemodialysis patient     M,W,F    Hyperlipidemia     Hypertension     Low back pain     NERY (obstructive sleep apnea)     NOT USING CPAP, dental appliance     Recurrent falls     UTI (urinary tract infection)      Past Surgical History:   Procedure Laterality Date    CATARACT EXTRACTION      COLONOSCOPY      DIALYSIS FISTULA CREATION      x2    ENDOSCOPY      INGUINAL HERNIA REPAIR      PROSTATE BIOPSY      THROAT SURGERY      diverticulum in throat    TONSILLECTOMY AND ADENOIDECTOMY      TOTAL SHOULDER ARTHROPLASTY W/ DISTAL CLAVICLE EXCISION Left 09/25/2023    Procedure: HEMIARTHROPLASTY, BICEPS TENODESIS - LEFT;  Surgeon: Matteo Tan MD;  Location: [facility];  Service: Orthopedics;  Laterality: Left;      General Information       Row Name 07/01/25 0847          Physical Therapy Time and Intention    Document Type evaluation  -AB     Mode of Treatment physical therapy  -AB       Row Name 07/01/25 0847          General Information    Patient Profile Reviewed yes  -AB     Prior Level of Function min assist:;all household mobility;gait;transfer;bed mobility;mod assist:;ADL's;dependent:;home management;driving  Caregiver present to provide hx. Pt requires assist w/ all mobility and ADL's. Using RW for household gait and w/c for community distances.  -AB     Existing Precautions/Restrictions fall;other (see comments);right;non-weight bearing;shoulder   RUE NWB in sling, IS nerve cath, dementia  -AB     Barriers to Rehab medically complex;previous functional deficit;cognitive status  -AB       Row Name 07/01/25 0847          Living Environment    Current Living Arrangements home  -AB     People in Home spouse;child(vickie), adult;other (see comments)  caregiver  -AB       Row Name 07/01/25 0847          Home Main Entrance    Number of Stairs, Main Entrance none;other (see comments)  ramp  -AB       Row Name 07/01/25 0847          Stairs Within Home, Primary    Stairs, Within Home, Primary stair lift to bedroom  -AB       Row Name 07/01/25 0847          Cognition    Orientation Status (Cognition) oriented to;person;disoriented to;place;situation;time  -AB       Row  Name 07/01/25 0847          Safety Issues/Impairments Affecting Functional Mobility    Safety Issues Affecting Function (Mobility) awareness of need for assistance;insight into deficits/self-awareness;safety precaution awareness;safety precautions follow-through/compliance;sequencing abilities;problem-solving;judgment;ability to follow commands;impulsivity  -AB     Impairments Affecting Function (Mobility) balance;cognition;endurance/activity tolerance;coordination;pain;strength;range of motion (ROM)  -AB     Cognitive Impairments, Mobility Safety/Performance attention;awareness, need for assistance;insight into deficits/self-awareness;impulsivity;judgment;problem-solving/reasoning;sequencing abilities;safety precaution follow-through;safety precaution awareness  -AB     Comment, Safety Issues/Impairments (Mobility) Pt w/ increased confusion and was very verbose this session. Consistent cues to remain on task.  -AB               User Key  (r) = Recorded By, (t) = Taken By, (c) = Cosigned By      Initials Name Provider Type    AB Caro Youngblood, PT Physical Therapist                   Mobility       Row Name 07/01/25 0852          Bed Mobility    Bed Mobility supine-sit;scooting/bridging  -AB     Scooting/Bridging [place] (Bed Mobility) maximum assist (25% patient effort);2 person assist;verbal cues  -AB     Supine-Sit [place] (Bed Mobility) maximum assist (25% patient effort);2 person assist;verbal cues  -AB     Assistive Device (Bed Mobility) head of bed elevated  -AB     Comment, (Bed Mobility) Pt required assist at BLE and trunk to sit EOB. Max cues to maintain NWB of RUE.  -AB       Row Name 07/01/25 0852          Transfers    Comment, (Transfers) Cues for hand placement, sequencing, and attention to task. Pt was very verbose this session. Consistent cues to remain on task.  -AB       Row Name 07/01/25 0852          Sit-Stand Transfer    Sit-Stand [place] (Transfers) moderate assist (50% patient  effort);2 person assist;verbal cues;nonverbal cues (demo/gesture)  -AB     Assistive Device (Sit-Stand Transfers) other (see comments)  UE support  -AB     Comment, (Sit-Stand Transfer) posterior lean  -AB       Row Name 07/01/25 0852          Gait/Stairs (Locomotion)    [place] Level (Gait) moderate assist (50% patient effort);2 person assist;verbal cues;nonverbal cues (demo/gesture)  -AB     Assistive Device (Gait) other (see comments)  UE support  -AB     Patient was able to Ambulate yes  -AB     Distance in Feet (Gait) 18  -AB     Deviations/Abnormal Patterns (Gait) bilateral deviations;amberly decreased;gait speed decreased;stride length decreased  -AB     Bilateral Gait Deviations forward flexed posture;heel strike decreased  -AB     [place] Level (Stairs) not tested  -AB     Comment, (Gait/Stairs) Pt ambulated with short/shuffling steps and forward flexed posture. Mod A provided secondary to instability and posterior lean. Further activity limited by weakness and cognitive deficits.  -AB       Row Name 07/01/25 0852          Mobility    Extremity Weight-bearing Status right upper extremity  -AB     Right Upper Extremity (Weight-bearing Status) non weight-bearing (NWB)   -AB               User Key  (r) = Recorded By, (t) = Taken By, (c) = Cosigned By      Initials Name Provider Type    AB Caro Youngblood, PT Physical Therapist                   Obj/Interventions       Row Name 07/01/25 0900          Range of Motion Comprehensive    General Range of Motion bilateral lower extremity ROM WFL  -AB       Row Name 07/01/25 0900          Strength Comprehensive (MMT)    General Manual Muscle Testing (MMT) Assessment lower extremity strength deficits identified  -AB     Comment, General Manual Muscle Testing (MMT) Assessment BLE grossly 3/5  -AB       Row Name 07/01/25 0900          Balance    Balance Assessment sitting static balance;sitting dynamic balance;standing static balance;standing dynamic balance   -AB     Static Sitting Balance contact guard  -AB     Dynamic Sitting Balance contact guard  -AB     Position, Sitting Balance unsupported;sitting edge of bed  -AB     Static Standing Balance minimal assist  -AB     Dynamic Standing Balance moderate assist;2-person assist;verbal cues;non-verbal cues (demo/gesture)  -AB     Position/Device Used, Standing Balance supported  -AB     Balance Interventions sitting;standing;sit to stand;supported;dynamic;static;occupation based/functional task  -AB     Comment, Balance unsteady w/o overt LOB.  -AB       Row Name 07/01/25 0900          Sensory Assessment (Somatosensory)    Sensory Assessment (Somatosensory) LE sensation intact  -AB               User Key  (r) = Recorded By, (t) = Taken By, (c) = Cosigned By      Initials Name Provider Type    AB Caro Youngblood, PT Physical Therapist                   Goals/Plan       Row Name 07/01/25 0905          Bed Mobility Goal 1 (PT)    Activity/Assistive Device (Bed Mobility Goal 1, PT) sit to supine;supine to sit  -AB     [place] Level/Cues Needed (Bed Mobility Goal 1, PT) minimum assist (75% or more patient effort)  -AB     Time Frame (Bed Mobility Goal 1, PT) short term goal (STG);5 days  -AB       Row Name 07/01/25 0905          Transfer Goal 1 (PT)    Activity/Assistive Device (Transfer Goal 1, PT) sit-to-stand/stand-to-sit;bed-to-chair/chair-to-bed  -AB     [place] Level/Cues Needed (Transfer Goal 1, PT) minimum assist (75% or more patient effort)  -AB     Time Frame (Transfer Goal 1, PT) long term goal (LTG);10 days  -AB       Row Name 07/01/25 0905          Gait Training Goal 1 (PT)    Activity/Assistive Device (Gait Training Goal 1, PT) gait (walking locomotion)  -AB     [place] Level (Gait Training Goal 1, PT) minimum assist (75% or more patient effort)  -AB     Distance (Gait Training Goal 1, PT) 100  -AB     Time Frame (Gait Training Goal 1, PT) long term goal (LTG);10 days  -AB       Row Name 07/01/25  0905          Therapy Assessment/Plan (PT)    Planned Therapy Interventions (PT) balance training;bed mobility training;gait training;home exercise program;patient/family education;postural re-education;transfer training;stretching;strengthening;ROM (range of motion)  -AB               User Key  (r) = Recorded By, (t) = Taken By, (c) = Cosigned By      Initials Name Provider Type    AB Caro Youngblood, PT Physical Therapist                   Clinical Impression       Row Name 07/01/25 0901          Pain    Additional Documentation Pain Scale: FACES Pre/Post-Treatment (Group)  -AB       Row Name 07/01/25 0901          Pain Scale: FACES Pre/Post-Treatment    Pain: FACES Scale, Pretreatment 0-->no hurt  -AB     Posttreatment Pain Rating 0-->no hurt  -AB       Row Name 07/01/25 0901          Plan of Care Review    Plan of Care Reviewed With patient;caregiver  -AB     Progress no change  -AB     Outcome Evaluation PT initial eval completed. Pt presents below his functional baseline with increased confusion, NWB/immobilization of RUE, impaired balance, and decreased endurance. Pt ambulated 18' with mod Ax2. Consistent cues required for attention to task. Further IPPT is warrented. PT rec d/c to SNF when medically appropriate.  -AB       Row Name 07/01/25 0901          Therapy Assessment/Plan (PT)    Rehab Potential (PT) good  -AB     Criteria for Skilled Interventions Met (PT) yes;meets criteria;skilled treatment is necessary  -AB     Therapy Frequency (PT) daily  -AB     Predicted Duration of Therapy Intervention (PT) 10 days  -AB       Row Name 07/01/25 0901          Vital Signs    Pre Systolic BP Rehab 114  -AB     Pre Treatment Diastolic BP 78  -AB     Pre SpO2 (%) 91  -AB     O2 Delivery Pre Treatment nasal cannula  -AB     Intra SpO2 (%) 89   -AB     O2 Delivery Intra Treatment room air  -AB     Post SpO2 (%) 90  -AB     O2 Delivery Post Treatment nasal cannula  -AB     Pre Patient Position Supine  -AB     Intra  Patient Position Standing  -AB     Post Patient Position Sitting  -AB       Row Name 07/01/25 0901          Positioning and Restraints    Pre-Treatment Position in bed  -AB     Post Treatment Position chair  -AB     In Chair notified nsg;reclined;sitting;call light within reach;encouraged to call for assist;exit alarm on;legs elevated;waffle cushion;with OT  -AB               User Key  (r) = Recorded By, (t) = Taken By, (c) = Cosigned By      Initials Name Provider Type    Caro Draper PT Physical Therapist                   Outcome Measures       Row Name 07/01/25 0906          How much help from another person do you currently need...    Turning from your back to your side while in flat bed without using bedrails? 2  -AB     Moving from lying on back to sitting on the side of a flat bed without bedrails? 2  -AB     Moving to and from a bed to a chair (including a wheelchair)? 2  -AB     Standing up from a chair using your arms (e.g., wheelchair, bedside chair)? 2  -AB     Climbing 3-5 steps with a railing? 1  -AB     To walk in hospital room? 2  -AB     AM-PAC 6 Clicks Score (PT) 11  -AB     Highest Level of Mobility Goal Move to Chair/Commode-4  -AB       Row Name 07/01/25 0906          Functional Assessment    Outcome Measure Options AM-PAC 6 Clicks Basic Mobility (PT)  -AB               User Key  (r) = Recorded By, (t) = Taken By, (c) = Cosigned By      Initials Name Provider Type    Caro Draper PT Physical Therapist                                 Physical Therapy Education       Title: PT OT SLP Therapies (In Progress)       Topic: Physical Therapy (In Progress)       Point: Mobility training (Done)       Learning Progress Summary            Patient Acceptance, E,D, VU,NR by AB at 7/1/2025 0906                      Point: Home exercise program (Not Started)       Learner Progress:  Not documented in this visit.              Point: Body mechanics (Done)       Learning Progress Summary             Patient Acceptance, E,D, VU,NR by AB at 7/1/2025 0906                      Point: Precautions (Done)       Learning Progress Summary            Patient Acceptance, E,D, VU,NR by AB at 7/1/2025 0906                                      User Key       Initials Effective Dates Name Provider Type Discipline    AB 09/22/22 -  Caro Youngblood, PT Physical Therapist PT                  PT Recommendation and Plan  Planned Therapy Interventions (PT): balance training, bed mobility training, gait training, home exercise program, patient/family education, postural re-education, transfer training, stretching, strengthening, ROM (range of motion)  Progress: no change  Outcome Evaluation: PT initial eval completed. Pt presents below his functional baseline with increased confusion, NWB/immobilization of RUE, impaired balance, and decreased endurance. Pt ambulated 18' with mod Ax2. Consistent cues required for attention to task. Further IPPT is warrented. PT rec d/c to SNF when medically appropriate.     Time Calculation:   PT Evaluation Complexity  History, PT Evaluation Complexity: 1-2 personal factors and/or comorbidities  Examination of Body Systems (PT Eval Complexity): total of 3 or more elements  Clinical Presentation (PT Evaluation Complexity): evolving  Clinical Decision Making (PT Evaluation Complexity): moderate complexity  Overall Complexity (PT Evaluation Complexity): moderate complexity     PT Charges       Row Name 07/01/25 0906             Time Calculation    Start Time 0758  -AB      PT Received On 07/01/25  -AB      PT Goal Re-Cert Due Date 07/11/25  -AB         Untimed Charges    PT Eval/Re-eval Minutes 55  -AB         Total Minutes    Untimed Charges Total Minutes 55  -AB       Total Minutes 55  -AB                User Key  (r) = Recorded By, (t) = Taken By, (c) = Cosigned By      Initials Name Provider Type    AB Caro Youngblood, PT Physical Therapist                  Therapy Charges for Today       Code  Description Service Date Service Provider Modifiers Qty    89532848400 HC PT EVAL MOD COMPLEXITY 4 7/1/2025 Caro Youngblood, PT GP 1            PT G-Codes  Outcome Measure Options: AM-PAC 6 Clicks Basic Mobility (PT)  AM-PAC 6 Clicks Score (PT): 11  PT Discharge Summary  Anticipated Discharge Disposition (PT): skilled nursing facility    Caro Youngblood, PT  7/1/2025

## 2025-07-01 NOTE — PLAN OF CARE
Goal Outcome Evaluation:            VSS. Patient has been confused tonight, minimal sleep between nursing care. No complaints of pain. Dressing CDI. Sling has remained in place. Questions and concerns answered.

## 2025-07-01 NOTE — PLAN OF CARE
Goal Outcome Evaluation:  Plan of Care Reviewed With: patient, caregiver           Outcome Evaluation: OT educated pt on shoulder precautions, ADL retraining to maintain, sling management and HEP, will edcuated caregiver when pt is more appropriate to demo sling and HEP. He is limited with NWB/immobilization RUE (dominant) arm, confusion, poor safety awareness with poor attention to teaching, impaired balance and desaturaiton on RA to 88%. Pt is performing significantly below baseline, recommend SNF.    Anticipated Discharge Disposition (OT): skilled nursing facility

## 2025-07-01 NOTE — PROGRESS NOTES
[facility]    Acute pain service Inpatient Progress Note    Patient Name: Enrike Tomas  :  1935  MRN:  [id number]        Acute Pain  Service Inpatient Progress Note:    Analgesia:Excellent  Pain Score:0/10  LOC: alert and awake  Resp Status: room air  Cardiac: VS stable  Side Effects:None  Catheter Site:clean, dressing intact and dry  Cath type: peripheral nerve cath(InfuSystem)  Volume: 1mL,5ml, 5ml InfuSystem Pump.  Dosing/Volume: ropivacaine 0.2%  Catheter Plan:Catheter to remain Insitu and Continue catheter infusion rate unchanged  Comments: The neuro assessment of the operative extremity includes the ability to do finger flexion and extension; includes the ability to do wrist flexion and extension, includes the ability to do elbow flexion and extension.  The neuro exam of the patient includes sensory function throughout the operative extremity.

## 2025-07-01 NOTE — PLAN OF CARE
Goal Outcome Evaluation:  Plan of Care Reviewed With: patient, caregiver        Progress: no change  Outcome Evaluation: PT initial eval completed. Pt presents below his functional baseline with increased confusion, NWB/immobilization of RUE, impaired balance, and decreased endurance. Pt ambulated 18' with mod Ax2. Consistent cues required for attention to task. Further IPPT is warrented. PT rec d/c to SNF when medically appropriate.    Anticipated Discharge Disposition (PT): skilled nursing facility

## 2025-07-02 ENCOUNTER — APPOINTMENT (OUTPATIENT)
Dept: NEPHROLOGY | Facility: HOSPITAL | Age: OVER 89
End: 2025-07-02
Payer: MEDICARE

## 2025-07-02 LAB
ANION GAP SERPL CALCULATED.3IONS-SCNC: 13 MMOL/L (ref 5–15)
BASOPHILS # BLD AUTO: 0.05 10*3/MM3 (ref 0–0.2)
BASOPHILS NFR BLD AUTO: 0.5 % (ref 0–1.5)
BUN SERPL-MCNC: 27.8 MG/DL (ref 8–23)
BUN/CREAT SERPL: 4.7 (ref 7–25)
CALCIUM SPEC-SCNC: 9.4 MG/DL (ref 8.2–9.6)
CHLORIDE SERPL-SCNC: 100 MMOL/L (ref 98–107)
CO2 SERPL-SCNC: 27 MMOL/L (ref 22–29)
CREAT SERPL-MCNC: 5.86 MG/DL (ref 0.76–1.27)
DEPRECATED RDW RBC AUTO: 52.9 FL (ref 37–54)
EGFRCR SERPLBLD CKD-EPI 2021: 8.6 ML/MIN/1.73
EOSINOPHIL # BLD AUTO: 0.12 10*3/MM3 (ref 0–0.4)
EOSINOPHIL NFR BLD AUTO: 1.3 % (ref 0.3–6.2)
ERYTHROCYTE [DISTWIDTH] IN BLOOD BY AUTOMATED COUNT: 14 % (ref 12.3–15.4)
GLUCOSE SERPL-MCNC: 87 MG/DL (ref 65–99)
HCT VFR BLD AUTO: 30.1 % (ref 37.5–51)
HGB BLD-MCNC: 9.9 G/DL (ref 13–17.7)
IMM GRANULOCYTES # BLD AUTO: 0.04 10*3/MM3 (ref 0–0.05)
IMM GRANULOCYTES NFR BLD AUTO: 0.4 % (ref 0–0.5)
LYMPHOCYTES # BLD AUTO: 0.83 10*3/MM3 (ref 0.7–3.1)
LYMPHOCYTES NFR BLD AUTO: 8.7 % (ref 19.6–45.3)
MCH RBC QN AUTO: 33.8 PG (ref 26.6–33)
MCHC RBC AUTO-ENTMCNC: 32.9 G/DL (ref 31.5–35.7)
MCV RBC AUTO: 102.7 FL (ref 79–97)
MONOCYTES # BLD AUTO: 0.66 10*3/MM3 (ref 0.1–0.9)
MONOCYTES NFR BLD AUTO: 7 % (ref 5–12)
NEUTROPHILS NFR BLD AUTO: 7.79 10*3/MM3 (ref 1.7–7)
NEUTROPHILS NFR BLD AUTO: 82.1 % (ref 42.7–76)
NRBC BLD AUTO-RTO: 0 /100 WBC (ref 0–0.2)
PLATELET # BLD AUTO: 183 10*3/MM3 (ref 140–450)
PMV BLD AUTO: 8.8 FL (ref 6–12)
POTASSIUM SERPL-SCNC: 4.9 MMOL/L (ref 3.5–5.2)
RBC # BLD AUTO: 2.93 10*6/MM3 (ref 4.14–5.8)
SODIUM SERPL-SCNC: 140 MMOL/L (ref 136–145)
WBC NRBC COR # BLD AUTO: 9.49 10*3/MM3 (ref 3.4–10.8)

## 2025-07-02 PROCEDURE — A9270 NON-COVERED ITEM OR SERVICE: HCPCS | Performed by: NURSE PRACTITIONER

## 2025-07-02 PROCEDURE — 97110 THERAPEUTIC EXERCISES: CPT | Performed by: OCCUPATIONAL THERAPIST

## 2025-07-02 PROCEDURE — 63710000001 TAMSULOSIN 0.4 MG CAPSULE: Performed by: NURSE PRACTITIONER

## 2025-07-02 PROCEDURE — A9270 NON-COVERED ITEM OR SERVICE: HCPCS | Performed by: ORTHOPAEDIC SURGERY

## 2025-07-02 PROCEDURE — 63710000001 ACETAMINOPHEN 325 MG TABLET: Performed by: ORTHOPAEDIC SURGERY

## 2025-07-02 PROCEDURE — 63710000001 PANTOPRAZOLE 40 MG TABLET DELAYED-RELEASE: Performed by: NURSE PRACTITIONER

## 2025-07-02 PROCEDURE — 63710000001 LEVOTHYROXINE 25 MCG TABLET: Performed by: NURSE PRACTITIONER

## 2025-07-02 PROCEDURE — 97535 SELF CARE MNGMENT TRAINING: CPT | Performed by: OCCUPATIONAL THERAPIST

## 2025-07-02 PROCEDURE — 97116 GAIT TRAINING THERAPY: CPT

## 2025-07-02 PROCEDURE — 63710000001 APIXABAN 2.5 MG TABLET: Performed by: NURSE PRACTITIONER

## 2025-07-02 PROCEDURE — 63710000001 DONEPEZIL 5 MG TABLET: Performed by: NURSE PRACTITIONER

## 2025-07-02 PROCEDURE — 97530 THERAPEUTIC ACTIVITIES: CPT

## 2025-07-02 PROCEDURE — G0257 UNSCHED DIALYSIS ESRD PT HOS: HCPCS

## 2025-07-02 PROCEDURE — 63710000001 MEMANTINE 10 MG TABLET: Performed by: NURSE PRACTITIONER

## 2025-07-02 PROCEDURE — 63710000001 TRAZODONE 50 MG TABLET: Performed by: NURSE PRACTITIONER

## 2025-07-02 PROCEDURE — 63710000001 AMIODARONE 200 MG TABLET: Performed by: NURSE PRACTITIONER

## 2025-07-02 PROCEDURE — 63710000001 SENNOSIDES-DOCUSATE 8.6-50 MG TABLET: Performed by: NURSE PRACTITIONER

## 2025-07-02 PROCEDURE — 63710000001 ATORVASTATIN 20 MG TABLET: Performed by: NURSE PRACTITIONER

## 2025-07-02 PROCEDURE — 80048 BASIC METABOLIC PNL TOTAL CA: CPT | Performed by: ORTHOPAEDIC SURGERY

## 2025-07-02 PROCEDURE — 63710000001 CIPROFLOXACIN 0.3 % SOLUTION 2.5 ML BOTTLE: Performed by: NURSE PRACTITIONER

## 2025-07-02 PROCEDURE — 63710000001 LEVOTHYROXINE 112 MCG TABLET: Performed by: NURSE PRACTITIONER

## 2025-07-02 PROCEDURE — 85025 COMPLETE CBC W/AUTO DIFF WBC: CPT | Performed by: ORTHOPAEDIC SURGERY

## 2025-07-02 PROCEDURE — 63710000001 SERTRALINE 100 MG TABLET: Performed by: NURSE PRACTITIONER

## 2025-07-02 RX ORDER — METOPROLOL TARTRATE 1 MG/ML
5 INJECTION, SOLUTION INTRAVENOUS EVERY 6 HOURS PRN
Status: DISCONTINUED | OUTPATIENT
Start: 2025-07-02 | End: 2025-07-04 | Stop reason: HOSPADM

## 2025-07-02 RX ORDER — CIPROFLOXACIN HYDROCHLORIDE 3.5 MG/ML
2 SOLUTION/ DROPS TOPICAL
Status: DISCONTINUED | OUTPATIENT
Start: 2025-07-02 | End: 2025-07-04 | Stop reason: HOSPADM

## 2025-07-02 RX ADMIN — PANTOPRAZOLE SODIUM 40 MG: 40 TABLET, DELAYED RELEASE ORAL at 05:36

## 2025-07-02 RX ADMIN — LEVOTHYROXINE SODIUM 137 MCG: 0.03 TABLET ORAL at 05:36

## 2025-07-02 RX ADMIN — MEMANTINE HYDROCHLORIDE 10 MG: 10 TABLET, FILM COATED ORAL at 21:55

## 2025-07-02 RX ADMIN — CIPROFLOXACIN 2 DROP: 3 SOLUTION OPHTHALMIC at 21:56

## 2025-07-02 RX ADMIN — ATORVASTATIN CALCIUM 20 MG: 20 TABLET, FILM COATED ORAL at 21:55

## 2025-07-02 RX ADMIN — TRAZODONE HYDROCHLORIDE 50 MG: 50 TABLET ORAL at 21:55

## 2025-07-02 RX ADMIN — SENNOSIDES AND DOCUSATE SODIUM 1 TABLET: 50; 8.6 TABLET ORAL at 21:57

## 2025-07-02 RX ADMIN — ACETAMINOPHEN 650 MG: 325 TABLET ORAL at 09:47

## 2025-07-02 RX ADMIN — CIPROFLOXACIN 2 DROP: 3 SOLUTION OPHTHALMIC at 17:37

## 2025-07-02 RX ADMIN — DONEPEZIL HYDROCHLORIDE 5 MG: 5 TABLET ORAL at 21:55

## 2025-07-02 RX ADMIN — APIXABAN 2.5 MG: 2.5 TABLET, FILM COATED ORAL at 21:56

## 2025-07-02 NOTE — PROGRESS NOTES
"IM progress note      Enrike Tomas  [id number]  1935     LOS: 0 days     Attending: Matteo Tan MD    Primary Care Provider: Blair Dhaliwal MD      Chief Complaint/Reason for visit:  No chief complaint on file.      Subjective   Doing ok. Adequate pain control. Some confusion. HD this AM. Denies f/c/n/v/sob/cp.    Objective        Visit Vitals  /50 (BP Location: Left arm, Patient Position: Lying)   Pulse 75   Temp 98.6 °F (37 °C) (Oral)   Resp 16   Ht 165.1 cm (65\")   Wt 57.2 kg (126 lb)   SpO2 96%   BMI 20.97 kg/m²     Temp (24hrs), Av.4 °F (36.9 °C), Min:97.8 °F (36.6 °C), Max:99 °F (37.2 °C)    Physical Therapy: (25) PT initial eval completed. Pt presents below his functional baseline with increased confusion, NWB/immobilization of RUE, impaired balance, and decreased endurance. Pt ambulated 18' with mod Ax2. Consistent cues required for attention to task. Further IPPT is warrented. PT rec d/c to SNF when medically appropriate.     Physical Exam:     General Appearance:    Alert, cooperative, in no acute distress   Head:    Normocephalic, without obvious abnormality, atraumatic    Lungs:     Normal effort, symmetric chest rise,  clear to      auscultation bilaterally              Heart:    Regular rhythm and normal rate, normal S1 and S2    Abdomen:     Normal bowel sounds, no masses, no organomegaly, soft        nontender, nondistended, no guarding, no rebound                tenderness   Extremities:    RUE in a sling, CDI  dressing . Interscalene nerve block cath present.  Distal pulses, cap refill, movements of fingers, wrist, intact.  No clubbing, cyanosis or edema.  No deformities.    Pulses:   Pulses palpable and equal bilaterally   Skin:   No bleeding, bruising or rash          Results Review:     I reviewed the patient's new clinical results.   Results from last 7 days   Lab Units 25  0755 25  0902 25  0841 25  1547   WBC 10*3/mm3 9.49 6.51  --  " 7.10   HEMOGLOBIN g/dL 9.9* 12.0*  --  12.4*   HEMOGLOBIN, POC g/dL  --   --  12.6  --    HEMATOCRIT % 30.1* 37.2*  --  37.8   HEMATOCRIT POC %  --   --  37*  --    PLATELETS 10*3/mm3 183 192  --  188     Results from last 7 days   Lab Units 07/02/25  0755 06/30/25  0836 06/25/25  1547   SODIUM mmol/L 140 140 140   POTASSIUM mmol/L 4.9 4.2 3.8   CHLORIDE mmol/L 100 97* 97*   CO2 mmol/L 27.0 27.6 26.7   BUN mg/dL 27.8* 30.5* 9.4   CREATININE mg/dL 5.86* 6.74* 3.30*   CALCIUM mg/dL 9.4 9.8* 9.0   BILIRUBIN mg/dL  --  0.3  --    ALK PHOS U/L  --  97  --    ALT (SGPT) U/L  --  11  --    AST (SGOT) U/L  --  19  --    GLUCOSE mg/dL 87 73 101*     I reviewed the patient's new imaging including images and reports.    All medications reviewed.   amiodarone, 100 mg, Oral, Q24H  apixaban, 2.5 mg, Oral, Q12H  atorvastatin, 20 mg, Oral, Nightly  donepezil, 5 mg, Oral, Nightly  levothyroxine, 137 mcg, Oral, Q AM  memantine, 10 mg, Oral, Q12H  pantoprazole, 40 mg, Oral, Q AM  senna-docusate sodium, 1 tablet, Oral, Nightly  sertraline, 100 mg, Oral, Daily  tamsulosin, 0.4 mg, Oral, Daily  traZODone, 50 mg, Oral, Nightly      acetaminophen, 650 mg, Q4H PRN   Or  acetaminophen, 650 mg, Q4H PRN  albumin human, 12.5 g, PRN  HYDROmorphone, 0.5 mg, Q2H PRN   And  naloxone, 0.1 mg, Q5 Min PRN  losartan, 25 mg, Daily PRN  metoprolol tartrate, 5 mg, Q6H PRN  oxyCODONE, 5 mg, Q4H PRN  Polyvinyl Alcohol-Povidone PF, 1 drop, Q3H PRN        Assessment & Plan       S/P shoulder hemiarthroplasty, right    (HFpEF) heart failure with preserved ejection fraction    Benign prostatic hyperplasia    Depression    ESRD (end stage renal disease)    Essential hypertension    GERD (gastroesophageal reflux disease)    Chronic systolic heart failure       Plan  1. PT/OT- CARISSA prado, VERNAB  2. Pain control-prns, interscalene block  3. IS-encouraged  4. DVT proph- Eliquis   5. Bowel regimen  6. Monitor post-op labs  7. Discharge planning- SNF recommended      -ESRD  On HD MWF. RUE fistula  Takes midodrine on HD days  NAL following for HD management       -HTN with intermittent hypotension  Hx labile BP with episodes of hypotension  On low dose losartan for HFpEF but only takes PRN per chart review  Takes midodrine on HD days and intermittently throughout week (checks BP twice daily)     -Paroxysmal A fib, HFpEF   Chronic, stable. Follows with cardiology  - On amiodarone and Eliquis- resume Eliquis        -GERD:  Resume PPI.  Formulary substitution when indicated.     -Hypothyroidism  Resume Synthroid     -Depression:  Resume home regimen including Sertraline     -BPH  Resume tamsulosin     -Major neurocognitive disorder, Memory loss  Follows with neurology, first seen 3/2025  Thought multifactorial 2/2 opioid use, ESRD, depression, possible vascular disease. Per neuro notes some c/f Alzheimer's  Resume Namenda and Aricept     Insomnia  - records reviewed, Melatonin ineffective, Seroquel led to excessive daytime sleepiness, Klonopin effective for sleep but worsened confusion   -Trial of Trazodone       Debbie Resendez, MERCEDEZ  07/02/25  13:39 EDT

## 2025-07-02 NOTE — PROGRESS NOTES
"   LOS: 0 days    Patient Care Team:  Blair Dhaliwal MD as PCP - General (Internal Medicine)  Blair Packer MD as Consulting Physician (Cardiology)  Michelle Aponte MD (Nephrology)    Reason For Visit:    Subjective     Seen on HD. Noted afib w RVR HR in 130s ( non sustained). Bp stable. Caty any chest pain and SOB.    Objective     Vital Signs:  Blood pressure 121/73, pulse (!) 137, temperature 97.8 °F (36.6 °C), temperature source Oral, resp. rate 18, height 165.1 cm (65\"), weight 57.2 kg (126 lb), SpO2 98%.    Flowsheet Rows      Flowsheet Row First Filed Value   Admission Height 165.1 cm (65\") Documented at 06/30/2025 0801   Admission Weight 57.2 kg (126 lb) Documented at 06/30/2025 0801            07/01 0701 - 07/02 0700  In: 600 [P.O.:600]  Out: -     Physical Exam:    GENERAL: WD elderly WM NAD  NEURO: Awake and alert,   + Dementia  PSYCHIATRIC: NMA. Cooperative with PE  EYE: PE, no icterus, no conjunctivitis  ENT: ommm, dentition intact,  Hearing intact  NECK:  No JVD discernable,  Trachea midline  CV: No edema, RRR  LUNGS:  Quiet,  Nonlabored resp.  Symmetrical expansion  ABDOMEN: Nondistended, soft nontender.  : No Ferrara, no palp bladder  SKIN: Warm and dry without rash  RUE aVF  + Thrill       Labs:    Results from last 7 days   Lab Units 07/02/25  0755 06/30/25  0902 06/30/25  0841 06/25/25  1547   WBC 10*3/mm3 9.49 6.51  --  7.10   HEMOGLOBIN g/dL 9.9* 12.0*  --  12.4*   HEMOGLOBIN, POC g/dL  --   --  12.6  --    PLATELETS 10*3/mm3 183 192  --  188     Results from last 7 days   Lab Units 07/02/25  0755 06/30/25  0836 06/25/25  1547   SODIUM mmol/L 140 140 140   POTASSIUM mmol/L 4.9 4.2 3.8   CHLORIDE mmol/L 100 97* 97*   CO2 mmol/L 27.0 27.6 26.7   BUN mg/dL 27.8* 30.5* 9.4   CREATININE mg/dL 5.86* 6.74* 3.30*   CALCIUM mg/dL 9.4 9.8* 9.0   ALBUMIN g/dL  --  3.8  --      Results from last 7 days   Lab Units 07/02/25  0755   GLUCOSE mg/dL 87     Results from last 7 days   Lab Units " 06/30/25  0836   ALK PHOS U/L 97   BILIRUBIN mg/dL 0.3   ALT (SGPT) U/L 11   AST (SGOT) U/L 19     Results from last 7 days   Lab Units 06/30/25  0841   PH, ARTERIAL pH units 7.39           A/P:      ESRD: On HD MWF with [facility] unit.  Patient here for scheduled shoulder surgery.  - Continue outpatient HD schedule    Afib w RVR: HR in  during HD treatment. 7/2/25.   On amiodarone. May need metoprolol PRN      HTN: Blood pressure initially quite elevated on admission.  Low side postsurgery.  - With HD.  - Support with albumin as indicated.     Electrolytes: Sodium and potassium are stable.     Acid-base: Bicarb has been stable.     Anemia: Hemoglobin above goal of 10-11.    Transfuse as indicated for Hgb <7.  -No JENNYFER at this time.  Can restart once Hgb <11     Volume: Stable.    -UF with HD as tolerated..  .  Post shoulder surgery 6/30/2025.     Thank you consulting us on Enrike Tomas who is of high risk and complexity.  We will follow along closely          Pancho Carpenter MD  07/02/25  10:42 EDT

## 2025-07-02 NOTE — PLAN OF CARE
Goal Outcome Evaluation:              Outcome Evaluation: Pt resting well throughout the night. 50mg trazodone started. No UOP noted to this time stammp. Turns per turn team. Sling on and nerve block  in place. Will continue with POC.

## 2025-07-02 NOTE — CASE MANAGEMENT/SOCIAL WORK
Continued Stay Note  [facility]     Patient Name: Enrike Tomas  MRN: [id number]  Today's Date: 7/2/2025    Admit Date: 6/30/2025    Plan: Skilled rehab   Discharge Plan       Row Name 07/02/25 1152       Plan    Plan Skilled rehab    Patient/Family in Agreement with Plan yes    Plan Comments Spoke with patient's wife, Cleo, to discuss discharge plan. Patient's plan is skilled rehab. Per Bing, [facility] has no rehab beds at this time. [facility], [facility], and [facility] have offered a bed. Patient's daughter will tour facilities todat prior to making a decision. CM will continue to follow and assist with discharge planning.    Final Discharge Disposition Code 03 - skilled nursing facility (SNF)                   Discharge Codes    No documentation.                       Hi Corrales RN

## 2025-07-02 NOTE — THERAPY TREATMENT NOTE
Patient Name: Enrike Tomas  : 1935    MRN: [id number]                              Today's Date: 2025       Admit Date: 2025    Visit Dx: No diagnosis found.  Patient Active Problem List   Diagnosis    (HFpEF) heart failure with preserved ejection fraction    Anemia of renal disease    At high risk for falls    Benign prostatic hyperplasia    Closed fracture of left acetabulum    Compression fracture of thoracic vertebra    Deficiency of other specified B group vitamins    Depression    ESRD (end stage renal disease)    Essential hypertension    GERD (gastroesophageal reflux disease)    Hepatic cyst    Long term (current) use of anticoagulants    Major depressive disorder, single episode, moderate    Mixed hyperlipidemia    Morgagni hernia    Occlusion of right vertebral artery    NERY (obstructive sleep apnea)    Fall    Severe malnutrition    Sustained SVT    Paroxysmal atrial fibrillation    Chronic heart failure with preserved ejection fraction (HFpEF)    S/P shoulder hemiarthroplasty, left    Anemia, acute on chronic    Postoperative confusion    Hyperkalemia    Chronic systolic heart failure    ESRD needing dialysis    S/P shoulder hemiarthroplasty, right     Past Medical History:   Diagnosis Date    A-fib     Abnormal ECG     occasional extra heartbeat    Anemia, acute on chronic 2023    Anesthesia complication     hallucinations upon waking, violent upon waking per pt report    Arthritis     Atrial fibrillation     BPH (benign prostatic hyperplasia)     Cervical compression fracture 2023    Dementia     Diverticulosis     Diverticulum of esophagus     puree diet and speech therapy    Does use hearing aid     Elevated cholesterol     ESRD on hemodialysis     GERD (gastroesophageal reflux disease)     Heart failure     Hemodialysis patient     M,W,F    Hyperlipidemia     Hypertension     Low back pain     NERY (obstructive sleep apnea)     NOT USING CPAP, dental appliance     Recurrent falls     UTI (urinary tract infection)      Past Surgical History:   Procedure Laterality Date    CATARACT EXTRACTION      COLONOSCOPY      DIALYSIS FISTULA CREATION      x2    ENDOSCOPY      INGUINAL HERNIA REPAIR      PROSTATE BIOPSY      SHOULDER HEMIARTHROPLASTY Right 6/30/2025    Procedure: SHOULDER HEMIARTHROPLASTY, BICEPS TENODESIS RIGHT;  Surgeon: Matteo Tan MD;  Location: [facility] OR;  Service: Orthopedics;  Laterality: Right;    THROAT SURGERY      diverticulum in throat    TONSILLECTOMY AND ADENOIDECTOMY      TOTAL SHOULDER ARTHROPLASTY W/ DISTAL CLAVICLE EXCISION Left 09/25/2023    Procedure: HEMIARTHROPLASTY, BICEPS TENODESIS - LEFT;  Surgeon: Matteo Tan MD;  Location: [facility] OR;  Service: Orthopedics;  Laterality: Left;      General Information       Row Name 07/02/25 1539          Physical Therapy Time and Intention    Document Type therapy note (daily note)  -AB     Mode of Treatment physical therapy  -AB       Row Name 07/02/25 1539          General Information    Patient Profile Reviewed yes  -AB     Existing Precautions/Restrictions fall;other (see comments);right;non-weight bearing;shoulder  interscalene nerve catheter, Donjoy II sling with brace, RUE fistula, dementia  -AB     Barriers to Rehab medically complex;previous functional deficit;cognitive status  -AB       Row Name 07/02/25 1539          Cognition    Orientation Status (Cognition) oriented to;person;disoriented to;place;situation;time  -AB       Row Name 07/02/25 1539          Safety Issues/Impairments Affecting Functional Mobility    Safety Issues Affecting Function (Mobility) awareness of need for assistance;insight into deficits/self-awareness;safety precautions follow-through/compliance;sequencing abilities;safety precaution awareness;problem-solving;judgment  -AB     Impairments Affecting Function (Mobility) balance;cognition;endurance/activity tolerance;coordination;pain;strength;range of motion  (ROM)  -AB     Cognitive Impairments, Mobility Safety/Performance attention;awareness, need for assistance;insight into deficits/self-awareness;judgment;problem-solving/reasoning;sequencing abilities;safety precaution follow-through;safety precaution awareness  -AB               User Key  (r) = Recorded By, (t) = Taken By, (c) = Cosigned By      Initials Name Provider Type    AB Caro Youngblood, PT Physical Therapist                   Mobility       Row Name 07/02/25 1540          Bed Mobility    Comment, (Bed Mobility) received on BSC and left UIC  -AB       Row Name 07/02/25 1540          Transfers    Comment, (Transfers) Cues for hand placement and sequencing. Pt continues to require consistent cues for attention to task.  -AB       Row Name 07/02/25 1540          Sit-Stand Transfer    Sit-Stand Forest (Transfers) moderate assist (50% patient effort);2 person assist;verbal cues;nonverbal cues (demo/gesture)  -AB     Assistive Device (Sit-Stand Transfers) other (see comments)  UE support  -AB     Comment, (Sit-Stand Transfer) strong boost to stand. Pt remained in crouched posture.  -AB       Row Name 07/02/25 1540          Gait/Stairs (Locomotion)    Forest Level (Gait) 2 person assist;verbal cues;nonverbal cues (demo/gesture);maximum assist (25% patient effort)  -AB     Assistive Device (Gait) other (see comments)  UE support  -AB     Patient was able to Ambulate yes  -AB     Distance in Feet (Gait) 4  -AB     Deviations/Abnormal Patterns (Gait) bilateral deviations;amberly decreased;gait speed decreased;stride length decreased  -AB     Bilateral Gait Deviations forward flexed posture;heel strike decreased  -AB     Left Sided Gait Deviations decreased knee extension  -AB     Right Sided Gait Deviations decreased knee extension  -AB     Comment, (Gait/Stairs) Pt took side steps to chair with mod Ax2 and UE support. Pt remained in crouched posture and reported fear of falling. Further activity limited  by weakness and cognitive deficits.  -AB       Row Name 07/02/25 1540          Mobility    Extremity Weight-bearing Status right upper extremity  -AB     Right Upper Extremity (Weight-bearing Status) non weight-bearing (NWB)  -AB               User Key  (r) = Recorded By, (t) = Taken By, (c) = Cosigned By      Initials Name Provider Type    AB Caro Youngblood, PT Physical Therapist                   Obj/Interventions       Row Name 07/02/25 1553          Motor Skills    Therapeutic Exercise knee  -AB       Row Name 07/02/25 1553          Knee (Therapeutic Exercise)    Knee (Therapeutic Exercise) strengthening exercise  -AB     Knee Strengthening (Therapeutic Exercise) bilateral;LAQ (long arc quad);10 repetitions  -AB       Row Name 07/02/25 1553          Balance    Balance Assessment sitting static balance;standing static balance;sitting dynamic balance;standing dynamic balance  -AB     Static Sitting Balance contact guard  -AB     Dynamic Sitting Balance contact guard  -AB     Position, Sitting Balance unsupported;sitting in chair  -AB     Static Standing Balance moderate assist  -AB     Dynamic Standing Balance maximum assist;2-person assist;verbal cues;non-verbal cues (demo/gesture)  -AB     Position/Device Used, Standing Balance supported;walker, front-wheeled  -AB     Balance Interventions sitting;standing;sit to stand;supported;dynamic;static;occupation based/functional task  -AB     Comment, Balance unsteady w/ mobility requiring max A to maintain standing.  -AB               User Key  (r) = Recorded By, (t) = Taken By, (c) = Cosigned By      Initials Name Provider Type    AB Caro Youngblood, PT Physical Therapist                   Goals/Plan    No documentation.                  Clinical Impression       Row Name 07/02/25 1555          Pain    Pain Location extremity  -AB     Pain Side/Orientation right;upper  -AB     Pain Management Interventions activity modification encouraged;exercise or physical  activity utilized;positioning techniques utilized  -AB     Response to Pain Interventions activity participation with tolerable pain  -AB       Row Name 07/02/25 1559          Pain Scale: FACES Pre/Post-Treatment    Pain: FACES Scale, Pretreatment 2-->hurts little bit  -AB     Posttreatment Pain Rating 2-->hurts little bit  -AB       Row Name 07/02/25 1555          Plan of Care Review    Plan of Care Reviewed With patient;spouse  -AB     Progress improving  -AB     Outcome Evaluation Pt with good effort but continues to be limited by cognitive deficits, generalized weakness, impaired balance, and decreased endurance. Pt ambulated 4' with max Ax2 and UE support. Further IPPT is warrented. PT will progress as able per POC.  -AB       Row Name 07/02/25 7847          Vital Signs    Pre Systolic BP Rehab 128  -AB     Pre Treatment Diastolic BP 69  -AB     Post Systolic BP Rehab 104  -AB     Post Treatment Diastolic BP 60  -AB     O2 Delivery Pre Treatment room air  -AB     O2 Delivery Intra Treatment room air  -AB     Post SpO2 (%) 92  -AB     O2 Delivery Post Treatment room air  -AB     Pre Patient Position Sitting  -AB     Intra Patient Position Standing  -AB     Post Patient Position Sitting  -AB       Row Name 07/02/25 1553          Positioning and Restraints    Pre-Treatment Position bedside commode  -AB     Post Treatment Position chair  -AB     In Chair notified nsg;reclined;sitting;call light within reach;encouraged to call for assist;exit alarm on;legs elevated;waffle cushion;with family/caregiver;on mechanical lift sling  -AB               User Key  (r) = Recorded By, (t) = Taken By, (c) = Cosigned By      Initials Name Provider Type    AB Caro Youngblood, PT Physical Therapist                   Outcome Measures       Row Name 07/02/25 1558 07/02/25 1215       How much help from another person do you currently need...    Turning from your back to your side while in flat bed without using bedrails? 2  -AB 2  -KL     Moving from lying on back to sitting on the side of a flat bed without bedrails? 2  -AB 2  -KL    Moving to and from a bed to a chair (including a wheelchair)? 2  -AB 2  -KL    Standing up from a chair using your arms (e.g., wheelchair, bedside chair)? 2  -AB 2  -KL    Climbing 3-5 steps with a railing? 1  -AB 1  -KL    To walk in hospital room? 2  -AB 2  -KL    AM-PAC 6 Clicks Score (PT) 11  -AB 11  -KL    Highest Level of Mobility Goal Move to Chair/Commode-4  -AB Move to Chair/Commode-4  -KL      Row Name 07/02/25 1558          Functional Assessment    Outcome Measure Options AM-PAC 6 Clicks Basic Mobility (PT)  -AB               User Key  (r) = Recorded By, (t) = Taken By, (c) = Cosigned By      Initials Name Provider Type    Leigh Ann Mcintosh, RN Registered Nurse    Caro Draper, PT Physical Therapist                                 Physical Therapy Education       Title: PT OT SLP Therapies (Done)       Topic: Physical Therapy (Done)       Point: Mobility training (Done)       Learning Progress Summary            Patient Acceptance, E,D, VU,NR by AB at 7/2/2025 1559    Acceptance, E,D, VU,NR by AB at 7/1/2025 0906                      Point: Home exercise program (Done)       Learning Progress Summary            Patient Acceptance, E,D, VU,NR by AB at 7/2/2025 1559                      Point: Body mechanics (Done)       Learning Progress Summary            Patient Acceptance, E,D, VU,NR by AB at 7/2/2025 1559    Acceptance, E,D, VU,NR by AB at 7/1/2025 0906                      Point: Precautions (Done)       Learning Progress Summary            Patient Acceptance, E,D, VU,NR by AB at 7/2/2025 1559    Acceptance, E,D, VU,NR by AB at 7/1/2025 0906                                      User Key       Initials Effective Dates Name Provider Type Discipline    AB 09/22/22 -  Caro Youngblood, PT Physical Therapist PT                  PT Recommendation and Plan  Planned Therapy Interventions (PT): balance  training, bed mobility training, gait training, home exercise program, patient/family education, postural re-education, transfer training, stretching, strengthening, ROM (range of motion)  Progress: improving  Outcome Evaluation: Pt with good effort but continues to be limited by cognitive deficits, generalized weakness, impaired balance, and decreased endurance. Pt ambulated 4' with max Ax2 and UE support. Further IPPT is warrented. PT will progress as able per POC.     Time Calculation:   PT Evaluation Complexity  History, PT Evaluation Complexity: 1-2 personal factors and/or comorbidities  Examination of Body Systems (PT Eval Complexity): total of 3 or more elements  Clinical Presentation (PT Evaluation Complexity): evolving  Clinical Decision Making (PT Evaluation Complexity): moderate complexity  Overall Complexity (PT Evaluation Complexity): moderate complexity     PT Charges       Row Name 07/02/25 1559             Time Calculation    Start Time 1448  -AB      PT Received On 07/02/25  -AB         Timed Charges    38479 - Gait Training Minutes  5  -AB      20021 - PT Therapeutic Activity Minutes 20  -AB         Total Minutes    Timed Charges Total Minutes 25  -AB       Total Minutes 25  -AB                User Key  (r) = Recorded By, (t) = Taken By, (c) = Cosigned By      Initials Name Provider Type    AB Caro Youngblood, PT Physical Therapist                  Therapy Charges for Today       Code Description Service Date Service Provider Modifiers Qty    17621950579 HC PT EVAL MOD COMPLEXITY 4 7/1/2025 Caro Youngblood, PT GP 1    16780137454 HC GAIT TRAINING EA 15 MIN 7/2/2025 Caro Youngblood, PT GP 1    44626982360 HC PT THERAPEUTIC ACT EA 15 MIN 7/2/2025 Caro Youngblood, PT GP 1    21079752378 HC PT THER SUPP EA 15 MIN 7/2/2025 Caro Youngblood, PT GP 2            PT G-Codes  Outcome Measure Options: AM-PAC 6 Clicks Basic Mobility (PT)  AM-PAC 6 Clicks Score (PT): 11  AM-PAC 6 Clicks Score (OT): 8  PT  Discharge Summary  Anticipated Discharge Disposition (PT): skilled nursing facility    Caro Youngblood, PT  7/2/2025

## 2025-07-02 NOTE — PLAN OF CARE
Problem: Hemodialysis  Goal: Safe, Effective Therapy Delivery  Outcome: Progressing  Goal: Effective Tissue Perfusion  Outcome: Progressing  Goal: Absence of Infection Signs and Symptoms  Outcome: Progressing   HD completed. Tolerated scheduled 3.5 hrs well for the first 2 hrs, then elevation of heart rate was noted,Dr. Carpenter was called and notified of clients status, Metoprolol-Lopressor 5 mg was prescribed, for increased heart rate above 110 bpm, medication was not administered. Access functioned well. UF goal of 1 liter reached, total net removed was 700 ml, total liters processed was 70.3 liters. Blood returned well with no complications. Report given to bj Batres  primary, RN. Goal Outcome Evaluation:

## 2025-07-02 NOTE — THERAPY TREATMENT NOTE
Patient Name: Enrike Tomas  : 1935    MRN: [id number]                              Today's Date: 2025       Admit Date: 2025    Visit Dx: No diagnosis found.  Patient Active Problem List   Diagnosis    (HFpEF) heart failure with preserved ejection fraction    Anemia of renal disease    At high risk for falls    Benign prostatic hyperplasia    Closed fracture of left acetabulum    Compression fracture of thoracic vertebra    Deficiency of other specified B group vitamins    Depression    ESRD (end stage renal disease)    Essential hypertension    GERD (gastroesophageal reflux disease)    Hepatic cyst    Long term (current) use of anticoagulants    Major depressive disorder, single episode, moderate    Mixed hyperlipidemia    Morgagni hernia    Occlusion of right vertebral artery    NERY (obstructive sleep apnea)    Fall    Severe malnutrition    Sustained SVT    Paroxysmal atrial fibrillation    Chronic heart failure with preserved ejection fraction (HFpEF)    S/P shoulder hemiarthroplasty, left    Anemia, acute on chronic    Postoperative confusion    Hyperkalemia    Chronic systolic heart failure    ESRD needing dialysis    S/P shoulder hemiarthroplasty, right     Past Medical History:   Diagnosis Date    A-fib     Abnormal ECG     occasional extra heartbeat    Anemia, acute on chronic 2023    Anesthesia complication     hallucinations upon waking, violent upon waking per pt report    Arthritis     Atrial fibrillation     BPH (benign prostatic hyperplasia)     Cervical compression fracture 2023    Dementia     Diverticulosis     Diverticulum of esophagus     puree diet and speech therapy    Does use hearing aid     Elevated cholesterol     ESRD on hemodialysis     GERD (gastroesophageal reflux disease)     Heart failure     Hemodialysis patient     M,W,F    Hyperlipidemia     Hypertension     Low back pain     NERY (obstructive sleep apnea)     NOT USING CPAP, dental appliance     Recurrent falls     UTI (urinary tract infection)      Past Surgical History:   Procedure Laterality Date    CATARACT EXTRACTION      COLONOSCOPY      DIALYSIS FISTULA CREATION      x2    ENDOSCOPY      INGUINAL HERNIA REPAIR      PROSTATE BIOPSY      SHOULDER HEMIARTHROPLASTY Right 6/30/2025    Procedure: SHOULDER HEMIARTHROPLASTY, BICEPS TENODESIS RIGHT;  Surgeon: Matteo Tan MD;  Location: [facility] OR;  Service: Orthopedics;  Laterality: Right;    THROAT SURGERY      diverticulum in throat    TONSILLECTOMY AND ADENOIDECTOMY      TOTAL SHOULDER ARTHROPLASTY W/ DISTAL CLAVICLE EXCISION Left 09/25/2023    Procedure: HEMIARTHROPLASTY, BICEPS TENODESIS - LEFT;  Surgeon: Matteo Tan MD;  Location: [facility] OR;  Service: Orthopedics;  Laterality: Left;      General Information       Row Name 07/02/25 1729          OT Time and Intention    Document Type therapy note (daily note)  -AR     Mode of Treatment individual therapy;occupational therapy  -AR       Row Name 07/02/25 1729          General Information    Existing Precautions/Restrictions fall;other (see comments);right;non-weight bearing;shoulder  interscalene, RUE fistual, dementia, Donjoy II with pillow  -AR       Row Name 07/02/25 1729          Cognition    Orientation Status (Cognition) unable/difficult to assess  -AR       Row Name 07/02/25 1729          Safety Issues/Impairments Affecting Functional Mobility    Safety Issues Affecting Function (Mobility) ability to follow commands;awareness of need for assistance;judgment;problem-solving;safety precaution awareness;safety precautions follow-through/compliance  -AR     Impairments Affecting Function (Mobility) balance;cognition;endurance/activity tolerance;coordination;pain;strength;range of motion (ROM)  -AR               User Key  (r) = Recorded By, (t) = Taken By, (c) = Cosigned By      Initials Name Provider Type    Debbie Bledsoe, OT Occupational Therapist                      Mobility/ADL's       Row Name 07/02/25 1730          Bed Mobility    Bed Mobility rolling left;rolling right  -AR     Rolling Left Ralston (Bed Mobility) dependent (less than 25% patient effort)  -AR     Rolling Right [place] (Bed Mobility) dependent (less than 25% patient effort)  -AR     Comment, (Bed Mobility) Pt rolled R/L midline for sling removal.  -AR       Row Name 07/02/25 1730          Transfers    Transfers bed-chair transfer  -AR     Comment, (Transfers) Unable to pivot pt d/t lethargy, assisted to bed via overhead lift.  -AR       Row Name 07/02/25 1730          Bed-Chair Transfer    Bed-Chair Ralston (Transfers) dependent (less than 25% patient effort)  -AR     Assistive Device (Bed-Chair Transfers) lift device  -AR       Row Name 07/02/25 1730          Activities of Daily Living    BADL Assessment/Intervention upper body dressing  -AR       Row Name 07/02/25 1730          Mobility    Extremity Weight-bearing Status right upper extremity  -AR     Right Upper Extremity (Weight-bearing Status) non weight-bearing (NWB)  -AR       Row Name 07/02/25 1730          Upper Body Dressing Assessment/Training    Ralston Level (Upper Body Dressing) doff;don;dependent (less than 25% patient effort)  -AR     Position (Upper Body Dressing) supine;supported sitting  -AR     Comment, (Upper Body Dressing) sling  -AR               User Key  (r) = Recorded By, (t) = Taken By, (c) = Cosigned By      Initials Name Provider Type    Debbie Bledsoe, OT Occupational Therapist                   Obj/Interventions       Row Name 07/02/25 1731          Sensory Assessment (Somatosensory)    Sensory Assessment (Somatosensory) unable/difficult to assess  -AR       Row Name 07/02/25 1731          Shoulder (Therapeutic Exercise)    Shoulder PROM (Therapeutic Exercise) right;flexion;extension;internal rotation;external rotation;supine;10 repetitions  -AR       Row Name 07/02/25 1731          Elbow/Forearm  (Therapeutic Exercise)    Elbow/Forearm (Therapeutic Exercise) PROM (passive range of motion)  -AR     Elbow/Forearm PROM (Therapeutic Exercise) right;flexion;extension;supination;pronation;sitting;10 repetitions;supine  -AR       Row Name 07/02/25 1731          Wrist (Therapeutic Exercise)    Wrist (Therapeutic Exercise) PROM (passive range of motion)  -AR     Wrist AAROM (Therapeutic Exercise) right;flexion;extension;10 repetitions  -AR     Wrist PROM (Therapeutic Exercise) right;flexion;extension;10 repetitions  -AR       Row Name 07/02/25 1731          Motor Skills    Therapeutic Exercise shoulder;elbow/forearm;hand;wrist  -AR               User Key  (r) = Recorded By, (t) = Taken By, (c) = Cosigned By      Initials Name Provider Type    Debbie Bledsoe, BETSY Occupational Therapist                   Goals/Plan    No documentation.                  Clinical Impression       [facility] Name 07/02/25 1732          Pain Scale: FACES Pre/Post-Treatment    Pain: FACES Scale, Pretreatment 0-->no hurt  -AR     Posttreatment Pain Rating 0-->no hurt  -AR       [facility] Name 07/02/25 1732          Plan of Care Review    Plan of Care Reviewed With patient  -AR     Progress declining  -AR     Outcome Evaluation Pt tolerated R shoulder PROM , IR chest/ER 30. Pt was assisted to bed from chair with overhead lift d/t lethargy. Pt limited with R shoulder precautions, poor balance, poor occupational endurance and confusion. Recommend SNF.  -AR       Row Name 07/02/25 1732          Therapy Plan Review/Discharge Plan (OT)    Anticipated Discharge Disposition (OT) skilled nursing facility  -AR       Row Name 07/02/25 1732          Vital Signs    Pre SpO2 (%) 90  -AR     O2 Delivery Pre Treatment room air  -AR     Intra SpO2 (%) 88  -AR     O2 Delivery Intra Treatment room air  -AR     Post SpO2 (%) 93  -AR     O2 Delivery Post Treatment room air  -AR     Pre Patient Position Sitting  -AR     Intra Patient Position Supine  -AR     Post  Patient Position Supine  -AR       Row Name 07/02/25 1732          Positioning and Restraints    Pre-Treatment Position sitting in chair/recliner  -AR     Post Treatment Position bed  -AR     In Bed notified nsg;supine;call light within reach;encouraged to call for assist;exit alarm on;with brace  cold pack over incision  -AR               User Key  (r) = Recorded By, (t) = Taken By, (c) = Cosigned By      Initials Name Provider Type    Debbie Bledsoe, OT Occupational Therapist                   Outcome Measures       Row Name 07/02/25 1734          How much help from another is currently needed...    Putting on and taking off regular lower body clothing? 1  -AR     Bathing (including washing, rinsing, and drying) 1  -AR     Toileting (which includes using toilet bed pan or urinal) 1  -AR     Putting on and taking off regular upper body clothing 1  -AR     Taking care of personal grooming (such as brushing teeth) 1  -AR     Eating meals 1  -AR     AM-PAC 6 Clicks Score (OT) 6  -AR       Row Name 07/02/25 1558 07/02/25 1215       How much help from another person do you currently need...    Turning from your back to your side while in flat bed without using bedrails? 2  -AB 2  -KL    Moving from lying on back to sitting on the side of a flat bed without bedrails? 2  -AB 2  -KL    Moving to and from a bed to a chair (including a wheelchair)? 2  -AB 2  -KL    Standing up from a chair using your arms (e.g., wheelchair, bedside chair)? 2  -AB 2  -KL    Climbing 3-5 steps with a railing? 1  -AB 1  -KL    To walk in hospital room? 2  -AB 2  -KL    AM-PAC 6 Clicks Score (PT) 11  -AB 11  -KL    Highest Level of Mobility Goal Move to Chair/Commode-4  -AB Move to Chair/Commode-4  -KL      Row Name 07/02/25 1734 07/02/25 1558       Functional Assessment    Outcome Measure Options AM-PAC 6 Clicks Daily Activity (OT)  -AR AM-PAC 6 Clicks Basic Mobility (PT)  -AB              User Key  (r) = Recorded By, (t) = Taken By,  (c) = Cosigned By      Initials Name Provider Type    AR Debbie Espinosa, BETSY Occupational Therapist    Leigh Ann Mcintosh RN Registered Nurse    Caro Draper, PT Physical Therapist                    Occupational Therapy Education       Title: PT OT SLP Therapies (In Progress)       Topic: Occupational Therapy (In Progress)       Point: ADL training (In Progress)       Learning Progress Summary            Patient Acceptance, E, NL by AR at 7/2/2025 1735    Eager, E,TB,D,H, NL,VU by AR at 7/1/2025 1225   Caregiver Eager, E,TB,D,H, NL,VU by AR at 7/1/2025 1225                      Point: Home exercise program (In Progress)       Learning Progress Summary            Patient Acceptance, E, NL by AR at 7/2/2025 1735    Eager, E,TB,D,H, NL,VU by AR at 7/1/2025 1225   Caregiver Eager, E,TB,D,H, NL,VU by AR at 7/1/2025 1225                      Point: Precautions (In Progress)       Learning Progress Summary            Patient Acceptance, E, NL by AR at 7/2/2025 1735    Eager, E,TB,D,H, NL,VU by AR at 7/1/2025 1225   Caregiver Eager, E,TB,D,H, NL,VU by AR at 7/1/2025 1225                      Point: Body mechanics (In Progress)       Learning Progress Summary            Patient Acceptance, E, NL by AR at 7/2/2025 1735    Eager, E,TB,D,H, NL,VU by AR at 7/1/2025 1225   Caregiver Eager, E,TB,D,H, NL,VU by AR at 7/1/2025 1225                                      User Key       Initials Effective Dates Name Provider Type Discipline    AR 07/11/23 -  Debbie Espinosa, OT Occupational Therapist OT                  OT Recommendation and Plan  Planned Therapy Interventions (OT): adaptive equipment training, BADL retraining, edema control/reduction, IADL retraining, occupation/activity based interventions, orthotic fabrication/fitting/training, passive ROM/stretching, patient/caregiver education/training, ROM/therapeutic exercise, transfer/mobility retraining  Therapy Frequency (OT): daily  Plan of Care Review  Plan  of Care Reviewed With: patient  Progress: declining  Outcome Evaluation: Pt tolerated R shoulder PROM , IR chest/ER 30. Pt was assisted to bed from chair with overhead lift d/t lethargy. Pt limited with R shoulder precautions, poor balance, poor occupational endurance and confusion. Recommend SNF.     Time Calculation:   Evaluation Complexity (OT)  Review Occupational Profile/Medical/Therapy History Complexity: brief/low complexity  Assessment, Occupational Performance/Identification of Deficit Complexity: 1-3 performance deficits  Clinical Decision Making Complexity (OT): problem focused assessment/low complexity  Overall Complexity of Evaluation (OT): low complexity     Time Calculation- OT       Row Name 07/02/25 1735 07/02/25 1559          Time Calculation- OT    OT Start Time 1648  -AR --     OT Received On 07/02/25  -AR --     OT Goal Re-Cert Due Date 07/11/25  -AR --        Timed Charges    87137 - OT Therapeutic Exercise Minutes 10  -AR --     47678 - Gait Training Minutes  -- 5  -AB     86438 - OT Self Care/Mgmt Minutes 29  -AR --        Total Minutes    Timed Charges Total Minutes 39  -AR 5  -AB      Total Minutes 39  -AR 5  -AB               User Key  (r) = Recorded By, (t) = Taken By, (c) = Cosigned By      Initials Name Provider Type    AR Debbie Espinosa, OT Occupational Therapist    AB Caro Youngblood, PT Physical Therapist                  Therapy Charges for Today       Code Description Service Date Service Provider Modifiers Qty    25450419229 HC OT SELF CARE/MGMT/TRAIN EA 15 MIN 7/1/2025 Debbie Espinosa, OT GO 1    96517431344 HC OT THER PROC EA 15 MIN 7/1/2025 Debbie Espinosa OT GO 1    36414177365 HC OT EVAL LOW COMPLEXITY 4 7/1/2025 Debbie Espinosa OT GO 1    80898498358 HC OT SELF CARE/MGMT/TRAIN EA 15 MIN 7/2/2025 Debbie Espinosa OT GO 2    76318073644 HC OT THER PROC EA 15 MIN 7/2/2025 Debbie Espinosa, OT GO 1    33523522568 HC OT THER SUPP EA 15 MIN 7/2/2025  Francisca, Debbie Morales, OT GO 3                 Debbie Espinosa, OT  7/2/2025

## 2025-07-02 NOTE — PLAN OF CARE
Goal Outcome Evaluation:  Plan of Care Reviewed With: patient        Progress: declining  Outcome Evaluation: Pt tolerated R shoulder PROM , IR chest/ER 30. Pt was assisted to bed from chair with overhead lift d/t lethargy. Pt limited with R shoulder precautions, poor balance, poor occupational endurance and confusion. Recommend SNF.    Anticipated Discharge Disposition (OT): skilled nursing facility

## 2025-07-02 NOTE — PROGRESS NOTES
[facility]    Acute pain service Inpatient Progress Note    Patient Name: Enrike Tomas  :  1935  MRN:  [id number]        Acute Pain  Service Inpatient Progress Note:    Analgesia:Good  Pain Score:0/10  LOC: alert and awake  Resp Status: room air  Cardiac: VS stable  Side Effects:None  Catheter Site:clean, dressing intact and dry  Cath type: peripheral nerve cath(InfuSystem)  Volume: 1mL,5ml, 5ml InfuSystem Pump.  Catheter Plan:Catheter to remain Insitu and Continue catheter infusion rate unchanged  Comments: The neuro assessment of the operative extremity MONSTER the ability to do finger flexion and extension; MONSTER the ability to do wrist flexion and extension, MONSTER the ability to do elbow flexion and extension.  The neuro exam of the patient MONSTER sensory function throughout the operative extremity.

## 2025-07-02 NOTE — PLAN OF CARE
Goal Outcome Evaluation:  Plan of Care Reviewed With: patient, spouse        Progress: improving  Outcome Evaluation: Pt with good effort but continues to be limited by cognitive deficits, generalized weakness, impaired balance, and decreased endurance. Pt ambulated 4' with max Ax2 and UE support. Further IPPT is warrented. PT will progress as able per POC.    Anticipated Discharge Disposition (PT): skilled nursing facility

## 2025-07-03 ENCOUNTER — ANCILLARY PROCEDURE (OUTPATIENT)
Dept: SPEECH THERAPY | Facility: HOSPITAL | Age: OVER 89
End: 2025-07-03
Payer: MEDICARE

## 2025-07-03 PROCEDURE — 97110 THERAPEUTIC EXERCISES: CPT | Performed by: OCCUPATIONAL THERAPIST

## 2025-07-03 PROCEDURE — 97530 THERAPEUTIC ACTIVITIES: CPT

## 2025-07-03 PROCEDURE — 63710000001 BISACODYL 5 MG TABLET DELAYED-RELEASE: Performed by: NURSE PRACTITIONER

## 2025-07-03 PROCEDURE — A9270 NON-COVERED ITEM OR SERVICE: HCPCS | Performed by: NURSE PRACTITIONER

## 2025-07-03 PROCEDURE — 63710000001 PANTOPRAZOLE 40 MG TABLET DELAYED-RELEASE: Performed by: NURSE PRACTITIONER

## 2025-07-03 PROCEDURE — 63710000001 LEVOTHYROXINE 112 MCG TABLET: Performed by: NURSE PRACTITIONER

## 2025-07-03 PROCEDURE — 63710000001 TRAZODONE 50 MG TABLET: Performed by: NURSE PRACTITIONER

## 2025-07-03 PROCEDURE — 63710000001 AMIODARONE 200 MG TABLET: Performed by: NURSE PRACTITIONER

## 2025-07-03 PROCEDURE — 63710000001 ATORVASTATIN 20 MG TABLET: Performed by: NURSE PRACTITIONER

## 2025-07-03 PROCEDURE — 63710000001 TAMSULOSIN 0.4 MG CAPSULE: Performed by: NURSE PRACTITIONER

## 2025-07-03 PROCEDURE — 63710000001 SENNOSIDES-DOCUSATE 8.6-50 MG TABLET: Performed by: NURSE PRACTITIONER

## 2025-07-03 PROCEDURE — 92612 ENDOSCOPY SWALLOW (FEES) VID: CPT

## 2025-07-03 PROCEDURE — 63710000001 APIXABAN 2.5 MG TABLET: Performed by: NURSE PRACTITIONER

## 2025-07-03 PROCEDURE — 97535 SELF CARE MNGMENT TRAINING: CPT | Performed by: OCCUPATIONAL THERAPIST

## 2025-07-03 PROCEDURE — 63710000001 DONEPEZIL 5 MG TABLET: Performed by: NURSE PRACTITIONER

## 2025-07-03 PROCEDURE — 92610 EVALUATE SWALLOWING FUNCTION: CPT

## 2025-07-03 PROCEDURE — 63710000001 SERTRALINE 100 MG TABLET: Performed by: NURSE PRACTITIONER

## 2025-07-03 PROCEDURE — 63710000001 LEVOTHYROXINE 25 MCG TABLET: Performed by: NURSE PRACTITIONER

## 2025-07-03 PROCEDURE — 63710000001 MEMANTINE 10 MG TABLET: Performed by: NURSE PRACTITIONER

## 2025-07-03 RX ORDER — POLYETHYLENE GLYCOL 3350 17 G/17G
17 POWDER, FOR SOLUTION ORAL DAILY PRN
Status: DISCONTINUED | OUTPATIENT
Start: 2025-07-03 | End: 2025-07-04 | Stop reason: HOSPADM

## 2025-07-03 RX ORDER — BISACODYL 10 MG
10 SUPPOSITORY, RECTAL RECTAL DAILY PRN
Status: DISCONTINUED | OUTPATIENT
Start: 2025-07-03 | End: 2025-07-04 | Stop reason: HOSPADM

## 2025-07-03 RX ORDER — BISACODYL 5 MG/1
5 TABLET, DELAYED RELEASE ORAL DAILY PRN
Status: DISCONTINUED | OUTPATIENT
Start: 2025-07-03 | End: 2025-07-04 | Stop reason: HOSPADM

## 2025-07-03 RX ADMIN — AMIODARONE HYDROCHLORIDE 100 MG: 200 TABLET ORAL at 09:05

## 2025-07-03 RX ADMIN — APIXABAN 2.5 MG: 2.5 TABLET, FILM COATED ORAL at 20:16

## 2025-07-03 RX ADMIN — TAMSULOSIN HYDROCHLORIDE 0.4 MG: 0.4 CAPSULE ORAL at 09:04

## 2025-07-03 RX ADMIN — PANTOPRAZOLE SODIUM 40 MG: 40 TABLET, DELAYED RELEASE ORAL at 05:43

## 2025-07-03 RX ADMIN — DONEPEZIL HYDROCHLORIDE 5 MG: 5 TABLET ORAL at 20:16

## 2025-07-03 RX ADMIN — CIPROFLOXACIN 2 DROP: 3 SOLUTION OPHTHALMIC at 05:44

## 2025-07-03 RX ADMIN — APIXABAN 2.5 MG: 2.5 TABLET, FILM COATED ORAL at 09:04

## 2025-07-03 RX ADMIN — LEVOTHYROXINE SODIUM 137 MCG: 0.03 TABLET ORAL at 05:43

## 2025-07-03 RX ADMIN — MEMANTINE HYDROCHLORIDE 10 MG: 10 TABLET, FILM COATED ORAL at 20:16

## 2025-07-03 RX ADMIN — CIPROFLOXACIN 2 DROP: 3 SOLUTION OPHTHALMIC at 09:06

## 2025-07-03 RX ADMIN — CIPROFLOXACIN 2 DROP: 3 SOLUTION OPHTHALMIC at 13:36

## 2025-07-03 RX ADMIN — SERTRALINE 100 MG: 100 TABLET, FILM COATED ORAL at 09:04

## 2025-07-03 RX ADMIN — TRAZODONE HYDROCHLORIDE 50 MG: 50 TABLET ORAL at 20:16

## 2025-07-03 RX ADMIN — ATORVASTATIN CALCIUM 20 MG: 20 TABLET, FILM COATED ORAL at 20:16

## 2025-07-03 RX ADMIN — MEMANTINE HYDROCHLORIDE 10 MG: 10 TABLET, FILM COATED ORAL at 09:04

## 2025-07-03 RX ADMIN — BISACODYL 5 MG: 5 TABLET, COATED ORAL at 13:36

## 2025-07-03 RX ADMIN — SENNOSIDES AND DOCUSATE SODIUM 1 TABLET: 50; 8.6 TABLET ORAL at 20:16

## 2025-07-03 RX ADMIN — CIPROFLOXACIN 2 DROP: 3 SOLUTION OPHTHALMIC at 20:35

## 2025-07-03 RX ADMIN — CIPROFLOXACIN 2 DROP: 3 SOLUTION OPHTHALMIC at 17:33

## 2025-07-03 NOTE — THERAPY TREATMENT NOTE
Patient Name: Enrike Tomas  : 1935    MRN: [id number]                              Today's Date: 7/3/2025       Admit Date: 2025    Visit Dx: No diagnosis found.  Patient Active Problem List   Diagnosis    (HFpEF) heart failure with preserved ejection fraction    Anemia of renal disease    At high risk for falls    Benign prostatic hyperplasia    Closed fracture of left acetabulum    Compression fracture of thoracic vertebra    Deficiency of other specified B group vitamins    Depression    ESRD (end stage renal disease)    Essential hypertension    GERD (gastroesophageal reflux disease)    Hepatic cyst    Long term (current) use of anticoagulants    Major depressive disorder, single episode, moderate    Mixed hyperlipidemia    Morgagni hernia    Occlusion of right vertebral artery    NERY (obstructive sleep apnea)    Fall    Severe malnutrition    Sustained SVT    Paroxysmal atrial fibrillation    Chronic heart failure with preserved ejection fraction (HFpEF)    S/P shoulder hemiarthroplasty, left    Anemia, acute on chronic    Postoperative confusion    Hyperkalemia    Chronic systolic heart failure    ESRD needing dialysis    S/P shoulder hemiarthroplasty, right     Past Medical History:   Diagnosis Date    A-fib     Abnormal ECG     occasional extra heartbeat    Anemia, acute on chronic 2023    Anesthesia complication     hallucinations upon waking, violent upon waking per pt report    Arthritis     Atrial fibrillation     BPH (benign prostatic hyperplasia)     Cervical compression fracture 2023    Dementia     Diverticulosis     Diverticulum of esophagus     puree diet and speech therapy    Does use hearing aid     Elevated cholesterol     ESRD on hemodialysis     GERD (gastroesophageal reflux disease)     Heart failure     Hemodialysis patient     M,W,F    Hyperlipidemia     Hypertension     Low back pain     NERY (obstructive sleep apnea)     NOT USING CPAP, dental appliance     Recurrent falls     UTI (urinary tract infection)      Past Surgical History:   Procedure Laterality Date    CATARACT EXTRACTION      COLONOSCOPY      DIALYSIS FISTULA CREATION      x2    ENDOSCOPY      INGUINAL HERNIA REPAIR      PROSTATE BIOPSY      SHOULDER HEMIARTHROPLASTY Right 6/30/2025    Procedure: SHOULDER HEMIARTHROPLASTY, BICEPS TENODESIS RIGHT;  Surgeon: Matteo Tan MD;  Location: [facility] OR;  Service: Orthopedics;  Laterality: Right;    THROAT SURGERY      diverticulum in throat    TONSILLECTOMY AND ADENOIDECTOMY      TOTAL SHOULDER ARTHROPLASTY W/ DISTAL CLAVICLE EXCISION Left 09/25/2023    Procedure: HEMIARTHROPLASTY, BICEPS TENODESIS - LEFT;  Surgeon: Matteo Tan MD;  Location: [facility] OR;  Service: Orthopedics;  Laterality: Left;      General Information       Row Name 07/03/25 1026          OT Time and Intention    Document Type therapy note (daily note)  -AR     Mode of Treatment individual therapy;occupational therapy  -AR       Row Name 07/03/25 1026          General Information    Existing Precautions/Restrictions fall;other (see comments);right;non-weight bearing;shoulder;oxygen therapy device and L/min  R interscalene nerve catheter, Don[place] II with pillow, RUE fistual, dementia  -AR       Row Name 07/03/25 1026          Cognition    Orientation Status (Cognition) oriented to;person  -AR       Row Name 07/03/25 1026          Safety Issues/Impairments Affecting Functional Mobility    Safety Issues Affecting Function (Mobility) awareness of need for assistance;insight into deficits/self-awareness;judgment;problem-solving;safety precaution awareness;safety precautions follow-through/compliance;sequencing abilities  -AR     Impairments Affecting Function (Mobility) balance;cognition;endurance/activity tolerance;coordination;pain;strength;range of motion (ROM);postural/trunk control  -AR               User Key  (r) = Recorded By, (t) = Taken By, (c) = Cosigned By      Initials  Name Provider Type    Debbie Bledseo, OT Occupational Therapist                     Mobility/ADL's       Row Name 07/03/25 1027          Bed Mobility    Bed Mobility scooting/bridging;supine-sit  -AR     Scooting/Bridging [place] (Bed Mobility) moderate assist (50% patient effort);verbal cues  -AR     Supine-Sit [place] (Bed Mobility) moderate assist (50% patient effort);verbal cues  -AR     Assistive Device (Bed Mobility) head of bed elevated;bed rails;repositioning sheet  -AR     Comment, (Bed Mobility) Min cues to maintain NWB RUE.  -AR       Row Name 07/03/25 1027          Transfers    Transfers sit-stand transfer;stand-sit transfer;bed-chair transfer  -AR     Comment, (Transfers) Initial posterior lateral lean, follows commands to sequence  -AR       Row Name 07/03/25 1027          Bed-Chair Transfer    Bed-Chair [place] (Transfers) moderate assist (50% patient effort);2 person assist;verbal cues  -AR     Assistive Device (Bed-Chair Transfers) other (see comments)  JOHN WILLIAMA  -AR       Row Name 07/03/25 1027          Sit-Stand Transfer    Sit-Stand [place] (Transfers) moderate assist (50% patient effort);2 person assist;verbal cues  -AR     Assistive Device (Sit-Stand Transfers) other (see comments)  -AR       Row Name 07/03/25 1027          Stand-Sit Transfer    Stand-Sit [place] (Transfers) moderate assist (50% patient effort);2 person assist;verbal cues  -AR     Assistive Device (Stand-Sit Transfers) other (see comments)  -AR       Row Name 07/03/25 1027          Activities of Daily Living    BADL Assessment/Intervention upper body dressing;lower body dressing  -AR       Row Name 07/03/25 1027          Mobility    Extremity Weight-bearing Status right upper extremity  -AR     Right Upper Extremity (Weight-bearing Status) non weight-bearing (NWB)  -AR       Row Name 07/03/25 1027          Upper Body Dressing Assessment/Training    [place] Level (Upper Body Dressing)  don;doff;dependent (less than 25% patient effort)  sling  -AR     Position (Upper Body Dressing) supported sitting  -AR     Comment, (Upper Body Dressing) Reviewed that pt is s/p R shoulder surgery and shoulder precautions.  -AR       Row Name 07/03/25 1027          Lower Body Dressing Assessment/Training    [place] Level (Lower Body Dressing) don;socks;dependent (less than 25% patient effort)  -AR     Position (Lower Body Dressing) supine  -AR               User Key  (r) = Recorded By, (t) = Taken By, (c) = Cosigned By      Initials Name Provider Type    Debbie Bledsoe, OT Occupational Therapist                   Obj/Interventions       Row Name 07/03/25 1030          Sensory Assessment (Somatosensory)    Sensory Assessment (Somatosensory) UE sensation intact  -AR       Row Name 07/03/25 1030          Shoulder (Therapeutic Exercise)    Shoulder AROM (Therapeutic Exercise) bilateral;scapular retraction;sitting;10 repetitions  -AR     Shoulder PROM (Therapeutic Exercise) right;extension;flexion;external rotation;internal rotation;sitting;10 repetitions  Caregiver at bedside deferred teaching on HEP as she does not anticipate performing at this stage in his care.  -AR       Row Name 07/03/25 1030          Elbow/Forearm (Therapeutic Exercise)    Elbow/Forearm AAROM (Therapeutic Exercise) right;flexion;extension;supination;pronation;sitting;10 repetitions  -AR       Row Name 07/03/25 1030          Wrist (Therapeutic Exercise)    Wrist AAROM (Therapeutic Exercise) right;flexion;extension;10 repetitions  -AR       Row Name 07/03/25 1030          Hand (Therapeutic Exercise)    Hand AROM/AAROM (Therapeutic Exercise) right;AROM (active range of motion);finger flexion;finger extension;10 repetitions  -AR       Row Name 07/03/25 1030          Motor Skills    Therapeutic Exercise shoulder;elbow/forearm;wrist;hand  -AR       Row Name 07/03/25 1030          Balance    Static Sitting Balance contact guard  -AR      Dynamic Sitting Balance contact guard  -AR     Position, Sitting Balance unsupported;sitting edge of bed  -AR     Static Standing Balance moderate assist  -AR     Dynamic Standing Balance moderate assist;2-person assist  -AR     Position/Device Used, Standing Balance supported  -AR               User Key  (r) = Recorded By, (t) = Taken By, (c) = Cosigned By      Initials Name Provider Type    Debbie Bledsoe, OT Occupational Therapist                   Goals/Plan       Row Name 07/03/25 1036          Transfer Goal 1 (OT)    Progress/Outcome (Transfer Goal 1, OT) goal ongoing  -AR       Row Name 07/03/25 1036          Dressing Goal 1 (OT)    Progress/Outcome (Dressing Goal 1, OT) goal ongoing  -AR       Row Name 07/03/25 1036          Self-Feeding Goal 1 (OT)    Progress/Outcomes (Self-Feeding Goal 1, OT) goal ongoing  -AR       Row Name 07/03/25 1036          ROM Goal 1 (OT)    Progress/Outcome (ROM Goal 1, OT) goal ongoing  -AR               User Key  (r) = Recorded By, (t) = Taken By, (c) = Cosigned By      Initials Name Provider Type    Debbie Bledsoe, OT Occupational Therapist                   Clinical Impression       Row Name 07/03/25 1031          Pain Assessment    Pretreatment Pain Rating 0/10 - no pain  -AR     Posttreatment Pain Rating 0/10 - no pain  -AR       Row Name 07/03/25 1031          Plan of Care Review    Plan of Care Reviewed With patient;caregiver  -AR       Row Name 07/03/25 1031          Therapy Assessment/Plan (OT)    Patient/Family Therapy Goal Statement (OT) Pt alert, oriented to self and following commands to participate. Pt tolerated R shoulder PROM , IR chest/ER 30 and no c/o pain at end of session. Pt limited with impaired balance, decreased safety awareness, NWB/immobilization RUE, decreased occupational endurance. Recommend SNF.  -AR       Row Name 07/03/25 1031          Therapy Plan Review/Discharge Plan (OT)    Anticipated Discharge Disposition (OT) skilled  nursing facility  -AR       Row Name 07/03/25 1031          Vital Signs    Pre SpO2 (%) 94  -AR     O2 Delivery Pre Treatment supplemental O2  -AR     Intra SpO2 (%) 90  -AR     O2 Delivery Intra Treatment supplemental O2  -AR     Post SpO2 (%) 9  -AR     O2 Delivery Post Treatment supplemental O2  -AR     Pre Patient Position Supine  -AR     Intra Patient Position Standing  -AR     Post Patient Position Sitting  -AR       Row Name 07/03/25 1031          Positioning and Restraints    Pre-Treatment Position in bed  -AR     Post Treatment Position chair  -AR     In Chair notified nsg;reclined;call light within reach;encouraged to call for assist;exit alarm on;with family/caregiver;with brace;legs elevated;on mechanical lift sling;waffle cushion  cold pack applied over incision  -AR               User Key  (r) = Recorded By, (t) = Taken By, (c) = Cosigned By      Initials Name Provider Type    Debbie Bledsoe, OT Occupational Therapist                   Outcome Measures       Row Name 07/03/25 1036          How much help from another is currently needed...    Putting on and taking off regular lower body clothing? 1  -AR     Bathing (including washing, rinsing, and drying) 2  -AR     Toileting (which includes using toilet bed pan or urinal) 1  -AR     Putting on and taking off regular upper body clothing 1  -AR     Taking care of personal grooming (such as brushing teeth) 2  -AR     Eating meals 2  -AR     AM-PAC 6 Clicks Score (OT) 9  -AR       Row Name 07/03/25 0814          How much help from another person do you currently need...    Turning from your back to your side while in flat bed without using bedrails? 2  -BK     Moving from lying on back to sitting on the side of a flat bed without bedrails? 2  -BK     Moving to and from a bed to a chair (including a wheelchair)? 2  -BK     Standing up from a chair using your arms (e.g., wheelchair, bedside chair)? 2  -BK     Climbing 3-5 steps with a railing? 2  -BK      To walk in hospital room? 2  -BK     AM-PAC 6 Clicks Score (PT) 12  -BK     Highest Level of Mobility Goal Move to Chair/Commode-4  -BK       Row Name 07/03/25 1036          Functional Assessment    Outcome Measure Options AM-PAC 6 Clicks Daily Activity (OT)  -AR               User Key  (r) = Recorded By, (t) = Taken By, (c) = Cosigned By      Initials Name Provider Type    AR Debbie Espinosa OT Occupational Therapist    Estella Velez RN Registered Nurse                    Occupational Therapy Education       Title: PT OT SLP Therapies (Done)       Topic: Occupational Therapy (Done)       Point: ADL training (Done)       Learning Progress Summary            Patient Eager, E,D, NR,VU by AR at 7/3/2025 1037    Acceptance, E, NL by AR at 7/2/2025 1735    Eager, E,TB,D,H, NL,VU by AR at 7/1/2025 1225   Caregiver Eager, E,D, NR,VU by AR at 7/3/2025 1037    Eager, E,TB,D,H, NL,VU by AR at 7/1/2025 1225                      Point: Home exercise program (Done)       Learning Progress Summary            Patient Eager, E,D, NR,VU by AR at 7/3/2025 1037    Acceptance, E, NL by AR at 7/2/2025 1735    Eager, E,TB,D,H, NL,VU by AR at 7/1/2025 1225   Caregiver Eager, E,D, NR,VU by AR at 7/3/2025 1037    Eager, E,TB,D,H, NL,VU by AR at 7/1/2025 1225                      Point: Precautions (Done)       Learning Progress Summary            Patient Eager, E,D, NR,VU by AR at 7/3/2025 1037    Acceptance, E, NL by AR at 7/2/2025 1735    Eager, E,TB,D,H, NL,VU by AR at 7/1/2025 1225   Caregiver Eager, E,D, NR,VU by AR at 7/3/2025 1037    Eager, E,TB,D,H, NL,VU by AR at 7/1/2025 1225                      Point: Body mechanics (Done)       Learning Progress Summary            Patient Eager, E,D, NR,VU by AR at 7/3/2025 1037    Acceptance, E, NL by AR at 7/2/2025 1735    Juan, CHRISSY,TB,D,H, NL,VU by AR at 7/1/2025 1225   Caregiver Juan, E,D, NR,VU by AR at 7/3/2025 1037    CHRISSY Martinez,TB,D,H, NL,VU by AR at 7/1/2025 1225                                       User Key       Initials Effective Dates Name Provider Type Discipline    AR 07/11/23 -  Debbie Espinosa OT Occupational Therapist OT                  OT Recommendation and Plan  Planned Therapy Interventions (OT): adaptive equipment training, BADL retraining, edema control/reduction, IADL retraining, occupation/activity based interventions, orthotic fabrication/fitting/training, passive ROM/stretching, patient/caregiver education/training, ROM/therapeutic exercise, transfer/mobility retraining  Therapy Frequency (OT): daily  Plan of Care Review  Plan of Care Reviewed With: patient, caregiver  Progress: declining  Outcome Evaluation: Pt tolerated R shoulder PROM , IR chest/ER 30. Pt was assisted to bed from chair with overhead lift d/t lethargy. Pt limited with R shoulder precautions, poor balance, poor occupational endurance and confusion. Recommend SNF.     Time Calculation:   Evaluation Complexity (OT)  Review Occupational Profile/Medical/Therapy History Complexity: brief/low complexity  Assessment, Occupational Performance/Identification of Deficit Complexity: 1-3 performance deficits  Clinical Decision Making Complexity (OT): problem focused assessment/low complexity  Overall Complexity of Evaluation (OT): low complexity     Time Calculation- OT       Row Name 07/03/25 1037             Time Calculation- OT    OT Start Time 0821  -AR      OT Received On 07/03/25  -AR      OT Goal Re-Cert Due Date 07/11/25  -AR         Timed Charges    79887 - OT Therapeutic Exercise Minutes 16  -AR      60867 - OT Self Care/Mgmt Minutes 24  -AR         Total Minutes    Timed Charges Total Minutes 40  -AR       Total Minutes 40  -AR                User Key  (r) = Recorded By, (t) = Taken By, (c) = Cosigned By      Initials Name Provider Type    Debbie Bledsoe OT Occupational Therapist                  Therapy Charges for Today       Code Description Service Date Service Provider  Modifiers Qty    05629938480 HC OT SELF CARE/MGMT/TRAIN EA 15 MIN 7/2/2025 Debbie Espinosa, OT GO 2    74611869214 HC OT THER PROC EA 15 MIN 7/2/2025 Debbie Espinosa Carmen, OT GO 1    81540924708 HC OT THER SUPP EA 15 MIN 7/2/2025 Debbie Espinosa Carmen, OT GO 3    59010606892 HC OT SELF CARE/MGMT/TRAIN EA 15 MIN 7/3/2025 Debbie Espinosa, OT GO 2    57908399665 HC OT THER PROC EA 15 MIN 7/3/2025 Debbie Espinosa Carmen, OT GO 1    21317438223 HC OT THER SUPP EA 15 MIN 7/3/2025 Debbie Espinosa Carmen, OT GO 3                 Debbie Niceh Francisca, OT  7/3/2025

## 2025-07-03 NOTE — PLAN OF CARE
Goal Outcome Evaluation:  Plan of Care Reviewed With: patient        Progress: no change     Pt is alert to self and place. Disoriented to situation and time. VSS. RA/2L NC. No urine output. Denied pain. Q2h turned.

## 2025-07-03 NOTE — CASE MANAGEMENT/SOCIAL WORK
Continued Stay Note   [place]     Patient Name: Enrike Tomas  MRN: [id number]  Today's Date: 7/3/2025    Admit Date: 6/30/2025    Plan: [facility]   Discharge Plan       Row Name 07/03/25 1143       Plan    Plan [facility]    Patient/Family in Agreement with Plan yes    Plan Comments [facility] offered a skilled rehab bed. Patient and daughter in agreement. Patient's insurance, [facility] Medicare will require a prior authorization and clinicals will be submitted for review electronically today. CM following.    Final Discharge Disposition Code 03 - skilled nursing facility (SNF)                   Discharge Codes    No documentation.                 Expected Discharge Date and Time       Expected Discharge Date Expected Discharge Time    Jul 8, 2025               Erin Espana RN

## 2025-07-03 NOTE — PLAN OF CARE
Goal Outcome Evaluation:  Plan of Care Reviewed With: patient, spouse        Progress: improving       Anticipated Discharge Disposition (SLP): skilled nursing facility          SLP Swallowing Diagnosis: functional oral phase, functional pharyngeal phase, other (see comments) (would benefit from general strengthening exs to increase safety of the swallow) (07/03/25 6412)

## 2025-07-03 NOTE — PROGRESS NOTES
[facility]    Acute pain service Inpatient Progress Note    Patient Name: Enrike Tomas  :  1935  MRN:  [id number]        Acute Pain  Service Inpatient Progress Note:    Analgesia:Good  LOC: alert and awake  Resp Status: room air  Cardiac: VS stable  Side Effects:None  Catheter Site:clean, dressing intact and dry  Cath type: peripheral nerve cath(InfuSystem)  Infusion rate: Ext/Pop: Basal: 1ml/hr, PIB: 5ml q 2 h, PCA: 5 ml q 30 min (1mL,5ml, 5ml InfuSystem Pump)  Catheter Plan:Catheter to remain Insitu and Continue catheter infusion rate unchanged  Comments: The neuro assessment of the operative extremity includes the ability to do finger flexion and extension; includes the ability to do wrist flexion and extension, includes the ability to do elbow flexion and extension.  The neuro exam of the patient includes sensory function throughout the operative extremity.

## 2025-07-03 NOTE — THERAPY EVALUATION
Acute Care - Speech Language Pathology   Swallow Initial Evaluation  [facility]  Clinical Swallow Evaluation     Patient Name: Enrike Tomas  : 1935  MRN: [id number]  Today's Date: 7/3/2025               Admit Date: 2025    Visit Dx:   No diagnosis found.  Patient Active Problem List   Diagnosis    (HFpEF) heart failure with preserved ejection fraction    Anemia of renal disease    At high risk for falls    Benign prostatic hyperplasia    Closed fracture of left acetabulum    Compression fracture of thoracic vertebra    Deficiency of other specified B group vitamins    Depression    ESRD (end stage renal disease)    Essential hypertension    GERD (gastroesophageal reflux disease)    Hepatic cyst    Long term (current) use of anticoagulants    Major depressive disorder, single episode, moderate    Mixed hyperlipidemia    Morgagni hernia    Occlusion of right vertebral artery    NERY (obstructive sleep apnea)    Fall    Severe malnutrition    Sustained SVT    Paroxysmal atrial fibrillation    Chronic heart failure with preserved ejection fraction (HFpEF)    S/P shoulder hemiarthroplasty, left    Anemia, acute on chronic    Postoperative confusion    Hyperkalemia    Chronic systolic heart failure    ESRD needing dialysis    S/P shoulder hemiarthroplasty, right     Past Medical History:   Diagnosis Date    A-fib     Abnormal ECG     occasional extra heartbeat    Anemia, acute on chronic 2023    Anesthesia complication     hallucinations upon waking, violent upon waking per pt report    Arthritis     Atrial fibrillation     BPH (benign prostatic hyperplasia)     Cervical compression fracture 2023    Dementia     Diverticulosis     Diverticulum of esophagus     puree diet and speech therapy    Does use hearing aid     Elevated cholesterol     ESRD on hemodialysis     GERD (gastroesophageal reflux disease)     Heart failure     Hemodialysis patient     M,W,F    Hyperlipidemia     Hypertension      Low back pain 2022    NERY (obstructive sleep apnea)     NOT USING CPAP, dental appliance    Recurrent falls     UTI (urinary tract infection)      Past Surgical History:   Procedure Laterality Date    CATARACT EXTRACTION      COLONOSCOPY      DIALYSIS FISTULA CREATION      x2    ENDOSCOPY      INGUINAL HERNIA REPAIR      PROSTATE BIOPSY      SHOULDER HEMIARTHROPLASTY Right 6/30/2025    Procedure: SHOULDER HEMIARTHROPLASTY, BICEPS TENODESIS RIGHT;  Surgeon: Matteo Tan MD;  Location:  [facility];  Service: Orthopedics;  Laterality: Right;    THROAT SURGERY      diverticulum in throat    TONSILLECTOMY AND ADENOIDECTOMY      TOTAL SHOULDER ARTHROPLASTY W/ DISTAL CLAVICLE EXCISION Left 09/25/2023    Procedure: HEMIARTHROPLASTY, BICEPS TENODESIS - LEFT;  Surgeon: Matteo Tan MD;  Location:  [facility];  Service: Orthopedics;  Laterality: Left;       SLP Recommendation and Plan  SLP Swallowing Diagnosis: suspected pharyngeal dysphagia (07/03/25 1020)  SLP Diet Recommendation: NPO, other (see comments) (x small sips of water for thirst) (07/03/25 1020)     SLP Rec. for Method of Medication Administration: meds whole, with puree, as tolerated (07/03/25 1020)     Monitor for Signs of Aspiration: yes, notify SLP if any concerns (07/03/25 1020)  Recommended Diagnostics: FEES (07/03/25 1020)     Anticipated Discharge Disposition (SLP): skilled nursing facility (07/03/25 1020)  Rehab Potential/Prognosis, Swallowing: re-evaluate goals as necessary (07/03/25 1020)        Oral Care Recommendations: Oral Care BID/PRN, Toothbrush (07/03/25 1020)                                               SWALLOW EVALUATION (Last 72 Hours)       SLP Adult Swallow Evaluation       Row Name 07/03/25 1020                   Rehab Evaluation    Document Type evaluation  -SM        Subjective Information no complaints  -SM        Patient Observations alert;cooperative  -SM        Patient Effort good  -SM           General Information     Patient Profile Reviewed yes  -SM        Pertinent History Of Current Problem R shoulder surgery 6/20. Increased confusion. Consulted after difficulty swallowing noted with breakfast. Hx dysphagia with recommedations for honey-thick liquids in 2023. Neurocognitive d/o with memory loss - multifactorial r/t opiod use, ESRD, depression, and dementia.  -SM        Current Method of Nutrition regular textures;thin liquids  -SM           Pain    Pretreatment Pain Rating 0/10 - no pain  -SM        Posttreatment Pain Rating 0/10 - no pain  -SM           General Eating/Swallowing Observations    Utensils Used spoon;straw  -SM        Consistencies Trialed thin liquids;nectar/syrup-thick liquids;pureed  -SM           Clinical Swallow Eval    Pharyngeal Phase suspected pharyngeal impairment  -SM           Pharyngeal Phase Concerns    Pharyngeal Phase Concerns throat clear;multiple swallows  -        Throat Clear other (see comments)  -        Pharyngeal Phase Concerns, Comment as progressed with trials. Called and discussed results with wife, to include GOC/POC discussion. Had been advanced to thin liquids and tolerating without significant issue at home, though she could not recall if instrumental swallow performed or not. She does wish to keep pt safe and will proceed with (likely temporary means) of thickened liquids, if necessary.  -SM           SLP Evaluation Clinical Impression    SLP Swallowing Diagnosis suspected pharyngeal dysphagia  -SM        Functional Impact risk of aspiration/pneumonia  -SM        Rehab Potential/Prognosis, Swallowing re-evaluate goals as necessary  -           Recommendations    SLP Diet Recommendation NPO;other (see comments)  x small sips of water for thirst  -SM        Recommended Diagnostics FEES  -SM        Oral Care Recommendations Oral Care BID/PRN;Toothbrush  -SM        SLP Rec. for Method of Medication Administration meds whole;with puree;as tolerated  -        Monitor for Signs  of Aspiration yes;notify SLP if any concerns  -SM        Anticipated Discharge Disposition (SLP) skilled nursing facility  -                  User Key  (r) = Recorded By, (t) = Taken By, (c) = Cosigned By      Initials Name Effective Dates    Sigrid Quesada MS CCC-SLP 01/20/25 -                     EDUCATION  The patient's wife and caregiver has been educated in the following areas:   Dysphagia (Swallowing Impairment) Oral Care/Hydration NPO rationale Modified Diet Instruction.                Time Calculation:    Time Calculation- SLP       Row Name 07/03/25 1131             Time Calculation- SLP    SLP Start Time 1020  -SM      SLP Received On 07/03/25  -         Untimed Charges    30610-YC Eval Oral Pharyng Swallow Minutes 60  -SM         Total Minutes    Untimed Charges Total Minutes 60  -SM       Total Minutes 60  -SM                User Key  (r) = Recorded By, (t) = Taken By, (c) = Cosigned By      Initials Name Provider Type    Sigrid Quesada MS CCC-SLP Speech and Language Pathologist                    Therapy Charges for Today       Code Description Service Date Service Provider Modifiers Qty    49318506160 HC ST EVAL ORAL PHARYNG SWALLOW 4 7/3/2025 Sigrid Higgins MS CCC-SLP GN 1                 Sigrid Higgins MS CCC-SLP  7/3/2025

## 2025-07-03 NOTE — PROGRESS NOTES
"Orthopedic Daily Progress Note      CC: None    Pain controlled  General: no fevers, chills  Abdomen: no nausea, vomiting, or diarrhea    No other complaints    Physical Exam:  I have reviewed the vital signs.  Temp:  [97.8 °F (36.6 °C)-99.8 °F (37.7 °C)] 98.4 °F (36.9 °C)  Heart Rate:  [] 74  Resp:  [12-20] 16  BP: ()/(36-84) 135/68    Objective:  Vital signs: (most recent): Blood pressure 135/68, pulse 74, temperature 98.4 °F (36.9 °C), temperature source Oral, resp. rate 16, height 165.1 cm (65\"), weight 57.2 kg (126 lb), SpO2 90%.              General Appearance:    Alert, cooperative, no distress  Extremities: No clubbing, cyanosis, or edema to lower extremities  Pulses:  2+ in distal surgical extremity  Skin: Dressing Clean/dry/intact      Results Review:    I have reviewed the labs, radiology results and diagnostic studies:yes    Results from last 7 days   Lab Units 07/02/25  0755   WBC 10*3/mm3 9.49   HEMOGLOBIN g/dL 9.9*   PLATELETS 10*3/mm3 183     Results from last 7 days   Lab Units 07/02/25  0755   SODIUM mmol/L 140   POTASSIUM mmol/L 4.9   CO2 mmol/L 27.0   CREATININE mg/dL 5.86*   GLUCOSE mg/dL 87       I have reviewed the medications.    Assessment/Problem List  POD# 3 S/p Right shoulder hemiarthroplasty    Plan  PT/OT  Rehab placement when available.  F/u with my office 2 weeks  Bandage off 7 days after surgery  May shower and rinse bandage as well as incision after bandage off when nerve catheter out.  For Rehab: Passive ROM R shoulder, FE, no limit, ER to 30, IR to chest.   -Active ROM elbow/wrist/hand  -NWB CARISSA Tan MD  07/03/25  07:19 EDT            "

## 2025-07-03 NOTE — THERAPY TREATMENT NOTE
Patient Name: Enrike Tomas  : 1935    MRN: [id number]                              Today's Date: 7/3/2025       Admit Date: 2025    Visit Dx: No diagnosis found.  Patient Active Problem List   Diagnosis    (HFpEF) heart failure with preserved ejection fraction    Anemia of renal disease    At high risk for falls    Benign prostatic hyperplasia    Closed fracture of left acetabulum    Compression fracture of thoracic vertebra    Deficiency of other specified B group vitamins    Depression    ESRD (end stage renal disease)    Essential hypertension    GERD (gastroesophageal reflux disease)    Hepatic cyst    Long term (current) use of anticoagulants    Major depressive disorder, single episode, moderate    Mixed hyperlipidemia    Morgagni hernia    Occlusion of right vertebral artery    NERY (obstructive sleep apnea)    Fall    Severe malnutrition    Sustained SVT    Paroxysmal atrial fibrillation    Chronic heart failure with preserved ejection fraction (HFpEF)    S/P shoulder hemiarthroplasty, left    Anemia, acute on chronic    Postoperative confusion    Hyperkalemia    Chronic systolic heart failure    ESRD needing dialysis    S/P shoulder hemiarthroplasty, right     Past Medical History:   Diagnosis Date    A-fib     Abnormal ECG     occasional extra heartbeat    Anemia, acute on chronic 2023    Anesthesia complication     hallucinations upon waking, violent upon waking per pt report    Arthritis     Atrial fibrillation     BPH (benign prostatic hyperplasia)     Cervical compression fracture 2023    Dementia     Diverticulosis     Diverticulum of esophagus     puree diet and speech therapy    Does use hearing aid     Elevated cholesterol     ESRD on hemodialysis     GERD (gastroesophageal reflux disease)     Heart failure     Hemodialysis patient     M,W,F    Hyperlipidemia     Hypertension     Low back pain     NERY (obstructive sleep apnea)     NOT USING CPAP, dental appliance     Recurrent falls     UTI (urinary tract infection)      Past Surgical History:   Procedure Laterality Date    CATARACT EXTRACTION      COLONOSCOPY      DIALYSIS FISTULA CREATION      x2    ENDOSCOPY      INGUINAL HERNIA REPAIR      PROSTATE BIOPSY      SHOULDER HEMIARTHROPLASTY Right 6/30/2025    Procedure: SHOULDER HEMIARTHROPLASTY, BICEPS TENODESIS RIGHT;  Surgeon: Matteo Tan MD;  Location: [facility] OR;  Service: Orthopedics;  Laterality: Right;    THROAT SURGERY      diverticulum in throat    TONSILLECTOMY AND ADENOIDECTOMY      TOTAL SHOULDER ARTHROPLASTY W/ DISTAL CLAVICLE EXCISION Left 09/25/2023    Procedure: HEMIARTHROPLASTY, BICEPS TENODESIS - LEFT;  Surgeon: Matteo Tan MD;  Location: [facility] OR;  Service: Orthopedics;  Laterality: Left;      General Information       Row Name 07/03/25 1116          Physical Therapy Time and Intention    Document Type therapy note (daily note)  -AB     Mode of Treatment physical therapy  -AB       Row Name 07/03/25 1116          General Information    Patient Profile Reviewed yes  -AB     Existing Precautions/Restrictions fall;other (see comments);right;non-weight bearing;shoulder;oxygen therapy device and L/min  R interscalene nerve catheter, Donjoy II with pillow, RUE fistual, dementia  -AB     Barriers to Rehab medically complex;previous functional deficit;cognitive status  -AB       Row Name 07/03/25 1116          Cognition    Orientation Status (Cognition) oriented to;person;disoriented to;place;situation;time  -AB       Row Name 07/03/25 1116          Safety Issues/Impairments Affecting Functional Mobility    Safety Issues Affecting Function (Mobility) awareness of need for assistance;insight into deficits/self-awareness;safety precautions follow-through/compliance;safety precaution awareness;sequencing abilities;problem-solving;judgment  -AB     Impairments Affecting Function (Mobility) balance;cognition;endurance/activity  tolerance;coordination;pain;strength;range of motion (ROM);postural/trunk control  -AB     Cognitive Impairments, Mobility Safety/Performance attention;awareness, need for assistance;insight into deficits/self-awareness;judgment;problem-solving/reasoning;sequencing abilities;safety precaution follow-through;safety precaution awareness  -AB               User Key  (r) = Recorded By, (t) = Taken By, (c) = Cosigned By      Initials Name Provider Type    AB Caro Youngblood, PT Physical Therapist                   Mobility       Row Name 07/03/25 1116          Bed Mobility    Bed Mobility scooting/bridging;sit-supine  -AB     Scooting/Bridging [place] (Bed Mobility) moderate assist (50% patient effort);verbal cues;2 person assist  -AB     Sit-Supine [place] (Bed Mobility) 2 person assist;nonverbal cues (demo/gesture);verbal cues;maximum assist (25% patient effort)  -AB     Assistive Device (Bed Mobility) head of bed elevated;bed rails;repositioning sheet  -AB     Comment, (Bed Mobility) Pt required assist at trunk and w/ BLE management to transition into supine.  -AB       Row Name 07/03/25 1116          Transfers    Comment, (Transfers) Cues for hand placement and sequencing.  -AB       Row Name 07/03/25 1116          Sit-Stand Transfer    Sit-Stand [place] (Transfers) moderate assist (50% patient effort);2 person assist;verbal cues  -AB     Assistive Device (Sit-Stand Transfers) other (see comments)  UE support  -AB     Comment, (Sit-Stand Transfer) posterior lean in standing.  -AB       Row Name 07/03/25 1116          Gait/Stairs (Locomotion)    [place] Level (Gait) 2 person assist;verbal cues;nonverbal cues (demo/gesture);moderate assist (50% patient effort)  -AB     Assistive Device (Gait) other (see comments)  UE support  -AB     Patient was able to Ambulate yes  -AB     Distance in Feet (Gait) 10  -AB     Deviations/Abnormal Patterns (Gait) bilateral deviations;amberly decreased;gait speed  decreased;stride length decreased  -AB     Bilateral Gait Deviations forward flexed posture;heel strike decreased  -AB     Left Sided Gait Deviations decreased knee extension  -AB     Right Sided Gait Deviations decreased knee extension  -AB     Comment, (Gait/Stairs) Pt ambulated with step to gait pattern, crouched posture, and decreased step length. Cues provided for forward gaze. Mod A for improved stability. Activity limited by weakness and fatigue.  -AB       Row Name 07/03/25 1116          Mobility    Extremity Weight-bearing Status right upper extremity  -AB     Right Upper Extremity (Weight-bearing Status) non weight-bearing (NWB)  -AB               User Key  (r) = Recorded By, (t) = Taken By, (c) = Cosigned By      Initials Name Provider Type    AB Caro Youngblood, PT Physical Therapist                   Obj/Interventions       Row Name 07/03/25 1120          Balance    Balance Assessment sitting static balance;sitting dynamic balance;standing static balance;standing dynamic balance  -AB     Static Sitting Balance contact guard  -AB     Dynamic Sitting Balance contact guard  -AB     Position, Sitting Balance unsupported;sitting edge of bed  -AB     Static Standing Balance moderate assist  -AB     Dynamic Standing Balance moderate assist;2-person assist;verbal cues;non-verbal cues (demo/gesture)  -AB     Position/Device Used, Standing Balance supported  -AB     Balance Interventions sitting;standing;sit to stand;supported;static;dynamic;occupation based/functional task  -AB     Comment, Balance unsteady w/o overt LOB.  -AB               User Key  (r) = Recorded By, (t) = Taken By, (c) = Cosigned By      Initials Name Provider Type    AB Caro Youngblood, PT Physical Therapist                   Goals/Plan    No documentation.                  Clinical Impression       Row Name 07/03/25 1120          Pain    Pain Location extremity  -AB     Pain Side/Orientation right;upper  -AB     Pain Management  Interventions activity modification encouraged;exercise or physical activity utilized;positioning techniques utilized;cold applied  -AB     Response to Pain Interventions activity participation with tolerable pain  -AB       Row Name 07/03/25 1120          Pain Scale: FACES Pre/Post-Treatment    Pain: FACES Scale, Pretreatment 0-->no hurt  -AB     Posttreatment Pain Rating 2-->hurts little bit  -AB       Row Name 07/03/25 1120          Plan of Care Review    Plan of Care Reviewed With patient;caregiver  -AB     Progress improving  -AB     Outcome Evaluation Pt continues to present below his functional baseline with weakness, impaired balance, cognitive deficits, and decreased endurance. He ambulated 10' with mod Ax2. Further IPPT is warrented. PT will progress as able per POC.  -AB       Row Name 07/03/25 1120          Vital Signs    Pre Systolic BP Rehab 182  -AB     Pre Treatment Diastolic BP 57  -AB     O2 Delivery Pre Treatment nasal cannula  -AB     O2 Delivery Intra Treatment nasal cannula  -AB     O2 Delivery Post Treatment nasal cannula  -AB     Pre Patient Position Sitting  -AB     Intra Patient Position Standing  -AB     Post Patient Position Supine  -AB       Row Name 07/03/25 1120          Positioning and Restraints    Pre-Treatment Position sitting in chair/recliner  -AB     Post Treatment Position bed  -AB     In Bed notified nsg;supine;call light within reach;encouraged to call for assist;exit alarm on;with family/caregiver;with brace  -AB               User Key  (r) = Recorded By, (t) = Taken By, (c) = Cosigned By      Initials Name Provider Type    AB Caro Youngblood, PT Physical Therapist                   Outcome Measures       Row Name 07/03/25 1122 07/03/25 0814       How much help from another person do you currently need...    Turning from your back to your side while in flat bed without using bedrails? 3  -AB 2  -BK    Moving from lying on back to sitting on the side of a flat bed without  bedrails? 2  -AB 2  -BK    Moving to and from a bed to a chair (including a wheelchair)? 2  -AB 2  -BK    Standing up from a chair using your arms (e.g., wheelchair, bedside chair)? 2  -AB 2  -BK    Climbing 3-5 steps with a railing? 2  -AB 2  -BK    To walk in hospital room? 2  -AB 2  -BK    AM-PAC 6 Clicks Score (PT) 13  -AB 12  -BK    Highest Level of Mobility Goal Move to Chair/Commode-4  -AB Move to Chair/Commode-4  -BK      Row Name 07/03/25 1122 07/03/25 1036       Functional Assessment    Outcome Measure Options AM-PAC 6 Clicks Basic Mobility (PT)  -AB AM-PAC 6 Clicks Daily Activity (OT)  -AR              User Key  (r) = Recorded By, (t) = Taken By, (c) = Cosigned By      Initials Name Provider Type    Debbie Bledsoe, OT Occupational Therapist    Caro Draper, PT Physical Therapist    Estella Velez, RN Registered Nurse                                 Physical Therapy Education       Title: PT OT SLP Therapies (Done)       Topic: Physical Therapy (Done)       Point: Mobility training (Done)       Learning Progress Summary            Patient Acceptance, E,D, VU,NR by AB at 7/3/2025 1122    Acceptance, E,D, VU,NR by AB at 7/2/2025 1559    Acceptance, E,D, VU,NR by AB at 7/1/2025 0906                      Point: Home exercise program (Done)       Learning Progress Summary            Patient Acceptance, E,D, VU,NR by AB at 7/2/2025 1559                      Point: Body mechanics (Done)       Learning Progress Summary            Patient Acceptance, E,D, VU,NR by AB at 7/3/2025 1122    Acceptance, E,D, VU,NR by AB at 7/2/2025 1559    Acceptance, E,D, VU,NR by AB at 7/1/2025 0906                      Point: Precautions (Done)       Learning Progress Summary            Patient Acceptance, E,D, VU,NR by AB at 7/3/2025 1122    Acceptance, E,D, VU,NR by AB at 7/2/2025 1559    Acceptance, E,D, VU,NR by AB at 7/1/2025 0906                                      User Key       Initials Effective Dates Name  Provider Type Discipline    AB 09/22/22 -  Caro Youngblood, PT Physical Therapist PT                  PT Recommendation and Plan  Planned Therapy Interventions (PT): balance training, bed mobility training, gait training, home exercise program, patient/family education, postural re-education, transfer training, stretching, strengthening, ROM (range of motion)  Progress: improving  Outcome Evaluation: Pt continues to present below his functional baseline with weakness, impaired balance, cognitive deficits, and decreased endurance. He ambulated 10' with mod Ax2. Further IPPT is warrented. PT will progress as able per POC.     Time Calculation:   PT Evaluation Complexity  History, PT Evaluation Complexity: 1-2 personal factors and/or comorbidities  Examination of Body Systems (PT Eval Complexity): total of 3 or more elements  Clinical Presentation (PT Evaluation Complexity): evolving  Clinical Decision Making (PT Evaluation Complexity): moderate complexity  Overall Complexity (PT Evaluation Complexity): moderate complexity     PT Charges       Row Name 07/03/25 1123             Time Calculation    Start Time 1100  -AB      PT Received On 07/03/25  -AB         Timed Charges    98160 - Gait Training Minutes  5  -AB      12364 - PT Therapeutic Activity Minutes 8  -AB         Total Minutes    Timed Charges Total Minutes 13  -AB       Total Minutes 13  -AB                User Key  (r) = Recorded By, (t) = Taken By, (c) = Cosigned By      Initials Name Provider Type    AB Caro Youngblood, PT Physical Therapist                  Therapy Charges for Today       Code Description Service Date Service Provider Modifiers Qty    74930536884 HC GAIT TRAINING EA 15 MIN 7/2/2025 Caro Youngblood, PT GP 1    40804968870 HC PT THERAPEUTIC ACT EA 15 MIN 7/2/2025 Caro Youngblood, PT GP 1    [id number]  PT THER SUPP EA 15 MIN 7/2/2025 Caro Youngblood, PT GP 2    37874042418 HC PT THERAPEUTIC ACT EA 15 MIN 7/3/2025 Caro Youngblood, PT GP 1     [id number]  PT THER SUPP EA 15 MIN 7/3/2025 Caro Youngblood, PT GP 1            PT G-Codes  Outcome Measure Options: AM-PAC 6 Clicks Basic Mobility (PT)  AM-PAC 6 Clicks Score (PT): 13  AM-PAC 6 Clicks Score (OT): 9  PT Discharge Summary  Anticipated Discharge Disposition (PT): skilled nursing facility    Caro Youngblood, PT  7/3/2025

## 2025-07-03 NOTE — PROGRESS NOTES
"   LOS: 0 days    Patient Care Team:  Blair Dhaliwal MD as PCP - General (Internal Medicine)  Blair Packer MD as Consulting Physician (Cardiology)  Michelle Aponte MD (Nephrology)    Reason For Visit:    Subjective     Plan for HD in Am.     Objective     Vital Signs:  Blood pressure 118/58, pulse 72, temperature 98.1 °F (36.7 °C), temperature source Axillary, resp. rate 18, height 165.1 cm (65\"), weight 57.2 kg (126 lb), SpO2 94%.    Flowsheet Rows      Flowsheet Row First Filed Value   Admission Height 165.1 cm (65\") Documented at 06/30/2025 0801   Admission Weight 57.2 kg (126 lb) Documented at 06/30/2025 0801            07/02 0701 - 07/03 0700  In: 400 [P.O.:100; I.V.:300]  Out: 1000     Physical Exam:    GENERAL: WD elderly WM NAD  NEURO: Awake and alert,   + Dementia  PSYCHIATRIC: NMA. Cooperative with PE  EYE: PE, no icterus, no conjunctivitis  ENT: ommm, dentition intact,  Hearing intact  NECK:  No JVD discernable,  Trachea midline  CV: No edema, RRR  LUNGS:  Quiet,  Nonlabored resp.  Symmetrical expansion  ABDOMEN: Nondistended, soft nontender.  : No [place], no palp bladder  SKIN: Warm and dry without rash  RUE aVF  + Thrill       Labs:    Results from last 7 days   Lab Units 07/02/25  0755 06/30/25  0902 06/30/25  0841   WBC 10*3/mm3 9.49 6.51  --    HEMOGLOBIN g/dL 9.9* 12.0*  --    HEMOGLOBIN, POC g/dL  --   --  12.6   PLATELETS 10*3/mm3 183 192  --      Results from last 7 days   Lab Units 07/02/25  0755 06/30/25  0836   SODIUM mmol/L 140 140   POTASSIUM mmol/L 4.9 4.2   CHLORIDE mmol/L 100 97*   CO2 mmol/L 27.0 27.6   BUN mg/dL 27.8* 30.5*   CREATININE mg/dL 5.86* 6.74*   CALCIUM mg/dL 9.4 9.8*   ALBUMIN g/dL  --  3.8     Results from last 7 days   Lab Units 07/02/25  0755   GLUCOSE mg/dL 87     Results from last 7 days   Lab Units 06/30/25  0836   ALK PHOS U/L 97   BILIRUBIN mg/dL 0.3   ALT (SGPT) U/L 11   AST (SGOT) U/L 19     Results from last 7 days   Lab Units 06/30/25  0841 "   PH, ARTERIAL pH units 7.39           A/P:      ESRD: On HD MWF with [facility] unit.  Patient here for scheduled shoulder surgery.  - Continue outpatient HD schedule    Afib w RVR: HR in  during HD treatment. 7/2/25.   On amiodarone. May need metoprolol PRN      HTN: Blood pressure initially quite elevated on admission.  Low side postsurgery.  - With HD.  - Support with albumin as indicated.     Electrolytes: Sodium and potassium are stable.     Acid-base: Bicarb has been stable.     Anemia: Hemoglobin above goal of 10-11.    Transfuse as indicated for Hgb <7.  -No JENNYFER at this time.  Can restart once Hgb <11     Volume: Stable.    -UF with HD as tolerated..  .  Post shoulder surgery 6/30/2025.     Thank you consulting us on Enrike Tomas who is of high risk and complexity.  We will follow along closely          Pancho Carpenter MD  07/03/25  14:40 EDT

## 2025-07-03 NOTE — DISCHARGE PLACEMENT REQUEST
"Enrike Ayala (89 y.o. Male)       Date of Birth   1935    Social Security Number       Address   [address] [place] [address]    Home Phone   [phone]    MRN   [id number]       Worship   Religious    Marital Status                               Admission Date   2025    Admission Type   Elective    Admitting Provider   Matteo Tan MD    Attending Provider   Matteo Tan MD    Department, Room/Bed   [facility] 3G, S357/1       Discharge Date       Discharge Disposition       Discharge Destination                                 Attending Provider: Matteo Tan MD    Allergies: Mirtazapine, Pollen Extract    Isolation: None   Infection: None   Code Status: Prior    Ht: 165.1 cm (65\")   Wt: 57.2 kg (126 lb)    Admission Cmt: None   Principal Problem: S/P shoulder hemiarthroplasty, right [Z96.611]                   Active Insurance as of 2025       Primary Coverage       Payor Plan Insurance Group Employer/Plan Group    [employer] MEDICARE REPLACEMENT [employer]vantage [employer] PPO [id number]       Payor Plan Address Payor Plan Phone Number Payor Plan Fax Number Effective Dates    [address] [address]   2024 - None Entered    [facility] [id number]         Subscriber Name Subscriber Birth Date Member ID       ENRIKE AYALA 1935 [id number]                     Emergency Contacts        (Rel.) Home Phone Work Phone Mobile Phone    LUAN AYALA (Daughter) [phone] -- [phone]    RICHARD AYALA (Spouse) [phone] -- [phone]                 History & Physical        Shanice Reed APRN at 25 1240          Patient Name: Enrike Ayala  MRN: [id number]  : 1935  DOS: 2025    Attending: Matteo Tan MD    Primary Care Provider: Blair Dhaliwal MD      Chief complaint:  Right Shoulder pain    Subjective  Patient is a pleasant 89 y.o. male presented for scheduled " "surgery by   He underwent right shoulder arthroplasty with right biceps tenodesis under GA and a block, tolerated surgery well, was admitted for further management.    Seen postoperatively, patient is doing well, good pain control, no complaints of nausea, vomiting, or shortness of breath.    Reviewed with patient past medical history and home medications- limited due to dementia      Allergies:  Allergies   Allergen Reactions    Mirtazapine Other (See Comments)     \"Did not work well\"    Pollen Extract Other (See Comments)     sneezing       Meds:  Medications Prior to Admission   Medication Sig Dispense Refill Last Dose/Taking    amiodarone (PACERONE) 100 MG tablet Take 1 tablet by mouth Daily.   6/29/2025    atorvastatin (LIPITOR) 20 MG tablet Take 1 tablet by mouth Every Night.   6/29/2025    B Complex-C-Folic Acid (Jenny-Serg) tablet Take 1 tablet by mouth Daily. OTC   6/29/2025    clonazePAM (KlonoPIN) 0.5 MG tablet Take 0.5 tablets by mouth every night at bedtime.   6/29/2025    donepezil (ARICEPT) 5 MG tablet Take 1 tablet by mouth Daily.   6/29/2025    HYDROcodone-acetaminophen (NORCO) 7.5-325 MG per tablet Take 1 tablet by mouth Every 8 (Eight) Hours As Needed for Mild Pain, Moderate Pain or Severe Pain.   6/29/2025    levothyroxine (SYNTHROID, LEVOTHROID) 137 MCG tablet Take 1 tablet by mouth Every Morning.   6/29/2025    losartan (COZAAR) 25 MG tablet Take 0.5 tablets by mouth As Needed. If BP is above 150   6/29/2025    memantine (NAMENDA) 10 MG tablet Take 1 tablet by mouth 2 (Two) Times a Day.   6/29/2025    midodrine (PROAMATINE) 5 MG tablet Take 1 tablet by mouth 3 (Three) Times a Day As Needed (low bp).   6/29/2025    pantoprazole (PROTONIX) 40 MG EC tablet Take 1 tablet by mouth Daily.   6/29/2025    sennosides-docusate (PERICOLACE) 8.6-50 MG per tablet Take 1 tablet by mouth Daily.   6/29/2025    sertraline (ZOLOFT) 100 MG tablet Take 1 tablet by mouth Daily.   6/29/2025    tamsulosin " (FLOMAX) 0.4 MG capsule 24 hr capsule Take 1 capsule by mouth Daily.   6/29/2025    acetaminophen (TYLENOL) 500 MG tablet Take 1 tablet by mouth Every 6 (Six) Hours As Needed for Mild Pain, Headache or Fever. OTC   Unknown    apixaban (ELIQUIS) 2.5 MG tablet tablet Take 1 tablet by mouth 2 (Two) Times a Day. Hold until you follow up with Dr. LANDAVERDE next week   6/27/2025    Buprenorphine (BUTRANS) 5 MCG/HR patch weekly transdermal patch Place 1 patch on the skin as directed by provider 1 (One) Time Per Week.   Unknown    Diclofenac Sodium (VOLTAREN) 1 % gel gel Apply 4 g topically to the appropriate area as directed 2 (Two) Times a Day.   Unknown    fluticasone (FLONASE) 50 MCG/ACT nasal spray Administer 2 sprays into the nostril(s) as directed by provider Daily As Needed for Rhinitis or Allergies. OTC   Unknown    melatonin 5 MG tablet tablet Take 1 tablet by mouth At Night As Needed (Sleep). OTC   Unknown         History:   Past Medical History:   Diagnosis Date    A-fib     Abnormal ECG     occasional extra heartbeat    Anemia, acute on chronic 09/30/2023    Anesthesia complication     hallucinations upon waking, violent upon waking per pt report    Arthritis     Atrial fibrillation     BPH (benign prostatic hyperplasia)     Cervical compression fracture 06/05/2023    Dementia     Diverticulosis     Diverticulum of esophagus     puree diet and speech therapy    Does use hearing aid     Elevated cholesterol     ESRD on hemodialysis     GERD (gastroesophageal reflux disease)     Heart failure     Hemodialysis patient     M,W,F    Hyperlipidemia     Hypertension     Low back pain 2022    NERY (obstructive sleep apnea)     NOT USING CPAP, dental appliance    Recurrent falls     UTI (urinary tract infection)      Past Surgical History:   Procedure Laterality Date    CATARACT EXTRACTION      COLONOSCOPY      DIALYSIS FISTULA CREATION      x2    ENDOSCOPY      INGUINAL HERNIA REPAIR      PROSTATE BIOPSY      THROAT SURGERY    "   diverticulum in throat    TONSILLECTOMY AND ADENOIDECTOMY      TOTAL SHOULDER ARTHROPLASTY W/ DISTAL CLAVICLE EXCISION Left 09/25/2023    Procedure: HEMIARTHROPLASTY, BICEPS TENODESIS - LEFT;  Surgeon: Matteo Tan MD;  Location: [facility];  Service: Orthopedics;  Laterality: Left;     Family History   Problem Relation Age of Onset    Stomach cancer Mother     Heart disease Mother     Arthritis Mother     Heart disease Father     Stroke Father     Kidney failure Father     Cancer Father     Kidney disease Father      Social History     Tobacco Use    Smoking status: Never    Smokeless tobacco: Never   Vaping Use    Vaping status: Never Used   Substance Use Topics    Alcohol use: Not Currently     Alcohol/week: 1.0 standard drink of alcohol     Types: 1 Glasses of wine per week     Comment: once monthly typically    Drug use: Never       Review of Systems  Pertinent items are noted in HPI    Vital Signs  /55 (BP Location: Left arm, Patient Position: Lying)   Pulse 56   Temp 97.4 °F (36.3 °C) (Oral)   Resp 18   Ht 165.1 cm (65\")   Wt 57.2 kg (126 lb)   SpO2 94%   BMI 20.97 kg/m²     Physical Exam:    General Appearance:    Alert, cooperative, in no acute distress   Head:    Normocephalic, without obvious abnormality, atraumatic   Eyes:            Lids and lashes normal, conjunctivae and sclerae normal, no   icterus, no pallor.   Ears:    Ears appear intact with no abnormalities noted   Throat:   No oral lesions, no thrush, oral mucosa moist   Neck:   No adenopathy, supple, trachea midline, no thyromegaly         Lungs:     Clear to auscultation,respirations regular, even and                   unlabored    Heart:    Regular rhythm and normal rate, normal S1 and S2, no      murmur    Abdomen:     Normal bowel sounds, no masses, no organomegaly, soft        nontender, nondistended, no guarding, no rebound                 tenderness   Genitalia:    Deferred   Extremities:   RUE in a sling, CDI  " dressing . Interscalene nerve block cath present.  Distal pulses, cap refill, movements of fingers, wrist, intact.     Pulses:   Pulses palpable and equal bilaterally   Skin:   No bleeding, bruising or rash   Neurologic:   Cranial nerves 2 - 12 grossly intact      I reviewed the patient's new clinical results.       Results from last 7 days   Lab Units 06/30/25  0902 06/30/25  0841 06/25/25  1547   WBC 10*3/mm3 6.51  --  7.10   HEMOGLOBIN g/dL 12.0*  --  12.4*   HEMOGLOBIN, POC g/dL  --  12.6  --    HEMATOCRIT % 37.2*  --  37.8   HEMATOCRIT POC %  --  37*  --    PLATELETS 10*3/mm3 192  --  188     Results from last 7 days   Lab Units 06/30/25  0836 06/25/25  1547   SODIUM mmol/L 140 140   POTASSIUM mmol/L 4.2 3.8   CHLORIDE mmol/L 97* 97*   CO2 mmol/L 27.6 26.7   BUN mg/dL 30.5* 9.4   CREATININE mg/dL 6.74* 3.30*   CALCIUM mg/dL 9.8* 9.0   BILIRUBIN mg/dL 0.3  --    ALK PHOS U/L 97  --    ALT (SGPT) U/L 11  --    AST (SGOT) U/L 19  --    GLUCOSE mg/dL 73 101*     Lab Results   Component Value Date    HGBA1C 5.16 06/25/2025           Assessment and Plan:       S/P shoulder hemiarthroplasty, right    (HFpEF) heart failure with preserved ejection fraction    Depression    ESRD (end stage renal disease)    Essential hypertension    GERD (gastroesophageal reflux disease)    Chronic systolic heart failure      Plan    1. PT/OT. NWB, RUE, ROM hand, wrist, elbow.  2. Pain control-prns, interscalene nerve block cath with ropivacaine infusion.   3. IS-encourage  4. DVT proph- Mech/ mobilize.  5. Bowel regimen  6. Resume home medications as appropriate  7. Monitor post-op labs  8. DC planning - ?rehab    -ESRD  On HD MWF. RUE fistula  Takes midodrine on HD days  Consult NAL for HD management       -HTN with intermittent hypotension  Hx labile BP with episodes of hypotension  On low dose losartan for HFpEF but only takes PRN per chart review  Takes midodrine on HD days and intermittently throughout week (checks BP twice  daily)    -Paroxysmal A fib, HFpEF   Chronic, stable. Follows with cardiology  - On amiodarone and Eliquis- resume Eliquis tomorrow       -GERD:  Resume PPI.  Formulary substitution when indicated.    -Hypothyroidism  Resume Synthroid     -Depression:  Resume home regimen including Sertraline    -BPH  Resume tamsulosin       -Major neurocognitive disorder, Memory loss  Follows with neurology, first seen 3/2025  Thought multifactorial 2/2 opioid use, ESRD, depression, possible vascular disease. Per neuro notes some c/f Alzheimer's  Resume Namenda and Aricept       Dragon disclaimer:  Part of this encounter note is an electronic transcription/translation of spoken language to printed text. The electronic translation of spoken language may permit erroneous, or at times, nonsensical words or phrases to be inadvertently transcribed; Although I have reviewed the note for such errors, some may still exist.    MERCEDEZ Hendrickson  06/30/25  12:41 EDT           Electronically signed by Shanice Reed APRN at 06/30/25 1345       Debbie Resendez APRN at 06/30/25 0802       Attestation signed by Matteo Tan MD at 06/30/25 0925      I have reviewed this documentation and agree.                        Pre-Op H&P  Enrike CONNELL Thom  [id number]  1935      Chief complaint: Right shoulder pain      Subjective:  Patient is a 89 y.o.male presents for scheduled surgery by Dr. Tan. He anticipates a RIGHT SHOULDER HEMIARTHROPLASTY, BICEPS TENODESIS  today. He reports a long history of right shoulder pain and limited ROM. He denies numbness, tingling or difficulty with  right hand. He tried cortisone injection with short-term pain relief.      Review of Systems:  Constitutional-- No fever, chills or sweats. No fatigue.  CV-- No chest pain, palpitation or syncope. +HTN, HLD, afib  Resp-- No SOB, cough, hemoptysis  Skin--No rashes or lesions      Allergies:   Allergies   Allergen Reactions    Mirtazapine Other (See  "Comments)     \"Did not work well\"    Pollen Extract Other (See Comments)     sneezing         Home Meds:  Medications Prior to Admission   Medication Sig Dispense Refill Last Dose/Taking    acetaminophen (TYLENOL) 500 MG tablet Take 1 tablet by mouth Every 6 (Six) Hours As Needed for Mild Pain, Headache or Fever. OTC       amiodarone (PACERONE) 100 MG tablet Take 1 tablet by mouth Daily.       apixaban (ELIQUIS) 2.5 MG tablet tablet Take 1 tablet by mouth 2 (Two) Times a Day. Hold until you follow up with Dr. LANDAVERDE next week       atorvastatin (LIPITOR) 20 MG tablet Take 1 tablet by mouth Every Night.       B Complex-C-Folic Acid (Jenny-Serg) tablet Take 1 tablet by mouth Daily. OTC       Buprenorphine (BUTRANS) 5 MCG/HR patch weekly transdermal patch Place 1 patch on the skin as directed by provider 1 (One) Time Per Week.       clonazePAM (KlonoPIN) 0.5 MG tablet Take 0.5 tablets by mouth every night at bedtime.       Diclofenac Sodium (VOLTAREN) 1 % gel gel Apply 4 g topically to the appropriate area as directed 2 (Two) Times a Day.       donepezil (ARICEPT) 5 MG tablet Take 1 tablet by mouth Daily.       fluticasone (FLONASE) 50 MCG/ACT nasal spray Administer 2 sprays into the nostril(s) as directed by provider Daily As Needed for Rhinitis or Allergies. OTC       HYDROcodone-acetaminophen (NORCO) 7.5-325 MG per tablet Take 1 tablet by mouth Every 8 (Eight) Hours As Needed for Mild Pain, Moderate Pain or Severe Pain.       levothyroxine (SYNTHROID, LEVOTHROID) 137 MCG tablet Take 1 tablet by mouth Every Morning.       losartan (COZAAR) 25 MG tablet Take 0.5 tablets by mouth As Needed. If BP is above 150       melatonin 5 MG tablet tablet Take 1 tablet by mouth At Night As Needed (Sleep). OTC       memantine (NAMENDA) 10 MG tablet Take 1 tablet by mouth 2 (Two) Times a Day.       midodrine (PROAMATINE) 5 MG tablet Take 1 tablet by mouth 3 (Three) Times a Day As Needed (low bp).       pantoprazole (PROTONIX) 40 MG EC " tablet Take 1 tablet by mouth Daily.       sennosides-docusate (PERICOLACE) 8.6-50 MG per tablet Take 1 tablet by mouth Daily.       sertraline (ZOLOFT) 100 MG tablet Take 1 tablet by mouth Daily.       tamsulosin (FLOMAX) 0.4 MG capsule 24 hr capsule Take 1 capsule by mouth Daily.       Teriparatide, Recombinant, (FORTEO) 620 MCG/2.48ML injection Inject 20 mcg under the skin into the appropriate area as directed Daily.            PMH:   Past Medical History:   Diagnosis Date    A-fib     Abnormal ECG     occasional extra heartbeat    Anemia, acute on chronic 09/30/2023    Anesthesia complication     hallucinations upon waking, violent upon waking per pt report    Arthritis     Atrial fibrillation     BPH (benign prostatic hyperplasia)     Cervical compression fracture 06/05/2023    Dementia     Diverticulosis     Diverticulum of esophagus     puree diet and speech therapy    Does use hearing aid     Elevated cholesterol     ESRD on hemodialysis     GERD (gastroesophageal reflux disease)     Heart failure     Hemodialysis patient     M,W,F    Hyperlipidemia     Hypertension     Low back pain 2022    NERY (obstructive sleep apnea)     NOT USING CPAP, dental appliance    Recurrent falls     UTI (urinary tract infection)      PSH:    Past Surgical History:   Procedure Laterality Date    CATARACT EXTRACTION      COLONOSCOPY      DIALYSIS FISTULA CREATION      x2    ENDOSCOPY      INGUINAL HERNIA REPAIR      PROSTATE BIOPSY      THROAT SURGERY      diverticulum in throat    TONSILLECTOMY AND ADENOIDECTOMY      TOTAL SHOULDER ARTHROPLASTY W/ DISTAL CLAVICLE EXCISION Left 09/25/2023    Procedure: HEMIARTHROPLASTY, BICEPS TENODESIS - LEFT;  Surgeon: Matteo Tan MD;  [facility]: [facility];  Service: Orthopedics;  Laterality: Left;       Immunization History:  Influenza: UTD  Pneumococcal: UTD  Tetanus: UTD    Social History:   Tobacco:   Social History     Tobacco Use   Smoking Status Never   Smokeless Tobacco Never  "     Alcohol:     Social History     Substance and Sexual Activity   Alcohol Use Not Currently    Alcohol/week: 1.0 standard drink of alcohol    Types: 1 Glasses of wine per week    Comment: once monthly typically         Physical Exam:/75 (BP Location: Left leg, Patient Position: Lying)   Pulse 67   Temp 98.2 °F (36.8 °C) (Temporal)   Resp 16   Ht 165.1 cm (65\")   Wt 57.2 kg (126 lb)   SpO2 94%   BMI 20.97 kg/m²       General Appearance:    Alert, cooperative, no distress, appears stated age   Head:    Normocephalic, without obvious abnormality, atraumatic   Lungs:     Clear to auscultation bilaterally, respirations unlabored    Heart:   Regular rate and rhythm, S1 and S2 normal    Abdomen:    Soft without tenderness   Extremities:   Extremities normal, atraumatic, no cyanosis or edema   Skin:   Skin color, texture, turgor normal, no rashes or lesions   Neurologic:   Grossly intact     Results Review:     LABS:  Lab Results   Component Value Date    WBC 7.10 06/25/2025    HGB 12.4 (L) 06/25/2025    HCT 37.8 06/25/2025    .1 (H) 06/25/2025     06/25/2025    NEUTROABS 7.63 (H) 03/26/2025    GLUCOSE 101 (H) 06/25/2025    BUN 9.4 06/25/2025    CREATININE 3.30 (H) 06/25/2025     06/25/2025    K 3.8 06/25/2025    CL 97 (L) 06/25/2025    CO2 26.7 06/25/2025    MG 1.8 03/26/2025    PHOS 3.2 03/21/2025    CALCIUM 9.0 06/25/2025    ALBUMIN 4.1 03/26/2025    AST 16 03/26/2025    ALT 13 03/26/2025    BILITOT 0.3 03/26/2025       RADIOLOGY:  Imaging Results (Last 72 Hours)       ** No results found for the last 72 hours. **            I reviewed the patient's new clinical results.    Cancer Staging (if applicable)  Cancer Patient: __ yes __no __unknown; If yes, clinical stage T:__ N:__M:__, stage group or __N/A      Impression: Right shoulder pain      Plan: RIGHT SHOULDER HEMIARTHROPLASTY, BICEPS TENODESIS       MERCEDEZ Ryan   6/30/2025   08:02 EDT    Electronically signed by Britney, " "Matteo Carrion MD at 25 0925          Physician Progress Notes (most recent note)        Debbie Resendez, MERCEDEZ at 25 1131          IM progress note      Enrike Tomas  [id number]  1935     LOS: 0 days     Attending: Matteo Tan MD    Primary Care Provider: Blair Dhaliwal MD      Chief Complaint/Reason for visit:  No chief complaint on file.      Subjective   Drowsy. Adequate pain control. Resting comfortably in recliner. Family reports some choking and difficulty swallowing with breakfast. Denies f/c/n/v/sob/cp.    Objective        Visit Vitals  BP (!) 182/57 Comment: taken on leg   Pulse 74   Temp 98.3 °F (36.8 °C) (Axillary)   Resp 18   Ht 165.1 cm (65\")   Wt 57.2 kg (126 lb)   SpO2 (!) 88%   BMI 20.97 kg/m²     Temp (24hrs), Av.7 °F (37.1 °C), Min:98.2 °F (36.8 °C), Max:99.8 °F (37.7 °C)    Physical Therapy: Pt continues to present below his functional baseline with weakness, impaired balance, cognitive deficits, and decreased endurance. He ambulated 10' with mod Ax2. Further IPPT is warrented. PT will progress as able per POC.      Physical Exam:     General Appearance:    Alert, cooperative, in no acute distress   Head:    Normocephalic, without obvious abnormality, atraumatic    Lungs:     Normal effort, symmetric chest rise,  clear to      auscultation bilaterally              Heart:    Regular rhythm and normal rate, normal S1 and S2    Abdomen:     Normal bowel sounds, no masses, no organomegaly, soft        nontender, nondistended, no guarding, no rebound                tenderness   Extremities:    RUE in a sling, CDI  dressing . Interscalene nerve block cath present.  Distal pulses, cap refill, movements of fingers, wrist, intact.  No clubbing, cyanosis or edema.  No deformities.    Pulses:   Pulses palpable and equal bilaterally   Skin:   No bleeding, bruising or rash          Results Review:     I reviewed the patient's new clinical results.   Results from last 7 days "   Lab Units 07/02/25  0755 06/30/25  0902 06/30/25  0841   WBC 10*3/mm3 9.49 6.51  --    HEMOGLOBIN g/dL 9.9* 12.0*  --    HEMOGLOBIN, POC g/dL  --   --  12.6   HEMATOCRIT % 30.1* 37.2*  --    HEMATOCRIT POC %  --   --  37*   PLATELETS 10*3/mm3 183 192  --      Results from last 7 days   Lab Units 07/02/25  0755 06/30/25  0836   SODIUM mmol/L 140 140   POTASSIUM mmol/L 4.9 4.2   CHLORIDE mmol/L 100 97*   CO2 mmol/L 27.0 27.6   BUN mg/dL 27.8* 30.5*   CREATININE mg/dL 5.86* 6.74*   CALCIUM mg/dL 9.4 9.8*   BILIRUBIN mg/dL  --  0.3   ALK PHOS U/L  --  97   ALT (SGPT) U/L  --  11   AST (SGOT) U/L  --  19   GLUCOSE mg/dL 87 73     I reviewed the patient's new imaging including images and reports.    All medications reviewed.   amiodarone, 100 mg, Oral, Q24H  apixaban, 2.5 mg, Oral, Q12H  atorvastatin, 20 mg, Oral, Nightly  ciprofloxacin, 2 drop, Left Eye, Q4H While Awake  donepezil, 5 mg, Oral, Nightly  levothyroxine, 137 mcg, Oral, Q AM  memantine, 10 mg, Oral, Q12H  pantoprazole, 40 mg, Oral, Q AM  senna-docusate sodium, 1 tablet, Oral, Nightly  sertraline, 100 mg, Oral, Daily  tamsulosin, 0.4 mg, Oral, Daily  traZODone, 50 mg, Oral, Nightly      acetaminophen, 650 mg, Q4H PRN   Or  acetaminophen, 650 mg, Q4H PRN  albumin human, 12.5 g, PRN  HYDROmorphone, 0.5 mg, Q2H PRN   And  naloxone, 0.1 mg, Q5 Min PRN  losartan, 25 mg, Daily PRN  metoprolol tartrate, 5 mg, Q6H PRN  oxyCODONE, 5 mg, Q4H PRN  Polyvinyl Alcohol-Povidone PF, 1 drop, Q3H PRN        Assessment & Plan       S/P shoulder hemiarthroplasty, right    (HFpEF) heart failure with preserved ejection fraction    Benign prostatic hyperplasia    Depression    ESRD (end stage renal disease)    Essential hypertension    GERD (gastroesophageal reflux disease)    Chronic systolic heart failure       Plan  1. PT/OT- CARISSA prado, MELVI  2. Pain control-prns, interscalene block  3. IS-encouraged  4. DVT proph- Eliquis   5. Bowel regimen  6. Monitor post-op labs  7.  Discharge planning- SNF recommended     -ESRD  On HD MWF. RUE fistula  Takes midodrine on HD days  NAL following for HD management       -HTN with intermittent hypotension  Hx labile BP with episodes of hypotension  On low dose losartan for HFpEF but only takes PRN per chart review  Takes midodrine on HD days and intermittently throughout week (checks BP twice daily)     -Paroxysmal A fib, HFpEF   Chronic, stable. Follows with cardiology  - On amiodarone and Eliquis- resume Eliquis        -GERD:  Resume PPI.  Formulary substitution when indicated.     -Hypothyroidism  Resume Synthroid     -Depression:  Resume home regimen including Sertraline     -BPH  Resume tamsulosin     -Major neurocognitive disorder, Memory loss  Follows with neurology, first seen 3/2025  Thought multifactorial 2/2 opioid use, ESRD, depression, possible vascular disease. Per neuro notes some c/f Alzheimer's  Resume Namenda and Aricept     Insomnia  - records reviewed, Melatonin ineffective, Seroquel led to excessive daytime sleepiness, Klonopin effective for sleep but worsened confusion   -Trial of Trazodone       MERCEDEZ Ryan  25  11:31 EDT     Electronically signed by Debbie Resendez APRN at 25 1134          Physical Therapy Notes (most recent note)        Caro Youngblood, PT at 25 1100  Version 2 of 2         Patient Name: Enrike Tomas  : 1935    MRN: [id number]                              Today's Date: 7/3/2025       Admit Date: 2025    Visit Dx: No diagnosis found.  Patient Active Problem List   Diagnosis    (HFpEF) heart failure with preserved ejection fraction    Anemia of renal disease    At high risk for falls    Benign prostatic hyperplasia    Closed fracture of left acetabulum    Compression fracture of thoracic vertebra    Deficiency of other specified B group vitamins    Depression    ESRD (end stage renal disease)    Essential hypertension    GERD (gastroesophageal reflux disease)     Hepatic cyst    Long term (current) use of anticoagulants    Major depressive disorder, single episode, moderate    Mixed hyperlipidemia    Morgagni hernia    Occlusion of right vertebral artery    NERY (obstructive sleep apnea)    Fall    Severe malnutrition    Sustained SVT    Paroxysmal atrial fibrillation    Chronic heart failure with preserved ejection fraction (HFpEF)    S/P shoulder hemiarthroplasty, left    Anemia, acute on chronic    Postoperative confusion    Hyperkalemia    Chronic systolic heart failure    ESRD needing dialysis    S/P shoulder hemiarthroplasty, right     Past Medical History:   Diagnosis Date    A-fib     Abnormal ECG     occasional extra heartbeat    Anemia, acute on chronic 09/30/2023    Anesthesia complication     hallucinations upon waking, violent upon waking per pt report    Arthritis     Atrial fibrillation     BPH (benign prostatic hyperplasia)     Cervical compression fracture 06/05/2023    Dementia     Diverticulosis     Diverticulum of esophagus     puree diet and speech therapy    Does use hearing aid     Elevated cholesterol     ESRD on hemodialysis     GERD (gastroesophageal reflux disease)     Heart failure     Hemodialysis patient     M,W,F    Hyperlipidemia     Hypertension     Low back pain 2022    NERY (obstructive sleep apnea)     NOT USING CPAP, dental appliance    Recurrent falls     UTI (urinary tract infection)      Past Surgical History:   Procedure Laterality Date    CATARACT EXTRACTION      COLONOSCOPY      DIALYSIS FISTULA CREATION      x2    ENDOSCOPY      INGUINAL HERNIA REPAIR      PROSTATE BIOPSY      SHOULDER HEMIARTHROPLASTY Right 6/30/2025    Procedure: SHOULDER HEMIARTHROPLASTY, BICEPS TENODESIS RIGHT;  Surgeon: Matteo Tan MD;  Location: [facility];  Service: Orthopedics;  Laterality: Right;    THROAT SURGERY      diverticulum in throat    TONSILLECTOMY AND ADENOIDECTOMY      TOTAL SHOULDER ARTHROPLASTY W/ DISTAL CLAVICLE EXCISION Left  09/25/2023    Procedure: HEMIARTHROPLASTY, BICEPS TENODESIS - LEFT;  Surgeon: Matteo Tan MD;  Location: [facility];  Service: Orthopedics;  Laterality: Left;      General Information       Row Name 07/03/25 1116          Physical Therapy Time and Intention    Document Type therapy note (daily note)  -AB     Mode of Treatment physical therapy  -AB       Row Name 07/03/25 1116          General Information    Patient Profile Reviewed yes  -AB     Existing Precautions/Restrictions fall;other (see comments);right;non-weight bearing;shoulder;oxygen therapy device and L/min  R interscalene nerve catheter, Donjoy II with pillow, RUE fistual, dementia  -AB     Barriers to Rehab medically complex;previous functional deficit;cognitive status  -AB       Row Name 07/03/25 1116          Cognition    Orientation Status (Cognition) oriented to;person;disoriented to;place;situation;time  -AB       Row Name 07/03/25 1116          Safety Issues/Impairments Affecting Functional Mobility    Safety Issues Affecting Function (Mobility) awareness of need for assistance;insight into deficits/self-awareness;safety precautions follow-through/compliance;safety precaution awareness;sequencing abilities;problem-solving;judgment  -AB     Impairments Affecting Function (Mobility) balance;cognition;endurance/activity tolerance;coordination;pain;strength;range of motion (ROM);postural/trunk control  -AB     Cognitive Impairments, Mobility Safety/Performance attention;awareness, need for assistance;insight into deficits/self-awareness;judgment;problem-solving/reasoning;sequencing abilities;safety precaution follow-through;safety precaution awareness  -AB               User Key  (r) = Recorded By, (t) = Taken By, (c) = Cosigned By      Initials Name Provider Type    AB Caro Youngblood PT Physical Therapist                   Mobility       Row Name 07/03/25 1116          Bed Mobility    Bed Mobility scooting/bridging;sit-supine  -AB      Scooting/Bridging [place] (Bed Mobility) moderate assist (50% patient effort);verbal cues;2 person assist  -AB     Sit-Supine [place] (Bed Mobility) 2 person assist;nonverbal cues (demo/gesture);verbal cues;maximum assist (25% patient effort)  -AB     Assistive Device (Bed Mobility) head of bed elevated;bed rails;repositioning sheet  -AB     Comment, (Bed Mobility) Pt required assist at trunk and w/ BLE management to transition into supine.  -AB       Row Name 07/03/25 1116          Transfers    Comment, (Transfers) Cues for hand placement and sequencing.  -AB       Row Name 07/03/25 1116          Sit-Stand Transfer    Sit-Stand [place] (Transfers) moderate assist (50% patient effort);2 person assist;verbal cues  -AB     Assistive Device (Sit-Stand Transfers) other (see comments)  UE support  -AB     Comment, (Sit-Stand Transfer) posterior lean in standing.  -AB       Row Name 07/03/25 1116          Gait/Stairs (Locomotion)    [place] Level (Gait) 2 person assist;verbal cues;nonverbal cues (demo/gesture);moderate assist (50% patient effort)  -AB     Assistive Device (Gait) other (see comments)  UE support  -AB     Patient was able to Ambulate yes  -AB     Distance in Feet (Gait) 10  -AB     Deviations/Abnormal Patterns (Gait) bilateral deviations;amberly decreased;gait speed decreased;stride length decreased  -AB     Bilateral Gait Deviations forward flexed posture;heel strike decreased  -AB     Left Sided Gait Deviations decreased knee extension  -AB     Right Sided Gait Deviations decreased knee extension  -AB     Comment, (Gait/Stairs) Pt ambulated with step to gait pattern, crouched posture, and decreased step length. Cues provided for forward gaze. Mod A for improved stability. Activity limited by weakness and fatigue.  -AB       Row Name 07/03/25 1116          Mobility    Extremity Weight-bearing Status right upper extremity  -AB     Right Upper Extremity (Weight-bearing Status) non  weight-bearing (NWB)  -AB               User Key  (r) = Recorded By, (t) = Taken By, (c) = Cosigned By      Initials Name Provider Type    AB Caro Youngblood, PT Physical Therapist                   Obj/Interventions       Row Name 07/03/25 1120          Balance    Balance Assessment sitting static balance;sitting dynamic balance;standing static balance;standing dynamic balance  -AB     Static Sitting Balance contact guard  -AB     Dynamic Sitting Balance contact guard  -AB     Position, Sitting Balance unsupported;sitting edge of bed  -AB     Static Standing Balance moderate assist  -AB     Dynamic Standing Balance moderate assist;2-person assist;verbal cues;non-verbal cues (demo/gesture)  -AB     Position/Device Used, Standing Balance supported  -AB     Balance Interventions sitting;standing;sit to stand;supported;static;dynamic;occupation based/functional task  -AB     Comment, Balance unsteady w/o overt LOB.  -AB               User Key  (r) = Recorded By, (t) = Taken By, (c) = Cosigned By      Initials Name Provider Type    AB Caro Youngblood, PT Physical Therapist                   Goals/Plan    No documentation.                  Clinical Impression       Row Name 07/03/25 1120          Pain    Pain Location extremity  -AB     Pain Side/Orientation right;upper  -AB     Pain Management Interventions activity modification encouraged;exercise or physical activity utilized;positioning techniques utilized;cold applied  -AB     Response to Pain Interventions activity participation with tolerable pain  -AB       Row Name 07/03/25 1120          Pain Scale: FACES Pre/Post-Treatment    Pain: FACES Scale, Pretreatment 0-->no hurt  -AB     Posttreatment Pain Rating 2-->hurts little bit  -AB       Row Name 07/03/25 1120          Plan of Care Review    Plan of Care Reviewed With patient;caregiver  -AB     Progress improving  -AB     Outcome Evaluation Pt continues to present below his functional baseline with weakness,  impaired balance, cognitive deficits, and decreased endurance. He ambulated 10' with mod Ax2. Further IPPT is warrented. PT will progress as able per POC.  -AB       Row Name 07/03/25 1120          Vital Signs    Pre Systolic BP Rehab 182  -AB     Pre Treatment Diastolic BP 57  -AB     O2 Delivery Pre Treatment nasal cannula  -AB     O2 Delivery Intra Treatment nasal cannula  -AB     O2 Delivery Post Treatment nasal cannula  -AB     Pre Patient Position Sitting  -AB     Intra Patient Position Standing  -AB     Post Patient Position Supine  -AB       Row Name 07/03/25 1120          Positioning and Restraints    Pre-Treatment Position sitting in chair/recliner  -AB     Post Treatment Position bed  -AB     In Bed notified nsg;supine;call light within reach;encouraged to call for assist;exit alarm on;with family/caregiver;with brace  -AB               User Key  (r) = Recorded By, (t) = Taken By, (c) = Cosigned By      Initials Name Provider Type    AB Caro Youngblood, PT Physical Therapist                   Outcome Measures       Row Name 07/03/25 1122 07/03/25 0814       How much help from another person do you currently need...    Turning from your back to your side while in flat bed without using bedrails? 3  -AB 2  -BK    Moving from lying on back to sitting on the side of a flat bed without bedrails? 2  -AB 2  -BK    Moving to and from a bed to a chair (including a wheelchair)? 2  -AB 2  -BK    Standing up from a chair using your arms (e.g., wheelchair, bedside chair)? 2  -AB 2  -BK    Climbing 3-5 steps with a railing? 2  -AB 2  -BK    To walk in hospital room? 2  -AB 2  -BK    AM-PAC 6 Clicks Score (PT) 13  -AB 12  -BK    Highest Level of Mobility Goal Move to Chair/Commode-4  -AB Move to Chair/Commode-4  -BK      Row Name 07/03/25 1122 07/03/25 1036       Functional Assessment    Outcome Measure Options AM-PAC 6 Clicks Basic Mobility (PT)  -AB AM-PAC 6 Clicks Daily Activity (OT)  -AR              User Key  (r) =  Recorded By, (t) = Taken By, (c) = Cosigned By      Initials Name Provider Type    Debbie Bledsoe, OT Occupational Therapist    Caro Draper, PT Physical Therapist    Estella Velez, RN Registered Nurse                                 Physical Therapy Education       Title: PT OT SLP Therapies (Done)       Topic: Physical Therapy (Done)       Point: Mobility training (Done)       Learning Progress Summary            Patient Acceptance, E,D, VU,NR by AB at 7/3/2025 1122    Acceptance, E,D, VU,NR by AB at 7/2/2025 1559    Acceptance, E,D, VU,NR by AB at 7/1/2025 0906                      Point: Home exercise program (Done)       Learning Progress Summary            Patient Acceptance, E,D, VU,NR by AB at 7/2/2025 1559                      Point: Body mechanics (Done)       Learning Progress Summary            Patient Acceptance, E,D, VU,NR by AB at 7/3/2025 1122    Acceptance, E,D, VU,NR by AB at 7/2/2025 1559    Acceptance, E,D, VU,NR by AB at 7/1/2025 0906                      Point: Precautions (Done)       Learning Progress Summary            Patient Acceptance, E,D, VU,NR by AB at 7/3/2025 1122    Acceptance, E,D, VU,NR by AB at 7/2/2025 1559    Acceptance, E,D, VU,NR by AB at 7/1/2025 0906                                      User Key       Initials Effective Dates Name Provider Type Discipline    AB 09/22/22 -  Caro Youngblood, PT Physical Therapist PT                  PT Recommendation and Plan  Planned Therapy Interventions (PT): balance training, bed mobility training, gait training, home exercise program, patient/family education, postural re-education, transfer training, stretching, strengthening, ROM (range of motion)  Progress: improving  Outcome Evaluation: Pt continues to present below his functional baseline with weakness, impaired balance, cognitive deficits, and decreased endurance. He ambulated 10' with mod Ax2. Further IPPT is warrented. PT will progress as able per POC.     Time  Calculation:   PT Evaluation Complexity  History, PT Evaluation Complexity: 1-2 personal factors and/or comorbidities  Examination of Body Systems (PT Eval Complexity): total of 3 or more elements  Clinical Presentation (PT Evaluation Complexity): evolving  Clinical Decision Making (PT Evaluation Complexity): moderate complexity  Overall Complexity (PT Evaluation Complexity): moderate complexity     PT Charges       Row Name 25 1123             Time Calculation    Start Time 1100  -AB      PT Received On 25  -AB         Timed Charges    43818 - Gait Training Minutes  5  -AB      13876 - PT Therapeutic Activity Minutes 8  -AB         Total Minutes    Timed Charges Total Minutes 13  -AB       Total Minutes 13  -AB                User Key  (r) = Recorded By, (t) = Taken By, (c) = Cosigned By      Initials Name Provider Type    AB Caro Youngblood, PT Physical Therapist                  Therapy Charges for Today       Code Description Service Date Service Provider Modifiers Qty    47714847993 HC GAIT TRAINING EA 15 MIN 2025 Caro Youngblood, PT GP 1    93387206309 HC PT THERAPEUTIC ACT EA 15 MIN 2025 Caro Youngblood, PT GP 1    52915420072 HC PT THER SUPP EA 15 MIN 2025 Caro Youngblood, PT GP 2    45542848256 HC PT THERAPEUTIC ACT EA 15 MIN 7/3/2025 Caro Youngblood, PT GP 1    84803411061 HC PT THER SUPP EA 15 MIN 7/3/2025 Caro Youngblood, PT GP 1            PT G-Codes  Outcome Measure Options: AM-PAC 6 Clicks Basic Mobility (PT)  AM-PAC 6 Clicks Score (PT): 13  AM-PAC 6 Clicks Score (OT): 9  PT Discharge Summary  Anticipated Discharge Disposition (PT): skilled nursing facility    Caro Youngblood PT  7/3/2025      Electronically signed by Caro Youngblood, PT at 25 1124       Caro Youngblood, PT at 25 1100  Version 1 of 2         Patient Name: Enrike Tomas  : 1935    MRN: [id number]                              Today's Date: 7/3/2025       Admit Date: 2025    Visit Dx: No  diagnosis found.  Patient Active Problem List   Diagnosis    (HFpEF) heart failure with preserved ejection fraction    Anemia of renal disease    At high risk for falls    Benign prostatic hyperplasia    Closed fracture of left acetabulum    Compression fracture of thoracic vertebra    Deficiency of other specified B group vitamins    Depression    ESRD (end stage renal disease)    Essential hypertension    GERD (gastroesophageal reflux disease)    Hepatic cyst    Long term (current) use of anticoagulants    Major depressive disorder, single episode, moderate    Mixed hyperlipidemia    Morgagni hernia    Occlusion of right vertebral artery    NERY (obstructive sleep apnea)    Fall    Severe malnutrition    Sustained SVT    Paroxysmal atrial fibrillation    Chronic heart failure with preserved ejection fraction (HFpEF)    S/P shoulder hemiarthroplasty, left    Anemia, acute on chronic    Postoperative confusion    Hyperkalemia    Chronic systolic heart failure    ESRD needing dialysis    S/P shoulder hemiarthroplasty, right     Past Medical History:   Diagnosis Date    A-fib     Abnormal ECG     occasional extra heartbeat    Anemia, acute on chronic 09/30/2023    Anesthesia complication     hallucinations upon waking, violent upon waking per pt report    Arthritis     Atrial fibrillation     BPH (benign prostatic hyperplasia)     Cervical compression fracture 06/05/2023    Dementia     Diverticulosis     Diverticulum of esophagus     puree diet and speech therapy    Does use hearing aid     Elevated cholesterol     ESRD on hemodialysis     GERD (gastroesophageal reflux disease)     Heart failure     Hemodialysis patient     M,W,F    Hyperlipidemia     Hypertension     Low back pain 2022    NERY (obstructive sleep apnea)     NOT USING CPAP, dental appliance    Recurrent falls     UTI (urinary tract infection)      Past Surgical History:   Procedure Laterality Date    CATARACT EXTRACTION      COLONOSCOPY      DIALYSIS  FISTULA CREATION      x2    ENDOSCOPY      INGUINAL HERNIA REPAIR      PROSTATE BIOPSY      SHOULDER HEMIARTHROPLASTY Right 6/30/2025    Procedure: SHOULDER HEMIARTHROPLASTY, BICEPS TENODESIS RIGHT;  Surgeon: Matteo Tan MD;  Location: [facility] OR;  Service: Orthopedics;  Laterality: Right;    THROAT SURGERY      diverticulum in throat    TONSILLECTOMY AND ADENOIDECTOMY      TOTAL SHOULDER ARTHROPLASTY W/ DISTAL CLAVICLE EXCISION Left 09/25/2023    Procedure: HEMIARTHROPLASTY, BICEPS TENODESIS - LEFT;  Surgeon: Matteo Tan MD;  Location: [facility] OR;  Service: Orthopedics;  Laterality: Left;      General Information       Row Name 07/03/25 1116          Physical Therapy Time and Intention    Document Type therapy note (daily note)  -AB     Mode of Treatment physical therapy  -AB       Row Name 07/03/25 1116          General Information    Patient Profile Reviewed yes  -AB     Existing Precautions/Restrictions fall;other (see comments);right;non-weight bearing;shoulder;oxygen therapy device and L/min  R interscalene nerve catheter, Donjoy II with pillow, RUE fistual, dementia  -AB     Barriers to Rehab medically complex;previous functional deficit;cognitive status  -AB       Row Name 07/03/25 1116          Cognition    Orientation Status (Cognition) oriented to;person;disoriented to;place;situation;time  -AB       Row Name 07/03/25 1116          Safety Issues/Impairments Affecting Functional Mobility    Safety Issues Affecting Function (Mobility) awareness of need for assistance;insight into deficits/self-awareness;safety precautions follow-through/compliance;safety precaution awareness;sequencing abilities;problem-solving;judgment  -AB     Impairments Affecting Function (Mobility) balance;cognition;endurance/activity tolerance;coordination;pain;strength;range of motion (ROM);postural/trunk control  -AB     Cognitive Impairments, Mobility Safety/Performance attention;awareness, need for  assistance;insight into deficits/self-awareness;judgment;problem-solving/reasoning;sequencing abilities;safety precaution follow-through;safety precaution awareness  -AB               User Key  (r) = Recorded By, (t) = Taken By, (c) = Cosigned By      Initials Name Provider Type    AB Caro Youngblood, PT Physical Therapist                   Mobility       Row Name 07/03/25 1116          Bed Mobility    Bed Mobility scooting/bridging;sit-supine  -AB     Scooting/Bridging [place] (Bed Mobility) moderate assist (50% patient effort);verbal cues;2 person assist  -AB     Sit-Supine [place] (Bed Mobility) 2 person assist;nonverbal cues (demo/gesture);verbal cues;maximum assist (25% patient effort)  -AB     Assistive Device (Bed Mobility) head of bed elevated;bed rails;repositioning sheet  -AB     Comment, (Bed Mobility) Pt required assist at trunk and w/ BLE management to transition into supine.  -AB       Row Name 07/03/25 1116          Transfers    Comment, (Transfers) Cues for hand placement and sequencing.  -AB       Row Name 07/03/25 1116          Sit-Stand Transfer    Sit-Stand [place] (Transfers) moderate assist (50% patient effort);2 person assist;verbal cues  -AB     Assistive Device (Sit-Stand Transfers) other (see comments)  UE support  -AB     Comment, (Sit-Stand Transfer) posterior lean in standing.  -AB       Row Name 07/03/25 1116          Gait/Stairs (Locomotion)    [place] Level (Gait) 2 person assist;verbal cues;nonverbal cues (demo/gesture);moderate assist (50% patient effort)  -AB     Assistive Device (Gait) other (see comments)  UE support  -AB     Patient was able to Ambulate yes  -AB     Distance in Feet (Gait) 10  -AB     Deviations/Abnormal Patterns (Gait) bilateral deviations;amberly decreased;gait speed decreased;stride length decreased  -AB     Bilateral Gait Deviations forward flexed posture;heel strike decreased  -AB     Left Sided Gait Deviations decreased knee extension   -AB     Right Sided Gait Deviations decreased knee extension  -AB     Comment, (Gait/Stairs) Pt ambulated with step to gait pattern, crouched posture, and decreased step length. Cues provided for forward gaze. Mod A for improved stability. Activity limited by weakness and fatigue.  -AB       Row Name 07/03/25 1116          Mobility    Extremity Weight-bearing Status right upper extremity  -AB     Right Upper Extremity (Weight-bearing Status) non weight-bearing (NWB)  -AB               User Key  (r) = Recorded By, (t) = Taken By, (c) = Cosigned By      Initials Name Provider Type    AB Caro Youngblood, PT Physical Therapist                   Obj/Interventions       Row Name 07/03/25 1120          Balance    Balance Assessment sitting static balance;sitting dynamic balance;standing static balance;standing dynamic balance  -AB     Static Sitting Balance contact guard  -AB     Dynamic Sitting Balance contact guard  -AB     Position, Sitting Balance unsupported;sitting edge of bed  -AB     Static Standing Balance moderate assist  -AB     Dynamic Standing Balance moderate assist;2-person assist;verbal cues;non-verbal cues (demo/gesture)  -AB     Position/Device Used, Standing Balance supported  -AB     Balance Interventions sitting;standing;sit to stand;supported;static;dynamic;occupation based/functional task  -AB     Comment, Balance unsteady w/o overt LOB.  -AB               User Key  (r) = Recorded By, (t) = Taken By, (c) = Cosigned By      Initials Name Provider Type    AB Caro Youngblood, PT Physical Therapist                   Goals/Plan    No documentation.                  Clinical Impression       Row Name 07/03/25 1120          Pain    Pain Location extremity  -AB     Pain Side/Orientation right;upper  -AB     Pain Management Interventions activity modification encouraged;exercise or physical activity utilized;positioning techniques utilized;cold applied  -AB     Response to Pain Interventions activity  participation with tolerable pain  -AB       Row Name 07/03/25 1120          Pain Scale: FACES Pre/Post-Treatment    Pain: FACES Scale, Pretreatment 0-->no hurt  -AB     Posttreatment Pain Rating 2-->hurts little bit  -AB       Row Name 07/03/25 1120          Plan of Care Review    Plan of Care Reviewed With patient;caregiver  -AB     Progress improving  -AB     Outcome Evaluation Pt continues to present below his functional baseline with weakness, impaired balance, cognitive deficits, and decreased endurance. He ambulated 10' with mod Ax2. Further IPPT is warrented. PT will progress as able per POC.  -AB       Row Name 07/03/25 1120          Vital Signs    Pre Systolic BP Rehab 182  -AB     Pre Treatment Diastolic BP 57  -AB     O2 Delivery Pre Treatment nasal cannula  -AB     O2 Delivery Intra Treatment nasal cannula  -AB     O2 Delivery Post Treatment nasal cannula  -AB     Pre Patient Position Sitting  -AB     Intra Patient Position Standing  -AB     Post Patient Position Supine  -AB       Row Name 07/03/25 1120          Positioning and Restraints    Pre-Treatment Position sitting in chair/recliner  -AB     Post Treatment Position bed  -AB     In Bed notified nsg;supine;call light within reach;encouraged to call for assist;exit alarm on;with family/caregiver;with brace  -AB               User Key  (r) = Recorded By, (t) = Taken By, (c) = Cosigned By      Initials Name Provider Type    AB Caro Youngblood, PT Physical Therapist                   Outcome Measures       Row Name 07/03/25 1122 07/03/25 0814       How much help from another person do you currently need...    Turning from your back to your side while in flat bed without using bedrails? 3  -AB 2  -BK    Moving from lying on back to sitting on the side of a flat bed without bedrails? 2  -AB 2  -BK    Moving to and from a bed to a chair (including a wheelchair)? 2  -AB 2  -BK    Standing up from a chair using your arms (e.g., wheelchair, bedside chair)? 2   -AB 2  -BK    Climbing 3-5 steps with a railing? 2  -AB 2  -BK    To walk in hospital room? 2  -AB 2  -BK    AM-PAC 6 Clicks Score (PT) 13  -AB 12  -BK    Highest Level of Mobility Goal Move to Chair/Commode-4  -AB Move to Chair/Commode-4  -BK      Row Name 07/03/25 1122 07/03/25 1036       Functional Assessment    Outcome Measure Options AM-PAC 6 Clicks Basic Mobility (PT)  -AB AM-PAC 6 Clicks Daily Activity (OT)  -AR              User Key  (r) = Recorded By, (t) = Taken By, (c) = Cosigned By      Initials Name Provider Type    AR Debbie Espinosa, OT Occupational Therapist    Caro Draper, PT Physical Therapist    Estella Velez, RN Registered Nurse                                 Physical Therapy Education       Title: PT OT SLP Therapies (Done)       Topic: Physical Therapy (Done)       Point: Mobility training (Done)       Learning Progress Summary            Patient Acceptance, E,D, VU,NR by AB at 7/3/2025 1122    Acceptance, E,D, VU,NR by AB at 7/2/2025 1559    Acceptance, E,D, VU,NR by AB at 7/1/2025 0906                      Point: Home exercise program (Done)       Learning Progress Summary            Patient Acceptance, E,D, VU,NR by AB at 7/2/2025 1559                      Point: Body mechanics (Done)       Learning Progress Summary            Patient Acceptance, E,D, VU,NR by AB at 7/3/2025 1122    Acceptance, E,D, VU,NR by AB at 7/2/2025 1559    Acceptance, E,D, VU,NR by AB at 7/1/2025 0906                      Point: Precautions (Done)       Learning Progress Summary            Patient Acceptance, E,D, VU,NR by AB at 7/3/2025 1122    Acceptance, E,D, VU,NR by AB at 7/2/2025 1559    Acceptance, E,D, VU,NR by AB at 7/1/2025 0906                                      User Key       Initials Effective Dates Name Provider Type Discipline    AB 09/22/22 -  Caro Youngblood, PT Physical Therapist PT                  PT Recommendation and Plan  Planned Therapy Interventions (PT): balance training,  bed mobility training, gait training, home exercise program, patient/family education, postural re-education, transfer training, stretching, strengthening, ROM (range of motion)  Progress: improving  Outcome Evaluation: Pt continues to present below his functional baseline with weakness, impaired balance, cognitive deficits, and decreased endurance. He ambulated 10' with mod Ax2. Further IPPT is warrented. PT will progress as able per POC.     Time Calculation:   PT Evaluation Complexity  History, PT Evaluation Complexity: 1-2 personal factors and/or comorbidities  Examination of Body Systems (PT Eval Complexity): total of 3 or more elements  Clinical Presentation (PT Evaluation Complexity): evolving  Clinical Decision Making (PT Evaluation Complexity): moderate complexity  Overall Complexity (PT Evaluation Complexity): moderate complexity     PT Charges       Row Name 07/03/25 1123             Time Calculation    Start Time 1100  -AB      PT Received On 07/03/25  -AB         Timed Charges    51690 - Gait Training Minutes  5  -AB      78711 - PT Therapeutic Activity Minutes 8  -AB         Total Minutes    Timed Charges Total Minutes 13  -AB       Total Minutes 13  -AB                User Key  (r) = Recorded By, (t) = Taken By, (c) = Cosigned By      Initials Name Provider Type    AB Caro Youngblood, PT Physical Therapist                  Therapy Charges for Today       Code Description Service Date Service Provider Modifiers Qty    06784066871 HC GAIT TRAINING EA 15 MIN 7/2/2025 Caro Youngblood, PT GP 1    03706388462 HC PT THERAPEUTIC ACT EA 15 MIN 7/2/2025 Caro Youngblood, PT GP 1    79591272541 HC PT THER SUPP EA 15 MIN 7/2/2025 Caro Youngblood, PT GP 2    68017518802 HC PT THERAPEUTIC ACT EA 15 MIN 7/3/2025 Caro Youngblood, PT GP 1            PT G-Codes  Outcome Measure Options: AM-PAC 6 Clicks Basic Mobility (PT)  AM-PAC 6 Clicks Score (PT): 13  AM-PAC 6 Clicks Score (OT): 9  PT Discharge Summary  Anticipated  Discharge Disposition (PT): skilled nursing facility    Caro Youngblood, PT  7/3/2025      Electronically signed by Caro Youngblood, PT at 25 1123          Occupational Therapy Notes (most recent note)        Debbie Espinosa, OT at 25 1038          Patient Name: Enrike Tomas  : 1935    MRN: [id number]                              Today's Date: 7/3/2025       Admit Date: 2025    Visit Dx: No diagnosis found.  Patient Active Problem List   Diagnosis    (HFpEF) heart failure with preserved ejection fraction    Anemia of renal disease    At high risk for falls    Benign prostatic hyperplasia    Closed fracture of left acetabulum    Compression fracture of thoracic vertebra    Deficiency of other specified B group vitamins    Depression    ESRD (end stage renal disease)    Essential hypertension    GERD (gastroesophageal reflux disease)    Hepatic cyst    Long term (current) use of anticoagulants    Major depressive disorder, single episode, moderate    Mixed hyperlipidemia    Morgagni hernia    Occlusion of right vertebral artery    NERY (obstructive sleep apnea)    Fall    Severe malnutrition    Sustained SVT    Paroxysmal atrial fibrillation    Chronic heart failure with preserved ejection fraction (HFpEF)    S/P shoulder hemiarthroplasty, left    Anemia, acute on chronic    Postoperative confusion    Hyperkalemia    Chronic systolic heart failure    ESRD needing dialysis    S/P shoulder hemiarthroplasty, right     Past Medical History:   Diagnosis Date    A-fib     Abnormal ECG     occasional extra heartbeat    Anemia, acute on chronic 2023    Anesthesia complication     hallucinations upon waking, violent upon waking per pt report    Arthritis     Atrial fibrillation     BPH (benign prostatic hyperplasia)     Cervical compression fracture 2023    Dementia     Diverticulosis     Diverticulum of esophagus     puree diet and speech therapy    Does use hearing aid     Elevated  cholesterol     ESRD on hemodialysis     GERD (gastroesophageal reflux disease)     Heart failure     Hemodialysis patient     M,W,F    Hyperlipidemia     Hypertension     Low back pain 2022    NERY (obstructive sleep apnea)     NOT USING CPAP, dental appliance    Recurrent falls     UTI (urinary tract infection)      Past Surgical History:   Procedure Laterality Date    CATARACT EXTRACTION      COLONOSCOPY      DIALYSIS FISTULA CREATION      x2    ENDOSCOPY      INGUINAL HERNIA REPAIR      PROSTATE BIOPSY      SHOULDER HEMIARTHROPLASTY Right 6/30/2025    Procedure: SHOULDER HEMIARTHROPLASTY, BICEPS TENODESIS RIGHT;  Surgeon: Matteo Tan MD;  Location: [facility] OR;  Service: Orthopedics;  Laterality: Right;    THROAT SURGERY      diverticulum in throat    TONSILLECTOMY AND ADENOIDECTOMY      TOTAL SHOULDER ARTHROPLASTY W/ DISTAL CLAVICLE EXCISION Left 09/25/2023    Procedure: HEMIARTHROPLASTY, BICEPS TENODESIS - LEFT;  Surgeon: Matteo Tan MD;  Location: [facility] OR;  Service: Orthopedics;  Laterality: Left;      General Information       Row Name 07/03/25 1026          OT Time and Intention    Document Type therapy note (daily note)  -AR     Mode of Treatment individual therapy;occupational therapy  -AR       Row Name 07/03/25 1026          General Information    Existing Precautions/Restrictions fall;other (see comments);right;non-weight bearing;shoulder;oxygen therapy device and L/min  R interscalene nerve catheter, Donjoy II with pillow, RUE fistual, dementia  -AR       Row Name 07/03/25 1026          Cognition    Orientation Status (Cognition) oriented to;person  -AR       Row Name 07/03/25 1026          Safety Issues/Impairments Affecting Functional Mobility    Safety Issues Affecting Function (Mobility) awareness of need for assistance;insight into deficits/self-awareness;judgment;problem-solving;safety precaution awareness;safety precautions follow-through/compliance;sequencing abilities  -AR      Impairments Affecting Function (Mobility) balance;cognition;endurance/activity tolerance;coordination;pain;strength;range of motion (ROM);postural/trunk control  -AR               User Key  (r) = Recorded By, (t) = Taken By, (c) = Cosigned By      Initials Name Provider Type    Debbie Bledsoe OT Occupational Therapist                     Mobility/ADL's       Row Name 07/03/25 1027          Bed Mobility    Bed Mobility scooting/bridging;supine-sit  -AR     Scooting/Bridging [place] (Bed Mobility) moderate assist (50% patient effort);verbal cues  -AR     Supine-Sit [place] (Bed Mobility) moderate assist (50% patient effort);verbal cues  -AR     Assistive Device (Bed Mobility) head of bed elevated;bed rails;repositioning sheet  -AR     Comment, (Bed Mobility) Min cues to maintain NWB RUE.  -AR       Row Name 07/03/25 1027          Transfers    Transfers sit-stand transfer;stand-sit transfer;bed-chair transfer  -AR     Comment, (Transfers) Initial posterior lateral lean, follows commands to sequence  -AR       Row Name 07/03/25 1027          Bed-Chair Transfer    Bed-Chair [place] (Transfers) moderate assist (50% patient effort);2 person assist;verbal cues  -AR     Assistive Device (Bed-Chair Transfers) other (see comments)  JOHN HHA  -AR       Row Name 07/03/25 1027          Sit-Stand Transfer    Sit-Stand [place] (Transfers) moderate assist (50% patient effort);2 person assist;verbal cues  -AR     Assistive Device (Sit-Stand Transfers) other (see comments)  -AR       Row Name 07/03/25 1027          Stand-Sit Transfer    Stand-Sit [place] (Transfers) moderate assist (50% patient effort);2 person assist;verbal cues  -AR     Assistive Device (Stand-Sit Transfers) other (see comments)  -AR       Row Name 07/03/25 1027          Activities of Daily Living    BADL Assessment/Intervention upper body dressing;lower body dressing  -AR       Row Name 07/03/25 1027          Mobility     Extremity Weight-bearing Status right upper extremity  -AR     Right Upper Extremity (Weight-bearing Status) non weight-bearing (NWB)  -AR       Row Name 07/03/25 1027          Upper Body Dressing Assessment/Training    [place] Level (Upper Body Dressing) don;doff;dependent (less than 25% patient effort)  sling  -AR     Position (Upper Body Dressing) supported sitting  -AR     Comment, (Upper Body Dressing) Reviewed that pt is s/p R shoulder surgery and shoulder precautions.  -AR       Row Name 07/03/25 1027          Lower Body Dressing Assessment/Training    [place] Level (Lower Body Dressing) don;socks;dependent (less than 25% patient effort)  -AR     Position (Lower Body Dressing) supine  -AR               User Key  (r) = Recorded By, (t) = Taken By, (c) = Cosigned By      Initials Name Provider Type    Debbie Bledsoe, OT Occupational Therapist                   Obj/Interventions       Row Name 07/03/25 1030          Sensory Assessment (Somatosensory)    Sensory Assessment (Somatosensory) UE sensation intact  -AR       Row Name 07/03/25 1030          Shoulder (Therapeutic Exercise)    Shoulder AROM (Therapeutic Exercise) bilateral;scapular retraction;sitting;10 repetitions  -AR     Shoulder PROM (Therapeutic Exercise) right;extension;flexion;external rotation;internal rotation;sitting;10 repetitions  Caregiver at bedside deferred teaching on HEP as she does not anticipate performing at this stage in his care.  -AR       Row Name 07/03/25 1030          Elbow/Forearm (Therapeutic Exercise)    Elbow/Forearm AAROM (Therapeutic Exercise) right;flexion;extension;supination;pronation;sitting;10 repetitions  -AR       Row Name 07/03/25 1030          Wrist (Therapeutic Exercise)    Wrist AAROM (Therapeutic Exercise) right;flexion;extension;10 repetitions  -AR       Row Name 07/03/25 1030          Hand (Therapeutic Exercise)    Hand AROM/AAROM (Therapeutic Exercise) right;AROM (active range of  motion);finger flexion;finger extension;10 repetitions  -AR       Row Name 07/03/25 1030          Motor Skills    Therapeutic Exercise shoulder;elbow/forearm;wrist;hand  -AR       Row Name 07/03/25 1030          Balance    Static Sitting Balance contact guard  -AR     Dynamic Sitting Balance contact guard  -AR     Position, Sitting Balance unsupported;sitting edge of bed  -AR     Static Standing Balance moderate assist  -AR     Dynamic Standing Balance moderate assist;2-person assist  -AR     Position/Device Used, Standing Balance supported  -AR               User Key  (r) = Recorded By, (t) = Taken By, (c) = Cosigned By      Initials Name Provider Type    Debbie Bledsoe, OT Occupational Therapist                   Goals/Plan       Row Name 07/03/25 1036          Transfer Goal 1 (OT)    Progress/Outcome (Transfer Goal 1, OT) goal ongoing  -AR       [facility] Name 07/03/25 1036          Dressing Goal 1 (OT)    Progress/Outcome (Dressing Goal 1, OT) goal ongoing  -AR       [facility] Name 07/03/25 1036          Self-Feeding Goal 1 (OT)    Progress/Outcomes (Self-Feeding Goal 1, OT) goal ongoing  -AR       [facility] Name 07/03/25 1036          ROM Goal 1 (OT)    Progress/Outcome (ROM Goal 1, OT) goal ongoing  -AR               User Key  (r) = Recorded By, (t) = Taken By, (c) = Cosigned By      Initials Name Provider Type    Debbie Bledsoe, OT Occupational Therapist                   Clinical Impression       Row Name 07/03/25 1031          Pain Assessment    Pretreatment Pain Rating 0/10 - no pain  -AR     Posttreatment Pain Rating 0/10 - no pain  -AR       Row Name 07/03/25 1031          Plan of Care Review    Plan of Care Reviewed With patient;caregiver  -AR       [facility] Name 07/03/25 1031          Therapy Assessment/Plan (OT)    Patient/Family Therapy Goal Statement (OT) Pt alert, oriented to self and following commands to participate. Pt tolerated R shoulder PROM , IR chest/ER 30 and no c/o pain at end of session.  Pt limited with impaired balance, decreased safety awareness, NWB/immobilization RUE, decreased occupational endurance. Recommend SNF.  -AR       Row Name 07/03/25 1031          Therapy Plan Review/Discharge Plan (OT)    Anticipated Discharge Disposition (OT) skilled nursing facility  -AR       Row Name 07/03/25 1031          Vital Signs    Pre SpO2 (%) 94  -AR     O2 Delivery Pre Treatment supplemental O2  -AR     Intra SpO2 (%) 90  -AR     O2 Delivery Intra Treatment supplemental O2  -AR     Post SpO2 (%) 9  -AR     O2 Delivery Post Treatment supplemental O2  -AR     Pre Patient Position Supine  -AR     Intra Patient Position Standing  -AR     Post Patient Position Sitting  -AR       Row Name 07/03/25 1031          Positioning and Restraints    Pre-Treatment Position in bed  -AR     Post Treatment Position chair  -AR     In Chair notified nsg;reclined;call light within reach;encouraged to call for assist;exit alarm on;with family/caregiver;with brace;legs elevated;on mechanical lift sling;waffle cushion  cold pack applied over incision  -AR               User Key  (r) = Recorded By, (t) = Taken By, (c) = Cosigned By      Initials Name Provider Type    Debbie Bledsoe, OT Occupational Therapist                   Outcome Measures       Row Name 07/03/25 1036          How much help from another is currently needed...    Putting on and taking off regular lower body clothing? 1  -AR     Bathing (including washing, rinsing, and drying) 2  -AR     Toileting (which includes using toilet bed pan or urinal) 1  -AR     Putting on and taking off regular upper body clothing 1  -AR     Taking care of personal grooming (such as brushing teeth) 2  -AR     Eating meals 2  -AR     AM-PAC 6 Clicks Score (OT) 9  -AR       Row Name 07/03/25 0814          How much help from another person do you currently need...    Turning from your back to your side while in flat bed without using bedrails? 2  -BK     Moving from lying on back  to sitting on the side of a flat bed without bedrails? 2  -BK     Moving to and from a bed to a chair (including a wheelchair)? 2  -BK     Standing up from a chair using your arms (e.g., wheelchair, bedside chair)? 2  -BK     Climbing 3-5 steps with a railing? 2  -BK     To walk in hospital room? 2  -BK     AM-PAC 6 Clicks Score (PT) 12  -BK     Highest Level of Mobility Goal Move to Chair/Commode-4  -BK       Row Name 07/03/25 1036          Functional Assessment    Outcome Measure Options AM-PAC 6 Clicks Daily Activity (OT)  -AR               User Key  (r) = Recorded By, (t) = Taken By, (c) = Cosigned By      Initials Name Provider Type    Debbie Bledsoe OT Occupational Therapist    Estella Velez RN Registered Nurse                    Occupational Therapy Education       Title: PT OT SLP Therapies (Done)       Topic: Occupational Therapy (Done)       Point: ADL training (Done)       Learning Progress Summary            Patient Eager, E,D, NR,VU by AR at 7/3/2025 1037    Acceptance, E, NL by AR at 7/2/2025 1735    Eager, E,TB,D,H, NL,VU by AR at 7/1/2025 1225   Caregiver Eager, E,D, NR,VU by AR at 7/3/2025 1037    Eager, E,TB,D,H, NL,VU by AR at 7/1/2025 1225                      Point: Home exercise program (Done)       Learning Progress Summary            Patient Eager, E,D, NR,VU by AR at 7/3/2025 1037    Acceptance, E, NL by AR at 7/2/2025 1735    Eager, E,TB,D,H, NL,VU by AR at 7/1/2025 1225   Caregiver Eager, E,D, NR,VU by AR at 7/3/2025 1037    Eager, E,TB,D,H, NL,VU by AR at 7/1/2025 1225                      Point: Precautions (Done)       Learning Progress Summary            Patient Eager, E,D, NR,VU by AR at 7/3/2025 1037    Acceptance, E, NL by AR at 7/2/2025 1735    Eager, E,TB,D,H, NL,VU by AR at 7/1/2025 1225   Caregiver CHRISSY Martinez D, NR,VU by AR at 7/3/2025 1037    CHRISSY Martinez,CRISTHIAN,D,H, NL,VU by AR at 7/1/2025 1225                      Point: Body mechanics (Done)       Learning Progress  Summary            Patient Eager, E,D, NR,VU by AR at 7/3/2025 1037    Acceptance, E, NL by AR at 7/2/2025 1735    Eager, E,TB,D,H, NL,VU by AR at 7/1/2025 1225   Caregiver Eager, E,D, NR,VU by AR at 7/3/2025 1037    Eager, E,TB,D,H, NL,VU by AR at 7/1/2025 1225                                      User Key       Initials Effective Dates Name Provider Type Discipline    AR 07/11/23 -  Debbie Espinosa OT Occupational Therapist OT                  OT Recommendation and Plan  Planned Therapy Interventions (OT): adaptive equipment training, BADL retraining, edema control/reduction, IADL retraining, occupation/activity based interventions, orthotic fabrication/fitting/training, passive ROM/stretching, patient/caregiver education/training, ROM/therapeutic exercise, transfer/mobility retraining  Therapy Frequency (OT): daily  Plan of Care Review  Plan of Care Reviewed With: patient, caregiver  Progress: declining  Outcome Evaluation: Pt tolerated R shoulder PROM , IR chest/ER 30. Pt was assisted to bed from chair with overhead lift d/t lethargy. Pt limited with R shoulder precautions, poor balance, poor occupational endurance and confusion. Recommend SNF.     Time Calculation:   Evaluation Complexity (OT)  Review Occupational Profile/Medical/Therapy History Complexity: brief/low complexity  Assessment, Occupational Performance/Identification of Deficit Complexity: 1-3 performance deficits  Clinical Decision Making Complexity (OT): problem focused assessment/low complexity  Overall Complexity of Evaluation (OT): low complexity     Time Calculation- OT       Row Name 07/03/25 1037             Time Calculation- OT    OT Start Time 0821  -AR      OT Received On 07/03/25  -AR      OT Goal Re-Cert Due Date 07/11/25  -AR         Timed Charges    36106 - OT Therapeutic Exercise Minutes 16  -AR      81495 - OT Self Care/Mgmt Minutes 24  -AR         Total Minutes    Timed Charges Total Minutes 40  -AR       Total Minutes  40  -AR                User Key  (r) = Recorded By, (t) = Taken By, (c) = Cosigned By      Initials Name Provider Type    Debbie Bledsoe OT Occupational Therapist                  Therapy Charges for Today       Code Description Service Date Service Provider Modifiers Qty    83335117904 HC OT SELF CARE/MGMT/TRAIN EA 15 MIN 7/2/2025 Debbie Espinosa OT GO 2    28542472059 HC OT THER PROC EA 15 MIN 7/2/2025 Debbie Espinosa OT GO 1    67506733362 HC OT THER SUPP EA 15 MIN 7/2/2025 Debbie Espinosa, OT GO 3    49912013883 HC OT SELF CARE/MGMT/TRAIN EA 15 MIN 7/3/2025 Debbie Espinosa OT GO 2    22805226414 HC OT THER PROC EA 15 MIN 7/3/2025 Debbie Espinosa, OT GO 1    80318856384 HC OT THER SUPP EA 15 MIN 7/3/2025 Debbie Espinosa, OT GO 3                 Debbie Espinosa OT  7/3/2025    Electronically signed by Debbie Espinosa OT at 07/03/25 1038

## 2025-07-03 NOTE — PLAN OF CARE
Goal Outcome Evaluation:  Plan of Care Reviewed With: patient, caregiver        Progress: declining  Outcome Evaluation: Pt alert and following commands, he tolerated R shoulder PROM , IR chest/ER 30. He remains limited with impaired balance, NWB/immobilization RUE, decreased strength and poor BUE integration.    Anticipated Discharge Disposition (OT): skilled nursing facility

## 2025-07-03 NOTE — PROGRESS NOTES
"IM progress note      Enrike Tomas  [id number]  1935     LOS: 0 days     Attending: Matteo Tan MD    Primary Care Provider: Blair Dhaliwal MD      Chief Complaint/Reason for visit:  No chief complaint on file.      Subjective   Drowsy. Adequate pain control. Resting comfortably in recliner. Family reports some choking and difficulty swallowing with breakfast. Denies f/c/n/v/sob/cp.    Objective        Visit Vitals  BP (!) 182/57 Comment: taken on leg   Pulse 74   Temp 98.3 °F (36.8 °C) (Axillary)   Resp 18   Ht 165.1 cm (65\")   Wt 57.2 kg (126 lb)   SpO2 (!) 88%   BMI 20.97 kg/m²     Temp (24hrs), Av.7 °F (37.1 °C), Min:98.2 °F (36.8 °C), Max:99.8 °F (37.7 °C)    Physical Therapy: Pt continues to present below his functional baseline with weakness, impaired balance, cognitive deficits, and decreased endurance. He ambulated 10' with mod Ax2. Further IPPT is warrented. PT will progress as able per POC.      Physical Exam:     General Appearance:    Alert, cooperative, in no acute distress   Head:    Normocephalic, without obvious abnormality, atraumatic    Lungs:     Normal effort, symmetric chest rise,  clear to      auscultation bilaterally              Heart:    Regular rhythm and normal rate, normal S1 and S2    Abdomen:     Normal bowel sounds, no masses, no organomegaly, soft        nontender, nondistended, no guarding, no rebound                tenderness   Extremities:    RUE in a sling, CDI  dressing . Interscalene nerve block cath present.  Distal pulses, cap refill, movements of fingers, wrist, intact.  No clubbing, cyanosis or edema.  No deformities.    Pulses:   Pulses palpable and equal bilaterally   Skin:   No bleeding, bruising or rash          Results Review:     I reviewed the patient's new clinical results.   Results from last 7 days   Lab Units 25  0755 25  0902 25  0841   WBC 10*3/mm3 9.49 6.51  --    HEMOGLOBIN g/dL 9.9* 12.0*  --    HEMOGLOBIN, POC " g/dL  --   --  12.6   HEMATOCRIT % 30.1* 37.2*  --    HEMATOCRIT POC %  --   --  37*   PLATELETS 10*3/mm3 183 192  --      Results from last 7 days   Lab Units 07/02/25  0755 06/30/25  0836   SODIUM mmol/L 140 140   POTASSIUM mmol/L 4.9 4.2   CHLORIDE mmol/L 100 97*   CO2 mmol/L 27.0 27.6   BUN mg/dL 27.8* 30.5*   CREATININE mg/dL 5.86* 6.74*   CALCIUM mg/dL 9.4 9.8*   BILIRUBIN mg/dL  --  0.3   ALK PHOS U/L  --  97   ALT (SGPT) U/L  --  11   AST (SGOT) U/L  --  19   GLUCOSE mg/dL 87 73     I reviewed the patient's new imaging including images and reports.    All medications reviewed.   amiodarone, 100 mg, Oral, Q24H  apixaban, 2.5 mg, Oral, Q12H  atorvastatin, 20 mg, Oral, Nightly  ciprofloxacin, 2 drop, Left Eye, Q4H While Awake  donepezil, 5 mg, Oral, Nightly  levothyroxine, 137 mcg, Oral, Q AM  memantine, 10 mg, Oral, Q12H  pantoprazole, 40 mg, Oral, Q AM  senna-docusate sodium, 1 tablet, Oral, Nightly  sertraline, 100 mg, Oral, Daily  tamsulosin, 0.4 mg, Oral, Daily  traZODone, 50 mg, Oral, Nightly      acetaminophen, 650 mg, Q4H PRN   Or  acetaminophen, 650 mg, Q4H PRN  albumin human, 12.5 g, PRN  HYDROmorphone, 0.5 mg, Q2H PRN   And  naloxone, 0.1 mg, Q5 Min PRN  losartan, 25 mg, Daily PRN  metoprolol tartrate, 5 mg, Q6H PRN  oxyCODONE, 5 mg, Q4H PRN  Polyvinyl Alcohol-Povidone PF, 1 drop, Q3H PRN        Assessment & Plan       S/P shoulder hemiarthroplasty, right    (HFpEF) heart failure with preserved ejection fraction    Benign prostatic hyperplasia    Depression    ESRD (end stage renal disease)    Essential hypertension    GERD (gastroesophageal reflux disease)    Chronic systolic heart failure       Plan  1. PT/OT- MELVI Dillon  2. Pain control-prns, interscalene block  3. IS-encouraged  4. DVT proph- Eliquis   5. Bowel regimen  6. Monitor post-op labs  7. Discharge planning- SNF recommended     -ESRD  On HD MWF. RUE fistula  Takes midodrine on HD days  NAL following for HD management       -HTN with  intermittent hypotension  Hx labile BP with episodes of hypotension  On low dose losartan for HFpEF but only takes PRN per chart review  Takes midodrine on HD days and intermittently throughout week (checks BP twice daily)     -Paroxysmal A fib, HFpEF   Chronic, stable. Follows with cardiology  - On amiodarone and Eliquis- resume Eliquis        -GERD:  Resume PPI.  Formulary substitution when indicated.     -Hypothyroidism  Resume Synthroid     -Depression:  Resume home regimen including Sertraline     -BPH  Resume tamsulosin     -Major neurocognitive disorder, Memory loss  Follows with neurology, first seen 3/2025  Thought multifactorial 2/2 opioid use, ESRD, depression, possible vascular disease. Per neuro notes some c/f Alzheimer's  Resume Namenda and Aricept     Insomnia  - records reviewed, Melatonin ineffective, Seroquel led to excessive daytime sleepiness, Klonopin effective for sleep but worsened confusion   -Trial of Trazodone       MERCEDEZ Ryan  07/03/25  11:31 EDT

## 2025-07-03 NOTE — PLAN OF CARE
Goal Outcome Evaluation:  Plan of Care Reviewed With: patient, spouse, caregiver                Anticipated Discharge Disposition (SLP): skilled nursing facility          SLP Swallowing Diagnosis: suspected pharyngeal dysphagia (07/03/25 1020)      Hx dysphagia with honey-thick liquids in 2023. FEES to follow.

## 2025-07-03 NOTE — PLAN OF CARE
Goal Outcome Evaluation:  Plan of Care Reviewed With: patient, caregiver        Progress: declining  Outcome Evaluation: Pt tolerated R shoulder PROM , IR chest/ER 30. Pt was assisted to bed from chair with overhead lift d/t lethargy. Pt limited with R shoulder precautions, poor balance, poor occupational endurance and confusion. Recommend SNF.    Anticipated Discharge Disposition (OT): skilled nursing facility

## 2025-07-03 NOTE — PLAN OF CARE
Goal Outcome Evaluation:  Plan of Care Reviewed With: patient, caregiver        Progress: improving  Outcome Evaluation: Pt continues to present below his functional baseline with weakness, impaired balance, cognitive deficits, and decreased endurance. He ambulated 10' with mod Ax2. Further IPPT is warrented. PT will progress as able per POC.    Anticipated Discharge Disposition (PT): skilled nursing facility

## 2025-07-03 NOTE — MBS/VFSS/FEES
Acute Care - Speech Language Pathology   Swallow Re-Assessment  [place]  Fiberoptic Endoscopic Evaluation of Swallowing (FEES)       Patient Name: Enrike Tomas  : 1935  MRN: [id number]  Today's Date: 7/3/2025               Admit Date: 2025    Visit Dx:     ICD-10-CM ICD-9-CM   1. Oropharyngeal dysphagia  R13.12 787.22     Patient Active Problem List   Diagnosis    (HFpEF) heart failure with preserved ejection fraction    Anemia of renal disease    At high risk for falls    Benign prostatic hyperplasia    Closed fracture of left acetabulum    Compression fracture of thoracic vertebra    Deficiency of other specified B group vitamins    Depression    ESRD (end stage renal disease)    Essential hypertension    GERD (gastroesophageal reflux disease)    Hepatic cyst    Long term (current) use of anticoagulants    Major depressive disorder, single episode, moderate    Mixed hyperlipidemia    Morgagni hernia    Occlusion of right vertebral artery    NERY (obstructive sleep apnea)    Fall    Severe malnutrition    Sustained SVT    Paroxysmal atrial fibrillation    Chronic heart failure with preserved ejection fraction (HFpEF)    S/P shoulder hemiarthroplasty, left    Anemia, acute on chronic    Postoperative confusion    Hyperkalemia    Chronic systolic heart failure    ESRD needing dialysis    S/P shoulder hemiarthroplasty, right     Past Medical History:   Diagnosis Date    A-fib     Abnormal ECG     occasional extra heartbeat    Anemia, acute on chronic 2023    Anesthesia complication     hallucinations upon waking, violent upon waking per pt report    Arthritis     Atrial fibrillation     BPH (benign prostatic hyperplasia)     Cervical compression fracture 2023    Dementia     Diverticulosis     Diverticulum of esophagus     puree diet and speech therapy    Does use hearing aid     Elevated cholesterol     ESRD on hemodialysis     GERD (gastroesophageal reflux disease)     Heart failure      Hemodialysis patient     M,W,F    Hyperlipidemia     Hypertension     Low back pain 2022    NERY (obstructive sleep apnea)     NOT USING CPAP, dental appliance    Recurrent falls     UTI (urinary tract infection)      Past Surgical History:   Procedure Laterality Date    CATARACT EXTRACTION      COLONOSCOPY      DIALYSIS FISTULA CREATION      x2    ENDOSCOPY      INGUINAL HERNIA REPAIR      PROSTATE BIOPSY      SHOULDER HEMIARTHROPLASTY Right 6/30/2025    Procedure: SHOULDER HEMIARTHROPLASTY, BICEPS TENODESIS RIGHT;  Surgeon: Matteo Tan MD;  Location: [facility] OR;  Service: Orthopedics;  Laterality: Right;    THROAT SURGERY      diverticulum in throat    TONSILLECTOMY AND ADENOIDECTOMY      TOTAL SHOULDER ARTHROPLASTY W/ DISTAL CLAVICLE EXCISION Left 09/25/2023    Procedure: HEMIARTHROPLASTY, BICEPS TENODESIS - LEFT;  Surgeon: Matteo Tan MD;  Location: [facility] OR;  Service: Orthopedics;  Laterality: Left;       SLP Recommendation and Plan  SLP Swallowing Diagnosis: functional oral phase, functional pharyngeal phase, other (see comments) (would benefit from general strengthening exs to increase safety of the swallow) (07/03/25 1330)  SLP Diet Recommendation: regular textures, thin liquids (07/03/25 1330)  Recommended Precautions and Strategies: upright posture during/after eating, general aspiration precautions, other (see comments) (hard cough at end of meals) (07/03/25 1330)  SLP Rec. for Method of Medication Administration: as tolerated (07/03/25 1330)     Monitor for Signs of Aspiration: yes, notify SLP if any concerns (07/03/25 1330)     Swallow Criteria for Skilled Therapeutic Interventions Met: demonstrates skilled criteria (07/03/25 1330)  Anticipated Discharge Disposition (SLP): skilled nursing facility (07/03/25 1330)  Rehab Potential/Prognosis, Swallowing: good, to achieve stated therapy goals (07/03/25 1330)  Therapy Frequency (Swallow): 3 days per week (07/03/25 1330)  Predicted  Duration Therapy Intervention (Days): 1 week (07/03/25 1330)  Oral Care Recommendations: Oral Care BID/PRN, Toothbrush (07/03/25 1330)                                        Progress: improving      SWALLOW EVALUATION (Last 72 Hours)       SLP Adult Swallow Evaluation       Row Name 07/03/25 1330 07/03/25 1020                Rehab Evaluation    Document Type re-evaluation  -RS evaluation  -SM       Subjective Information no complaints  -RS no complaints  -SM       Patient Observations alert;cooperative  -RS alert;cooperative  -SM       Patient/Family/Caregiver Comments/Observations WifeCleo, present  -RS --       Patient Effort good  -RS good  -SM       Symptoms Noted During/After Treatment none  -RS --       Oral Care oral rinse provided  -RS --          General Information    Patient Profile Reviewed yes  -RS yes  -SM       Pertinent History Of Current Problem See previous SLP notes  -RS R shoulder surgery 6/20. Increased confusion. Consulted after difficulty swallowing noted with breakfast. Hx dysphagia with recommedations for honey-thick liquids in 2023. Neurocognitive d/o with memory loss - multifactorial r/t opiod use, ESRD, depression, and dementia.  -SM       Current Method of Nutrition regular textures;thin liquids  -RS regular textures;thin liquids  -SM          Pain    Pretreatment Pain Rating 0/10 - no pain  -RS 0/10 - no pain  -SM       Posttreatment Pain Rating 0/10 - no pain  -RS 0/10 - no pain  -SM          General Eating/Swallowing Observations    Utensils Used -- spoon;straw  -SM       Consistencies Trialed -- thin liquids;nectar/syrup-thick liquids;pureed  -SM          Clinical Swallow Eval    Pharyngeal Phase -- suspected pharyngeal impairment  -SM          Pharyngeal Phase Concerns    Pharyngeal Phase Concerns -- throat clear;multiple swallows  -SM       Throat Clear -- other (see comments)  -SM       Pharyngeal Phase Concerns, Comment -- as progressed with trials. Called and discussed  results with wife, to include GOC/POC discussion. Had been advanced to thin liquids and tolerating without significant issue at home, though she could not recall if instrumental swallow performed or not. She does wish to keep pt safe and will proceed with (likely temporary means) of thickened liquids, if necessary.  -          Fiberoptic Endoscopic Evaluation of Swallowing (FEES)    Risks/Benefits Reviewed risks/benefits explained;patient;family;agreed to eval  -RS --       Nasal Entry right:  -RS --       Scope serial number/identification [id number]  -RS --          Anatomy and Physiology    Anatomic Considerations no anatomic structural deviation;other (see comments)  -RS --       Comment vallecular residue noted upon scope entry into pharynx, suspect lunch  -RS --       Velopharyngeal WFL  -RS --       Base of Tongue symmetrical  -RS --       Epiglottis WFL  -RS --       Laryngeal Function Breathing symmetrical  -RS --       Laryngeal Function Phonation symmetrical  -RS --       Laryngeal Function to Breath Hold TVF/FVF/Arytenoid;sustained closure  -RS --       Secretion Rating Scale (Ryan et al. 1996) 1- secretions present around the laryngeal vestibule  -RS --       Secretion Description thin;clear  -RS --       Ice Chips elicited swallow  -RS --       Spontaneous Swallow frequency adequate  -RS --       Sensory sensed scope  -RS --       Utensils Used Spoon;Cup;Straw  -RS --       Consistencies Trialed ice chips;thin liquids;nectar-thick liquids;mixed consistency;regular textures;pudding/puree  -RS --          FEES Interpretation    Oral Phase prespill of liquids into pharynx  -RS --          Initiation of Pharyngeal Swallow    Initiation of Pharyngeal Swallow bolus in valleculae  -RS --       Pharyngeal Phase functional pharyngeal phase of swallow;impaired pharyngeal phase of swallowing  -RS --       Penetration After the Swallow thin liquids;secondary to residue;other (see comments)  R lateral channel  -RS --        Depth of Penetration shallow;transient  -RS --       Response to Penetration No  -RS --       No spontaneous response to penetration and effective laryngeal clearance with cue (see comments)  -RS --       Rosenbek's Scale thin:;2-->Level 2;nectar:;pudding/puree:;mixed consistencies:;regular textures:;1-->Level 1  -RS --       Residue all consistencies tested;valleculae;secondary to reduced laryngeal elevation;secondary to reduced base of tongue retraction;secondary to reduced posterior pharyngeal wall stripping  -RS --       Response to Residue partial residue clearance;with spontaneous subsequent swallow  -RS --       Attempted Compensatory Maneuvers other (see comments)  deferred as pt would be u/a to recall during meals 2/2 cognition  -RS --       Pharyngeal Phase, Comment Intermittent and inconsistent shallow penetration (or transient) after the swallow w thins from lateral channel residue. Cued cough effectively clears vestibule  -RS --       FEES Summary Pt would benefit from several sessions of dysphagia tx to increase the safety of the swallow. No need to repeat study unless change in status  -RS --          Swallowing Quality of Life Assessment    Education and counseling provided Signs of aspiration;Silent aspiration;Risks of aspiration;Safest diet options;Compensatory strategy recommendations and rationale  -RS --          SLP Evaluation Clinical Impression    SLP Swallowing Diagnosis functional oral phase;functional pharyngeal phase;other (see comments)  would benefit from general strengthening exs to increase safety of the swallow  -RS suspected pharyngeal dysphagia  -SM       Functional Impact risk of aspiration/pneumonia  -RS risk of aspiration/pneumonia  -SM       Rehab Potential/Prognosis, Swallowing good, to achieve stated therapy goals  -RS re-evaluate goals as necessary  -       Swallow Criteria for Skilled Therapeutic Interventions Met demonstrates skilled criteria  -RS --           Recommendations    Therapy Frequency (Swallow) 3 days per week  -RS --       Predicted Duration Therapy Intervention (Days) 1 week  -RS --       SLP Diet Recommendation regular textures;thin liquids  -RS NPO;other (see comments)  x small sips of water for thirst  -SM       Recommended Diagnostics -- FEES  -SM       Recommended Precautions and Strategies upright posture during/after eating;general aspiration precautions;other (see comments)  hard cough at end of meals  -RS --       Oral Care Recommendations Oral Care BID/PRN;Toothbrush  -RS Oral Care BID/PRN;Toothbrush  -SM       SLP Rec. for Method of Medication Administration as tolerated  -RS meds whole;with puree;as tolerated  -SM       Monitor for Signs of Aspiration yes;notify SLP if any concerns  -RS yes;notify SLP if any concerns  -SM       Anticipated Discharge Disposition (SLP) skilled nursing facility  -RS skilled nursing facility  -                 User Key  (r) = Recorded By, (t) = Taken By, (c) = Cosigned By      Initials Name Effective Dates    Sigrid Quesada MS CCC-SLP 01/20/25 -     RS Rick Smith MS CCC-SLP 09/14/23 -                     EDUCATION  The patient has been educated in the following areas:   Dysphagia (Swallowing Impairment).        SLP GOALS       Row Name 07/03/25 1330             (LTG) Patient will demonstrate functional swallow for    Diet Texture (Demonstrate functional swallow) regular textures  -RS      Liquid viscosity (Demonstrate functional swallow) thin liquids  -RS      [place] (Demonstrate functional swallow) independently (over 90% accuracy)  -RS      Time Frame (Demonstrate functional swallow) 1 week  -RS      Progress/Outcomes (Demonstrate functional swallow) new goal  -RS         (STG) Lingual Strengthening Goal 1 (SLP)    Activity (Lingual Strengthening Goal 1, SLP) increase tongue back strength  -RS      Increase Tongue Back Strength lingual resistance exercises  -RS      [place]/Accuracy  (Lingual Strengthening Goal 1, SLP) with minimal cues (75-90% accuracy)  -RS      Time Frame (Lingual Strengthening Goal 1, SLP) 1 week  -RS      Progress/Outcomes (Lingual Strengthening Goal 1, SLP) new goal  -RS         (STG) Pharyngeal Strengthening Exercise Goal 1 (SLP)    Activity (Pharyngeal Strengthening Goal 1, SLP) increase timing;increase superior movement of the hyolaryngeal complex;increase squeeze/positive pressure generation;increase tongue base retraction  -RS      Increase Timing prepping - 3 second prep or suck swallow or 3-step swallow  -RS      Increase Superior Movement of the Hyolaryngeal Complex Mendelsohn  -RS      Increase Squeeze/Positive Pressure Generation hard effortful swallow  -RS      Increase Tongue Base Retraction woodrow  -RS      [place]/Accuracy (Pharyngeal Strengthening Goal 1, SLP) with minimal cues (75-90% accuracy)  -RS      Time Frame (Pharyngeal Strengthening Goal 1, SLP) 1 week  -RS      Progress/Outcomes (Pharyngeal Strengthening Goal 1, SLP) new goal  -RS         (STG) Swallow Compensatory Strategies Goal 1 (SLP)    Activity (Swallow Compensatory Strategies/Techniques Goal 1, SLP) throat clear/extra swallow;other (see comments)  at completion of meal  -RS      [place]/Accuracy (Swallow Compensatory Strategies/Techniques Goal 1, SLP) with minimal cues (75-90% accuracy)  -RS      Time Frame (Swallow Compensatory Strategies/Techniques Goal 1, SLP) 1 week  -RS      Progress/Outcomes (Swallow Compensatory Strategies/Techniques Goal 1, SLP) new goal  -RS                User Key  (r) = Recorded By, (t) = Taken By, (c) = Cosigned By      Initials Name Provider Type    RS Rick Smith MS CCC-SLP Speech and Language Pathologist                         Time Calculation:    Time Calculation- SLP       Row Name 07/03/25 1526 07/03/25 1131          Time Calculation- SLP    SLP Start Time 1330  -RS 1020  -SM     SLP Received On 07/03/25  -RS 07/03/25  -SM        Untimed Charges     11671-QR Eval Oral Pharyng Swallow Minutes -- 60  -SM     42257-PI Fiberoptic Endo Eval Swallow Minutes 98  -RS --        Total Minutes    Untimed Charges Total Minutes 98  -RS 60  -SM      Total Minutes 98  -RS 60  -SM               User Key  (r) = Recorded By, (t) = Taken By, (c) = Cosigned By      Initials Name Provider Type    Sigrid Quesada MS CCC-SLP Speech and Language Pathologist    RS Rick Smith MS CCC-SLP Speech and Language Pathologist                    Therapy Charges for Today       Code Description Service Date Service Provider Modifiers Qty    [id number]  ST FIBEROPTIC ENDO EVAL SWALL 7 7/3/2025 Rick Smith MS CCC-SLP GN 1                 Rick Smith MS CCC-MINH  7/3/2025

## 2025-07-04 ENCOUNTER — APPOINTMENT (OUTPATIENT)
Dept: NEPHROLOGY | Facility: HOSPITAL | Age: OVER 89
End: 2025-07-04
Payer: MEDICARE

## 2025-07-04 VITALS
SYSTOLIC BLOOD PRESSURE: 122 MMHG | OXYGEN SATURATION: 92 % | RESPIRATION RATE: 14 BRPM | HEART RATE: 72 BPM | TEMPERATURE: 98 F | WEIGHT: 126 LBS | HEIGHT: 65 IN | BODY MASS INDEX: 20.99 KG/M2 | DIASTOLIC BLOOD PRESSURE: 79 MMHG

## 2025-07-04 LAB
ALBUMIN SERPL-MCNC: 3.7 G/DL (ref 3.5–5.2)
ANION GAP SERPL CALCULATED.3IONS-SCNC: 17 MMOL/L (ref 5–15)
BUN SERPL-MCNC: 39 MG/DL (ref 8–23)
BUN/CREAT SERPL: 7 (ref 7–25)
CALCIUM SPEC-SCNC: 9.3 MG/DL (ref 8.2–9.6)
CHLORIDE SERPL-SCNC: 97 MMOL/L (ref 98–107)
CO2 SERPL-SCNC: 25 MMOL/L (ref 22–29)
CREAT SERPL-MCNC: 5.58 MG/DL (ref 0.76–1.27)
EGFRCR SERPLBLD CKD-EPI 2021: 9.1 ML/MIN/1.73
GLUCOSE SERPL-MCNC: 65 MG/DL (ref 65–99)
PHOSPHATE SERPL-MCNC: 4.8 MG/DL (ref 2.5–4.5)
POTASSIUM SERPL-SCNC: 4.3 MMOL/L (ref 3.5–5.2)
SODIUM SERPL-SCNC: 139 MMOL/L (ref 136–145)

## 2025-07-04 PROCEDURE — 80069 RENAL FUNCTION PANEL: CPT | Performed by: INTERNAL MEDICINE

## 2025-07-04 PROCEDURE — 63710000001 PANTOPRAZOLE 40 MG TABLET DELAYED-RELEASE: Performed by: NURSE PRACTITIONER

## 2025-07-04 PROCEDURE — A9270 NON-COVERED ITEM OR SERVICE: HCPCS | Performed by: NURSE PRACTITIONER

## 2025-07-04 PROCEDURE — 63710000001 TAMSULOSIN 0.4 MG CAPSULE: Performed by: NURSE PRACTITIONER

## 2025-07-04 PROCEDURE — 63710000001 LEVOTHYROXINE 112 MCG TABLET: Performed by: NURSE PRACTITIONER

## 2025-07-04 PROCEDURE — 63710000001 MEMANTINE 10 MG TABLET: Performed by: NURSE PRACTITIONER

## 2025-07-04 PROCEDURE — 63710000001 LEVOTHYROXINE 25 MCG TABLET: Performed by: NURSE PRACTITIONER

## 2025-07-04 PROCEDURE — G0257 UNSCHED DIALYSIS ESRD PT HOS: HCPCS

## 2025-07-04 PROCEDURE — 63710000001 AMIODARONE 200 MG TABLET: Performed by: NURSE PRACTITIONER

## 2025-07-04 PROCEDURE — 63710000001 APIXABAN 2.5 MG TABLET: Performed by: NURSE PRACTITIONER

## 2025-07-04 PROCEDURE — 63710000001 SERTRALINE 100 MG TABLET: Performed by: NURSE PRACTITIONER

## 2025-07-04 RX ORDER — ALBUMIN (HUMAN) 12.5 G/50ML
12.5 SOLUTION INTRAVENOUS AS NEEDED
Status: DISCONTINUED | OUTPATIENT
Start: 2025-07-04 | End: 2025-07-04 | Stop reason: SDUPTHER

## 2025-07-04 RX ORDER — ALBUMIN (HUMAN) 12.5 G/50ML
12.5 SOLUTION INTRAVENOUS AS NEEDED
Status: DISCONTINUED | OUTPATIENT
Start: 2025-07-04 | End: 2025-07-04 | Stop reason: HOSPADM

## 2025-07-04 RX ORDER — ALBUMIN (HUMAN) 12.5 G/50ML
25 SOLUTION INTRAVENOUS AS NEEDED
Status: DISCONTINUED | OUTPATIENT
Start: 2025-07-04 | End: 2025-07-04 | Stop reason: HOSPADM

## 2025-07-04 RX ORDER — ROPIVACAINE HYDROCHLORIDE 2 MG/ML
1 INJECTION, SOLUTION EPIDURAL; INFILTRATION; PERINEURAL CONTINUOUS
Start: 2025-07-04

## 2025-07-04 RX ORDER — TRAZODONE HYDROCHLORIDE 50 MG/1
50 TABLET ORAL NIGHTLY
Start: 2025-07-04

## 2025-07-04 RX ADMIN — PANTOPRAZOLE SODIUM 40 MG: 40 TABLET, DELAYED RELEASE ORAL at 06:10

## 2025-07-04 RX ADMIN — LEVOTHYROXINE SODIUM 137 MCG: 0.03 TABLET ORAL at 06:10

## 2025-07-04 RX ADMIN — CIPROFLOXACIN 2 DROP: 3 SOLUTION OPHTHALMIC at 06:11

## 2025-07-04 RX ADMIN — APIXABAN 2.5 MG: 2.5 TABLET, FILM COATED ORAL at 12:03

## 2025-07-04 RX ADMIN — AMIODARONE HYDROCHLORIDE 100 MG: 200 TABLET ORAL at 12:02

## 2025-07-04 RX ADMIN — SERTRALINE 100 MG: 100 TABLET, FILM COATED ORAL at 12:03

## 2025-07-04 RX ADMIN — MEMANTINE HYDROCHLORIDE 10 MG: 10 TABLET, FILM COATED ORAL at 12:02

## 2025-07-04 RX ADMIN — CIPROFLOXACIN 2 DROP: 3 SOLUTION OPHTHALMIC at 13:22

## 2025-07-04 RX ADMIN — CIPROFLOXACIN 2 DROP: 3 SOLUTION OPHTHALMIC at 12:04

## 2025-07-04 RX ADMIN — TAMSULOSIN HYDROCHLORIDE 0.4 MG: 0.4 CAPSULE ORAL at 12:02

## 2025-07-04 NOTE — PROGRESS NOTES
"   LOS: 0 days    Patient Care Team:  lBair Dhaliwal MD as PCP - General (Internal Medicine)  Blair Packer MD as Consulting Physician (Cardiology)  Michelle Aponte MD (Nephrology)    Reason For Visit:    Subjective     Seen on HD tolerating well. HR stable in 80. Previously in Afib w RVR. Bp borderline SBP ~ 100.      Objective     Vital Signs:  Blood pressure 102/59, pulse 80, temperature 96.8 °F (36 °C), temperature source Axillary, resp. rate 16, height 165.1 cm (65\"), weight 57.2 kg (126 lb), SpO2 93%.    Flowsheet Rows      Flowsheet Row First Filed Value   Admission Height 165.1 cm (65\") Documented at 06/30/2025 0801   Admission Weight 57.2 kg (126 lb) Documented at 06/30/2025 0801            07/03 0701 - 07/04 0700  In: 120 [P.O.:120]  Out: -     Physical Exam:    GENERAL: WD elderly WM NAD  NEURO: Awake and alert,   + Dementia  PSYCHIATRIC: NMA. Cooperative with PE  EYE: PE, no icterus, no conjunctivitis  ENT: ommm, dentition intact,  Hearing intact  NECK:  No JVD discernable,  Trachea midline  CV: No edema, RRR  LUNGS:  Quiet,  Nonlabored resp.  Symmetrical expansion  ABDOMEN: Nondistended, soft nontender.  : No [place], no palp bladder  SKIN: Warm and dry without rash  RUE aVF  + Thrill       Labs:    Results from last 7 days   Lab Units 07/02/25  0755 06/30/25  0902 06/30/25  0841   WBC 10*3/mm3 9.49 6.51  --    HEMOGLOBIN g/dL 9.9* 12.0*  --    HEMOGLOBIN, POC g/dL  --   --  12.6   PLATELETS 10*3/mm3 183 192  --      Results from last 7 days   Lab Units 07/04/25  0804 07/02/25  0755 06/30/25  0836   SODIUM mmol/L 139 140 140   POTASSIUM mmol/L 4.3 4.9 4.2   CHLORIDE mmol/L 97* 100 97*   CO2 mmol/L 25.0 27.0 27.6   BUN mg/dL 39.0* 27.8* 30.5*   CREATININE mg/dL 5.58* 5.86* 6.74*   CALCIUM mg/dL 9.3 9.4 9.8*   PHOSPHORUS mg/dL 4.8*  --   --    ALBUMIN g/dL 3.7  --  3.8     Results from last 7 days   Lab Units 07/04/25  0804   GLUCOSE mg/dL 65     Results from last 7 days   Lab Units " 06/30/25  0836   ALK PHOS U/L 97   BILIRUBIN mg/dL 0.3   ALT (SGPT) U/L 11   AST (SGOT) U/L 19     Results from last 7 days   Lab Units 06/30/25  0841   PH, ARTERIAL pH units 7.39           A/P:      ESRD: On HD MWF with [facility] unit.  Patient here for scheduled shoulder surgery.  - Continue outpatient HD schedule    Afib w RVR: HR in  during HD treatment. 7/2/25.   On amiodarone. May need metoprolol PRN      HTN: Blood pressure initially quite elevated on admission.  Low side postsurgery.  - With HD.  - Support with albumin as indicated.     Electrolytes: Sodium and potassium are stable.     Acid-base: Bicarb has been stable.     Anemia: Hemoglobin above goal of 10-11.    Transfuse as indicated for Hgb <7.  -No JENNYFER at this time.  Can restart once Hgb <11     Volume: Stable.    -UF with HD as tolerated..  .  Post shoulder surgery 6/30/2025.     Thank you consulting us on Enrike Tomas who is of high risk and complexity.  We will follow along closely          Pancho Carpenter MD  07/04/25  10:17 EDT

## 2025-07-04 NOTE — PROGRESS NOTES
"  Orthopedic Daily Progress Note      CC: None    Denies pain  General: no fevers, chills  Abdomen: no nausea, vomiting, or diarrhea    No other complaints    Physical Exam:  I have reviewed the vital signs.  Temp:  [96.8 °F (36 °C)-98.5 °F (36.9 °C)] 98 °F (36.7 °C)  Heart Rate:  [67-80] 72  Resp:  [14-18] 14  BP: (102-157)/(55-82) 122/79    Objective:  Vital signs: (most recent): Blood pressure 122/79, pulse 72, temperature 98 °F (36.7 °C), temperature source Oral, resp. rate 14, height 165.1 cm (65\"), weight 57.2 kg (126 lb), SpO2 92%.              General Appearance:    Alert, cooperative, no distress  Extremities: No clubbing, cyanosis, or edema to lower extremities  Pulses:  2+ in distal surgical extremity  Skin: Dressing Clean/dry/intact      Results Review:    I have reviewed the labs, radiology results and diagnostic studies:    Results from last 7 days   Lab Units 07/02/25  0755   WBC 10*3/mm3 9.49   HEMOGLOBIN g/dL 9.9*   PLATELETS 10*3/mm3 183     Results from last 7 days   Lab Units 07/04/25  0804   SODIUM mmol/L 139   POTASSIUM mmol/L 4.3   CO2 mmol/L 25.0   CREATININE mg/dL 5.58*   GLUCOSE mg/dL 65       I have reviewed the medications.    Assessment/Problem List  POD# 4 S/p Right shoulder hemiarthroplasty    Plan  PT/OT  Ok to rehab  Bandage may be removed Monday and may shower and rinse incision with soap and water, no immersion  F/u with me 10 days.    Matteo Tan MD  07/04/25  12:08 EDT            "

## 2025-07-04 NOTE — DISCHARGE INSTRUCTIONS
EXOFIN CARING FOR YOUR WOUND    AFTER exofin Fusion SKIN CLOSURE SYSTEM HAS BEEN APPLIED    YOUR HEALTHCARE PROFESSIONAL HAS CHOSEN TO USE exofin Fusion SKIN CLOSURE SYSTEM TO CLOSE YOUR WOUND.    exocin Fusion is the combination of a mesh and liquid adhesive that allows the incision or wound to be held together during the healing process.    exocin Fusion should remain in place until your healthcare professional; has determined that adequate healing has occurred, which is usually anywhere between 7 to 14 days. In most cases, exofin Fusion is easily removed with little or no discomfort.    In the event that you notice that exofin Fusion is beginning to loosen or may be coming off, contact your healthcare professional.    The following information is provided to help you understand how to care for your incision and is based on the FDA-cleared product labeling.    You should always follow the instructions of your healthcare provider.    THINGS TO KNOW    BATHING OR SHOWERING    If directed by your healthcare professional, you may occasionally and briefly wet your incision or wound that was treated with exofin Fusion in the shower or bath. Do not soak or scrub your incision or wound. Do not swim or soak your incision or wound in water. After showering or bathing, gently blot your incision or wound dry with a soft towel. If a dry protective dressing is being used over exofin Fusion, it should be replaced with a fresh, dry protective dressing after showering or bathing as directed by your healthcare practitioner. Care should also be taken so that any tape that may be part of the dry protective dressing does not come into contact with exofin Fusion because when the tape is removed, it may also remove exofin Fusion.    WOUND HEALING  If you experience any redness, swelling, discomfort, warmth or pus, contact your healthcare professional and he or she will determine how your incision or wound is healing and take the  necessary steps to address any issues.    EXERCISE  Do not engage in strenuous exercise that may cause additional stress on your incision or wound. Follow your healthcare professional's guidance about when you can return to your normal activities.    OINTMENTS OR LIQUIDS  Topical ointments, liquids or any other products (other than dry bandages) should not be applied to the incision while exofin Fusion is in place. These may loosen exofin Fusion from the skin before it has completely healed.    REMOVING exofin Fusion  Your healthcare professional will determine when the healing process of your incision or wound has been completed and exofin Fusion is ready to be removed, which is usually between 7 to 14 days. When healing is complete, your healthcare professional will carefully peel off the exofin Fusion.    Prior to removal, do not scratch, rub or pick at the mesh. This may loosen the adhesive and mesh before the skin is healed.  In the event that you notice the exofin Fusion is beginning to loosen and may be coming off or comes off with the skin/wound, contract your healthcare professional.    For complete directions on the use of exofin Fusion, please refer to instructions for Use (IFU) included in the product packaging.  IF YOU HAVE ANY QUESTIONS OR CONCERNS ABOUT exofin Fusion PLEASE CONTACT YOUR HEALTHCARE PROFESSIONAL.InfuBLOCK - Patient Information    What is a pain pump?  The InfuBLOCK pump delivers post-operative, non-narcotic, numbing medication to the nerve near the surgical site for pain relief.     Where can I find information about my pain pump?           For more information about your pain pump, scan the QR code.  For additional patient resources, visit [email or web address]/resources-[email or web address].                                                                                               While your physician is your primary source for information about your treatment there may be times during your  treatment that you need assistance with your infusion pump.     If you need assistance take the following steps:    The InfuSystem Nursing Hotline is Here for You 24/7.  Please call [phone] for the following concerns or complications:    Answers to questions about your infusion pump                 Tubing disconnect  Assistance with pump alarms                                                      Dislodged catheter  Excessive leakage noted from pump                                         Inadequate pain control    2.   [facility] Anesthesia Acute Pain Service: [phone] is available 24/7 for any further needs or concerns about medication or pain control. Nerve Catheter Removal Instructions  When your device is empty:    Remove your catheter by pulling the dressing off slowly (like you would remove a regular bandage). The catheter should pull right out of the skin.  Check that the BLUE tip is intact.                                                                                     If the catheter is stuck, reposition your   extremity and pull slowly until removed.  *If catheter is HURTING and WON'T come out, stop and call [phone] for further assistance.    Remove medication bag from the black carrying case.  Cut the tubing on right and left side of pump, and discard the medication bag and tubing into garbage.  Place the pump and black carrying case into the plastic bag and then place this into the return box.  Seal box with blue stickers and return to [employer] postal service. THIS IS PRE-PAID POSTAGE.[facility] COLD THERAPY - PATIENT INSTRUCTION SHEET    Cold Compression Therapy for your comfort and rehabilitation  Your caregivers want you to be productive in your rehab and comfortable during your stay. In keeping with those goals, you will be receiving an [facility] Cold Therapy Wrap to help ease post-operative pain and swelling that might keep you from getting back on track! Your [facility] Cold Therapy Wrap is  effective and simple-to-use, and you will be encouraged to apply it throughout your hospital stay and at home through the duration of your recovery.    When you are ready to go home  Be sure to take your SMI Cold Therapy Wrap and both sets of Gel Bags with you for continued comfort and use throughout your rehabilitation. If you don't already have them, ask your nurse or aide to retrieve your SMI Gel Bags from the patient freezer.    Home use precautions  Always follow your medical professional's application instructions upon discharge. Your SMI Cold Therapy Wrap and Gel Bags are designed to last for months following your surgery. Never heat the Gel Bags unless specified by your healthcare provider. Supervision is advised when using this product on children or geriatric patients. To avoid danger of suffocation, please keep the outer plastic packaging away from children & pets.    Cold Therapy Instructions  Place Gel Bags in a freezer set ¾ of the way to max temperature for at least (4) hours. For best results, lay the Gel Bags flat and [id number] in the freezer. Once frozen, slide Gel Bags into the gel pouch and secure your wrap to the affected area with the straps.  Gel wraps that have been stored in a freezer for an extended period of time may require a (10) minute period of softening up in a room temperature environment before application.  The gel pouch acts as a protective barrier. NEVER place frozen bags directly onto skin, as this may cause frostbite injury.  The SMI Cold Therapy Wrap is designed to be able to be worm while ambulating. The compression straps can be secured well enough so that the Wrap won't fall off while moving.  Wrap Application Videos can be viewed at [email or web address].  An additional protective barrier such as clothing, a washcloth, hand-towel or pillowcase may be used during prolonged treatment applications.  The Gel-Pouch and Wrap are both Latex-Free and the Gel Bag ingredients  are non toxic.    SMI Wrap care instructions  The SMI Cold Therapy Wrap may be hand washed and hung to dry when needed.    [facility] re-order information  Additional SMI body specific wraps and/or Gel Bags can be re-ordered from [email or web address].[email or web address] or call 125-ICE-WRAP ([phone])  SMI COLD THERAPY - PATIENT INSTRUCTION SHEET    Cold Compression Therapy for your comfort and rehabilitation  Your caregivers want you to be productive in your rehab and comfortable during your stay. In keeping with those goals, you will be receiving an SMI Cold Therapy Wrap to help ease post-operative pain and swelling that might keep you from getting back on track! Your SMI Cold Therapy Wrap is effective and simple-to-use, and you will be encouraged to apply it throughout your hospital stay and at home through the duration of your recovery.    When you are ready to go home  Be sure to take your SMI Cold Therapy Wrap and both sets of Gel Bags with you for continued comfort and use throughout your rehabilitation. If you don't already have them, ask your nurse or aide to retrieve your SMI Gel Bags from the patient freezer.    Home use precautions  Always follow your medical professional's application instructions upon discharge. Your SMI Cold Therapy Wrap and Gel Bags are designed to last for months following your surgery. Never heat the Gel Bags unless specified by your healthcare provider. Supervision is advised when using this product on children or geriatric patients. To avoid danger of suffocation, please keep the outer plastic packaging away from children & pets.    Cold Therapy Instructions  Place Gel Bags in a freezer set ¾ of the way to max temperature for at least (4) hours. For best results, lay the Gel Bags flat and [id number] in the freezer. Once frozen, slide Gel Bags into the gel pouch and secure your wrap to the affected area with the straps.  Gel wraps that have been stored in a freezer for an extended period of time  may require a (10) minute period of softening up in a room temperature environment before application.  The gel pouch acts as a protective barrier. NEVER place frozen bags directly onto skin, as this may cause frostbite injury.  The [facility] Cold Therapy Wrap is designed to be able to be worm while ambulating. The compression straps can be secured well enough so that the Wrap won't fall off while moving.  Wrap Application Videos can be viewed at [email or web address].  An additional protective barrier such as clothing, a washcloth, hand-towel or pillowcase may be used during prolonged treatment applications.  The Gel-Pouch and Wrap are both Latex-Free and the Gel Bag ingredients are non toxic.    [facility] Wrap care instructions  The [facility] Cold Therapy Wrap may be hand washed and hung to dry when needed.    [facility] re-order information  Additional [facility] body specific wraps and/or Gel Bags can be re-ordered from [email or web address] or call [employer]-ICE-WRAP ([phone])

## 2025-07-04 NOTE — DISCHARGE SUMMARY
Patient Name: Enrike Tomas  MRN: [id number]  : 1935  DOS: 2025    Attending: Matteo Tan MD    Primary Care Provider: Blair Dhaliwal MD    Date of Admission:.2025  5:05 AM    Date of Discharge:  2025    Discharge Diagnosis:   S/P shoulder hemiarthroplasty, right    (HFpEF) heart failure with preserved ejection fraction    Benign prostatic hyperplasia    Depression    ESRD (end stage renal disease)    Essential hypertension    GERD (gastroesophageal reflux disease)      Hospital Course    At admit:    Patient is a pleasant 89 y.o. male presented for scheduled surgery by   He underwent right shoulder arthroplasty with right biceps tenodesis under GA and a block, tolerated surgery well, was admitted for further management.     Seen postoperatively, patient is doing well, good pain control, no complaints of nausea, vomiting, or shortness of breath.     Reviewed with patient past medical history and home medications- limited due to dementia     After admit:    Patient was provided pain medications as needed for pain control, along with interscalene nerve block infusion of Ropivacaine.    Adjustments were made to pain medications to optimize postop pain management.   Risks and benefits of opiate medications discussed with patient.  Jose report in chart was reviewed prior to discharge.    The patient was seen by OT and was taught exercises for his right arm.  The patient used an IS for atelectasis prophylaxis and mechanicals for DVT prophylaxis.    Home medications were resumed as appropriate, and labs were monitored and remained fairly stable.     He is on HD MWF and was dialyzed this morning.    With the progress he has made, he is ready for DC to rehab today.      The patient will have an interscalene nerve block ( instructed on it during this admit)  Discussed with patient regarding plan and he shows understanding and agreement.      Consultants:  Nephrology    Procedures  "Performed    DATE OF OPERATION: 25  PREOPERATIVE DIAGNOSIS: Right shoulder rotator cuff arthropathy.    POSTOPERATIVE DIAGNOSES:  1. Right shoulder rotator cuff arthropathy  2. Biceps tenosynovitis.    PROCEDURES PERFORMED:  1. Right shoulder hemiarthroplasty.    2. Right biceps tenodesis.    SURGEON: Matteo Tan MD    Pertinent Test Results:    I reviewed the patient's new clinical results.   Results from last 7 days   Lab Units 25  0755 25  0902 25  0841   WBC 10*3/mm3 9.49 6.51  --    HEMOGLOBIN g/dL 9.9* 12.0*  --    HEMOGLOBIN, POC g/dL  --   --  12.6   HEMATOCRIT % 30.1* 37.2*  --    HEMATOCRIT POC %  --   --  37*   PLATELETS 10*3/mm3 183 192  --      Results from last 7 days   Lab Units 25  0804 25  0755 25  0836   SODIUM mmol/L 139 140 140   POTASSIUM mmol/L 4.3 4.9 4.2   CHLORIDE mmol/L 97* 100 97*   CO2 mmol/L 25.0 27.0 27.6   BUN mg/dL 39.0* 27.8* 30.5*   CREATININE mg/dL 5.58* 5.86* 6.74*   CALCIUM mg/dL 9.3 9.4 9.8*   BILIRUBIN mg/dL  --   --  0.3   ALK PHOS U/L  --   --  97   ALT (SGPT) U/L  --   --  11   AST (SGOT) U/L  --   --  19   GLUCOSE mg/dL 65 87 73     I reviewed the patient's new imaging including images and reports.      Occupational Therapy: Pt alert and following commands, he tolerated R shoulder PROM , IR chest/ER 30. He remains limited with impaired balance, NWB/immobilization RUE, decreased strength and poor BUE integration.       Physical therapy: Pt continues to present below his functional baseline with weakness, impaired balance, cognitive deficits, and decreased endurance. He ambulated 10' with mod Ax2. Further IPPT is warrented. PT will progress as able per POC.       Discharge Assessment:    Vital Signs  Visit Vitals  /79 (BP Location: Left arm, Patient Position: Lying)   Pulse 72   Temp 98 °F (36.7 °C) (Oral)   Resp 14   Ht 165.1 cm (65\")   Wt 57.2 kg (126 lb)   SpO2 92%   BMI 20.97 kg/m²     Temp (24hrs), Av.8 °F (36.6 " °C), Min:96.8 °F (36 °C), Max:98.5 °F (36.9 °C)      General Appearance:    Alert, cooperative, in no acute distress   Lungs:     Clear to auscultation,respirations regular, even and   unlabored    Heart:    Regular rhythm and normal rate, normal S1 and S2    Abdomen:     Normal bowel sounds, no masses, no organomegaly, soft   non-tender, non-distended, no guarding, no rebound tenderness   Extremities:   RUE in a sling, CDI Aquacel dressing. Interscalene nerve block cath present. Distal pulses, cap refill, movements of fingers, wrist, intact.   Pulses:   Pulses palpable and equal bilaterally   Skin:   No bleeding, bruising or rash   Neurologic:   Cranial nerves 2 - 12 grossly intact       Discharge Disposition: [facility] rehab          Discharge Medications        New Medications        Instructions Start Date   ropivacaine 0.2 % infusion (INFUSYSTEM)  Commonly known as: NAROPIN   1 mL/hr (2 mg/hr), Peripheral Nerve, Continuous      traZODone 50 MG tablet  Commonly known as: DESYREL   50 mg, Oral, Nightly             Continue These Medications        Instructions Start Date   acetaminophen 500 MG tablet  Commonly known as: TYLENOL   500 mg, Oral, Every 6 Hours PRN, OTC      amiodarone 100 MG tablet  Commonly known as: PACERONE   100 mg, Daily      apixaban 2.5 MG tablet tablet  Commonly known as: ELIQUIS   2.5 mg, Oral, 2 Times Daily, Hold until you follow up with Dr. LANDAVERDE next week      atorvastatin 20 MG tablet  Commonly known as: LIPITOR   20 mg, Nightly      Buprenorphine 5 MCG/HR patch weekly transdermal patch  Commonly known as: BUTRANS   1 patch, Transdermal, Weekly      Diclofenac Sodium 1 % gel gel  Commonly known as: VOLTAREN   4 g, Topical, 2 Times Daily      donepezil 5 MG tablet  Commonly known as: ARICEPT   5 mg, Daily      fluticasone 50 MCG/ACT nasal spray  Commonly known as: FLONASE   2 sprays, Nasal, Daily PRN, OTC      levothyroxine 137 MCG tablet  Commonly known as: SYNTHROID, LEVOTHROID    137 mcg, Every Early Morning      losartan 25 MG tablet  Commonly known as: COZAAR   12.5 mg, As Needed      melatonin 5 MG tablet tablet   5 mg, Oral, Nightly PRN, OTC      memantine 10 MG tablet  Commonly known as: NAMENDA   10 mg, 2 Times Daily      midodrine 5 MG tablet  Commonly known as: PROAMATINE   5 mg, Oral, 3 Times Daily PRN      pantoprazole 40 MG EC tablet  Commonly known as: PROTONIX   40 mg, Daily      Jenny-Serg tablet   1 tablet, Daily      sennosides-docusate 8.6-50 MG per tablet  Commonly known as: PERICOLACE   1 tablet, Daily      sertraline 100 MG tablet  Commonly known as: ZOLOFT   100 mg, Daily      tamsulosin 0.4 MG capsule 24 hr capsule  Commonly known as: FLOMAX   1 capsule, Daily             Stop These Medications      clonazePAM 0.5 MG tablet  Commonly known as: KlonoPIN     HYDROcodone-acetaminophen 7.5-325 MG per tablet  Commonly known as: NORCO              Discharge Diet: regular diet      Activity at Discharge: RUE sling, NWB    Bandage may be removed Monday and may shower and rinse incision with soap and water, no immersion     Follow-up Appointments  Dr. Tan per his orders      Debbie Resendez, MERCEDEZ  07/04/25  13:02 EDT

## 2025-07-04 NOTE — PLAN OF CARE
Problem: Hemodialysis  Goal: Safe, Effective Therapy Delivery  Outcome: Progressing  Goal: Effective Tissue Perfusion  Outcome: Progressing  Goal: Absence of Infection Signs and Symptoms  Outcome: Progressing   Goal Outcome Evaluation:         HD complete, blood reinfused, report given to primary RN Mariama.    Fluid removal: 1.1L

## 2025-07-04 NOTE — CASE MANAGEMENT/SOCIAL WORK
Case Management Discharge Note      Final Note: Pt has been approved to transfer to [facility] with transportation by the facility at 1530. He will need to be downstairs in front of the [address] building by 1530. Report can be called to [phone]. Pt and family in agreement with plan. IMM given         Selected Continued Care - Admitted Since 6/30/2025       Destination Coordination complete.      Service Provider Services Address Phone Fax Patient Preferred    [facility] [place] [facility] [address] [address] [phone] --              Durable Medical Equipment    No services have been selected for the patient.                Dialysis/Infusion    No services have been selected for the patient.                Home Medical Care    No services have been selected for the patient.                Therapy    No services have been selected for the patient.                Community Resources    No services have been selected for the patient.                Community & DME    No services have been selected for the patient.                         Final Discharge Disposition Code: 03 - skilled nursing facility (SNF)

## 2025-07-04 NOTE — PLAN OF CARE
Goal Outcome Evaluation:      Pt. Is A&O to self and place, pt. Is confused on situation and time on and off. RA. BM 07/03. Fistulas remain intact. Infu block intact. Q2 turn. No pain reported. Scheduled Dialysis in AM.

## 2025-07-05 ENCOUNTER — HOSPITAL ENCOUNTER (EMERGENCY)
Facility: HOSPITAL | Age: OVER 89
Discharge: HOME | End: 2025-07-05
Attending: EMERGENCY MEDICINE
Payer: MEDICARE

## 2025-07-05 VITALS
RESPIRATION RATE: 18 BRPM | SYSTOLIC BLOOD PRESSURE: 121 MMHG | WEIGHT: 126 LBS | HEIGHT: 65 IN | OXYGEN SATURATION: 91 % | DIASTOLIC BLOOD PRESSURE: 63 MMHG | HEART RATE: 73 BPM | TEMPERATURE: 98.5 F | BODY MASS INDEX: 20.99 KG/M2

## 2025-07-05 DIAGNOSIS — Z98.890 HISTORY OF SHOULDER SURGERY: ICD-10-CM
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
## 2025-07-05 PROCEDURE — 99283 EMERGENCY DEPT VISIT LOW MDM: CPT

## 2025-07-05 NOTE — ED PROVIDER NOTES
"Subjective   History of Present Illness    Pt presents via EMS from rehab facility for dislodged anesthesia catheter.  He had a recent shoulder surgery and has an indwelling regional nerve block catheter with pump, reportedly supposed to be in place about another week but accidentally dislodged today.      Patient has dementia per EMS.  Patient denies any pain and says he feels just fine currently.    History provided by:  Patient and EMS personnel      Review of Systems    Past Medical History:   Diagnosis Date    A-fib     Abnormal ECG     occasional extra heartbeat    Anemia, acute on chronic 09/30/2023    Anesthesia complication     hallucinations upon waking, violent upon waking per pt report    Arthritis     Atrial fibrillation     BPH (benign prostatic hyperplasia)     Cervical compression fracture 06/05/2023    Dementia     Diverticulosis     Diverticulum of esophagus     puree diet and speech therapy    Does use hearing aid     Elevated cholesterol     ESRD on hemodialysis     GERD (gastroesophageal reflux disease)     Heart failure     Hemodialysis patient     M,W,F    Hyperlipidemia     Hypertension     Low back pain 2022    NERY (obstructive sleep apnea)     NOT USING CPAP, dental appliance    Recurrent falls     UTI (urinary tract infection)        Allergies   Allergen Reactions    Mirtazapine Other (See Comments)     \"Did not work well\"    Pollen Extract Other (See Comments)     sneezing       Past Surgical History:   Procedure Laterality Date    CATARACT EXTRACTION      COLONOSCOPY      DIALYSIS FISTULA CREATION      x2    ENDOSCOPY      INGUINAL HERNIA REPAIR      PROSTATE BIOPSY      SHOULDER HEMIARTHROPLASTY Right 6/30/2025    Procedure: SHOULDER HEMIARTHROPLASTY, BICEPS TENODESIS RIGHT;  Surgeon: Matteo Tan MD;  Location: [facility];  Service: Orthopedics;  Laterality: Right;    THROAT SURGERY      diverticulum in throat    TONSILLECTOMY AND ADENOIDECTOMY      TOTAL SHOULDER ARTHROPLASTY " W/ DISTAL CLAVICLE EXCISION Left 09/25/2023    Procedure: HEMIARTHROPLASTY, BICEPS TENODESIS - LEFT;  Surgeon: Matteo Tan MD;  Location: [facility];  Service: Orthopedics;  Laterality: Left;       Family History   Problem Relation Age of Onset    Stomach cancer Mother     Heart disease Mother     Arthritis Mother     Heart disease Father     Stroke Father     Kidney failure Father     Cancer Father     Kidney disease Father        Social History     Socioeconomic History    Marital status:    Tobacco Use    Smoking status: Never    Smokeless tobacco: Never   Vaping Use    Vaping status: Never Used   Substance and Sexual Activity    Alcohol use: Not Currently     Alcohol/week: 1.0 standard drink of alcohol     Types: 1 Glasses of wine per week     Comment: once monthly typically    Drug use: Never    Sexual activity: Defer           Objective   Physical Exam  Vitals and nursing note reviewed.   Constitutional:       General: He is not in acute distress.     Appearance: Normal appearance. He is not ill-appearing.   HENT:      Head: Normocephalic and atraumatic.   Eyes:      General: No scleral icterus.        Right eye: No discharge.         Left eye: No discharge.      Conjunctiva/sclera: Conjunctivae normal.   Cardiovascular:      Rate and Rhythm: Normal rate.   Pulmonary:      Effort: Pulmonary effort is normal. No respiratory distress.   Musculoskeletal:         General: No swelling or deformity.      Cervical back: Normal range of motion and neck supple.      Comments: R shoulder mild swelling.  Nerve block catheter from R neck site. Clean, dry. No infection.   Skin:     General: Skin is dry.      Findings: No rash.   Neurological:      General: No focal deficit present.      Mental Status: He is alert. Mental status is at baseline. He is disoriented.   Psychiatric:         Mood and Affect: Mood normal.         Behavior: Behavior normal.         Thought Content: Thought content normal.          Procedures           ED Course    I reviewed outside records. Operate note 6/30/25 notes a R shoulder hemiarthroplasty with R biceps tenodesis.  Per anesthesia notes same date a scalene nerve block was placed with ropivacaine via catheter.    I spoke with anesthesia pain service.  They said that the pumps are usually in place for about a week and since this is POD#6 that there is not an indication for replacement as it would be about to be stopped anyway.    Chart indicates he is also on buprenorphine. He has no complaints in the ED.  No treatment indicated.                                                   Medical Decision Making  Problems Addressed:  History of shoulder surgery: acute illness or injury  Medical device associated with adverse incidents: acute illness or injury    Amount and/or Complexity of Data Reviewed  External Data Reviewed: notes.  Discussion of management or test interpretation with external provider(s): anesthesia    Risk  Prescription drug management.        Final diagnoses:   Medical device associated with adverse incidents   History of shoulder surgery       ED Disposition  ED Disposition       ED Disposition   Discharge    Condition   Stable    Comment   --               No follow-up provider specified.       Medication List      No changes were made to your prescriptions during this visit.            Zach Connell MD  07/05/25 [id number]

## (undated) DEVICE — DRSNG SURG AQUACEL AG/ADVNTGE 9X15CM 3.5X6IN

## (undated) DEVICE — HANDPIECE SET WITH HIGH FLOW TIP AND SUCTION TUBE: Brand: INTERPULSE

## (undated) DEVICE — POSTN HD UNIV

## (undated) DEVICE — MICRO HVTSA, 0.5G AND HVTSA SOURCEMARK PRODUCT CODE M1206 AND M1206-01: Brand: EXOFIN MICRO HVTSA, 0.5G

## (undated) DEVICE — GLV SURG SENSICARE PI ORTHO SZ7.5 LF STRL

## (undated) DEVICE — ADHS SKIN PREMIERPRO EXOFIN TOPICAL HI/VISC .5ML

## (undated) DEVICE — SUT VIC 0 TIES 18IN J912G

## (undated) DEVICE — GOWN,NON-REINFORCED,SIRUS,SET IN SLV,XL: Brand: MEDLINE

## (undated) DEVICE — PATIENT RETURN ELECTRODE, SINGLE-USE, CONTACT QUALITY MONITORING, ADULT, WITH 9FT CORD, FOR PATIENTS WEIGING OVER 33LBS. (15KG): Brand: MEGADYNE

## (undated) DEVICE — ELECTRD BLD EZ CLN STD 2.5IN

## (undated) DEVICE — UNDERGLV SURG BIOGEL INDICAT PI SZ8 BLU

## (undated) DEVICE — STRYKER PERFORMANCE SERIES SAGITTAL BLADE: Brand: STRYKER PERFORMANCE SERIES

## (undated) DEVICE — SLNG ULTRSLING2 11TO13IN MD

## (undated) DEVICE — SPNG GZ WOVN 4X4IN 12PLY 10/BX STRL

## (undated) DEVICE — ANTIBACTERIAL UNDYED BRAIDED (POLYGLACTIN 910), SYNTHETIC ABSORBABLE SUTURE: Brand: COATED VICRYL

## (undated) DEVICE — Device

## (undated) DEVICE — BLANKT WARM LOWR/BDY 100X120CM

## (undated) DEVICE — ELECTRD BLD EZ CLN STD 6.5IN

## (undated) DEVICE — DRAPE,SHOULDER,BEACH CHAIR,STERILE: Brand: MEDLINE

## (undated) DEVICE — PENCL SMOKE/EVAC MEGADYNE TELESCP 10FT

## (undated) DEVICE — PK MAJ SHLDR SPLT 10

## (undated) DEVICE — SUT MONOCRYL PLS ANTIB UND 3/0  PS1 27IN

## (undated) DEVICE — SYR LUERLOK 30CC

## (undated) DEVICE — SKN PREP SPNG STKS PVP PNT STR: Brand: MEDLINE INDUSTRIES, INC.

## (undated) DEVICE — THIN FLEXIBLE NOZZLE: Brand: REVOLUTION

## (undated) DEVICE — KT PUMP INFUBLOCK MDL 2100 PMKITSOLIS

## (undated) DEVICE — TBG PENCL TELESCP MEGADYNE SMOKE EVAC 10FT

## (undated) DEVICE — GLV SURG SIGNATURE TOUCH PF LTX 8 STRL BX/50

## (undated) DEVICE — PKNG ABS BONE ORTHODRI 8IN